# Patient Record
Sex: FEMALE | Race: BLACK OR AFRICAN AMERICAN | NOT HISPANIC OR LATINO | Employment: FULL TIME | URBAN - METROPOLITAN AREA
[De-identification: names, ages, dates, MRNs, and addresses within clinical notes are randomized per-mention and may not be internally consistent; named-entity substitution may affect disease eponyms.]

---

## 2018-01-31 ENCOUNTER — HISTORICAL (OUTPATIENT)
Dept: LAB | Facility: HOSPITAL | Age: 23
End: 2018-01-31

## 2018-01-31 LAB
ABS NEUT (OLG): 1.9 X10(3)/MCL (ref 1.5–6.9)
BASOPHILS NFR BLD MANUAL: 0 % (ref 0–1)
CHOLEST SERPL-MCNC: 208 MG/DL (ref 140–200)
CHOLEST/HDLC SERPL: 3 MG/DL (ref 0–8)
EOSINOPHIL NFR BLD MANUAL: 1 % (ref 0–5)
ERYTHROCYTE [DISTWIDTH] IN BLOOD BY AUTOMATED COUNT: 13.3 % (ref 11.5–17)
EST. AVERAGE GLUCOSE BLD GHB EST-MCNC: 194 MG/DL
GRANULOCYTES NFR BLD MANUAL: 48 % (ref 47–80)
HBA1C MFR BLD: 8.4 % (ref 4.8–6)
HCT VFR BLD AUTO: 34.3 % (ref 36–48)
HDLC SERPL-MCNC: 73 MG/DL (ref 35–59)
HGB BLD-MCNC: 11.1 GM/DL (ref 12–16)
LDLC SERPL CALC-MCNC: 116 MG/DL (ref 100–129)
LYMPHOCYTES NFR BLD MANUAL: 49 % (ref 15–40)
MCH RBC QN AUTO: 28 PG (ref 27–34)
MCHC RBC AUTO-ENTMCNC: 32 GM/DL (ref 31–36)
MCV RBC AUTO: 86 FL (ref 80–99)
MICROCYTES BLD QL SMEAR: ABNORMAL
MONOCYTES NFR BLD MANUAL: 2 % (ref 4–12)
PLATELET # BLD AUTO: 452 X10(3)/MCL (ref 140–400)
PLATELET # BLD EST: ABNORMAL 10*3/UL
PMV BLD AUTO: 9 FL (ref 6.8–10)
POIKILOCYTOSIS BLD QL SMEAR: ABNORMAL
RBC # BLD AUTO: 4.01 X10(6)/MCL (ref 4.2–5.4)
RBC MORPH BLD: ABNORMAL
TRIGL SERPL-MCNC: 101 MG/DL (ref 35–150)
TSH SERPL-ACNC: 1.5 MIU/ML (ref 0.36–3.74)
VLDLC SERPL CALC-MCNC: 20 MG/DL
WBC # SPEC AUTO: 4.8 X10(3)/MCL (ref 4.5–11.5)

## 2022-02-08 ENCOUNTER — HISTORICAL (OUTPATIENT)
Dept: ADMINISTRATIVE | Facility: HOSPITAL | Age: 27
End: 2022-02-08

## 2022-02-13 ENCOUNTER — HISTORICAL (OUTPATIENT)
Dept: ADMINISTRATIVE | Facility: HOSPITAL | Age: 27
End: 2022-02-13

## 2022-02-17 ENCOUNTER — HISTORICAL (OUTPATIENT)
Dept: ADMINISTRATIVE | Facility: HOSPITAL | Age: 27
End: 2022-02-17

## 2022-02-17 ENCOUNTER — HOSPITAL ENCOUNTER (OUTPATIENT)
Dept: MEDSURG UNIT | Facility: HOSPITAL | Age: 27
End: 2022-02-18
Attending: INTERNAL MEDICINE | Admitting: INTERNAL MEDICINE

## 2022-02-17 LAB
ABS NEUT (OLG): 12.33 (ref 2.1–9.2)
ALBUMIN SERPL-MCNC: 3.4 G/DL (ref 3.5–5)
ALBUMIN/GLOB SERPL: 0.9 {RATIO} (ref 1.1–2)
ALP SERPL-CCNC: 81 U/L (ref 40–150)
ALT SERPL-CCNC: 10 U/L (ref 0–55)
APPEARANCE, UA: CLEAR
AST SERPL-CCNC: 15 U/L (ref 5–34)
B-OH-BUTYR SERPL-MCNC: 0.38 MG/DL
BACTERIA SPEC CULT: NORMAL
BASOPHILS # BLD AUTO: 0 10*3/UL (ref 0–0.2)
BASOPHILS NFR BLD AUTO: 0 %
BILIRUB SERPL-MCNC: 0.6 MG/DL
BILIRUB UR QL STRIP: NEGATIVE
BILIRUBIN DIRECT+TOT PNL SERPL-MCNC: 0.3 (ref 0–0.5)
BILIRUBIN DIRECT+TOT PNL SERPL-MCNC: 0.3 (ref 0–0.8)
BUN SERPL-MCNC: 3.5 MG/DL (ref 7–18.7)
BUN SERPL-MCNC: 5.3 MG/DL (ref 7–18.7)
CALCIUM SERPL-MCNC: 8.5 MG/DL (ref 8.7–10.5)
CALCIUM SERPL-MCNC: 9.9 MG/DL (ref 8.7–10.5)
CBG: 102 (ref 70–115)
CBG: 160 (ref 70–115)
CBG: 337 (ref 70–115)
CBG: 437 (ref 70–115)
CHLORIDE SERPL-SCNC: 83 MMOL/L (ref 98–107)
CHLORIDE SERPL-SCNC: 93 MMOL/L (ref 98–107)
CK MB SERPL-MCNC: 0.3 NG/ML
CK SERPL-CCNC: 41 U/L (ref 29–168)
CO2 SERPL-SCNC: 31 MMOL/L (ref 22–29)
CO2 SERPL-SCNC: 34 MMOL/L (ref 22–29)
COLOR UR: NORMAL
CREAT SERPL-MCNC: 0.82 MG/DL (ref 0.55–1.02)
CREAT SERPL-MCNC: 1.41 MG/DL (ref 0.55–1.02)
CREAT/UREA NIT SERPL: 4
EOSINOPHIL # BLD AUTO: 0 10*3/UL (ref 0–0.9)
EOSINOPHIL NFR BLD AUTO: 0 %
ERYTHROCYTE [DISTWIDTH] IN BLOOD BY AUTOMATED COUNT: 12.5 % (ref 11.5–14.5)
FLAG2 (OHS): 70
FLAG3 (OHS): 80
FLAGS (OHS): 80
GLOBULIN SER-MCNC: 3.8 G/DL (ref 2.4–3.5)
GLUCOSE (UA): NORMAL
GLUCOSE SERPL-MCNC: 453 MG/DL (ref 74–100)
GLUCOSE SERPL-MCNC: 98 MG/DL (ref 74–100)
HCT VFR BLD AUTO: 33 % (ref 35–46)
HEMOLYSIS INTERF INDEX SERPL-ACNC: 1
HEMOLYSIS INTERF INDEX SERPL-ACNC: 3
HEMOLYSIS INTERF INDEX SERPL-ACNC: 6
HGB BLD-MCNC: 11.1 G/DL (ref 12–16)
HGB UR QL STRIP: NEGATIVE
HYALINE CASTS #/AREA URNS LPF: NORMAL /[LPF]
ICTERIC INTERF INDEX SERPL-ACNC: 1
ICTERIC INTERF INDEX SERPL-ACNC: 1
IMM GRANULOCYTES # BLD AUTO: 0.15 10*3/UL
IMM GRANULOCYTES NFR BLD AUTO: 1 %
IMM. NE 1 SUSPECT FLAG (OHS): 20
IMM. NE 2 SUSPECT FLAG (OHS): 10
KETONES UR QL STRIP: NORMAL
LEUKOCYTE ESTERASE UR QL STRIP: NEGATIVE
LIPASE SERPL-CCNC: 22 U/L
LIPEMIC INTERF INDEX SERPL-ACNC: 10
LIPEMIC INTERF INDEX SERPL-ACNC: 8
LOW EVENT # SUSPECT FLAG (OHS): 80
LYMPHOCYTES # BLD AUTO: 1.9 10*3/UL (ref 0.6–4.6)
LYMPHOCYTES NFR BLD AUTO: 12 %
MAGNESIUM SERPL-MCNC: 1.3 MG/DL (ref 1.6–2.6)
MANUAL DIFF? (OHS): NO
MCH RBC QN AUTO: 28.3 PG (ref 26–34)
MCHC RBC AUTO-ENTMCNC: 33.6 G/DL (ref 31–37)
MCV RBC AUTO: 84.2 FL (ref 80–100)
MO BLASTS SUSPECT FLAG (OHS): 40
MONOCYTES # BLD AUTO: 1.1 10*3/UL (ref 0.1–1.3)
MONOCYTES NFR BLD AUTO: 7 %
MUCOUS THREADS URNS QL MICRO: NORMAL
NEUTROPHILS # BLD AUTO: 12.33 10*3/UL (ref 2.1–9.2)
NEUTROPHILS NFR BLD AUTO: 79 %
NITRITE UR QL STRIP: NEGATIVE
NRBC BLD AUTO-RTO: 0 % (ref 0–0.2)
PH UR STRIP: 6.5 [PH] (ref 4.5–8)
PHOSPHATE SERPL-MCNC: 3.1 MG/DL (ref 2.3–4.7)
PLATELET # BLD AUTO: 739 10*3/UL (ref 130–400)
PLATELET CLUMPS SUSPECT FLAG (OHS): 10
PMV BLD AUTO: 8.7 FL (ref 7.4–10.4)
POTASSIUM SERPL-SCNC: 2.2 MMOL/L (ref 3.5–5.1)
POTASSIUM SERPL-SCNC: 2.5 MMOL/L (ref 3.5–5.1)
PROT SERPL-MCNC: 7.2 G/DL (ref 6.4–8.3)
PROT UR QL STRIP: NORMAL
RBC # BLD AUTO: 3.92 10*6/UL (ref 4–5.2)
RBC #/AREA URNS HPF: NORMAL /[HPF] (ref 0–5)
SARS-COV-2 AG RESP QL IA.RAPID: NEGATIVE
SODIUM SERPL-SCNC: 128 MMOL/L (ref 136–145)
SODIUM SERPL-SCNC: 137 MMOL/L (ref 136–145)
SP GR UR STRIP: 1.01 (ref 1–1.03)
SQUAMOUS EPITHELIAL, UA: NORMAL
TROPONIN I SERPL-MCNC: 0.01 NG/ML (ref 0–0.04)
UROBILINOGEN UR STRIP-ACNC: NORMAL
WBC # SPEC AUTO: 15.6 10*3/UL (ref 4.5–11)
WBC #/AREA URNS HPF: NORMAL /[HPF] (ref 0–5)

## 2022-02-18 LAB
ABS NEUT (OLG): 5.91 (ref 2.1–9.2)
ALBUMIN SERPL-MCNC: 2.5 G/DL (ref 3.5–5)
ALBUMIN/GLOB SERPL: 0.9 {RATIO} (ref 1.1–2)
ALP SERPL-CCNC: 61 U/L (ref 40–150)
ALT SERPL-CCNC: 8 U/L (ref 0–55)
AST SERPL-CCNC: 14 U/L (ref 5–34)
BASOPHILS # BLD AUTO: 0 10*3/UL (ref 0–0.2)
BASOPHILS NFR BLD AUTO: 0 %
BILIRUB SERPL-MCNC: 0.3 MG/DL
BILIRUBIN DIRECT+TOT PNL SERPL-MCNC: 0.1 (ref 0–0.5)
BILIRUBIN DIRECT+TOT PNL SERPL-MCNC: 0.2 (ref 0–0.8)
BUN SERPL-MCNC: 3.5 MG/DL (ref 7–18.7)
CALCIUM SERPL-MCNC: 8.3 MG/DL (ref 8.7–10.5)
CBG: 143 (ref 70–115)
CBG: 94 (ref 70–115)
CHLORIDE SERPL-SCNC: 99 MMOL/L (ref 98–107)
CO2 SERPL-SCNC: 32 MMOL/L (ref 22–29)
CREAT SERPL-MCNC: 0.76 MG/DL (ref 0.55–1.02)
EOSINOPHIL # BLD AUTO: 0.2 10*3/UL (ref 0–0.9)
EOSINOPHIL NFR BLD AUTO: 2 %
ERYTHROCYTE [DISTWIDTH] IN BLOOD BY AUTOMATED COUNT: 12.9 % (ref 11.5–14.5)
FLAG2 (OHS): 70
FLAG3 (OHS): 80
FLAGS (OHS): 80
GLOBULIN SER-MCNC: 2.8 G/DL (ref 2.4–3.5)
GLUCOSE SERPL-MCNC: 110 MG/DL (ref 74–100)
HCT VFR BLD AUTO: 30 % (ref 35–46)
HEMOLYSIS INTERF INDEX SERPL-ACNC: 11
HGB BLD-MCNC: 9.9 G/DL (ref 12–16)
ICTERIC INTERF INDEX SERPL-ACNC: 0
IMM GRANULOCYTES # BLD AUTO: 0.08 10*3/UL
IMM GRANULOCYTES NFR BLD AUTO: 1 %
IMM. NE 1 SUSPECT FLAG (OHS): 10
IMM. NE 2 SUSPECT FLAG (OHS): 10
LIPEMIC INTERF INDEX SERPL-ACNC: 7
LOW EVENT # SUSPECT FLAG (OHS): 80
LYMPHOCYTES # BLD AUTO: 2.5 10*3/UL (ref 0.6–4.6)
LYMPHOCYTES NFR BLD AUTO: 26 %
MANUAL DIFF? (OHS): NO
MCH RBC QN AUTO: 28.2 PG (ref 26–34)
MCHC RBC AUTO-ENTMCNC: 33 G/DL (ref 31–37)
MCV RBC AUTO: 85.5 FL (ref 80–100)
MO BLASTS SUSPECT FLAG (OHS): 40
MONOCYTES # BLD AUTO: 1 10*3/UL (ref 0.1–1.3)
MONOCYTES NFR BLD AUTO: 10 %
NEUTROPHILS # BLD AUTO: 5.91 10*3/UL (ref 2.1–9.2)
NEUTROPHILS NFR BLD AUTO: 61 %
NRBC BLD AUTO-RTO: 0 % (ref 0–0.2)
PLATELET # BLD AUTO: 610 10*3/UL (ref 130–400)
PMV BLD AUTO: 8.3 FL (ref 7.4–10.4)
POTASSIUM SERPL-SCNC: 3.8 MMOL/L (ref 3.5–5.1)
PROT SERPL-MCNC: 5.3 G/DL (ref 6.4–8.3)
RBC # BLD AUTO: 3.51 10*6/UL (ref 4–5.2)
SODIUM SERPL-SCNC: 139 MMOL/L (ref 136–145)
WBC # SPEC AUTO: 9.6 10*3/UL (ref 4.5–11)

## 2022-03-22 ENCOUNTER — HOSPITAL ENCOUNTER (OUTPATIENT)
Dept: MEDSURG UNIT | Facility: HOSPITAL | Age: 27
End: 2022-03-24
Attending: INTERNAL MEDICINE | Admitting: INTERNAL MEDICINE

## 2022-03-22 LAB
ABS NEUT (OLG): 7.5 (ref 1.5–6.9)
ALBUMIN SERPL-MCNC: 3 G/DL (ref 3.5–5)
ALBUMIN SERPL-MCNC: 3.4 G/DL (ref 3.5–5)
ALBUMIN/GLOB SERPL: 0.9 {RATIO} (ref 1.1–2)
ALBUMIN/GLOB SERPL: 1 {RATIO} (ref 1.1–2)
ALP SERPL-CCNC: 65 U/L (ref 40–150)
ALP SERPL-CCNC: 73 U/L (ref 40–150)
ALT SERPL-CCNC: 9 U/L (ref 0–55)
ALT SERPL-CCNC: 9 U/L (ref 0–55)
APPEARANCE, UA: CLEAR
AST SERPL-CCNC: 13 U/L (ref 5–34)
AST SERPL-CCNC: 19 U/L (ref 5–34)
B-HCG SERPL QL: NEGATIVE
BASOPHILS # BLD AUTO: 0 10*3/UL (ref 0–0.1)
BASOPHILS NFR BLD AUTO: 0 % (ref 0–1)
BILIRUB SERPL-MCNC: 0.7 MG/DL
BILIRUB SERPL-MCNC: 0.9 MG/DL
BILIRUB UR QL STRIP: NEGATIVE
BILIRUBIN DIRECT+TOT PNL SERPL-MCNC: 0.2 (ref 0–0.5)
BILIRUBIN DIRECT+TOT PNL SERPL-MCNC: 0.3 (ref 0–0.5)
BILIRUBIN DIRECT+TOT PNL SERPL-MCNC: 0.5 (ref 0–0.8)
BILIRUBIN DIRECT+TOT PNL SERPL-MCNC: 0.6 (ref 0–0.8)
BUN SERPL-MCNC: 12 MG/DL (ref 7–18.7)
BUN SERPL-MCNC: 13 MG/DL (ref 7–18.7)
CALCIUM SERPL-MCNC: 8.3 MG/DL (ref 8.7–10.5)
CALCIUM SERPL-MCNC: 9.1 MG/DL (ref 8.7–10.5)
CBG: 144 (ref 70–115)
CBG: 255 (ref 70–115)
CBG: 302 (ref 70–115)
CBG: 334 (ref 70–115)
CBG: 51 (ref 70–115)
CBG: 61 (ref 70–115)
CHLORIDE SERPL-SCNC: 90 MMOL/L (ref 98–107)
CHLORIDE SERPL-SCNC: 93 MMOL/L (ref 98–107)
CO2 SERPL-SCNC: 29 MMOL/L (ref 22–29)
CO2 SERPL-SCNC: 33 MMOL/L (ref 22–29)
COLOR UR: YELLOW
CREAT SERPL-MCNC: 0.8 MG/DL (ref 0.55–1.02)
CREAT SERPL-MCNC: 1.04 MG/DL (ref 0.55–1.02)
CRP SERPL HS-MCNC: 0.05 MG/L
DO MICRO?: YES
EOSINOPHIL # BLD AUTO: 0 10*3/UL (ref 0–0.6)
EOSINOPHIL NFR BLD AUTO: 0 % (ref 0–5)
ERYTHROCYTE [DISTWIDTH] IN BLOOD BY AUTOMATED COUNT: 13.4 % (ref 11.5–17)
ERYTHROCYTE [SEDIMENTATION RATE] IN BLOOD: 63 MM/H (ref 0–20)
GLOBULIN SER-MCNC: 3 G/DL (ref 2.4–3.5)
GLOBULIN SER-MCNC: 3.7 G/DL (ref 2.4–3.5)
GLUCOSE (UA): >=1000
GLUCOSE SERPL-MCNC: 262 MG/DL (ref 74–100)
GLUCOSE SERPL-MCNC: 402 MG/DL (ref 74–100)
HCG UR QL IA.RAPID: NEGATIVE
HCG UR QL IA.RAPID: POSITIVE
HCT VFR BLD AUTO: 34.7 % (ref 36–48)
HEMOLYSIS INTERF INDEX SERPL-ACNC: 0
HGB BLD-MCNC: 11.5 G/DL (ref 12–16)
HGB UR QL STRIP: NORMAL
ICTERIC INTERF INDEX SERPL-ACNC: 1
ICTERIC INTERF INDEX SERPL-ACNC: 1
IMM GRANULOCYTES # BLD AUTO: 0.05 10*3/UL (ref 0–0.02)
IMM GRANULOCYTES NFR BLD AUTO: 0.5 % (ref 0–0.43)
KETONES UR QL STRIP: 40
LACTATE SERPL-SCNC: 1.8 MMOL/L (ref 0.5–2.2)
LEUKOCYTE ESTERASE UR QL STRIP: NEGATIVE
LIPASE SERPL-CCNC: 17 U/L
LIPEMIC INTERF INDEX SERPL-ACNC: 0
LIPEMIC INTERF INDEX SERPL-ACNC: 2
LYMPHOCYTES # BLD AUTO: 1.7 10*3/UL (ref 0.5–4.1)
LYMPHOCYTES NFR BLD AUTO: 17 % (ref 15–40)
MAGNESIUM SERPL-MCNC: 1.5 MG/DL (ref 1.6–2.6)
MANUAL DIFF? (OHS): NO
MCH RBC QN AUTO: 28 PG (ref 27–34)
MCHC RBC AUTO-ENTMCNC: 33 G/DL (ref 31–36)
MCV RBC AUTO: 86 FL (ref 80–99)
MONOCYTES # BLD AUTO: 0.6 10*3/UL (ref 0–1.1)
MONOCYTES NFR BLD AUTO: 6 % (ref 4–12)
NEUTROPHILS # BLD AUTO: 7.5 10*3/UL (ref 1.5–6.9)
NEUTROPHILS NFR BLD AUTO: 76 % (ref 43–75)
NITRITE UR QL STRIP: NEGATIVE
PH UR STRIP: 7 [PH] (ref 4.6–8)
PHOSPHATE SERPL-MCNC: 2.5 MG/DL (ref 2.3–4.7)
PLATELET # BLD AUTO: 516 10*3/UL (ref 140–400)
PMV BLD AUTO: 9.3 FL (ref 6.8–10)
POTASSIUM SERPL-SCNC: 2.5 MMOL/L (ref 3.5–5.1)
POTASSIUM SERPL-SCNC: 2.9 MMOL/L (ref 3.5–5.1)
PROT SERPL-MCNC: 6 G/DL (ref 6.4–8.3)
PROT SERPL-MCNC: 7.1 G/DL (ref 6.4–8.3)
PROT UR QL STRIP: >=300
RBC # BLD AUTO: 4.06 10*6/UL (ref 4.2–5.4)
SARS-COV-2 AG RESP QL IA.RAPID: NOT DETECTED
SODIUM SERPL-SCNC: 134 MMOL/L (ref 136–145)
SODIUM SERPL-SCNC: 136 MMOL/L (ref 136–145)
SP GR UR STRIP: 1.02 (ref 1–1.03)
UROBILINOGEN UR STRIP-ACNC: 0.2
WBC # SPEC AUTO: 9.9 10*3/UL (ref 4.5–11.5)

## 2022-03-23 LAB
ABS NEUT (OLG): 5.2 (ref 1.5–6.9)
ALBUMIN SERPL-MCNC: 2.7 G/DL (ref 3.5–5)
ALBUMIN/GLOB SERPL: 0.9 {RATIO} (ref 1.1–2)
ALP SERPL-CCNC: 67 U/L (ref 40–150)
ALT SERPL-CCNC: 7 U/L (ref 0–55)
AST SERPL-CCNC: 15 U/L (ref 5–34)
BASOPHILS # BLD AUTO: 0 10*3/UL (ref 0–0.1)
BASOPHILS NFR BLD AUTO: 0 % (ref 0–1)
BILIRUB SERPL-MCNC: 0.5 MG/DL
BILIRUBIN DIRECT+TOT PNL SERPL-MCNC: 0.2 (ref 0–0.5)
BILIRUBIN DIRECT+TOT PNL SERPL-MCNC: 0.3 (ref 0–0.8)
BUN SERPL-MCNC: 7 MG/DL (ref 7–18.7)
BUN SERPL-MCNC: 8 MG/DL (ref 7–18.7)
CALCIUM SERPL-MCNC: 7.9 MG/DL (ref 8.7–10.5)
CALCIUM SERPL-MCNC: 8.2 MG/DL (ref 8.7–10.5)
CBG: 115 (ref 70–115)
CBG: 118 (ref 70–115)
CBG: 124 (ref 70–115)
CBG: 130 (ref 70–115)
CBG: 171 (ref 70–115)
CBG: 42 (ref 70–115)
CBG: 59 (ref 70–115)
CBG: 63 (ref 70–115)
CBG: 90 (ref 70–115)
CHLORIDE SERPL-SCNC: 100 MMOL/L (ref 98–107)
CHLORIDE SERPL-SCNC: 101 MMOL/L (ref 98–107)
CO2 SERPL-SCNC: 30 MMOL/L (ref 22–29)
CO2 SERPL-SCNC: 33 MMOL/L (ref 22–29)
CREAT SERPL-MCNC: 0.69 MG/DL (ref 0.55–1.02)
CREAT SERPL-MCNC: 0.71 MG/DL (ref 0.55–1.02)
CREAT/UREA NIT SERPL: 10
EOSINOPHIL # BLD AUTO: 0.1 10*3/UL (ref 0–0.6)
EOSINOPHIL NFR BLD AUTO: 1 % (ref 0–5)
ERYTHROCYTE [DISTWIDTH] IN BLOOD BY AUTOMATED COUNT: 13.4 % (ref 11.5–17)
GLOBULIN SER-MCNC: 2.9 G/DL (ref 2.4–3.5)
GLUCOSE SERPL-MCNC: 155 MG/DL (ref 74–100)
GLUCOSE SERPL-MCNC: 184 MG/DL (ref 74–100)
HCT VFR BLD AUTO: 28.7 % (ref 36–48)
HEMOLYSIS INTERF INDEX SERPL-ACNC: 2
HEMOLYSIS INTERF INDEX SERPL-ACNC: 2
HEMOLYSIS INTERF INDEX SERPL-ACNC: 9
HGB BLD-MCNC: 9.5 G/DL (ref 12–16)
ICTERIC INTERF INDEX SERPL-ACNC: 0
ICTERIC INTERF INDEX SERPL-ACNC: 1
IMM GRANULOCYTES # BLD AUTO: 0.03 10*3/UL (ref 0–0.02)
IMM GRANULOCYTES NFR BLD AUTO: 0.4 % (ref 0–0.43)
LIPEMIC INTERF INDEX SERPL-ACNC: 3
LIPEMIC INTERF INDEX SERPL-ACNC: 5
LYMPHOCYTES # BLD AUTO: 1.8 10*3/UL (ref 0.5–4.1)
LYMPHOCYTES NFR BLD AUTO: 23 % (ref 15–40)
MAGNESIUM SERPL-MCNC: 2.5 MG/DL (ref 1.6–2.6)
MANUAL DIFF? (OHS): NO
MCH RBC QN AUTO: 28 PG (ref 27–34)
MCHC RBC AUTO-ENTMCNC: 33 G/DL (ref 31–36)
MCV RBC AUTO: 85 FL (ref 80–99)
MONOCYTES # BLD AUTO: 0.7 10*3/UL (ref 0–1.1)
MONOCYTES NFR BLD AUTO: 9 % (ref 4–12)
NEUTROPHILS # BLD AUTO: 5.2 10*3/UL (ref 1.5–6.9)
NEUTROPHILS NFR BLD AUTO: 66 % (ref 43–75)
PLATELET # BLD AUTO: 435 10*3/UL (ref 140–400)
PMV BLD AUTO: 8.7 FL (ref 6.8–10)
POTASSIUM SERPL-SCNC: 2.4 MMOL/L (ref 3.5–5.1)
POTASSIUM SERPL-SCNC: 3.4 MMOL/L (ref 3.5–5.1)
PROT SERPL-MCNC: 5.6 G/DL (ref 6.4–8.3)
RBC # BLD AUTO: 3.39 10*6/UL (ref 4.2–5.4)
SODIUM SERPL-SCNC: 138 MMOL/L (ref 136–145)
SODIUM SERPL-SCNC: 139 MMOL/L (ref 136–145)
WBC # SPEC AUTO: 7.9 10*3/UL (ref 4.5–11.5)

## 2022-03-24 LAB
CBG: 147 (ref 70–115)
CBG: 170 (ref 70–115)
CBG: 47 (ref 70–115)
CBG: 63 (ref 70–115)

## 2022-04-22 ENCOUNTER — HISTORICAL (OUTPATIENT)
Dept: ADMINISTRATIVE | Facility: HOSPITAL | Age: 27
End: 2022-04-22

## 2022-05-14 NOTE — DISCHARGE SUMMARY
Admit and Discharge Dates  Admit Date: 03/24/2022  Discharge Date: 03/24/2022  Physicians  Attending Physician - Tiny ALLEN, Ozzy  Admitting Physician - Tiny ALLEN, Ozzy  Primary Care Physician - Physician MD, Non Staff  Primary Care Physician - Jerson ALLEN, Landen  Discharge Diagnosis  Back pain?[id number]  ?  Chronic bilateral thoracic back pain?M54.6  ?  Diabetic gastroparesis?E11.43  ?  Hypokalemia?E87.6  ?  Hypomagnesemia?E83.42  ?  Surgical Procedures  No procedures recorded for this visit.  Immunizations  01/14/2022 - COVID-19 mRNA, LNP-S, PF - Moderna?  Hospital Course  26-year-old?female with a past medical history of?type 1 diabetes,?acute kidney injury, dehydration?diabetic gastroparesis?presented to emergency room?with significant nausea vomiting?and throwing up. ?Did not get any better.  Upon arrival to emergency room noticed to be dehydrated, potassium was?low as well. ?Patient was admitted to the hospital for further evaluation management  IV fluid,She was placed IV medication for nausea and vomiting continued home medications.  She was treated for couple of days and slowly was started on p.o. medication.? She was started on p.o. diet as well which she tolerated  Patient was seen evaluated again on March 24, 2022.? That time she was at her baseline.  Patient states she has a long history of gastroparesis.? She has been taking Reglan in the past has been discontinued due to side effects.  We will discharge patient home with some p.o. treatment for nausea and vomiting, gastritis.  Patient is to follow-up with primary care physicians and be referred to a gastroenterologist for further evaluation management.  If patient is not a candidate for any Reglan and continues to be symptomatic recommend follow-up with GI and possible gastric pacing.?  ?  Significant Findings  hypokalemia  Time Spent on discharge  34 minutes  Objective  Vitals & Measurements  T:?36.5? ?C (Oral)?  TMIN:?36.5? ?C (Oral)? TMAX:?37? ?C (Oral)? HR:?84(Peripheral)? RR:?18? BP:?123/84? SpO2:?100%?  Physical Exam  VITAL SIGNS: ?Reviewed. ? ?  GENERAL: ?In no apparent distress. ?  HEAD: ?No signs of head trauma.  EYES: ?Pupils are equal. ?Extraocular motions intact. ?  EARS: ?Hearing grossly intact.  MOUTH: ?Oropharynx is normal.?  NECK: ?No adenopathy, no JVD. ??  CHEST: ?Chest with clear breath sounds bilaterally. ?No wheezes, rales, or rhonchi. ?  CARDIAC: ?Regular rate and rhythm. ?S1 and S2, without murmurs, gallops, or rubs.  VASCULAR: ?No Edema. ?Peripheral pulses normal and equal in all extremities.  ABDOMEN:?Soft, mild distention, positive bowel sounds and epigastric discomfort.  MUSCULOSKELETAL: ?Good range of motion of all major joints. Extremities without clubbing, cyanosis or edema. ?  NEUROLOGIC EXAM: ?Alert and oriented x 3. ?No focal sensory or strength deficits. ? Speech normal. ?Follows commands.  PSYCHIATRIC: ?Mood normal.  SKIN: ?No rash or lesions.  ?  Patient Discharge Condition  stable  Discharge Disposition  home   Discharge Medication Reconciliation  Prescribed  erythromycin (erythromycin 250 mg oral tablet)?250 mg, Oral, q8hr  insulin glargine (insulin glargine 100 units/mL subcutaneous solution)?40 units, Subcutaneous, At Bedtime  ondansetron (ondansetron 4 mg oral tablet)?4 mg, Oral, q6hr  Continue  dicyclomine (dicyclomine 10 mg oral capsule)?10 mg, Oral, QID  insulin lispro (HumaLOG Cartridge 100 units/mL injectable solution)?7 units, Subcutaneous, TIDAC  metoprolol (metoprolol tartrate 25 mg oral tab)?25 mg, Oral, BID  potassium chloride (potassium chloride 20 mEq oral powder)?40 mEq, Oral, Once  sucralfate (Carafate 1 g/10 mL oral suspension)?1 gm, Oral, AC & HS  sucralfate (Carafate 1 g/10 mL oral suspension)?1 gm, Oral, AC & HS  Discontinue  insulin glargine (Lantus 100 units/mL subcutaneous solution)?30 units, Subcutaneous, BID  metoclopramide (metoclopramide 5 mg oral tablet)?5 mg,  Oral, TID  pantoprazole (pantoprazole 20 mg ORAL EC-Tablet)?20 mg, Oral, Daily  potassium chloride (potassium chloride 20 mEq oral TABLET extended release)?20 mEq, Oral, BID  promethazine (Phenergan 12.5 mg Tab)?12.5 mg, Oral, q6hr, PRN for nausea/vomiting  promethazine (Promethazine 25 mg ORAL Tab)?25 mg, Oral, q4hr, PRN as needed for motion sickness  promethazine (promethazine 25 mg oral tablet)?25 mg, Oral, q6hr  Follow up  Physician MD, Non Staff, Attending Physician  Follow-up with PCP in 3 to 5 days  Car Seat Challenge  No Qualifying Data

## 2022-05-14 NOTE — ED PROVIDER NOTES
Patient:   Dorene Husain             MRN: [id number]            FIN: [id number]               Age:   26 years     Sex:  Female     :  1995   Associated Diagnoses:   Chronic bilateral thoracic back pain; Diabetic gastroparesis; Hypokalemia; Hypomagnesemia   Author:   Ozzy Franks MD      Basic Information   Time seen: Immediately upon arrival.   History source: Patient.   Arrival mode: Private vehicle.   History limitation: None.   Additional information: Chief Complaint from Nursing Triage Note : Chief Complaint   3/22/2022 11:10 CDT      Chief Complaint           Pain from the waiste up to her shoulder blades causing nausea and vomitting   started 2 weeks ago has not improved    3/21/2022 0:46 CDT       Chief Complaint           c/o generalized back pain with N/V that began at 1600 today. last tylenol at 2100.  .   Provider/Visit info:   Time Seen:  Ozzy Franks MD / 2022 11:21  .   History of Present Illness   The patient presents with 26-year-old female with history of type 1 diabetes, gastroparesis, and chronic thoracic back pain presents with nontraumatic thoracic back pain worse over the past 2 weeks.  Patient also admits to intermittent nausea with vomiting.  Denies diarrhea or constipation.  Denies fever or sick contacts.  Denies chest pain or shortness of breath.  This is her third ED visit in the past 9 days.  No other complaints..  The onset was chronic.  The course/duration of symptoms is constant and fluctuating in intensity.  Type of injury: none.  The location where the incident occurred was unknown.  Location: Bilateral thoracic. Radiating pain: none. The character of symptoms is achy.  The degree at onset was minimal.  The degree at present is minimal.  The exacerbating factor is none.  The relieving factor is analgesics.  Risk factors consist of none.  Prior episodes: none.  Therapy today: see nurses notes.  Associated symptoms: denies bowel dysfunction and denies bladder  dysfunction.        Review of Systems   Constitutional symptoms:  Negative except as documented in HPI.   Skin symptoms:  Negative except as documented in HPI.   Eye symptoms:  Negative except as documented in HPI.   ENMT symptoms:  Negative except as documented in HPI.   Respiratory symptoms:  Negative except as documented in HPI.   Cardiovascular symptoms:  Negative except as documented in HPI.   Gastrointestinal symptoms:  Nausea, vomiting.    Genitourinary symptoms:  Negative except as documented in HPI.   Musculoskeletal symptoms:  Back pain.   Neurologic symptoms:  Negative except as documented in HPI.   Psychiatric symptoms:  Negative except as documented in HPI.   Endocrine symptoms:  Negative except as documented in HPI.   Hematologic/Lymphatic symptoms:  Negative except as documented in HPI.   Allergy/immunologic symptoms:  Negative except as documented in HPI.      Health Status   Allergies:    Allergic Reactions (Selected)  No Known Allergies,    Allergies (1) Active Reaction  No Known Allergies None Documented  .   Medications:  (Selected)   Inpatient Medications  Ordered  IVF Normal Saline NS Bolus 1000ml: 1,000 mL, 1,000 mL, IV, Once, 1000 mL/hr, start date 02/06/22 5:33:00 CST, stop date 02/06/22 5:33:00 CST, STAT, 1.83, m2  IVF Normal Saline NS Infusion: 1,000 mL, 1,000 mL, IV, Once, 1000 mL/hr, start date 02/06/22 5:33:00 CST, stop date 02/06/22 5:33:00 CST, STAT, 1.83, m2  insulin glargine 100 U/mL (per 1 unit): 20 units, form: Soln, Subcutaneous, Once-NOW, first dose 02/06/22 7:50:00 CST, stop date 02/06/22 7:50:00 CST, STAT  morphine 1 mg/mL injectable solution: 2 mg, form: Soln, IV Push, Once-NOW, first dose 02/18/22 0:20:00 CST, stop date 02/18/22 0:20:00 CST, NOW  Prescriptions  Prescribed  Carafate 1 g/10 mL oral suspension: 1 gm = 10 mL, Oral, AC & HS, # 1,120 mL, 2 Refill(s), Pharmacy: [facility], 160, cm, Height/Length Dosing, 02/07/22 17:58:00 CST, 73.7, kg, Weight Dosing,  02/07/22 17:58:00 CST  Lantus 100 units/mL subcutaneous solution: 30 units, Subcutaneous, BID, # 12 mL, 3 Refill(s), Pharmacy: [facility], 160, cm, Height/Length Dosing, 02/07/22 17:58:00 CST, 73.7, kg, Weight Dosing, 02/07/22 17:58:00 CST  Phenergan 12.5 mg Tab: 12.5 mg = 1 tab(s), Oral, q6hr, PRN PRN for nausea/vomiting, # 12 tab(s), 0 Refill(s)  Promethazine 25 mg ORAL Tab: 25 mg = 1 tab(s), Oral, q4hr, PRN PRN as needed for motion sickness, # 18 tab(s), 0 Refill(s)  potassium chloride 20 mEq oral TABLET extended release: 20 mEq = 1 tab(s), Oral, BID, # 14 tab(s), 0 Refill(s)  promethazine 25 mg oral tablet: 25 mg = 1 tab(s), Oral, q6hr, # 28 tab(s), 0 Refill(s), 160, cm, Height/Length Dosing, 02/06/22 5:26:00 CST, 80, kg, Weight Dosing, 02/06/22 5:26:00 CST  Documented Medications  Documented  HumaLOG Cartridge 100 units/mL injectable solution: 7 units, Subcutaneous, TIDAC, # 3 mL, 0 Refill(s)  dicyclomine 10 mg oral capsule: 10 mg = 1 cap(s), Oral, QID  metoclopramide 5 mg oral tablet: 5 mg = 1 tab(s), Oral, TID  metoprolol tartrate 25 mg oral tab: 25 mg = 1 tab(s), Oral, BID  ondansetron 4 mg oral tablet: 4 mg = 1 tab(s), Oral, q6hr  pantoprazole 20 mg ORAL EC-Tablet: 20 mg = 1 tab(s), Oral, Daily  potassium chloride 20 mEq oral powder: 40 mEq = 2 packet(s), Oral, Once, 0 Refill(s).      Past Medical/ Family/ Social History   Medical history:    Active  DM - Diabetes mellitus (452376612)  Diabetic gastroparesis (8275091012)  Resolved  DKA - diabetic ketoacidosis (1769662377): Onset on 11/27/2021 at 26 years.  Resolved on 11/29/2021 at 26 years., Reviewed as documented in chart.   Surgical history:    Esophagogastroduodenoscopy on 9/11/2021 at 26 Years.  Comments:  9/11/2021 13:34 Philip Healy RN  auto-populated from documented surgical case  Biopsy Gastrointestinal on 9/11/2021 at 26 Years.  Comments:  9/11/2021 13:34 Philip Healy RN  auto-populated from  documented surgical case  Biopsy Gastrointestinal on 7/30/2018 at 23 Years.  Comments:  7/30/2018 8:06 Kunal Briseno  auto-populated from documented surgical case  Cytology  Brushing on 7/30/2018 at 23 Years.  Comments:  7/30/2018 8:06 Kunal Briseno  auto-populated from documented surgical case  Esophagogastroduodenoscopy on 7/30/2018 at 23 Years.  Comments:  7/30/2018 8:06 Kunal Briseno  auto-populated from documented surgical case  Esophagogastroduodenoscopy on 8/12/2016 at 21 Years.  Comments:  8/12/2016 11:58 NEVILLE Orta RN, Cierra CHISHOLM  auto-populated from documented surgical case  Central Line Insertion (Right) on 8/11/2016 at 21 Years.  Comments:  8/11/2016 14:27 Renea Pena RN  auto-populated from documented surgical case  Cholecystectomy Laparoscopic on 8/11/2016 at 21 Years.  Comments:  8/11/2016 14:27 Renea Pena RN  auto-populated from documented surgical case, Reviewed as documented in chart.   Family history:    Stroke  Father  Metastatic cancer  Father  Acute myocardial infarction.  Mother  Congestive heart disease.  Mother  Heart failure.  Mother  Hypertension.  Mother  Father  Diabetes mellitus type 1.  Mother  Hyperlipidemia.  Father  Mother  , Reviewed as documented in chart.   Social history: Reviewed as documented in chart.   Problem list:    Active Problems (11)  Acute disease or injury-related malnutrition   CHATA (acute kidney injury)   Dehydration   Diabetic gastroparesis   DM - Diabetes mellitus   Drug-seeking behavior   Lactic acidosis   Leukocytosis   Malnutrition   Nausea & vomiting   PNA (pneumonia)   .      Physical Examination               Vital Signs      Vital Signs (last 24 hrs)_____  Last Charted___________  Temp Oral     36.9 DegC  (MAR 22 11:10)  Heart Rate Peripheral   H 125bpm  (MAR 22 11:10)  Resp Rate         20 br/min  (MAR 22 11:10)  SBP      H 185mmHg  (MAR 22 11:10)  DBP      H 91mmHg  (MAR 22 11:10)  SpO2      100 %  (MAR 22  11:10)  .   General:  Alert, no acute distress.    Skin:  Warm, dry.    Head:  Normocephalic.   Neck:  Supple.   Eye:  Extraocular movements are intact, normal conjunctiva.    Ears, nose, mouth and throat:  Oral mucosa moist.   Cardiovascular:  Regular rate and rhythm.   Respiratory:  Lungs are clear to auscultation, respirations are non-labored.    Chest wall:  No tenderness.   Back:  Nontender, Normal range of motion.    Musculoskeletal:  Normal ROM, normal strength, no tenderness, no swelling, no deformity.    Gastrointestinal:  Soft, Nontender, Non distended, Normal bowel sounds.    Neurological:  No focal neurological deficit observed.   Lymphatics:  No lymphadenopathy.   Psychiatric:  Cooperative.      Medical Decision Making   Documents reviewed:  Emergency department nurses' notes, emergency department records, prior records.    Results review:  Lab results : Lab View   3/22/2022 14:30 CDT      COVID-19 Rapid            Not Detected    3/22/2022 12:36 CDT      POC pH                    7.600  HI                             POC pCO2                  32.7 mmHg  LOW                             POC pO2                   89.0 mmHg                             POC HCO3                  32.1 mmol/L  HI                             POC TCO2                  33.0 mmol/L  HI                             POC sO2                   98.0 %  HI                             POC BE                    11 mmol/L  HI    3/22/2022 12:32 CDT      Sample ABG                ARTERIAL                             Treatment                 ROOM AIR                             Site                      Brachial Rt                             pH Art                    7.600  HI                             pCO2 Art                  32.7 mmHg  LOW                             pO2 Art                   89.0 mmHg                             HCO3 Art                  32.1 mmol/L  HI                             CO2 Totl Art              33.0 mmol/L   HI                             O2 Sat Art                98.0 mmHg  HI                             D base                    11.0  HI                             Allens                    N/A    3/22/2022 12:07 CDT      Est Creat Clearance Ser   88.00 mL/min    3/22/2022 11:43 CDT      Sodium Lvl                134 mmol/L  LOW                             Potassium Lvl             2.5 mmol/L  LOW                             Chloride                  90 mmol/L  LOW                             CO2                       29 mmol/L                             Calcium Lvl               9.1 mg/dL                             Magnesium Lvl             1.50 mg/dL  LOW                             Glucose Lvl               402 mg/dL  HI                             BUN                       13.0 mg/dL                             Creatinine                1.04 mg/dL  HI                             eGFR-AA                   >60  NA                             eGFR-YENI                  >60 mls/min/1.73/m2  NA                             Bili Total                0.9 mg/dL                             Bili Direct               0.3 mg/dL                             Bili Indirect             0.60 mg/dL                             AST                       13 unit/L                             ALT                       9 unit/L                             Alk Phos                  73 unit/L                             Total Protein             7.1 gm/dL                             Albumin Lvl               3.4 gm/dL  LOW                             Globulin                  3.7 gm/dL  HI                             A/G Ratio                 0.9 ratio  LOW                             Phosphorus                2.5 mg/dL                             Lactic Acid Lvl           1.8 mmol/L                             Lipase Lvl                17 U/L                             WBC                       9.9 x10(3)/mcL                              RBC                       4.06 x10(6)/mcL  LOW                             Hgb                       11.5 gm/dL  LOW                             Hct                       34.7 %  LOW                             Platelet                  516 x10(3)/mcL  HI                             MCV                       86 fL                             MCH                       28 pg                             MCHC                      33 gm/dL                             RDW                       13.4 %                             MPV                       9.3 fL                             Abs Neut                  7.5 x10(3)/mcL  HI                             Neutro Auto               76 %  HI                             Lymph Auto                17 %                             Mono Auto                 6 %                             Eos Auto                  0 %                             Abs Eos                   0.0 x10(3)/mcL                             Basophil Auto             0 %                             Abs Neutro                7.5 x10(3)/mcL  HI                             Abs Lymph                 1.7 x10(3)/mcL                             Abs Mono                  0.6 x10(3)/mcL                             Abs Baso                  0.0 x10(3)/mcL                             IG%                       0.500 %  HI                             IG#                       0.0500  HI                             Sed Rate                  63 mm/hr  HI    3/22/2022 11:38 CDT      U Beta hCG Ql             Negative                             UA Appear                 Clear                             UA Color                  Yellow                             UA Spec Grav              1.025                             UA Bili                   Negative                             UA pH                     7.0                             UA Urobilinogen           0.2 EU/dL                             UA Blood                   Small                             UA Glucose                >=1000 mg/dL                             UA Ketones                40 mg/dL                             UA Protein                >=300 mg/dL                             UA Nitrite                Negative                             UA Leuk Est               Negative                             UA WBC                    2-5 /HPF                             UA RBC                    2-5 /HPF                             UA Mucous                 Trace                             UA Bacteria               Rare /HPF                             UA Trichomonas            Few /HPF                             UA Squam Epithelial       Few /LPF                             UA Hyal Cast              Rare /LPF    3/21/2022 5:21 CDT       U pH                      8  NA                             UA Appear                 Clear                             UA Color                  Yellow                             UA Spec Grav              1.025                             UA Bili                   Negative                             UA pH                     8.5  HI                             UA Urobilinogen           0.2 EU/dL                             UA Blood                  Moderate                             UA Glucose                250 mg/dL                             UA Ketones                Trace mg/dL                             UA Protein                >=300 mg/dL                             UA Nitrite                Negative                             UA Leuk Est               Negative                             UA WBC                    None Seen                             UA RBC                    5-10 /HPF                             UA Bacteria               Rare /HPF                             UA Trichomonas            Few /HPF                             UA Squam Epithelial       Few /LPF                             U Amph Scr                 Negative                             U Judie Scr                Negative                             U Benzodia Scr            Negative                             U Cannab Scr              Negative                             U Cocaine Scr             Negative                             U Opiate Scr              Negative                             U Phencyclidine Scr       Negative                             U Methadone               Negative                             U MDMA Scr                Negative                             U Fentanyl Scr            Negative    3/21/2022 1:48 CDT       Sodium Lvl                139 mmol/L                             Potassium Lvl             2.7 mmol/L  LOW                             Chloride                  99 mmol/L                             CO2                       29 mmol/L                             Calcium Lvl               9.4 mg/dL                             Magnesium Lvl             1.60 mg/dL                             Glucose Lvl               247 mg/dL  HI                             BUN                       11.0 mg/dL                             Creatinine                0.90 mg/dL                             eGFR-AA                   >60  NA                             eGFR-YENI                  >60 mls/min/1.73/m2  NA                             Bili Total                0.6 mg/dL                             Bili Direct               0.2 mg/dL                             Bili Indirect             0.40 mg/dL                             AST                       16 unit/L                             ALT                       10 unit/L                             Alk Phos                  75 unit/L                             Total Protein             7.3 gm/dL                             Albumin Lvl               3.5 gm/dL                             Globulin                  3.8 gm/dL  HI                             A/G Ratio                  0.9 ratio  LOW                             BNP                       68.3 pg/mL                             Lipase Lvl                37 U/L                             Total CK                  93 U/L                             WBC                       8.4 x10(3)/mcL                             RBC                       3.87 x10(6)/mcL  LOW                             Hgb                       10.6 gm/dL  LOW                             Hct                       32.0 %  LOW                             Platelet                  519 x10(3)/mcL  HI                             MCV                       83 fL                             MCH                       27 pg                             MCHC                      33 gm/dL                             RDW                       13.2 %                             MPV                       8.9 fL                             Abs Neut                  6.6 x10(3)/mcL                             Neutro Auto               78 %  HI                             Lymph Auto                15 %                             Mono Auto                 5 %                             Eos Auto                  0 %                             Abs Eos                   0.0 x10(3)/mcL                             Basophil Auto             0 %                             Abs Neutro                6.6 x10(3)/mcL                             Abs Lymph                 1.3 x10(3)/mcL                             Abs Mono                  0.4 x10(3)/mcL                             Abs Baso                  0.0 x10(3)/mcL                             IG%                       0.500 %  HI                             IG#                       0.0400  HI    3/21/2022 1:16 CDT       Beta hCG Ql               Negative  ,    No qualifying data available.       Reexamination/ Reevaluation   Vital signs   Anion gap 15   Course: progressing as expected.   Pain status: decreased.   Assessment: exam  improved.      Procedure   Critical care note   Total time: 30 minutes spent engaged in work directly related to patient care and/ or available for direct patient care.   Critical condition(s) addressed for impending deterioration include: cardiovascular, metabolic.   Associated risk factors: dysrhythmia, metabolic changes, dehydration.   Management: bedside assessment, supervision of care, Interpretation (blood gases, blood pressure, cardiac output measures), Interventions hemodynamic management.   Treatment response: Patient's been hydrated with IV fluids and given subcu insulin.  IV mag and p.o. potassium.  3rd ED visit in the past 9 days.  Will admit for hydration, ISS, and electrolyte replacement..   Performed by: self.      Impression and Plan   Diagnosis   Chronic bilateral thoracic back pain (MQO57-LA M54.6)   Diabetic gastroparesis (BGP46-MA E11.43)   Hypokalemia (STG32-MN E87.6)   Hypomagnesemia (ZGL99-BP E83.42)      Calls-Consults   -  3/22/2022 14:06:00 , Tra Franklin MD, Ok to admit.    Plan   Condition: Stable.    Disposition: Admit time  3/22/2022 14:06:00, Place in Observation Unit, Dispositioned by: Time: 3/22/2022 14:06:00, Ozzy Franks MD.    Counseled: Patient, Regarding diagnosis, Regarding diagnostic results, Regarding treatment plan, Patient indicated understanding of instructions.    Orders: Launch Orders   Admit/Transfer/Discharge:  Place in Outpatient Observation (Order): 3/22/2022 14:08 CDT, Medical Unit Tra Franklin MD, No.

## 2022-05-20 ENCOUNTER — HOSPITAL ENCOUNTER (INPATIENT)
Facility: HOSPITAL | Age: 27
LOS: 6 days | Discharge: HOME | DRG: 638 | End: 2022-05-26
Attending: INTERNAL MEDICINE | Admitting: INTERNAL MEDICINE
Payer: MEDICAID

## 2022-05-20 DIAGNOSIS — E10.10 TYPE 1 DIABETES MELLITUS WITH KETOACIDOSIS WITHOUT COMA: Primary | ICD-10-CM

## 2022-05-20 DIAGNOSIS — N17.9 AKI (ACUTE KIDNEY INJURY): ICD-10-CM

## 2022-05-20 DIAGNOSIS — A59.9 TRICHOMONIASIS: ICD-10-CM

## 2022-05-20 DIAGNOSIS — R11.2 NAUSEA AND VOMITING, INTRACTABILITY OF VOMITING NOT SPECIFIED, UNSPECIFIED VOMITING TYPE: ICD-10-CM

## 2022-05-20 DIAGNOSIS — R73.9 HYPERGLYCEMIA: ICD-10-CM

## 2022-05-20 DIAGNOSIS — Z91.199 PERSONAL HISTORY OF NONCOMPLIANCE WITH MEDICAL TREATMENT AND REGIMEN: ICD-10-CM

## 2022-05-20 DIAGNOSIS — E87.1 HYPONATREMIA: ICD-10-CM

## 2022-05-20 DIAGNOSIS — E86.0 DEHYDRATION: ICD-10-CM

## 2022-05-20 PROCEDURE — 82962 GLUCOSE BLOOD TEST: CPT

## 2022-05-20 PROCEDURE — 96365 THER/PROPH/DIAG IV INF INIT: CPT

## 2022-05-20 PROCEDURE — 20000000 HC ICU ROOM

## 2022-05-20 PROCEDURE — 96366 THER/PROPH/DIAG IV INF ADDON: CPT

## 2022-05-20 PROCEDURE — 96375 TX/PRO/DX INJ NEW DRUG ADDON: CPT

## 2022-05-20 PROCEDURE — 99291 CRITICAL CARE FIRST HOUR: CPT | Mod: 25

## 2022-05-20 PROCEDURE — 96361 HYDRATE IV INFUSION ADD-ON: CPT

## 2022-05-20 NOTE — Clinical Note
Patient is current on a insulin drip at 8 units an hour and will need admission to the intensive care unit

## 2022-05-21 PROBLEM — E10.10 TYPE 1 DIABETES MELLITUS WITH KETOACIDOSIS WITHOUT COMA: Status: ACTIVE | Noted: 2022-05-21

## 2022-05-21 PROBLEM — N17.9 AKI (ACUTE KIDNEY INJURY): Status: ACTIVE | Noted: 2022-05-21

## 2022-05-21 LAB
ALBUMIN SERPL-MCNC: 3.3 GM/DL (ref 3.5–5)
ALBUMIN/GLOB SERPL: 0.9 RATIO (ref 1.1–2)
ALP SERPL-CCNC: 98 UNIT/L (ref 40–150)
ALT SERPL-CCNC: 9 UNIT/L (ref 0–55)
AMPHET UR QL SCN: NEGATIVE
ANION GAP SERPL CALC-SCNC: 23 MEQ/L
ANION GAP SERPL CALC-SCNC: 8 MEQ/L
APPEARANCE UR: CLEAR
AST SERPL-CCNC: 14 UNIT/L (ref 5–34)
B-HCG SERPL QL: NEGATIVE
BACTERIA #/AREA URNS AUTO: ABNORMAL /HPF
BARBITURATE SCN PRESENT UR: NEGATIVE
BASOPHILS # BLD AUTO: 0.04 X10(3)/MCL (ref 0–0.2)
BASOPHILS NFR BLD AUTO: 0.3 %
BENZODIAZ UR QL SCN: NEGATIVE
BILIRUB UR QL STRIP.AUTO: NEGATIVE MG/DL
BILIRUBIN DIRECT+TOT PNL SERPL-MCNC: 1.2 MG/DL
BUN SERPL-MCNC: 31 MG/DL (ref 7–18.7)
BUN SERPL-MCNC: 39 MG/DL (ref 7–18.7)
BUN SERPL-MCNC: 41 MG/DL (ref 7–18.7)
CALCIUM SERPL-MCNC: 8.4 MG/DL (ref 8.4–10.2)
CALCIUM SERPL-MCNC: 8.5 MG/DL (ref 8.4–10.2)
CALCIUM SERPL-MCNC: 8.7 MG/DL (ref 8.4–10.2)
CANNABINOIDS UR QL SCN: NEGATIVE
CHLORIDE SERPL-SCNC: 79 MMOL/L (ref 98–107)
CHLORIDE SERPL-SCNC: 92 MMOL/L (ref 98–107)
CHLORIDE SERPL-SCNC: 99 MMOL/L (ref 98–107)
CO2 SERPL-SCNC: 10 MMOL/L (ref 22–29)
CO2 SERPL-SCNC: 25 MMOL/L (ref 22–29)
CO2 SERPL-SCNC: 8 MMOL/L (ref 22–29)
COCAINE UR QL SCN: NEGATIVE
COLOR UR AUTO: YELLOW
CREAT SERPL-MCNC: 1.45 MG/DL (ref 0.55–1.02)
CREAT SERPL-MCNC: 1.73 MG/DL (ref 0.55–1.02)
CREAT SERPL-MCNC: 1.91 MG/DL (ref 0.55–1.02)
CREAT/UREA NIT SERPL: 21
CREAT/UREA NIT SERPL: 23
EOSINOPHIL # BLD AUTO: 0 X10(3)/MCL (ref 0–0.9)
EOSINOPHIL NFR BLD AUTO: 0 %
ERYTHROCYTE [DISTWIDTH] IN BLOOD BY AUTOMATED COUNT: 12.3 % (ref 11.5–17)
EST. AVERAGE GLUCOSE BLD GHB EST-MCNC: 220.2 MG/DL
FENTANYL UR QL SCN: NEGATIVE
FIO2: 21
GLOBULIN SER-MCNC: 3.6 GM/DL (ref 2.4–3.5)
GLUCOSE SERPL-MCNC: 174 MG/DL (ref 70–110)
GLUCOSE SERPL-MCNC: 224 MG/DL (ref 70–110)
GLUCOSE SERPL-MCNC: 508 MG/DL (ref 74–100)
GLUCOSE SERPL-MCNC: 53 MG/DL (ref 74–100)
GLUCOSE SERPL-MCNC: 74 MG/DL (ref 70–110)
GLUCOSE SERPL-MCNC: 798 MG/DL (ref 74–100)
GLUCOSE UR QL STRIP.AUTO: >=1000 MG/DL
HBA1C MFR BLD: 9.3 %
HCO3 UR-SCNC: 11.4 MMOL/L (ref 24–28)
HCT VFR BLD AUTO: 34.4 % (ref 37–47)
HGB BLD-MCNC: 11.3 GM/DL (ref 12–16)
IMM GRANULOCYTES # BLD AUTO: 0.09 X10(3)/MCL (ref 0–0.02)
IMM GRANULOCYTES NFR BLD AUTO: 0.7 % (ref 0–0.43)
KETONES UR QL STRIP.AUTO: 80 MG/DL
LEUKOCYTE ESTERASE UR QL STRIP.AUTO: NEGATIVE UNIT/L
LYMPHOCYTES # BLD AUTO: 1.76 X10(3)/MCL (ref 0.6–4.6)
LYMPHOCYTES NFR BLD AUTO: 13 %
MCH RBC QN AUTO: 28.3 PG (ref 27–31)
MCHC RBC AUTO-ENTMCNC: 32.8 MG/DL (ref 33–36)
MCV RBC AUTO: 86.2 FL (ref 80–94)
MDMA UR QL SCN: NEGATIVE
MONOCYTES # BLD AUTO: 1.07 X10(3)/MCL (ref 0.1–1.3)
MONOCYTES NFR BLD AUTO: 7.9 %
MUCOUS THREADS URNS QL MICRO: ABNORMAL /LPF
NEUTROPHILS # BLD AUTO: 10.6 X10(3)/MCL (ref 2.1–9.2)
NEUTROPHILS NFR BLD AUTO: 78.1 %
NITRITE UR QL STRIP.AUTO: NEGATIVE
OPIATES UR QL SCN: NEGATIVE
PCO2 BLDA: 23.6 MMHG (ref 35–45)
PCP UR QL: NEGATIVE
PH SMN: 7.29 [PH] (ref 7.35–7.45)
PH UR STRIP.AUTO: 5 [PH]
PH UR: 5 [PH] (ref 3–11)
PLATELET # BLD AUTO: 530 X10(3)/MCL (ref 130–400)
PMV BLD AUTO: 9.3 FL (ref 9.4–12.4)
PO2 BLDA: 104 MMHG (ref 80–100)
POC BE: -15 MMOL/L
POC SATURATED O2: 97 % (ref 95–100)
POC TCO2: 12 MMOL/L (ref 23–27)
POCT GLUCOSE: 153 MG/DL (ref 70–110)
POCT GLUCOSE: 187 MG/DL (ref 70–110)
POCT GLUCOSE: 213 MG/DL (ref 70–110)
POCT GLUCOSE: 224 MG/DL (ref 70–110)
POCT GLUCOSE: 246 MG/DL (ref 70–110)
POCT GLUCOSE: 296 MG/DL (ref 70–110)
POCT GLUCOSE: 380 MG/DL (ref 70–110)
POCT GLUCOSE: 477 MG/DL (ref 70–110)
POCT GLUCOSE: 54 MG/DL (ref 70–110)
POCT GLUCOSE: 67 MG/DL (ref 70–110)
POCT GLUCOSE: 74 MG/DL (ref 70–110)
POCT GLUCOSE: >500 MG/DL (ref 70–110)
POTASSIUM SERPL-SCNC: 3.3 MMOL/L (ref 3.5–5.1)
POTASSIUM SERPL-SCNC: 3.5 MMOL/L (ref 3.5–5.1)
POTASSIUM SERPL-SCNC: 4.3 MMOL/L (ref 3.5–5.1)
PROT SERPL-MCNC: 6.9 GM/DL (ref 6.4–8.3)
PROT UR QL STRIP.AUTO: 100 MG/DL
RBC # BLD AUTO: 3.99 X10(6)/MCL (ref 4.2–5.4)
RBC #/AREA URNS AUTO: ABNORMAL /HPF
RBC UR QL AUTO: ABNORMAL UNIT/L
SAMPLE: ABNORMAL
SARS-COV-2 RDRP RESP QL NAA+PROBE: NEGATIVE
SODIUM SERPL-SCNC: 118 MMOL/L (ref 136–145)
SODIUM SERPL-SCNC: 125 MMOL/L (ref 136–145)
SODIUM SERPL-SCNC: 132 MMOL/L (ref 136–145)
SP GR UR STRIP.AUTO: 1.01
SPECIFIC GRAVITY, URINE AUTO (.000) (OHS): 1.01 (ref 1–1.03)
SQUAMOUS #/AREA URNS AUTO: ABNORMAL /LPF
T VAGINALIS URNS QL MICRO: ABNORMAL /HPF
UROBILINOGEN UR STRIP-ACNC: 0.2 MG/DL
WBC # SPEC AUTO: 13.6 X10(3)/MCL (ref 4.5–11.5)
WBC #/AREA URNS AUTO: ABNORMAL /HPF

## 2022-05-21 PROCEDURE — 94761 N-INVAS EAR/PLS OXIMETRY MLT: CPT

## 2022-05-21 PROCEDURE — C9399 UNCLASSIFIED DRUGS OR BIOLOG: HCPCS | Performed by: INTERNAL MEDICINE

## 2022-05-21 PROCEDURE — 25000003 PHARM REV CODE 250: Performed by: INTERNAL MEDICINE

## 2022-05-21 PROCEDURE — 63600175 PHARM REV CODE 636 W HCPCS: Performed by: INTERNAL MEDICINE

## 2022-05-21 PROCEDURE — 81001 URINALYSIS AUTO W/SCOPE: CPT | Performed by: INTERNAL MEDICINE

## 2022-05-21 PROCEDURE — 36415 COLL VENOUS BLD VENIPUNCTURE: CPT | Performed by: INTERNAL MEDICINE

## 2022-05-21 PROCEDURE — 80307 DRUG TEST PRSMV CHEM ANLYZR: CPT | Performed by: INTERNAL MEDICINE

## 2022-05-21 PROCEDURE — 82310 ASSAY OF CALCIUM: CPT | Performed by: INTERNAL MEDICINE

## 2022-05-21 PROCEDURE — 36600 WITHDRAWAL OF ARTERIAL BLOOD: CPT

## 2022-05-21 PROCEDURE — 99900035 HC TECH TIME PER 15 MIN (STAT)

## 2022-05-21 PROCEDURE — 82803 BLOOD GASES ANY COMBINATION: CPT

## 2022-05-21 PROCEDURE — 83036 HEMOGLOBIN GLYCOSYLATED A1C: CPT | Performed by: INTERNAL MEDICINE

## 2022-05-21 PROCEDURE — 80053 COMPREHEN METABOLIC PANEL: CPT | Performed by: INTERNAL MEDICINE

## 2022-05-21 PROCEDURE — 87040 BLOOD CULTURE FOR BACTERIA: CPT | Performed by: INTERNAL MEDICINE

## 2022-05-21 PROCEDURE — 87635 SARS-COV-2 COVID-19 AMP PRB: CPT | Performed by: INTERNAL MEDICINE

## 2022-05-21 PROCEDURE — 85025 COMPLETE CBC W/AUTO DIFF WBC: CPT | Performed by: INTERNAL MEDICINE

## 2022-05-21 PROCEDURE — 81025 URINE PREGNANCY TEST: CPT | Performed by: INTERNAL MEDICINE

## 2022-05-21 PROCEDURE — 20000000 HC ICU ROOM

## 2022-05-21 RX ORDER — HYDROXYZINE HYDROCHLORIDE 25 MG/1
50 TABLET, FILM COATED ORAL DAILY PRN
Status: DISCONTINUED | OUTPATIENT
Start: 2022-05-21 | End: 2022-05-26 | Stop reason: HOSPADM

## 2022-05-21 RX ORDER — HYDROXYZINE HYDROCHLORIDE 50 MG/1
50 TABLET, FILM COATED ORAL DAILY PRN
COMMUNITY
Start: 2022-05-02 | End: 2023-10-11 | Stop reason: SDUPTHER

## 2022-05-21 RX ORDER — SODIUM CHLORIDE 0.9 % (FLUSH) 0.9 %
10 SYRINGE (ML) INJECTION
Status: DISCONTINUED | OUTPATIENT
Start: 2022-05-21 | End: 2022-05-21 | Stop reason: SDUPTHER

## 2022-05-21 RX ORDER — SODIUM CHLORIDE 0.9 % (FLUSH) 0.9 %
10 SYRINGE (ML) INJECTION
Status: DISCONTINUED | OUTPATIENT
Start: 2022-05-21 | End: 2022-05-24

## 2022-05-21 RX ORDER — ENOXAPARIN SODIUM 100 MG/ML
30 INJECTION SUBCUTANEOUS EVERY 24 HOURS
Status: DISCONTINUED | OUTPATIENT
Start: 2022-05-21 | End: 2022-05-26 | Stop reason: HOSPADM

## 2022-05-21 RX ORDER — AMITRIPTYLINE HYDROCHLORIDE 25 MG/1
25 TABLET, FILM COATED ORAL NIGHTLY
COMMUNITY
Start: 2022-02-22 | End: 2024-03-18

## 2022-05-21 RX ORDER — FAMOTIDINE 10 MG/ML
20 INJECTION INTRAVENOUS EVERY 12 HOURS
Status: DISCONTINUED | OUTPATIENT
Start: 2022-05-21 | End: 2022-05-21

## 2022-05-21 RX ORDER — POTASSIUM CHLORIDE 7.46 G/1000ML
10 INJECTION, SOLUTION INTRAVENOUS
Status: DISCONTINUED | OUTPATIENT
Start: 2022-05-21 | End: 2022-05-21

## 2022-05-21 RX ORDER — ONDANSETRON 2 MG/ML
4 INJECTION INTRAMUSCULAR; INTRAVENOUS EVERY 6 HOURS PRN
Status: DISCONTINUED | OUTPATIENT
Start: 2022-05-21 | End: 2022-05-24

## 2022-05-21 RX ORDER — DEXTROSE MONOHYDRATE 100 MG/ML
INJECTION, SOLUTION INTRAVENOUS
Status: DISCONTINUED | OUTPATIENT
Start: 2022-05-21 | End: 2022-05-24

## 2022-05-21 RX ORDER — ONDANSETRON 2 MG/ML
4 INJECTION INTRAMUSCULAR; INTRAVENOUS
Status: COMPLETED | OUTPATIENT
Start: 2022-05-21 | End: 2022-05-21

## 2022-05-21 RX ORDER — INSULIN GLARGINE 100 [IU]/ML
INJECTION, SOLUTION SUBCUTANEOUS
COMMUNITY
Start: 2022-02-10 | End: 2022-06-10

## 2022-05-21 RX ORDER — PANTOPRAZOLE SODIUM 40 MG/1
40 TABLET, DELAYED RELEASE ORAL DAILY
Status: DISCONTINUED | OUTPATIENT
Start: 2022-05-21 | End: 2022-05-26 | Stop reason: HOSPADM

## 2022-05-21 RX ORDER — OLMESARTAN MEDOXOMIL 40 MG/1
40 TABLET ORAL DAILY
Status: ON HOLD | COMMUNITY
Start: 2022-05-02 | End: 2023-03-06 | Stop reason: HOSPADM

## 2022-05-21 RX ORDER — AMITRIPTYLINE HYDROCHLORIDE 25 MG/1
25 TABLET, FILM COATED ORAL NIGHTLY
Status: DISCONTINUED | OUTPATIENT
Start: 2022-05-21 | End: 2022-05-26 | Stop reason: HOSPADM

## 2022-05-21 RX ORDER — PANTOPRAZOLE SODIUM 40 MG/1
40 TABLET, DELAYED RELEASE ORAL DAILY
Status: ON HOLD | COMMUNITY
Start: 2022-05-02 | End: 2022-09-26 | Stop reason: SDUPTHER

## 2022-05-21 RX ORDER — SODIUM CHLORIDE 9 MG/ML
1000 INJECTION, SOLUTION INTRAVENOUS CONTINUOUS
Status: ACTIVE | OUTPATIENT
Start: 2022-05-21 | End: 2022-05-21

## 2022-05-21 RX ORDER — PROCHLORPERAZINE EDISYLATE 5 MG/ML
10 INJECTION INTRAMUSCULAR; INTRAVENOUS
Status: COMPLETED | OUTPATIENT
Start: 2022-05-21 | End: 2022-05-21

## 2022-05-21 RX ORDER — ACETAMINOPHEN 500 MG
1000 TABLET ORAL EVERY 6 HOURS PRN
Status: DISCONTINUED | OUTPATIENT
Start: 2022-05-21 | End: 2022-05-26 | Stop reason: HOSPADM

## 2022-05-21 RX ORDER — DICYCLOMINE HYDROCHLORIDE 10 MG/1
10 CAPSULE ORAL 4 TIMES DAILY
COMMUNITY
Start: 2022-02-22 | End: 2024-03-18

## 2022-05-21 RX ORDER — INSULIN GLARGINE 100 [IU]/ML
INJECTION, SOLUTION SUBCUTANEOUS
Status: ON HOLD | COMMUNITY
Start: 2022-04-29 | End: 2023-10-06 | Stop reason: SDUPTHER

## 2022-05-21 RX ORDER — DEXTROSE MONOHYDRATE 50 MG/ML
INJECTION, SOLUTION INTRAVENOUS CONTINUOUS
Status: DISCONTINUED | OUTPATIENT
Start: 2022-05-21 | End: 2022-05-21

## 2022-05-21 RX ORDER — ONDANSETRON 2 MG/ML
4 INJECTION INTRAMUSCULAR; INTRAVENOUS EVERY 8 HOURS PRN
Status: DISCONTINUED | OUTPATIENT
Start: 2022-05-21 | End: 2022-05-21

## 2022-05-21 RX ORDER — LORAZEPAM 2 MG/ML
0.5 INJECTION INTRAMUSCULAR ONCE
Status: COMPLETED | OUTPATIENT
Start: 2022-05-21 | End: 2022-05-21

## 2022-05-21 RX ORDER — METOPROLOL TARTRATE 50 MG/1
50 TABLET ORAL 2 TIMES DAILY
COMMUNITY
Start: 2022-05-02

## 2022-05-21 RX ORDER — ONDANSETRON 4 MG/1
4 TABLET, FILM COATED ORAL EVERY 6 HOURS PRN
Status: ON HOLD | COMMUNITY
Start: 2022-03-24 | End: 2023-06-19

## 2022-05-21 RX ORDER — LABETALOL HYDROCHLORIDE 5 MG/ML
10 INJECTION, SOLUTION INTRAVENOUS EVERY 6 HOURS PRN
Status: DISCONTINUED | OUTPATIENT
Start: 2022-05-21 | End: 2022-05-24

## 2022-05-21 RX ORDER — METOCLOPRAMIDE 5 MG/1
5 TABLET ORAL 4 TIMES DAILY
COMMUNITY
Start: 2022-05-18 | End: 2022-08-14

## 2022-05-21 RX ORDER — LISINOPRIL 20 MG/1
20 TABLET ORAL 2 TIMES DAILY
Status: ON HOLD | COMMUNITY
Start: 2022-02-22 | End: 2023-03-06 | Stop reason: HOSPADM

## 2022-05-21 RX ORDER — INSULIN LISPRO 100 [IU]/ML
INJECTION, SOLUTION INTRAVENOUS; SUBCUTANEOUS
COMMUNITY
Start: 2022-02-17 | End: 2022-08-21

## 2022-05-21 RX ORDER — METRONIDAZOLE 250 MG/1
500 TABLET ORAL EVERY 12 HOURS
Status: DISCONTINUED | OUTPATIENT
Start: 2022-05-21 | End: 2022-05-24

## 2022-05-21 RX ORDER — MUPIROCIN 20 MG/G
OINTMENT TOPICAL 2 TIMES DAILY
Status: DISPENSED | OUTPATIENT
Start: 2022-05-21 | End: 2022-05-26

## 2022-05-21 RX ORDER — METOPROLOL TARTRATE 50 MG/1
50 TABLET ORAL 2 TIMES DAILY
Status: DISCONTINUED | OUTPATIENT
Start: 2022-05-21 | End: 2022-05-26 | Stop reason: HOSPADM

## 2022-05-21 RX ORDER — PROCHLORPERAZINE EDISYLATE 5 MG/ML
5 INJECTION INTRAMUSCULAR; INTRAVENOUS EVERY 6 HOURS PRN
Status: DISCONTINUED | OUTPATIENT
Start: 2022-05-21 | End: 2022-05-24 | Stop reason: SDUPTHER

## 2022-05-21 RX ORDER — DICYCLOMINE HYDROCHLORIDE 10 MG/1
10 CAPSULE ORAL 4 TIMES DAILY
Status: DISCONTINUED | OUTPATIENT
Start: 2022-05-21 | End: 2022-05-26 | Stop reason: HOSPADM

## 2022-05-21 RX ORDER — INSULIN ASPART 100 [IU]/ML
7 INJECTION, SOLUTION INTRAVENOUS; SUBCUTANEOUS
Status: DISCONTINUED | OUTPATIENT
Start: 2022-05-21 | End: 2022-05-26 | Stop reason: HOSPADM

## 2022-05-21 RX ORDER — SODIUM CHLORIDE 9 MG/ML
1000 INJECTION, SOLUTION INTRAVENOUS CONTINUOUS
Status: DISCONTINUED | OUTPATIENT
Start: 2022-05-21 | End: 2022-05-21

## 2022-05-21 RX ORDER — ENOXAPARIN SODIUM 100 MG/ML
30 INJECTION SUBCUTANEOUS EVERY 24 HOURS
Status: DISCONTINUED | OUTPATIENT
Start: 2022-05-21 | End: 2022-05-21 | Stop reason: SDUPTHER

## 2022-05-21 RX ADMIN — PANTOPRAZOLE SODIUM 40 MG: 40 TABLET, DELAYED RELEASE ORAL at 02:05

## 2022-05-21 RX ADMIN — INSULIN HUMAN 6 UNITS/HR: 1 INJECTION, SOLUTION INTRAVENOUS at 02:05

## 2022-05-21 RX ADMIN — LABETALOL HYDROCHLORIDE 10 MG: 5 INJECTION INTRAVENOUS at 10:05

## 2022-05-21 RX ADMIN — DICYCLOMINE HYDROCHLORIDE 10 MG: 10 CAPSULE ORAL at 04:05

## 2022-05-21 RX ADMIN — HYDROXYZINE HYDROCHLORIDE 50 MG: 25 TABLET ORAL at 07:05

## 2022-05-21 RX ADMIN — INSULIN DETEMIR 15 UNITS: 100 INJECTION, SOLUTION SUBCUTANEOUS at 02:05

## 2022-05-21 RX ADMIN — POTASSIUM CHLORIDE 10 MEQ: 149 INJECTION, SOLUTION, CONCENTRATE INTRAVENOUS at 10:05

## 2022-05-21 RX ADMIN — POTASSIUM CHLORIDE 10 MEQ: 149 INJECTION, SOLUTION, CONCENTRATE INTRAVENOUS at 01:05

## 2022-05-21 RX ADMIN — FAMOTIDINE 20 MG: 10 INJECTION INTRAVENOUS at 08:05

## 2022-05-21 RX ADMIN — INSULIN ASPART 7 UNITS: 100 INJECTION, SOLUTION INTRAVENOUS; SUBCUTANEOUS at 04:05

## 2022-05-21 RX ADMIN — AMITRIPTYLINE HYDROCHLORIDE 25 MG: 25 TABLET, FILM COATED ORAL at 08:05

## 2022-05-21 RX ADMIN — DICYCLOMINE HYDROCHLORIDE 10 MG: 10 CAPSULE ORAL at 08:05

## 2022-05-21 RX ADMIN — METRONIDAZOLE 500 MG: 250 TABLET ORAL at 08:05

## 2022-05-21 RX ADMIN — POTASSIUM CHLORIDE 10 MEQ: 149 INJECTION, SOLUTION, CONCENTRATE INTRAVENOUS at 09:05

## 2022-05-21 RX ADMIN — DEXTROSE MONOHYDRATE: 50 INJECTION, SOLUTION INTRAVENOUS at 10:05

## 2022-05-21 RX ADMIN — SODIUM CHLORIDE 1000 ML: 9 INJECTION, SOLUTION INTRAVENOUS at 08:05

## 2022-05-21 RX ADMIN — METOPROLOL TARTRATE 50 MG: 50 TABLET, FILM COATED ORAL at 08:05

## 2022-05-21 RX ADMIN — PROCHLORPERAZINE EDISYLATE 10 MG: 5 INJECTION INTRAMUSCULAR; INTRAVENOUS at 04:05

## 2022-05-21 RX ADMIN — LORAZEPAM 0.5 MG: 2 INJECTION INTRAMUSCULAR; INTRAVENOUS at 10:05

## 2022-05-21 RX ADMIN — SODIUM CHLORIDE 1000 ML: 9 INJECTION, SOLUTION INTRAVENOUS at 01:05

## 2022-05-21 RX ADMIN — SODIUM CHLORIDE 1000 ML: 9 INJECTION, SOLUTION INTRAVENOUS at 12:05

## 2022-05-21 RX ADMIN — PROCHLORPERAZINE EDISYLATE 5 MG: 5 INJECTION INTRAMUSCULAR; INTRAVENOUS at 10:05

## 2022-05-21 RX ADMIN — MUPIROCIN: 20 OINTMENT TOPICAL at 10:05

## 2022-05-21 RX ADMIN — ACETAMINOPHEN 1000 MG: 500 TABLET, FILM COATED ORAL at 08:05

## 2022-05-21 RX ADMIN — ENOXAPARIN SODIUM 30 MG: 100 INJECTION SUBCUTANEOUS at 04:05

## 2022-05-21 RX ADMIN — POTASSIUM CHLORIDE 10 MEQ: 149 INJECTION, SOLUTION, CONCENTRATE INTRAVENOUS at 12:05

## 2022-05-21 RX ADMIN — ONDANSETRON 4 MG: 2 INJECTION INTRAMUSCULAR; INTRAVENOUS at 06:05

## 2022-05-21 RX ADMIN — ONDANSETRON 4 MG: 2 INJECTION INTRAMUSCULAR; INTRAVENOUS at 03:05

## 2022-05-21 RX ADMIN — INSULIN DETEMIR 30 UNITS: 100 INJECTION, SOLUTION SUBCUTANEOUS at 08:05

## 2022-05-21 RX ADMIN — ACETAMINOPHEN 1000 MG: 500 TABLET, FILM COATED ORAL at 01:05

## 2022-05-21 NOTE — H&P
"[facility] Medicine History & Physical Examination       Patient: Dorene Husain  MRN: [id number]  STATUS: IP- Inpatient   DOS: 5/21/2022   PCP: Kumar Ortiz NP      CC: muscle aches       HISTORY OF PRESENT ILLNESS   26 y.o. female with a history that includes IDDM I and medication non-compliance, presented to ED overnight with muscle aches and back pain.  Associated with nausea and vomiting. Evaluation in ED noted glucose >500, labs noted glucose of 798, bicarb 8, sodium 118, with AG of 31.  Patient admitted to ICU under [facility] and started on insulin infusion.      REVIEW OF SYSTEMS   Except as documented, all other systems reviewed and negative       PAST MEDICAL HISTORY   IDDM  HTN  Gastroparesis       PAST SURGICAL HISTORY   None      FAMILY HISTORY   Reviewed, negative in relation to patient's current condition.      SOCIAL HISTORY   Denies any alcohol, tobacco or drug use      ALLERGIES   NKDA      HOME MEDICATIONS     Current Outpatient Medications   Medication Instructions    amitriptyline (ELAVIL) 25 mg, Oral, Nightly    BASAGLAR KWIKPEN U-100 INSULIN glargine 100 units/mL (3mL) SubQ pen Subcutaneous    dicyclomine (BENTYL) 10 mg, Oral, 4 times daily    hydrOXYzine (ATARAX) 50 mg, Oral, Daily PRN    insulin glargine (LANTUS) 100 unit/mL injection Subcutaneous    insulin lispro (HUMALOG U-100 INSULIN) 100 unit/mL Crtg Subcutaneous    lisinopriL (PRINIVIL,ZESTRIL) 20 mg, Oral, 2 times daily    metoclopramide HCl (REGLAN) 5 mg, Oral, 4 times daily    metoprolol tartrate (LOPRESSOR) 50 mg, Oral, 2 times daily    olmesartan (BENICAR) 40 mg, Oral, Daily    ondansetron (ZOFRAN) 4 mg, Oral, Every 6 hours PRN    pantoprazole (PROTONIX) 40 mg, Oral, Daily          PHYSICAL EXAM   VITALS:  /72   Pulse 96   Temp 97 °F (36.1 °C)   Resp 16   Ht 5' 2" (1.575 m)   Wt 63.4 kg (139 lb 12.8 oz)   LMP  (LMP Unknown)   SpO2 98%   Breastfeeding No   BMI 25.57 kg/m²  "     GENERAL: Awake and in NAD  HEENT: NC/AT, EOMI, PERRL.  NECK: Supple,  No JVD  LUNGS: CTA B/L  CVS: RRR, S1S2 positive  GI/: Soft, ND, bowel sounds positive, minimally TTP, no focality  EXTREMITIES: Peripheral pulses 2+, no peripheral edema  DERM: Warm, dry.  No rashes or lesions noted.  NEURO: AAOx3, no focal neurologic deficit  PSYCH: Cooperative, appropriate mood and affect       LABS     Recent Labs     05/21/22  0100   WBC 13.6*   HGB 11.3*   HCT 34.4*   *      Recent Labs     05/21/22  0100 05/21/22  0430 05/21/22  1208   *   < > 132*   K 4.3   < > 3.5   CO2 8*   < > 25   BUN 41.0*   < > 31.0*   CREATININE 1.91*   < > 1.45*   CALCIUM 8.7   < > 8.5   BILITOT 1.2  --   --    AST 14  --   --    ALT 9  --   --    ALKPHOS 98  --   --    ALBUMIN 3.3*  --   --     < > = values in this interval not displayed.        Recent Labs     05/21/22  0030 05/21/22  0303 05/21/22  0421 05/21/22  0526 05/21/22  0629 05/21/22  0736 05/21/22  0827 05/21/22  1037 05/21/22  1118 05/21/22  1241   POCTGLUCOSE >500* >500* 477* 380* 296* 246* 224* 74 54* 67*         ASSESSMENT   IDDM I with DKA  CHATA s/t IVVD  SIRS, likley secondary to above  h/o gastroparesis    PLAN:   Most recent gap is now closed, can d/c insulin and D5  Start diabetic diet and SC insulin  Will continue to hold antihypertensives for now  Otherwise home meds reviewed and resumed as deemed appropriate      Prophylaxis: SC  Code Status: Full      Bao Vuong MD  [facility] Medicine  05/21/2022       Critical Care Time: 55 minutes spent in direct hands on care, review of labs, imaging and medical record, and discussion of diagnosis, treatment, prognosis with patient/family.  Patient remains at high-risk for clinical decompensation  Critical Care diagnosis: DKA    If the patient has been admitted under observation status, it is at my discretion and under our care with hospital medicine services. [TWA]
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
## 2022-05-21 NOTE — HISTORICAL [facility]
This is a historical note converted from Lori. Formatting and pictures may have been removed.  Please reference Lori for original formatting and attached multimedia. Chief Complaint  Pain from the waiste up to her shoulder blades causing nausea and vomitting ? started 2 weeks ago has not improved  History of Present Illness  6-year-old -American female with a past medical history significant for type 1 diabetes mostly noncompliant?and resulting  Physical Exam  Vitals & Measurements  T:?36.9? ?C (Oral)? HR:?90(Peripheral)? RR:?16? BP:?136/95? SpO2:?98%? WT:?68?kg?  Assessment/Plan  Back pain?[id number]  Chronic bilateral thoracic back pain?M54.6  Diabetic gastroparesis?E11.43  Hypokalemia?E87.6  Hypomagnesemia?E83.42  ?  ?  ?  #Type 1 diabetes?hyperglycemia with a mild?low-grade anion gap?with positive ketones consistent with?very mild?DKA?signed-patient has been hospitalized?in the past she has been in the ED?3 times in the course of the last 2 weeks  -Patient seen sitting up she is able to tolerate liquids currently?she feels?markedly better with IV fluids and insulin  -We will get?CMP?this evening check for?anion gap associated acidosis consider transfer to ICU with insulin if patient has worsening symptoms?or worsening anion gap  -We will continue to cover with twice daily basal insulin  -Advance diet as tolerated  -Phenergan 12.5 mg?IM every 8 hours as needed in addition to alternated Zofran  ?  #Lumbar back pain-nothing remarkable on CT scan she states that she is chronic back pain.  ?  #Gastroparesis-patient has not tolerated?Reglan in the past  -trial of erythromycin  ?  #Depression?patient was on?SSRI she is subsequently stopped the medicine she felt it was ineffective  ?   Problem List/Past Medical History  Ongoing  Acute disease or injury-related malnutrition  CHATA (acute kidney injury)  Dehydration  Diabetic gastroparesis  DM - Diabetes mellitus  Drug-seeking  behavior  Lactic acidosis  Leukocytosis  Nausea & vomiting  PNA (pneumonia)  Historical  DKA - diabetic ketoacidosis  Procedure/Surgical History  Fluoroscopy of Superior Vena Cava using Low Osmolar Contrast, Guidance (02/08/2022)  Insertion of Infusion Device into Superior Vena Cava, Percutaneous Approach (02/08/2022)  Biopsy Gastrointestinal (09/11/2021)  Esophagogastroduodenoscopy (09/11/2021)  Excision of Esophagogastric Junction, Via Natural or Artificial Opening Endoscopic, Diagnostic (09/11/2021)  Excision of Stomach, Via Natural or Artificial Opening Endoscopic, Diagnostic (09/11/2021)  Insertion of Infusion Device into Upper Vein, Percutaneous Approach (09/06/2021)  Biopsy Gastrointestinal (07/30/2018)  Cytology Brushing (07/30/2018)  Esophagogastroduodenoscopy (07/30/2018)  Excision of Duodenum, Via Natural or Artificial Opening Endoscopic, Diagnostic (07/30/2018)  Excision of Esophagus, Via Natural or Artificial Opening Endoscopic, Diagnostic (07/30/2018)  Excision of Stomach, Pylorus, Via Natural or Artificial Opening Endoscopic, Diagnostic (07/30/2018)  Esophagogastroduodenoscopy (08/12/2016)  Excision of Stomach, Via Natural or Artificial Opening Endoscopic, Diagnostic (08/12/2016)  Central Line Insertion (Right) (08/11/2016)  Cholecystectomy Laparoscopic (08/11/2016)  Resection of Gallbladder, Percutaneous Endoscopic Approach (08/11/2016)   Medications  Inpatient  acetaminophen, 650 mg= 20.3 mL, Oral, q6hr, PRN  acetaminophen, 1000 mg= 2 tab(s), Oral, q6hr, PRN  cloNIDine, 0.2 mg= 1 tab(s), Oral, q8hr, PRN  Dextrose 50% and Water (50 mL vial/syringe), 12.5 gm= 25 mL, IV Push, Once, PRN  Dextrose 50% and Water (50 mL vial/syringe), 12.5 gm= 25 mL, IV Push, As Directed, PRN  Dextrose 50% and Water (50 mL vial/syringe), 25 gm= 50 mL, IV Push, As Directed  Dextrose 50% in Water intravenous solution, 12.5 gm= 25 mL, IV Push, As Directed, PRN  glucagon, 1 mg= 1 EA, IM, q10min, PRN  glucagon, 1 mg= 1 EA, IM,  q10min, PRN  ibuprofen, 400 mg= 1 tab(s), Oral, q6hr, PRN  insulin glargine 100 U/mL (per 1 unit), 20 units= 0.2 mL, Subcutaneous, Once-NOW  insulin lispro, 3-21 units, Subcutaneous, As Directed, PRN  IVF Normal Saline NS Bolus 1000ml, 1000 mL, IV, Once  IVF Normal Saline NS Infusion, 1000 mL, IV, Once  Lovenox, 40 mg= 0.4 mL, Subcutaneous, Daily  morphine 1 mg/mL injectable solution, 2 mg= 1 mL, IV Push, Once-NOW  morphine 4 mg/mL preservative-free intravenous solution, 4 mg= 1 mL, IV Push, q4hr, PRN  Protonix IV 40 mg intravenous injection +  100 mL  Reglan, 10 mg= 2 mL, IV Push, [id number]  Sodium Chloride 0.9% intravenous solution 1,000 mL, 1000 mL, IV  Toradol, 30 mg= 1 mL, IV Push, q6hr, PRN  Zofran, 8 mg= 4 mL, IV Push, q4hr, PRN  Home  Carafate 1 g/10 mL oral suspension, 1 gm= 10 mL, Oral, AC & HS, 2 refills  dicyclomine 10 mg oral capsule, 10 mg= 1 cap(s), Oral, QID  HumaLOG Cartridge 100 units/mL injectable solution, 7 units, Subcutaneous, TIDAC  Lantus 100 units/mL subcutaneous solution, 30 units, Subcutaneous, BID, 3 refills,? ?Not taking  metoclopramide 5 mg oral tablet, 5 mg= 1 tab(s), Oral, TID,? ?Not taking  metoprolol tartrate 25 mg oral tab, 25 mg= 1 tab(s), Oral, BID  ondansetron 4 mg oral tablet, 4 mg= 1 tab(s), Oral, q6hr  pantoprazole 20 mg ORAL EC-Tablet, 20 mg= 1 tab(s), Oral, Daily  Phenergan 12.5 mg Tab, 12.5 mg= 1 tab(s), Oral, q6hr, PRN  potassium chloride 20 mEq oral powder, 40 mEq= 2 packet(s), Oral, Once,? ?Not taking  potassium chloride 20 mEq oral TABLET extended release, 20 mEq= 1 tab(s), Oral, BID  Promethazine 25 mg ORAL Tab, 25 mg= 1 tab(s), Oral, q4hr, PRN  promethazine 25 mg oral tablet, 25 mg= 1 tab(s), Oral, q6hr  Allergies  No Known Allergies  Social History  Abuse/Neglect  No, 03/22/2022  No, 03/21/2022  No, 03/15/2022  No, 03/13/2022  No, No, Yes, 02/17/2022  No, 02/13/2022  No, 02/07/2022  No, 02/06/2022  No, 12/07/2021  No, 11/30/2021  No, 11/27/2021  No, No, Yes,  09/05/2021  No, 09/05/2021  No, 09/04/2021  No, 09/02/2021  Alcohol  Never, 08/08/2016  Home/Environment  Living situation: Home/Independent., 12/05/2017  Substance Use  Never, 08/08/2016  Tobacco  Never (less than 100 in lifetime), No, 03/22/2022  Never (less than 100 in lifetime), No, 03/21/2022  Never (less than 100 in lifetime), No, 03/15/2022  Never (less than 100 in lifetime), No, 03/13/2022  Never (less than 100 in lifetime), N/A, 02/17/2022  Never (less than 100 in lifetime), No, 02/13/2022  Never (less than 100 in lifetime), N/A, 02/07/2022  Never (less than 100 in lifetime), No, 02/06/2022  Never (less than 100 in lifetime), N/A, 12/07/2021  Never (less than 100 in lifetime), N/A, Household tobacco concerns: No., 11/30/2021  Never (less than 100 in lifetime), N/A, 11/27/2021  Never (less than 100 in lifetime), No, 09/04/2021  Never (less than 100 in lifetime), No, 09/02/2021  Family History  Acute myocardial infarction.: Mother.  Congestive heart disease.: Mother.  Diabetes mellitus type 1.: Mother.  Heart failure.: Mother.  Hyperlipidemia.: Mother and Father.  Hypertension.: Mother and Father.  Metastatic cancer: Father.  Stroke: Father.  Immunizations  Vaccine Date Status   measles/mumps/rubella virus vaccine 05/31/2016 Recorded   tetanus/diphtheria/pertussis, acel(Tdap) 08/16/2013 Recorded   measles/mumps/rubella virus vaccine 08/25/1999 Recorded   hepatitis B pediatric vaccine 03/05/1996 Recorded   hepatitis B pediatric vaccine 1995 Recorded   hepatitis B pediatric vaccine 1995 Recorded

## 2022-05-21 NOTE — HPI
26 y.o. female with a history that includes IDDM I and medication non-compliance, presented to ED overnight with muscle aches and back pain.  Associated with nausea and vomiting. Evaluation in ED noted glucose >500, labs noted glucose of 798, bicarb 8, sodium 118, with AG of 31.  Patient admitted to ICU under [facility] and started on insulin infusion.

## 2022-05-21 NOTE — ED PROVIDER NOTES
Encounter Date: 5/20/2022       History     Chief Complaint   Patient presents with    Back Pain    Hyperglycemia     26-year-old black female came into the emergency department complaining of muscle aches back pain and states that she thinks she is in the DKA.  She states that she is supposed to take Basaglar 30 twice a day and sliding scale and 5 units with meals however she admits that she is not compliant        Review of patient's allergies indicates:  No Known Allergies  No past medical history on file.  No past surgical history on file.  No family history on file.     Review of Systems   Constitutional: Negative.  Negative for activity change, appetite change, chills, diaphoresis, fatigue, fever and unexpected weight change.   HENT: Negative.  Negative for congestion, dental problem, drooling, ear discharge, ear pain, facial swelling, hearing loss, mouth sores, nosebleeds, postnasal drip, rhinorrhea, sinus pressure, sinus pain, sneezing, sore throat, tinnitus, trouble swallowing and voice change.    Eyes: Negative.  Negative for photophobia, pain, discharge, redness, itching and visual disturbance.   Respiratory: Negative.  Negative for apnea, cough, choking, chest tightness, shortness of breath, wheezing and stridor.    Cardiovascular: Negative.  Negative for chest pain, palpitations and leg swelling.   Gastrointestinal: Positive for nausea and vomiting. Negative for abdominal distention, abdominal pain, anal bleeding, blood in stool, constipation, diarrhea and rectal pain.   Endocrine: Negative.  Negative for cold intolerance, heat intolerance, polydipsia, polyphagia and polyuria.   Genitourinary: Negative.  Negative for decreased urine volume, difficulty urinating, dyspareunia, dysuria, enuresis, flank pain, frequency, genital sores, hematuria, menstrual problem, pelvic pain, urgency, vaginal bleeding, vaginal discharge and vaginal pain.   Musculoskeletal: Positive for arthralgias, back pain and  myalgias. Negative for gait problem, joint swelling, neck pain and neck stiffness.   Skin: Negative.  Negative for color change, pallor, rash and wound.   Allergic/Immunologic: Negative.  Negative for environmental allergies, food allergies and immunocompromised state.   Neurological: Negative.  Negative for dizziness, tremors, seizures, syncope, facial asymmetry, speech difficulty, weakness, light-headedness, numbness and headaches.   Hematological: Negative.  Negative for adenopathy. Does not bruise/bleed easily.   Psychiatric/Behavioral: Negative.  Negative for agitation, behavioral problems, confusion, decreased concentration, dysphoric mood, hallucinations, self-injury, sleep disturbance and suicidal ideas. The patient is not nervous/anxious and is not hyperactive.    All other systems reviewed and are negative.      Physical Exam     Initial Vitals [05/20/22 2334]   BP Pulse Resp Temp SpO2   (!) 157/89 (!) 113 16 98.8 °F (37.1 °C) 98 %      MAP       --         Physical Exam    Nursing note and vitals reviewed.  Constitutional: She appears well-developed and well-nourished. She is not diaphoretic. No distress.   HENT:   Head: Normocephalic and atraumatic.   Mouth/Throat: Oropharynx is clear and moist. No oropharyngeal exudate.   Eyes: Conjunctivae and EOM are normal. Pupils are equal, round, and reactive to light. No scleral icterus.   Neck: Neck supple. No JVD present.   Normal range of motion.  Cardiovascular: Normal rate, regular rhythm, normal heart sounds and intact distal pulses. Exam reveals no gallop and no friction rub.    No murmur heard.  Pulmonary/Chest: Breath sounds normal. No respiratory distress. She has no wheezes. She exhibits no tenderness.   Abdominal: Abdomen is soft. Bowel sounds are normal. She exhibits no distension. There is no abdominal tenderness. There is no rebound.   Musculoskeletal:         General: Normal range of motion.      Cervical back: Normal range of motion and neck  supple.     Neurological: She is alert and oriented to person, place, and time. She has normal strength and normal reflexes.   Skin: Skin is warm and dry. Capillary refill takes less than 2 seconds.   Psychiatric:   Very dramatic         ED Course   Critical Care    Date/Time: 5/21/2022 5:44 AM  Performed by: Chuy Mathews MD  Authorized by: Chuy Mathews MD   Direct patient critical care time: 35 minutes  Additional history critical care time: 10 minutes  Ordering / reviewing critical care time: 5 minutes  Documentation critical care time: 10 minutes  Total critical care time (exclusive of procedural time) : 60 minutes  Critical care time was exclusive of separately billable procedures and treating other patients.  Critical care was necessary to treat or prevent imminent or life-threatening deterioration of the following conditions: dehydration.  Critical care was time spent personally by me on the following activities: ordering and performing treatments and interventions, ordering and review of laboratory studies, pulse oximetry, re-evaluation of patient's condition, review of old charts, evaluation of patient's response to treatment and interpretation of cardiac output measurements.        Admission on 05/20/2022   Component Date Value Ref Range Status    Sodium Level 05/21/2022 118 (A) 136 - 145 mmol/L Final    Potassium Level 05/21/2022 4.3  3.5 - 5.1 mmol/L Final    Chloride 05/21/2022 79 (A) 98 - 107 mmol/L Final    Carbon Dioxide 05/21/2022 8 (A) 22 - 29 mmol/L Final    Glucose Level 05/21/2022 798 (A) 74 - 100 mg/dL Final    Blood Urea Nitrogen 05/21/2022 41.0 (A) 7.0 - 18.7 mg/dL Final    Creatinine 05/21/2022 1.91 (A) 0.55 - 1.02 mg/dL Final    Calcium Level Total 05/21/2022 8.7  8.4 - 10.2 mg/dL Final    Protein Total 05/21/2022 6.9  6.4 - 8.3 gm/dL Final    Albumin Level 05/21/2022 3.3 (A) 3.5 - 5.0 gm/dL Final    Globulin 05/21/2022 3.6 (A) 2.4 - 3.5 gm/dL Final     Albumin/Globulin Ratio 05/21/2022 0.9 (A) 1.1 - 2.0 ratio Final    Bilirubin Total 05/21/2022 1.2  <=1.5 mg/dL Final    Alkaline Phosphatase 05/21/2022 98  40 - 150 unit/L Final    Alanine Aminotransferase 05/21/2022 9  0 - 55 unit/L Final    Aspartate Aminotransferase 05/21/2022 14  5 - 34 unit/L Final    Estimated GFR- 05/21/2022 41  mls/min/1.73/m2 Final    Color, UA 05/21/2022 Yellow  Yellow, Colorless, Other, Clear Final    Appearance, UA 05/21/2022 Clear  Clear Final    Specific Gravity, UA 05/21/2022 1.015   Final    pH, UA 05/21/2022 5.0  5.0, 5.5, 6.0, 6.5, 7.0, 7.5, 8.0, 8.5 Final    Protein, UA 05/21/2022 100  (A) Negative, 300  mg/dL Final    Glucose, UA 05/21/2022 >=1000 (A) Negative, Normal mg/dL Final    Ketones, UA 05/21/2022 80  (A) Negative, +1, +2, +3, +4, +5, >=160 mg/dL Final    Blood, UA 05/21/2022 Small (A) Negative unit/L Final    Bilirubin, UA 05/21/2022 Negative  Negative mg/dL Final    Urobilinogen, UA 05/21/2022 0.2  0.2, 1.0, Normal mg/dL Final    Nitrites, UA 05/21/2022 Negative  Negative Final    Leukocyte Esterase, UA 05/21/2022 Negative  Negative, 75  unit/L Final    Amphetamines, Urine 05/21/2022 Negative  Negative Final    Barbituates, Urine 05/21/2022 Negative  Negative Final    Benzodiazepine, Urine 05/21/2022 Negative  Negative Final    Cannabinoids, Urine 05/21/2022 Negative  Negative Final    Cocaine, Urine 05/21/2022 Negative  Negative Final    Fentanyl, Urine 05/21/2022 Negative  Negative Final    Mdma, Urine 05/21/2022 Negative  Negative Final    Opiates, Urine 05/21/2022 Negative  Negative Final    Phencyclidine, Urine 05/21/2022 Negative  Negative Final    pH, Urine 05/21/2022 5.0  3.0 - 11.0 Final    Specific Gravity, Urine Auto 05/21/2022 1.015  1.001 - 1.035 Final    Beta hCG Qualitative, Urine 05/21/2022 Negative  Negative Final    WBC 05/21/2022 13.6 (A) 4.5 - 11.5 x10(3)/mcL Final    RBC 05/21/2022 3.99 (A) 4.20 - 5.40  x10(6)/mcL Final    Hgb 05/21/2022 11.3 (A) 12.0 - 16.0 gm/dL Final    Hct 05/21/2022 34.4 (A) 37.0 - 47.0 % Final    MCV 05/21/2022 86.2  80.0 - 94.0 fL Final    MCH 05/21/2022 28.3  27.0 - 31.0 pg Final    MCHC 05/21/2022 32.8 (A) 33.0 - 36.0 mg/dL Final    RDW 05/21/2022 12.3  11.5 - 17.0 % Final    Platelet 05/21/2022 530 (A) 130 - 400 x10(3)/mcL Final    MPV 05/21/2022 9.3 (A) 9.4 - 12.4 fL Final    Neut % 05/21/2022 78.1  % Final    Lymph % 05/21/2022 13.0  % Final    Mono % 05/21/2022 7.9  % Final    Eos % 05/21/2022 0.0  % Final    Basophil % 05/21/2022 0.3  % Final    Lymph # 05/21/2022 1.76  0.6 - 4.6 x10(3)/mcL Final    Neut # 05/21/2022 10.6 (A) 2.1 - 9.2 x10(3)/mcL Final    Mono # 05/21/2022 1.07  0.1 - 1.3 x10(3)/mcL Final    Eos # 05/21/2022 0.00  0 - 0.9 x10(3)/mcL Final    Baso # 05/21/2022 0.04  0 - 0.2 x10(3)/mcL Final    IG# 05/21/2022 0.09 (A) 0 - 0.0155 x10(3)/mcL Final    IG% 05/21/2022 0.7 (A) 0 - 0.43 % Final    POC PH 05/21/2022 7.291 (A) 7.35 - 7.45 Final    POC PCO2 05/21/2022 23.6 (A) 35 - 45 mmHg Final    POC PO2 05/21/2022 104 (A) 80 - 100 mmHg Final    POC HCO3 05/21/2022 11.4 (A) 24 - 28 mmol/L Final    POC BE 05/21/2022 -15  -2 to 2 mmol/L Final    POC SATURATED O2 05/21/2022 97  95 - 100 % Final    POC TCO2 05/21/2022 12 (A) 23 - 27 mmol/L Final    Sample 05/21/2022 ARTERIAL   Final    FiO2 05/21/2022 21   Final    Bacteria, UA 05/21/2022 Rare  None Seen, Rare, Occasional /HPF Final    Mucous, UA 05/21/2022 Trace (A) None Seen /LPF Final    Trichomonas, UA 05/21/2022 Rare (A) None Seen /HPF Final    RBC, UA 05/21/2022 0-2  None Seen, 0-2, 3-5, 0-5 /HPF Final    WBC, UA 05/21/2022 0-2  None Seen, 0-2, 3-5, 0-5 /HPF Final    Squamous Epithelial Cells, UA 05/21/2022 Rare  None Seen, Rare, Occasional, Occ /LPF Final    Sodium Level 05/21/2022 125 (A) 136 - 145 mmol/L Final    Potassium Level 05/21/2022 3.3 (A) 3.5 - 5.1 mmol/L Final    Chloride  05/21/2022 92 (A) 98 - 107 mmol/L Final    Carbon Dioxide 05/21/2022 10 (A) 22 - 29 mmol/L Final    Glucose Level 05/21/2022 508 (A) 74 - 100 mg/dL Final    Blood Urea Nitrogen 05/21/2022 39.0 (A) 7.0 - 18.7 mg/dL Final    Creatinine 05/21/2022 1.73 (A) 0.55 - 1.02 mg/dL Final    BUN/Creatinine Ratio 05/21/2022 23   Final    Calcium Level Total 05/21/2022 8.4  8.4 - 10.2 mg/dL Final    Estimated GFR- 05/21/2022 46  mls/min/1.73/m2 Final    Anion Gap 05/21/2022 23.0  mEq/L Final    SARS COV-2 MOLECULAR 05/21/2022 Negative  Negative Final    Hemoglobin A1c 05/21/2022 9.3 (A) <=7.0 % Final    Estimated Average Glucose 05/21/2022 220.2  mg/dL Final    POCT Glucose 05/21/2022 >500 (A) 70 - 110 mg/dL Final    POCT Glucose 05/21/2022 >500 (A) 70 - 110 mg/dL Final    POCT Glucose 05/21/2022 477 (A) 70 - 110 mg/dL Final    POCT Glucose 05/21/2022 380 (A) 70 - 110 mg/dL Final       Labs Reviewed   COMPREHENSIVE METABOLIC PANEL - Abnormal; Notable for the following components:       Result Value    Sodium Level 118 (*)     Chloride 79 (*)     Carbon Dioxide 8 (*)     Glucose Level 798 (*)     Blood Urea Nitrogen 41.0 (*)     Creatinine 1.91 (*)     Albumin Level 3.3 (*)     Globulin 3.6 (*)     Albumin/Globulin Ratio 0.9 (*)     All other components within normal limits   URINALYSIS, REFLEX TO URINE CULTURE - Abnormal; Notable for the following components:    Protein,   (*)     Glucose, UA >=1000 (*)     Ketones, UA 80  (*)     Blood, UA Small (*)     All other components within normal limits   CBC WITH DIFFERENTIAL - Abnormal; Notable for the following components:    WBC 13.6 (*)     RBC 3.99 (*)     Hgb 11.3 (*)     Hct 34.4 (*)     MCHC 32.8 (*)     Platelet 530 (*)     MPV 9.3 (*)     Neut # 10.6 (*)     IG# 0.09 (*)     IG% 0.7 (*)     All other components within normal limits   URINALYSIS, MICROSCOPIC - Abnormal; Notable for the following components:    Mucous, UA Trace (*)      Trichomonas, UA Rare (*)     All other components within normal limits   BASIC METABOLIC PANEL - Abnormal; Notable for the following components:    Sodium Level 125 (*)     Potassium Level 3.3 (*)     Chloride 92 (*)     Carbon Dioxide 10 (*)     Glucose Level 508 (*)     Blood Urea Nitrogen 39.0 (*)     Creatinine 1.73 (*)     All other components within normal limits   HEMOGLOBIN A1C - Abnormal; Notable for the following components:    Hemoglobin A1c 9.3 (*)     All other components within normal limits   ISTAT PROCEDURE - Abnormal; Notable for the following components:    POC PH 7.291 (*)     POC PCO2 23.6 (*)     POC PO2 104 (*)     POC HCO3 11.4 (*)     POC TCO2 12 (*)     All other components within normal limits   POCT GLUCOSE - Abnormal; Notable for the following components:    POCT Glucose >500 (*)     All other components within normal limits   POCT GLUCOSE - Abnormal; Notable for the following components:    POCT Glucose >500 (*)     All other components within normal limits   POCT GLUCOSE - Abnormal; Notable for the following components:    POCT Glucose 477 (*)     All other components within normal limits   POCT GLUCOSE - Abnormal; Notable for the following components:    POCT Glucose 380 (*)     All other components within normal limits   DRUG SCREEN PANEL, URINE EMERGENCY - Normal   HCG QUALITATIVE URINE - Normal   SARS-COV-2 RNA AMPLIFICATION, QUAL - Normal   BLOOD CULTURE [facility]   BLOOD CULTURE [facility]   CBC W/ AUTO DIFFERENTIAL    Narrative:     The following orders were created for panel order CBC auto differential.  Procedure                               Abnormality         Status                     ---------                               -----------         ------                     CBC with Differential[[id number]]        Abnormal            Final result                 Please view results for these tests on the individual orders.   POCT GLUCOSE, HAND-HELD DEVICE          Imaging Results    None           Medications   0.9%  NaCl infusion (1,000 mLs Intravenous New Bag 5/21/22 0128)   insulin regular in 0.9 % NaCl 100 unit/100 mL (1 unit/mL) infusion (8 Units/hr Intravenous Rate/Dose Change 5/21/22 0500)   sodium chloride 0.9% bolus 1,000 mL (0 mLs Intravenous Stopped 5/21/22 0120)   ondansetron injection 4 mg (4 mg Intravenous Given 5/21/22 0308)   prochlorperazine injection Soln 10 mg (10 mg Intravenous Given 5/21/22 0434)                 ED Course as of 05/21/22 0550   Sat May 21, 2022   0230 Patient has an anion gap acidosis consistent with her diabetic ketoacidosis from noncompliance with [facility] insulin regimen I have ordered IV fluids IV insulin and will repeat her BMP at 4:00 a.m. at that time will decide if she needs admission or transfer. [PL]   0550 Spoke with Dr. Biggs the hospitalist who agrees with admission to the intensive care unit [PL]      ED Course User Index  [PL] Chuy Mathews MD             Clinical Impression:   Final diagnoses:  [E10.11] Diabetic ketoacidosis with coma associated with type 1 diabetes mellitus (Primary)  [R73.9] Hyperglycemia  [E86.0] Dehydration  [N17.9] CHATA (acute kidney injury)  [E87.1] Hyponatremia  [R11.2] Nausea and vomiting, intractability of vomiting not specified, unspecified vomiting type  [Z91.19] Personal history of noncompliance with medical treatment and regimen  [A59.9] Trichomoniasis          ED Disposition Condition    Admit           Anne Blackwell is a certified LPN and was present during the entire interaction with this patient     Chuy Mathews MD  05/21/22 0536

## 2022-05-22 LAB
ALBUMIN SERPL-MCNC: 2.9 GM/DL (ref 3.5–5)
ALBUMIN/GLOB SERPL: 1 RATIO (ref 1.1–2)
ALP SERPL-CCNC: 72 UNIT/L (ref 40–150)
ALT SERPL-CCNC: 6 UNIT/L (ref 0–55)
AST SERPL-CCNC: 14 UNIT/L (ref 5–34)
BILIRUBIN DIRECT+TOT PNL SERPL-MCNC: 0.6 MG/DL
BUN SERPL-MCNC: 25 MG/DL (ref 7–18.7)
CALCIUM SERPL-MCNC: 8.5 MG/DL (ref 8.4–10.2)
CHLORIDE SERPL-SCNC: 100 MMOL/L (ref 98–107)
CO2 SERPL-SCNC: 24 MMOL/L (ref 22–29)
CREAT SERPL-MCNC: 1.2 MG/DL (ref 0.55–1.02)
ERYTHROCYTE [DISTWIDTH] IN BLOOD BY AUTOMATED COUNT: 12.2 % (ref 11.5–17)
EST. AVERAGE GLUCOSE BLD GHB EST-MCNC: 220.2 MG/DL
GLOBULIN SER-MCNC: 2.9 GM/DL (ref 2.4–3.5)
GLUCOSE SERPL-MCNC: 121 MG/DL (ref 74–100)
HBA1C MFR BLD: 9.3 %
HCT VFR BLD AUTO: 29.7 % (ref 37–47)
HGB BLD-MCNC: 9.9 GM/DL (ref 12–16)
MCH RBC QN AUTO: 28.2 PG (ref 27–31)
MCHC RBC AUTO-ENTMCNC: 33.3 MG/DL (ref 33–36)
MCV RBC AUTO: 84.6 FL (ref 80–94)
PLATELET # BLD AUTO: 444 X10(3)/MCL (ref 130–400)
PMV BLD AUTO: 8.8 FL (ref 9.4–12.4)
POCT GLUCOSE: 127 MG/DL (ref 70–110)
POCT GLUCOSE: 160 MG/DL (ref 70–110)
POCT GLUCOSE: 169 MG/DL (ref 70–110)
POCT GLUCOSE: 306 MG/DL (ref 70–110)
POCT GLUCOSE: 87 MG/DL (ref 70–110)
POTASSIUM SERPL-SCNC: 3.3 MMOL/L (ref 3.5–5.1)
PROT SERPL-MCNC: 5.8 GM/DL (ref 6.4–8.3)
RBC # BLD AUTO: 3.51 X10(6)/MCL (ref 4.2–5.4)
SODIUM SERPL-SCNC: 136 MMOL/L (ref 136–145)
WBC # SPEC AUTO: 8.5 X10(3)/MCL (ref 4.5–11.5)

## 2022-05-22 PROCEDURE — 80053 COMPREHEN METABOLIC PANEL: CPT | Performed by: INTERNAL MEDICINE

## 2022-05-22 PROCEDURE — 25000003 PHARM REV CODE 250: Performed by: INTERNAL MEDICINE

## 2022-05-22 PROCEDURE — 36415 COLL VENOUS BLD VENIPUNCTURE: CPT | Performed by: INTERNAL MEDICINE

## 2022-05-22 PROCEDURE — 63600175 PHARM REV CODE 636 W HCPCS: Performed by: INTERNAL MEDICINE

## 2022-05-22 PROCEDURE — 11000001 HC ACUTE MED/SURG PRIVATE ROOM

## 2022-05-22 PROCEDURE — 83036 HEMOGLOBIN GLYCOSYLATED A1C: CPT | Performed by: INTERNAL MEDICINE

## 2022-05-22 PROCEDURE — 94761 N-INVAS EAR/PLS OXIMETRY MLT: CPT

## 2022-05-22 PROCEDURE — C9399 UNCLASSIFIED DRUGS OR BIOLOG: HCPCS | Performed by: INTERNAL MEDICINE

## 2022-05-22 PROCEDURE — 99900035 HC TECH TIME PER 15 MIN (STAT)

## 2022-05-22 PROCEDURE — 85027 COMPLETE CBC AUTOMATED: CPT | Performed by: INTERNAL MEDICINE

## 2022-05-22 RX ORDER — POTASSIUM CHLORIDE 20 MEQ/1
40 TABLET, EXTENDED RELEASE ORAL ONCE
Status: COMPLETED | OUTPATIENT
Start: 2022-05-22 | End: 2022-05-22

## 2022-05-22 RX ORDER — HYDROCODONE BITARTRATE AND ACETAMINOPHEN 5; 325 MG/1; MG/1
1 TABLET ORAL EVERY 6 HOURS PRN
Status: DISCONTINUED | OUTPATIENT
Start: 2022-05-22 | End: 2022-05-23

## 2022-05-22 RX ORDER — KETOROLAC TROMETHAMINE 30 MG/ML
15 INJECTION, SOLUTION INTRAMUSCULAR; INTRAVENOUS EVERY 6 HOURS PRN
Status: DISCONTINUED | OUTPATIENT
Start: 2022-05-22 | End: 2022-05-24

## 2022-05-22 RX ADMIN — HYDROXYZINE HYDROCHLORIDE 50 MG: 25 TABLET ORAL at 12:05

## 2022-05-22 RX ADMIN — KETOROLAC TROMETHAMINE 15 MG: 30 INJECTION, SOLUTION INTRAMUSCULAR; INTRAVENOUS at 01:05

## 2022-05-22 RX ADMIN — POTASSIUM CHLORIDE 40 MEQ: 1500 TABLET, EXTENDED RELEASE ORAL at 11:05

## 2022-05-22 RX ADMIN — METOPROLOL TARTRATE 50 MG: 50 TABLET, FILM COATED ORAL at 09:05

## 2022-05-22 RX ADMIN — HYDROCODONE BITARTRATE AND ACETAMINOPHEN 1 TABLET: 5; 325 TABLET ORAL at 12:05

## 2022-05-22 RX ADMIN — INSULIN DETEMIR 30 UNITS: 100 INJECTION, SOLUTION SUBCUTANEOUS at 09:05

## 2022-05-22 RX ADMIN — METRONIDAZOLE 500 MG: 250 TABLET ORAL at 09:05

## 2022-05-22 RX ADMIN — PROCHLORPERAZINE EDISYLATE 5 MG: 5 INJECTION INTRAMUSCULAR; INTRAVENOUS at 09:05

## 2022-05-22 RX ADMIN — AMITRIPTYLINE HYDROCHLORIDE 25 MG: 25 TABLET, FILM COATED ORAL at 09:05

## 2022-05-22 RX ADMIN — INSULIN ASPART 7 UNITS: 100 INJECTION, SOLUTION INTRAVENOUS; SUBCUTANEOUS at 11:05

## 2022-05-22 RX ADMIN — DICYCLOMINE HYDROCHLORIDE 10 MG: 10 CAPSULE ORAL at 09:05

## 2022-05-22 RX ADMIN — HYDROCODONE BITARTRATE AND ACETAMINOPHEN 1 TABLET: 5; 325 TABLET ORAL at 09:05

## 2022-05-22 RX ADMIN — ONDANSETRON 4 MG: 2 INJECTION INTRAMUSCULAR; INTRAVENOUS at 01:05

## 2022-05-22 RX ADMIN — PANTOPRAZOLE SODIUM 40 MG: 40 TABLET, DELAYED RELEASE ORAL at 09:05

## 2022-05-22 RX ADMIN — DICYCLOMINE HYDROCHLORIDE 10 MG: 10 CAPSULE ORAL at 12:05

## 2022-05-22 RX ADMIN — ACETAMINOPHEN 1000 MG: 500 TABLET, FILM COATED ORAL at 09:05

## 2022-05-22 RX ADMIN — LABETALOL HYDROCHLORIDE 10 MG: 5 INJECTION INTRAVENOUS at 04:05

## 2022-05-22 RX ADMIN — PROCHLORPERAZINE EDISYLATE 5 MG: 5 INJECTION INTRAMUSCULAR; INTRAVENOUS at 03:05

## 2022-05-22 RX ADMIN — INSULIN ASPART 7 UNITS: 100 INJECTION, SOLUTION INTRAVENOUS; SUBCUTANEOUS at 07:05

## 2022-05-22 RX ADMIN — DICYCLOMINE HYDROCHLORIDE 10 MG: 10 CAPSULE ORAL at 04:05

## 2022-05-22 RX ADMIN — ENOXAPARIN SODIUM 30 MG: 100 INJECTION SUBCUTANEOUS at 04:05

## 2022-05-22 RX ADMIN — MUPIROCIN: 20 OINTMENT TOPICAL at 09:05

## 2022-05-22 NOTE — PROGRESS NOTES
[facility]  Progress Note - Hospital Medicine     Patient Name: Dorene Husain  MRN: [id number]  Status: IP- Inpatient   Admission Date: 5/20/2022  Length of Stay: 1      CC: hospital follow-up for hyperglycemia       SUBJECTIVE   26 y.o. female with a history that includes IDDM I and medication non-compliance, presented to ED overnight with muscle aches and back pain.  Associated with nausea and vomiting. Evaluation in ED noted glucose >500, labs noted glucose of 798, bicarb 8, sodium 118, with AG of 31.  [place] [facility] insulin infusion.    Today: Patient seen and examined at bedside, and chart reviewed, including current medications.  Patient complains of abd/back pain today.  No vomiting.  Sugars remain stable.      MEDICATIONS   Scheduled   amitriptyline  25 mg Oral Nightly    dicyclomine  10 mg Oral QID    enoxaparin  30 mg Subcutaneous Daily    insulin aspart U-100  7 Units Subcutaneous TIDWM    insulin detemir U-100  30 Units Subcutaneous QHS    metoprolol tartrate  50 mg Oral BID    metroNIDAZOLE  500 mg Oral Q12H    mupirocin   Nasal BID    pantoprazole  40 mg Oral Daily     Continuous Infusions  None    PRN  acetaminophen, dextrose 10 % in water (D10W), dextrose 10 % in water (D10W), dextrose 10%, dextrose 10%, HYDROcodone-acetaminophen, hydrOXYzine, labetalol, ondansetron, prochlorperazine, sodium chloride 0.9%       PHYSICAL EXAM   VITALS  Temp: 98.4 °F (36.9 °C) (05/22/22 0822)  Pulse: 79 (05/22/22 1202)  Resp: 20 (05/22/22 1232)  BP: 138/81 (05/22/22 0822)  SpO2: 99 % (05/22/22 1202)    GENERAL: Awake and in NAD  LUNGS: CTA B/L  CVS: Normal rate  GI/: Soft, NT, bowel sounds positive.  EXTREMITIES: No peripheral edema  NEURO: AAOx3  PSYCH: Cooperative      LABS   CBC  Recent Labs     05/22/22  0239   WBC 8.5   HGB 9.9*   HCT 29.7*   *      CHEM  Recent Labs     05/22/22 0239      K 3.3*   CO2 24   BUN 25.0*   CREATININE  1.20*   CALCIUM 8.5   BILITOT 0.6   AST 14   ALT 6   ALKPHOS 72   ALBUMIN 2.9*          MICROBIOLOGY     Microbiology Results (last 7 days)     Procedure Component Value Units Date/Time    Blood Culture #2 [[id number]] Collected: 05/21/22 0430    Order Status: Resulted Specimen: Blood Updated: 05/21/22 0433    Blood Culture #1 [[id number]] Collected: 05/21/22 0100    Order Status: Resulted Specimen: Blood Updated: 05/21/22 0112          ASSESSMENT   IDDM I with DKA  CHATA s/t IVVD  SIRS, likley secondary to above  h/o gastroparesis      PLAN   Scale diet back to clears, continue analgesics as needed  Sugars relatively controlled, continue current insulin regimen  Will continue to monitor inpatient for now      Prophylaxis: JACINTO Vuong MD  [facility] Medicine  05/22/2022

## 2022-05-22 NOTE — NURSING TRANSFER
Nursing Transfer Note      5/22/2022     Reason patient is being transferred: MD order.    Transfer from ICU 3 to Rm 339.    Transfer via WC.    Transfer with personal belongings.    Transported by AMNA Medel RN    Medicines sent: NA    Any special needs or follow-up needed: NA    Chart send with patient: Yes    Notified: Receiving nurse Jp.    Patient reassessed at: Time of transfer.    Upon arrival to floor: Pt stable.

## 2022-05-23 LAB
POCT GLUCOSE: 126 MG/DL (ref 70–110)
POCT GLUCOSE: 174 MG/DL (ref 70–110)
POCT GLUCOSE: 180 MG/DL (ref 70–110)
POCT GLUCOSE: 396 MG/DL (ref 70–110)

## 2022-05-23 PROCEDURE — 94761 N-INVAS EAR/PLS OXIMETRY MLT: CPT

## 2022-05-23 PROCEDURE — 99900035 HC TECH TIME PER 15 MIN (STAT)

## 2022-05-23 PROCEDURE — 25000003 PHARM REV CODE 250: Performed by: INTERNAL MEDICINE

## 2022-05-23 PROCEDURE — C9399 UNCLASSIFIED DRUGS OR BIOLOG: HCPCS | Performed by: INTERNAL MEDICINE

## 2022-05-23 PROCEDURE — 11000001 HC ACUTE MED/SURG PRIVATE ROOM

## 2022-05-23 PROCEDURE — 63600175 PHARM REV CODE 636 W HCPCS: Performed by: INTERNAL MEDICINE

## 2022-05-23 RX ORDER — SODIUM CHLORIDE 9 MG/ML
INJECTION, SOLUTION INTRAVENOUS CONTINUOUS
Status: DISCONTINUED | OUTPATIENT
Start: 2022-05-23 | End: 2022-05-24

## 2022-05-23 RX ORDER — POTASSIUM CHLORIDE 14.9 MG/ML
20 INJECTION INTRAVENOUS
Status: COMPLETED | OUTPATIENT
Start: 2022-05-23 | End: 2022-05-23

## 2022-05-23 RX ADMIN — INSULIN ASPART 7 UNITS: 100 INJECTION, SOLUTION INTRAVENOUS; SUBCUTANEOUS at 11:05

## 2022-05-23 RX ADMIN — POTASSIUM CHLORIDE 20 MEQ: 14.9 INJECTION, SOLUTION INTRAVENOUS at 11:05

## 2022-05-23 RX ADMIN — INSULIN DETEMIR 30 UNITS: 100 INJECTION, SOLUTION SUBCUTANEOUS at 09:05

## 2022-05-23 RX ADMIN — PANTOPRAZOLE SODIUM 40 MG: 40 TABLET, DELAYED RELEASE ORAL at 08:05

## 2022-05-23 RX ADMIN — SODIUM CHLORIDE: 9 INJECTION, SOLUTION INTRAVENOUS at 11:05

## 2022-05-23 RX ADMIN — POTASSIUM CHLORIDE 20 MEQ: 14.9 INJECTION, SOLUTION INTRAVENOUS at 01:05

## 2022-05-23 RX ADMIN — KETOROLAC TROMETHAMINE 15 MG: 30 INJECTION, SOLUTION INTRAMUSCULAR; INTRAVENOUS at 10:05

## 2022-05-23 RX ADMIN — AMITRIPTYLINE HYDROCHLORIDE 25 MG: 25 TABLET, FILM COATED ORAL at 09:05

## 2022-05-23 RX ADMIN — PROCHLORPERAZINE EDISYLATE 5 MG: 5 INJECTION INTRAMUSCULAR; INTRAVENOUS at 03:05

## 2022-05-23 RX ADMIN — METRONIDAZOLE 500 MG: 250 TABLET ORAL at 08:05

## 2022-05-23 RX ADMIN — SODIUM CHLORIDE: 9 INJECTION, SOLUTION INTRAVENOUS at 04:05

## 2022-05-23 RX ADMIN — METOPROLOL TARTRATE 50 MG: 50 TABLET, FILM COATED ORAL at 09:05

## 2022-05-23 RX ADMIN — KETOROLAC TROMETHAMINE 15 MG: 30 INJECTION, SOLUTION INTRAMUSCULAR; INTRAVENOUS at 06:05

## 2022-05-23 RX ADMIN — PROCHLORPERAZINE EDISYLATE 5 MG: 5 INJECTION INTRAMUSCULAR; INTRAVENOUS at 12:05

## 2022-05-23 RX ADMIN — MUPIROCIN: 20 OINTMENT TOPICAL at 09:05

## 2022-05-23 RX ADMIN — METRONIDAZOLE 500 MG: 250 TABLET ORAL at 09:05

## 2022-05-23 RX ADMIN — PROCHLORPERAZINE EDISYLATE 5 MG: 5 INJECTION INTRAMUSCULAR; INTRAVENOUS at 09:05

## 2022-05-23 RX ADMIN — METOPROLOL TARTRATE 50 MG: 50 TABLET, FILM COATED ORAL at 08:05

## 2022-05-23 NOTE — HOSPITAL COURSE
Blood sugars better controlled. Symptoms improved. Tolerating PO initially then started having nausea/vomiting. Restarted on Reglan and improving again. K+ required replacement.

## 2022-05-23 NOTE — SUBJECTIVE & OBJECTIVE
Interval History: Feeling better. No acute complaints. Wants to advance diet.    Review of Systems: 10 systems reviewed all pertinent positives and negatives stated in HPI, otherwise negative.    Objective:     Vital Signs (Most Recent):  Temp: 98.1 °F (36.7 °C) (05/23/22 0800)  Pulse: 74 (05/23/22 0800)  Resp: 20 (05/23/22 0800)  BP: 115/78 (05/23/22 0800)  SpO2: 99 % (05/23/22 0812) Vital Signs (24h Range):  Temp:  [97.8 °F (36.6 °C)-98.1 °F (36.7 °C)] 98.1 °F (36.7 °C)  Pulse:  [66-74] 74  Resp:  [18-20] 20  SpO2:  [98 %-100 %] 99 %  BP: (115-149)/() 115/78     Weight: 63.4 kg (139 lb 12.8 oz)  Body mass index is 25.57 kg/m².  No intake or output data in the 24 hours ending 05/23/22 1728   Physical Exam  Constitutional:       Appearance: Normal appearance.   HENT:      Head: Normocephalic and atraumatic.      Nose: Nose normal.      Mouth/Throat:      Mouth: Mucous membranes are moist.      Pharynx: Oropharynx is clear.   Eyes:      Extraocular Movements: Extraocular movements intact.      Pupils: Pupils are equal, round, and reactive to light.   Cardiovascular:      Rate and Rhythm: Normal rate and regular rhythm.      Heart sounds: Normal heart sounds.   Pulmonary:      Effort: Pulmonary effort is normal.      Breath sounds: Normal breath sounds.   Abdominal:      General: Abdomen is flat. There is no distension.      Palpations: Abdomen is soft.      Tenderness: There is no abdominal tenderness.   Musculoskeletal:         General: Normal range of motion.      Cervical back: Normal range of motion. No rigidity.   Skin:     General: Skin is warm and dry.   Neurological:      General: No focal deficit present.      Mental Status: She is alert and oriented to person, place, and time. Mental status is at baseline.   Psychiatric:         Attention and Perception: Attention normal.         Mood and Affect: Mood is depressed. Affect is flat.         Speech: Speech normal.         Behavior: Behavior normal.  Behavior is cooperative.         Thought Content: Thought content normal.         Cognition and Memory: Cognition and memory normal.         Judgment: Judgment normal.       Significant Labs: All pertinent labs within the past 24 hours have been reviewed.  CBC:   Recent Labs   Lab 05/22/22 0239   WBC 8.5   HGB 9.9*   HCT 29.7*   *     CMP:   Recent Labs   Lab 05/22/22 0239      K 3.3*   CO2 24   BUN 25.0*   CREATININE 1.20*   CALCIUM 8.5   ALBUMIN 2.9*   BILITOT 0.6   ALKPHOS 72   AST 14   ALT 6       Significant Imaging: I have reviewed all pertinent imaging results/findings within the past 24 hours.

## 2022-05-23 NOTE — PROGRESS NOTES
[facility] Medicine  Progress Note    Patient Name: Dorene Husain  MRN: [id number]  Patient Class: IP- Inpatient   Admission Date: 5/20/2022  Length of Stay: 2 days  Attending Physician: Sergio Hidalgo MD  Primary Care Provider: Kumar Ortiz NP        Subjective:     Principal Problem:Type 1 diabetes mellitus with ketoacidosis without coma        HPI:  26 y.o. female with a history that includes IDDM I and medication non-compliance, presented to ED overnight with muscle aches and back pain.  Associated with nausea and vomiting. Evaluation in ED noted glucose >500, labs noted glucose of 798, bicarb 8, sodium 118, with AG of 31.  [place] [facility] [place].      Overview/Hospital Course:  Blood sugars better controlled. Symptoms improved. Tolerating PO.      Interval History: Feeling better. No acute complaints. Wants to advance diet.    Review of Systems: 10 systems reviewed all pertinent positives and negatives stated in HPI, otherwise negative.    Objective:     Vital Signs (Most Recent):  Temp: 98.1 °F (36.7 °C) (05/23/22 0800)  Pulse: 74 (05/23/22 0800)  Resp: 20 (05/23/22 0800)  BP: 115/78 (05/23/22 0800)  SpO2: 99 % (05/23/22 0812) Vital Signs (24h Range):  Temp:  [97.8 °F (36.6 °C)-98.1 °F (36.7 °C)] 98.1 °F (36.7 °C)  Pulse:  [66-74] 74  Resp:  [18-20] 20  SpO2:  [98 %-100 %] 99 %  BP: (115-149)/() 115/78     Weight: 63.4 kg (139 lb 12.8 oz)  Body mass index is 25.57 kg/m².  No intake or output data in the 24 hours ending 05/23/22 1728   Physical Exam  Constitutional:       Appearance: Normal appearance.   HENT:      Head: Normocephalic and atraumatic.      Nose: Nose normal.      Mouth/Throat:      Mouth: Mucous membranes are moist.      Pharynx: Oropharynx is clear.   Eyes:      Extraocular Movements: Extraocular movements intact.      Pupils: Pupils are equal, round, and reactive to light.   Cardiovascular:       Rate and Rhythm: Normal rate and regular rhythm.      Heart sounds: Normal heart sounds.   Pulmonary:      Effort: Pulmonary effort is normal.      Breath sounds: Normal breath sounds.   Abdominal:      General: Abdomen is flat. There is no distension.      Palpations: Abdomen is soft.      Tenderness: There is no abdominal tenderness.   Musculoskeletal:         General: Normal range of motion.      Cervical back: Normal range of motion. No rigidity.   Skin:     General: Skin is warm and dry.   Neurological:      General: No focal deficit present.      Mental Status: She is alert and oriented to person, place, and time. Mental status is at baseline.   Psychiatric:         Attention and Perception: Attention normal.         Mood and Affect: Mood is depressed. Affect is flat.         Speech: Speech normal.         Behavior: Behavior normal. Behavior is cooperative.         Thought Content: Thought content normal.         Cognition and Memory: Cognition and memory normal.         Judgment: Judgment normal.       Significant Labs: All pertinent labs within the past 24 hours have been reviewed.  CBC:   Recent Labs   Lab 05/22/22 0239   WBC 8.5   HGB 9.9*   HCT 29.7*   *     CMP:   Recent Labs   Lab 05/22/22 0239      K 3.3*   CO2 24   BUN 25.0*   CREATININE 1.20*   CALCIUM 8.5   ALBUMIN 2.9*   BILITOT 0.6   ALKPHOS 72   AST 14   ALT 6       Significant Imaging: I have reviewed all pertinent imaging results/findings within the past 24 hours.      Assessment/Plan:      * Type 1 diabetes mellitus with ketoacidosis without coma  ssi  Home lantus  Advance diet  Monitor cbg's  hme tomorrow      CHATA (acute kidney injury)  Improving  IVF  Monitor labs        VTE Risk Mitigation (From admission, onward)         Ordered     enoxaparin injection 30 mg  Daily         05/21/22 0824     IP VTE HIGH RISK PATIENT  Once         05/21/22 0824                Discharge Planning   EBENEZER:      Code Status: Full Code   Is the  patient medically ready for discharge?:     Reason for patient still in hospital (select all that apply): Treatment                     Sergio Hidalgo MD  Department of Hospital Medicine   [facility]

## 2022-05-24 LAB
ANION GAP SERPL CALC-SCNC: 6 MEQ/L
BASOPHILS # BLD AUTO: 0.02 X10(3)/MCL (ref 0–0.2)
BASOPHILS NFR BLD AUTO: 0.3 %
BUN SERPL-MCNC: 14 MG/DL (ref 7–18.7)
CALCIUM SERPL-MCNC: 8.1 MG/DL (ref 8.4–10.2)
CHLORIDE SERPL-SCNC: 104 MMOL/L (ref 98–107)
CO2 SERPL-SCNC: 25 MMOL/L (ref 22–29)
CREAT SERPL-MCNC: 1.01 MG/DL (ref 0.55–1.02)
CREAT/UREA NIT SERPL: 14
EOSINOPHIL # BLD AUTO: 0.11 X10(3)/MCL (ref 0–0.9)
EOSINOPHIL NFR BLD AUTO: 1.6 %
ERYTHROCYTE [DISTWIDTH] IN BLOOD BY AUTOMATED COUNT: 12.3 % (ref 11.5–17)
GLUCOSE SERPL-MCNC: 369 MG/DL (ref 74–100)
HCT VFR BLD AUTO: 26.6 % (ref 37–47)
HGB BLD-MCNC: 8.7 GM/DL (ref 12–16)
IMM GRANULOCYTES # BLD AUTO: 0.03 X10(3)/MCL (ref 0–0.02)
IMM GRANULOCYTES NFR BLD AUTO: 0.4 % (ref 0–0.43)
LYMPHOCYTES # BLD AUTO: 2.31 X10(3)/MCL (ref 0.6–4.6)
LYMPHOCYTES NFR BLD AUTO: 34.3 %
MCH RBC QN AUTO: 28.2 PG (ref 27–31)
MCHC RBC AUTO-ENTMCNC: 32.7 MG/DL (ref 33–36)
MCV RBC AUTO: 86.4 FL (ref 80–94)
MONOCYTES # BLD AUTO: 0.57 X10(3)/MCL (ref 0.1–1.3)
MONOCYTES NFR BLD AUTO: 8.5 %
NEUTROPHILS # BLD AUTO: 3.7 X10(3)/MCL (ref 2.1–9.2)
NEUTROPHILS NFR BLD AUTO: 54.9 %
PLATELET # BLD AUTO: 345 X10(3)/MCL (ref 130–400)
PMV BLD AUTO: 9.1 FL (ref 9.4–12.4)
POCT GLUCOSE: 208 MG/DL (ref 70–110)
POCT GLUCOSE: 262 MG/DL (ref 70–110)
POCT GLUCOSE: 417 MG/DL (ref 70–110)
POCT GLUCOSE: 69 MG/DL (ref 70–110)
POTASSIUM SERPL-SCNC: 3.4 MMOL/L (ref 3.5–5.1)
RBC # BLD AUTO: 3.08 X10(6)/MCL (ref 4.2–5.4)
SODIUM SERPL-SCNC: 135 MMOL/L (ref 136–145)
WBC # SPEC AUTO: 6.7 X10(3)/MCL (ref 4.5–11.5)

## 2022-05-24 PROCEDURE — 94761 N-INVAS EAR/PLS OXIMETRY MLT: CPT

## 2022-05-24 PROCEDURE — 25000003 PHARM REV CODE 250: Performed by: INTERNAL MEDICINE

## 2022-05-24 PROCEDURE — C9399 UNCLASSIFIED DRUGS OR BIOLOG: HCPCS | Performed by: INTERNAL MEDICINE

## 2022-05-24 PROCEDURE — 11000001 HC ACUTE MED/SURG PRIVATE ROOM

## 2022-05-24 PROCEDURE — 63600175 PHARM REV CODE 636 W HCPCS: Performed by: INTERNAL MEDICINE

## 2022-05-24 PROCEDURE — 36415 COLL VENOUS BLD VENIPUNCTURE: CPT | Performed by: INTERNAL MEDICINE

## 2022-05-24 PROCEDURE — 85025 COMPLETE CBC W/AUTO DIFF WBC: CPT | Performed by: INTERNAL MEDICINE

## 2022-05-24 PROCEDURE — 99900035 HC TECH TIME PER 15 MIN (STAT)

## 2022-05-24 PROCEDURE — 80048 BASIC METABOLIC PNL TOTAL CA: CPT | Performed by: INTERNAL MEDICINE

## 2022-05-24 RX ORDER — METOCLOPRAMIDE HYDROCHLORIDE 5 MG/ML
10 INJECTION INTRAMUSCULAR; INTRAVENOUS EVERY 6 HOURS
Status: DISCONTINUED | OUTPATIENT
Start: 2022-05-24 | End: 2022-05-24

## 2022-05-24 RX ORDER — PROCHLORPERAZINE MALEATE 10 MG
10 TABLET ORAL 3 TIMES DAILY PRN
Status: DISCONTINUED | OUTPATIENT
Start: 2022-05-24 | End: 2022-05-26 | Stop reason: HOSPADM

## 2022-05-24 RX ORDER — PROCHLORPERAZINE EDISYLATE 5 MG/ML
10 INJECTION INTRAMUSCULAR; INTRAVENOUS EVERY 6 HOURS PRN
Status: DISCONTINUED | OUTPATIENT
Start: 2022-05-24 | End: 2022-05-24

## 2022-05-24 RX ORDER — ONDANSETRON 4 MG/1
4 TABLET, ORALLY DISINTEGRATING ORAL ONCE
Status: COMPLETED | OUTPATIENT
Start: 2022-05-24 | End: 2022-05-24

## 2022-05-24 RX ORDER — KETOROLAC TROMETHAMINE 30 MG/ML
15 INJECTION, SOLUTION INTRAMUSCULAR; INTRAVENOUS EVERY 6 HOURS PRN
Status: DISCONTINUED | OUTPATIENT
Start: 2022-05-24 | End: 2022-05-26 | Stop reason: HOSPADM

## 2022-05-24 RX ORDER — PROMETHAZINE HYDROCHLORIDE 25 MG/1
25 SUPPOSITORY RECTAL EVERY 6 HOURS PRN
Status: DISCONTINUED | OUTPATIENT
Start: 2022-05-24 | End: 2022-05-26 | Stop reason: HOSPADM

## 2022-05-24 RX ORDER — METOCLOPRAMIDE 10 MG/1
10 TABLET ORAL
Status: DISCONTINUED | OUTPATIENT
Start: 2022-05-24 | End: 2022-05-26 | Stop reason: HOSPADM

## 2022-05-24 RX ADMIN — INSULIN DETEMIR 30 UNITS: 100 INJECTION, SOLUTION SUBCUTANEOUS at 08:05

## 2022-05-24 RX ADMIN — KETOROLAC TROMETHAMINE 15 MG: 30 INJECTION, SOLUTION INTRAMUSCULAR; INTRAVENOUS at 10:05

## 2022-05-24 RX ADMIN — METRONIDAZOLE 500 MG: 250 TABLET ORAL at 09:05

## 2022-05-24 RX ADMIN — HYDROXYZINE HYDROCHLORIDE 50 MG: 25 TABLET ORAL at 11:05

## 2022-05-24 RX ADMIN — INSULIN ASPART 7 UNITS: 100 INJECTION, SOLUTION INTRAVENOUS; SUBCUTANEOUS at 09:05

## 2022-05-24 RX ADMIN — PROCHLORPERAZINE MALEATE 10 MG: 10 TABLET ORAL at 03:05

## 2022-05-24 RX ADMIN — KETOROLAC TROMETHAMINE 15 MG: 30 INJECTION, SOLUTION INTRAMUSCULAR; INTRAVENOUS at 04:05

## 2022-05-24 RX ADMIN — PROMETHAZINE HYDROCHLORIDE 25 MG: 25 SUPPOSITORY RECTAL at 01:05

## 2022-05-24 RX ADMIN — METOPROLOL TARTRATE 50 MG: 50 TABLET, FILM COATED ORAL at 09:05

## 2022-05-24 RX ADMIN — ONDANSETRON 4 MG: 4 TABLET, ORALLY DISINTEGRATING ORAL at 10:05

## 2022-05-24 RX ADMIN — PROCHLORPERAZINE MALEATE 10 MG: 10 TABLET ORAL at 08:05

## 2022-05-24 RX ADMIN — HYDROXYZINE HYDROCHLORIDE 50 MG: 25 TABLET ORAL at 08:05

## 2022-05-24 RX ADMIN — PANTOPRAZOLE SODIUM 40 MG: 40 TABLET, DELAYED RELEASE ORAL at 09:05

## 2022-05-24 RX ADMIN — PROCHLORPERAZINE MALEATE 10 MG: 10 TABLET ORAL at 06:05

## 2022-05-24 RX ADMIN — METOCLOPRAMIDE 10 MG: 10 TABLET ORAL at 04:05

## 2022-05-25 PROBLEM — E11.43 DM GASTROPARESIS: Chronic | Status: ACTIVE | Noted: 2022-05-25

## 2022-05-25 PROBLEM — E87.6 HYPOKALEMIA: Status: ACTIVE | Noted: 2022-05-25

## 2022-05-25 PROBLEM — K31.84 DM GASTROPARESIS: Chronic | Status: ACTIVE | Noted: 2022-05-25

## 2022-05-25 LAB
ANION GAP SERPL CALC-SCNC: 12 MEQ/L
BASOPHILS # BLD AUTO: 0.04 X10(3)/MCL (ref 0–0.2)
BASOPHILS NFR BLD AUTO: 0.3 %
BUN SERPL-MCNC: 15 MG/DL (ref 7–18.7)
CALCIUM SERPL-MCNC: 9.3 MG/DL (ref 8.4–10.2)
CHLORIDE SERPL-SCNC: 93 MMOL/L (ref 98–107)
CO2 SERPL-SCNC: 31 MMOL/L (ref 22–29)
CREAT SERPL-MCNC: 0.92 MG/DL (ref 0.55–1.02)
CREAT/UREA NIT SERPL: 16
EOSINOPHIL # BLD AUTO: 0.11 X10(3)/MCL (ref 0–0.9)
EOSINOPHIL NFR BLD AUTO: 0.9 %
ERYTHROCYTE [DISTWIDTH] IN BLOOD BY AUTOMATED COUNT: 12.1 % (ref 11.5–17)
GLUCOSE SERPL-MCNC: 190 MG/DL (ref 74–100)
HCT VFR BLD AUTO: 32 % (ref 37–47)
HGB BLD-MCNC: 10.7 GM/DL (ref 12–16)
IMM GRANULOCYTES # BLD AUTO: 0.07 X10(3)/MCL (ref 0–0.02)
IMM GRANULOCYTES NFR BLD AUTO: 0.6 % (ref 0–0.43)
LYMPHOCYTES # BLD AUTO: 3.05 X10(3)/MCL (ref 0.6–4.6)
LYMPHOCYTES NFR BLD AUTO: 25.8 %
MCH RBC QN AUTO: 28.1 PG (ref 27–31)
MCHC RBC AUTO-ENTMCNC: 33.4 MG/DL (ref 33–36)
MCV RBC AUTO: 84 FL (ref 80–94)
MONOCYTES # BLD AUTO: 1.03 X10(3)/MCL (ref 0.1–1.3)
MONOCYTES NFR BLD AUTO: 8.7 %
NEUTROPHILS # BLD AUTO: 7.5 X10(3)/MCL (ref 2.1–9.2)
NEUTROPHILS NFR BLD AUTO: 63.7 %
PLATELET # BLD AUTO: 471 X10(3)/MCL (ref 130–400)
PMV BLD AUTO: 9.1 FL (ref 9.4–12.4)
POCT GLUCOSE: 236 MG/DL (ref 70–110)
POCT GLUCOSE: 400 MG/DL (ref 70–110)
POCT GLUCOSE: 402 MG/DL (ref 70–110)
POCT GLUCOSE: >500 MG/DL (ref 70–110)
POTASSIUM SERPL-SCNC: 2.9 MMOL/L (ref 3.5–5.1)
RBC # BLD AUTO: 3.81 X10(6)/MCL (ref 4.2–5.4)
SODIUM SERPL-SCNC: 136 MMOL/L (ref 136–145)
WBC # SPEC AUTO: 11.8 X10(3)/MCL (ref 4.5–11.5)

## 2022-05-25 PROCEDURE — 11000001 HC ACUTE MED/SURG PRIVATE ROOM

## 2022-05-25 PROCEDURE — 80048 BASIC METABOLIC PNL TOTAL CA: CPT | Performed by: INTERNAL MEDICINE

## 2022-05-25 PROCEDURE — 99900035 HC TECH TIME PER 15 MIN (STAT)

## 2022-05-25 PROCEDURE — 36415 COLL VENOUS BLD VENIPUNCTURE: CPT | Performed by: INTERNAL MEDICINE

## 2022-05-25 PROCEDURE — 63600175 PHARM REV CODE 636 W HCPCS: Performed by: INTERNAL MEDICINE

## 2022-05-25 PROCEDURE — 25000003 PHARM REV CODE 250: Performed by: INTERNAL MEDICINE

## 2022-05-25 PROCEDURE — C9399 UNCLASSIFIED DRUGS OR BIOLOG: HCPCS | Performed by: INTERNAL MEDICINE

## 2022-05-25 PROCEDURE — 85025 COMPLETE CBC W/AUTO DIFF WBC: CPT | Performed by: INTERNAL MEDICINE

## 2022-05-25 PROCEDURE — 94761 N-INVAS EAR/PLS OXIMETRY MLT: CPT

## 2022-05-25 RX ORDER — POTASSIUM CHLORIDE 20 MEQ/1
60 TABLET, EXTENDED RELEASE ORAL ONCE
Status: CANCELLED | OUTPATIENT
Start: 2022-05-25

## 2022-05-25 RX ORDER — POTASSIUM CHLORIDE 14.9 MG/ML
20 INJECTION INTRAVENOUS
Status: DISCONTINUED | OUTPATIENT
Start: 2022-05-25 | End: 2022-05-25

## 2022-05-25 RX ORDER — POTASSIUM CHLORIDE 20 MEQ/1
20 TABLET, EXTENDED RELEASE ORAL ONCE
Status: COMPLETED | OUTPATIENT
Start: 2022-05-25 | End: 2022-05-25

## 2022-05-25 RX ORDER — POTASSIUM CHLORIDE 20 MEQ/1
60 TABLET, EXTENDED RELEASE ORAL ONCE
Status: COMPLETED | OUTPATIENT
Start: 2022-05-25 | End: 2022-05-25

## 2022-05-25 RX ORDER — POTASSIUM CHLORIDE 20 MEQ/1
60 TABLET, EXTENDED RELEASE ORAL ONCE
Status: DISCONTINUED | OUTPATIENT
Start: 2022-05-25 | End: 2022-05-25

## 2022-05-25 RX ORDER — POTASSIUM CHLORIDE 20 MEQ/1
60 TABLET, EXTENDED RELEASE ORAL ONCE
Status: COMPLETED | OUTPATIENT
Start: 2022-05-26 | End: 2022-05-26

## 2022-05-25 RX ORDER — POTASSIUM CHLORIDE 20 MEQ/1
40 TABLET, EXTENDED RELEASE ORAL ONCE
Status: COMPLETED | OUTPATIENT
Start: 2022-05-25 | End: 2022-05-25

## 2022-05-25 RX ADMIN — PROCHLORPERAZINE MALEATE 10 MG: 10 TABLET ORAL at 11:05

## 2022-05-25 RX ADMIN — AMITRIPTYLINE HYDROCHLORIDE 25 MG: 25 TABLET, FILM COATED ORAL at 09:05

## 2022-05-25 RX ADMIN — KETOROLAC TROMETHAMINE 15 MG: 30 INJECTION, SOLUTION INTRAMUSCULAR; INTRAVENOUS at 01:05

## 2022-05-25 RX ADMIN — DICYCLOMINE HYDROCHLORIDE 10 MG: 10 CAPSULE ORAL at 09:05

## 2022-05-25 RX ADMIN — POTASSIUM CHLORIDE 40 MEQ: 1500 TABLET, EXTENDED RELEASE ORAL at 01:05

## 2022-05-25 RX ADMIN — KETOROLAC TROMETHAMINE 15 MG: 30 INJECTION, SOLUTION INTRAMUSCULAR; INTRAVENOUS at 09:05

## 2022-05-25 RX ADMIN — POTASSIUM CHLORIDE 60 MEQ: 1500 TABLET, EXTENDED RELEASE ORAL at 09:05

## 2022-05-25 RX ADMIN — DICYCLOMINE HYDROCHLORIDE 10 MG: 10 CAPSULE ORAL at 05:05

## 2022-05-25 RX ADMIN — DICYCLOMINE HYDROCHLORIDE 10 MG: 10 CAPSULE ORAL at 01:05

## 2022-05-25 RX ADMIN — METOPROLOL TARTRATE 50 MG: 50 TABLET, FILM COATED ORAL at 09:05

## 2022-05-25 RX ADMIN — PROCHLORPERAZINE MALEATE 10 MG: 10 TABLET ORAL at 09:05

## 2022-05-25 RX ADMIN — INSULIN DETEMIR 30 UNITS: 100 INJECTION, SOLUTION SUBCUTANEOUS at 09:05

## 2022-05-25 RX ADMIN — POTASSIUM CHLORIDE 20 MEQ: 1500 TABLET, EXTENDED RELEASE ORAL at 03:05

## 2022-05-25 RX ADMIN — INSULIN ASPART 7 UNITS: 100 INJECTION, SOLUTION INTRAVENOUS; SUBCUTANEOUS at 05:05

## 2022-05-25 RX ADMIN — METOCLOPRAMIDE 10 MG: 10 TABLET ORAL at 05:05

## 2022-05-25 RX ADMIN — KETOROLAC TROMETHAMINE 15 MG: 30 INJECTION, SOLUTION INTRAMUSCULAR; INTRAVENOUS at 05:05

## 2022-05-25 RX ADMIN — ENOXAPARIN SODIUM 30 MG: 100 INJECTION SUBCUTANEOUS at 05:05

## 2022-05-25 RX ADMIN — PROCHLORPERAZINE MALEATE 10 MG: 10 TABLET ORAL at 05:05

## 2022-05-25 RX ADMIN — PANTOPRAZOLE SODIUM 40 MG: 40 TABLET, DELAYED RELEASE ORAL at 09:05

## 2022-05-25 RX ADMIN — METOCLOPRAMIDE 10 MG: 10 TABLET ORAL at 12:05

## 2022-05-25 RX ADMIN — INSULIN ASPART 7 UNITS: 100 INJECTION, SOLUTION INTRAVENOUS; SUBCUTANEOUS at 01:05

## 2022-05-25 NOTE — PROGRESS NOTES
[facility] Medicine  Progress Note    Patient Name: Dorene Husain  MRN: [id number]  Patient Class: IP- Inpatient   Admission Date: 5/20/2022  Length of Stay: 4 days  Attending Physician: Sergio Hidalgo MD  Primary Care Provider: Kumar Ortiz NP        Subjective:     Principal Problem:Type 1 diabetes mellitus with ketoacidosis without coma        HPI:  26 y.o. female with a history that includes IDDM I and medication non-compliance, presented to ED overnight with muscle aches and back pain.  Associated with nausea and vomiting. Evaluation in ED noted glucose >500, labs noted glucose of 798, bicarb 8, sodium 118, with AG of 31.  [place] [facility] [place] insulin infusion.      Overview/Hospital Course:  Blood sugars better controlled. Symptoms improved. Tolerating PO initially then started having nausea/vomiting.      Interval History: Complains of intractable nausea/vomiting. Vomited upon entering room.    Review of Systems: 10 systems reviewed all pertinent positives and negatives stated in HPI, otherwise negative.    Objective:     Vital Signs (Most Recent):  Temp: 98.9 °F (37.2 °C) (05/25/22 0529)  Pulse: 101 (05/25/22 0529)  Resp: 18 (05/24/22 1949)  BP: (!) 141/92 (05/25/22 0529)  SpO2: 100 % (05/25/22 0816)   Vital Signs (24h Range):  Temp:  [98.4 °F (36.9 °C)-98.9 °F (37.2 °C)] 98.9 °F (37.2 °C)  Pulse:  [] 101  Resp:  [18] 18  SpO2:  [97 %-100 %] 100 %  BP: (141-187)/() 141/92     Weight: 63.4 kg (139 lb 12.8 oz)  Body mass index is 25.57 kg/m².    Intake/Output Summary (Last 24 hours) at 5/25/2022 0828  Last data filed at 5/24/2022 1800  Gross per 24 hour   Intake 240 ml   Output --   Net 240 ml      Physical Exam  Constitutional:       Appearance: Normal appearance.   HENT:      Head: Normocephalic and atraumatic.      Nose: Nose normal.      Mouth/Throat:      Mouth: Mucous membranes are moist.      Pharynx: Oropharynx is  clear.   Eyes:      Extraocular Movements: Extraocular movements intact.      Pupils: Pupils are equal, round, and reactive to light.   Cardiovascular:      Rate and Rhythm: Normal rate and regular rhythm.      Heart sounds: Normal heart sounds.   Pulmonary:      Effort: Pulmonary effort is normal.      Breath sounds: Normal breath sounds.   Abdominal:      General: Abdomen is flat. There is no distension.      Palpations: Abdomen is soft.      Tenderness: There is no abdominal tenderness.   Musculoskeletal:         General: Normal range of motion.      Cervical back: Normal range of motion. No rigidity.   Skin:     General: Skin is warm and dry.   Neurological:      General: No focal deficit present.      Mental Status: She is alert and oriented to person, place, and time. Mental status is at baseline.   Psychiatric:         Attention and Perception: Attention normal.         Mood and Affect: Mood is depressed. Affect is flat.         Speech: Speech normal.         Behavior: Behavior normal. Behavior is cooperative.         Thought Content: Thought content normal.         Cognition and Memory: Cognition and memory normal.         Judgment: Judgment normal.        Significant Labs: All pertinent labs within the past 24 hours have been reviewed.    Significant Imaging: I have reviewed all pertinent imaging results/findings within the past 24 hours.      Assessment/Plan:      * Type 1 diabetes mellitus with ketoacidosis without coma  ssi  Home lantus  Advance diet as felecia  Monitor cbg's  home soon    CHATA (acute kidney injury)  Improving  IVF  Monitor labs        VTE Risk Mitigation (From admission, onward)         Ordered     enoxaparin injection 30 mg  Daily         05/21/22 0824     IP VTE HIGH RISK PATIENT  Once         05/21/22 0824                Discharge Planning   EBENEZER:      Code Status: Full Code   Is the patient medically ready for discharge?:     Reason for patient still in hospital (select all that apply):  Treatment                     Sergio Hidalgo MD  Department of Hospital Medicine   [facility]

## 2022-05-26 VITALS
OXYGEN SATURATION: 100 % | BODY MASS INDEX: 25.73 KG/M2 | SYSTOLIC BLOOD PRESSURE: 136 MMHG | HEART RATE: 86 BPM | DIASTOLIC BLOOD PRESSURE: 103 MMHG | WEIGHT: 139.81 LBS | TEMPERATURE: 98 F | HEIGHT: 62 IN | RESPIRATION RATE: 18 BRPM

## 2022-05-26 PROBLEM — N17.9 AKI (ACUTE KIDNEY INJURY): Status: RESOLVED | Noted: 2022-05-21 | Resolved: 2022-05-26

## 2022-05-26 PROBLEM — E87.6 HYPOKALEMIA: Status: RESOLVED | Noted: 2022-05-25 | Resolved: 2022-05-26

## 2022-05-26 LAB
ANION GAP SERPL CALC-SCNC: 12 MEQ/L
BACTERIA BLD CULT: NORMAL
BACTERIA BLD CULT: NORMAL
BASOPHILS # BLD AUTO: 0.04 X10(3)/MCL (ref 0–0.2)
BASOPHILS NFR BLD AUTO: 0.4 %
BUN SERPL-MCNC: 14 MG/DL (ref 7–18.7)
CALCIUM SERPL-MCNC: 9.5 MG/DL (ref 8.4–10.2)
CHLORIDE SERPL-SCNC: 94 MMOL/L (ref 98–107)
CO2 SERPL-SCNC: 26 MMOL/L (ref 22–29)
CREAT SERPL-MCNC: 0.98 MG/DL (ref 0.55–1.02)
CREAT/UREA NIT SERPL: 14
EOSINOPHIL # BLD AUTO: 0.08 X10(3)/MCL (ref 0–0.9)
EOSINOPHIL NFR BLD AUTO: 0.8 %
ERYTHROCYTE [DISTWIDTH] IN BLOOD BY AUTOMATED COUNT: 11.9 % (ref 11.5–17)
GLUCOSE SERPL-MCNC: 255 MG/DL (ref 74–100)
HCT VFR BLD AUTO: 33.5 % (ref 37–47)
HGB BLD-MCNC: 10.8 GM/DL (ref 12–16)
IMM GRANULOCYTES # BLD AUTO: 0.07 X10(3)/MCL (ref 0–0.02)
IMM GRANULOCYTES NFR BLD AUTO: 0.7 % (ref 0–0.43)
LYMPHOCYTES # BLD AUTO: 2.29 X10(3)/MCL (ref 0.6–4.6)
LYMPHOCYTES NFR BLD AUTO: 22.4 %
MCH RBC QN AUTO: 27.8 PG (ref 27–31)
MCHC RBC AUTO-ENTMCNC: 32.2 MG/DL (ref 33–36)
MCV RBC AUTO: 86.3 FL (ref 80–94)
MONOCYTES # BLD AUTO: 0.86 X10(3)/MCL (ref 0.1–1.3)
MONOCYTES NFR BLD AUTO: 8.4 %
NEUTROPHILS # BLD AUTO: 6.9 X10(3)/MCL (ref 2.1–9.2)
NEUTROPHILS NFR BLD AUTO: 67.3 %
PLATELET # BLD AUTO: 475 X10(3)/MCL (ref 130–400)
PMV BLD AUTO: 9 FL (ref 9.4–12.4)
POCT GLUCOSE: 223 MG/DL (ref 70–110)
POCT GLUCOSE: 267 MG/DL (ref 70–110)
POTASSIUM SERPL-SCNC: 3.7 MMOL/L (ref 3.5–5.1)
RBC # BLD AUTO: 3.88 X10(6)/MCL (ref 4.2–5.4)
SODIUM SERPL-SCNC: 132 MMOL/L (ref 136–145)
WBC # SPEC AUTO: 10.2 X10(3)/MCL (ref 4.5–11.5)

## 2022-05-26 PROCEDURE — 85025 COMPLETE CBC W/AUTO DIFF WBC: CPT | Performed by: INTERNAL MEDICINE

## 2022-05-26 PROCEDURE — 25000003 PHARM REV CODE 250: Performed by: INTERNAL MEDICINE

## 2022-05-26 PROCEDURE — 63600175 PHARM REV CODE 636 W HCPCS: Performed by: INTERNAL MEDICINE

## 2022-05-26 PROCEDURE — 36415 COLL VENOUS BLD VENIPUNCTURE: CPT | Performed by: INTERNAL MEDICINE

## 2022-05-26 PROCEDURE — 94761 N-INVAS EAR/PLS OXIMETRY MLT: CPT

## 2022-05-26 PROCEDURE — 80048 BASIC METABOLIC PNL TOTAL CA: CPT | Performed by: INTERNAL MEDICINE

## 2022-05-26 RX ADMIN — PROMETHAZINE HYDROCHLORIDE 25 MG: 25 SUPPOSITORY RECTAL at 08:05

## 2022-05-26 RX ADMIN — METOCLOPRAMIDE 10 MG: 10 TABLET ORAL at 04:05

## 2022-05-26 RX ADMIN — KETOROLAC TROMETHAMINE 15 MG: 30 INJECTION, SOLUTION INTRAMUSCULAR; INTRAVENOUS at 01:05

## 2022-05-26 RX ADMIN — INSULIN ASPART 7 UNITS: 100 INJECTION, SOLUTION INTRAVENOUS; SUBCUTANEOUS at 12:05

## 2022-05-26 RX ADMIN — KETOROLAC TROMETHAMINE 15 MG: 30 INJECTION, SOLUTION INTRAMUSCULAR; INTRAVENOUS at 04:05

## 2022-05-26 RX ADMIN — PROCHLORPERAZINE MALEATE 10 MG: 10 TABLET ORAL at 12:05

## 2022-05-26 RX ADMIN — ACETAMINOPHEN 1000 MG: 500 TABLET, FILM COATED ORAL at 06:05

## 2022-05-26 RX ADMIN — PANTOPRAZOLE SODIUM 40 MG: 40 TABLET, DELAYED RELEASE ORAL at 08:05

## 2022-05-26 RX ADMIN — METOPROLOL TARTRATE 50 MG: 50 TABLET, FILM COATED ORAL at 08:05

## 2022-05-26 RX ADMIN — DICYCLOMINE HYDROCHLORIDE 10 MG: 10 CAPSULE ORAL at 04:05

## 2022-05-26 RX ADMIN — DICYCLOMINE HYDROCHLORIDE 10 MG: 10 CAPSULE ORAL at 08:05

## 2022-05-26 RX ADMIN — METOCLOPRAMIDE 10 MG: 10 TABLET ORAL at 06:05

## 2022-05-26 RX ADMIN — POTASSIUM CHLORIDE 60 MEQ: 1500 TABLET, EXTENDED RELEASE ORAL at 06:05

## 2022-05-26 RX ADMIN — METOCLOPRAMIDE 10 MG: 10 TABLET ORAL at 12:05

## 2022-05-26 RX ADMIN — DICYCLOMINE HYDROCHLORIDE 10 MG: 10 CAPSULE ORAL at 12:05

## 2022-05-26 NOTE — SUBJECTIVE & OBJECTIVE
Interval History: Feeling better today. Didn't eat breakfast but did eat a little bit of lunch. Tolerating liquids well today. Replacing K+ PO and home tomorrow.    Review of Systems: 10 systems reviewed all pertinent positives and negatives stated in HPI, otherwise negative.    Objective:     Vital Signs (Most Recent):  Temp: 98.4 °F (36.9 °C) (05/25/22 1700)  Pulse: (!) 10 (05/25/22 1700)  Resp: 18 (05/25/22 1700)  BP: (!) 167/94 (05/25/22 1700)  SpO2: 97 % (05/25/22 1700)   Vital Signs (24h Range):  Temp:  [98.2 °F (36.8 °C)-98.9 °F (37.2 °C)] 98.4 °F (36.9 °C)  Pulse:  [] 10  Resp:  [18] 18  SpO2:  [97 %-100 %] 97 %  BP: (141-187)/() 167/94     Weight: 63.4 kg (139 lb 12.8 oz)  Body mass index is 25.57 kg/m².  No intake or output data in the 24 hours ending 05/25/22 1911     Physical Exam  Constitutional:       Appearance: Normal appearance.   HENT:      Head: Normocephalic and atraumatic.      Nose: Nose normal.      Mouth/Throat:      Mouth: Mucous membranes are moist.      Pharynx: Oropharynx is clear.   Eyes:      Extraocular Movements: Extraocular movements intact.      Pupils: Pupils are equal, round, and reactive to light.   Cardiovascular:      Rate and Rhythm: Normal rate and regular rhythm.      Heart sounds: Normal heart sounds.   Pulmonary:      Effort: Pulmonary effort is normal.      Breath sounds: Normal breath sounds.   Abdominal:      General: Abdomen is flat. There is no distension.      Palpations: Abdomen is soft.      Tenderness: There is no abdominal tenderness.   Musculoskeletal:         General: Normal range of motion.      Cervical back: Normal range of motion. No rigidity.   Skin:     General: Skin is warm and dry.   Neurological:      General: No focal deficit present.      Mental Status: She is alert and oriented to person, place, and time. Mental status is at baseline.   Psychiatric:         Attention and Perception: Attention normal.         Mood and Affect: Mood is  depressed. Affect is flat.         Speech: Speech normal.         Behavior: Behavior normal. Behavior is cooperative.         Thought Content: Thought content normal.         Cognition and Memory: Cognition and memory normal.         Judgment: Judgment normal.     Significant Labs: All pertinent labs within the past 24 hours have been reviewed.  CBC:   Recent Labs   Lab 05/24/22 0332 05/25/22  0846   WBC 6.7 11.8*   HGB 8.7* 10.7*   HCT 26.6* 32.0*    471*     CMP:   Recent Labs   Lab 05/24/22 0332 05/25/22  0846   * 136   K 3.4* 2.9*   CO2 25 31*   BUN 14.0 15.0   CREATININE 1.01 0.92   CALCIUM 8.1* 9.3       Significant Imaging: I have reviewed all pertinent imaging results/findings within the past 24 hours.

## 2022-05-26 NOTE — PROGRESS NOTES
[facility] Medicine  Progress Note    Patient Name: Dorene Husain  MRN: [id number]  Patient Class: IP- Inpatient   Admission Date: 5/20/2022  Length of Stay: 4 days  Attending Physician: Sergio Hidalgo MD  Primary Care Provider: Kumar Ortiz NP        Subjective:     Principal Problem:Type 1 diabetes mellitus with ketoacidosis without coma        HPI:  26 y.o. female with a history that includes IDDM I and medication non-compliance, presented to ED overnight with muscle aches and back pain.  Associated with nausea and vomiting. Evaluation in ED noted glucose >500, labs noted glucose of 798, bicarb 8, sodium 118, with AG of 31.  [place] [facility] [place].      Overview/Hospital Course:  Blood sugars better controlled. Symptoms improved. Tolerating PO initially then started having nausea/vomiting. Restarted on Reglan and improving again. K+ required replacement.       Interval History: Feeling better today. Didn't eat breakfast but did eat a little bit of lunch. Tolerating liquids well today. Replacing K+ PO and home tomorrow.    Review of Systems: 10 systems reviewed all pertinent positives and negatives stated in HPI, otherwise negative.    Objective:     Vital Signs (Most Recent):  Temp: 98.4 °F (36.9 °C) (05/25/22 1700)  Pulse: (!) 10 (05/25/22 1700)  Resp: 18 (05/25/22 1700)  BP: (!) 167/94 (05/25/22 1700)  SpO2: 97 % (05/25/22 1700)   Vital Signs (24h Range):  Temp:  [98.2 °F (36.8 °C)-98.9 °F (37.2 °C)] 98.4 °F (36.9 °C)  Pulse:  [] 10  Resp:  [18] 18  SpO2:  [97 %-100 %] 97 %  BP: (141-187)/() 167/94     Weight: 63.4 kg (139 lb 12.8 oz)  Body mass index is 25.57 kg/m².  No intake or output data in the 24 hours ending 05/25/22 1911     Physical Exam  Constitutional:       Appearance: Normal appearance.   HENT:      Head: Normocephalic and atraumatic.      Nose: Nose normal.      Mouth/Throat:      Mouth: Mucous  membranes are moist.      Pharynx: Oropharynx is clear.   Eyes:      Extraocular Movements: Extraocular movements intact.      Pupils: Pupils are equal, round, and reactive to light.   Cardiovascular:      Rate and Rhythm: Normal rate and regular rhythm.      Heart sounds: Normal heart sounds.   Pulmonary:      Effort: Pulmonary effort is normal.      Breath sounds: Normal breath sounds.   Abdominal:      General: Abdomen is flat. There is no distension.      Palpations: Abdomen is soft.      Tenderness: There is no abdominal tenderness.   Musculoskeletal:         General: Normal range of motion.      Cervical back: Normal range of motion. No rigidity.   Skin:     General: Skin is warm and dry.   Neurological:      General: No focal deficit present.      Mental Status: She is alert and oriented to person, place, and time. Mental status is at baseline.   Psychiatric:         Attention and Perception: Attention normal.         Mood and Affect: Mood is depressed. Affect is flat.         Speech: Speech normal.         Behavior: Behavior normal. Behavior is cooperative.         Thought Content: Thought content normal.         Cognition and Memory: Cognition and memory normal.         Judgment: Judgment normal.     Significant Labs: All pertinent labs within the past 24 hours have been reviewed.  CBC:   Recent Labs   Lab 05/24/22  0332 05/25/22  0846   WBC 6.7 11.8*   HGB 8.7* 10.7*   HCT 26.6* 32.0*    471*     CMP:   Recent Labs   Lab 05/24/22  0332 05/25/22  0846   * 136   K 3.4* 2.9*   CO2 25 31*   BUN 14.0 15.0   CREATININE 1.01 0.92   CALCIUM 8.1* 9.3       Significant Imaging: I have reviewed all pertinent imaging results/findings within the past 24 hours.      Assessment/Plan:      * Type 1 diabetes mellitus with ketoacidosis without coma  ssi  Home lantus  Advanced diet  Monitor cbg's  home tomorrow      CHATA (acute kidney injury)  Improving  IVF  Monitor labs      Hypokalemia  Replace PO      DM  gastroparesis  Reglan  Needs outpt GI f/u  Pt now has [place] Medicaid finally      VTE Risk Mitigation (From admission, onward)         Ordered     enoxaparin injection 30 mg  Daily         05/21/22 0824     IP VTE HIGH RISK PATIENT  Once         05/21/22 0824                Discharge Planning   EBENEZER:      Code Status: Full Code   Is the patient medically ready for discharge?:     Reason for patient still in hospital (select all that apply): Treatment                     Sergio Hidalgo MD  Department of Hospital Medicine   [facility]

## 2022-05-27 ENCOUNTER — PATIENT OUTREACH (OUTPATIENT)
Dept: ADMINISTRATIVE | Facility: CLINIC | Age: 27
End: 2022-05-27
Payer: MEDICAID

## 2022-05-27 NOTE — PROGRESS NOTES
C3 nurse attempted to contact patient for a TCC post hospital discharge follow-up call. The patient hung up the phone in the middle of the call.

## 2022-05-30 ENCOUNTER — HOSPITAL ENCOUNTER (INPATIENT)
Facility: HOSPITAL | Age: 27
LOS: 3 days | Discharge: HOME | DRG: 683 | End: 2022-06-02
Attending: FAMILY MEDICINE | Admitting: INTERNAL MEDICINE
Payer: MEDICAID

## 2022-05-30 DIAGNOSIS — R55 SYNCOPE, UNSPECIFIED SYNCOPE TYPE: Primary | ICD-10-CM

## 2022-05-30 DIAGNOSIS — R52 PAIN: ICD-10-CM

## 2022-05-30 DIAGNOSIS — R07.9 CHEST PAIN: ICD-10-CM

## 2022-05-30 DIAGNOSIS — S30.0XXA COCCYGEAL CONTUSION, INITIAL ENCOUNTER: ICD-10-CM

## 2022-05-30 DIAGNOSIS — E87.1 HYPONATREMIA: ICD-10-CM

## 2022-05-30 DIAGNOSIS — R73.9 HYPERGLYCEMIA: ICD-10-CM

## 2022-05-30 DIAGNOSIS — R55 SYNCOPE AND COLLAPSE: ICD-10-CM

## 2022-05-30 DIAGNOSIS — R55 SYNCOPE: ICD-10-CM

## 2022-05-30 DIAGNOSIS — E86.0 DEHYDRATION: ICD-10-CM

## 2022-05-30 LAB
ALBUMIN SERPL-MCNC: 3.3 GM/DL (ref 3.5–5)
ALBUMIN/GLOB SERPL: 0.8 RATIO (ref 1.1–2)
ALP SERPL-CCNC: 91 UNIT/L (ref 40–150)
ALT SERPL-CCNC: 12 UNIT/L (ref 0–55)
AMPHET UR QL SCN: NEGATIVE
ANION GAP SERPL CALC-SCNC: 17 MEQ/L
APPEARANCE UR: CLEAR
AST SERPL-CCNC: 29 UNIT/L (ref 5–34)
B-HCG SERPL QL: NEGATIVE
BACTERIA #/AREA URNS AUTO: ABNORMAL /HPF
BARBITURATE SCN PRESENT UR: NEGATIVE
BASOPHILS # BLD AUTO: 0.05 X10(3)/MCL (ref 0–0.2)
BASOPHILS NFR BLD AUTO: 0.4 %
BENZODIAZ UR QL SCN: POSITIVE
BILIRUB UR QL STRIP.AUTO: ABNORMAL MG/DL
BILIRUBIN DIRECT+TOT PNL SERPL-MCNC: 0.6 MG/DL
BUN SERPL-MCNC: 24 MG/DL (ref 7–18.7)
BUN SERPL-MCNC: 25 MG/DL (ref 7–18.7)
CALCIUM SERPL-MCNC: 8.8 MG/DL (ref 8.4–10.2)
CALCIUM SERPL-MCNC: 9.2 MG/DL (ref 8.4–10.2)
CANNABINOIDS UR QL SCN: NEGATIVE
CHLORIDE SERPL-SCNC: 82 MMOL/L (ref 98–107)
CHLORIDE SERPL-SCNC: 84 MMOL/L (ref 98–107)
CO2 SERPL-SCNC: 23 MMOL/L (ref 22–29)
CO2 SERPL-SCNC: 26 MMOL/L (ref 22–29)
COCAINE UR QL SCN: NEGATIVE
COLOR UR AUTO: YELLOW
CREAT SERPL-MCNC: 1.68 MG/DL (ref 0.55–1.02)
CREAT SERPL-MCNC: 2.14 MG/DL (ref 0.55–1.02)
CREAT/UREA NIT SERPL: 15
EOSINOPHIL # BLD AUTO: 0.02 X10(3)/MCL (ref 0–0.9)
EOSINOPHIL NFR BLD AUTO: 0.2 %
ERYTHROCYTE [DISTWIDTH] IN BLOOD BY AUTOMATED COUNT: 12 % (ref 11.5–17)
FENTANYL UR QL SCN: NEGATIVE
FIO2: 21
GLOBULIN SER-MCNC: 4.1 GM/DL (ref 2.4–3.5)
GLUCOSE SERPL-MCNC: 348 MG/DL (ref 74–100)
GLUCOSE SERPL-MCNC: 355 MG/DL (ref 74–100)
GLUCOSE SERPL-MCNC: 384 MG/DL (ref 70–110)
GLUCOSE UR QL STRIP.AUTO: 250 MG/DL
HCO3 UR-SCNC: 32.2 MMOL/L (ref 24–28)
HCT VFR BLD AUTO: 33.2 % (ref 37–47)
HGB BLD-MCNC: 11.3 GM/DL (ref 12–16)
HYALINE CASTS URNS QL MICRO: ABNORMAL /HPF
IMM GRANULOCYTES # BLD AUTO: 0.13 X10(3)/MCL (ref 0–0.02)
IMM GRANULOCYTES NFR BLD AUTO: 1.1 % (ref 0–0.43)
KETONES UR QL STRIP.AUTO: ABNORMAL MG/DL
LEUKOCYTE ESTERASE UR QL STRIP.AUTO: NEGATIVE UNIT/L
LYMPHOCYTES # BLD AUTO: 2.16 X10(3)/MCL (ref 0.6–4.6)
LYMPHOCYTES NFR BLD AUTO: 18.6 %
MCH RBC QN AUTO: 28.1 PG (ref 27–31)
MCHC RBC AUTO-ENTMCNC: 34 MG/DL (ref 33–36)
MCV RBC AUTO: 82.6 FL (ref 80–94)
MDMA UR QL SCN: NEGATIVE
MONOCYTES # BLD AUTO: 1.36 X10(3)/MCL (ref 0.1–1.3)
MONOCYTES NFR BLD AUTO: 11.7 %
NEUTROPHILS # BLD AUTO: 7.9 X10(3)/MCL (ref 2.1–9.2)
NEUTROPHILS NFR BLD AUTO: 68 %
NITRITE UR QL STRIP.AUTO: NEGATIVE
OPIATES UR QL SCN: POSITIVE
PCO2 BLDA: 41.5 MMHG (ref 35–45)
PCP UR QL: NEGATIVE
PH SMN: 7.5 [PH] (ref 7.35–7.45)
PH UR STRIP.AUTO: 5.5 [PH]
PH UR: 5 [PH] (ref 3–11)
PLATELET # BLD AUTO: 607 X10(3)/MCL (ref 130–400)
PMV BLD AUTO: 8.7 FL (ref 9.4–12.4)
PO2 BLDA: 100 MMHG (ref 80–100)
POC BE: 9 MMOL/L
POC SATURATED O2: 98 % (ref 95–100)
POC TCO2: 33 MMOL/L (ref 23–27)
POCT GLUCOSE: 300 MG/DL (ref 70–110)
POCT GLUCOSE: 393 MG/DL (ref 70–110)
POTASSIUM SERPL-SCNC: 3.3 MMOL/L (ref 3.5–5.1)
POTASSIUM SERPL-SCNC: 4.3 MMOL/L (ref 3.5–5.1)
PROT SERPL-MCNC: 7.4 GM/DL (ref 6.4–8.3)
PROT UR QL STRIP.AUTO: >=300 MG/DL
RBC # BLD AUTO: 4.02 X10(6)/MCL (ref 4.2–5.4)
RBC #/AREA URNS AUTO: ABNORMAL /HPF
RBC UR QL AUTO: ABNORMAL UNIT/L
SAMPLE: ABNORMAL
SODIUM SERPL-SCNC: 124 MMOL/L (ref 136–145)
SODIUM SERPL-SCNC: 127 MMOL/L (ref 136–145)
SP GR UR STRIP.AUTO: >=1.03
SPECIFIC GRAVITY, URINE AUTO (.000) (OHS): 1.01 (ref 1–1.03)
SQUAMOUS #/AREA URNS AUTO: ABNORMAL /LPF
UROBILINOGEN UR STRIP-ACNC: 0.2 MG/DL
WBC # SPEC AUTO: 11.6 X10(3)/MCL (ref 4.5–11.5)
WBC #/AREA URNS AUTO: ABNORMAL /HPF

## 2022-05-30 PROCEDURE — 96375 TX/PRO/DX INJ NEW DRUG ADDON: CPT

## 2022-05-30 PROCEDURE — 85025 COMPLETE CBC W/AUTO DIFF WBC: CPT | Performed by: NURSE PRACTITIONER

## 2022-05-30 PROCEDURE — 96374 THER/PROPH/DIAG INJ IV PUSH: CPT

## 2022-05-30 PROCEDURE — 81025 URINE PREGNANCY TEST: CPT | Performed by: FAMILY MEDICINE

## 2022-05-30 PROCEDURE — 63600175 PHARM REV CODE 636 W HCPCS: Performed by: FAMILY MEDICINE

## 2022-05-30 PROCEDURE — 93005 ELECTROCARDIOGRAM TRACING: CPT

## 2022-05-30 PROCEDURE — 36415 COLL VENOUS BLD VENIPUNCTURE: CPT | Performed by: NURSE PRACTITIONER

## 2022-05-30 PROCEDURE — 11000001 HC ACUTE MED/SURG PRIVATE ROOM

## 2022-05-30 PROCEDURE — 96361 HYDRATE IV INFUSION ADD-ON: CPT

## 2022-05-30 PROCEDURE — 25000003 PHARM REV CODE 250: Performed by: HOSPITALIST

## 2022-05-30 PROCEDURE — 21400001 HC TELEMETRY ROOM

## 2022-05-30 PROCEDURE — 82962 GLUCOSE BLOOD TEST: CPT

## 2022-05-30 PROCEDURE — 82803 BLOOD GASES ANY COMBINATION: CPT

## 2022-05-30 PROCEDURE — 81001 URINALYSIS AUTO W/SCOPE: CPT | Performed by: NURSE PRACTITIONER

## 2022-05-30 PROCEDURE — 63600175 PHARM REV CODE 636 W HCPCS: Performed by: NURSE PRACTITIONER

## 2022-05-30 PROCEDURE — 80307 DRUG TEST PRSMV CHEM ANLYZR: CPT | Performed by: NURSE PRACTITIONER

## 2022-05-30 PROCEDURE — 36600 WITHDRAWAL OF ARTERIAL BLOOD: CPT

## 2022-05-30 PROCEDURE — 94761 N-INVAS EAR/PLS OXIMETRY MLT: CPT

## 2022-05-30 PROCEDURE — 99285 EMERGENCY DEPT VISIT HI MDM: CPT | Mod: 25

## 2022-05-30 PROCEDURE — 80053 COMPREHEN METABOLIC PANEL: CPT | Performed by: NURSE PRACTITIONER

## 2022-05-30 RX ORDER — GLUCAGON 1 MG
1 KIT INJECTION
Status: DISCONTINUED | OUTPATIENT
Start: 2022-05-30 | End: 2022-05-31

## 2022-05-30 RX ORDER — AMITRIPTYLINE HYDROCHLORIDE 25 MG/1
25 TABLET, FILM COATED ORAL NIGHTLY
Status: DISCONTINUED | OUTPATIENT
Start: 2022-05-30 | End: 2022-06-02 | Stop reason: HOSPADM

## 2022-05-30 RX ORDER — SODIUM CHLORIDE 0.9 % (FLUSH) 0.9 %
10 SYRINGE (ML) INJECTION EVERY 12 HOURS PRN
Status: DISCONTINUED | OUTPATIENT
Start: 2022-05-30 | End: 2022-06-02 | Stop reason: HOSPADM

## 2022-05-30 RX ORDER — SODIUM CHLORIDE 0.9 % (FLUSH) 0.9 %
10 SYRINGE (ML) INJECTION
Status: DISCONTINUED | OUTPATIENT
Start: 2022-05-30 | End: 2022-06-02 | Stop reason: HOSPADM

## 2022-05-30 RX ORDER — PANTOPRAZOLE SODIUM 40 MG/1
40 TABLET, DELAYED RELEASE ORAL DAILY
Status: DISCONTINUED | OUTPATIENT
Start: 2022-05-31 | End: 2022-06-02 | Stop reason: HOSPADM

## 2022-05-30 RX ORDER — MAG HYDROX/ALUMINUM HYD/SIMETH 200-200-20
30 SUSPENSION, ORAL (FINAL DOSE FORM) ORAL 4 TIMES DAILY PRN
Status: DISCONTINUED | OUTPATIENT
Start: 2022-05-30 | End: 2022-06-02 | Stop reason: HOSPADM

## 2022-05-30 RX ORDER — SIMETHICONE 80 MG
1 TABLET,CHEWABLE ORAL 4 TIMES DAILY PRN
Status: DISCONTINUED | OUTPATIENT
Start: 2022-05-30 | End: 2022-06-02 | Stop reason: HOSPADM

## 2022-05-30 RX ORDER — METOPROLOL TARTRATE 50 MG/1
50 TABLET ORAL 2 TIMES DAILY
Status: DISCONTINUED | OUTPATIENT
Start: 2022-05-30 | End: 2022-05-30

## 2022-05-30 RX ORDER — DICYCLOMINE HYDROCHLORIDE 10 MG/1
10 CAPSULE ORAL 4 TIMES DAILY
Status: DISCONTINUED | OUTPATIENT
Start: 2022-05-31 | End: 2022-06-02 | Stop reason: HOSPADM

## 2022-05-30 RX ORDER — LOSARTAN POTASSIUM 50 MG/1
50 TABLET ORAL DAILY
Status: DISCONTINUED | OUTPATIENT
Start: 2022-05-31 | End: 2022-05-30

## 2022-05-30 RX ORDER — INSULIN ASPART 100 [IU]/ML
0-5 INJECTION, SOLUTION INTRAVENOUS; SUBCUTANEOUS
Status: DISCONTINUED | OUTPATIENT
Start: 2022-05-30 | End: 2022-05-31

## 2022-05-30 RX ORDER — KETOROLAC TROMETHAMINE 30 MG/ML
30 INJECTION, SOLUTION INTRAMUSCULAR; INTRAVENOUS
Status: COMPLETED | OUTPATIENT
Start: 2022-05-30 | End: 2022-05-30

## 2022-05-30 RX ORDER — ONDANSETRON 2 MG/ML
4 INJECTION INTRAMUSCULAR; INTRAVENOUS EVERY 8 HOURS PRN
Status: DISCONTINUED | OUTPATIENT
Start: 2022-05-30 | End: 2022-06-02 | Stop reason: HOSPADM

## 2022-05-30 RX ORDER — TRAMADOL HYDROCHLORIDE 50 MG/1
50 TABLET ORAL EVERY 6 HOURS PRN
Status: DISCONTINUED | OUTPATIENT
Start: 2022-05-30 | End: 2022-05-31

## 2022-05-30 RX ORDER — MORPHINE SULFATE 4 MG/ML
4 INJECTION, SOLUTION INTRAMUSCULAR; INTRAVENOUS
Status: COMPLETED | OUTPATIENT
Start: 2022-05-30 | End: 2022-05-30

## 2022-05-30 RX ORDER — ACETAMINOPHEN 325 MG/1
650 TABLET ORAL EVERY 4 HOURS PRN
Status: DISCONTINUED | OUTPATIENT
Start: 2022-05-30 | End: 2022-06-02 | Stop reason: HOSPADM

## 2022-05-30 RX ORDER — LISINOPRIL 20 MG/1
20 TABLET ORAL 2 TIMES DAILY
Status: DISCONTINUED | OUTPATIENT
Start: 2022-05-30 | End: 2022-05-30

## 2022-05-30 RX ORDER — METOCLOPRAMIDE 10 MG/1
10 TABLET ORAL 4 TIMES DAILY
Status: DISCONTINUED | OUTPATIENT
Start: 2022-05-31 | End: 2022-05-31

## 2022-05-30 RX ORDER — IBUPROFEN 200 MG
24 TABLET ORAL
Status: DISCONTINUED | OUTPATIENT
Start: 2022-05-30 | End: 2022-06-02 | Stop reason: HOSPADM

## 2022-05-30 RX ORDER — IBUPROFEN 200 MG
16 TABLET ORAL
Status: DISCONTINUED | OUTPATIENT
Start: 2022-05-30 | End: 2022-06-02 | Stop reason: HOSPADM

## 2022-05-30 RX ORDER — MORPHINE SULFATE 4 MG/ML
2 INJECTION, SOLUTION INTRAMUSCULAR; INTRAVENOUS
Status: COMPLETED | OUTPATIENT
Start: 2022-05-30 | End: 2022-05-30

## 2022-05-30 RX ORDER — ACETAMINOPHEN 325 MG/1
650 TABLET ORAL EVERY 8 HOURS PRN
Status: DISCONTINUED | OUTPATIENT
Start: 2022-05-30 | End: 2022-06-02 | Stop reason: HOSPADM

## 2022-05-30 RX ORDER — NALOXONE HCL 0.4 MG/ML
0.02 VIAL (ML) INJECTION
Status: DISCONTINUED | OUTPATIENT
Start: 2022-05-30 | End: 2022-06-02 | Stop reason: HOSPADM

## 2022-05-30 RX ORDER — TALC
6 POWDER (GRAM) TOPICAL NIGHTLY PRN
Status: DISCONTINUED | OUTPATIENT
Start: 2022-05-30 | End: 2022-06-02 | Stop reason: HOSPADM

## 2022-05-30 RX ADMIN — MORPHINE SULFATE 2 MG: 4 INJECTION INTRAVENOUS at 06:05

## 2022-05-30 RX ADMIN — HUMAN INSULIN 4 UNITS: 100 INJECTION, SOLUTION SUBCUTANEOUS at 05:05

## 2022-05-30 RX ADMIN — SODIUM CHLORIDE, POTASSIUM CHLORIDE, SODIUM LACTATE AND CALCIUM CHLORIDE 1000 ML: 600; 310; 30; 20 INJECTION, SOLUTION INTRAVENOUS at 12:05

## 2022-05-30 RX ADMIN — TRAMADOL HYDROCHLORIDE 50 MG: 50 TABLET, COATED ORAL at 11:05

## 2022-05-30 RX ADMIN — KETOROLAC TROMETHAMINE 30 MG: 30 INJECTION, SOLUTION INTRAMUSCULAR; INTRAVENOUS at 12:05

## 2022-05-30 RX ADMIN — AMITRIPTYLINE HYDROCHLORIDE 25 MG: 25 TABLET, FILM COATED ORAL at 09:05

## 2022-05-30 RX ADMIN — MORPHINE SULFATE 4 MG: 4 INJECTION INTRAVENOUS at 01:05

## 2022-05-30 RX ADMIN — SODIUM CHLORIDE, POTASSIUM CHLORIDE, SODIUM LACTATE AND CALCIUM CHLORIDE 1000 ML: 600; 310; 30; 20 INJECTION, SOLUTION INTRAVENOUS at 02:05

## 2022-05-30 NOTE — ED PROVIDER NOTES
Encounter Date: 5/30/2022       History     Chief Complaint   Patient presents with    Fall     Reports got dizzy 2 days ago and fell in bathroom hitting head. Having head pain and tailbone pain     26-year-old female presents emergency department after history of fall.  States she fell yesterday after getting dizzy.  She also states while taking a bath 2 days ago she had a syncopal episode while getting out of the tub.  She is complaining of pain over her tailbone and to the top of her head.  She does not know when she hit her head if it was yesterday or the day before.  She has a history of diabetes with frequent DKA and noncompliance.  She describes the pain in her tailbone as moderate and sharp.  Pain increases with lying on her back, movement and palpation.  She denies any change in bowel or bladder habits.  She denies any numbness or weakness in her legs.  She does not know what her blood sugar was at the time of either of the incidents that she did not check it after the incident occurred.        Review of patient's allergies indicates:  No Known Allergies  Past Medical History:   Diagnosis Date    Diabetes mellitus     DKA (diabetic ketoacidosis)     DKA (diabetic ketoacidosis)     DKA (diabetic ketoacidosis)     Gastroparesis     Gastroparesis due to DM     Hypertension     Ketoacidosis due to diabetes     Pneumonia     Secondary DM with DKA      Past Surgical History:   Procedure Laterality Date    None       History reviewed. No pertinent family history.  Social History     Tobacco Use    Smoking status: Never Smoker    Smokeless tobacco: Never Used   Substance Use Topics    Alcohol use: Never    Drug use: Never     Review of Systems   Constitutional: Negative.    HENT: Negative.    Eyes: Negative.    Respiratory: Negative.    Cardiovascular: Negative.    Gastrointestinal: Negative.  Negative for nausea and vomiting.   Endocrine: Negative.    Genitourinary: Negative.    Musculoskeletal:  Negative.  Negative for neck pain.   Skin: Negative.    Allergic/Immunologic: Negative.    Neurological: Positive for syncope.   Hematological: Negative.    Psychiatric/Behavioral: Negative.        Physical Exam     Initial Vitals [05/30/22 1158]   BP Pulse Resp Temp SpO2   106/75 (!) 115 19 98.8 °F (37.1 °C) 99 %      MAP       --         Physical Exam    Nursing note and vitals reviewed.  Constitutional: Vital signs are normal. She appears well-developed and well-nourished. She is cooperative.   In obvious discomfort.   HENT:   Head: Normocephalic and atraumatic.       Right Ear: Tympanic membrane, external ear and ear canal normal. No hemotympanum.   Left Ear: Tympanic membrane, external ear and ear canal normal. No hemotympanum.   Nose: Nose normal.   Mouth/Throat: Uvula is midline, oropharynx is clear and moist and mucous membranes are normal. No oropharyngeal exudate.   No dental injury   Eyes: Conjunctivae, EOM and lids are normal. Pupils are equal, round, and reactive to light.   Neck: Neck supple.   Normal range of motion.  Cardiovascular: Regular rhythm. Tachycardia present.    Pulmonary/Chest: Effort normal and breath sounds normal.   Abdominal: Abdomen is soft and flat. Bowel sounds are normal. There is no abdominal tenderness.   Musculoskeletal:         General: Normal range of motion.      Cervical back: Normal range of motion and neck supple. No spinous process tenderness or muscular tenderness.        Back:      Neurological: She is alert and oriented to person, place, and time. She has normal strength.   Skin: Skin is warm, dry and intact. No rash noted.   Psychiatric: Her speech is normal and behavior is normal. Thought content normal. Her mood appears anxious. Cognition and memory are normal.         ED Course   Procedures  Labs Reviewed   COMPREHENSIVE METABOLIC PANEL - Abnormal; Notable for the following components:       Result Value    Sodium Level 124 (*)     Chloride 82 (*)     Glucose Level  348 (*)     Blood Urea Nitrogen 24.0 (*)     Creatinine 2.14 (*)     Albumin Level 3.3 (*)     Globulin 4.1 (*)     Albumin/Globulin Ratio 0.8 (*)     All other components within normal limits   URINALYSIS, REFLEX TO URINE CULTURE - Abnormal; Notable for the following components:    Protein, UA >=300 (*)     Glucose,   (*)     Ketones, UA Trace (*)     Blood, UA Small (*)     Bilirubin, UA Small (*)     All other components within normal limits   DRUG SCREEN, URINE (BEAKER) - Abnormal; Notable for the following components:    Benzodiazepine, Urine Positive (*)     Opiates, Urine Positive (*)     All other components within normal limits   CBC WITH DIFFERENTIAL - Abnormal; Notable for the following components:    WBC 11.6 (*)     RBC 4.02 (*)     Hgb 11.3 (*)     Hct 33.2 (*)     Platelet 607 (*)     MPV 8.7 (*)     Mono # 1.36 (*)     IG# 0.13 (*)     IG% 1.1 (*)     All other components within normal limits   URINALYSIS, MICROSCOPIC - Abnormal; Notable for the following components:    Hyaline Casts, UA Few (*)     Squamous Epithelial Cells, UA Few (*)     All other components within normal limits   POCT GLUCOSE - Abnormal; Notable for the following components:    POCT Glucose 393 (*)     All other components within normal limits   PREGNANCY TEST, URINE RAPID - Normal   CBC W/ AUTO DIFFERENTIAL    Narrative:     The following orders were created for panel order CBC Auto Differential.  Procedure                               Abnormality         Status                     ---------                               -----------         ------                     CBC with Differential[[id number]]        Abnormal            Final result                 Please view results for these tests on the individual orders.     EKG Readings: (Independently Interpreted)   Rhythm: Sinus Tachycardia. Heart Rate: 123. Ectopy: No Ectopy. ST Segments: Normal ST Segments. T Waves: Normal.     ECG Results          EKG 12-lead (In process)   Result time 05/30/22 13:36:35    In process by Interface, Lab In [facility] (05/30/22 13:36:35)                 Narrative:    Test Reason : R55,    Vent. Rate : 123 BPM     Atrial Rate : 123 BPM     P-R Int : 120 ms          QRS Dur : 080 ms      QT Int : 356 ms       P-R-T Axes : 065 076 016 degrees     QTc Int : 509 ms    Sinus tachycardia  Biatrial enlargement  Abnormal ECG  No previous ECGs available    Referred By: [email or web address]   SELF           Confirmed By:                             Imaging Results          X-Ray Sacrum And Coccyx (Final result)  Result time 05/30/22 16:03:50    Final result by Danilo Navarro MD (05/30/22 16:03:50)                 Impression:      1. No definite acute osseous defect identified  2. Osseous injury to the sacrum and coccyx is notoriously occult in appearance and a bone scan or MR exam would allow further evaluation if clinically indicated      Electronically signed by: Danilo Navarro  Date:    05/30/2022  Time:    16:03             Narrative:    EXAMINATION:  XR SACRUM AND COCCYX    CLINICAL HISTORY:  Syncope and collapse; .    COMPARISON:  Lumbosacral spine 217 22    FINDINGS:  AP and lateral views reveal no definite fracture dislocation.  There is slight sclerosis at sacroiliac joints bilaterally.  No aggressive osteolytic or osteoblastic lesion is appreciated.                                 Medications   lactated ringers bolus 1,000 mL (1,000 mLs Intravenous New Bag 5/30/22 1239)   ketorolac injection 30 mg (30 mg Intravenous Given 5/30/22 1253)   morphine injection 4 mg (4 mg Intravenous Given 5/30/22 1332)   lactated ringers bolus 1,000 mL (1,000 mLs Intravenous New Bag 5/30/22 1421)     Medical Decision Making:   Clinical Tests:   Lab Tests: Ordered and Reviewed  1300 having difficulty getting blood for lab tests was able to get IV access we will give her fluids and attempt to redraw    Able to obtain blood after 1 liter of fluids.     Positive orthostatics.              ED  Course as of 05/30/22 2026   Mon May 30, 2022   1523 Sodium(!): 124 [DL]   1752 POCT ARTERIAL BLOOD GAS Blood Gas [BRIDGET]      ED Course User Index  [DL] ORLANDO Berger  [BRIDGET] Lincoln Mckeon MD           4:28 PM.  Transfer of Care:  Pt care transferred to Dr. Mckeon.      Clinical Impression:   Final diagnoses:  [R55] Syncope  [R52] Pain  [R55] Syncope, unspecified syncope type (Primary)  [E87.1] Hyponatremia  [E86.0] Dehydration  [S30.0XXA] Coccygeal contusion, initial encounter  [R73.9] Hyperglycemia          ED Disposition Condition    Admit               ORLANDO Berger  05/30/22 1629       ORLANDO Berger  05/30/22 1644       ORLANDO Berger  05/30/22 2026

## 2022-05-30 NOTE — ED PROVIDER NOTES
Encounter Date: 5/30/2022       History     Chief Complaint   Patient presents with    Fall     Reports got dizzy 2 days ago and fell in bathroom hitting head. Having head pain and tailbone pain     HPI  Review of patient's allergies indicates:  No Known Allergies  Past Medical History:   Diagnosis Date    Diabetes mellitus     DKA (diabetic ketoacidosis)     DKA (diabetic ketoacidosis)     DKA (diabetic ketoacidosis)     Gastroparesis     Gastroparesis due to DM     Hypertension     Ketoacidosis due to diabetes     Pneumonia     Secondary DM with DKA      Past Surgical History:   Procedure Laterality Date    None       History reviewed. No pertinent family history.  Social History     Tobacco Use    Smoking status: Never Smoker    Smokeless tobacco: Never Used   Substance Use Topics    Alcohol use: Never    Drug use: Never     Review of Systems    Physical Exam     Initial Vitals [05/30/22 1158]   BP Pulse Resp Temp SpO2   106/75 (!) 115 19 98.8 °F (37.1 °C) 99 %      MAP       --         Physical Exam    ED Course   Procedures  Labs Reviewed   COMPREHENSIVE METABOLIC PANEL - Abnormal; Notable for the following components:       Result Value    Sodium Level 124 (*)     Chloride 82 (*)     Glucose Level 348 (*)     Blood Urea Nitrogen 24.0 (*)     Creatinine 2.14 (*)     Albumin Level 3.3 (*)     Globulin 4.1 (*)     Albumin/Globulin Ratio 0.8 (*)     All other components within normal limits   URINALYSIS, REFLEX TO URINE CULTURE - Abnormal; Notable for the following components:    Protein, UA >=300 (*)     Glucose,   (*)     Ketones, UA Trace (*)     Blood, UA Small (*)     Bilirubin, UA Small (*)     All other components within normal limits   DRUG SCREEN, URINE (BEAKER) - Abnormal; Notable for the following components:    Benzodiazepine, Urine Positive (*)     Opiates, Urine Positive (*)     All other components within normal limits   CBC WITH DIFFERENTIAL - Abnormal; Notable for the  following components:    WBC 11.6 (*)     RBC 4.02 (*)     Hgb 11.3 (*)     Hct 33.2 (*)     Platelet 607 (*)     MPV 8.7 (*)     Mono # 1.36 (*)     IG# 0.13 (*)     IG% 1.1 (*)     All other components within normal limits   URINALYSIS, MICROSCOPIC - Abnormal; Notable for the following components:    Hyaline Casts, UA Few (*)     Squamous Epithelial Cells, UA Few (*)     All other components within normal limits   BASIC METABOLIC PANEL - Abnormal; Notable for the following components:    Sodium Level 127 (*)     Potassium Level 3.3 (*)     Chloride 84 (*)     Glucose Level 355 (*)     Blood Urea Nitrogen 25.0 (*)     Creatinine 1.68 (*)     All other components within normal limits   POCT GLUCOSE, HAND-HELD DEVICE - Abnormal; Notable for the following components:    POC Glucose 384 (*)     All other components within normal limits   POCT GLUCOSE - Abnormal; Notable for the following components:    POCT Glucose 393 (*)     All other components within normal limits   ISTAT PROCEDURE - Abnormal; Notable for the following components:    POC PH 7.498 (*)     POC HCO3 32.2 (*)     POC TCO2 33 (*)     All other components within normal limits   PREGNANCY TEST, URINE RAPID - Normal   CBC W/ AUTO DIFFERENTIAL    Narrative:     The following orders were created for panel order CBC Auto Differential.  Procedure                               Abnormality         Status                     ---------                               -----------         ------                     CBC with Differential[[id number]]        Abnormal            Final result                 Please view results for these tests on the individual orders.   SARS-COV-2 RNA AMPLIFICATION, QUAL        ECG Results          EKG 12-lead (In process)  Result time 05/30/22 13:36:35    In process by Interface, Lab In [facility] (05/30/22 13:36:35)                 Narrative:    Test Reason : R55,    Vent. Rate : 123 BPM     Atrial Rate : 123 BPM     P-R Int : 120 ms           QRS Dur : 080 ms      QT Int : 356 ms       P-R-T Axes : 065 076 016 degrees     QTc Int : 509 ms    Sinus tachycardia  Biatrial enlargement  Abnormal ECG  No previous ECGs available    Referred By: [email or web address]   SELF           Confirmed By:                             Imaging Results          X-Ray Chest 1 View (Final result)  Result time 05/30/22 16:39:42    Final result by Danilo Navarro MD (05/30/22 16:39:42)                 Impression:      1. No active cardiopulmonary disease identified      Electronically signed by: Danilo Navarro  Date:    05/30/2022  Time:    16:39             Narrative:    EXAMINATION:  XR CHEST 1 VIEW    CLINICAL HISTORY:  , Pain, unspecified.    COMPARISON:  04/22/2022    FINDINGS:  An AP view or more reveals the heart to be normal in size the trachea is midline.  No infiltrate or effusion is seen.  Bony structures appear grossly intact.                               X-Ray Sacrum And Coccyx (Final result)  Result time 05/30/22 16:03:50    Final result by Danilo Navarro MD (05/30/22 16:03:50)                 Impression:      1. No definite acute osseous defect identified  2. Osseous injury to the sacrum and coccyx is notoriously occult in appearance and a bone scan or MR exam would allow further evaluation if clinically indicated      Electronically signed by: Danilo Navarro  Date:    05/30/2022  Time:    16:03             Narrative:    EXAMINATION:  XR SACRUM AND COCCYX    CLINICAL HISTORY:  Syncope and collapse; .    COMPARISON:  Lumbosacral spine 217 22    FINDINGS:  AP and lateral views reveal no definite fracture dislocation.  There is slight sclerosis at sacroiliac joints bilaterally.  No aggressive osteolytic or osteoblastic lesion is appreciated.                                 Medications   sodium chloride 0.9% flush 10 mL (has no administration in time range)   lactated ringers bolus 1,000 mL (1,000 mLs Intravenous New Bag 5/30/22 1239)   ketorolac injection 30 mg (30 mg  Intravenous Given 5/30/22 1253)   morphine injection 4 mg (4 mg Intravenous Given 5/30/22 1332)   lactated ringers bolus 1,000 mL (1,000 mLs Intravenous New Bag 5/30/22 1421)   insulin regular injection 4 Units 0.04 mL (4 Units Intravenous Given 5/30/22 1753)     Medical Decision Making:   Initial Assessment:   Syncope, hyperglycemia  Differential Diagnosis:   Syncope, hyperglycemia,  Clinical Tests:   Lab Tests: Ordered and Reviewed  Radiological Study: Ordered and Reviewed  ED Management:  Spoke with Dr. Hidalgo states get ABG and BMP and call him back             ED Course as of 05/30/22 1805   Mon May 30, 2022   1523 Sodium(!): 124 [DL]   1752 POCT ARTERIAL BLOOD GAS Blood Gas [BRIDGET]      ED Course User Index  [DL] ORLANDO Berger  [BRIDGET] Lincoln Mckeon MD             Clinical Impression:   Final diagnoses:  [R55] Syncope  [R52] Pain  [R55] Syncope, unspecified syncope type (Primary)  [E87.1] Hyponatremia  [E86.0] Dehydration  [S30.0XXA] Coccygeal contusion, initial encounter  [R73.9] Hyperglycemia          ED Disposition Condition    Admit               Lincoln Mckeon MD  05/30/22 9567

## 2022-05-31 PROBLEM — M54.9 BACK PAIN: Status: ACTIVE | Noted: 2022-05-31

## 2022-05-31 PROBLEM — G89.29 CHRONIC PAIN: Chronic | Status: ACTIVE | Noted: 2022-05-31

## 2022-05-31 PROBLEM — R55 SYNCOPAL EPISODES: Status: ACTIVE | Noted: 2022-05-31

## 2022-05-31 PROBLEM — E10.65 HYPERGLYCEMIA DUE TO TYPE 1 DIABETES MELLITUS: Status: ACTIVE | Noted: 2022-05-31

## 2022-05-31 LAB
ALBUMIN SERPL-MCNC: 2.7 GM/DL (ref 3.5–5)
ALBUMIN/GLOB SERPL: 1 RATIO (ref 1.1–2)
ALP SERPL-CCNC: 81 UNIT/L (ref 40–150)
ALT SERPL-CCNC: 9 UNIT/L (ref 0–55)
AST SERPL-CCNC: 15 UNIT/L (ref 5–34)
BASOPHILS # BLD AUTO: 0.05 X10(3)/MCL (ref 0–0.2)
BASOPHILS NFR BLD AUTO: 0.5 %
BILIRUBIN DIRECT+TOT PNL SERPL-MCNC: 0.6 MG/DL
BUN SERPL-MCNC: 24 MG/DL (ref 7–18.7)
CALCIUM SERPL-MCNC: 8.6 MG/DL (ref 8.4–10.2)
CHLORIDE SERPL-SCNC: 83 MMOL/L (ref 98–107)
CO2 SERPL-SCNC: 27 MMOL/L (ref 22–29)
CREAT SERPL-MCNC: 1.4 MG/DL (ref 0.55–1.02)
EOSINOPHIL # BLD AUTO: 0.06 X10(3)/MCL (ref 0–0.9)
EOSINOPHIL NFR BLD AUTO: 0.6 %
ERYTHROCYTE [DISTWIDTH] IN BLOOD BY AUTOMATED COUNT: 11.9 % (ref 11.5–17)
GLOBULIN SER-MCNC: 2.8 GM/DL (ref 2.4–3.5)
GLUCOSE SERPL-MCNC: 434 MG/DL (ref 74–100)
HCT VFR BLD AUTO: 29.1 % (ref 37–47)
HGB BLD-MCNC: 9.6 GM/DL (ref 12–16)
IMM GRANULOCYTES # BLD AUTO: 0.11 X10(3)/MCL (ref 0–0.02)
IMM GRANULOCYTES NFR BLD AUTO: 1.1 % (ref 0–0.43)
LYMPHOCYTES # BLD AUTO: 2.3 X10(3)/MCL (ref 0.6–4.6)
LYMPHOCYTES NFR BLD AUTO: 22.1 %
MCH RBC QN AUTO: 27.7 PG (ref 27–31)
MCHC RBC AUTO-ENTMCNC: 33 MG/DL (ref 33–36)
MCV RBC AUTO: 84.1 FL (ref 80–94)
MONOCYTES # BLD AUTO: 0.99 X10(3)/MCL (ref 0.1–1.3)
MONOCYTES NFR BLD AUTO: 9.5 %
NEUTROPHILS # BLD AUTO: 6.9 X10(3)/MCL (ref 2.1–9.2)
NEUTROPHILS NFR BLD AUTO: 66.2 %
PLATELET # BLD AUTO: 522 X10(3)/MCL (ref 130–400)
PMV BLD AUTO: 9.2 FL (ref 9.4–12.4)
POCT GLUCOSE: 394 MG/DL (ref 70–110)
POCT GLUCOSE: 489 MG/DL (ref 70–110)
POCT GLUCOSE: >500 MG/DL (ref 70–110)
POCT GLUCOSE: >500 MG/DL (ref 70–110)
POTASSIUM SERPL-SCNC: 3.6 MMOL/L (ref 3.5–5.1)
PROT SERPL-MCNC: 5.5 GM/DL (ref 6.4–8.3)
RBC # BLD AUTO: 3.46 X10(6)/MCL (ref 4.2–5.4)
SODIUM SERPL-SCNC: 127 MMOL/L (ref 136–145)
WBC # SPEC AUTO: 10.4 X10(3)/MCL (ref 4.5–11.5)

## 2022-05-31 PROCEDURE — 63600175 PHARM REV CODE 636 W HCPCS: Performed by: EMERGENCY MEDICINE

## 2022-05-31 PROCEDURE — C9399 UNCLASSIFIED DRUGS OR BIOLOG: HCPCS | Performed by: EMERGENCY MEDICINE

## 2022-05-31 PROCEDURE — 94761 N-INVAS EAR/PLS OXIMETRY MLT: CPT

## 2022-05-31 PROCEDURE — 63600175 PHARM REV CODE 636 W HCPCS: Performed by: INTERNAL MEDICINE

## 2022-05-31 PROCEDURE — 25000003 PHARM REV CODE 250: Performed by: INTERNAL MEDICINE

## 2022-05-31 PROCEDURE — 25000003 PHARM REV CODE 250: Performed by: HOSPITALIST

## 2022-05-31 PROCEDURE — 63600175 PHARM REV CODE 636 W HCPCS: Performed by: HOSPITALIST

## 2022-05-31 PROCEDURE — 80053 COMPREHEN METABOLIC PANEL: CPT | Performed by: FAMILY MEDICINE

## 2022-05-31 PROCEDURE — 36415 COLL VENOUS BLD VENIPUNCTURE: CPT | Performed by: FAMILY MEDICINE

## 2022-05-31 PROCEDURE — 25000003 PHARM REV CODE 250: Performed by: EMERGENCY MEDICINE

## 2022-05-31 PROCEDURE — 11000001 HC ACUTE MED/SURG PRIVATE ROOM

## 2022-05-31 PROCEDURE — 85025 COMPLETE CBC W/AUTO DIFF WBC: CPT | Performed by: FAMILY MEDICINE

## 2022-05-31 PROCEDURE — 21400001 HC TELEMETRY ROOM

## 2022-05-31 PROCEDURE — 36415 COLL VENOUS BLD VENIPUNCTURE: CPT | Performed by: EMERGENCY MEDICINE

## 2022-05-31 RX ORDER — GLUCAGON 1 MG
1 KIT INJECTION
Status: DISCONTINUED | OUTPATIENT
Start: 2022-05-31 | End: 2022-06-02 | Stop reason: HOSPADM

## 2022-05-31 RX ORDER — GLUCAGON 1 MG
1 KIT INJECTION
Status: DISCONTINUED | OUTPATIENT
Start: 2022-05-31 | End: 2022-05-31

## 2022-05-31 RX ORDER — INSULIN ASPART 100 [IU]/ML
5 INJECTION, SOLUTION INTRAVENOUS; SUBCUTANEOUS
Status: DISCONTINUED | OUTPATIENT
Start: 2022-05-31 | End: 2022-05-31

## 2022-05-31 RX ORDER — METOCLOPRAMIDE HYDROCHLORIDE 5 MG/5ML
5 SOLUTION ORAL
Status: DISCONTINUED | OUTPATIENT
Start: 2022-05-31 | End: 2022-06-02 | Stop reason: HOSPADM

## 2022-05-31 RX ORDER — OXYCODONE AND ACETAMINOPHEN 5; 325 MG/1; MG/1
1 TABLET ORAL EVERY 4 HOURS PRN
Status: DISCONTINUED | OUTPATIENT
Start: 2022-05-31 | End: 2022-06-02 | Stop reason: HOSPADM

## 2022-05-31 RX ORDER — SODIUM CHLORIDE AND POTASSIUM CHLORIDE 150; 900 MG/100ML; MG/100ML
INJECTION, SOLUTION INTRAVENOUS CONTINUOUS
Status: DISCONTINUED | OUTPATIENT
Start: 2022-05-31 | End: 2022-06-02 | Stop reason: HOSPADM

## 2022-05-31 RX ORDER — INSULIN ASPART 100 [IU]/ML
0-14 INJECTION, SOLUTION INTRAVENOUS; SUBCUTANEOUS
Status: COMPLETED | OUTPATIENT
Start: 2022-05-31 | End: 2022-05-31

## 2022-05-31 RX ORDER — HYDRALAZINE HYDROCHLORIDE 20 MG/ML
10 INJECTION INTRAMUSCULAR; INTRAVENOUS EVERY 4 HOURS PRN
Status: DISCONTINUED | OUTPATIENT
Start: 2022-05-31 | End: 2022-06-02 | Stop reason: HOSPADM

## 2022-05-31 RX ADMIN — DICYCLOMINE HYDROCHLORIDE 10 MG: 10 CAPSULE ORAL at 09:05

## 2022-05-31 RX ADMIN — METOCLOPRAMIDE 10 MG: 10 TABLET ORAL at 04:05

## 2022-05-31 RX ADMIN — METOCLOPRAMIDE 10 MG: 10 TABLET ORAL at 09:05

## 2022-05-31 RX ADMIN — TRAMADOL HYDROCHLORIDE 50 MG: 50 TABLET, COATED ORAL at 09:05

## 2022-05-31 RX ADMIN — OXYCODONE HYDROCHLORIDE AND ACETAMINOPHEN 1 TABLET: 5; 325 TABLET ORAL at 04:05

## 2022-05-31 RX ADMIN — METOCLOPRAMIDE 10 MG: 10 TABLET ORAL at 01:05

## 2022-05-31 RX ADMIN — INSULIN ASPART 14 UNITS: 100 INJECTION, SOLUTION INTRAVENOUS; SUBCUTANEOUS at 09:05

## 2022-05-31 RX ADMIN — AMITRIPTYLINE HYDROCHLORIDE 25 MG: 25 TABLET, FILM COATED ORAL at 08:05

## 2022-05-31 RX ADMIN — PANTOPRAZOLE SODIUM 40 MG: 40 TABLET, DELAYED RELEASE ORAL at 09:05

## 2022-05-31 RX ADMIN — METOCLOPRAMIDE HYDROCHLORIDE 5 MG: 5 SOLUTION ORAL at 08:05

## 2022-05-31 RX ADMIN — DICYCLOMINE HYDROCHLORIDE 10 MG: 10 CAPSULE ORAL at 04:05

## 2022-05-31 RX ADMIN — INSULIN ASPART 5 UNITS: 100 INJECTION, SOLUTION INTRAVENOUS; SUBCUTANEOUS at 01:05

## 2022-05-31 RX ADMIN — OXYCODONE HYDROCHLORIDE AND ACETAMINOPHEN 1 TABLET: 5; 325 TABLET ORAL at 08:05

## 2022-05-31 RX ADMIN — SODIUM CHLORIDE AND POTASSIUM CHLORIDE: 9; 1.49 INJECTION, SOLUTION INTRAVENOUS at 04:05

## 2022-05-31 RX ADMIN — DICYCLOMINE HYDROCHLORIDE 10 MG: 10 CAPSULE ORAL at 01:05

## 2022-05-31 RX ADMIN — INSULIN DETEMIR 10 UNITS: 100 INJECTION, SOLUTION SUBCUTANEOUS at 09:05

## 2022-05-31 RX ADMIN — DICYCLOMINE HYDROCHLORIDE 10 MG: 10 CAPSULE ORAL at 08:05

## 2022-05-31 NOTE — H&P
Date of Service: 5/30/2022    Chief complaint:  Dizziness    HPI  This is a 25 y/o female with PMH HTN and DM2 who comes in for fall.  Patient reports that she got dizzy after taking a shower two days ago and she reports that when she was putting her clothes on she passed out.  Patient reports that the next day patient went to the kitchen to get something to drink and she passed out.  Patient reports that she hit her head.  Patient reports that this has not happened to her before.  No recent changes to medications.  Patient had no chest pain, palpitations shortness of breath prior to passing out.  Patient complaining of pain in her tailbone and back.  PMH:  DM, gastroparesis, HTN  Surgeries: Cholecystectomy  and box injection in stomach  Family History: Father had HTN  Social History: No smoking, no drinking, no illicit drug use    ROS: All other 14 point review of systems were reviewed and are negative      Physical Exam  Initial Vitals [05/30/22 1158]   BP Pulse Resp Temp SpO2   106/75 (!) 115 19 98.8 °F (37.1 °C) 99 %       MAP           --              Physical Exam     Nursing note and vitals reviewed.  Constitutional: Vital signs are normal. She appears well-developed and well-nourished. She is cooperative.   In obvious discomfort.   HENT:   Head: Normocephalic and atraumatic.    Right Ear: Tympanic membrane, external ear and ear canal normal. No hemotympanum.   Left Ear: Tympanic membrane, external ear and ear canal normal. No hemotympanum.   Nose: Nose normal.   Mouth/Throat: Uvula is midline, oropharynx is clear and moist and mucous membranes are normal. No oropharyngeal exudate.   No dental injury   Eyes: Conjunctivae, EOM and lids are normal. Pupils are equal, round, and reactive to light.   Neck: Neck supple.   Normal range of motion.  Cardiovascular: Regular rhythm. Tachycardia present.    Pulmonary/Chest: Effort normal and breath sounds normal.   Abdominal: Abdomen is soft and flat. Bowel sounds are  normal. There is no abdominal tenderness.   Musculoskeletal:         General: Normal range of motion.      Cervical back: Normal range of motion and neck supple. No spinous process tenderness or muscular tendernes  Neurological: She is alert and oriented to person, place, and time. She has normal strength.   Skin: Skin is warm, dry and intact. No rash noted.   Psychiatric: Her speech is normal and behavior is normal. Thought content normal. Her mood appears anxious. Cognition and memory are normal.     Labs Reviewed   COMPREHENSIVE METABOLIC PANEL - Abnormal; Notable for the following components:       Result Value      Sodium Level 124 (*)       Chloride 82 (*)       Glucose Level 348 (*)       Blood Urea Nitrogen 24.0 (*)       Creatinine 2.14 (*)       Albumin Level 3.3 (*)       Globulin 4.1 (*)       Albumin/Globulin Ratio 0.8 (*)       All other components within normal limits   URINALYSIS, REFLEX TO URINE CULTURE - Abnormal; Notable for the following components:     Protein, UA >=300 (*)       Glucose,   (*)       Ketones, UA Trace (*)       Blood, UA Small (*)       Bilirubin, UA Small (*)       All other components within normal limits   DRUG SCREEN, URINE (BEAKER) - Abnormal; Notable for the following components:     Benzodiazepine, Urine Positive (*)       Opiates, Urine Positive (*)       All other components within normal limits   CBC WITH DIFFERENTIAL - Abnormal; Notable for the following components:     WBC 11.6 (*)       RBC 4.02 (*)       Hgb 11.3 (*)       Hct 33.2 (*)       Platelet 607 (*)       MPV 8.7 (*)       Mono # 1.36 (*)       IG# 0.13 (*)       IG% 1.1 (*)       All other components within normal limits   URINALYSIS, MICROSCOPIC - Abnormal; Notable for the following components:     Hyaline Casts, UA Few (*)       Squamous Epithelial Cells, UA Few (*)       All other components within normal limits   POCT GLUCOSE - Abnormal; Notable for the following components:     POCT Glucose 393       Assessment and Plan    Syncope  Most likely due to dehydration, and possibly from drug use  UTox positive for Benzos and Opiates  IVF  Check orthos  Check ECHO and US Carotid    Acute renal failure  Dehydration  IVF   Monitor BMP daily    DM2  Uncontrolled  Check HbA1c  ISS  Keep FS<200    Hyponatremia  Most likely from dehydration  IVF  Monitor BMP daily    Patient seen and examined via telemedicine  Provider at home,patient at [facility]

## 2022-05-31 NOTE — ASSESSMENT & PLAN NOTE
Patient with decreased BUN and creatinine on arrival.  Secondary to dehydration  Continue IV fluid check BUN and creatinine.  Patient eventually has this medical issue on and off.  Suspect noncompliance with medical treatment/.

## 2022-05-31 NOTE — ASSESSMENT & PLAN NOTE
Patient claims she has a severe coccyx area pain.  Very resistant to examination.  X-ray shows negative finding.  Since fracture can be insidious will order CT of the coccyx area to rule out fracture.  Titrate pain medication as needed

## 2022-05-31 NOTE — ASSESSMENT & PLAN NOTE
Patient has been using pain medication as an outpatient.  Not sure exactly where she is taking them from but they are prescribed pain medication  I would not be surprised this patient is shopping for her home pain medication

## 2022-05-31 NOTE — SUBJECTIVE & OBJECTIVE
Interval History:  Patient continues to have tail bone pain.  Pain is very intense, continues 5/10 in the tailbone area.  Does not radiate gets better with pain medication.  No passing out no near syncopal episodes.      Review of Systems   Constitutional:  Positive for activity change. Negative for appetite change, chills and diaphoresis.   HENT: Negative.     Musculoskeletal:  Positive for back pain.        See history of present illness.   All other systems reviewed and are negative.  Objective:     Vital Signs (Most Recent):  Temp: 98.1 °F (36.7 °C) (05/31/22 1216)  Pulse: 108 (05/31/22 1216)  Resp: 20 (05/31/22 0940)  BP: 120/76 (05/31/22 1216)  SpO2: 100 % (05/31/22 1216) Vital Signs (24h Range):  Temp:  [97.6 °F (36.4 °C)-98.9 °F (37.2 °C)] 98.1 °F (36.7 °C)  Pulse:  [] 108  Resp:  [13-20] 20  SpO2:  [96 %-100 %] 100 %  BP: (107-162)/(67-98) 120/76     Weight: 66.7 kg (147 lb)  Body mass index is 26.89 kg/m².  No intake or output data in the 24 hours ending 05/31/22 1533   Physical Exam  Vitals reviewed.   Constitutional:       General: She is in acute distress.   HENT:      Head: Normocephalic.      Nose: Nose normal.      Mouth/Throat:      Mouth: Mucous membranes are moist.   Eyes:      Extraocular Movements: Extraocular movements intact.      Pupils: Pupils are equal, round, and reactive to light.   Cardiovascular:      Rate and Rhythm: Normal rate and regular rhythm.   Pulmonary:      Effort: Pulmonary effort is normal.      Breath sounds: Normal breath sounds.   Abdominal:      General: Abdomen is flat. Bowel sounds are normal.      Palpations: Abdomen is soft.   Musculoskeletal:         General: Tenderness and signs of injury present.      Cervical back: Normal range of motion.      Comments: Significant pain in the lumbar sacral area.   Skin:     Capillary Refill: Capillary refill takes less than 2 seconds.   Neurological:      Mental Status: She is alert.   Psychiatric:         Attention and  Perception: Attention and perception normal.         Mood and Affect: Mood is anxious.       Significant Labs: All pertinent labs within the past 24 hours have been reviewed.  Recent Lab Results  (Last 5 results in the past 24 hours)        05/31/22  1153   05/31/22  0421   05/30/22 2009 05/30/22  1737   05/30/22  1730        Albumin/Globulin Ratio   1.0             Albumin   2.7             Alkaline Phosphatase   81             ALT   9             Anion Gap         17.0       AST   15             Baso #   0.05             Basophil %   0.5             BILIRUBIN TOTAL   0.6             BUN   24.0       25.0       BUN/CREAT RATIO         15       Calcium   8.6       8.8       Chloride   83       84       CO2   27       26       Creatinine   1.40       1.68       eGFR if    58       47       Eos #   0.06             Eosinophil %   0.6             FiO2       21         Globulin, Total   2.8             Glucose   434       355       Hematocrit   29.1             Hemoglobin   9.6             Immature Grans (Abs)   0.11             Immature Granulocytes   1.1             Lymph #   2.30             LYMPH %   22.1             MCH   27.7             MCHC   33.0             MCV   84.1             Mono #   0.99             Mono %   9.5             MPV   9.2             Neut #   6.9             Neut %   66.2             Platelets   522             POC BE       9         POC HCO3       32.2         POC PCO2       41.5         POC PH       7.498         POC PO2       100         POC SATURATED O2       98         POC TCO2       33         POCT Glucose >500     300           Potassium   3.6       3.3       PROTEIN TOTAL   5.5             RBC   3.46             RDW   11.9             Sample       ARTERIAL         Sodium   127       127       WBC   10.4                                    Significant Imaging: I have reviewed all pertinent imaging results/findings within the past 24 hours.

## 2022-05-31 NOTE — HPI
HPI  This is a 27 y/o female with PMH HTN and DM2 who comes in for fall.  Patient reports that she got dizzy after taking a shower two days ago and she reports that when she was putting her clothes on she passed out.  Patient reports that the next day patient went to the kitchen to get something to drink and she passed out.  Patient reports that she hit her head.  Patient reports that this has not happened to her before.  No recent changes to medications.  Patient had no chest pain, palpitations shortness of breath prior to passing out.  Patient complaining of pain in her tailbone and back.  PMH:  DM, gastroparesis, HTN  Surgeries: Cholecystectomy  and box injection in stomach  Family History: Father had HTN  Social History: No smoking, no drinking, no illicit drug use.     05/31/2022  Patient continued to have tailbone pain.  Very intense, gets better with pain medication does not radiate and associated with nausea.  Pain has been the same has a day of arrival to emergency room and Ultram is not helping significantly.  Overall heart wise patient doing okay.  Will continue telemetry monitoring.  Titrate pain medication to better control prior to being discharged home.

## 2022-05-31 NOTE — ASSESSMENT & PLAN NOTE
Patient is giving as multiple excuses.  Sometime blood sugar at home is high sometimes very low.  Still elevated.  Will titrate medication to keep blood pressure elevated.  Continue IV fluid normal saline to assist us with dehydration and pseodohypernatremia

## 2022-05-31 NOTE — HOSPITAL COURSE
05/31/2022  Patient continued to have tailbone pain.  Very intense, gets better with pain medication does not radiate and associated with nausea.  Pain has been the same has a day of arrival to emergency room and Ultram is not helping significantly.  Overall heart wise patient doing okay.  Will continue telemetry monitoring.  Titrate pain medication to better control prior to being discharged home.  06/01/2022.  During the night blood sugar has been in the range of 500 and nondetectable.  Eventually patient is taking 15 units of detemir but her home medication is Lantus 40 twice a day in addition to 7 units of fast acting insulin before each meal.  She continues to have tail bone pain/coccyx pain.  She is not able to ambulate as much as she wants to.  Will change patient medication to detemir 30 units twice a day today, increase her sliding scale insulin.  Will start patient on 5 units of fast acting insulin with meal and add sliding scale on top of that.  Continue IV fluid, diabetic diet.  Patient at high risk for developing ketoacidosis although not stable to be discharged.  CT of the coccyx area to rule out any fracture.  06/02/2022.  Patient doing much better.  Pain has been controlled with p.o. medication.  Blood sugar much better controlled overnight.  Her hemoglobin A1c has been over 10.  Discussed with patient diabetes management.  Eventually patient is taking more medications than we are giving here in hospital but her p.o. intake calories there is a more at home  Recommended patient to continue with current regimen and monitor closely blood sugar in order to become compliant with treatment.  She will be prescribed few days of P 0 pain medication for pain relief.  Patient will follow up with primary care physician.  If pain persists recommend to have a CT of the spine/coccyx care as an outpatient since our CT here in hospital is not working at present time.

## 2022-05-31 NOTE — PROGRESS NOTES
Pharmacist Renal Dose Adjustment Note    Dorene Husain is a 26 y.o. female being treated with the medication metoclopramide    Patient Data:    Vital Signs (Most Recent):  Temp: 98 °F (36.7 °C) (05/31/22 1646)  Pulse: 103 (05/31/22 1646)  Resp: 20 (05/31/22 1638)  BP: (!) 139/91 (05/31/22 1646)  SpO2: 97 % (05/31/22 1646)   Vital Signs (72h Range):  Temp:  [97.6 °F (36.4 °C)-98.9 °F (37.2 °C)]   Pulse:  []   Resp:  [13-21]   BP: ()/(67-98)   SpO2:  [96 %-100 %]      Recent Labs   Lab 05/30/22  1343 05/30/22  1730 05/31/22  0421   CREATININE 2.14* 1.68* 1.40*     Serum creatinine: 1.4 mg/dL (H) 05/31/22 0421  Estimated creatinine clearance: 54.5 mL/min (A)    Medication:metoclopramide dose: 10mg frequency qid will be changed to medication:metoclopramide dose:5mg frequency:achs    Pharmacist's Name: Leny Dhillon  Pharmacist's Extension: [phone]

## 2022-05-31 NOTE — PROGRESS NOTES
[facility] - Medical Surgical Unit  [facility]  Progress Note    Patient Name: Dorene Husain  MRN: [id number]  Patient Class: IP- Inpatient   Admission Date: 5/30/2022  Length of Stay: 1 days  Attending Physician: Sergoi Hidalgo MD  Primary Care Provider: Kumar Ortiz NP        Subjective:     Principal Problem:Syncopal episodes        HPI:    HPI  This is a 25 y/o female with PMH HTN and DM2 who comes in for fall.  Patient reports that she got dizzy after taking a shower two days ago and she reports that when she was putting her clothes on she passed out.  Patient reports that the next day patient went to the kitchen to get something to drink and she passed out.  Patient reports that she hit her head.  Patient reports that this has not happened to her before.  No recent changes to medications.  Patient had no chest pain, palpitations shortness of breath prior to passing out.  Patient complaining of pain in her tailbone and back.  PMH:  DM, gastroparesis, HTN  Surgeries: Cholecystectomy  and box injection in stomach  Family History: Father had HTN  Social History: No smoking, no drinking, no illicit drug use.     05/31/2022  Patient continued to have tailbone pain.  Very intense, gets better with pain medication does not radiate and associated with nausea.  Pain has been the same has a day of arrival to emergency room and Ultram is not helping significantly.  Overall heart wise patient doing okay.  Will continue telemetry monitoring.  Titrate pain medication to better control prior to being discharged home.      Overview/Hospital Course:        05/31/2022  Patient continued to have tailbone pain.  Very intense, gets better with pain medication does not radiate and associated with nausea.  Pain has been the same has a day of arrival to emergency room and Ultram is not helping significantly.  Overall heart wise patient doing okay.  Will continue telemetry monitoring.  Titrate pain medication to  better control prior to being discharged home.      Interval History:  Patient continues to have tail bone pain.  Pain is very intense, continues 5/10 in the tailbone area.  Does not radiate gets better with pain medication.  No passing out no near syncopal episodes.      Review of Systems   Constitutional:  Positive for activity change. Negative for appetite change, chills and diaphoresis.   HENT: Negative.     Musculoskeletal:  Positive for back pain.        See history of present illness.   All other systems reviewed and are negative.  Objective:     Vital Signs (Most Recent):  Temp: 98.1 °F (36.7 °C) (05/31/22 1216)  Pulse: 108 (05/31/22 1216)  Resp: 20 (05/31/22 0940)  BP: 120/76 (05/31/22 1216)  SpO2: 100 % (05/31/22 1216) Vital Signs (24h Range):  Temp:  [97.6 °F (36.4 °C)-98.9 °F (37.2 °C)] 98.1 °F (36.7 °C)  Pulse:  [] 108  Resp:  [13-20] 20  SpO2:  [96 %-100 %] 100 %  BP: (107-162)/(67-98) 120/76     Weight: 66.7 kg (147 lb)  Body mass index is 26.89 kg/m².  No intake or output data in the 24 hours ending 05/31/22 1533   Physical Exam  Vitals reviewed.   Constitutional:       General: She is in acute distress.   HENT:      Head: Normocephalic.      Nose: Nose normal.      Mouth/Throat:      Mouth: Mucous membranes are moist.   Eyes:      Extraocular Movements: Extraocular movements intact.      Pupils: Pupils are equal, round, and reactive to light.   Cardiovascular:      Rate and Rhythm: Normal rate and regular rhythm.   Pulmonary:      Effort: Pulmonary effort is normal.      Breath sounds: Normal breath sounds.   Abdominal:      General: Abdomen is flat. Bowel sounds are normal.      Palpations: Abdomen is soft.   Musculoskeletal:         General: Tenderness and signs of injury present.      Cervical back: Normal range of motion.      Comments: Significant pain in the lumbar sacral area.   Skin:     Capillary Refill: Capillary refill takes less than 2 seconds.   Neurological:      Mental Status:  She is alert.   Psychiatric:         Attention and Perception: Attention and perception normal.         Mood and Affect: Mood is anxious.       Significant Labs: All pertinent labs within the past 24 hours have been reviewed.  Recent Lab Results  (Last 5 results in the past 24 hours)        05/31/22  1153   05/31/22  0421   05/30/22 2009 05/30/22  1737   05/30/22  1730        Albumin/Globulin Ratio   1.0             Albumin   2.7             Alkaline Phosphatase   81             ALT   9             Anion Gap         17.0       AST   15             Baso #   0.05             Basophil %   0.5             BILIRUBIN TOTAL   0.6             BUN   24.0       25.0       BUN/CREAT RATIO         15       Calcium   8.6       8.8       Chloride   83       84       CO2   27       26       Creatinine   1.40       1.68       eGFR if    58       47       Eos #   0.06             Eosinophil %   0.6             FiO2       21         Globulin, Total   2.8             Glucose   434       355       Hematocrit   29.1             Hemoglobin   9.6             Immature Grans (Abs)   0.11             Immature Granulocytes   1.1             Lymph #   2.30             LYMPH %   22.1             MCH   27.7             MCHC   33.0             MCV   84.1             Mono #   0.99             Mono %   9.5             MPV   9.2             Neut #   6.9             Neut %   66.2             Platelets   522             POC BE       9         POC HCO3       32.2         POC PCO2       41.5         POC PH       7.498         POC PO2       100         POC SATURATED O2       98         POC TCO2       33         POCT Glucose >500     300           Potassium   3.6       3.3       PROTEIN TOTAL   5.5             RBC   3.46             RDW   11.9             Sample       ARTERIAL         Sodium   127       127       WBC   10.4                                    Significant Imaging: I have reviewed all pertinent imaging results/findings  within the past 24 hours.      Assessment/Plan:      * Syncopal episodes  Continue current management.  Titrate home medications for sugar diabetes.      CHATA (acute kidney injury)  Patient with decreased BUN and creatinine on arrival.  Secondary to dehydration  Continue IV fluid check BUN and creatinine.  Patient eventually has this medical issue on and off.  Suspect noncompliance with medical treatment/.      Hyperglycemia due to type 1 diabetes mellitus  Patient is giving as multiple excuses.  Sometime blood sugar at home is high sometimes very low.  Still elevated.  Will titrate medication to keep blood pressure elevated.  Continue IV fluid normal saline to assist us with dehydration and pseodohypernatremia      Chronic pain  Patient has been using pain medication as an outpatient.  Not sure exactly where she is taking them from but they are prescribed pain medication  I would not be surprised this patient is shopping for her home pain medication      Back pain  Patient claims she has a severe coccyx area pain.  Very resistant to examination.  X-ray shows negative finding.  Since fracture can be insidious will order CT of the coccyx area to rule out fracture.  Titrate pain medication as needed      DM gastroparesis  Chronic medical issue.  Small continues p.o. intake      VTE Risk Mitigation (From admission, onward)         Ordered     Place CORNELIA hose  Until discontinued         05/30/22 1805     Place sequential compression device  Until discontinued         05/30/22 1805     IP VTE LOW RISK PATIENT  Once         05/30/22 1805                Discharge Planning   EBENEZER:      Code Status: Full Code   Is the patient medically ready for discharge?:     Reason for patient still in hospital (select all that apply): Patient trending condition                  Segun Viramontes MD  Department of Hospital Medicine   [facility] - [facility] Surgical [facility]

## 2022-06-01 LAB
ALBUMIN SERPL-MCNC: 2.8 GM/DL (ref 3.5–5)
ALBUMIN/GLOB SERPL: 0.9 RATIO (ref 1.1–2)
ALP SERPL-CCNC: 92 UNIT/L (ref 40–150)
ALT SERPL-CCNC: 11 UNIT/L (ref 0–55)
AST SERPL-CCNC: 17 UNIT/L (ref 5–34)
BASOPHILS # BLD AUTO: 0.03 X10(3)/MCL (ref 0–0.2)
BASOPHILS NFR BLD AUTO: 0.4 %
BILIRUBIN DIRECT+TOT PNL SERPL-MCNC: 0.3 MG/DL
BUN SERPL-MCNC: 17 MG/DL (ref 7–18.7)
CALCIUM SERPL-MCNC: 8.5 MG/DL (ref 8.4–10.2)
CHLORIDE SERPL-SCNC: 90 MMOL/L (ref 98–107)
CO2 SERPL-SCNC: 28 MMOL/L (ref 22–29)
CREAT SERPL-MCNC: 1.5 MG/DL (ref 0.55–1.02)
EOSINOPHIL # BLD AUTO: 0.1 X10(3)/MCL (ref 0–0.9)
EOSINOPHIL NFR BLD AUTO: 1.3 %
ERYTHROCYTE [DISTWIDTH] IN BLOOD BY AUTOMATED COUNT: 11.9 % (ref 11.5–17)
EST. AVERAGE GLUCOSE BLD GHB EST-MCNC: 254.7 MG/DL
GLOBULIN SER-MCNC: 3.1 GM/DL (ref 2.4–3.5)
GLUCOSE SERPL-MCNC: 416 MG/DL (ref 74–100)
HBA1C MFR BLD: 10.5 %
HCT VFR BLD AUTO: 26.3 % (ref 37–47)
HGB BLD-MCNC: 8.9 GM/DL (ref 12–16)
IMM GRANULOCYTES # BLD AUTO: 0.09 X10(3)/MCL (ref 0–0.02)
IMM GRANULOCYTES NFR BLD AUTO: 1.2 % (ref 0–0.43)
LYMPHOCYTES # BLD AUTO: 1.43 X10(3)/MCL (ref 0.6–4.6)
LYMPHOCYTES NFR BLD AUTO: 18.8 %
MAGNESIUM SERPL-MCNC: 1.9 MG/DL (ref 1.6–2.6)
MCH RBC QN AUTO: 27.9 PG (ref 27–31)
MCHC RBC AUTO-ENTMCNC: 33.8 MG/DL (ref 33–36)
MCV RBC AUTO: 82.4 FL (ref 80–94)
MONOCYTES # BLD AUTO: 0.9 X10(3)/MCL (ref 0.1–1.3)
MONOCYTES NFR BLD AUTO: 11.8 %
NEUTROPHILS # BLD AUTO: 5.1 X10(3)/MCL (ref 2.1–9.2)
NEUTROPHILS NFR BLD AUTO: 66.5 %
PLATELET # BLD AUTO: 516 X10(3)/MCL (ref 130–400)
PMV BLD AUTO: 8.5 FL (ref 9.4–12.4)
POCT GLUCOSE: 183 MG/DL (ref 70–110)
POCT GLUCOSE: 274 MG/DL (ref 70–110)
POCT GLUCOSE: 448 MG/DL (ref 70–110)
POCT GLUCOSE: 482 MG/DL (ref 70–110)
POCT GLUCOSE: >500 MG/DL (ref 70–110)
POTASSIUM SERPL-SCNC: 3.8 MMOL/L (ref 3.5–5.1)
PROT SERPL-MCNC: 5.9 GM/DL (ref 6.4–8.3)
RBC # BLD AUTO: 3.19 X10(6)/MCL (ref 4.2–5.4)
SODIUM SERPL-SCNC: 128 MMOL/L (ref 136–145)
WBC # SPEC AUTO: 7.6 X10(3)/MCL (ref 4.5–11.5)

## 2022-06-01 PROCEDURE — 25000003 PHARM REV CODE 250: Performed by: INTERNAL MEDICINE

## 2022-06-01 PROCEDURE — 85025 COMPLETE CBC W/AUTO DIFF WBC: CPT | Performed by: EMERGENCY MEDICINE

## 2022-06-01 PROCEDURE — 25000003 PHARM REV CODE 250: Performed by: HOSPITALIST

## 2022-06-01 PROCEDURE — 36415 COLL VENOUS BLD VENIPUNCTURE: CPT | Performed by: EMERGENCY MEDICINE

## 2022-06-01 PROCEDURE — 83735 ASSAY OF MAGNESIUM: CPT | Performed by: EMERGENCY MEDICINE

## 2022-06-01 PROCEDURE — 83036 HEMOGLOBIN GLYCOSYLATED A1C: CPT | Performed by: INTERNAL MEDICINE

## 2022-06-01 PROCEDURE — 63600175 PHARM REV CODE 636 W HCPCS: Performed by: EMERGENCY MEDICINE

## 2022-06-01 PROCEDURE — C9399 UNCLASSIFIED DRUGS OR BIOLOG: HCPCS | Performed by: EMERGENCY MEDICINE

## 2022-06-01 PROCEDURE — 25000003 PHARM REV CODE 250: Performed by: EMERGENCY MEDICINE

## 2022-06-01 PROCEDURE — 80053 COMPREHEN METABOLIC PANEL: CPT | Performed by: HOSPITALIST

## 2022-06-01 PROCEDURE — 94761 N-INVAS EAR/PLS OXIMETRY MLT: CPT

## 2022-06-01 PROCEDURE — 11000001 HC ACUTE MED/SURG PRIVATE ROOM

## 2022-06-01 RX ORDER — IBUPROFEN 200 MG
24 TABLET ORAL
Status: DISCONTINUED | OUTPATIENT
Start: 2022-06-01 | End: 2022-06-02 | Stop reason: HOSPADM

## 2022-06-01 RX ORDER — INSULIN ASPART 100 [IU]/ML
5 INJECTION, SOLUTION INTRAVENOUS; SUBCUTANEOUS
Status: DISCONTINUED | OUTPATIENT
Start: 2022-06-01 | End: 2022-06-02

## 2022-06-01 RX ORDER — INSULIN ASPART 100 [IU]/ML
25 INJECTION, SOLUTION INTRAVENOUS; SUBCUTANEOUS ONCE
Status: COMPLETED | OUTPATIENT
Start: 2022-06-01 | End: 2022-06-01

## 2022-06-01 RX ORDER — INSULIN ASPART 100 [IU]/ML
1-10 INJECTION, SOLUTION INTRAVENOUS; SUBCUTANEOUS
Status: DISCONTINUED | OUTPATIENT
Start: 2022-06-01 | End: 2022-06-02 | Stop reason: HOSPADM

## 2022-06-01 RX ORDER — INSULIN ASPART 100 [IU]/ML
25 INJECTION, SOLUTION INTRAVENOUS; SUBCUTANEOUS ONCE
Status: DISCONTINUED | OUTPATIENT
Start: 2022-06-01 | End: 2022-06-01

## 2022-06-01 RX ORDER — GLUCAGON 1 MG
1 KIT INJECTION
Status: DISCONTINUED | OUTPATIENT
Start: 2022-06-01 | End: 2022-06-02 | Stop reason: HOSPADM

## 2022-06-01 RX ORDER — IBUPROFEN 200 MG
16 TABLET ORAL
Status: DISCONTINUED | OUTPATIENT
Start: 2022-06-01 | End: 2022-06-02 | Stop reason: HOSPADM

## 2022-06-01 RX ADMIN — AMITRIPTYLINE HYDROCHLORIDE 25 MG: 25 TABLET, FILM COATED ORAL at 08:06

## 2022-06-01 RX ADMIN — OXYCODONE HYDROCHLORIDE AND ACETAMINOPHEN 1 TABLET: 5; 325 TABLET ORAL at 03:06

## 2022-06-01 RX ADMIN — OXYCODONE HYDROCHLORIDE AND ACETAMINOPHEN 1 TABLET: 5; 325 TABLET ORAL at 02:06

## 2022-06-01 RX ADMIN — INSULIN DETEMIR 30 UNITS: 100 INJECTION, SOLUTION SUBCUTANEOUS at 09:06

## 2022-06-01 RX ADMIN — DICYCLOMINE HYDROCHLORIDE 10 MG: 10 CAPSULE ORAL at 08:06

## 2022-06-01 RX ADMIN — DICYCLOMINE HYDROCHLORIDE 10 MG: 10 CAPSULE ORAL at 05:06

## 2022-06-01 RX ADMIN — OXYCODONE HYDROCHLORIDE AND ACETAMINOPHEN 1 TABLET: 5; 325 TABLET ORAL at 09:06

## 2022-06-01 RX ADMIN — OXYCODONE HYDROCHLORIDE AND ACETAMINOPHEN 1 TABLET: 5; 325 TABLET ORAL at 08:06

## 2022-06-01 RX ADMIN — INSULIN ASPART 6 UNITS: 100 INJECTION, SOLUTION INTRAVENOUS; SUBCUTANEOUS at 05:06

## 2022-06-01 RX ADMIN — METOCLOPRAMIDE HYDROCHLORIDE 5 MG: 5 SOLUTION ORAL at 08:06

## 2022-06-01 RX ADMIN — INSULIN ASPART 25 UNITS: 100 INJECTION, SOLUTION INTRAVENOUS; SUBCUTANEOUS at 11:06

## 2022-06-01 RX ADMIN — METOCLOPRAMIDE HYDROCHLORIDE 5 MG: 5 SOLUTION ORAL at 05:06

## 2022-06-01 RX ADMIN — INSULIN ASPART 5 UNITS: 100 INJECTION, SOLUTION INTRAVENOUS; SUBCUTANEOUS at 05:06

## 2022-06-01 RX ADMIN — SODIUM CHLORIDE AND POTASSIUM CHLORIDE: 9; 1.49 INJECTION, SOLUTION INTRAVENOUS at 12:06

## 2022-06-01 RX ADMIN — SODIUM CHLORIDE AND POTASSIUM CHLORIDE: 9; 1.49 INJECTION, SOLUTION INTRAVENOUS at 11:06

## 2022-06-01 RX ADMIN — PANTOPRAZOLE SODIUM 40 MG: 40 TABLET, DELAYED RELEASE ORAL at 09:06

## 2022-06-01 RX ADMIN — INSULIN ASPART 25 UNITS: 100 INJECTION, SOLUTION INTRAVENOUS; SUBCUTANEOUS at 03:06

## 2022-06-01 RX ADMIN — SODIUM CHLORIDE AND POTASSIUM CHLORIDE: 9; 1.49 INJECTION, SOLUTION INTRAVENOUS at 03:06

## 2022-06-01 RX ADMIN — DICYCLOMINE HYDROCHLORIDE 10 MG: 10 CAPSULE ORAL at 09:06

## 2022-06-01 RX ADMIN — METOCLOPRAMIDE HYDROCHLORIDE 5 MG: 5 SOLUTION ORAL at 04:06

## 2022-06-01 RX ADMIN — METOCLOPRAMIDE HYDROCHLORIDE 5 MG: 5 SOLUTION ORAL at 11:06

## 2022-06-01 RX ADMIN — DICYCLOMINE HYDROCHLORIDE 10 MG: 10 CAPSULE ORAL at 02:06

## 2022-06-01 NOTE — SUBJECTIVE & OBJECTIVE
Interval History:  As mentioned patient has significant pain in the tailbone area and not able to ambulate much.  In addition    Review of system.  Patient denies any nausea vomiting and diarrhea.  She continues to have ongoing tailbone pain.  It is 7/10 and gets better with pain medication.  Minimal ambulation increases the pain, palpations increase the pain.  Nurse reports significant elevation of blood sugar overnight and not able to control with current regimen.  All other system reviewed and no other positive findings other than all above-mentioned.    Objective:     Vital Signs (Most Recent):  Temp: 97.9 °F (36.6 °C) (06/01/22 1209)  Pulse: (!) 112 (06/01/22 1209)  Resp: 18 (06/01/22 0947)  BP: (!) 178/114 (06/01/22 1209)  SpO2: 98 % (06/01/22 1209)   Vital Signs (24h Range):  Temp:  [96.8 °F (36 °C)-98.9 °F (37.2 °C)] 97.9 °F (36.6 °C)  Pulse:  [] 112  Resp:  [18-20] 18  SpO2:  [93 %-100 %] 98 %  BP: (120-178)/() 178/114     Weight: 66.7 kg (147 lb)  Body mass index is 26.89 kg/m².    Intake/Output Summary (Last 24 hours) at 6/1/2022 1211  Last data filed at 5/31/2022 1800  Gross per 24 hour   Intake 240 ml   Output --   Net 240 ml      Physical Exam  Vitals reviewed.   Constitutional:       General: She is in acute distress.   HENT:      Head: Normocephalic and atraumatic.      Nose: Nose normal.      Mouth/Throat:      Mouth: Mucous membranes are dry.   Eyes:      Extraocular Movements: Extraocular movements intact.      Conjunctiva/sclera: Conjunctivae normal.      Pupils: Pupils are equal, round, and reactive to light.   Cardiovascular:      Rate and Rhythm: Regular rhythm. Tachycardia present.      Pulses: Normal pulses.      Heart sounds: Normal heart sounds.   Pulmonary:      Effort: Pulmonary effort is normal.      Breath sounds: Normal breath sounds.   Abdominal:      General: Abdomen is flat. Bowel sounds are normal.      Palpations: Abdomen is soft.   Musculoskeletal:         General:  Tenderness and signs of injury present.      Cervical back: Normal range of motion and neck supple.      Comments: Significant pain and discomfort in the lumbar and sacral area.  Decreased range of motion secondary to pain with bilateral paravertebral lower lumbar muscle pain.   Skin:     Capillary Refill: Capillary refill takes less than 2 seconds.   Neurological:      General: No focal deficit present.      Mental Status: She is alert and oriented to person, place, and time.   Psychiatric:      Comments: Patient anxious.       Significant Labs: All pertinent labs within the past 24 hours have been reviewed.    Significant Imaging: I have reviewed all pertinent imaging results/findings within the past 24 hours.

## 2022-06-01 NOTE — PROGRESS NOTES
[facility]  [facility] Medicine  Progress Note    Patient Name: Dorene Husain  MRN: [id number]  Patient Class: IP- Inpatient   Admission Date: 5/30/2022  Length of Stay: 2 days  Attending Physician: Sergio Hidalgo MD  Primary Care Provider: Kumar Ortiz NP        Subjective:     Principal Problem:CHATA (acute kidney injury)        HPI:    HPI  This is a 27 y/o female with PMH HTN and DM2 who comes in for fall.  Patient reports that she got dizzy after taking a shower two days ago and she reports that when she was putting her clothes on she passed out.  Patient reports that the next day patient went to the kitchen to get something to drink and she passed out.  Patient reports that she hit her head.  Patient reports that this has not happened to her before.  No recent changes to medications.  Patient had no chest pain, palpitations shortness of breath prior to passing out.  Patient complaining of pain in her tailbone and back.  PMH:  DM, gastroparesis, HTN  Surgeries: Cholecystectomy  and box injection in stomach  Family History: Father had HTN  Social History: No smoking, no drinking, no illicit drug use.     05/31/2022  Patient continued to have tailbone pain.  Very intense, gets better with pain medication does not radiate and associated with nausea.  Pain has been the same has a day of arrival to emergency room and Ultram is not helping significantly.  Overall heart wise patient doing okay.  Will continue telemetry monitoring.  Titrate pain medication to better control prior to being discharged home.      Overview/Hospital Course:        05/31/2022  Patient continued to have tailbone pain.  Very intense, gets better with pain medication does not radiate and associated with nausea.  Pain has been the same has a day of arrival to emergency room and Ultram is not helping significantly.  Overall heart wise patient doing okay.  Will continue telemetry monitoring.  Titrate pain  medication to better control prior to being discharged home.  06/01/2022.  During the night blood sugar has been in the range of 500 and nondetectable.  Eventually patient is taking 15 units of detemir but her home medication is Lantus 40 twice a day in addition to 7 units of fast acting insulin before each meal.  She continues to have tail bone pain/coccyx pain.  She is not able to ambulate as much as she wants to.  Will change patient medication to detemir 30 units twice a day today, increase her sliding scale insulin.  Will start patient on 5 units of fast acting insulin with meal and add sliding scale on top of that.  Continue IV fluid, diabetic diet.  Patient at high risk for developing ketoacidosis although not stable to be discharged.  CT of the coccyx area to rule out any fracture.      Interval History:  As mentioned patient has significant pain in the tailbone area and not able to ambulate much.  In addition    Review of system.  Patient denies any nausea vomiting and diarrhea.  She continues to have ongoing tailbone pain.  It is 7/10 and gets better with pain medication.  Minimal ambulation increases the pain, palpations increase the pain.  Nurse reports significant elevation of blood sugar overnight and not able to control with current regimen.  All other system reviewed and no other positive findings other than all above-mentioned.    Objective:     Vital Signs (Most Recent):  Temp: 97.9 °F (36.6 °C) (06/01/22 1209)  Pulse: (!) 112 (06/01/22 1209)  Resp: 18 (06/01/22 0947)  BP: (!) 178/114 (06/01/22 1209)  SpO2: 98 % (06/01/22 1209)   Vital Signs (24h Range):  Temp:  [96.8 °F (36 °C)-98.9 °F (37.2 °C)] 97.9 °F (36.6 °C)  Pulse:  [] 112  Resp:  [18-20] 18  SpO2:  [93 %-100 %] 98 %  BP: (120-178)/() 178/114     Weight: 66.7 kg (147 lb)  Body mass index is 26.89 kg/m².    Intake/Output Summary (Last 24 hours) at 6/1/2022 1211  Last data filed at 5/31/2022 1800  Gross per 24 hour   Intake 240 ml    Output --   Net 240 ml      Physical Exam  Vitals reviewed.   Constitutional:       General: She is in acute distress.   HENT:      Head: Normocephalic and atraumatic.      Nose: Nose normal.      Mouth/Throat:      Mouth: Mucous membranes are dry.   Eyes:      Extraocular Movements: Extraocular movements intact.      Conjunctiva/sclera: Conjunctivae normal.      Pupils: Pupils are equal, round, and reactive to light.   Cardiovascular:      Rate and Rhythm: Regular rhythm. Tachycardia present.      Pulses: Normal pulses.      Heart sounds: Normal heart sounds.   Pulmonary:      Effort: Pulmonary effort is normal.      Breath sounds: Normal breath sounds.   Abdominal:      General: Abdomen is flat. Bowel sounds are normal.      Palpations: Abdomen is soft.   Musculoskeletal:         General: Tenderness and signs of injury present.      Cervical back: Normal range of motion and neck supple.      Comments: Significant pain and discomfort in the lumbar and sacral area.  Decreased range of motion secondary to pain with bilateral paravertebral lower lumbar muscle pain.   Skin:     Capillary Refill: Capillary refill takes less than 2 seconds.   Neurological:      General: No focal deficit present.      Mental Status: She is alert and oriented to person, place, and time.   Psychiatric:      Comments: Patient anxious.       Significant Labs: All pertinent labs within the past 24 hours have been reviewed.    Significant Imaging: I have reviewed all pertinent imaging results/findings within the past 24 hours.      Assessment/Plan:      * CHATA (acute kidney injury)  Improved.  Continue IV fluid check BUN and creatinine in the morning      Syncopal episodes  Improved at this time.  Continue to monitor    Hyperglycemia due to type 1 diabetes mellitus  Blood sugar elevated over 500.  Will give 25 units of short-acting insulin.  Address patient home regimen of insulin and use around 60-65% of what patient is using at home      Chronic pain  Patient has been using pain medication as an outpatient.  Not sure exactly where she is taking them from but they are prescribed pain medication  I would not be surprised this patient is shopping for her home pain medication      Back pain  CT of the lumbar and coccyx area to rule out fracture.  Continue pain medication      DM gastroparesis  Chronic medical issue.  Small continues p.o. intake      VTE Risk Mitigation (From admission, onward)         Ordered     Place CORNELIA hose  Until discontinued         05/30/22 1805     Place sequential compression device  Until discontinued         05/30/22 1805     IP VTE LOW RISK PATIENT  Once         05/30/22 1805                Discharge Planning   EBENEZER:      Code Status: Full Code   Is the patient medically ready for discharge?:     Reason for patient still in hospital (select all that apply): Treatment                     Segun Viramontes MD  Department of Hospital Medicine   [facility]

## 2022-06-01 NOTE — ASSESSMENT & PLAN NOTE
Blood sugar elevated over 500.  Will give 25 units of short-acting insulin.  Address patient home regimen of insulin and use around 60-65% of what patient is using at home

## 2022-06-02 VITALS
HEIGHT: 62 IN | OXYGEN SATURATION: 100 % | RESPIRATION RATE: 18 BRPM | WEIGHT: 147 LBS | SYSTOLIC BLOOD PRESSURE: 143 MMHG | TEMPERATURE: 98 F | BODY MASS INDEX: 27.05 KG/M2 | HEART RATE: 97 BPM | DIASTOLIC BLOOD PRESSURE: 95 MMHG

## 2022-06-02 PROBLEM — E10.65 HYPERGLYCEMIA DUE TO TYPE 1 DIABETES MELLITUS: Chronic | Status: ACTIVE | Noted: 2022-05-31

## 2022-06-02 PROBLEM — R55 SYNCOPAL EPISODES: Chronic | Status: ACTIVE | Noted: 2022-05-31

## 2022-06-02 PROBLEM — N17.9 AKI (ACUTE KIDNEY INJURY): Chronic | Status: ACTIVE | Noted: 2022-05-21

## 2022-06-02 LAB
ALBUMIN SERPL-MCNC: 2.5 GM/DL (ref 3.5–5)
ALBUMIN/GLOB SERPL: 1 RATIO (ref 1.1–2)
ALP SERPL-CCNC: 67 UNIT/L (ref 40–150)
ALT SERPL-CCNC: 7 UNIT/L (ref 0–55)
AORTIC VALVE CUSP SEPERATION: 0 CM
AST SERPL-CCNC: 16 UNIT/L (ref 5–34)
AV MEAN GRADIENT: 4 MMHG
AV PEAK GRADIENT: 8 MMHG
AV VELOCITY RATIO: 0.9
BASOPHILS # BLD AUTO: 0.04 X10(3)/MCL (ref 0–0.2)
BASOPHILS NFR BLD AUTO: 0.5 %
BILIRUBIN DIRECT+TOT PNL SERPL-MCNC: 0.2 MG/DL
BSA FOR ECHO PROCEDURE: 1.71 M2
BUN SERPL-MCNC: 6 MG/DL (ref 7–18.7)
CALCIUM SERPL-MCNC: 8.6 MG/DL (ref 8.4–10.2)
CHLORIDE SERPL-SCNC: 100 MMOL/L (ref 98–107)
CO2 SERPL-SCNC: 30 MMOL/L (ref 22–29)
CREAT SERPL-MCNC: 0.8 MG/DL (ref 0.55–1.02)
CV ECHO LV RWT: 0.83 CM
DOP CALC AO PEAK VEL: 1.44 M/S
DOP CALC AO VTI: 25.9 CM
DOP CALC LVOT AREA: 3 CM2
DOP CALC LVOT DIAMETER: 1.95 CM
DOP CALC LVOT PEAK VEL: 1.29 M/S
E WAVE DECELERATION TIME: 173.73 MSEC
E/A RATIO: 1.13
ECHO LV POSTERIOR WALL: 1.54 CM (ref 0.6–1.1)
EOSINOPHIL # BLD AUTO: 0.12 X10(3)/MCL (ref 0–0.9)
EOSINOPHIL NFR BLD AUTO: 1.4 %
ERYTHROCYTE [DISTWIDTH] IN BLOOD BY AUTOMATED COUNT: 11.9 % (ref 11.5–17)
FRACTIONAL SHORTENING: 29 % (ref 28–44)
GLOBULIN SER-MCNC: 2.5 GM/DL (ref 2.4–3.5)
GLUCOSE SERPL-MCNC: 53 MG/DL (ref 74–100)
HCT VFR BLD AUTO: 26.2 % (ref 37–47)
HGB BLD-MCNC: 8.7 GM/DL (ref 12–16)
IMM GRANULOCYTES # BLD AUTO: 0.08 X10(3)/MCL (ref 0–0.02)
IMM GRANULOCYTES NFR BLD AUTO: 0.9 % (ref 0–0.43)
INTERVENTRICULAR SEPTUM: 1.34 CM (ref 0.6–1.1)
LEFT ATRIUM SIZE: 3.85 CM
LEFT INTERNAL DIMENSION IN SYSTOLE: 2.63 CM (ref 2.1–4)
LEFT VENTRICLE DIASTOLIC VOLUME INDEX: 34.99 ML/M2
LEFT VENTRICLE DIASTOLIC VOLUME: 58.78 ML
LEFT VENTRICLE MASS INDEX: 117 G/M2
LEFT VENTRICLE SYSTOLIC VOLUME INDEX: 15.1 ML/M2
LEFT VENTRICLE SYSTOLIC VOLUME: 25.3 ML
LEFT VENTRICULAR INTERNAL DIMENSION IN DIASTOLE: 3.72 CM (ref 3.5–6)
LEFT VENTRICULAR MASS: 196.97 G
LVOT MG: 3.11 MMHG
LVOT MV: 0.77 CM/S
LYMPHOCYTES # BLD AUTO: 3.16 X10(3)/MCL (ref 0.6–4.6)
LYMPHOCYTES NFR BLD AUTO: 36.6 %
MCH RBC QN AUTO: 27.8 PG (ref 27–31)
MCHC RBC AUTO-ENTMCNC: 33.2 MG/DL (ref 33–36)
MCV RBC AUTO: 83.7 FL (ref 80–94)
MONOCYTES # BLD AUTO: 0.99 X10(3)/MCL (ref 0.1–1.3)
MONOCYTES NFR BLD AUTO: 11.5 %
MV PEAK A VEL: 0.68 M/S
MV PEAK E VEL: 0.77 M/S
MV STENOSIS PRESSURE HALF TIME: 50.38 MS
MV VALVE AREA P 1/2 METHOD: 4.37 CM2
NEUTROPHILS # BLD AUTO: 4.3 X10(3)/MCL (ref 2.1–9.2)
NEUTROPHILS NFR BLD AUTO: 49.1 %
PISA TR MAX VEL: 2.35 M/S
PLATELET # BLD AUTO: 530 X10(3)/MCL (ref 130–400)
PMV BLD AUTO: 8.5 FL (ref 9.4–12.4)
POCT GLUCOSE: 120 MG/DL (ref 70–110)
POCT GLUCOSE: 178 MG/DL (ref 70–110)
POCT GLUCOSE: 179 MG/DL (ref 70–110)
POCT GLUCOSE: 53 MG/DL (ref 70–110)
POTASSIUM SERPL-SCNC: 4 MMOL/L (ref 3.5–5.1)
PROT SERPL-MCNC: 5 GM/DL (ref 6.4–8.3)
RBC # BLD AUTO: 3.13 X10(6)/MCL (ref 4.2–5.4)
RIGHT VENTRICULAR END-DIASTOLIC DIMENSION: 2.22 CM
SODIUM SERPL-SCNC: 137 MMOL/L (ref 136–145)
TR MAX PG: 22 MMHG
WBC # SPEC AUTO: 8.6 X10(3)/MCL (ref 4.5–11.5)

## 2022-06-02 PROCEDURE — 25000003 PHARM REV CODE 250: Performed by: HOSPITALIST

## 2022-06-02 PROCEDURE — C9399 UNCLASSIFIED DRUGS OR BIOLOG: HCPCS | Performed by: EMERGENCY MEDICINE

## 2022-06-02 PROCEDURE — 36415 COLL VENOUS BLD VENIPUNCTURE: CPT | Performed by: HOSPITALIST

## 2022-06-02 PROCEDURE — 25000003 PHARM REV CODE 250: Performed by: INTERNAL MEDICINE

## 2022-06-02 PROCEDURE — 85025 COMPLETE CBC W/AUTO DIFF WBC: CPT | Performed by: HOSPITALIST

## 2022-06-02 PROCEDURE — 63600175 PHARM REV CODE 636 W HCPCS: Performed by: EMERGENCY MEDICINE

## 2022-06-02 PROCEDURE — 94761 N-INVAS EAR/PLS OXIMETRY MLT: CPT

## 2022-06-02 PROCEDURE — 25000003 PHARM REV CODE 250: Performed by: EMERGENCY MEDICINE

## 2022-06-02 PROCEDURE — 80053 COMPREHEN METABOLIC PANEL: CPT | Performed by: HOSPITALIST

## 2022-06-02 RX ORDER — INSULIN ASPART 100 [IU]/ML
7 INJECTION, SOLUTION INTRAVENOUS; SUBCUTANEOUS
Status: DISCONTINUED | OUTPATIENT
Start: 2022-06-02 | End: 2022-06-02 | Stop reason: HOSPADM

## 2022-06-02 RX ORDER — OXYCODONE AND ACETAMINOPHEN 5; 325 MG/1; MG/1
1 TABLET ORAL EVERY 8 HOURS PRN
Qty: 10 TABLET | Refills: 0
Start: 2022-06-02 | End: 2022-06-06

## 2022-06-02 RX ADMIN — METOCLOPRAMIDE HYDROCHLORIDE 5 MG: 5 SOLUTION ORAL at 11:06

## 2022-06-02 RX ADMIN — METOCLOPRAMIDE HYDROCHLORIDE 5 MG: 5 SOLUTION ORAL at 05:06

## 2022-06-02 RX ADMIN — INSULIN DETEMIR 30 UNITS: 100 INJECTION, SOLUTION SUBCUTANEOUS at 09:06

## 2022-06-02 RX ADMIN — OXYCODONE HYDROCHLORIDE AND ACETAMINOPHEN 1 TABLET: 5; 325 TABLET ORAL at 02:06

## 2022-06-02 RX ADMIN — PANTOPRAZOLE SODIUM 40 MG: 40 TABLET, DELAYED RELEASE ORAL at 09:06

## 2022-06-02 RX ADMIN — INSULIN ASPART 7 UNITS: 100 INJECTION, SOLUTION INTRAVENOUS; SUBCUTANEOUS at 11:06

## 2022-06-02 RX ADMIN — OXYCODONE HYDROCHLORIDE AND ACETAMINOPHEN 1 TABLET: 5; 325 TABLET ORAL at 09:06

## 2022-06-02 RX ADMIN — SODIUM CHLORIDE AND POTASSIUM CHLORIDE: 9; 1.49 INJECTION, SOLUTION INTRAVENOUS at 09:06

## 2022-06-02 RX ADMIN — DICYCLOMINE HYDROCHLORIDE 10 MG: 10 CAPSULE ORAL at 09:06

## 2022-06-02 NOTE — DISCHARGE SUMMARY
[facility]  Hospital Medicine  Discharge Summary      Patient Name: Dorene Husain  MRN: [id number]  Patient Class: IP- Inpatient  Admission Date: 5/30/2022  Hospital Length of Stay: 3 days  Discharge Date and Time:  06/02/2022 12:40 PM  Attending Physician: Sergio Hidalgo MD   Discharging Provider: Segun Viramontes MD  Primary Care Provider: Kumar Ortiz NP      HPI:     HPI  This is a 27 y/o female with PMH HTN and DM2 who comes in for fall.  Patient reports that she got dizzy after taking a shower two days ago and she reports that when she was putting her clothes on she passed out.  Patient reports that the next day patient went to the kitchen to get something to drink and she passed out.  Patient reports that she hit her head.  Patient reports that this has not happened to her before.  No recent changes to medications.  Patient had no chest pain, palpitations shortness of breath prior to passing out.  Patient complaining of pain in her tailbone and back.  PMH:  DM, gastroparesis, HTN  Surgeries: Cholecystectomy  and box injection in stomach  Family History: Father had HTN  Social History: No smoking, no drinking, no illicit drug use.     05/31/2022  Patient continued to have tailbone pain.  Very intense, gets better with pain medication does not radiate and associated with nausea.  Pain has been the same has a day of arrival to emergency room and Ultram is not helping significantly.  Overall heart wise patient doing okay.  Will continue telemetry monitoring.  Titrate pain medication to better control prior to being discharged home.      * No surgery found *      Hospital Course:         05/31/2022  Patient continued to have tailbone pain.  Very intense, gets better with pain medication does not radiate and associated with nausea.  Pain has been the same has a day of arrival to emergency room and Ultram is not helping significantly.  Overall heart wise patient doing  anthony.  Will continue telemetry monitoring.  Titrate pain medication to better control prior to being discharged home.  06/01/2022.  During the night blood sugar has been in the range of 500 and nondetectable.  Eventually patient is taking 15 units of detemir but her home medication is Lantus 40 twice a day in addition to 7 units of fast acting insulin before each meal.  She continues to have tail bone pain/coccyx pain.  She is not able to ambulate as much as she wants to.  Will change patient medication to detemir 30 units twice a day today, increase her sliding scale insulin.  Will start patient on 5 units of fast acting insulin with meal and add sliding scale on top of that.  Continue IV fluid, diabetic diet.  Patient at high risk for developing ketoacidosis although not stable to be discharged.  CT of the coccyx area to rule out any fracture.  06/02/2022.  Patient doing much better.  Pain has been controlled with p.o. medication.  Blood sugar much better controlled overnight.  Her hemoglobin A1c has been over 10.  Discussed with patient diabetes management.  Eventually patient is taking more medications than we are giving here in hospital but her p.o. intake calories there is a more at home  Recommended patient to continue with current regimen and monitor closely blood sugar in order to become compliant with treatment.  She will be prescribed few days of P 0 pain medication for pain relief.  Patient will follow up with primary care physician.  If pain persists recommend to have a CT of the spine/coccyx care as an outpatient since our CT here in hospital is not working at present time.       Goals of Care Treatment Preferences:  Code Status: Full Code      Consults:     * CHATA (acute kidney injury)  Improved and close to her baseline.      Syncopal episodes  Resolved.    Hyperglycemia due to type 1 diabetes mellitus  Continue current medications but closely monitor calorie intake otherwise blood sugar goals going  to be difficult to  managed    Chronic pain  Recommended patient to follow up with primary care physician regarding chronic pain management.      Final Active Diagnoses:    Diagnosis Date Noted POA    PRINCIPAL PROBLEM:  CHATA (acute kidney injury) [N17.9] 05/21/2022 Unknown     Chronic    Syncopal episodes [R55] 05/31/2022 Yes     Chronic    Hyperglycemia due to type 1 diabetes mellitus [E10.65] 05/31/2022 Yes     Chronic    Back pain [M54.9] 05/31/2022 Unknown    Chronic pain [G89.29] 05/31/2022 Yes     Chronic    DM gastroparesis [E11.43, K31.84] 05/25/2022 Yes     Chronic      Problems Resolved During this Admission:       Discharged Condition: good    Disposition: Home or Self Care    Follow Up:   Follow-up Information     Kumar Ortiz NP Follow up in 1 week(s).    Specialty: Family Medicine  Contact information:  [place] [id number]  [phone]                       Patient Instructions:      Diet diabetic     Notify your health care provider if you experience any of the following:  temperature >100.4     Notify your health care provider if you experience any of the following:  increased confusion or weakness     Activity as tolerated       Significant Diagnostic Studies: Labs:   CMP   Recent Labs   Lab 06/01/22  0455 06/02/22  0518   * 137   K 3.8 4.0   CO2 28 30*   BUN 17.0 6.0*   CREATININE 1.50* 0.80   CALCIUM 8.5 8.6   ALBUMIN 2.8* 2.5*   BILITOT 0.3 0.2   ALKPHOS 92 67   AST 17 16   ALT 11 7       Pending Diagnostic Studies:     Procedure Component Value Units Date/Time    CT Lumbar Spine Without Contrast [[id number]]     Order Status: Sent Lab Status: No result          Medications:  Reconciled Home Medications:      Medication List      START taking these medications    oxyCODONE-acetaminophen 5-325 mg per tablet  Commonly known as: PERCOCET  Take 1 tablet by mouth every 8 (eight) hours as needed for Pain (back pain.).        CONTINUE taking these medications     amitriptyline 25 MG tablet  Commonly known as: ELAVIL  Take 25 mg by mouth nightly.     * insulin glargine 100 unit/mL injection  Commonly known as: Lantus  Inject into the skin.     * BASAGLAR KWIKPEN U-100 INSULIN glargine 100 units/mL (3mL) SubQ pen  Generic drug: insulin  Inject into the skin.     dicyclomine 10 MG capsule  Commonly known as: BENTYL  Take 10 mg by mouth 4 (four) times daily.     HumaLOG U-100 Insulin 100 unit/mL Crtg  Generic drug: insulin lispro  Inject into the skin.     hydrOXYzine 50 MG tablet  Commonly known as: ATARAX  Take 50 mg by mouth daily as needed.     lisinopriL 20 MG tablet  Commonly known as: PRINIVIL,ZESTRIL  Take 20 mg by mouth 2 (two) times daily.     metoclopramide HCl 5 MG tablet  Commonly known as: REGLAN  Take 5 mg by mouth 4 (four) times daily.     metoprolol tartrate 50 MG tablet  Commonly known as: LOPRESSOR  Take 50 mg by mouth 2 (two) times daily.     olmesartan 40 MG tablet  Commonly known as: BENICAR  Take 40 mg by mouth once daily.     ondansetron 4 MG tablet  Commonly known as: ZOFRAN  Take 4 mg by mouth every 6 (six) hours as needed.     pantoprazole 40 MG tablet  Commonly known as: PROTONIX  Take 40 mg by mouth once daily.         * This list has 2 medication(s) that are the same as other medications prescribed for you. Read the directions carefully, and ask your doctor or other care provider to review them with you.                Indwelling Lines/Drains at time of discharge:   Lines/Drains/Airways     None                 Time spent on the discharge of patient: 39 minutes         Segun Viramontes MD  Department of Hospital Medicine  [facility]

## 2022-06-02 NOTE — ASSESSMENT & PLAN NOTE
Continue current medications but closely monitor calorie intake otherwise blood sugar goals going to be difficult to  managed

## 2022-06-03 ENCOUNTER — PATIENT OUTREACH (OUTPATIENT)
Dept: ADMINISTRATIVE | Facility: CLINIC | Age: 27
End: 2022-06-03
Payer: MEDICAID

## 2022-06-03 NOTE — PROGRESS NOTES
C3 nurse attempted to contact Dorene Husain for a TCC post hospital discharge follow up call. No answer. No voicemail available.The patient does not have a scheduled HOSFU appointment that I can locate.

## 2022-06-06 NOTE — PROGRESS NOTES
C3 nurse attempted to contact Dorene Husain for a TCC post hospital discharge follow up call. No answer. VM left with CB information. The patient does not have a scheduled HOSFU appointment.

## 2022-06-23 NOTE — DISCHARGE SUMMARY
[facility]  [facility] Surgical [facility]  Hospital Medicine  Discharge Summary      Patient Name: Dorene Husain  MRN: [id number]  Patient Class: IP- Inpatient  Admission Date: 5/20/2022  Hospital Length of Stay: 6 days  Discharge Date and Time: 5/26/2022  6:15 PM  Attending Physician: No att. providers found   Discharging Provider: Sergio Hidalgo MD  Primary Care Provider: Kumar Ortiz NP      HPI:   26 y.o. female with a history that includes IDDM I and medication non-compliance, presented to ED overnight with muscle aches and back pain.  Associated with nausea and vomiting. Evaluation in ED noted glucose >500, labs noted glucose of 798, bicarb 8, sodium 118, with AG of 31.  Patient admitted to ICU under [facility] and started on insulin infusion.      * No surgery found *      Hospital Course:   Blood sugars better controlled. Symptoms improved. Tolerating PO initially then started having nausea/vomiting. Restarted on Reglan and improving again. K+ required replacement. Discharged home       Goals of Care Treatment Preferences:  Code Status: Full Code      Consults:     No new Assessment & Plan notes have been filed under this hospital service since the last note was generated.  Service: Hospital Medicine    Final Active Diagnoses:    Diagnosis Date Noted POA    PRINCIPAL PROBLEM:  Type 1 diabetes mellitus with ketoacidosis without coma [E10.10] 05/21/2022 Yes    CHATA (acute kidney injury) [N17.9] 05/21/2022 Yes     Chronic    DM gastroparesis [E11.43, K31.84] 05/25/2022 Yes     Chronic      Problems Resolved During this Admission:    Diagnosis Date Noted Date Resolved POA    Hypokalemia [E87.6] 05/25/2022 05/26/2022 Yes       Discharged Condition: stable    Disposition: Home or Self Care    Follow Up:   Follow-up Information     Kumar Ortiz NP Follow up.    Specialty: Family Medicine  Why: As needed, If symptoms worsen  Contact information:  [place] [address]  [phone]                        Patient Instructions:      Diet diabetic     Activity as tolerated         Pending Diagnostic Studies:     None         Medications:  Reconciled Home Medications:      Medication List      CONTINUE taking these medications    amitriptyline 25 MG tablet  Commonly known as: ELAVIL  Take 25 mg by mouth nightly.     * insulin glargine 100 unit/mL injection  Commonly known as: Lantus  Inject into the skin.     * BASAGLAR KWIKPEN U-100 INSULIN glargine 100 units/mL (3mL) SubQ pen  Generic drug: insulin  Inject into the skin.     dicyclomine 10 MG capsule  Commonly known as: BENTYL  Take 10 mg by mouth 4 (four) times daily.     HumaLOG U-100 Insulin 100 unit/mL Crtg  Generic drug: insulin lispro  Inject into the skin.     hydrOXYzine 50 MG tablet  Commonly known as: ATARAX  Take 50 mg by mouth daily as needed.     lisinopriL 20 MG tablet  Commonly known as: PRINIVIL,ZESTRIL  Take 20 mg by mouth 2 (two) times daily.     metoclopramide HCl 5 MG tablet  Commonly known as: REGLAN  Take 5 mg by mouth 4 (four) times daily.     metoprolol tartrate 50 MG tablet  Commonly known as: LOPRESSOR  Take 50 mg by mouth 2 (two) times daily.     olmesartan 40 MG tablet  Commonly known as: BENICAR  Take 40 mg by mouth once daily.     ondansetron 4 MG tablet  Commonly known as: ZOFRAN  Take 4 mg by mouth every 6 (six) hours as needed.     pantoprazole 40 MG tablet  Commonly known as: PROTONIX  Take 40 mg by mouth once daily.         * This list has 2 medication(s) that are the same as other medications prescribed for you. Read the directions carefully, and ask your doctor or other care provider to review them with you.                Indwelling Lines/Drains at time of discharge:   Lines/Drains/Airways     None                 Time spent on the discharge of patient: 32 minutes         Sergio Hidalgo MD  Department of Hospital Medicine  [facility]

## 2022-06-29 ENCOUNTER — HOSPITAL ENCOUNTER (EMERGENCY)
Facility: HOSPITAL | Age: 27
Discharge: HOME | End: 2022-06-30
Attending: INTERNAL MEDICINE
Payer: MEDICAID

## 2022-06-29 DIAGNOSIS — E86.0 DEHYDRATION: Primary | ICD-10-CM

## 2022-06-29 DIAGNOSIS — E10.65 UNCONTROLLED TYPE 1 DIABETES MELLITUS WITH HYPERGLYCEMIA: ICD-10-CM

## 2022-06-29 DIAGNOSIS — M79.10 MYALGIA: ICD-10-CM

## 2022-06-29 DIAGNOSIS — R11.2 NAUSEA AND VOMITING, INTRACTABILITY OF VOMITING NOT SPECIFIED, UNSPECIFIED VOMITING TYPE: ICD-10-CM

## 2022-06-29 LAB
ALBUMIN SERPL-MCNC: 3.4 GM/DL (ref 3.5–5)
ALBUMIN/GLOB SERPL: 0.9 RATIO (ref 1.1–2)
ALLENS TEST: ABNORMAL
ALP SERPL-CCNC: 105 UNIT/L (ref 40–150)
ALT SERPL-CCNC: 10 UNIT/L (ref 0–55)
AMPHET UR QL SCN: NEGATIVE
AMYLASE SERPL-CCNC: 34 UNIT/L (ref 25–125)
APPEARANCE UR: CLEAR
AST SERPL-CCNC: 14 UNIT/L (ref 5–34)
B-HCG SERPL QL: NEGATIVE
BACTERIA #/AREA URNS AUTO: ABNORMAL /HPF
BARBITURATE SCN PRESENT UR: NEGATIVE
BASOPHILS # BLD AUTO: 0.05 X10(3)/MCL (ref 0–0.2)
BASOPHILS NFR BLD AUTO: 0.3 %
BENZODIAZ UR QL SCN: NEGATIVE
BILIRUB UR QL STRIP.AUTO: NEGATIVE MG/DL
BILIRUBIN DIRECT+TOT PNL SERPL-MCNC: 1.2 MG/DL
BUN SERPL-MCNC: 30 MG/DL (ref 7–18.7)
CALCIUM SERPL-MCNC: 9 MG/DL (ref 8.4–10.2)
CANNABINOIDS UR QL SCN: NEGATIVE
CHLORIDE SERPL-SCNC: 90 MMOL/L (ref 98–107)
CO2 SERPL-SCNC: 18 MMOL/L (ref 22–29)
COCAINE UR QL SCN: NEGATIVE
COLOR UR AUTO: YELLOW
CREAT SERPL-MCNC: 1.24 MG/DL (ref 0.55–1.02)
DELSYS: ABNORMAL
EOSINOPHIL # BLD AUTO: 0 X10(3)/MCL (ref 0–0.9)
EOSINOPHIL NFR BLD AUTO: 0 %
ERYTHROCYTE [DISTWIDTH] IN BLOOD BY AUTOMATED COUNT: 13.2 % (ref 11.5–17)
FENTANYL UR QL SCN: NEGATIVE
GLOBULIN SER-MCNC: 3.7 GM/DL (ref 2.4–3.5)
GLUCOSE SERPL-MCNC: 531 MG/DL (ref 74–100)
GLUCOSE UR QL STRIP.AUTO: 500 MG/DL
HCO3 UR-SCNC: 21.7 MMOL/L (ref 24–28)
HCT VFR BLD AUTO: 31.1 % (ref 37–47)
HGB BLD-MCNC: 10.4 GM/DL (ref 12–16)
IMM GRANULOCYTES # BLD AUTO: 0.13 X10(3)/MCL (ref 0–0.02)
IMM GRANULOCYTES NFR BLD AUTO: 0.8 % (ref 0–0.43)
KETONES UR QL STRIP.AUTO: 15 MG/DL
LACTATE SERPL-SCNC: 1.9 MMOL/L (ref 0.5–2.2)
LEUKOCYTE ESTERASE UR QL STRIP.AUTO: NEGATIVE UNIT/L
LIPASE SERPL-CCNC: 14 U/L
LYMPHOCYTES # BLD AUTO: 1.81 X10(3)/MCL (ref 0.6–4.6)
LYMPHOCYTES NFR BLD AUTO: 11.5 %
MAGNESIUM SERPL-MCNC: 1.8 MG/DL (ref 1.6–2.6)
MCH RBC QN AUTO: 27.7 PG (ref 27–31)
MCHC RBC AUTO-ENTMCNC: 33.4 MG/DL (ref 33–36)
MCV RBC AUTO: 82.9 FL (ref 80–94)
MDMA UR QL SCN: NEGATIVE
MONOCYTES # BLD AUTO: 1.09 X10(3)/MCL (ref 0.1–1.3)
MONOCYTES NFR BLD AUTO: 6.9 %
NEUTROPHILS # BLD AUTO: 12.7 X10(3)/MCL (ref 2.1–9.2)
NEUTROPHILS NFR BLD AUTO: 80.5 %
NITRITE UR QL STRIP.AUTO: NEGATIVE
OPIATES UR QL SCN: NEGATIVE
PCO2 BLDA: 31.3 MMHG (ref 35–45)
PCP UR QL: NEGATIVE
PH SMN: 7.45 [PH] (ref 7.35–7.45)
PH UR STRIP.AUTO: 6 [PH]
PH UR: 6 [PH] (ref 3–11)
PLATELET # BLD AUTO: 618 X10(3)/MCL (ref 130–400)
PMV BLD AUTO: 8.7 FL (ref 7.4–10.4)
PO2 BLDA: 104 MMHG (ref 80–100)
POC BE: -2 MMOL/L
POC SATURATED O2: 98 % (ref 95–100)
POC TCO2: 23 MMOL/L (ref 23–27)
POTASSIUM SERPL-SCNC: 3.9 MMOL/L (ref 3.5–5.1)
PROT SERPL-MCNC: 7.1 GM/DL (ref 6.4–8.3)
PROT UR QL STRIP.AUTO: >=300 MG/DL
RBC # BLD AUTO: 3.75 X10(6)/MCL (ref 4.2–5.4)
RBC #/AREA URNS AUTO: ABNORMAL /HPF
RBC UR QL AUTO: ABNORMAL UNIT/L
SAMPLE: ABNORMAL
SITE: ABNORMAL
SODIUM SERPL-SCNC: 127 MMOL/L (ref 136–145)
SP GR UR STRIP.AUTO: 1.01
SPECIFIC GRAVITY, URINE AUTO (.000) (OHS): 1.01 (ref 1–1.03)
SQUAMOUS #/AREA URNS AUTO: ABNORMAL /HPF
UROBILINOGEN UR STRIP-ACNC: 0.2 MG/DL
WBC # SPEC AUTO: 15.8 X10(3)/MCL (ref 4.5–11.5)
WBC #/AREA URNS AUTO: ABNORMAL /HPF

## 2022-06-29 PROCEDURE — 83690 ASSAY OF LIPASE: CPT | Performed by: INTERNAL MEDICINE

## 2022-06-29 PROCEDURE — 36415 COLL VENOUS BLD VENIPUNCTURE: CPT | Performed by: INTERNAL MEDICINE

## 2022-06-29 PROCEDURE — 81001 URINALYSIS AUTO W/SCOPE: CPT | Mod: 59 | Performed by: INTERNAL MEDICINE

## 2022-06-29 PROCEDURE — 83605 ASSAY OF LACTIC ACID: CPT | Performed by: INTERNAL MEDICINE

## 2022-06-29 PROCEDURE — 96374 THER/PROPH/DIAG INJ IV PUSH: CPT

## 2022-06-29 PROCEDURE — 80307 DRUG TEST PRSMV CHEM ANLYZR: CPT | Performed by: INTERNAL MEDICINE

## 2022-06-29 PROCEDURE — 80053 COMPREHEN METABOLIC PANEL: CPT | Performed by: INTERNAL MEDICINE

## 2022-06-29 PROCEDURE — 63600175 PHARM REV CODE 636 W HCPCS: Performed by: INTERNAL MEDICINE

## 2022-06-29 PROCEDURE — 85025 COMPLETE CBC W/AUTO DIFF WBC: CPT | Performed by: INTERNAL MEDICINE

## 2022-06-29 PROCEDURE — 82150 ASSAY OF AMYLASE: CPT | Performed by: INTERNAL MEDICINE

## 2022-06-29 PROCEDURE — 96375 TX/PRO/DX INJ NEW DRUG ADDON: CPT

## 2022-06-29 PROCEDURE — 96361 HYDRATE IV INFUSION ADD-ON: CPT

## 2022-06-29 PROCEDURE — 82962 GLUCOSE BLOOD TEST: CPT

## 2022-06-29 PROCEDURE — 81025 URINE PREGNANCY TEST: CPT | Performed by: INTERNAL MEDICINE

## 2022-06-29 PROCEDURE — 99284 EMERGENCY DEPT VISIT MOD MDM: CPT | Mod: 25

## 2022-06-29 PROCEDURE — 83735 ASSAY OF MAGNESIUM: CPT | Performed by: INTERNAL MEDICINE

## 2022-06-29 PROCEDURE — 25000003 PHARM REV CODE 250: Performed by: INTERNAL MEDICINE

## 2022-06-29 RX ORDER — ONDANSETRON 2 MG/ML
4 INJECTION INTRAMUSCULAR; INTRAVENOUS
Status: COMPLETED | OUTPATIENT
Start: 2022-06-29 | End: 2022-06-29

## 2022-06-29 RX ORDER — KETOROLAC TROMETHAMINE 30 MG/ML
15 INJECTION, SOLUTION INTRAMUSCULAR; INTRAVENOUS
Status: COMPLETED | OUTPATIENT
Start: 2022-06-30 | End: 2022-06-30

## 2022-06-29 RX ORDER — SODIUM CHLORIDE AND POTASSIUM CHLORIDE 150; 900 MG/100ML; MG/100ML
INJECTION, SOLUTION INTRAVENOUS CONTINUOUS
Status: DISCONTINUED | OUTPATIENT
Start: 2022-06-29 | End: 2022-06-30 | Stop reason: HOSPADM

## 2022-06-29 RX ORDER — MAGNESIUM SULFATE HEPTAHYDRATE 40 MG/ML
2 INJECTION, SOLUTION INTRAVENOUS
Status: COMPLETED | OUTPATIENT
Start: 2022-06-30 | End: 2022-06-30

## 2022-06-29 RX ADMIN — HUMAN INSULIN 10 UNITS: 100 INJECTION, SOLUTION SUBCUTANEOUS at 11:06

## 2022-06-29 RX ADMIN — ONDANSETRON 4 MG: 2 INJECTION INTRAMUSCULAR; INTRAVENOUS at 11:06

## 2022-06-29 RX ADMIN — HUMAN INSULIN 4 UNITS: 100 INJECTION, SOLUTION SUBCUTANEOUS at 11:06

## 2022-06-29 RX ADMIN — POTASSIUM CHLORIDE AND SODIUM CHLORIDE: 900; 150 INJECTION, SOLUTION INTRAVENOUS at 11:06

## 2022-06-29 RX ADMIN — SODIUM CHLORIDE 1000 ML: 9 INJECTION, SOLUTION INTRAVENOUS at 11:06

## 2022-06-30 VITALS
BODY MASS INDEX: 26.57 KG/M2 | WEIGHT: 144.38 LBS | RESPIRATION RATE: 18 BRPM | DIASTOLIC BLOOD PRESSURE: 74 MMHG | OXYGEN SATURATION: 99 % | SYSTOLIC BLOOD PRESSURE: 127 MMHG | TEMPERATURE: 98 F | HEART RATE: 87 BPM | HEIGHT: 62 IN

## 2022-06-30 VITALS
OXYGEN SATURATION: 99 % | WEIGHT: 144 LBS | DIASTOLIC BLOOD PRESSURE: 114 MMHG | SYSTOLIC BLOOD PRESSURE: 157 MMHG | HEIGHT: 62 IN | BODY MASS INDEX: 26.5 KG/M2 | RESPIRATION RATE: 18 BRPM | TEMPERATURE: 99 F | HEART RATE: 106 BPM

## 2022-06-30 DIAGNOSIS — M54.50 CHRONIC LOW BACK PAIN WITHOUT SCIATICA, UNSPECIFIED BACK PAIN LATERALITY: Primary | ICD-10-CM

## 2022-06-30 DIAGNOSIS — G89.29 CHRONIC LOW BACK PAIN WITHOUT SCIATICA, UNSPECIFIED BACK PAIN LATERALITY: Primary | ICD-10-CM

## 2022-06-30 PROBLEM — K31.84 DIABETIC GASTROPARESIS: Status: ACTIVE | Noted: 2022-06-30

## 2022-06-30 PROBLEM — Z76.5 DRUG-SEEKING BEHAVIOR: Status: ACTIVE | Noted: 2022-06-30

## 2022-06-30 PROBLEM — E11.43 DIABETIC GASTROPARESIS: Status: ACTIVE | Noted: 2022-06-30

## 2022-06-30 PROBLEM — E11.9 DIABETES MELLITUS: Status: ACTIVE | Noted: 2022-06-30

## 2022-06-30 LAB
POCT GLUCOSE: 230 MG/DL (ref 70–110)
POCT GLUCOSE: 322 MG/DL (ref 70–110)
POCT GLUCOSE: 371 MG/DL (ref 70–110)
POCT GLUCOSE: >500 MG/DL (ref 70–110)

## 2022-06-30 PROCEDURE — 96372 THER/PROPH/DIAG INJ SC/IM: CPT | Performed by: INTERNAL MEDICINE

## 2022-06-30 PROCEDURE — 99285 EMERGENCY DEPT VISIT HI MDM: CPT | Mod: 25

## 2022-06-30 PROCEDURE — 63600175 PHARM REV CODE 636 W HCPCS: Performed by: INTERNAL MEDICINE

## 2022-06-30 PROCEDURE — 82962 GLUCOSE BLOOD TEST: CPT

## 2022-06-30 RX ORDER — KETOROLAC TROMETHAMINE 30 MG/ML
30 INJECTION, SOLUTION INTRAMUSCULAR; INTRAVENOUS
Status: COMPLETED | OUTPATIENT
Start: 2022-06-30 | End: 2022-06-30

## 2022-06-30 RX ORDER — DROPERIDOL 2.5 MG/ML
2.5 INJECTION, SOLUTION INTRAMUSCULAR; INTRAVENOUS ONCE
Status: COMPLETED | OUTPATIENT
Start: 2022-06-30 | End: 2022-06-30

## 2022-06-30 RX ORDER — PROCHLORPERAZINE EDISYLATE 5 MG/ML
10 INJECTION INTRAMUSCULAR; INTRAVENOUS
Status: COMPLETED | OUTPATIENT
Start: 2022-06-30 | End: 2022-06-30

## 2022-06-30 RX ORDER — NAPROXEN 500 MG/1
500 TABLET ORAL 2 TIMES DAILY WITH MEALS
Qty: 30 TABLET | Refills: 0 | Status: SHIPPED | OUTPATIENT
Start: 2022-06-30 | End: 2022-07-15

## 2022-06-30 RX ADMIN — KETOROLAC TROMETHAMINE 30 MG: 30 INJECTION, SOLUTION INTRAMUSCULAR; INTRAVENOUS at 06:06

## 2022-06-30 RX ADMIN — MAGNESIUM SULFATE 2 G: 2 INJECTION INTRAVENOUS at 12:06

## 2022-06-30 RX ADMIN — KETOROLAC TROMETHAMINE 15 MG: 30 INJECTION, SOLUTION INTRAMUSCULAR; INTRAVENOUS at 12:06

## 2022-06-30 RX ADMIN — PROCHLORPERAZINE EDISYLATE 10 MG: 5 INJECTION INTRAMUSCULAR; INTRAVENOUS at 12:06

## 2022-06-30 RX ADMIN — HUMAN INSULIN 5 UNITS: 100 INJECTION, SOLUTION SUBCUTANEOUS at 01:06

## 2022-06-30 RX ADMIN — DROPERIDOL 2.5 MG: 2.5 INJECTION, SOLUTION INTRAMUSCULAR; INTRAVENOUS at 06:06

## 2022-06-30 NOTE — ED PROVIDER NOTES
Encounter Date: 6/30/2022       History     Chief Complaint   Patient presents with    Nausea     States bad nausea and back pain. Cbg 230 in triage     HPI   Patient with history of diabetes mellitus, diabetic gastroparesis, recurrent nausea, vomiting, noncompliance with medication recurrent episodes of DKA comes to the emergency room with complaint of back pain, and usual nausea and vomiting.  Patient says last month she fell in the bathroom and she has been hurting in the back, she had come at that time but did not do anything about it because the CT scan was broken and she comes back with complaint of low back pain today.  She says she has been to her family doctor but he does not do anything about it, so she today she decided to come to the emergency room get the CT scan done.    Review of patient's allergies indicates:  No Known Allergies  Past Medical History:   Diagnosis Date    Diabetes mellitus     Diabetic gastroparesis associated with type 1 diabetes mellitus     DKA (diabetic ketoacidosis)     DKA (diabetic ketoacidosis)     DKA (diabetic ketoacidosis)     Gastroparesis     Gastroparesis due to DM     Hypertension     Ketoacidosis due to diabetes     Non compliance with medical treatment     Pneumonia     Secondary DM with DKA      Past Surgical History:   Procedure Laterality Date    CHOLECYSTECTOMY      ESOPHAGOGASTRODUODENOSCOPY      Multiple    None       Family History   Problem Relation Age of Onset    Heart disease Mother     Hypertension Mother     Diabetes Mother     Hyperlipidemia Mother     Cancer Father     Stroke Father     Hypertension Father     Hyperlipidemia Father      Social History     Tobacco Use    Smoking status: Never Smoker    Smokeless tobacco: Never Used   Substance Use Topics    Alcohol use: Never    Drug use: Yes     Types: Marijuana     Review of Systems      Review of Systems   Constitutional symptoms:  No fever, no chills.    Skin symptoms:  No  rash.    Eye symptoms:  Negative except as documented in HPI.   ENMT symptoms:  No sore throat,    Respiratory symptoms:  No shortness of breath, no cough, no wheezing.    Cardiovascular symptoms:  No chest pain, no palpitations, no tachycardia.    Gastrointestinal symptoms:  No abdominal pain, Nausea, Vomiting, no diarrhea, no constipation.    Genitourinary symptoms:  No dysuria,    Musculoskeletal symptoms:  Back pain,    Neurologic symptoms:  No headache, no dizziness.    Psychiatric symptoms:  No anxiety, no depression, no substance abuse.    Allergy/immunologic symptoms:  No seasonal allergies,              Additional review of systems information: All other systems reviewed and otherwise negative.    Physical Exam     Initial Vitals [06/30/22 1821]   BP Pulse Resp Temp SpO2   (!) 157/114 106 18 99.4 °F (37.4 °C) 99 %      MAP       --         Physical Exam       Vital Signs: Reviewed as in chart.  General:  Alert, no acute distress. Anxious  Skin: Normal for Ethnic Origin  Eye:  Extraocular movements are intact.   Cardiovascular:  Regular rate and rhythm, No murmur.    Respiratory:  Lungs are clear to auscultation, respirations are non-labored.    Musculoskeletal:  No deformity.  No point tenderness on the spine, no swelling on the spine, no bruising, mild diffuse tenderness in the lower lumbar spine area bilaterally,  Gastrointestinal:  Soft, Nontender, Non distended, Normal bowel sounds.    Neurological:  Alert and oriented to person, place, time, and situation, normal motor observed, normal speech observed.    Psychiatric:  Cooperative, appropriate mood & affect.    ED Course   Procedures  Labs Reviewed   POCT GLUCOSE - Abnormal; Notable for the following components:       Result Value    POCT Glucose 230 (*)     All other components within normal limits          Imaging Results          CT Lumbar Spine Without Contrast (Final result)  Result time 06/30/22 19:03:25    Final result by Trenton Hankins MD  (06/30/22 19:03:25)                 Impression:      No fracture of the lumbar spine.  Minimal spondylotic changes are identified without evidence for central canal stenosis or neural foraminal narrowing.      Electronically signed by: Trenton Hankins MD  Date:    06/30/2022  Time:    19:03             Narrative:    EXAMINATION:  CT LUMBAR SPINE WITHOUT CONTRAST    CLINICAL HISTORY:  Low back pain, symptoms persist with > 6wks conservative treatment;    TECHNIQUE:  Multiple cross-sectional images of the lumbar spine were obtained without the use of contrast.  Coronal and sagittal reformatted images were obtained.  An automated dose exposure technique was utilized.  This limits radiation does the patient.    COMPARISON:  None    FINDINGS:  Five lumbar type vertebral bodies.  The alignment curvature lumbar spine is normal.  The vertebral heights and intervertebral disc spaces maintained.  No evidence for compression deformity, fracture, or subluxation.  No evidence for central canal stenosis or neural foraminal narrowing.    Degenerative changes of bilateral SI joints.    The visualized hollow and solid viscera are grossly normal.  Postsurgical changes of cholecystectomy.                                 Medications   ketorolac injection 30 mg (30 mg Intramuscular Given 6/30/22 1837)   droperidoL injection 2.5 mg (2.5 mg Intramuscular Given 6/30/22 1838)                        The patient essentially has chronic back pain, her CT scan does not show any acute abnormality, I am going to let her go home with instruction to see her family doctor for continuation of her usual medication, adjustment of her medication, and she needs to start following a diet, is stop drinking soda and high energy drinks, and take medicines as prescribed by her family doctor.  She will continue taking her usual medication.  I will prescribe her naproxen for her back pain which has been chronic.  Clinical Impression:   Final diagnoses:  [M54.50,  G89.29] Chronic low back pain without sciatica, unspecified back pain laterality (Primary)          ED Disposition Condition    Discharge Stable        ED Prescriptions     Medication Sig Dispense Start Date End Date Auth. Provider    naproxen (NAPROSYN) 500 MG tablet Take 1 tablet (500 mg total) by mouth 2 (two) times daily with meals. for 15 days 30 tablet 6/30/2022 7/15/2022 Millie Burr MD        Follow-up Information     Follow up With Specialties Details Why Contact Info    Kumar Ortiz NP Family Medicine In 2 days  [address] [place] [address]  [phone]             Millie Burr MD  06/30/22 6249

## 2022-06-30 NOTE — ED PROVIDER NOTES
Encounter Date: 6/29/2022       History     Chief Complaint   Patient presents with    Vomiting     Nausea, vomiting and back pain starting yesterday, increasing tonight. Pt reports CBG at home 346, took 30 units of insulin at 1730. CBG currently reading HI    Hyperglycemia     26-year-old 26-year-old black female came to the emergency department with nausea vomiting and back cramps.  Patient has a history of uncontrolled diabetes and is in the emergency department numerous times for hyperglycemia and diabetic ketoacidosis.  She states that she has been feeling well for few days and her sugars been very high and she has been a with a heat and she she feels dehydrated which made her come to the emergency department        Review of patient's allergies indicates:  No Known Allergies  Past Medical History:   Diagnosis Date    Diabetes mellitus     DKA (diabetic ketoacidosis)     DKA (diabetic ketoacidosis)     DKA (diabetic ketoacidosis)     Gastroparesis     Gastroparesis due to DM     Hypertension     Ketoacidosis due to diabetes     Pneumonia     Secondary DM with DKA      Past Surgical History:   Procedure Laterality Date    None       No family history on file.  Social History     Tobacco Use    Smoking status: Never Smoker    Smokeless tobacco: Never Used   Substance Use Topics    Alcohol use: Never    Drug use: Never     Review of Systems   Constitutional: Negative.  Negative for activity change, appetite change, chills, diaphoresis, fatigue, fever and unexpected weight change.   HENT: Negative.  Negative for congestion, dental problem, drooling, ear discharge, ear pain, facial swelling, hearing loss, mouth sores, nosebleeds, postnasal drip, rhinorrhea, sinus pressure, sinus pain, sneezing, sore throat, tinnitus, trouble swallowing and voice change.    Eyes: Negative.  Negative for photophobia, pain, discharge, redness, itching and visual disturbance.   Respiratory: Negative.  Negative for  apnea, cough, choking, chest tightness, shortness of breath, wheezing and stridor.    Cardiovascular: Negative.  Negative for chest pain, palpitations and leg swelling.   Gastrointestinal: Positive for nausea and vomiting. Negative for abdominal distention, abdominal pain, anal bleeding, blood in stool, constipation, diarrhea and rectal pain.   Endocrine: Positive for polyuria. Negative for cold intolerance, heat intolerance, polydipsia and polyphagia.   Genitourinary: Negative.  Negative for decreased urine volume, difficulty urinating, dyspareunia, dysuria, enuresis, flank pain, frequency, genital sores, hematuria, menstrual problem, pelvic pain, urgency, vaginal bleeding, vaginal discharge and vaginal pain.   Musculoskeletal: Positive for myalgias. Negative for arthralgias, back pain, gait problem, joint swelling, neck pain and neck stiffness.   Skin: Negative.  Negative for color change, pallor, rash and wound.   Allergic/Immunologic: Negative.  Negative for environmental allergies, food allergies and immunocompromised state.   Neurological: Negative.  Negative for dizziness, tremors, seizures, syncope, facial asymmetry, speech difficulty, weakness, light-headedness, numbness and headaches.   Hematological: Negative.  Negative for adenopathy. Does not bruise/bleed easily.   Psychiatric/Behavioral: Negative.  Negative for agitation, behavioral problems, confusion, decreased concentration, dysphoric mood, hallucinations, self-injury, sleep disturbance and suicidal ideas. The patient is not nervous/anxious and is not hyperactive.    All other systems reviewed and are negative.      Physical Exam     Initial Vitals [06/29/22 2239]   BP Pulse Resp Temp SpO2   (!) 188/127 (!) 116 18 97.9 °F (36.6 °C) 98 %      MAP       --         Physical Exam    Nursing note and vitals reviewed.  Constitutional: She appears well-developed and well-nourished. She is not diaphoretic. No distress.   Very dramatic   HENT:   Head:  Normocephalic and atraumatic.   Mouth/Throat: No oropharyngeal exudate.   Dry mucous membranes   Eyes: Conjunctivae and EOM are normal. Pupils are equal, round, and reactive to light. No scleral icterus.   Neck: Neck supple. No JVD present.   Normal range of motion.  Cardiovascular: Regular rhythm, normal heart sounds and intact distal pulses. Tachycardia present.  Exam reveals no gallop and no friction rub.    No murmur heard.  Pulmonary/Chest: Breath sounds normal. No respiratory distress. She has no wheezes. She exhibits no tenderness.   Abdominal: Abdomen is soft. Bowel sounds are normal. She exhibits no distension. There is no abdominal tenderness. There is no rebound.   Musculoskeletal:         General: Normal range of motion.      Cervical back: Normal range of motion and neck supple.     Neurological: She is alert and oriented to person, place, and time. She has normal strength and normal reflexes.   Skin: Skin is warm and dry. Capillary refill takes less than 2 seconds.   Poor skin turgor   Psychiatric: She has a normal mood and affect. Her behavior is normal. Judgment and thought content normal.         ED Course   Procedures  Labs Reviewed   COMPREHENSIVE METABOLIC PANEL - Abnormal; Notable for the following components:       Result Value    Sodium Level 127 (*)     Chloride 90 (*)     Carbon Dioxide 18 (*)     Glucose Level 531 (*)     Blood Urea Nitrogen 30.0 (*)     Creatinine 1.24 (*)     Albumin Level 3.4 (*)     Globulin 3.7 (*)     Albumin/Globulin Ratio 0.9 (*)     All other components within normal limits   URINALYSIS, REFLEX TO URINE CULTURE - Abnormal; Notable for the following components:    Protein, UA >=300 (*)     Glucose,   (*)     Ketones, UA 15  (*)     Blood, UA Moderate (*)     All other components within normal limits   CBC WITH DIFFERENTIAL - Abnormal; Notable for the following components:    WBC 15.8 (*)     RBC 3.75 (*)     Hgb 10.4 (*)     Hct 31.1 (*)     Platelet 618 (*)      Neut # 12.7 (*)     IG# 0.13 (*)     IG% 0.8 (*)     All other components within normal limits   URINALYSIS, MICROSCOPIC - Abnormal; Notable for the following components:    Squamous Epithelial Cells, UA Few (*)     All other components within normal limits   ISTAT PROCEDURE - Abnormal; Notable for the following components:    POC PCO2 31.3 (*)     POC PO2 104 (*)     POC HCO3 21.7 (*)     All other components within normal limits   LIPASE - Normal   AMYLASE - Normal   LACTIC ACID, PLASMA - Normal   MAGNESIUM - Normal   PREGNANCY TEST, URINE RAPID - Normal   DRUG SCREEN, URINE (BEAKER) - Normal    Narrative:     Cut off concentrations:    Amphetamines - 1000 ng/ml  Barbiturates - 200 ng/ml  Benzodiazepine - 200 ng/ml  Cannabinoids (THC) - 50 ng/ml  Cocaine - 300 ng/ml  Fentanyl - 1.0 ng/ml  MDMA - 500 ng/ml  Opiates - 300 ng/ml   Phencyclidine (PCP) - 25 ng/ml    Specimen submitted for drug analysis and tested for pH and specific gravity in order to evaluate sample integrity. Suspect tampering if specific gravity is <1.003 and/or pH is not within the range of 4.5 - 8.0  False negatives may result form substances such as bleach added to urine.  False positives may result for the presence of a substance with similar chemical structure to the drug or its metabolite.    This test provides only a PRELIMINARY analytical test result. A more specific alternate chemical method must be used in order to obtain a confirmed analytical result. Gas chromatography/mass spectrometry (GC/MS) is the preferred confirmatory method. Other chemical confirmation methods are available. Clinical consideration and professional judgement should be applied to any drug of abuse test result, particularly when preliminary positive results are used.    Positive results will be confirmed only at the physicians request. Unconfirmed screening results are to be used only for medical purposes (treatment).        CBC W/ AUTO DIFFERENTIAL    Narrative:      The following orders were created for panel order CBC auto differential.  Procedure                               Abnormality         Status                     ---------                               -----------         ------                     CBC with Differential[[id number]]        Abnormal            Final result                 Please view results for these tests on the individual orders.   POCT GLUCOSE, HAND-HELD DEVICE      Admission on 06/29/2022   Component Date Value Ref Range Status    Sodium Level 06/29/2022 127 (A) 136 - 145 mmol/L Final    Potassium Level 06/29/2022 3.9  3.5 - 5.1 mmol/L Final    Chloride 06/29/2022 90 (A) 98 - 107 mmol/L Final    Carbon Dioxide 06/29/2022 18 (A) 22 - 29 mmol/L Final    Glucose Level 06/29/2022 531 (A) 74 - 100 mg/dL Final    Blood Urea Nitrogen 06/29/2022 30.0 (A) 7.0 - 18.7 mg/dL Final    Creatinine 06/29/2022 1.24 (A) 0.55 - 1.02 mg/dL Final    Calcium Level Total 06/29/2022 9.0  8.4 - 10.2 mg/dL Final    Protein Total 06/29/2022 7.1  6.4 - 8.3 gm/dL Final    Albumin Level 06/29/2022 3.4 (A) 3.5 - 5.0 gm/dL Final    Globulin 06/29/2022 3.7 (A) 2.4 - 3.5 gm/dL Final    Albumin/Globulin Ratio 06/29/2022 0.9 (A) 1.1 - 2.0 ratio Final    Bilirubin Total 06/29/2022 1.2  <=1.5 mg/dL Final    Alkaline Phosphatase 06/29/2022 105  40 - 150 unit/L Final    Alanine Aminotransferase 06/29/2022 10  0 - 55 unit/L Final    Aspartate Aminotransferase 06/29/2022 14  5 - 34 unit/L Final    Estimated GFR- 06/29/2022 >60  mls/min/1.73/m2 Final    Lipase Level 06/29/2022 14  <=60 U/L Final    Amylase Level 06/29/2022 34  25 - 125 unit/L Final    Lactic Acid Level 06/29/2022 1.9  0.5 - 2.2 mmol/L Final    Magnesium Level 06/29/2022 1.80  1.60 - 2.60 mg/dL Final    Color, UA 06/29/2022 Yellow  Yellow, Colorless, Other, Clear Final    Appearance, UA 06/29/2022 Clear  Clear Final    Specific [place], UA 06/29/2022 1.015   Final    pH, UA  06/29/2022 6.0  5.0, 5.5, 6.0, 6.5, 7.0, 7.5, 8.0, 8.5 Final    Protein, UA 06/29/2022 >=300 (A) Negative, 300  mg/dL Final    Glucose, UA 06/29/2022 500  (A) Negative, Normal mg/dL Final    Ketones, UA 06/29/2022 15  (A) Negative, +1, +2, +3, +4, +5, >=160, >=80 mg/dL Final    Blood, UA 06/29/2022 Moderate (A) Negative unit/L Final    Bilirubin, UA 06/29/2022 Negative  Negative mg/dL Final    Urobilinogen, UA 06/29/2022 0.2  0.2, 1.0, Normal mg/dL Final    Nitrites, UA 06/29/2022 Negative  Negative Final    Leukocyte Esterase, UA 06/29/2022 Negative  Negative, 75  unit/L Final    Beta hCG Qualitative, Urine 06/29/2022 Negative  Negative Final    Amphetamines, Urine 06/29/2022 Negative  Negative Final    Barbituates, Urine 06/29/2022 Negative  Negative Final    Benzodiazepine, Urine 06/29/2022 Negative  Negative Final    Cannabinoids, Urine 06/29/2022 Negative  Negative Final    Cocaine, Urine 06/29/2022 Negative  Negative Final    Fentanyl, Urine 06/29/2022 Negative  Negative Final    MDMA, Urine 06/29/2022 Negative  Negative Final    Opiates, Urine 06/29/2022 Negative  Negative Final    Phencyclidine, Urine 06/29/2022 Negative  Negative Final    pH, Urine 06/29/2022 6.0  3.0 - 11.0 Final    Specific Gravity, Urine Auto 06/29/2022 1.015  1.001 - 1.035 Final    WBC 06/29/2022 15.8 (A) 4.5 - 11.5 x10(3)/mcL Final    RBC 06/29/2022 3.75 (A) 4.20 - 5.40 x10(6)/mcL Final    Hgb 06/29/2022 10.4 (A) 12.0 - 16.0 gm/dL Final    Hct 06/29/2022 31.1 (A) 37.0 - 47.0 % Final    MCV 06/29/2022 82.9  80.0 - 94.0 fL Final    MCH 06/29/2022 27.7  27.0 - 31.0 pg Final    MCHC 06/29/2022 33.4  33.0 - 36.0 mg/dL Final    RDW 06/29/2022 13.2  11.5 - 17.0 % Final    Platelet 06/29/2022 618 (A) 130 - 400 x10(3)/mcL Final    MPV 06/29/2022 8.7  7.4 - 10.4 fL Final    Neut % 06/29/2022 80.5  % Final    Lymph % 06/29/2022 11.5  % Final    Mono % 06/29/2022 6.9  % Final    Eos % 06/29/2022 0.0  % Final     Basophil % 06/29/2022 0.3  % Final    Lymph # 06/29/2022 1.81  0.6 - 4.6 x10(3)/mcL Final    Neut # 06/29/2022 12.7 (A) 2.1 - 9.2 x10(3)/mcL Final    Mono # 06/29/2022 1.09  0.1 - 1.3 x10(3)/mcL Final    Eos # 06/29/2022 0.00  0 - 0.9 x10(3)/mcL Final    Baso # 06/29/2022 0.05  0 - 0.2 x10(3)/mcL Final    IG# 06/29/2022 0.13 (A) 0 - 0.0155 x10(3)/mcL Final    IG% 06/29/2022 0.8 (A) 0 - 0.43 % Final    Bacteria, UA 06/29/2022 None Seen  None Seen, Rare, Occasional /HPF Final    RBC, UA 06/29/2022 3-5  None Seen, 0-2, 3-5, 0-5 /HPF Final    WBC, UA 06/29/2022 0-2  None Seen, 0-2, 3-5, 0-5 /HPF Final    Squamous Epithelial Cells, UA 06/29/2022 Few (A) None Seen, Rare, Occasional, Occ /HPF Final    POC PH 06/29/2022 7.449  7.35 - 7.45 Final    POC PCO2 06/29/2022 31.3 (A) 35 - 45 mmHg Final    POC PO2 06/29/2022 104 (A) 80 - 100 mmHg Final    POC HCO3 06/29/2022 21.7 (A) 24 - 28 mmol/L Final    POC BE 06/29/2022 -2  -2 to 2 mmol/L Final    POC SATURATED O2 06/29/2022 98  95 - 100 % Final    POC TCO2 06/29/2022 23  23 - 27 mmol/L Final    Sample 06/29/2022 ARTERIAL   Final    Site 06/29/2022 LR   Final    Allens Test 06/29/2022 Pass   Final    DelSys 06/29/2022 Room Air   Final            Imaging Results    None          Medications   0.9 % NaCl with KCl 20 mEq infusion ( Intravenous Stopped 6/30/22 0135)   magnesium sulfate 2g in water 50mL IVPB (premix) (2 g Intravenous New Bag 6/30/22 0038)   sodium chloride 0.9% bolus 1,000 mL (0 mLs Intravenous Stopped 6/30/22 0001)   ondansetron injection 4 mg (4 mg Intravenous Given 6/29/22 2334)   insulin regular injection 4 Units 0.04 mL (4 Units Intravenous Given 6/29/22 2353)   insulin regular injection 10 Units 0.1 mL (10 Units Subcutaneous Given 6/29/22 2354)   ketorolac injection 15 mg (15 mg Intravenous Given 6/30/22 0015)   prochlorperazine injection Soln 10 mg (10 mg Intravenous Given 6/30/22 0047)   insulin regular injection 5 Units 0.05 mL (5  Units Subcutaneous Given 6/30/22 0104)     Medical Decision Making:   History:   Old Medical Records: I decided to obtain old medical records.  Old Records Summarized: records from previous admission(s).  Differential Diagnosis:   Diabetic ketoacidosis versus hyperglycemia and dehydration  Clinical Tests:   Lab Tests: Ordered  Medical Tests: Ordered  ED Management:  For IV fluids and will give Zofran and Toradol for nausea vomiting and myalgia pain             ED Course as of 06/30/22 0146   Thu Jun 30, 2022   0144 After a couple L of IV fluids and some subcutaneous and intravenous insulin patient's blood sugar is under 350 I have given her Toradol for pain and Zofran and Compazine for anxiety and nausea and she is markedly improved she is stable for discharge so of sent home and I have educated her on a proper diabetic diet and how to take insulin to keep her out of the emergency department.  It is apparent that patient has 0 understanding of diabetes [PL]      ED Course User Index  [PL] Chuy Mathews MD             Clinical Impression:   Final diagnoses:  [E86.0] Dehydration (Primary)  [E10.65] Uncontrolled type 1 diabetes mellitus with hyperglycemia  [R11.2] Nausea and vomiting, intractability of vomiting not specified, unspecified vomiting type  [M79.10] Myalgia          ED Disposition Condition    Discharge Stable        ED Prescriptions     None        Follow-up Information     Follow up With Specialties Details Why Contact Info    Kumar Ortiz NP Family Medicine In 2 days  [address] [place] [address]  [phone]          Umu Guzman is a certified MA and was present during the entire interaction with this patient     Chuy Mathews MD  06/30/22 0146

## 2022-06-30 NOTE — ED NOTES
Pt entered ED ambulatory with c/o generalized back pain, nausea, and vomiting. States glucose at home tonight was in 300s and took 30 units at 1730. Crying at this time.

## 2022-06-30 NOTE — ED NOTES
Pt to ED via POV for nausea and back pain. Pt ambulatory to room. Pt in no distress, was seen in ED last night. All safety and personal needs addressed.

## 2022-07-08 ENCOUNTER — HOSPITAL ENCOUNTER (OUTPATIENT)
Facility: HOSPITAL | Age: 27
Discharge: HOME | DRG: 641 | End: 2022-07-10
Attending: EMERGENCY MEDICINE | Admitting: INTERNAL MEDICINE
Payer: MEDICAID

## 2022-07-08 DIAGNOSIS — M54.9 CHRONIC BACK PAIN GREATER THAN 3 MONTHS DURATION: ICD-10-CM

## 2022-07-08 DIAGNOSIS — R11.2 NAUSEA & VOMITING: ICD-10-CM

## 2022-07-08 DIAGNOSIS — G89.29 CHRONIC BACK PAIN GREATER THAN 3 MONTHS DURATION: ICD-10-CM

## 2022-07-08 DIAGNOSIS — E10.69 TYPE 1 DIABETES MELLITUS WITH OTHER SPECIFIED COMPLICATION: ICD-10-CM

## 2022-07-08 DIAGNOSIS — Z45.2 ENCOUNTER FOR CENTRAL LINE PLACEMENT: ICD-10-CM

## 2022-07-08 DIAGNOSIS — K31.84 DIABETIC GASTROPARESIS: ICD-10-CM

## 2022-07-08 DIAGNOSIS — E11.43 DIABETIC GASTROPARESIS: ICD-10-CM

## 2022-07-08 DIAGNOSIS — E87.6 HYPOKALEMIA: Primary | ICD-10-CM

## 2022-07-08 DIAGNOSIS — E86.0 DEHYDRATION, MODERATE: ICD-10-CM

## 2022-07-08 LAB
ALBUMIN SERPL-MCNC: 3.6 GM/DL (ref 3.5–5)
ALBUMIN/GLOB SERPL: 1 RATIO (ref 1.1–2)
ALLENS TEST: ABNORMAL
ALP SERPL-CCNC: 85 UNIT/L (ref 40–150)
ALT SERPL-CCNC: 9 UNIT/L (ref 0–55)
ANION GAP SERPL CALC-SCNC: 14 MEQ/L
APPEARANCE UR: ABNORMAL
APTT PPP: 29.5 SECONDS (ref 23.2–33.7)
AST SERPL-CCNC: 14 UNIT/L (ref 5–34)
B-HCG SERPL QL: NEGATIVE
B-OH-BUTYR SERPL-MCNC: 2.6 MMOL/L
BACTERIA #/AREA URNS AUTO: ABNORMAL /HPF
BASOPHILS # BLD AUTO: 0.04 X10(3)/MCL (ref 0–0.2)
BASOPHILS NFR BLD AUTO: 0.4 %
BILIRUB UR QL STRIP.AUTO: NEGATIVE MG/DL
BILIRUBIN DIRECT+TOT PNL SERPL-MCNC: 0.9 MG/DL
BUN SERPL-MCNC: 24 MG/DL (ref 7–18.7)
BUN SERPL-MCNC: 25 MG/DL (ref 7–18.7)
CALCIUM SERPL-MCNC: 8 MG/DL (ref 8.4–10.2)
CALCIUM SERPL-MCNC: 9.6 MG/DL (ref 8.4–10.2)
CHLORIDE SERPL-SCNC: 86 MMOL/L (ref 98–107)
CHLORIDE SERPL-SCNC: 94 MMOL/L (ref 98–107)
CO2 SERPL-SCNC: 27 MMOL/L (ref 22–29)
CO2 SERPL-SCNC: 30 MMOL/L (ref 22–29)
COLOR UR AUTO: YELLOW
CREAT SERPL-MCNC: 1.39 MG/DL (ref 0.55–1.02)
CREAT SERPL-MCNC: 1.71 MG/DL (ref 0.55–1.02)
CREAT/UREA NIT SERPL: 17
DELSYS: ABNORMAL
EOSINOPHIL # BLD AUTO: 0.04 X10(3)/MCL (ref 0–0.9)
EOSINOPHIL NFR BLD AUTO: 0.4 %
ERYTHROCYTE [DISTWIDTH] IN BLOOD BY AUTOMATED COUNT: 12.8 % (ref 11.5–17)
GLOBULIN SER-MCNC: 3.6 GM/DL (ref 2.4–3.5)
GLUCOSE SERPL-MCNC: 339 MG/DL (ref 74–100)
GLUCOSE SERPL-MCNC: 428 MG/DL (ref 74–100)
GLUCOSE UR QL STRIP.AUTO: 100 MG/DL
HCO3 UR-SCNC: 29.1 MMOL/L (ref 24–28)
HCT VFR BLD AUTO: 32.6 % (ref 37–47)
HGB BLD-MCNC: 10.8 GM/DL (ref 12–16)
IMM GRANULOCYTES # BLD AUTO: 0.05 X10(3)/MCL (ref 0–0.04)
IMM GRANULOCYTES NFR BLD AUTO: 0.5 %
INR BLD: 1.06 (ref 0–1.3)
KETONES UR QL STRIP.AUTO: 15 MG/DL
LACTATE SERPL-SCNC: 1.5 MMOL/L (ref 0.5–2.2)
LEUKOCYTE ESTERASE UR QL STRIP.AUTO: NEGATIVE UNIT/L
LYMPHOCYTES # BLD AUTO: 1.96 X10(3)/MCL (ref 0.6–4.6)
LYMPHOCYTES NFR BLD AUTO: 20.9 %
MAGNESIUM SERPL-MCNC: 1.7 MG/DL (ref 1.6–2.6)
MCH RBC QN AUTO: 27.6 PG (ref 27–31)
MCHC RBC AUTO-ENTMCNC: 33.1 MG/DL (ref 33–36)
MCV RBC AUTO: 83.2 FL (ref 80–94)
MONOCYTES # BLD AUTO: 0.69 X10(3)/MCL (ref 0.1–1.3)
MONOCYTES NFR BLD AUTO: 7.4 %
NEUTROPHILS # BLD AUTO: 6.6 X10(3)/MCL (ref 2.1–9.2)
NEUTROPHILS NFR BLD AUTO: 70.4 %
NITRITE UR QL STRIP.AUTO: NEGATIVE
PCO2 BLDA: 43.5 MMHG (ref 35–45)
PH SMN: 7.43 [PH] (ref 7.35–7.45)
PH UR STRIP.AUTO: 5.5 [PH]
PLATELET # BLD AUTO: 568 X10(3)/MCL (ref 130–400)
PMV BLD AUTO: 9 FL (ref 7.4–10.4)
PO2 BLDA: 32 MMHG (ref 40–60)
POC BE: 5 MMOL/L
POC SATURATED O2: 62 % (ref 95–100)
POC TCO2: 30 MMOL/L (ref 24–29)
POCT GLUCOSE: 273 MG/DL (ref 70–110)
POCT GLUCOSE: 277 MG/DL (ref 70–110)
POTASSIUM SERPL-SCNC: 2.7 MMOL/L (ref 3.5–5.1)
POTASSIUM SERPL-SCNC: 2.7 MMOL/L (ref 3.5–5.1)
PROT SERPL-MCNC: 7.2 GM/DL (ref 6.4–8.3)
PROT UR QL STRIP.AUTO: >=300 MG/DL
PROTHROMBIN TIME: 13.8 SECONDS (ref 12.5–14.5)
RBC # BLD AUTO: 3.92 X10(6)/MCL (ref 4.2–5.4)
RBC #/AREA URNS AUTO: ABNORMAL /HPF
RBC UR QL AUTO: NEGATIVE UNIT/L
SAMPLE: ABNORMAL
SITE: ABNORMAL
SODIUM SERPL-SCNC: 131 MMOL/L (ref 136–145)
SODIUM SERPL-SCNC: 135 MMOL/L (ref 136–145)
SP GR UR STRIP.AUTO: 1.02
SQUAMOUS #/AREA URNS AUTO: ABNORMAL /HPF
TROPONIN I SERPL-MCNC: 0.02 NG/ML (ref 0–0.04)
TSH SERPL-ACNC: 1.15 UIU/ML (ref 0.35–4.94)
UROBILINOGEN UR STRIP-ACNC: 0.2 MG/DL
WBC # SPEC AUTO: 9.4 X10(3)/MCL (ref 4.5–11.5)
WBC #/AREA URNS AUTO: ABNORMAL /HPF

## 2022-07-08 PROCEDURE — 82010 KETONE BODYS QUAN: CPT | Performed by: EMERGENCY MEDICINE

## 2022-07-08 PROCEDURE — 96361 HYDRATE IV INFUSION ADD-ON: CPT

## 2022-07-08 PROCEDURE — G0378 HOSPITAL OBSERVATION PER HR: HCPCS

## 2022-07-08 PROCEDURE — 25000003 PHARM REV CODE 250: Performed by: INTERNAL MEDICINE

## 2022-07-08 PROCEDURE — 81025 URINE PREGNANCY TEST: CPT | Performed by: EMERGENCY MEDICINE

## 2022-07-08 PROCEDURE — 99900035 HC TECH TIME PER 15 MIN (STAT)

## 2022-07-08 PROCEDURE — 25000003 PHARM REV CODE 250: Performed by: EMERGENCY MEDICINE

## 2022-07-08 PROCEDURE — 85025 COMPLETE CBC W/AUTO DIFF WBC: CPT | Performed by: EMERGENCY MEDICINE

## 2022-07-08 PROCEDURE — 99291 CRITICAL CARE FIRST HOUR: CPT | Mod: 25

## 2022-07-08 PROCEDURE — 11000001 HC ACUTE MED/SURG PRIVATE ROOM

## 2022-07-08 PROCEDURE — 96372 THER/PROPH/DIAG INJ SC/IM: CPT | Mod: 59 | Performed by: INTERNAL MEDICINE

## 2022-07-08 PROCEDURE — 93005 ELECTROCARDIOGRAM TRACING: CPT

## 2022-07-08 PROCEDURE — 85610 PROTHROMBIN TIME: CPT | Performed by: EMERGENCY MEDICINE

## 2022-07-08 PROCEDURE — 96366 THER/PROPH/DIAG IV INF ADDON: CPT | Mod: 59

## 2022-07-08 PROCEDURE — 94761 N-INVAS EAR/PLS OXIMETRY MLT: CPT

## 2022-07-08 PROCEDURE — 84443 ASSAY THYROID STIM HORMONE: CPT | Performed by: EMERGENCY MEDICINE

## 2022-07-08 PROCEDURE — 96372 THER/PROPH/DIAG INJ SC/IM: CPT | Performed by: EMERGENCY MEDICINE

## 2022-07-08 PROCEDURE — 36556 INSERT NON-TUNNEL CV CATH: CPT | Mod: LT

## 2022-07-08 PROCEDURE — 96375 TX/PRO/DX INJ NEW DRUG ADDON: CPT

## 2022-07-08 PROCEDURE — 63600175 PHARM REV CODE 636 W HCPCS: Performed by: EMERGENCY MEDICINE

## 2022-07-08 PROCEDURE — 83735 ASSAY OF MAGNESIUM: CPT | Performed by: EMERGENCY MEDICINE

## 2022-07-08 PROCEDURE — 36415 COLL VENOUS BLD VENIPUNCTURE: CPT | Performed by: EMERGENCY MEDICINE

## 2022-07-08 PROCEDURE — 87040 BLOOD CULTURE FOR BACTERIA: CPT | Performed by: EMERGENCY MEDICINE

## 2022-07-08 PROCEDURE — 84484 ASSAY OF TROPONIN QUANT: CPT | Performed by: EMERGENCY MEDICINE

## 2022-07-08 PROCEDURE — 82962 GLUCOSE BLOOD TEST: CPT

## 2022-07-08 PROCEDURE — 63600175 PHARM REV CODE 636 W HCPCS: Performed by: INTERNAL MEDICINE

## 2022-07-08 PROCEDURE — 21400001 HC TELEMETRY ROOM

## 2022-07-08 PROCEDURE — 80053 COMPREHEN METABOLIC PANEL: CPT | Performed by: EMERGENCY MEDICINE

## 2022-07-08 PROCEDURE — C9399 UNCLASSIFIED DRUGS OR BIOLOG: HCPCS | Performed by: INTERNAL MEDICINE

## 2022-07-08 PROCEDURE — 82803 BLOOD GASES ANY COMBINATION: CPT

## 2022-07-08 PROCEDURE — 83605 ASSAY OF LACTIC ACID: CPT | Performed by: EMERGENCY MEDICINE

## 2022-07-08 PROCEDURE — 85730 THROMBOPLASTIN TIME PARTIAL: CPT | Performed by: EMERGENCY MEDICINE

## 2022-07-08 PROCEDURE — 96365 THER/PROPH/DIAG IV INF INIT: CPT

## 2022-07-08 PROCEDURE — 81001 URINALYSIS AUTO W/SCOPE: CPT | Performed by: EMERGENCY MEDICINE

## 2022-07-08 RX ORDER — POTASSIUM CHLORIDE 14.9 MG/ML
20 INJECTION INTRAVENOUS
Status: COMPLETED | OUTPATIENT
Start: 2022-07-08 | End: 2022-07-08

## 2022-07-08 RX ORDER — TRAMADOL HYDROCHLORIDE 50 MG/1
50 TABLET ORAL EVERY 6 HOURS PRN
Status: DISCONTINUED | OUTPATIENT
Start: 2022-07-08 | End: 2022-07-10 | Stop reason: HOSPADM

## 2022-07-08 RX ORDER — METOCLOPRAMIDE HYDROCHLORIDE 5 MG/ML
10 INJECTION INTRAMUSCULAR; INTRAVENOUS EVERY 6 HOURS
Status: DISCONTINUED | OUTPATIENT
Start: 2022-07-08 | End: 2022-07-08

## 2022-07-08 RX ORDER — IBUPROFEN 200 MG
16 TABLET ORAL
Status: DISCONTINUED | OUTPATIENT
Start: 2022-07-08 | End: 2022-07-10 | Stop reason: HOSPADM

## 2022-07-08 RX ORDER — SODIUM CHLORIDE 9 MG/ML
1000 INJECTION, SOLUTION INTRAVENOUS
Status: ACTIVE | OUTPATIENT
Start: 2022-07-08 | End: 2022-07-09

## 2022-07-08 RX ORDER — IBUPROFEN 200 MG
24 TABLET ORAL
Status: DISCONTINUED | OUTPATIENT
Start: 2022-07-08 | End: 2022-07-10 | Stop reason: HOSPADM

## 2022-07-08 RX ORDER — LIDOCAINE HYDROCHLORIDE 10 MG/ML
INJECTION INFILTRATION; PERINEURAL
Status: DISPENSED
Start: 2022-07-08 | End: 2022-07-09

## 2022-07-08 RX ORDER — ONDANSETRON 4 MG/1
8 TABLET, ORALLY DISINTEGRATING ORAL
Status: COMPLETED | OUTPATIENT
Start: 2022-07-08 | End: 2022-07-08

## 2022-07-08 RX ORDER — SODIUM CHLORIDE 9 MG/ML
INJECTION, SOLUTION INTRAVENOUS CONTINUOUS
Status: DISCONTINUED | OUTPATIENT
Start: 2022-07-08 | End: 2022-07-10 | Stop reason: HOSPADM

## 2022-07-08 RX ORDER — SODIUM CHLORIDE 0.9 % (FLUSH) 0.9 %
10 SYRINGE (ML) INJECTION
Status: DISCONTINUED | OUTPATIENT
Start: 2022-07-08 | End: 2022-07-10 | Stop reason: HOSPADM

## 2022-07-08 RX ORDER — ONDANSETRON 2 MG/ML
8 INJECTION INTRAMUSCULAR; INTRAVENOUS EVERY 6 HOURS PRN
Status: DISCONTINUED | OUTPATIENT
Start: 2022-07-08 | End: 2022-07-08

## 2022-07-08 RX ORDER — HYDROXYZINE HYDROCHLORIDE 25 MG/1
50 TABLET, FILM COATED ORAL DAILY PRN
Status: DISCONTINUED | OUTPATIENT
Start: 2022-07-08 | End: 2022-07-10 | Stop reason: HOSPADM

## 2022-07-08 RX ORDER — FAMOTIDINE 20 MG/1
20 TABLET, FILM COATED ORAL 2 TIMES DAILY
Status: DISCONTINUED | OUTPATIENT
Start: 2022-07-08 | End: 2022-07-10 | Stop reason: HOSPADM

## 2022-07-08 RX ORDER — DICYCLOMINE HYDROCHLORIDE 10 MG/1
10 CAPSULE ORAL 4 TIMES DAILY
Status: DISCONTINUED | OUTPATIENT
Start: 2022-07-08 | End: 2022-07-10 | Stop reason: HOSPADM

## 2022-07-08 RX ORDER — HYDROMORPHONE HYDROCHLORIDE 2 MG/ML
0.25 INJECTION, SOLUTION INTRAMUSCULAR; INTRAVENOUS; SUBCUTANEOUS
Status: COMPLETED | OUTPATIENT
Start: 2022-07-08 | End: 2022-07-08

## 2022-07-08 RX ORDER — POTASSIUM CHLORIDE 14.9 MG/ML
20 INJECTION INTRAVENOUS
Status: DISCONTINUED | OUTPATIENT
Start: 2022-07-08 | End: 2022-07-08

## 2022-07-08 RX ORDER — METOPROLOL TARTRATE 50 MG/1
50 TABLET ORAL 2 TIMES DAILY
Status: DISCONTINUED | OUTPATIENT
Start: 2022-07-08 | End: 2022-07-10 | Stop reason: HOSPADM

## 2022-07-08 RX ORDER — ONDANSETRON 2 MG/ML
8 INJECTION INTRAMUSCULAR; INTRAVENOUS EVERY 6 HOURS PRN
Status: DISCONTINUED | OUTPATIENT
Start: 2022-07-08 | End: 2022-07-10 | Stop reason: HOSPADM

## 2022-07-08 RX ORDER — ACETAMINOPHEN 10 MG/ML
1000 INJECTION, SOLUTION INTRAVENOUS ONCE
Status: DISCONTINUED | OUTPATIENT
Start: 2022-07-08 | End: 2022-07-08

## 2022-07-08 RX ORDER — POTASSIUM CHLORIDE 7.45 MG/ML
10 INJECTION INTRAVENOUS
Status: DISCONTINUED | OUTPATIENT
Start: 2022-07-08 | End: 2022-07-08

## 2022-07-08 RX ORDER — INSULIN ASPART 100 [IU]/ML
0-5 INJECTION, SOLUTION INTRAVENOUS; SUBCUTANEOUS
Status: DISCONTINUED | OUTPATIENT
Start: 2022-07-08 | End: 2022-07-10 | Stop reason: HOSPADM

## 2022-07-08 RX ORDER — KETOROLAC TROMETHAMINE 30 MG/ML
30 INJECTION, SOLUTION INTRAMUSCULAR; INTRAVENOUS
Status: DISCONTINUED | OUTPATIENT
Start: 2022-07-08 | End: 2022-07-08

## 2022-07-08 RX ORDER — AMITRIPTYLINE HYDROCHLORIDE 25 MG/1
25 TABLET, FILM COATED ORAL NIGHTLY
Status: DISCONTINUED | OUTPATIENT
Start: 2022-07-08 | End: 2022-07-08

## 2022-07-08 RX ORDER — ACETAMINOPHEN 325 MG/1
650 TABLET ORAL EVERY 8 HOURS PRN
Status: DISCONTINUED | OUTPATIENT
Start: 2022-07-08 | End: 2022-07-10 | Stop reason: HOSPADM

## 2022-07-08 RX ORDER — PANTOPRAZOLE SODIUM 40 MG/1
40 TABLET, DELAYED RELEASE ORAL DAILY
Status: DISCONTINUED | OUTPATIENT
Start: 2022-07-09 | End: 2022-07-10 | Stop reason: HOSPADM

## 2022-07-08 RX ORDER — HYDROMORPHONE HYDROCHLORIDE 2 MG/ML
1 INJECTION, SOLUTION INTRAMUSCULAR; INTRAVENOUS; SUBCUTANEOUS
Status: COMPLETED | OUTPATIENT
Start: 2022-07-08 | End: 2022-07-08

## 2022-07-08 RX ORDER — LISINOPRIL 20 MG/1
20 TABLET ORAL 2 TIMES DAILY
Status: DISCONTINUED | OUTPATIENT
Start: 2022-07-08 | End: 2022-07-08

## 2022-07-08 RX ORDER — SODIUM CHLORIDE 9 MG/ML
1000 INJECTION, SOLUTION INTRAVENOUS
Status: COMPLETED | OUTPATIENT
Start: 2022-07-08 | End: 2022-07-08

## 2022-07-08 RX ORDER — GLUCAGON 1 MG
1 KIT INJECTION
Status: DISCONTINUED | OUTPATIENT
Start: 2022-07-08 | End: 2022-07-10 | Stop reason: HOSPADM

## 2022-07-08 RX ORDER — METOCLOPRAMIDE 10 MG/1
10 TABLET ORAL
Status: DISCONTINUED | OUTPATIENT
Start: 2022-07-08 | End: 2022-07-10 | Stop reason: HOSPADM

## 2022-07-08 RX ADMIN — POTASSIUM CHLORIDE 20 MEQ: 14.9 INJECTION, SOLUTION INTRAVENOUS at 06:07

## 2022-07-08 RX ADMIN — SODIUM CHLORIDE: 9 INJECTION, SOLUTION INTRAVENOUS at 05:07

## 2022-07-08 RX ADMIN — SODIUM CHLORIDE: 9 INJECTION, SOLUTION INTRAVENOUS at 09:07

## 2022-07-08 RX ADMIN — POTASSIUM BICARBONATE 20 MEQ: 782 TABLET, EFFERVESCENT ORAL at 01:07

## 2022-07-08 RX ADMIN — HYDROMORPHONE HYDROCHLORIDE 1 MG: 2 INJECTION, SOLUTION INTRAMUSCULAR; INTRAVENOUS; SUBCUTANEOUS at 12:07

## 2022-07-08 RX ADMIN — ONDANSETRON 8 MG: 4 TABLET, ORALLY DISINTEGRATING ORAL at 12:07

## 2022-07-08 RX ADMIN — POTASSIUM CHLORIDE 10 MEQ: 7.46 INJECTION, SOLUTION INTRAVENOUS at 01:07

## 2022-07-08 RX ADMIN — DICYCLOMINE HYDROCHLORIDE 10 MG: 10 CAPSULE ORAL at 09:07

## 2022-07-08 RX ADMIN — METOPROLOL TARTRATE 50 MG: 50 TABLET, FILM COATED ORAL at 09:07

## 2022-07-08 RX ADMIN — SODIUM CHLORIDE 2000 ML: 9 INJECTION, SOLUTION INTRAVENOUS at 10:07

## 2022-07-08 RX ADMIN — TRAMADOL HYDROCHLORIDE 50 MG: 50 TABLET, COATED ORAL at 10:07

## 2022-07-08 RX ADMIN — HYDROXYZINE HYDROCHLORIDE 50 MG: 25 TABLET ORAL at 05:07

## 2022-07-08 RX ADMIN — HYDROMORPHONE HYDROCHLORIDE 0.25 MG: 2 INJECTION, SOLUTION INTRAMUSCULAR; INTRAVENOUS; SUBCUTANEOUS at 03:07

## 2022-07-08 RX ADMIN — DICYCLOMINE HYDROCHLORIDE 10 MG: 10 CAPSULE ORAL at 05:07

## 2022-07-08 RX ADMIN — SODIUM CHLORIDE 1000 ML: 9 INJECTION, SOLUTION INTRAVENOUS at 02:07

## 2022-07-08 RX ADMIN — METOCLOPRAMIDE 10 MG: 10 TABLET ORAL at 05:07

## 2022-07-08 RX ADMIN — INSULIN DETEMIR 20 UNITS: 100 INJECTION, SOLUTION SUBCUTANEOUS at 09:07

## 2022-07-08 RX ADMIN — INSULIN ASPART 1 UNITS: 100 INJECTION, SOLUTION INTRAVENOUS; SUBCUTANEOUS at 09:07

## 2022-07-08 RX ADMIN — POTASSIUM CHLORIDE 20 MEQ: 14.9 INJECTION, SOLUTION INTRAVENOUS at 04:07

## 2022-07-08 RX ADMIN — FAMOTIDINE 20 MG: 20 TABLET ORAL at 09:07

## 2022-07-08 RX ADMIN — METOCLOPRAMIDE 10 MG: 10 TABLET ORAL at 09:07

## 2022-07-08 NOTE — H&P
[facility] - Medical Surgical Unit  [facility] Medicine  History & Physical    Patient Name: Dorene Husain  MRN: [id number]  Patient Class: IP- Inpatient  Admission Date: 7/8/2022  Attending Physician: Sergio Hidalgo MD   Primary Care Provider: Kumar Ortiz NP         Patient information was obtained from patient, past medical records and ER records.     Subjective:     Principal Problem:Hypokalemia    Chief Complaint:   Chief Complaint   Patient presents with    Emesis     Pt c/o vomiting and pain to entire back from fall on month agp. Tx here 3 days ago for same complaint.        HPI: 25yo BF with h/o IDDM, Gastroparesis, Chronic Pain presented to ED with several day h/o N/V  and severe back pain that is chronic. She was seen in ED 8 days ago for same complaints. CT Spine didn't not show any acute abnormalities. She was found to be dehydrated and hypokalemic in ED. Admitted for IVF, electrolyte replacement and symptom control. She will not be given any narcotic analgesics.      Past Medical History:   Diagnosis Date    Diabetes mellitus     Diabetic gastroparesis associated with type 1 diabetes mellitus     DKA (diabetic ketoacidosis)     DKA (diabetic ketoacidosis)     DKA (diabetic ketoacidosis)     Gastroparesis     Gastroparesis due to DM     Hypertension     Ketoacidosis due to diabetes     Non compliance with medical treatment     Pneumonia     Secondary DM with DKA        Past Surgical History:   Procedure Laterality Date    CHOLECYSTECTOMY      ESOPHAGOGASTRODUODENOSCOPY      Multiple    None         Review of patient's allergies indicates:  No Known Allergies    No current facility-administered medications on file prior to encounter.     Current Outpatient Medications on File Prior to Encounter   Medication Sig    amitriptyline (ELAVIL) 25 MG tablet Take 25 mg by mouth nightly.    BASAGLAR KWIKPEN U-100 INSULIN glargine 100 units/mL (3mL) SubQ pen Inject into the skin.     dicyclomine (BENTYL) 10 MG capsule Take 10 mg by mouth 4 (four) times daily.    hydrOXYzine (ATARAX) 50 MG tablet Take 50 mg by mouth daily as needed.    insulin lispro (HUMALOG U-100 INSULIN) 100 unit/mL Crtg Inject into the skin.    lisinopriL (PRINIVIL,ZESTRIL) 20 MG tablet Take 20 mg by mouth 2 (two) times daily.    metoclopramide HCl (REGLAN) 5 MG tablet Take 5 mg by mouth 4 (four) times daily.    metoprolol tartrate (LOPRESSOR) 50 MG tablet Take 50 mg by mouth 2 (two) times daily.    naproxen (NAPROSYN) 500 MG tablet Take 1 tablet (500 mg total) by mouth 2 (two) times daily with meals. for 15 days    olmesartan (BENICAR) 40 MG tablet Take 40 mg by mouth once daily.    ondansetron (ZOFRAN) 4 MG tablet Take 4 mg by mouth every 6 (six) hours as needed.    pantoprazole (PROTONIX) 40 MG tablet Take 40 mg by mouth once daily.     Family History       Problem Relation (Age of Onset)    Cancer Father    Diabetes Mother    Heart disease Mother    Hyperlipidemia Mother, Father    Hypertension Mother, Father    Stroke Father          Tobacco Use    Smoking status: Never Smoker    Smokeless tobacco: Never Used   Substance and Sexual Activity    Alcohol use: Never    Drug use: Yes     Types: Marijuana    Sexual activity: Yes       Review of Systems: 10 systems reviewed all pertinent positives and negatives stated in HPI, otherwise negative.        Objective:     Vital Signs (Most Recent):  Temp: 98.1 °F (36.7 °C) (07/08/22 1616)  Pulse: 82 (07/08/22 1616)  Resp: 16 (07/08/22 1616)  BP: (!) 158/111 (07/08/22 1616)  SpO2: 100 % (07/08/22 1616)   Vital Signs (24h Range):  Temp:  [98.1 °F (36.7 °C)-98.8 °F (37.1 °C)] 98.1 °F (36.7 °C)  Pulse:  [] 82  Resp:  [14-20] 16  SpO2:  [99 %-100 %] 100 %  BP: (130-165)/() 158/111     Weight: 65.3 kg (143 lb 15.4 oz)  Body mass index is 26.33 kg/m².    Physical Exam  Constitutional:       Appearance: Normal appearance.   HENT:      Head: Normocephalic and  atraumatic.      Nose: Nose normal.      Mouth/Throat:      Mouth: Mucous membranes are dry.      Pharynx: Oropharynx is clear.   Eyes:      Extraocular Movements: Extraocular movements intact.      Pupils: Pupils are equal, round, and reactive to light.   Cardiovascular:      Rate and Rhythm: Normal rate and regular rhythm.   Pulmonary:      Effort: Pulmonary effort is normal.      Breath sounds: Normal breath sounds.   Abdominal:      General: Abdomen is flat. Bowel sounds are normal.      Palpations: Abdomen is soft.   Musculoskeletal:         General: No swelling.      Cervical back: Normal range of motion.      Right lower leg: No edema.      Left lower leg: No edema.   Skin:     General: Skin is warm and dry.   Neurological:      General: No focal deficit present.      Mental Status: She is alert. Mental status is at baseline.   Psychiatric:         Attention and Perception: Attention normal.         Mood and Affect: Mood is depressed. Affect is labile and tearful.         Speech: Speech normal.         Behavior: Behavior normal.          Significant Labs: All pertinent labs within the past 24 hours have been reviewed.  CBC:   Recent Labs   Lab 07/08/22  1054   WBC 9.4   HGB 10.8*   HCT 32.6*   *     CMP:   Recent Labs   Lab 07/08/22  1054 07/08/22  1445   * 135*   K 2.7* 2.7*   CO2 30* 27   BUN 25.0* 24.0*   CREATININE 1.71* 1.39*   CALCIUM 9.6 8.0*   ALBUMIN 3.6  --    BILITOT 0.9  --    ALKPHOS 85  --    AST 14  --    ALT 9  --          Significant Imaging: I have reviewed all pertinent imaging results/findings within the past 24 hours.    Assessment/Plan:     Problem Noted   Hypokalemia 5/25/2022   Alvarez (Acute Kidney Injury) 5/21/2022   Hypertension    Drug-Seeking Behavior 6/30/2022   Hyperglycemia Due to Type 1 Diabetes Mellitus 5/31/2022   Chronic Pain 5/31/2022   DM Gastroparesis 5/25/2022       - IVF  - replace lytes  - Reglan  - home meds  - am labs  - no narcotics          VTE Risk  Mitigation (From admission, onward)         Ordered     Place sequential compression device  Until discontinued         07/08/22 1534     IP VTE LOW RISK PATIENT  Once         07/08/22 1534                   Sergio Hidalgo MD  Department of Hospital Medicine   [facility] - [facility] Surgical [facility]

## 2022-07-08 NOTE — SUBJECTIVE & OBJECTIVE
Past Medical History:   Diagnosis Date    Diabetes mellitus     Diabetic gastroparesis associated with type 1 diabetes mellitus     DKA (diabetic ketoacidosis)     DKA (diabetic ketoacidosis)     DKA (diabetic ketoacidosis)     Gastroparesis     Gastroparesis due to DM     Hypertension     Ketoacidosis due to diabetes     Non compliance with medical treatment     Pneumonia     Secondary DM with DKA        Past Surgical History:   Procedure Laterality Date    CHOLECYSTECTOMY      ESOPHAGOGASTRODUODENOSCOPY      Multiple    None         Review of patient's allergies indicates:  No Known Allergies    No current facility-administered medications on file prior to encounter.     Current Outpatient Medications on File Prior to Encounter   Medication Sig    amitriptyline (ELAVIL) 25 MG tablet Take 25 mg by mouth nightly.    BASAGLAR KWIKPEN U-100 INSULIN glargine 100 units/mL (3mL) SubQ pen Inject into the skin.    dicyclomine (BENTYL) 10 MG capsule Take 10 mg by mouth 4 (four) times daily.    hydrOXYzine (ATARAX) 50 MG tablet Take 50 mg by mouth daily as needed.    insulin lispro (HUMALOG U-100 INSULIN) 100 unit/mL Crtg Inject into the skin.    lisinopriL (PRINIVIL,ZESTRIL) 20 MG tablet Take 20 mg by mouth 2 (two) times daily.    metoclopramide HCl (REGLAN) 5 MG tablet Take 5 mg by mouth 4 (four) times daily.    metoprolol tartrate (LOPRESSOR) 50 MG tablet Take 50 mg by mouth 2 (two) times daily.    naproxen (NAPROSYN) 500 MG tablet Take 1 tablet (500 mg total) by mouth 2 (two) times daily with meals. for 15 days    olmesartan (BENICAR) 40 MG tablet Take 40 mg by mouth once daily.    ondansetron (ZOFRAN) 4 MG tablet Take 4 mg by mouth every 6 (six) hours as needed.    pantoprazole (PROTONIX) 40 MG tablet Take 40 mg by mouth once daily.     Family History       Problem Relation (Age of Onset)    Cancer Father    Diabetes Mother    Heart disease Mother    Hyperlipidemia Mother, Father    Hypertension Mother, Father    Stroke  Father          Tobacco Use    Smoking status: Never Smoker    Smokeless tobacco: Never Used   Substance and Sexual Activity    Alcohol use: Never    Drug use: Yes     Types: Marijuana    Sexual activity: Yes       Review of Systems: 10 systems reviewed all pertinent positives and negatives stated in HPI, otherwise negative.        Objective:     Vital Signs (Most Recent):  Temp: 98.1 °F (36.7 °C) (07/08/22 1616)  Pulse: 82 (07/08/22 1616)  Resp: 16 (07/08/22 1616)  BP: (!) 158/111 (07/08/22 1616)  SpO2: 100 % (07/08/22 1616)   Vital Signs (24h Range):  Temp:  [98.1 °F (36.7 °C)-98.8 °F (37.1 °C)] 98.1 °F (36.7 °C)  Pulse:  [] 82  Resp:  [14-20] 16  SpO2:  [99 %-100 %] 100 %  BP: (130-165)/() 158/111     Weight: 65.3 kg (143 lb 15.4 oz)  Body mass index is 26.33 kg/m².    Physical Exam  Constitutional:       Appearance: Normal appearance.   HENT:      Head: Normocephalic and atraumatic.      Nose: Nose normal.      Mouth/Throat:      Mouth: Mucous membranes are dry.      Pharynx: Oropharynx is clear.   Eyes:      Extraocular Movements: Extraocular movements intact.      Pupils: Pupils are equal, round, and reactive to light.   Cardiovascular:      Rate and Rhythm: Normal rate and regular rhythm.   Pulmonary:      Effort: Pulmonary effort is normal.      Breath sounds: Normal breath sounds.   Abdominal:      General: Abdomen is flat. Bowel sounds are normal.      Palpations: Abdomen is soft.   Musculoskeletal:         General: No swelling.      Cervical back: Normal range of motion.      Right lower leg: No edema.      Left lower leg: No edema.   Skin:     General: Skin is warm and dry.   Neurological:      General: No focal deficit present.      Mental Status: She is alert. Mental status is at baseline.   Psychiatric:         Attention and Perception: Attention normal.         Mood and Affect: Mood is depressed. Affect is labile and tearful.         Speech: Speech normal.         Behavior: Behavior  normal.          Significant Labs: All pertinent labs within the past 24 hours have been reviewed.  CBC:   Recent Labs   Lab 07/08/22  1054   WBC 9.4   HGB 10.8*   HCT 32.6*   *     CMP:   Recent Labs   Lab 07/08/22  1054 07/08/22  1445   * 135*   K 2.7* 2.7*   CO2 30* 27   BUN 25.0* 24.0*   CREATININE 1.71* 1.39*   CALCIUM 9.6 8.0*   ALBUMIN 3.6  --    BILITOT 0.9  --    ALKPHOS 85  --    AST 14  --    ALT 9  --          Significant Imaging: I have reviewed all pertinent imaging results/findings within the past 24 hours.

## 2022-07-08 NOTE — ED PROVIDER NOTES
Encounter Date: 2022       History     Chief Complaint   Patient presents with    Emesis     Pt c/o vomiting and pain to entire back from fall on month agp. Tx here 3 days ago for same complaint.      26-year-old female who presents to emergency department complaining of nausea and vomiting and severe low back pain severe low back pain is chronic.  She has had back pain off and on sciatica for apparently a very long time.  She had a fall sometime in the past she was here on the 30th a days ago had a CT scan of her back which did not show any obvious fractures or dislocations.  She had nausea and vomiting at that time also.  She has had diabetic ketoacidosis in the past.  She is a type 1 diabetic.  She has diabetic gastroparesis.    The patient says her pain is terrible in her back right now she can not hold anything down.  Apparently this has gotten worse but both are chronic.    Family history Mom is alive healthy dad alive healthy ( this is the history the patient gives me it is markedly different than what is listed in the chart from previous evaluations)     Social history said she is single lives with her father does not do tobacco alcohol or drugs is employed.      Vaccinations said she has had 2 COVID vaccine but no boost yet she did have a flu shot  tetanus is less than 5 years no pneumonia vaccines.    Family physician Dr. Kumar Ortiz.  At the [facility].     0 para 0 menstrual cycle 1 week ago.    Surgical history cholecystectomy.    Medical history diabetes type 1 diabetic gastroparesis, episodes of DKA, chronic back pain with and without sciatica @ times        Review of patient's allergies indicates:  No Known Allergies  Past Medical History:   Diagnosis Date    Diabetes mellitus     Diabetic gastroparesis associated with type 1 diabetes mellitus     DKA (diabetic ketoacidosis)     DKA (diabetic ketoacidosis)     DKA (diabetic ketoacidosis)     Gastroparesis     Gastroparesis  due to DM     Hypertension     Ketoacidosis due to diabetes     Non compliance with medical treatment     Pneumonia     Secondary DM with DKA      Past Surgical History:   Procedure Laterality Date    CHOLECYSTECTOMY      ESOPHAGOGASTRODUODENOSCOPY      Multiple    None       Family History   Problem Relation Age of Onset    Heart disease Mother     Hypertension Mother     Diabetes Mother     Hyperlipidemia Mother     Cancer Father     Stroke Father     Hypertension Father     Hyperlipidemia Father      Social History     Tobacco Use    Smoking status: Never Smoker    Smokeless tobacco: Never Used   Substance Use Topics    Alcohol use: Never    Drug use: Yes     Types: Marijuana     Review of Systems   Constitutional: Negative for activity change, appetite change and unexpected weight change.   HENT: Negative.    Eyes: Negative.    Respiratory: Negative.    Cardiovascular: Negative.    Gastrointestinal: Positive for nausea and vomiting.   Endocrine: Negative.    Genitourinary: Negative.    Musculoskeletal: Positive for back pain.   Skin: Negative.    Allergic/Immunologic: Negative.    Neurological: Negative.    Hematological: Negative.    Psychiatric/Behavioral: Negative.    All other systems reviewed and are negative.      Physical Exam     Initial Vitals [07/08/22 1025]   BP Pulse Resp Temp SpO2   (!) 130/91 102 18 98.8 °F (37.1 °C) 100 %      MAP       --         Physical Exam    Nursing note and vitals reviewed.  Constitutional: She appears well-developed and well-nourished. She appears distressed.   Patient has a rather round moon type face.  She is awake she is oriented.  She is obviously uncomfortable  She is not toxic-appearing Her story varies  originally  did go see her doctor then she went 2 weeks ago.  But she has not gone seen him since she was evaluated here on the 30th not in the past 8 days.   HENT:   Head: Normocephalic and atraumatic.   Right Ear: Tympanic membrane and external  ear normal.   Left Ear: Tympanic membrane and external ear normal.   Nose: Nose normal.   Mouth/Throat: Oropharynx is clear and moist and mucous membranes are normal.   Eyes: Conjunctivae and EOM are normal. Pupils are equal, round, and reactive to light.   Neck: Neck supple. No thyromegaly present. No tracheal deviation present. No JVD present.   Normal range of motion.  Cardiovascular: Normal rate, regular rhythm and normal heart sounds. Exam reveals no gallop.    No murmur heard.  Pulmonary/Chest: Breath sounds normal. No stridor. No respiratory distress. She has no wheezes. She has no rhonchi. She has no rales. She exhibits no tenderness.   Abdominal: Abdomen is soft. Bowel sounds are normal. She exhibits no distension and no mass. There is no abdominal tenderness.   Genitourinary:    Genitourinary Comments: Patient is without CVA tenderness on either side     Musculoskeletal:         General: No tenderness. Normal range of motion.      Cervical back: Normal range of motion and neck supple.      Comments: She has tenderness to even the slightest touch in her lower back there is no obvious gross step-off there is no CVA tenderness     Lymphadenopathy:     She has no cervical adenopathy.   Neurological: She is alert and oriented to person, place, and time. She has normal strength and normal reflexes. No cranial nerve deficit. GCS score is 15. GCS eye subscore is 4. GCS verbal subscore is 5. GCS motor subscore is 6.   Skin: Skin is warm and dry. Capillary refill takes 2 to 3 seconds. No rash noted.   Psychiatric: She has a normal mood and affect. Her behavior is normal. Judgment and thought content normal.         ED Course   Central Line    Date/Time: 7/8/2022 1:03 PM  Performed by: Danilo العلي MD  Authorized by: Danilo العلي MD     Location procedure was performed:  [facility] EMERGENCY DEPARTMENT  Pre-operative diagnosis:  Dehyration  Post-operative diagnosis:  Same  Consent Done ?:  Yes  Time out complete?:  Verified correct patient, procedure, equipment, staff, and site/side    Indications:  Vascular access and med administration  Anesthesia:  Local infiltration  Local anesthetic:  Lidocaine 1% without epinephrine  Anesthetic total (ml):  10  Description of findings:  Chest x-ray is pending  Preparation:  Skin prepped with ChloraPrep  Skin prep agent dried: Skin prep agent completely dried prior to procedure    Sterile barriers: All five maximal sterile barriers used - gloves, gown, cap, mask and large sterile sheet    Hand hygiene: Hand hygiene performed immediately prior to central venous catheter insertion    Location:  Left subclavian  Catheter type:  Triple lumen  Catheter size:  7 Fr  Inserted Catheter Length (cm):  15  Ultrasound guidance: No    Manometry: No    Number of attempts:  3  Securement:  Line sutured and sterile dressing applied  Technical Procedures Used:  Standard Seldinger technique was used.  The 1st 2 attempts vessel was cannulated with a needle in the water each time.  But due to thick fibrous tissue.  The dilator would not pass within unable to insert central line on 3rd attempt.  The dilator passed and we were able to insert the central line  Estimated blood loss (mL):  7  XRay:  Placement verified by x-ray, no pneumothorax on x-ray and successful placement  Adverse Events:  None  Other Complications:  The 1st 2 attempts were complicated by thick fibrous tissue.  On the third attempt the dilator to be inserted.   The 1st 2 attempts were complicated by thick fibrous tissue.  On the third attempt the dilator to be inserted.Termination Site: superior vena cava    Critical Care    Date/Time: 7/8/2022 3:41 PM  Performed by: Danilo العلي MD  Authorized by: Danilo العلي MD   Direct patient critical care time: 30 minutes  Documentation critical care time: 6 minutes  Consulting other physicians critical care time: 6 minutes  Total critical care time (exclusive of procedural time) : 42  minutes  Critical care time was exclusive of separately billable procedures and treating other patients.  Critical care was necessary to treat or prevent imminent or life-threatening deterioration of the following conditions: dehydration.        Recent Results (from the past 6 hour(s))   Urinalysis, Reflex to Urine Culture Urine, Clean Catch    Collection Time: 07/08/22 10:38 AM    Specimen: Urine   Result Value Ref Range    Color, UA Yellow Yellow, Colorless, Other, Clear    Appearance, UA Slightly Cloudy (A) Clear    Specific Gravity, UA 1.025     pH, UA 5.5 5.0, 5.5, 6.0, 6.5, 7.0, 7.5, 8.0, 8.5    Protein, UA >=300 (A) Negative, 300  mg/dL    Glucose,   (A) Negative, Normal mg/dL    Ketones, UA 15  (A) Negative, +1, +2, +3, +4, +5, >=160, >=80 mg/dL    Blood, UA Negative Negative unit/L    Bilirubin, UA Negative Negative mg/dL    Urobilinogen, UA 0.2 0.2, 1.0, Normal mg/dL    Nitrites, UA Negative Negative    Leukocyte Esterase, UA Negative Negative, 75  unit/L   Pregnancy, urine rapid    Collection Time: 07/08/22 10:38 AM   Result Value Ref Range    Beta hCG Qualitative, Urine Negative Negative   Urinalysis, Microscopic    Collection Time: 07/08/22 10:38 AM   Result Value Ref Range    Bacteria, UA Few (A) None Seen, Rare, Occasional /HPF    RBC, UA None Seen None Seen, 0-2, 3-5, 0-5 /HPF    WBC, UA None Seen None Seen, 0-2, 3-5, 0-5 /HPF    Squamous Epithelial Cells, UA Many (A) None Seen, Rare, Occasional, Occ /HPF   Comprehensive metabolic panel    Collection Time: 07/08/22 10:54 AM   Result Value Ref Range    Sodium Level 131 (L) 136 - 145 mmol/L    Potassium Level 2.7 (LL) 3.5 - 5.1 mmol/L    Chloride 86 (L) 98 - 107 mmol/L    Carbon Dioxide 30 (H) 22 - 29 mmol/L    Glucose Level 428 (H) 74 - 100 mg/dL    Blood Urea Nitrogen 25.0 (H) 7.0 - 18.7 mg/dL    Creatinine 1.71 (H) 0.55 - 1.02 mg/dL    Calcium Level Total 9.6 8.4 - 10.2 mg/dL    Protein Total 7.2 6.4 - 8.3 gm/dL    Albumin Level 3.6 3.5 - 5.0  gm/dL    Globulin 3.6 (H) 2.4 - 3.5 gm/dL    Albumin/Globulin Ratio 1.0 (L) 1.1 - 2.0 ratio    Bilirubin Total 0.9 <=1.5 mg/dL    Alkaline Phosphatase 85 40 - 150 unit/L    Alanine Aminotransferase 9 0 - 55 unit/L    Aspartate Aminotransferase 14 5 - 34 unit/L    Estimated GFR- 46 mls/min/1.73/m2   Lactic acid, plasma    Collection Time: 07/08/22 10:54 AM   Result Value Ref Range    Lactic Acid Level 1.5 0.5 - 2.2 mmol/L   Protime-INR    Collection Time: 07/08/22 10:54 AM   Result Value Ref Range    PT 13.8 12.5 - 14.5 seconds    INR 1.06 0.00 - 1.30   APTT    Collection Time: 07/08/22 10:54 AM   Result Value Ref Range    PTT 29.5 23.2 - 33.7 seconds   TSH    Collection Time: 07/08/22 10:54 AM   Result Value Ref Range    Thyroid Stimulating Hormone 1.1516 0.3500 - 4.9400 uIU/mL   Troponin I    Collection Time: 07/08/22 10:54 AM   Result Value Ref Range    Troponin-I 0.021 0.000 - 0.045 ng/mL   Magnesium    Collection Time: 07/08/22 10:54 AM   Result Value Ref Range    Magnesium Level 1.70 1.60 - 2.60 mg/dL   CBC with Differential    Collection Time: 07/08/22 10:54 AM   Result Value Ref Range    WBC 9.4 4.5 - 11.5 x10(3)/mcL    RBC 3.92 (L) 4.20 - 5.40 x10(6)/mcL    Hgb 10.8 (L) 12.0 - 16.0 gm/dL    Hct 32.6 (L) 37.0 - 47.0 %    MCV 83.2 80.0 - 94.0 fL    MCH 27.6 27.0 - 31.0 pg    MCHC 33.1 33.0 - 36.0 mg/dL    RDW 12.8 11.5 - 17.0 %    Platelet 568 (H) 130 - 400 x10(3)/mcL    MPV 9.0 7.4 - 10.4 fL    Neut % 70.4 %    Lymph % 20.9 %    Mono % 7.4 %    Eos % 0.4 %    Basophil % 0.4 %    Lymph # 1.96 0.6 - 4.6 x10(3)/mcL    Neut # 6.6 2.1 - 9.2 x10(3)/mcL    Mono # 0.69 0.1 - 1.3 x10(3)/mcL    Eos # 0.04 0 - 0.9 x10(3)/mcL    Baso # 0.04 0 - 0.2 x10(3)/mcL    IG# 0.05 (H) 0 - 0.04 x10(3)/mcL    IG% 0.5 %   ISTAT PROCEDURE    Collection Time: 07/08/22  2:36 PM   Result Value Ref Range    POC PH 7.433 7.35 - 7.45    POC PCO2 43.5 35 - 45 mmHg    POC PO2 32 (L) 40 - 60 mmHg    POC HCO3 29.1 (H) 24 - 28  mmol/L    POC BE 5 -2 to 2 mmol/L    POC SATURATED O2 62 (L) 95 - 100 %    POC TCO2 30 (H) 24 - 29 mmol/L    Sample VENOUS     Site Other     Allens Test N/A     DelSys Room Air    Basic metabolic panel    Collection Time: 07/08/22  2:45 PM   Result Value Ref Range    Sodium Level 135 (L) 136 - 145 mmol/L    Potassium Level 2.7 (LL) 3.5 - 5.1 mmol/L    Chloride 94 (L) 98 - 107 mmol/L    Carbon Dioxide 27 22 - 29 mmol/L    Glucose Level 339 (H) 74 - 100 mg/dL    Blood Urea Nitrogen 24.0 (H) 7.0 - 18.7 mg/dL    Creatinine 1.39 (H) 0.55 - 1.02 mg/dL    BUN/Creatinine Ratio 17     Calcium Level Total 8.0 (L) 8.4 - 10.2 mg/dL    Estimated GFR- 59 mls/min/1.73/m2    Anion Gap 14.0 mEq/L       Labs Reviewed   COMPREHENSIVE METABOLIC PANEL - Abnormal; Notable for the following components:       Result Value    Sodium Level 131 (*)     Potassium Level 2.7 (*)     Chloride 86 (*)     Carbon Dioxide 30 (*)     Glucose Level 428 (*)     Blood Urea Nitrogen 25.0 (*)     Creatinine 1.71 (*)     Globulin 3.6 (*)     Albumin/Globulin Ratio 1.0 (*)     All other components within normal limits   URINALYSIS, REFLEX TO URINE CULTURE - Abnormal; Notable for the following components:    Appearance, UA Slightly Cloudy (*)     Protein, UA >=300 (*)     Glucose,   (*)     Ketones, UA 15  (*)     All other components within normal limits   CBC WITH DIFFERENTIAL - Abnormal; Notable for the following components:    RBC 3.92 (*)     Hgb 10.8 (*)     Hct 32.6 (*)     Platelet 568 (*)     IG# 0.05 (*)     All other components within normal limits   URINALYSIS, MICROSCOPIC - Abnormal; Notable for the following components:    Bacteria, UA Few (*)     Squamous Epithelial Cells, UA Many (*)     All other components within normal limits   BASIC METABOLIC PANEL - Abnormal; Notable for the following components:    Sodium Level 135 (*)     Potassium Level 2.7 (*)     Chloride 94 (*)     Glucose Level 339 (*)     Blood Urea Nitrogen  24.0 (*)     Creatinine 1.39 (*)     Calcium Level Total 8.0 (*)     All other components within normal limits   ISTAT PROCEDURE - Abnormal; Notable for the following components:    POC PO2 32 (*)     POC HCO3 29.1 (*)     POC SATURATED O2 62 (*)     POC TCO2 30 (*)     All other components within normal limits   PREGNANCY TEST, URINE RAPID - Normal   LACTIC ACID, PLASMA - Normal   PROTIME-INR - Normal   APTT - Normal   TSH - Normal   TROPONIN I - Normal   MAGNESIUM - Normal   BLOOD CULTURE OLG   CBC W/ AUTO DIFFERENTIAL    Narrative:     The following orders were created for panel order CBC auto differential.  Procedure                               Abnormality         Status                     ---------                               -----------         ------                     CBC with Differential[[id number]]        Abnormal            Final result                 Please view results for these tests on the individual orders.   BETA - HYDROXYBUTYRATE, SERUM   POCT GLUCOSE, HAND-HELD DEVICE     EKG Readings: (Independently Interpreted)   EKG is done at 10:48 a.m. 103 beats per minute sinus tachycardia with left atrial enlargement QT is prolonged at 547 milliseconds.  There is no ST segment elevation or changes there is a poor baseline the patient is having a very hard time holding still.     ECG Results          EKG 12-lead (In process)  Result time 07/08/22 14:07:27    In process by Interface, Lab In [facility] (07/08/22 14:07:27)                 Narrative:    Test Reason : R11.2,    Vent. Rate : 103 BPM     Atrial Rate : 103 BPM     P-R Int : 126 ms          QRS Dur : 072 ms      QT Int : 418 ms       P-R-T Axes : 061 055 037 degrees     QTc Int : 547 ms    Sinus tachycardia  Possible Left atrial enlargement  Prolonged QT  Abnormal ECG  When compared with ECG of 30-MAY-2022 12:23,  No significant change was found    Referred By: [email or web address]   SELF           Confirmed By:                             Vitals:     07/08/22 1214 07/08/22 1445 07/08/22 1458 07/08/22 1527   BP:  (!) 149/118     BP Location:       Patient Position:       Pulse: 90 91 86    Resp:  16 20 18   Temp:       SpO2: 100% 99% 99%    Weight:       Height:         Imaging Results          X-Ray Chest AP Portable (Final result)  Result time 07/08/22 13:42:49    Final result by Lauro Bravo MD (07/08/22 13:42:49)                 Impression:      No acute pulmonary process identified.      Electronically signed by: Lauro Bravo  Date:    07/08/2022  Time:    13:42             Narrative:    EXAMINATION:  XR CHEST AP PORTABLE    CLINICAL HISTORY:  Encounter for adjustment and management of vascular access device    TECHNIQUE:  Frontal view(s) of the chest.    COMPARISON:  Radiography 05/30/2022    FINDINGS:  Left-sided central venous catheter tip overlies the proximal SVC.  Normal cardiac silhouette.  The lungs are well-inflated.  No consolidation identified.  No significant pleural effusion or discernible pneumothorax.                    ED Interpretation by Danilo العلي MD (07/08/22 13:27:59, [facility]  Emergency Dept, Emergency Medicine)    No ptx in SVC                               Medications   LIDOcaine HCL 10 mg/ml (1%) 10 mg/mL (1 %) injection (has no administration in time range)   potassium chloride 10 mEq in 100 mL IVPB (10 mEq Intravenous New Bag 7/8/22 1330)   sodium chloride 0.9% flush 10 mL (has no administration in time range)   acetaminophen tablet 650 mg (has no administration in time range)   traMADoL tablet 50 mg (has no administration in time range)   famotidine tablet 20 mg (has no administration in time range)   ondansetron injection 8 mg (has no administration in time range)   potassium chloride 20 mEq in 100 mL IVPB (FOR CENTRAL LINE ADMINISTRATION ONLY) (has no administration in time range)   0.9%  NaCl infusion (has no administration in time range)     Followed by   0.9%  NaCl infusion (has no administration in  time range)   sodium chloride 0.9% bolus 2,000 mL (0 mLs Intravenous Stopped 7/8/22 1245)   ondansetron disintegrating tablet 8 mg (8 mg Oral Given 7/8/22 1200)   HYDROmorphone (PF) injection 1 mg (1 mg Intramuscular Given 7/8/22 1200)   potassium bicarbonate disintegrating tablet 20 mEq (20 mEq Oral Given 7/8/22 1315)   0.9%  NaCl infusion (1,000 mLs Intravenous New Bag 7/8/22 1445)   HYDROmorphone (PF) injection 0.25 mg (0.25 mg Intravenous Given 7/8/22 1530)     Medical Decision Making:   Initial Assessment:   DKA dehydration malingering diabetic gastroparesis  Differential Diagnosis:   DKA, malingering, diabetic gastroparesis, nausea vomiting, electrolyte disturbances.             ED Course as of 07/08/22 1543   Fri Jul 08, 2022   1529 Hypokalemia still present I talked to Dr. Hidalgo T  on-call for hospitalist service patient is admitted we will continue hydration through the left subclavian line will continue potassium replacement [DM]      ED Course User Index  [DM] Danilo العلي MD             Clinical Impression:   Final diagnoses:  [R11.2] Nausea & vomiting  [Z45.2] Encounter for central line placement  [E87.6] Hypokalemia (Primary)  [E10.69] Type 1 diabetes mellitus with other specified complication  [E86.0] Dehydration, moderate  [E11.43, K31.84] Diabetic gastroparesis  [M54.9, G89.29] Chronic back pain greater than 3 months duration          ED Disposition Condition    Admit               Danilo العلي MD  07/08/22 1330       Danilo العلي MD  07/08/22 1341       Danilo العلي MD  07/08/22 1543

## 2022-07-08 NOTE — HPI
27yo BF with h/o IDDM, Gastroparesis, Chronic Pain presented to ED with several day h/o N/V  and severe back pain that is chronic. She was seen in ED 8 days ago for same complaints. CT Spine didn't not show any acute abnormalities. She was found to be dehydrated and hypokalemic in ED. Admitted for IVF, electrolyte replacement and symptom control. She will not be given any narcotic analgesics.

## 2022-07-09 LAB
ANION GAP SERPL CALC-SCNC: 13 MEQ/L
BASOPHILS # BLD AUTO: 0.06 X10(3)/MCL (ref 0–0.2)
BASOPHILS NFR BLD AUTO: 0.6 %
BUN SERPL-MCNC: 14 MG/DL (ref 7–18.7)
CALCIUM SERPL-MCNC: 8.8 MG/DL (ref 8.4–10.2)
CHLORIDE SERPL-SCNC: 101 MMOL/L (ref 98–107)
CO2 SERPL-SCNC: 24 MMOL/L (ref 22–29)
CREAT SERPL-MCNC: 0.79 MG/DL (ref 0.55–1.02)
CREAT/UREA NIT SERPL: 18
EOSINOPHIL # BLD AUTO: 0.09 X10(3)/MCL (ref 0–0.9)
EOSINOPHIL NFR BLD AUTO: 0.9 %
ERYTHROCYTE [DISTWIDTH] IN BLOOD BY AUTOMATED COUNT: 12.9 % (ref 11.5–17)
GLUCOSE SERPL-MCNC: 130 MG/DL (ref 74–100)
HCT VFR BLD AUTO: 28.6 % (ref 37–47)
HGB BLD-MCNC: 9.4 GM/DL (ref 12–16)
IMM GRANULOCYTES # BLD AUTO: 0.06 X10(3)/MCL (ref 0–0.04)
IMM GRANULOCYTES NFR BLD AUTO: 0.6 %
LYMPHOCYTES # BLD AUTO: 2.84 X10(3)/MCL (ref 0.6–4.6)
LYMPHOCYTES NFR BLD AUTO: 27.6 %
MCH RBC QN AUTO: 27.4 PG (ref 27–31)
MCHC RBC AUTO-ENTMCNC: 32.9 MG/DL (ref 33–36)
MCV RBC AUTO: 83.4 FL (ref 80–94)
MONOCYTES # BLD AUTO: 1.06 X10(3)/MCL (ref 0.1–1.3)
MONOCYTES NFR BLD AUTO: 10.3 %
NEUTROPHILS # BLD AUTO: 6.2 X10(3)/MCL (ref 2.1–9.2)
NEUTROPHILS NFR BLD AUTO: 60 %
PLATELET # BLD AUTO: 514 X10(3)/MCL (ref 130–400)
PMV BLD AUTO: 9.4 FL (ref 7.4–10.4)
POCT GLUCOSE: 128 MG/DL (ref 70–110)
POCT GLUCOSE: 150 MG/DL (ref 70–110)
POCT GLUCOSE: 334 MG/DL (ref 70–110)
POCT GLUCOSE: 44 MG/DL (ref 70–110)
POCT GLUCOSE: 459 MG/DL (ref 70–110)
POCT GLUCOSE: 80 MG/DL (ref 70–110)
POTASSIUM SERPL-SCNC: 3.5 MMOL/L (ref 3.5–5.1)
RBC # BLD AUTO: 3.43 X10(6)/MCL (ref 4.2–5.4)
SODIUM SERPL-SCNC: 138 MMOL/L (ref 136–145)
WBC # SPEC AUTO: 10.3 X10(3)/MCL (ref 4.5–11.5)

## 2022-07-09 PROCEDURE — 96372 THER/PROPH/DIAG INJ SC/IM: CPT | Performed by: INTERNAL MEDICINE

## 2022-07-09 PROCEDURE — 96361 HYDRATE IV INFUSION ADD-ON: CPT

## 2022-07-09 PROCEDURE — 21400001 HC TELEMETRY ROOM

## 2022-07-09 PROCEDURE — 94761 N-INVAS EAR/PLS OXIMETRY MLT: CPT

## 2022-07-09 PROCEDURE — 85025 COMPLETE CBC W/AUTO DIFF WBC: CPT | Performed by: EMERGENCY MEDICINE

## 2022-07-09 PROCEDURE — 63600175 PHARM REV CODE 636 W HCPCS: Performed by: INTERNAL MEDICINE

## 2022-07-09 PROCEDURE — 80048 BASIC METABOLIC PNL TOTAL CA: CPT | Performed by: EMERGENCY MEDICINE

## 2022-07-09 PROCEDURE — 25000003 PHARM REV CODE 250: Performed by: EMERGENCY MEDICINE

## 2022-07-09 PROCEDURE — 25000003 PHARM REV CODE 250: Performed by: INTERNAL MEDICINE

## 2022-07-09 PROCEDURE — 36415 COLL VENOUS BLD VENIPUNCTURE: CPT | Performed by: EMERGENCY MEDICINE

## 2022-07-09 PROCEDURE — C9399 UNCLASSIFIED DRUGS OR BIOLOG: HCPCS | Performed by: INTERNAL MEDICINE

## 2022-07-09 PROCEDURE — G0378 HOSPITAL OBSERVATION PER HR: HCPCS

## 2022-07-09 PROCEDURE — 11000001 HC ACUTE MED/SURG PRIVATE ROOM

## 2022-07-09 RX ADMIN — TRAMADOL HYDROCHLORIDE 50 MG: 50 TABLET, COATED ORAL at 10:07

## 2022-07-09 RX ADMIN — INSULIN DETEMIR 35 UNITS: 100 INJECTION, SOLUTION SUBCUTANEOUS at 08:07

## 2022-07-09 RX ADMIN — METOCLOPRAMIDE 10 MG: 10 TABLET ORAL at 06:07

## 2022-07-09 RX ADMIN — PANTOPRAZOLE SODIUM 40 MG: 40 TABLET, DELAYED RELEASE ORAL at 08:07

## 2022-07-09 RX ADMIN — METOCLOPRAMIDE 10 MG: 10 TABLET ORAL at 10:07

## 2022-07-09 RX ADMIN — FAMOTIDINE 20 MG: 20 TABLET ORAL at 08:07

## 2022-07-09 RX ADMIN — FAMOTIDINE 20 MG: 20 TABLET ORAL at 10:07

## 2022-07-09 RX ADMIN — METOPROLOL TARTRATE 50 MG: 50 TABLET, FILM COATED ORAL at 08:07

## 2022-07-09 RX ADMIN — TRAMADOL HYDROCHLORIDE 50 MG: 50 TABLET, COATED ORAL at 11:07

## 2022-07-09 RX ADMIN — DICYCLOMINE HYDROCHLORIDE 10 MG: 10 CAPSULE ORAL at 08:07

## 2022-07-09 RX ADMIN — SODIUM CHLORIDE: 9 INJECTION, SOLUTION INTRAVENOUS at 12:07

## 2022-07-09 RX ADMIN — INSULIN DETEMIR 15 UNITS: 100 INJECTION, SOLUTION SUBCUTANEOUS at 10:07

## 2022-07-09 RX ADMIN — DICYCLOMINE HYDROCHLORIDE 10 MG: 10 CAPSULE ORAL at 10:07

## 2022-07-09 RX ADMIN — DICYCLOMINE HYDROCHLORIDE 10 MG: 10 CAPSULE ORAL at 05:07

## 2022-07-09 RX ADMIN — DICYCLOMINE HYDROCHLORIDE 10 MG: 10 CAPSULE ORAL at 12:07

## 2022-07-09 RX ADMIN — METOPROLOL TARTRATE 50 MG: 50 TABLET, FILM COATED ORAL at 10:07

## 2022-07-09 RX ADMIN — INSULIN ASPART 2 UNITS: 100 INJECTION, SOLUTION INTRAVENOUS; SUBCUTANEOUS at 10:07

## 2022-07-09 NOTE — PROGRESS NOTES
[facility] Medicine Progress Note    Patient Name: Dorene Husain  Age: 27 y.o.   Code Status: Full Code  MRN: [id number]  Admission Date: 7/8/2022 10:29 AM   Patient Class: IP- Inpatient  Hospital Length of Stay: 1 days  Attending Provider: Sergio Hidalgo MD  Primary Care Provider: Kumar Ortiz NP   Chief Complaint:   Chief Complaint   Patient presents with    Emesis     Pt c/o vomiting and pain to entire back from fall on month agp. Tx here 3 days ago for same complaint.           SUBJECTIVE:     Follow-up:  Hypokalemia    HPI:  27yo BF with h/o IDDM, Gastroparesis, Chronic Pain presented to ED with several day h/o N/V  and severe back pain that is chronic. She was seen in ED 8 days ago for same complaints. CT Spine didn't not show any acute abnormalities. She was found to be dehydrated and hypokalemic in ED. Admitted for IVF, electrolyte replacement and symptom control. She will not be given any narcotic analgesics.     Last 24h: Feeling better. Tolerating PO CL. Wants to advance diet. Labs improved.    Scheduled Meds:   dicyclomine  10 mg Oral QID    famotidine  20 mg Oral BID    insulin detemir U-100  20 Units Subcutaneous QHS    insulin detemir U-100  35 Units Subcutaneous Daily    metoclopramide HCl  10 mg Oral QID (AC & HS)    metoprolol tartrate  50 mg Oral BID    pantoprazole  40 mg Oral Daily     Continuous Infusions:   sodium chloride 0.9% 150 mL/hr at 07/09/22 1230     PRN Meds:   acetaminophen    dextrose 10%    dextrose 10%    dextrose 50%    dextrose 50%    glucagon (human recombinant)    glucose    glucose    hydrOXYzine    insulin aspart U-100    ondansetron    sodium chloride 0.9%    traMADoL      Lines/Drains:   Lines/Drains/Airways     Central Venous Catheter Line  Duration           (Retired)     CVC Triple Lumen - PreSep Cath 07/08/22 1045 left subclavian 1 day                  Allergies as of 07/08/2022    (No Known Allergies)         Review of  Systems: 10 systems reviewed all pertinent positives and negatives stated in HPI, otherwise negative.       OBJECTIVE:     Vital Signs (Most Recent)  Temp: 98.5 °F (36.9 °C) (07/09/22 0800)  Pulse: 93 (07/09/22 0800)  Resp: 18 (07/09/22 1105)  BP: (!) 147/102 (07/09/22 0800)  SpO2: 100 % (07/09/22 0800)    Vital Signs Range (Last 24H):  Temp:  [96.8 °F (36 °C)-98.5 °F (36.9 °C)]   Pulse:  [73-93]   Resp:  [14-20]   BP: (122-165)/()   SpO2:  [99 %-100 %]     I & O (Last 24H):    Intake/Output Summary (Last 24 hours) at 7/9/2022 1422  Last data filed at 7/9/2022 0430  Gross per 24 hour   Intake 100 ml   Output 3050 ml   Net -2950 ml       Physical Exam  Constitutional:       General: She is not in acute distress.     Appearance: Normal appearance. She is normal weight. She is not ill-appearing or toxic-appearing.   HENT:      Head: Normocephalic and atraumatic.      Nose: Nose normal.      Mouth/Throat:      Mouth: Mucous membranes are moist.      Pharynx: Oropharynx is clear.   Eyes:      Extraocular Movements: Extraocular movements intact.      Pupils: Pupils are equal, round, and reactive to light.   Cardiovascular:      Rate and Rhythm: Normal rate and regular rhythm.      Pulses: Normal pulses.      Heart sounds: Normal heart sounds.   Pulmonary:      Effort: Pulmonary effort is normal.      Breath sounds: Normal breath sounds.   Abdominal:      General: Abdomen is flat. Bowel sounds are normal.      Palpations: Abdomen is soft.   Musculoskeletal:         General: Normal range of motion.   Skin:     General: Skin is warm and dry.   Neurological:      General: No focal deficit present.      Mental Status: She is alert and oriented to person, place, and time. Mental status is at baseline.   Psychiatric:         Mood and Affect: Mood normal.         Behavior: Behavior normal.         Thought Content: Thought content normal.         Judgment: Judgment normal.          Recent Labs   Lab 07/08/22  1059  07/09/22  0559   WBC 9.4 10.3   HGB 10.8* 9.4*   HCT 32.6* 28.6*   * 514*        Recent Labs   Lab 07/08/22  1054 07/08/22  1445 07/09/22  0559   * 135* 138   K 2.7* 2.7* 3.5   CHLORIDE 86* 94* 101   CO2 30* 27 24   BUN 25.0* 24.0* 14.0   CREATININE 1.71* 1.39* 0.79   GLUCOSE 428* 339* 130*   MG 1.70  --   --    CALCIUM 9.6 8.0* 8.8   ALBUMIN 3.6  --   --    BILITOT 0.9  --   --    ALKPHOS 85  --   --    ALT 9  --   --    AST 14  --   --                ASSESSMENT/PLAN:     Patient Active Problem List    Diagnosis Date Noted    Hypokalemia 05/25/2022    CHATA (acute kidney injury) 05/21/2022    Hypertension     Drug-seeking behavior 06/30/2022    Diabetic gastroparesis 06/30/2022    Diabetes mellitus 06/30/2022    Syncopal episodes 05/31/2022    Hyperglycemia due to type 1 diabetes mellitus 05/31/2022    Back pain 05/31/2022    Chronic pain 05/31/2022    DM gastroparesis 05/25/2022           - IVF  - replace lytes prn  - Reglan  - home meds  - am labs  - no narcotics  - advance diet           VTE Risk Mitigation (From admission, onward)         Ordered     Place sequential compression device  Until discontinued         07/08/22 1534     IP VTE LOW RISK PATIENT  Once         07/08/22 1534                       Sergio Hidalgo MD  [facility] Medicine   [facility]

## 2022-07-10 VITALS
TEMPERATURE: 98 F | HEIGHT: 62 IN | DIASTOLIC BLOOD PRESSURE: 71 MMHG | OXYGEN SATURATION: 99 % | HEART RATE: 88 BPM | WEIGHT: 143.94 LBS | BODY MASS INDEX: 26.49 KG/M2 | SYSTOLIC BLOOD PRESSURE: 108 MMHG | RESPIRATION RATE: 20 BRPM

## 2022-07-10 PROBLEM — Z76.5 DRUG-SEEKING BEHAVIOR: Chronic | Status: ACTIVE | Noted: 2022-06-30

## 2022-07-10 LAB
ALBUMIN SERPL-MCNC: 2.5 GM/DL (ref 3.5–5)
BASOPHILS # BLD AUTO: 0.05 X10(3)/MCL (ref 0–0.2)
BASOPHILS NFR BLD AUTO: 0.5 %
BUN SERPL-MCNC: 10 MG/DL (ref 7–18.7)
CALCIUM SERPL-MCNC: 8.1 MG/DL (ref 8.4–10.2)
CHLORIDE SERPL-SCNC: 105 MMOL/L (ref 98–107)
CO2 SERPL-SCNC: 24 MMOL/L (ref 22–29)
CREAT SERPL-MCNC: 0.8 MG/DL (ref 0.55–1.02)
EOSINOPHIL # BLD AUTO: 0.16 X10(3)/MCL (ref 0–0.9)
EOSINOPHIL NFR BLD AUTO: 1.7 %
ERYTHROCYTE [DISTWIDTH] IN BLOOD BY AUTOMATED COUNT: 12.8 % (ref 11.5–17)
GLUCOSE SERPL-MCNC: 79 MG/DL (ref 74–100)
HCT VFR BLD AUTO: 25.8 % (ref 37–47)
HGB BLD-MCNC: 8.3 GM/DL (ref 12–16)
IMM GRANULOCYTES # BLD AUTO: 0.05 X10(3)/MCL (ref 0–0.04)
IMM GRANULOCYTES NFR BLD AUTO: 0.5 %
LYMPHOCYTES # BLD AUTO: 3.35 X10(3)/MCL (ref 0.6–4.6)
LYMPHOCYTES NFR BLD AUTO: 35.8 %
MAGNESIUM SERPL-MCNC: 1.1 MG/DL (ref 1.6–2.6)
MCH RBC QN AUTO: 28.2 PG (ref 27–31)
MCHC RBC AUTO-ENTMCNC: 32.2 MG/DL (ref 33–36)
MCV RBC AUTO: 87.8 FL (ref 80–94)
MONOCYTES # BLD AUTO: 0.92 X10(3)/MCL (ref 0.1–1.3)
MONOCYTES NFR BLD AUTO: 9.8 %
NEUTROPHILS # BLD AUTO: 4.8 X10(3)/MCL (ref 2.1–9.2)
NEUTROPHILS NFR BLD AUTO: 51.7 %
PHOSPHATE SERPL-MCNC: 3.4 MG/DL (ref 2.3–4.7)
PLATELET # BLD AUTO: 384 X10(3)/MCL (ref 130–400)
PMV BLD AUTO: 8.8 FL (ref 7.4–10.4)
POCT GLUCOSE: 150 MG/DL (ref 70–110)
POCT GLUCOSE: 361 MG/DL (ref 70–110)
POCT GLUCOSE: 49 MG/DL (ref 70–110)
POCT GLUCOSE: 69 MG/DL (ref 70–110)
POTASSIUM SERPL-SCNC: 3 MMOL/L (ref 3.5–5.1)
RBC # BLD AUTO: 2.94 X10(6)/MCL (ref 4.2–5.4)
SODIUM SERPL-SCNC: 139 MMOL/L (ref 136–145)
WBC # SPEC AUTO: 9.4 X10(3)/MCL (ref 4.5–11.5)

## 2022-07-10 PROCEDURE — G0378 HOSPITAL OBSERVATION PER HR: HCPCS

## 2022-07-10 PROCEDURE — 94761 N-INVAS EAR/PLS OXIMETRY MLT: CPT

## 2022-07-10 PROCEDURE — 96376 TX/PRO/DX INJ SAME DRUG ADON: CPT

## 2022-07-10 PROCEDURE — 96372 THER/PROPH/DIAG INJ SC/IM: CPT | Performed by: INTERNAL MEDICINE

## 2022-07-10 PROCEDURE — 80069 RENAL FUNCTION PANEL: CPT | Performed by: INTERNAL MEDICINE

## 2022-07-10 PROCEDURE — 25000003 PHARM REV CODE 250: Performed by: EMERGENCY MEDICINE

## 2022-07-10 PROCEDURE — 85025 COMPLETE CBC W/AUTO DIFF WBC: CPT | Performed by: INTERNAL MEDICINE

## 2022-07-10 PROCEDURE — 36415 COLL VENOUS BLD VENIPUNCTURE: CPT | Performed by: INTERNAL MEDICINE

## 2022-07-10 PROCEDURE — 25000003 PHARM REV CODE 250: Performed by: INTERNAL MEDICINE

## 2022-07-10 PROCEDURE — 83735 ASSAY OF MAGNESIUM: CPT | Performed by: INTERNAL MEDICINE

## 2022-07-10 PROCEDURE — 63600175 PHARM REV CODE 636 W HCPCS: Performed by: INTERNAL MEDICINE

## 2022-07-10 PROCEDURE — 96361 HYDRATE IV INFUSION ADD-ON: CPT

## 2022-07-10 PROCEDURE — C9399 UNCLASSIFIED DRUGS OR BIOLOG: HCPCS | Performed by: INTERNAL MEDICINE

## 2022-07-10 RX ORDER — POTASSIUM CHLORIDE 750 MG/1
10 TABLET, EXTENDED RELEASE ORAL 2 TIMES DAILY
Qty: 40 TABLET | Refills: 0 | Status: SHIPPED | OUTPATIENT
Start: 2022-07-10 | End: 2022-07-30

## 2022-07-10 RX ORDER — POTASSIUM CHLORIDE 14.9 MG/ML
20 INJECTION INTRAVENOUS
Status: COMPLETED | OUTPATIENT
Start: 2022-07-10 | End: 2022-07-10

## 2022-07-10 RX ORDER — POTASSIUM CHLORIDE 20 MEQ/1
40 TABLET, EXTENDED RELEASE ORAL EVERY 6 HOURS SCHEDULED
Status: DISCONTINUED | OUTPATIENT
Start: 2022-07-10 | End: 2022-07-10 | Stop reason: HOSPADM

## 2022-07-10 RX ADMIN — SODIUM CHLORIDE: 9 INJECTION, SOLUTION INTRAVENOUS at 08:07

## 2022-07-10 RX ADMIN — POTASSIUM CHLORIDE 20 MEQ: 200 INJECTION, SOLUTION INTRAVENOUS at 02:07

## 2022-07-10 RX ADMIN — FAMOTIDINE 20 MG: 20 TABLET ORAL at 08:07

## 2022-07-10 RX ADMIN — POTASSIUM CHLORIDE 40 MEQ: 1500 TABLET, EXTENDED RELEASE ORAL at 12:07

## 2022-07-10 RX ADMIN — METOPROLOL TARTRATE 50 MG: 50 TABLET, FILM COATED ORAL at 08:07

## 2022-07-10 RX ADMIN — METOCLOPRAMIDE 10 MG: 10 TABLET ORAL at 10:07

## 2022-07-10 RX ADMIN — INSULIN ASPART 5 UNITS: 100 INJECTION, SOLUTION INTRAVENOUS; SUBCUTANEOUS at 10:07

## 2022-07-10 RX ADMIN — TRAMADOL HYDROCHLORIDE 50 MG: 50 TABLET, COATED ORAL at 12:07

## 2022-07-10 RX ADMIN — DICYCLOMINE HYDROCHLORIDE 10 MG: 10 CAPSULE ORAL at 08:07

## 2022-07-10 RX ADMIN — ACETAMINOPHEN 325MG 650 MG: 325 TABLET ORAL at 01:07

## 2022-07-10 RX ADMIN — POTASSIUM CHLORIDE 20 MEQ: 200 INJECTION, SOLUTION INTRAVENOUS at 12:07

## 2022-07-10 RX ADMIN — DICYCLOMINE HYDROCHLORIDE 10 MG: 10 CAPSULE ORAL at 12:07

## 2022-07-10 RX ADMIN — INSULIN DETEMIR 35 UNITS: 100 INJECTION, SOLUTION SUBCUTANEOUS at 08:07

## 2022-07-10 RX ADMIN — PANTOPRAZOLE SODIUM 40 MG: 40 TABLET, DELAYED RELEASE ORAL at 08:07

## 2022-07-10 RX ADMIN — SODIUM CHLORIDE: 9 INJECTION, SOLUTION INTRAVENOUS at 01:07

## 2022-07-10 RX ADMIN — METOCLOPRAMIDE 10 MG: 10 TABLET ORAL at 05:07

## 2022-07-10 NOTE — DISCHARGE SUMMARY
[facility]  Medical Surgical Unit  Hospital Medicine  Discharge Summary      Patient Name: Dorene Husain  MRN: [id number]  Patient Class: IP- Inpatient  Admission Date: 7/8/2022 10:29 AM  Hospital Length of Stay: 2 days  Discharge Date and Time:  07/10/2022 2:01 PM  Attending Physician: Sergio Hidalgo MD   Discharging Provider: Sergio Hidalgo MD  Primary Care Provider: Kumar Ortiz NP      HPI:   25yo BF with h/o IDDM, Gastroparesis, Chronic Pain presented to ED with several day h/o N/V  and severe back pain that is chronic. She was seen in ED 8 days ago for same complaints. CT Spine didn't not show any acute abnormalities. She was found to be dehydrated and hypokalemic in ED. Admitted for IVF, electrolyte replacement and symptom control. She will not be given any narcotic analgesics.      Hospital Course:   Pt admitted to Hospital Medicine and aggressively rehydrated with IVF. Given CL diet and advanced as felecia. K+ replaced as needed. Pt improved back to baseline and discharged home in stable condition.        Final Active Diagnoses:    Diagnosis Date Noted POA    PRINCIPAL PROBLEM:  Hypokalemia [E87.6] 05/25/2022 Yes    CHATA (acute kidney injury) [N17.9] 05/21/2022 Yes    Hypertension [I10]  Yes     Chronic    Drug-seeking behavior [Z76.5] 06/30/2022 Yes     Chronic    Hyperglycemia due to type 1 diabetes mellitus [E10.65] 05/31/2022 Yes     Chronic    Chronic pain [G89.29] 05/31/2022 Yes     Chronic    DM gastroparesis [E11.43, K31.84] 05/25/2022 Yes     Chronic      Problems Resolved During this Admission:       Discharged Condition: good    Disposition: Home or Self Care    Follow Up:   Follow-up Information     Kumar Ortiz NP Follow up.    Specialty: Family Medicine  Why: As needed, If symptoms worsen  Contact information:  [place] [id number] [phone]                       Patient Instructions:      Diet diabetic     Activity as tolerated       Physical  Exam  GEN:  WNWD, non-toxic, appears comfortable  HEENT:  NCAT, MMM, EOMI, anicteric sclerae, neck supple  CV:  RRR, no MRG  CHEST: no CVA tenderness, nml excursions, non-labored, equal air entry bilat, CTAB, no WRR  ABD:  BS+, NTND, no rebound/guarding, no ascites  EXTR:  no deformities, MAEW, strength equal bilat  SKIN:  no rashes, no lesions, no CCE, warm, dry, turgor nml  NEURO:  A&Ox4, no focal deficits, CN II-XII intact grossly  PSYCH:  calm, cooperative, responds appropriately      Significant Diagnostic Studies: Labs:   CMP   Recent Labs   Lab 07/08/22  1445 07/09/22  0559 07/10/22  0454   * 138 139   K 2.7* 3.5 3.0*   CO2 27 24 24   BUN 24.0* 14.0 10.0   CREATININE 1.39* 0.79 0.80   CALCIUM 8.0* 8.8 8.1*   ALBUMIN  --   --  2.5*    and CBC   Recent Labs   Lab 07/09/22  0559 07/10/22  0621   WBC 10.3 9.4   HGB 9.4* 8.3*   HCT 28.6* 25.8*   * 384       Pending Diagnostic Studies:     None         Medications:  Reconciled Home Medications:      Medication List      START taking these medications    potassium chloride SA 10 MEQ tablet  Commonly known as: K-DUR,KLOR-CON  Take 1 tablet (10 mEq total) by mouth 2 (two) times daily. for 20 days        CONTINUE taking these medications    amitriptyline 25 MG tablet  Commonly known as: ELAVIL  Take 25 mg by mouth nightly.     BASAGLAR KWIKPEN U-100 INSULIN glargine 100 units/mL SubQ pen  Generic drug: insulin  Inject into the skin.     dicyclomine 10 MG capsule  Commonly known as: BENTYL  Take 10 mg by mouth 4 (four) times daily.     HumaLOG U-100 Insulin 100 unit/mL Crtg  Generic drug: insulin lispro  Inject into the skin.     hydrOXYzine 50 MG tablet  Commonly known as: ATARAX  Take 50 mg by mouth daily as needed.     lisinopriL 20 MG tablet  Commonly known as: PRINIVIL,ZESTRIL  Take 20 mg by mouth 2 (two) times daily.     metoclopramide HCl 5 MG tablet  Commonly known as: REGLAN  Take 5 mg by mouth 4 (four) times daily.     metoprolol tartrate 50 MG  tablet  Commonly known as: LOPRESSOR  Take 50 mg by mouth 2 (two) times daily.     naproxen 500 MG tablet  Commonly known as: NAPROSYN  Take 1 tablet (500 mg total) by mouth 2 (two) times daily with meals. for 15 days     olmesartan 40 MG tablet  Commonly known as: BENICAR  Take 40 mg by mouth once daily.     ondansetron 4 MG tablet  Commonly known as: ZOFRAN  Take 4 mg by mouth every 6 (six) hours as needed.     pantoprazole 40 MG tablet  Commonly known as: PROTONIX  Take 40 mg by mouth once daily.            Indwelling Lines/Drains at time of discharge:   Lines/Drains/Airways     Central Venous Catheter Line  Duration           (Retired)     CVC Triple Lumen - PreSep Cath 07/08/22 1045 left subclavian 2 days                Time spent on the discharge of patient: 31 minutes         Sergio Hidalgo MD  Department of Hospital Medicine  [facility] - Medical Surgical Unit

## 2022-07-10 NOTE — HOSPITAL COURSE
Pt admitted to Hospital Medicine and aggressively rehydrated with IVF. Given CL diet and advanced as felecia. K+ replaced as needed. Pt improved back to baseline and discharged home in stable condition.

## 2022-07-11 ENCOUNTER — PATIENT OUTREACH (OUTPATIENT)
Dept: ADMINISTRATIVE | Facility: CLINIC | Age: 27
End: 2022-07-11
Payer: MEDICAID

## 2022-07-11 NOTE — PROGRESS NOTES
C3 nurse attempted to contact Dorene Husain for a TCC post hospital discharge follow up call. No answer. LVM with CB information. The patient does not have a scheduled HOSFU appointment, and the pt does not have an [facility] PCP.

## 2022-07-12 NOTE — PROGRESS NOTES
C3 nurse attempted to contact Dorene Husain for a TCC post hospital discharge follow up call. No answer. Phone rang and hung up. The patient does not have a scheduled HOSFU appointment, and the pt does not have an [facility] PCP.

## 2022-07-13 LAB — BACTERIA BLD CULT: NORMAL

## 2022-07-18 NOTE — CLINICAL REVIEW
27-year-old female admitted on 07/08/2022 and discharged on 07/10/2022 she presented with acute on chronic gastroparesis.  She was admitted for IV fluids, electrolyte management.  In there was no hemodynamic instability, food/medication intolerance persisting beyond the observation level of care .  She remains appropriate for an observation setting    Kenna Toure MD  Utilization Management  Physician Advisor

## 2022-08-08 ENCOUNTER — HOSPITAL ENCOUNTER (EMERGENCY)
Facility: HOSPITAL | Age: 27
Discharge: HOME | End: 2022-08-08
Attending: INTERNAL MEDICINE
Payer: MEDICAID

## 2022-08-08 VITALS
RESPIRATION RATE: 18 BRPM | SYSTOLIC BLOOD PRESSURE: 151 MMHG | WEIGHT: 155 LBS | TEMPERATURE: 99 F | OXYGEN SATURATION: 98 % | BODY MASS INDEX: 28.35 KG/M2 | HEART RATE: 89 BPM | DIASTOLIC BLOOD PRESSURE: 85 MMHG

## 2022-08-08 DIAGNOSIS — R41.82 ALTERED MENTAL STATUS: ICD-10-CM

## 2022-08-08 DIAGNOSIS — F23 ACUTE PSYCHOSIS: Primary | ICD-10-CM

## 2022-08-08 DIAGNOSIS — F19.90 DRUG USE DISORDER: ICD-10-CM

## 2022-08-08 LAB
ALBUMIN SERPL-MCNC: 3.5 GM/DL (ref 3.5–5)
ALBUMIN/GLOB SERPL: 0.9 RATIO (ref 1.1–2)
ALP SERPL-CCNC: 95 UNIT/L (ref 40–150)
ALT SERPL-CCNC: 9 UNIT/L (ref 0–55)
AMPHET UR QL SCN: NEGATIVE
APAP SERPL-MCNC: <17.4 UG/ML (ref 17.4–30)
APPEARANCE UR: CLEAR
AST SERPL-CCNC: 16 UNIT/L (ref 5–34)
B-HCG SERPL QL: NEGATIVE
BACTERIA #/AREA URNS AUTO: NORMAL /HPF
BARBITURATE SCN PRESENT UR: NEGATIVE
BASOPHILS # BLD AUTO: 0.07 X10(3)/MCL (ref 0–0.2)
BASOPHILS NFR BLD AUTO: 0.5 %
BENZODIAZ UR QL SCN: NEGATIVE
BILIRUB UR QL STRIP.AUTO: NEGATIVE MG/DL
BILIRUBIN DIRECT+TOT PNL SERPL-MCNC: 0.2 MG/DL
BUN SERPL-MCNC: 20 MG/DL (ref 7–18.7)
CALCIUM SERPL-MCNC: 9.7 MG/DL (ref 8.4–10.2)
CANNABINOIDS UR QL SCN: NEGATIVE
CHLORIDE SERPL-SCNC: 105 MMOL/L (ref 98–107)
CO2 SERPL-SCNC: 20 MMOL/L (ref 22–29)
COCAINE UR QL SCN: NEGATIVE
COLOR UR AUTO: YELLOW
CREAT SERPL-MCNC: 1.23 MG/DL (ref 0.55–1.02)
EOSINOPHIL # BLD AUTO: 0.12 X10(3)/MCL (ref 0–0.9)
EOSINOPHIL NFR BLD AUTO: 0.9 %
ERYTHROCYTE [DISTWIDTH] IN BLOOD BY AUTOMATED COUNT: 12.4 % (ref 11.5–17)
ETHANOL SERPL-MCNC: <10 MG/DL
FENTANYL UR QL SCN: NEGATIVE
GFR SERPLBLD CREATININE-BSD FMLA CKD-EPI: >60 MLS/MIN/1.73/M2
GLOBULIN SER-MCNC: 4 GM/DL (ref 2.4–3.5)
GLUCOSE SERPL-MCNC: 124 MG/DL (ref 74–100)
GLUCOSE SERPL-MCNC: 132 MG/DL (ref 70–110)
GLUCOSE UR QL STRIP.AUTO: NEGATIVE MG/DL
HCT VFR BLD AUTO: 29.4 % (ref 37–47)
HGB BLD-MCNC: 9.9 GM/DL (ref 12–16)
IMM GRANULOCYTES # BLD AUTO: 0.12 X10(3)/MCL (ref 0–0.04)
IMM GRANULOCYTES NFR BLD AUTO: 0.9 %
KETONES UR QL STRIP.AUTO: NEGATIVE MG/DL
LEUKOCYTE ESTERASE UR QL STRIP.AUTO: NEGATIVE UNIT/L
LIPASE SERPL-CCNC: 22 U/L
LYMPHOCYTES # BLD AUTO: 2.54 X10(3)/MCL (ref 0.6–4.6)
LYMPHOCYTES NFR BLD AUTO: 18.3 %
MCH RBC QN AUTO: 27.9 PG (ref 27–31)
MCHC RBC AUTO-ENTMCNC: 33.7 MG/DL (ref 33–36)
MCV RBC AUTO: 82.8 FL (ref 80–94)
MDMA UR QL SCN: NEGATIVE
MONOCYTES # BLD AUTO: 0.58 X10(3)/MCL (ref 0.1–1.3)
MONOCYTES NFR BLD AUTO: 4.2 %
NEUTROPHILS # BLD AUTO: 10.4 X10(3)/MCL (ref 2.1–9.2)
NEUTROPHILS NFR BLD AUTO: 75.2 %
NITRITE UR QL STRIP.AUTO: NEGATIVE
OPIATES UR QL SCN: NEGATIVE
PCP UR QL: NEGATIVE
PH UR STRIP.AUTO: 6 [PH]
PH UR: 6 [PH] (ref 3–11)
PLATELET # BLD AUTO: 564 X10(3)/MCL (ref 130–400)
PMV BLD AUTO: 8.5 FL (ref 7.4–10.4)
POCT GLUCOSE: 132 MG/DL (ref 70–110)
POTASSIUM SERPL-SCNC: 4.3 MMOL/L (ref 3.5–5.1)
PROT SERPL-MCNC: 7.5 GM/DL (ref 6.4–8.3)
PROT UR QL STRIP.AUTO: 100 MG/DL
RBC # BLD AUTO: 3.55 X10(6)/MCL (ref 4.2–5.4)
RBC #/AREA URNS AUTO: NORMAL /HPF
RBC UR QL AUTO: NEGATIVE UNIT/L
SALICYLATES SERPL-MCNC: <5 MG/DL
SODIUM SERPL-SCNC: 137 MMOL/L (ref 136–145)
SP GR UR STRIP.AUTO: 1.01
SPECIFIC GRAVITY, URINE AUTO (.000) (OHS): 1.01 (ref 1–1.03)
SQUAMOUS #/AREA URNS AUTO: NORMAL /HPF
UROBILINOGEN UR STRIP-ACNC: 0.2 MG/DL
WBC # SPEC AUTO: 13.9 X10(3)/MCL (ref 4.5–11.5)
WBC #/AREA URNS AUTO: NORMAL /HPF

## 2022-08-08 PROCEDURE — 80307 DRUG TEST PRSMV CHEM ANLYZR: CPT | Performed by: INTERNAL MEDICINE

## 2022-08-08 PROCEDURE — 81001 URINALYSIS AUTO W/SCOPE: CPT | Performed by: INTERNAL MEDICINE

## 2022-08-08 PROCEDURE — 63600175 PHARM REV CODE 636 W HCPCS: Performed by: INTERNAL MEDICINE

## 2022-08-08 PROCEDURE — 80143 DRUG ASSAY ACETAMINOPHEN: CPT | Performed by: INTERNAL MEDICINE

## 2022-08-08 PROCEDURE — 36415 COLL VENOUS BLD VENIPUNCTURE: CPT | Performed by: INTERNAL MEDICINE

## 2022-08-08 PROCEDURE — 83690 ASSAY OF LIPASE: CPT | Performed by: INTERNAL MEDICINE

## 2022-08-08 PROCEDURE — 85025 COMPLETE CBC W/AUTO DIFF WBC: CPT | Performed by: INTERNAL MEDICINE

## 2022-08-08 PROCEDURE — 81025 URINE PREGNANCY TEST: CPT | Performed by: INTERNAL MEDICINE

## 2022-08-08 PROCEDURE — 82962 GLUCOSE BLOOD TEST: CPT

## 2022-08-08 PROCEDURE — 96372 THER/PROPH/DIAG INJ SC/IM: CPT | Performed by: INTERNAL MEDICINE

## 2022-08-08 PROCEDURE — 93005 ELECTROCARDIOGRAM TRACING: CPT

## 2022-08-08 PROCEDURE — 80053 COMPREHEN METABOLIC PANEL: CPT | Performed by: INTERNAL MEDICINE

## 2022-08-08 PROCEDURE — 80179 DRUG ASSAY SALICYLATE: CPT | Performed by: INTERNAL MEDICINE

## 2022-08-08 PROCEDURE — 99285 EMERGENCY DEPT VISIT HI MDM: CPT | Mod: 25

## 2022-08-08 PROCEDURE — 82077 ASSAY SPEC XCP UR&BREATH IA: CPT | Performed by: INTERNAL MEDICINE

## 2022-08-08 RX ORDER — HALOPERIDOL 5 MG/ML
5 INJECTION INTRAMUSCULAR
Status: COMPLETED | OUTPATIENT
Start: 2022-08-08 | End: 2022-08-08

## 2022-08-08 RX ADMIN — HALOPERIDOL LACTATE 5 MG: 5 INJECTION, SOLUTION INTRAMUSCULAR at 09:08

## 2022-08-08 NOTE — ED PROVIDER NOTES
08/08/2022         10:49 AM    Source of History:  History obtained from the patient.       Chief complaint:  From Nurse Triage:  Altered Mental Status (Brought in by [facility] for altered mental status.  in route)    HPI:  Dorene Husain is a 27 y.o. female presenting with Altered Mental Status (Brought in by [facility] for altered mental status.  in route)       Patient with history of the diabetes, recurrent ER visits with the diabetic gastroparesis, nausea and vomiting is brought to the emergency room by ambulance saying that patient is just crying and cannot say what is going on.  When I talked to patient patient says that she did some drug and after that she got scared and then she starts talking about different things, patient's father says that she was found next to the refrigerator standing there and talking out of her head, so he gave her some peanut butter and Gatorade and call the ambulance.  He says they were not with her during the night so they do not know what she did.    Review of Systems   Constitutional symptoms:  No Fever. No Chills    Skin symptoms:  No Rash.    Eye symptoms:  No Visual disturbance reported.   ENMT symptoms:  No Sore throat,    Respiratory symptoms:  No Shortness of Breath, no Cough, no Wheezing.    Cardiovascular symptoms:  No Chest Pain, No Palpitations, No Tachycardia.    Gastrointestinal symptoms:  No Abdominal Pain, No Nausea, No Vomiting, No Diarrhea, No Constipation.    Genitourinary symptoms:  No Dysuria,    Musculoskeletal symptoms:  No Back pain,    Neurologic symptoms:  No Headache, No Dizziness.    Psychiatric symptoms:  Anxiety, Depression, Substance Abuse, feeling confused.              Additional review of systems information: Patient Denies Any Other Complaints.  All Other Systems Reviewed With Patient And Negative.    ALLEGIES:  Review of patient's allergies indicates:  No Known Allergies    HOME MEDICINES:    Current Facility-Administered  Medications:     lactated ringers bolus 1,000 mL, 1,000 mL, Intravenous, ED 1 Time, Millie Burr MD    Current Outpatient Medications:     amitriptyline (ELAVIL) 25 MG tablet, Take 25 mg by mouth nightly., Disp: , Rfl:     BASAGLAR KWIKPEN U-100 INSULIN glargine 100 units/mL (3mL) SubQ pen, Inject into the skin., Disp: , Rfl:     dicyclomine (BENTYL) 10 MG capsule, Take 10 mg by mouth 4 (four) times daily., Disp: , Rfl:     hydrOXYzine (ATARAX) 50 MG tablet, Take 50 mg by mouth daily as needed., Disp: , Rfl:     insulin lispro (HUMALOG U-100 INSULIN) 100 unit/mL Crtg, Inject into the skin., Disp: , Rfl:     lisinopriL (PRINIVIL,ZESTRIL) 20 MG tablet, Take 20 mg by mouth 2 (two) times daily., Disp: , Rfl:     metoclopramide HCl (REGLAN) 5 MG tablet, Take 5 mg by mouth 4 (four) times daily., Disp: , Rfl:     metoprolol tartrate (LOPRESSOR) 50 MG tablet, Take 50 mg by mouth 2 (two) times daily., Disp: , Rfl:     olmesartan (BENICAR) 40 MG tablet, Take 40 mg by mouth once daily., Disp: , Rfl:     ondansetron (ZOFRAN) 4 MG tablet, Take 4 mg by mouth every 6 (six) hours as needed., Disp: , Rfl:     pantoprazole (PROTONIX) 40 MG tablet, Take 40 mg by mouth once daily., Disp: , Rfl:     PMH:  As per HPI and below:    Reviewed and updated in chart.    PAST MEDICAL HISTORY:  Past Medical History:   Diagnosis Date    Diabetes mellitus     Diabetic gastroparesis associated with type 1 diabetes mellitus     DKA (diabetic ketoacidosis)     DKA (diabetic ketoacidosis)     DKA (diabetic ketoacidosis)     Gastroparesis     Gastroparesis due to DM     Hypertension     Ketoacidosis due to diabetes     Non compliance with medical treatment     Pneumonia     Secondary DM with DKA         PAST SURGICAL HISTORY:  Past Surgical History:   Procedure Laterality Date    CHOLECYSTECTOMY      ESOPHAGOGASTRODUODENOSCOPY      Multiple    None         SOCIAL HISTORY:  Social History     Tobacco Use    Smoking  status: Never Smoker    Smokeless tobacco: Never Used   Substance Use Topics    Alcohol use: Never    Drug use: Yes     Types: Marijuana       FAMILY HISTORY:  Family History   Problem Relation Age of Onset    Heart disease Mother     Hypertension Mother     Diabetes Mother     Hyperlipidemia Mother     Cancer Father     Stroke Father     Hypertension Father     Hyperlipidemia Father         PROBLEM LIST:  Patient Active Problem List   Diagnosis    CHATA (acute kidney injury)    Hypokalemia    DM gastroparesis    Syncopal episodes    Hyperglycemia due to type 1 diabetes mellitus    Back pain    Chronic pain    Drug-seeking behavior    Diabetic gastroparesis    Diabetes mellitus    Hypertension        PHYSICAL EXAM:    Vitals:    08/08/22 1000   BP: (!) 157/119   Pulse:    Resp:    Temp:        BP (!) 157/119   Pulse (!) 112   Temp 97.7 °F (36.5 °C) (Axillary)   Resp (!) 24   Wt 70.3 kg (155 lb)   SpO2 98%   BMI 28.35 kg/m²      Vital Signs: Reviewed As In Chart.  General:  Alert, No Cardiorespiratory Distress Noted, constantly crying, tearful, and talking sometimes she makes sense sometime she just have word salad.   Skin: Normal For Ethnic Origin  Eye:  Extraocular Movements Are Intact.   Cardiovascular:  Regular Rate And Rhythm, No Murmur, No Pedal Edema.    Respiratory:  Respirations Nonlabored, No Respiratory Distress, Good Bilateral Air Entry, No Rales, No Rhonchi.    Musculoskeletal:  No Gross Deformity Noted.   Gastrointestinal:  Soft, Non Distended, Non Tender, Normal Bowel Sounds.    Neurological:  Alert And Oriented To Person, Place, Time, And Situation, Normal Motor Observed, Normal Speech Observed.    Psychiatric:  Anxious, tearful , depressed , paranoid.    INITIAL IMPRESSION/ DIFFERENTIAL DX:      MEDICAL DECISION MAKING:      Reviewed Nurses Note. Reviewed Vital Signs.     Reviewed Pertinent old records, History and updated as necessary.    27 y.o. female with Altered Mental  Status (Brought in by [facility] for altered mental status.  in route)    Patient with history of recurrent admission to emergency room with the nausea, vomiting, DKA, diabetic gastroparesis, today comes to the emergency room but she says that she did some drugs last night and this morning her family found her talking out of her head, and confused.  Father gave her peanut butter and Gatorade and then brought her to the emergency room by ambulance.  Patient herself says that she did some drugs last night.  And then she is scared and she does not know what she did.    ED WORKUP AND COURSE:  ED ORDERS:  Orders Placed This Encounter   Procedures    X-Ray Chest AP Portable    CBC auto differential    Comprehensive metabolic panel    Urinalysis, Reflex to Urine Culture Urine, Clean Catch    Drug Screen panel, emergency    Ethanol    Acetaminophen level    Salicylate level    Pregnancy, urine rapid    Lipase    CBC with Differential    Urinalysis, Microscopic    Diet NPO    Vital signs    Cardiac Monitoring - Adult    Pulse Oximetry Continuous    POCT Glucose    EKG 12-lead    Saline lock IV       Medications   lactated ringers bolus 1,000 mL (has no administration in time range)   haloperidol lactate injection 5 mg (5 mg Intramuscular Given 8/8/22 0954)            ECG Results          EKG 12-lead (Preliminary result)  Result time 08/08/22 10:35:50    ED Interpretation by Millie Burr MD (08/08/22 10:35:50, [facility] - Emergency Dept, Emergency Medicine)    EKG: Interpreted by Millie Burr MD. independently as Normal Sinus Rhythm, Rate 92, Normal Axis, Normal Intervals., normal EKG                              ED LABS ORDERED AND REVIEWED:  Admission on 08/08/2022   Component Date Value Ref Range Status    Sodium Level 08/08/2022 137  136 - 145 mmol/L Final    Potassium Level 08/08/2022 4.3  3.5 - 5.1 mmol/L Final    Chloride 08/08/2022 105  98 - 107 mmol/L Final    Carbon  Dioxide 08/08/2022 20 (A) 22 - 29 mmol/L Final    Glucose Level 08/08/2022 124 (A) 74 - 100 mg/dL Final    Blood Urea Nitrogen 08/08/2022 20.0 (A) 7.0 - 18.7 mg/dL Final    Creatinine 08/08/2022 1.23 (A) 0.55 - 1.02 mg/dL Final    Calcium Level Total 08/08/2022 9.7  8.4 - 10.2 mg/dL Final    Protein Total 08/08/2022 7.5  6.4 - 8.3 gm/dL Final    Albumin Level 08/08/2022 3.5  3.5 - 5.0 gm/dL Final    Globulin 08/08/2022 4.0 (A) 2.4 - 3.5 gm/dL Final    Albumin/Globulin Ratio 08/08/2022 0.9 (A) 1.1 - 2.0 ratio Final    Bilirubin Total 08/08/2022 0.2  <=1.5 mg/dL Final    Alkaline Phosphatase 08/08/2022 95  40 - 150 unit/L Final    Alanine Aminotransferase 08/08/2022 9  0 - 55 unit/L Final    Aspartate Aminotransferase 08/08/2022 16  5 - 34 unit/L Final    eGFR 08/08/2022 >60  mls/min/1.73/m2 Final    POC Glucose 08/08/2022 132 (A) 70 - 110 MG/DL Final    Color, UA 08/08/2022 Yellow  Yellow, Colorless, Other, Clear Final    Appearance, UA 08/08/2022 Clear  Clear Final    Specific Gravity, UA 08/08/2022 1.010   Final    pH, UA 08/08/2022 6.0  5.0, 5.5, 6.0, 6.5, 7.0, 7.5, 8.0, 8.5 Final    Protein, UA 08/08/2022 100  (A) Negative, 300  mg/dL Final    Glucose, UA 08/08/2022 Negative  Negative, Normal mg/dL Final    Ketones, UA 08/08/2022 Negative  Negative, +1, +2, +3, +4, +5, >=160, >=80 mg/dL Final    Blood, UA 08/08/2022 Negative  Negative unit/L Final    Bilirubin, UA 08/08/2022 Negative  Negative mg/dL Final    Urobilinogen, UA 08/08/2022 0.2  0.2, 1.0, Normal mg/dL Final    Nitrites, UA 08/08/2022 Negative  Negative Final    Leukocyte Esterase, UA 08/08/2022 Negative  Negative, 75  unit/L Final    Amphetamines, Urine 08/08/2022 Negative  Negative Final    Barbituates, Urine 08/08/2022 Negative  Negative Final    Benzodiazepine, Urine 08/08/2022 Negative  Negative Final    Cannabinoids, Urine 08/08/2022 Negative  Negative Final    Cocaine, Urine 08/08/2022 Negative  Negative Final     Fentanyl, Urine 08/08/2022 Negative  Negative Final    MDMA, Urine 08/08/2022 Negative  Negative Final    Opiates, Urine 08/08/2022 Negative  Negative Final    Phencyclidine, Urine 08/08/2022 Negative  Negative Final    pH, Urine 08/08/2022 6.0  3.0 - 11.0 Final    Specific Gravity, Urine Auto 08/08/2022 1.010  1.001 - 1.035 Final    Ethanol Level 08/08/2022 <10.0  <=10.0 mg/dL Final    Acetaminophen Level 08/08/2022 <17.4 (A) 17.4 - 30.0 ug/ml Final    Salicylate Level 08/08/2022 <5.0  mg/dL Final    Beta hCG Qualitative, Urine 08/08/2022 Negative  Negative Final    Lipase Level 08/08/2022 22  <=60 U/L Final    WBC 08/08/2022 13.9 (A) 4.5 - 11.5 x10(3)/mcL Final    RBC 08/08/2022 3.55 (A) 4.20 - 5.40 x10(6)/mcL Final    Hgb 08/08/2022 9.9 (A) 12.0 - 16.0 gm/dL Final    Hct 08/08/2022 29.4 (A) 37.0 - 47.0 % Final    MCV 08/08/2022 82.8  80.0 - 94.0 fL Final    MCH 08/08/2022 27.9  27.0 - 31.0 pg Final    MCHC 08/08/2022 33.7  33.0 - 36.0 mg/dL Final    RDW 08/08/2022 12.4  11.5 - 17.0 % Final    Platelet 08/08/2022 564 (A) 130 - 400 x10(3)/mcL Final    MPV 08/08/2022 8.5  7.4 - 10.4 fL Final    Neut % 08/08/2022 75.2  % Final    Lymph % 08/08/2022 18.3  % Final    Mono % 08/08/2022 4.2  % Final    Eos % 08/08/2022 0.9  % Final    Basophil % 08/08/2022 0.5  % Final    Lymph # 08/08/2022 2.54  0.6 - 4.6 x10(3)/mcL Final    Neut # 08/08/2022 10.4 (A) 2.1 - 9.2 x10(3)/mcL Final    Mono # 08/08/2022 0.58  0.1 - 1.3 x10(3)/mcL Final    Eos # 08/08/2022 0.12  0 - 0.9 x10(3)/mcL Final    Baso # 08/08/2022 0.07  0 - 0.2 x10(3)/mcL Final    IG# 08/08/2022 0.12 (A) 0 - 0.04 x10(3)/mcL Final    IG% 08/08/2022 0.9  % Final    Bacteria, UA 08/08/2022 None Seen  None Seen, Rare, Occasional /HPF Final    RBC, UA 08/08/2022 None Seen  None Seen, 0-2, 3-5, 0-5 /HPF Final    WBC, UA 08/08/2022 None Seen  None Seen, 0-2, 3-5, 0-5 /HPF Final    Squamous Epithelial Cells, UA 08/08/2022 None Seen  None  Seen, Rare, Occasional, Occ /HPF Final    POCT Glucose 08/08/2022 132 (A) 70 - 110 mg/dL Final       RADIOLOGY STUDIES ORDERED AND REVIEWED:  Imaging Results          X-Ray Chest AP Portable (Preliminary result)  Result time 08/08/22 11:22:14    ED Interpretation by Millie Burr MD (08/08/22 11:22:14, [facility] - Emergency Dept, Emergency Medicine)    Chest One View: No Focal Consolidation, No Acute Cardiopulmonary abnormality identified grossly.                              ED COURSE AND REEVALUATIONS:    PROCEDURES PERFORMED IN ED:  Procedures    ED Course as of 08/08/22 1159   Mon Aug 08, 2022   1122 Patient's workup in the emergency room does not reveal any major abnormality, she said that she did some drug but there is no drugs in her urine screen, possibly she did some legal weed which is known to cause this type of syndrome with no drugs on the drug screen.  She is doing better after the Haldol, I am going to let her go back home with her father with instruction to monitor her closely, they need to monitor her blood sugar and see from there. [GQ]      ED Course User Index  [GQ] Millie Burr MD              DIAGNOSTIC IMPRESSION:      1. Acute psychosis    2. Altered mental status    3. Drug use disorder         ED Disposition Condition    Discharge Stable             Medication List      ASK your doctor about these medications    amitriptyline 25 MG tablet  Commonly known as: ELAVIL     BASAGLAR KWIKPEN U-100 INSULIN glargine 100 units/mL SubQ pen  Generic drug: insulin     dicyclomine 10 MG capsule  Commonly known as: BENTYL     HumaLOG U-100 Insulin 100 unit/mL Crtg  Generic drug: insulin lispro     hydrOXYzine 50 MG tablet  Commonly known as: ATARAX     lisinopriL 20 MG tablet  Commonly known as: PRINIVIL,ZESTRIL     metoclopramide HCl 5 MG tablet  Commonly known as: REGLAN     metoprolol tartrate 50 MG tablet  Commonly known as: LOPRESSOR     olmesartan 40 MG tablet  Commonly known  as: BENICAR     ondansetron 4 MG tablet  Commonly known as: ZOFRAN     pantoprazole 40 MG tablet  Commonly known as: PROTONIX              Follow-up Information     Kumar Ortiz NP In 2 days.    Specialty: Family Medicine  Contact information:  [address] [place] [address]  [phone]                          ED Prescriptions     None        Follow-up Information     Follow up With Specialties Details Why Contact Info    Kumar Ortiz NP Family Medicine In 2 days  [address] [place] [address]  [phone]             Millie Burr MD  08/08/22 1126       Millie Burr MD  08/08/22 1155

## 2022-08-08 NOTE — DISCHARGE INSTRUCTIONS
Take medicines as prescribed    See your family doctor in one to 2 days for further evaluation, workup, and treatment as necessary    Avoid driving or operating machinery while taking medicines as some medicines might cause drowsiness and may cause problems. Also pain medicines have potential of being addictive  so use Pain meds specially Narcotics Sparingly.    The exam and treatment you received in Emergency Room was for an urgent problem and NOT INTENDED AS COMPLETE CARE. It is important that you FOLLOW UP with a doctor for ongoing care. If your symptoms become WORSE or you DO NOT IMPROVE and you are unable to reach your health care provider, you should RETURN to the emergency department. The Emergency Room doctor has provided a PRELIMINARY INTERPRETATION of all your STUDIES. A final interpretation may be done after you are discharged. IF A CHANGE in your diagnosis or treatment is needed WE WILL CONTACT YOU. It is critical that we have a CURRENT PHONE NUMBER FOR YOU.          See a psychiatrist for follow-up.    If you already have one.  Please see him or her.  Otherwise, talk to your family doctor to refer you to one.

## 2022-08-14 ENCOUNTER — HOSPITAL ENCOUNTER (INPATIENT)
Facility: HOSPITAL | Age: 27
LOS: 3 days | Discharge: HOME | DRG: 690 | End: 2022-08-17
Attending: INTERNAL MEDICINE | Admitting: INTERNAL MEDICINE
Payer: MEDICAID

## 2022-08-14 DIAGNOSIS — R11.2 NON-INTRACTABLE VOMITING WITH NAUSEA, UNSPECIFIED VOMITING TYPE: ICD-10-CM

## 2022-08-14 DIAGNOSIS — R11.10 EMESIS: ICD-10-CM

## 2022-08-14 DIAGNOSIS — N30.01 ACUTE CYSTITIS WITH HEMATURIA: ICD-10-CM

## 2022-08-14 DIAGNOSIS — R10.13 EPIGASTRIC PAIN: ICD-10-CM

## 2022-08-14 DIAGNOSIS — Z76.5 DRUG-SEEKING BEHAVIOR: Chronic | ICD-10-CM

## 2022-08-14 DIAGNOSIS — M54.50 LUMBAR PAIN: ICD-10-CM

## 2022-08-14 DIAGNOSIS — R73.9 HYPERGLYCEMIA: ICD-10-CM

## 2022-08-14 DIAGNOSIS — R11.2 NON-INTRACTABLE VOMITING WITH NAUSEA: Primary | ICD-10-CM

## 2022-08-14 DIAGNOSIS — R11.2 INTRACTABLE VOMITING WITH NAUSEA, UNSPECIFIED VOMITING TYPE: ICD-10-CM

## 2022-08-14 LAB
ALBUMIN SERPL-MCNC: 3.7 GM/DL (ref 3.5–5)
ALBUMIN/GLOB SERPL: 0.9 RATIO (ref 1.1–2)
ALP SERPL-CCNC: 102 UNIT/L (ref 40–150)
ALT SERPL-CCNC: 5 UNIT/L (ref 0–55)
AMPHET UR QL SCN: NEGATIVE
APPEARANCE UR: CLEAR
AST SERPL-CCNC: 13 UNIT/L (ref 5–34)
B-HCG SERPL QL: NEGATIVE
BACTERIA #/AREA URNS AUTO: ABNORMAL /HPF
BARBITURATE SCN PRESENT UR: NEGATIVE
BASOPHILS # BLD AUTO: 0.05 X10(3)/MCL (ref 0–0.2)
BASOPHILS NFR BLD AUTO: 0.7 %
BENZODIAZ UR QL SCN: NEGATIVE
BILIRUB UR QL STRIP.AUTO: NEGATIVE MG/DL
BILIRUBIN DIRECT+TOT PNL SERPL-MCNC: 0.4 MG/DL
BUN SERPL-MCNC: 16 MG/DL (ref 7–18.7)
CALCIUM SERPL-MCNC: 9.6 MG/DL (ref 8.4–10.2)
CANNABINOIDS UR QL SCN: NEGATIVE
CHLORIDE SERPL-SCNC: 104 MMOL/L (ref 98–107)
CO2 SERPL-SCNC: 17 MMOL/L (ref 22–29)
COCAINE UR QL SCN: NEGATIVE
COLOR UR AUTO: YELLOW
CREAT SERPL-MCNC: 1.2 MG/DL (ref 0.55–1.02)
EOSINOPHIL # BLD AUTO: 0.07 X10(3)/MCL (ref 0–0.9)
EOSINOPHIL NFR BLD AUTO: 1 %
ERYTHROCYTE [DISTWIDTH] IN BLOOD BY AUTOMATED COUNT: 12.6 % (ref 11.5–17)
FENTANYL UR QL SCN: NEGATIVE
GFR SERPLBLD CREATININE-BSD FMLA CKD-EPI: >60 MLS/MIN/1.73/M2
GLOBULIN SER-MCNC: 4 GM/DL (ref 2.4–3.5)
GLUCOSE SERPL-MCNC: 293 MG/DL (ref 74–100)
GLUCOSE SERPL-MCNC: 308 MG/DL (ref 70–110)
GLUCOSE UR QL STRIP.AUTO: 250 MG/DL
HCT VFR BLD AUTO: 32.9 % (ref 37–47)
HGB BLD-MCNC: 10.6 GM/DL (ref 12–16)
IMM GRANULOCYTES # BLD AUTO: 0.03 X10(3)/MCL (ref 0–0.04)
IMM GRANULOCYTES NFR BLD AUTO: 0.4 %
KETONES UR QL STRIP.AUTO: NEGATIVE MG/DL
LEUKOCYTE ESTERASE UR QL STRIP.AUTO: NEGATIVE UNIT/L
LYMPHOCYTES # BLD AUTO: 1.9 X10(3)/MCL (ref 0.6–4.6)
LYMPHOCYTES NFR BLD AUTO: 25.9 %
MCH RBC QN AUTO: 27.3 PG (ref 27–31)
MCHC RBC AUTO-ENTMCNC: 32.2 MG/DL (ref 33–36)
MCV RBC AUTO: 84.8 FL (ref 80–94)
MDMA UR QL SCN: NEGATIVE
MONOCYTES # BLD AUTO: 0.36 X10(3)/MCL (ref 0.1–1.3)
MONOCYTES NFR BLD AUTO: 4.9 %
NEUTROPHILS # BLD AUTO: 4.9 X10(3)/MCL (ref 2.1–9.2)
NEUTROPHILS NFR BLD AUTO: 67.1 %
NITRITE UR QL STRIP.AUTO: NEGATIVE
NRBC BLD AUTO-RTO: 0 %
OPIATES UR QL SCN: NEGATIVE
PCP UR QL: NEGATIVE
PH UR STRIP.AUTO: 5.5 [PH]
PH UR: 5.5 [PH] (ref 3–11)
PLATELET # BLD AUTO: 351 X10(3)/MCL (ref 130–400)
PMV BLD AUTO: 10.4 FL (ref 7.4–10.4)
POCT GLUCOSE: 185 MG/DL (ref 70–110)
POTASSIUM SERPL-SCNC: 4 MMOL/L (ref 3.5–5.1)
PROT SERPL-MCNC: 7.7 GM/DL (ref 6.4–8.3)
PROT UR QL STRIP.AUTO: >=300 MG/DL
RBC # BLD AUTO: 3.88 X10(6)/MCL (ref 4.2–5.4)
RBC #/AREA URNS AUTO: ABNORMAL /HPF
RBC UR QL AUTO: ABNORMAL UNIT/L
SARS-COV-2 RDRP RESP QL NAA+PROBE: NEGATIVE
SODIUM SERPL-SCNC: 136 MMOL/L (ref 136–145)
SP GR UR STRIP.AUTO: 1.02
SPECIFIC GRAVITY, URINE AUTO (.000) (OHS): 1.02 (ref 1–1.03)
SQUAMOUS #/AREA URNS AUTO: ABNORMAL /HPF
UROBILINOGEN UR STRIP-ACNC: 0.2 MG/DL
WBC # SPEC AUTO: 7.3 X10(3)/MCL (ref 4.5–11.5)
WBC #/AREA URNS AUTO: ABNORMAL /HPF

## 2022-08-14 PROCEDURE — 63700000 PHARM REV CODE 250 ALT 637 W/O HCPCS: Performed by: NURSE PRACTITIONER

## 2022-08-14 PROCEDURE — 81001 URINALYSIS AUTO W/SCOPE: CPT | Performed by: NURSE PRACTITIONER

## 2022-08-14 PROCEDURE — 80053 COMPREHEN METABOLIC PANEL: CPT | Performed by: NURSE PRACTITIONER

## 2022-08-14 PROCEDURE — 63600175 PHARM REV CODE 636 W HCPCS: Performed by: INTERNAL MEDICINE

## 2022-08-14 PROCEDURE — 96374 THER/PROPH/DIAG INJ IV PUSH: CPT

## 2022-08-14 PROCEDURE — 96372 THER/PROPH/DIAG INJ SC/IM: CPT | Performed by: INTERNAL MEDICINE

## 2022-08-14 PROCEDURE — G0378 HOSPITAL OBSERVATION PER HR: HCPCS

## 2022-08-14 PROCEDURE — 25000003 PHARM REV CODE 250: Performed by: INTERNAL MEDICINE

## 2022-08-14 PROCEDURE — 82962 GLUCOSE BLOOD TEST: CPT

## 2022-08-14 PROCEDURE — 85025 COMPLETE CBC W/AUTO DIFF WBC: CPT | Performed by: NURSE PRACTITIONER

## 2022-08-14 PROCEDURE — 36415 COLL VENOUS BLD VENIPUNCTURE: CPT | Performed by: NURSE PRACTITIONER

## 2022-08-14 PROCEDURE — 96361 HYDRATE IV INFUSION ADD-ON: CPT

## 2022-08-14 PROCEDURE — C9399 UNCLASSIFIED DRUGS OR BIOLOG: HCPCS | Performed by: INTERNAL MEDICINE

## 2022-08-14 PROCEDURE — 11000001 HC ACUTE MED/SURG PRIVATE ROOM

## 2022-08-14 PROCEDURE — 63600175 PHARM REV CODE 636 W HCPCS: Performed by: GENERAL ACUTE CARE HOSPITAL

## 2022-08-14 PROCEDURE — 96375 TX/PRO/DX INJ NEW DRUG ADDON: CPT

## 2022-08-14 PROCEDURE — 99285 EMERGENCY DEPT VISIT HI MDM: CPT | Mod: 25

## 2022-08-14 PROCEDURE — 63600175 PHARM REV CODE 636 W HCPCS: Performed by: NURSE PRACTITIONER

## 2022-08-14 PROCEDURE — 81025 URINE PREGNANCY TEST: CPT | Performed by: NURSE PRACTITIONER

## 2022-08-14 PROCEDURE — 25000003 PHARM REV CODE 250: Performed by: NURSE PRACTITIONER

## 2022-08-14 PROCEDURE — 80307 DRUG TEST PRSMV CHEM ANLYZR: CPT | Performed by: NURSE PRACTITIONER

## 2022-08-14 PROCEDURE — 87635 SARS-COV-2 COVID-19 AMP PRB: CPT | Performed by: NURSE PRACTITIONER

## 2022-08-14 PROCEDURE — 94761 N-INVAS EAR/PLS OXIMETRY MLT: CPT

## 2022-08-14 RX ORDER — DROPERIDOL 2.5 MG/ML
2.5 INJECTION, SOLUTION INTRAMUSCULAR; INTRAVENOUS ONCE
Status: DISCONTINUED | OUTPATIENT
Start: 2022-08-14 | End: 2022-08-14

## 2022-08-14 RX ORDER — FLUCONAZOLE 100 MG/1
100 TABLET ORAL
Status: COMPLETED | OUTPATIENT
Start: 2022-08-14 | End: 2022-08-14

## 2022-08-14 RX ORDER — SODIUM CHLORIDE 9 MG/ML
INJECTION, SOLUTION INTRAVENOUS CONTINUOUS
Status: DISCONTINUED | OUTPATIENT
Start: 2022-08-14 | End: 2022-08-17 | Stop reason: HOSPADM

## 2022-08-14 RX ORDER — MAG HYDROX/ALUMINUM HYD/SIMETH 200-200-20
30 SUSPENSION, ORAL (FINAL DOSE FORM) ORAL ONCE
Status: COMPLETED | OUTPATIENT
Start: 2022-08-14 | End: 2022-08-14

## 2022-08-14 RX ORDER — LIDOCAINE HYDROCHLORIDE 20 MG/ML
15 SOLUTION OROPHARYNGEAL ONCE
Status: COMPLETED | OUTPATIENT
Start: 2022-08-14 | End: 2022-08-14

## 2022-08-14 RX ORDER — GLUCAGON 1 MG
1 KIT INJECTION
Status: DISCONTINUED | OUTPATIENT
Start: 2022-08-14 | End: 2022-08-17 | Stop reason: HOSPADM

## 2022-08-14 RX ORDER — ACETAMINOPHEN 325 MG/1
650 TABLET ORAL EVERY 6 HOURS PRN
Status: DISCONTINUED | OUTPATIENT
Start: 2022-08-14 | End: 2022-08-17 | Stop reason: HOSPADM

## 2022-08-14 RX ORDER — ONDANSETRON 2 MG/ML
8 INJECTION INTRAMUSCULAR; INTRAVENOUS
Status: COMPLETED | OUTPATIENT
Start: 2022-08-14 | End: 2022-08-14

## 2022-08-14 RX ORDER — PROMETHAZINE HYDROCHLORIDE 25 MG/1
25 TABLET ORAL EVERY 6 HOURS PRN
Status: DISCONTINUED | OUTPATIENT
Start: 2022-08-14 | End: 2022-08-17 | Stop reason: HOSPADM

## 2022-08-14 RX ORDER — METOCLOPRAMIDE 10 MG/1
10 TABLET ORAL EVERY 6 HOURS
Qty: 30 TABLET | Refills: 0 | Status: ON HOLD | OUTPATIENT
Start: 2022-08-14 | End: 2022-09-26 | Stop reason: SDUPTHER

## 2022-08-14 RX ORDER — INSULIN ASPART 100 [IU]/ML
1-10 INJECTION, SOLUTION INTRAVENOUS; SUBCUTANEOUS EVERY 6 HOURS PRN
Status: DISCONTINUED | OUTPATIENT
Start: 2022-08-14 | End: 2022-08-17 | Stop reason: HOSPADM

## 2022-08-14 RX ORDER — PROCHLORPERAZINE EDISYLATE 5 MG/ML
10 INJECTION INTRAMUSCULAR; INTRAVENOUS
Status: COMPLETED | OUTPATIENT
Start: 2022-08-14 | End: 2022-08-14

## 2022-08-14 RX ORDER — METOPROLOL TARTRATE 50 MG/1
50 TABLET ORAL 2 TIMES DAILY
Status: DISCONTINUED | OUTPATIENT
Start: 2022-08-14 | End: 2022-08-17 | Stop reason: HOSPADM

## 2022-08-14 RX ORDER — CEFTRIAXONE 1 G/1
1 INJECTION, POWDER, FOR SOLUTION INTRAMUSCULAR; INTRAVENOUS
Status: DISCONTINUED | OUTPATIENT
Start: 2022-08-14 | End: 2022-08-17 | Stop reason: HOSPADM

## 2022-08-14 RX ORDER — DROPERIDOL 2.5 MG/ML
2.5 INJECTION, SOLUTION INTRAMUSCULAR; INTRAVENOUS ONCE
Status: COMPLETED | OUTPATIENT
Start: 2022-08-14 | End: 2022-08-14

## 2022-08-14 RX ORDER — FLUCONAZOLE 100 MG/1
100 TABLET ORAL DAILY
Qty: 7 TABLET | Refills: 0 | Status: ON HOLD | OUTPATIENT
Start: 2022-08-14 | End: 2022-08-21 | Stop reason: HOSPADM

## 2022-08-14 RX ORDER — SODIUM CHLORIDE 9 MG/ML
1000 INJECTION, SOLUTION INTRAVENOUS
Status: COMPLETED | OUTPATIENT
Start: 2022-08-14 | End: 2022-08-14

## 2022-08-14 RX ORDER — SODIUM CHLORIDE 9 MG/ML
1000 INJECTION, SOLUTION INTRAVENOUS
Status: ACTIVE | OUTPATIENT
Start: 2022-08-14 | End: 2022-08-15

## 2022-08-14 RX ORDER — NITROFURANTOIN 25; 75 MG/1; MG/1
100 CAPSULE ORAL 2 TIMES DAILY
Qty: 10 CAPSULE | Refills: 0 | Status: ON HOLD | OUTPATIENT
Start: 2022-08-14 | End: 2022-08-21 | Stop reason: HOSPADM

## 2022-08-14 RX ORDER — NITROFURANTOIN 25; 75 MG/1; MG/1
100 CAPSULE ORAL
Status: COMPLETED | OUTPATIENT
Start: 2022-08-14 | End: 2022-08-14

## 2022-08-14 RX ORDER — ENOXAPARIN SODIUM 100 MG/ML
40 INJECTION SUBCUTANEOUS EVERY 24 HOURS
Status: DISCONTINUED | OUTPATIENT
Start: 2022-08-14 | End: 2022-08-17 | Stop reason: HOSPADM

## 2022-08-14 RX ORDER — SODIUM CHLORIDE 0.9 % (FLUSH) 0.9 %
10 SYRINGE (ML) INJECTION
Status: DISCONTINUED | OUTPATIENT
Start: 2022-08-14 | End: 2022-08-17 | Stop reason: HOSPADM

## 2022-08-14 RX ORDER — PANTOPRAZOLE SODIUM 40 MG/1
40 TABLET, DELAYED RELEASE ORAL DAILY
Status: DISCONTINUED | OUTPATIENT
Start: 2022-08-15 | End: 2022-08-15

## 2022-08-14 RX ORDER — ONDANSETRON 2 MG/ML
4 INJECTION INTRAMUSCULAR; INTRAVENOUS EVERY 8 HOURS PRN
Status: DISCONTINUED | OUTPATIENT
Start: 2022-08-14 | End: 2022-08-17 | Stop reason: HOSPADM

## 2022-08-14 RX ORDER — TALC
6 POWDER (GRAM) TOPICAL NIGHTLY PRN
Status: DISCONTINUED | OUTPATIENT
Start: 2022-08-14 | End: 2022-08-17 | Stop reason: HOSPADM

## 2022-08-14 RX ADMIN — SODIUM CHLORIDE 1000 ML: 9 INJECTION, SOLUTION INTRAVENOUS at 05:08

## 2022-08-14 RX ADMIN — INSULIN ASPART 2 UNITS: 100 INJECTION, SOLUTION INTRAVENOUS; SUBCUTANEOUS at 10:08

## 2022-08-14 RX ADMIN — SODIUM CHLORIDE: 9 INJECTION, SOLUTION INTRAVENOUS at 10:08

## 2022-08-14 RX ADMIN — ONDANSETRON 8 MG: 2 INJECTION INTRAMUSCULAR; INTRAVENOUS at 06:08

## 2022-08-14 RX ADMIN — SODIUM CHLORIDE 1000 ML: 9 INJECTION, SOLUTION INTRAVENOUS at 06:08

## 2022-08-14 RX ADMIN — CEFTRIAXONE 1 G: 1 INJECTION, POWDER, FOR SOLUTION INTRAMUSCULAR; INTRAVENOUS at 09:08

## 2022-08-14 RX ADMIN — FLUCONAZOLE 100 MG: 100 TABLET ORAL at 06:08

## 2022-08-14 RX ADMIN — HUMAN INSULIN 7 UNITS: 100 INJECTION, SOLUTION SUBCUTANEOUS at 05:08

## 2022-08-14 RX ADMIN — PROCHLORPERAZINE EDISYLATE 10 MG: 5 INJECTION INTRAMUSCULAR; INTRAVENOUS at 09:08

## 2022-08-14 RX ADMIN — DROPERIDOL 2.5 MG: 2.5 INJECTION, SOLUTION INTRAMUSCULAR; INTRAVENOUS at 05:08

## 2022-08-14 RX ADMIN — LIDOCAINE HYDROCHLORIDE 15 ML: 20 SOLUTION ORAL at 08:08

## 2022-08-14 RX ADMIN — ENOXAPARIN SODIUM 40 MG: 100 INJECTION SUBCUTANEOUS at 10:08

## 2022-08-14 RX ADMIN — ALUMINUM HYDROXIDE, MAGNESIUM HYDROXIDE, AND SIMETHICONE 30 ML: 200; 200; 20 SUSPENSION ORAL at 08:08

## 2022-08-14 RX ADMIN — PROMETHAZINE HYDROCHLORIDE 25 MG: 25 TABLET ORAL at 11:08

## 2022-08-14 RX ADMIN — INSULIN DETEMIR 10 UNITS: 100 INJECTION, SOLUTION SUBCUTANEOUS at 10:08

## 2022-08-14 RX ADMIN — NITROFURANTOIN (MONOHYDRATE/MACROCRYSTALS) 100 MG: 75; 25 CAPSULE ORAL at 06:08

## 2022-08-14 NOTE — ED PROVIDER NOTES
Encounter Date: 8/14/2022       History     Chief Complaint   Patient presents with    Back Pain     Back pain   started again over the last 2 days     Patient is a 27-year-old female who is known to this ED who presents to the emergency department for evaluation and treatment of severe back pain, generalized body aches, she is crying in the room and it is difficult to elicit an accurate HPI, she is a known noncompliant type one diabetic, she is alert and awake, nods her head no to questions of any SOB or CP.        Review of patient's allergies indicates:  No Known Allergies  Past Medical History:   Diagnosis Date    Diabetes mellitus     Diabetic gastroparesis associated with type 1 diabetes mellitus     DKA (diabetic ketoacidosis)     DKA (diabetic ketoacidosis)     DKA (diabetic ketoacidosis)     Gastroparesis     Gastroparesis due to DM     Hypertension     Ketoacidosis due to diabetes     Non compliance with medical treatment     Pneumonia     Secondary DM with DKA      Past Surgical History:   Procedure Laterality Date    CHOLECYSTECTOMY      ESOPHAGOGASTRODUODENOSCOPY      Multiple    None       Family History   Problem Relation Age of Onset    Heart disease Mother     Hypertension Mother     Diabetes Mother     Hyperlipidemia Mother     Cancer Father     Stroke Father     Hypertension Father     Hyperlipidemia Father      Social History     Tobacco Use    Smoking status: Never Smoker    Smokeless tobacco: Never Used   Substance Use Topics    Alcohol use: Never    Drug use: Yes     Types: Marijuana     Review of Systems   All other systems reviewed and are negative.      Physical Exam     Initial Vitals [08/14/22 1621]   BP Pulse Resp Temp SpO2   (!) 95/56 100 (!) 22 98.6 °F (37 °C) 100 %      MAP       --         Physical Exam    Nursing note and vitals reviewed.  Constitutional: She appears well-developed and well-nourished.   HENT:   Head: Atraumatic.   Eyes: Conjunctivae are  normal.   Neck: Neck supple.   Normal range of motion.  Cardiovascular: Normal rate, regular rhythm and normal heart sounds.   Pulmonary/Chest: Breath sounds normal.   Abdominal: Abdomen is soft. There is no abdominal tenderness.   There is right CVA tenderness.  There is left CVA tenderness.   Musculoskeletal:      Cervical back: Normal, normal range of motion and neck supple.      Thoracic back: Normal.      Lumbar back: Normal.           ED Course   Procedures  Labs Reviewed   COMPREHENSIVE METABOLIC PANEL - Abnormal; Notable for the following components:       Result Value    Carbon Dioxide 17 (*)     Glucose Level 293 (*)     Creatinine 1.20 (*)     Globulin 4.0 (*)     Albumin/Globulin Ratio 0.9 (*)     All other components within normal limits   URINALYSIS, REFLEX TO URINE CULTURE - Abnormal; Notable for the following components:    Protein, UA >=300 (*)     Glucose,   (*)     Blood, UA Large (*)     All other components within normal limits   CBC WITH DIFFERENTIAL - Abnormal; Notable for the following components:    RBC 3.88 (*)     Hgb 10.6 (*)     Hct 32.9 (*)     MCHC 32.2 (*)     All other components within normal limits   URINALYSIS, MICROSCOPIC - Abnormal; Notable for the following components:    RBC, UA 11-20 (*)     All other components within normal limits   POCT GLUCOSE, HAND-HELD DEVICE - Abnormal; Notable for the following components:    POC Glucose 308 (*)     All other components within normal limits   DRUG SCREEN, URINE (BEAKER) - Normal    Narrative:     Cut off concentrations:    Amphetamines - 1000 ng/ml  Barbiturates - 200 ng/ml  Benzodiazepine - 200 ng/ml  Cannabinoids (THC) - 50 ng/ml  Cocaine - 300 ng/ml  Fentanyl - 1.0 ng/ml  MDMA - 500 ng/ml  Opiates - 300 ng/ml   Phencyclidine (PCP) - 25 ng/ml    Specimen submitted for drug analysis and tested for pH and specific gravity in order to evaluate sample integrity. Suspect tampering if specific gravity is <1.003 and/or pH is not  within the range of 4.5 - 8.0  False negatives may result form substances such as bleach added to urine.  False positives may result for the presence of a substance with similar chemical structure to the drug or its metabolite.    This test provides only a PRELIMINARY analytical test result. A more specific alternate chemical method must be used in order to obtain a confirmed analytical result. Gas chromatography/mass spectrometry (GC/MS) is the preferred confirmatory method. Other chemical confirmation methods are available. Clinical consideration and professional judgement should be applied to any drug of abuse test result, particularly when preliminary positive results are used.    Positive results will be confirmed only at the physicians request. Unconfirmed screening results are to be used only for medical purposes (treatment).        PREGNANCY TEST, URINE RAPID - Normal   SARS-COV-2 RNA AMPLIFICATION, QUAL - Normal   CBC W/ AUTO DIFFERENTIAL    Narrative:     The following orders were created for panel order CBC auto differential.  Procedure                               Abnormality         Status                     ---------                               -----------         ------                     CBC with Differential[[id number]]        Abnormal            Final result                 Please view results for these tests on the individual orders.          Imaging Results          CT Abdomen Pelvis  Without Contrast (Final result)  Result time 08/15/22 08:38:53    Final result by Danilo Navarro MD (08/15/22 08:38:53)                 Impression:    Impression:    1. The bladder is nondistended however the bladder wall appears thickened after considering state of nondistension which could reflect an element of cystitis.    2. No acute intraabdominal or pelvic solid organ or bowel pathology identified. Details and other findings as discussed above.    1. Concur with preliminary report      Electronically  signed by: Danilo Navarro  Date:    08/15/2022  Time:    08:38             Narrative:    EXAMINATION:  CT ABDOMEN PELVIS WITHOUT CONTRAST    CLINICAL HISTORY:  Epigastric pain;, .    TECHNIQUE:  PATIENT RADIATION DOSE: DLP(mGycm) 253.30    As per PQRS measures, all CT scans at this facility used dose modulation, iterative reconstruction, and/or weight based dose adjustment when appropriate to reduce radiation dose to as low as reasonably achievable.    COMPARISON:  None available.    FINDINGS:  Serial axial images were obtained of the abdomen pelvis without the administration of IV contrast.  Additional sagittal and coronal reconstructions were performed.    Lines and Tubes: None.    Thorax:    Lungs: The visualized lung bases appear unremarkable.    Heart: Mild cardiomegaly is seen.    Abdomen:    Abdominal Wall: No abdominal wall pathology is seen.    Liver: The liver appears unremarkable.    Biliary System: No intrahepatic or extrahepatic biliary duct dilatation is seen.    Gallbladder: Surgical clips are seen in the gallbladder fossa which may reflect prior cholecystectomy correlate with surgical history and visual inspection.    Pancreas: The pancreas appears unremarkable.    Spleen: The spleen appears unremarkable.    Adrenals: The adrenal glands appear unremarkable.    Kidneys: The kidneys appear unremarkable with no stones cysts masses or hydronephrosis.    Aorta: The abdominal aorta appears unremarkable.    IVC: Unremarkable.    Bowel:    Esophagus: There is a moderate sized hiatal hernia with mucosal prominence.    Stomach: The stomach appears unremarkable.    Duodenum: Unremarkable appearing duodenum.    Small Bowel: The small bowel appears unremarkable.    Colon: There is moderate stool in the colon which could reflect an element of constipation.    Appendix: The appendix appears unremarkable and is seen on â?OImage 104, Series 3â?O through â?OImage 87, Series 3â?O.    Peritoneum: No intraperitoneal free  air or ascites is seen.    Pelvis:    Bladder: The bladder is nondistended however the bladder wall appears thickened after considering state of nondistension.    Female:    Uterus: The uterus appears unremarkable.    Ovaries: The ovaries are not identified. No adnexal masses are seen.    Bony structures:    Dorsal Spine: The visualized dorsal spine appears unremarkable.    Bony Pelvis: The visualized bony structures of the pelvis appear unremarkable.                    Preliminary result by Interface, Rad Results In (08/14/22 19:38:28)                 Narrative:    START OF REPORT:  Technique: CT of the abdomen and pelvis was performed with axial images as well as sagittal and coronal reconstruction images without intravenous contrast.    Comparison: None available.    Clinical History: Abd pain.    Dosage Information: Automated Exposure Control was utilized.    Findings:  Lines and Tubes: None.  Thorax:  Lungs: The visualized lung bases appear unremarkable.  Heart: Mild cardiomegaly is seen.  Abdomen:  Abdominal Wall: No abdominal wall pathology is seen.  Liver: The liver appears unremarkable.  Biliary System: No intrahepatic or extrahepatic biliary duct dilatation is seen.  Gallbladder: Surgical clips are seen in the gallbladder fossa which may reflect prior cholecystectomy correlate with surgical history and visual inspection.  Pancreas: The pancreas appears unremarkable.  Spleen: The spleen appears unremarkable.  Adrenals: The adrenal glands appear unremarkable.  Kidneys: The kidneys appear unremarkable with no stones cysts masses or hydronephrosis.  Aorta: The abdominal aorta appears unremarkable.  IVC: Unremarkable.  Bowel:  Esophagus: There is a moderate sized hiatal hernia.  Stomach: The stomach appears unremarkable.  Duodenum: Unremarkable appearing duodenum.  Small Bowel: The small bowel appears unremarkable.  Colon: There is moderate stool in the colon which could reflect an element of  constipation.  Appendix: The appendix appears unremarkable and is seen on âImage 104, Series 3â through âImage 87, Series 3â.  Peritoneum: No intraperitoneal free air or ascites is seen.    Pelvis:  Bladder: The bladder is nondistended however the bladder wall appears thickened after considering state of nondistension.  Female:  Uterus: The uterus appears unremarkable.  Ovaries: The ovaries are not identified. No adnexal masses are seen.    Bony structures:  Dorsal Spine: The visualized dorsal spine appears unremarkable.  Bony Pelvis: The visualized bony structures of the pelvis appear unremarkable.      Impression:  1. The bladder is nondistended however the bladder wall appears thickened after considering state of nondistension which could reflect an element of cystitis.  2. No acute intraabdominal or pelvic solid organ or bowel pathology identified. Details and other findings as discussed above.                      Preliminary result by Danilo Navarro MD (08/14/22 19:38:28)                 Narrative:    START OF REPORT:  Technique: CT of the abdomen and pelvis was performed with axial images as well as sagittal and coronal reconstruction images without intravenous contrast.    Comparison: None available.    Clinical History: Abd pain.    Dosage Information: Automated Exposure Control was utilized.    Findings:  Lines and Tubes: None.  Thorax:  Lungs: The visualized lung bases appear unremarkable.  Heart: Mild cardiomegaly is seen.  Abdomen:  Abdominal Wall: No abdominal wall pathology is seen.  Liver: The liver appears unremarkable.  Biliary System: No intrahepatic or extrahepatic biliary duct dilatation is seen.  Gallbladder: Surgical clips are seen in the gallbladder fossa which may reflect prior cholecystectomy correlate with surgical history and visual inspection.  Pancreas: The pancreas appears unremarkable.  Spleen: The spleen appears unremarkable.  Adrenals: The adrenal glands appear  unremarkable.  Kidneys: The kidneys appear unremarkable with no stones cysts masses or hydronephrosis.  Aorta: The abdominal aorta appears unremarkable.  IVC: Unremarkable.  Bowel:  Esophagus: There is a moderate sized hiatal hernia.  Stomach: The stomach appears unremarkable.  Duodenum: Unremarkable appearing duodenum.  Small Bowel: The small bowel appears unremarkable.  Colon: There is moderate stool in the colon which could reflect an element of constipation.  Appendix: The appendix appears unremarkable and is seen on âImage 104, Series 3â through âImage 87, Series 3â.  Peritoneum: No intraperitoneal free air or ascites is seen.    Pelvis:  Bladder: The bladder is nondistended however the bladder wall appears thickened after considering state of nondistension.  Female:  Uterus: The uterus appears unremarkable.  Ovaries: The ovaries are not identified. No adnexal masses are seen.    Bony structures:  Dorsal Spine: The visualized dorsal spine appears unremarkable.  Bony Pelvis: The visualized bony structures of the pelvis appear unremarkable.      Impression:  1. The bladder is nondistended however the bladder wall appears thickened after considering state of nondistension which could reflect an element of cystitis.  2. No acute intraabdominal or pelvic solid organ or bowel pathology identified. Details and other findings as discussed above.                                 RADIOLOGY REPORT (Final result)  Result time 08/17/22 15:12:56    Final result by Unknown User (08/17/22 15:12:56)                                   Medications   0.9%  NaCl infusion (1,000 mLs Intravenous Not Given 8/14/22 2200)   0.9%  NaCl infusion ( Intravenous Canceled Entry 8/15/22 1045)   droperidoL injection 2.5 mg (2.5 mg Intravenous Given 8/14/22 1700)   0.9%  NaCl infusion (0 mLs Intravenous Stopped 8/14/22 1805)   insulin regular injection 7 Units 0.07 mL (7 Units Intravenous Given 8/14/22 1753)   nitrofurantoin  (macrocrystal-monohydrate) 100 MG capsule 100 mg (100 mg Oral Given 8/14/22 1818)   fluconazole tablet 100 mg (100 mg Oral Given 8/14/22 1818)   ondansetron injection 8 mg (8 mg Intravenous Given 8/14/22 1837)   0.9%  NaCl infusion (0 mLs Intravenous Stopped 8/14/22 2135)   aluminum-magnesium hydroxide-simethicone 200-200-20 mg/5 mL suspension 30 mL (30 mLs Oral Given 8/14/22 2025)     And   LIDOcaine HCl 2% oral solution 15 mL (15 mLs Oral Given 8/14/22 2025)   prochlorperazine injection Soln 10 mg (10 mg Intravenous Given 8/14/22 2148)   traMADoL tablet 50 mg (50 mg Oral Given 8/15/22 0533)   magnesium sulfate 2g in water 50mL IVPB (premix) (0 g Intravenous Stopped 8/16/22 1709)                 ED Course as of 08/20/22 1749   Sun Aug 14, 2022   1712 Anion gap is 15, not in DKA at this time, will administer 7 units of regular insulin, cancelled ABG per Dr. Burr, will hydrate while in the ED, UA pending [EB]   1800 States feels much better after hydration, has no evidence of systemic infection, I will treat her for gastroparesis and a UTI at this time, instructed to take her insulin as prescribed and diabetic diet, she verbalized understanding and she will be discharged at this time. [EB]   1923 The patient was administered her 1st dose of Diflucan and Macrobid while in the ED, she vomited immediately thereafter, she was given some Zofran and another bag of normal saline, she was then re-examined by myself and found to have severe epigastric pain that was not present upon initial examination, her flank pain has since resolved, her blood pressure was noted to increase during her time in the ED, I will order a CT at this time.  She did have a CT abdomen and pelvis done last February, no acute findings. [EB]   2003 CT results reviewed in detail, no acute findings in her epigastrium, confirm diagnosis consistent with cystitis for which she will be treated, I will administer GI cocktail at this time and discharge her  to home to follow-up with her PCP tomorrow. [EB]   2100 Upon attempt to discharge, the patient continues to retch and vomit when we mention discharged to her, I re-evaluated her and ask  to re-evaluate her, due to her degree of vomiting and known gastric paresis we would like to admit her observation overnight for control of intractable vomiting, cystitis. [EB]   2100 The patient came for nausea and intractable emesis ( has h/o gastroparesis), CT of abd/pelvis is negative, she has cystitis, she threw up PO Macrobid which was given to her, on PE the patient is actively throwing up, because oef DM 1 type and intractable emesis, she requires IV hydration with IV ABX x 24 hrs admission [IP]   2103 I passed care to Dr. Faust at 9:03 p.m. [EB]   2148 The patient was accepted by a hospitalist ( DR Franco) [IP]      ED Course User Index  [EB] Samantha Reynolds, LORENZA  [IP] Shana Brown MD             Clinical Impression:   Final diagnoses:  [R73.9] Hyperglycemia  [M54.50] Lumbar pain  [N30.01] Acute cystitis with hematuria  [R11.2] Non-intractable vomiting with nausea, unspecified vomiting type  [R10.13] Epigastric pain  [R11.2] Intractable vomiting with nausea, unspecified vomiting type  [R11.10] Emesis          ED Disposition Condition    Observation               Samantha Reynolds, ORLANDO  08/14/22 1806       ORLANDO Christine  08/14/22 2005       ORLANDO Christine  08/14/22 2104       Samantha Reynolds, SALVADOR  08/20/22 [id number]

## 2022-08-15 LAB
POCT GLUCOSE: 200 MG/DL (ref 70–110)
POCT GLUCOSE: 223 MG/DL (ref 70–110)
POCT GLUCOSE: 280 MG/DL (ref 70–110)
POCT GLUCOSE: 308 MG/DL (ref 70–110)

## 2022-08-15 PROCEDURE — 11000001 HC ACUTE MED/SURG PRIVATE ROOM

## 2022-08-15 PROCEDURE — 96372 THER/PROPH/DIAG INJ SC/IM: CPT | Performed by: INTERNAL MEDICINE

## 2022-08-15 PROCEDURE — 25000003 PHARM REV CODE 250: Performed by: INTERNAL MEDICINE

## 2022-08-15 PROCEDURE — 63600175 PHARM REV CODE 636 W HCPCS: Performed by: FAMILY MEDICINE

## 2022-08-15 PROCEDURE — 63600175 PHARM REV CODE 636 W HCPCS: Performed by: INTERNAL MEDICINE

## 2022-08-15 PROCEDURE — G0378 HOSPITAL OBSERVATION PER HR: HCPCS

## 2022-08-15 PROCEDURE — C9399 UNCLASSIFIED DRUGS OR BIOLOG: HCPCS | Performed by: INTERNAL MEDICINE

## 2022-08-15 PROCEDURE — C9113 INJ PANTOPRAZOLE SODIUM, VIA: HCPCS | Performed by: FAMILY MEDICINE

## 2022-08-15 PROCEDURE — 25000003 PHARM REV CODE 250: Performed by: HOSPITALIST

## 2022-08-15 PROCEDURE — 25000003 PHARM REV CODE 250: Performed by: FAMILY MEDICINE

## 2022-08-15 PROCEDURE — 94761 N-INVAS EAR/PLS OXIMETRY MLT: CPT

## 2022-08-15 RX ORDER — MORPHINE SULFATE 4 MG/ML
1 INJECTION, SOLUTION INTRAMUSCULAR; INTRAVENOUS EVERY 4 HOURS PRN
Status: DISCONTINUED | OUTPATIENT
Start: 2022-08-15 | End: 2022-08-16

## 2022-08-15 RX ORDER — ONDANSETRON 2 MG/ML
4 INJECTION INTRAMUSCULAR; INTRAVENOUS EVERY 6 HOURS PRN
Status: DISCONTINUED | OUTPATIENT
Start: 2022-08-15 | End: 2022-08-15

## 2022-08-15 RX ORDER — TRAMADOL HYDROCHLORIDE 50 MG/1
50 TABLET ORAL ONCE
Status: COMPLETED | OUTPATIENT
Start: 2022-08-15 | End: 2022-08-15

## 2022-08-15 RX ORDER — PROMETHAZINE HYDROCHLORIDE 25 MG/ML
12.5 INJECTION, SOLUTION INTRAMUSCULAR; INTRAVENOUS EVERY 6 HOURS PRN
Status: DISCONTINUED | OUTPATIENT
Start: 2022-08-15 | End: 2022-08-17 | Stop reason: HOSPADM

## 2022-08-15 RX ORDER — HYDRALAZINE HYDROCHLORIDE 20 MG/ML
10 INJECTION INTRAMUSCULAR; INTRAVENOUS EVERY 6 HOURS PRN
Status: DISCONTINUED | OUTPATIENT
Start: 2022-08-15 | End: 2022-08-17 | Stop reason: HOSPADM

## 2022-08-15 RX ORDER — SODIUM CHLORIDE 9 MG/ML
1000 INJECTION, SOLUTION INTRAVENOUS
Status: ACTIVE | OUTPATIENT
Start: 2022-08-15 | End: 2022-08-15

## 2022-08-15 RX ADMIN — SODIUM CHLORIDE: 9 INJECTION, SOLUTION INTRAVENOUS at 03:08

## 2022-08-15 RX ADMIN — MORPHINE SULFATE 1 MG: 4 INJECTION INTRAVENOUS at 08:08

## 2022-08-15 RX ADMIN — ACETAMINOPHEN 325MG 650 MG: 325 TABLET ORAL at 04:08

## 2022-08-15 RX ADMIN — PANTOPRAZOLE SODIUM 40 MG: 40 INJECTION, POWDER, FOR SOLUTION INTRAVENOUS at 11:08

## 2022-08-15 RX ADMIN — METOPROLOL TARTRATE 50 MG: 50 TABLET, FILM COATED ORAL at 08:08

## 2022-08-15 RX ADMIN — ONDANSETRON 4 MG: 2 INJECTION INTRAMUSCULAR; INTRAVENOUS at 03:08

## 2022-08-15 RX ADMIN — INSULIN ASPART 6 UNITS: 100 INJECTION, SOLUTION INTRAVENOUS; SUBCUTANEOUS at 04:08

## 2022-08-15 RX ADMIN — ENOXAPARIN SODIUM 40 MG: 100 INJECTION SUBCUTANEOUS at 04:08

## 2022-08-15 RX ADMIN — PANTOPRAZOLE SODIUM 40 MG: 40 TABLET, DELAYED RELEASE ORAL at 08:08

## 2022-08-15 RX ADMIN — INSULIN DETEMIR 10 UNITS: 100 INJECTION, SOLUTION SUBCUTANEOUS at 08:08

## 2022-08-15 RX ADMIN — MORPHINE SULFATE 1 MG: 4 INJECTION INTRAVENOUS at 11:08

## 2022-08-15 RX ADMIN — CEFTRIAXONE 1 G: 1 INJECTION, POWDER, FOR SOLUTION INTRAMUSCULAR; INTRAVENOUS at 09:08

## 2022-08-15 RX ADMIN — PROMETHAZINE HYDROCHLORIDE 25 MG: 25 TABLET ORAL at 06:08

## 2022-08-15 RX ADMIN — HYDRALAZINE HYDROCHLORIDE 10 MG: 20 INJECTION INTRAMUSCULAR; INTRAVENOUS at 03:08

## 2022-08-15 RX ADMIN — TRAMADOL HYDROCHLORIDE 50 MG: 50 TABLET, COATED ORAL at 05:08

## 2022-08-15 RX ADMIN — MORPHINE SULFATE 1 MG: 4 INJECTION INTRAVENOUS at 03:08

## 2022-08-15 NOTE — SUBJECTIVE & OBJECTIVE
Interval History:  epigastric pain, nausea and vomting    Review of Systems   Constitutional:  Positive for activity change, appetite change and fatigue. Negative for fever.   HENT:  Negative for ear pain, sinus pressure, sinus pain and sore throat.    Respiratory:  Negative for cough, chest tightness, shortness of breath and wheezing.    Cardiovascular:  Negative for chest pain and leg swelling.   Gastrointestinal:  Positive for abdominal pain, nausea and vomiting. Negative for constipation and diarrhea.   Genitourinary:  Negative for dysuria, flank pain and frequency.   Musculoskeletal:  Negative for back pain, gait problem and joint swelling.   Skin:  Negative for rash and wound.   Neurological:  Negative for seizures, light-headedness and numbness.   Psychiatric/Behavioral:  Negative for agitation, behavioral problems and confusion.    Objective:     Vital Signs (Most Recent):  Temp: 99.1 °F (37.3 °C) (08/15/22 0726)  Pulse: 105 (08/15/22 0726)  Resp: 20 (08/15/22 1119)  BP: (!) 187/109 (08/15/22 0726)  SpO2: 99 % (08/15/22 0726)   Vital Signs (24h Range):  Temp:  [98.6 °F (37 °C)-99.2 °F (37.3 °C)] 99.1 °F (37.3 °C)  Pulse:  [] 105  Resp:  [18-22] 20  SpO2:  [76 %-100 %] 99 %  BP: ()/() 187/109     Weight: 67.6 kg (149 lb)  Body mass index is 27.25 kg/m².    Intake/Output Summary (Last 24 hours) at 8/15/2022 1516  Last data filed at 8/15/2022 1200  Gross per 24 hour   Intake 3373.33 ml   Output --   Net 3373.33 ml      Physical Exam  Vitals and nursing note reviewed.   Constitutional:       General: She is in acute distress.      Appearance: She is normal weight. She is ill-appearing. She is not toxic-appearing.   HENT:      Head: Normocephalic and atraumatic.      Right Ear: External ear normal.      Left Ear: External ear normal.      Nose: Nose normal.      Mouth/Throat:      Mouth: Mucous membranes are moist.      Pharynx: Oropharynx is clear.   Eyes:      General: No scleral icterus.      Conjunctiva/sclera: Conjunctivae normal.   Cardiovascular:      Rate and Rhythm: Normal rate and regular rhythm.      Heart sounds: Normal heart sounds. No murmur heard.  Pulmonary:      Effort: Pulmonary effort is normal. No respiratory distress.      Breath sounds: Normal breath sounds. No wheezing or rales.   Abdominal:      Palpations: Abdomen is soft.      Tenderness: There is abdominal tenderness (epigastric).   Musculoskeletal:      Cervical back: Normal range of motion and neck supple.   Skin:     General: Skin is warm.      Coloration: Skin is not jaundiced.      Findings: No erythema or rash.   Neurological:      Mental Status: She is alert.       Significant Labs: All pertinent labs within the past 24 hours have been reviewed.       Significant Imaging: I have reviewed all pertinent imaging results/findings within the past 24 hours.

## 2022-08-15 NOTE — SUBJECTIVE & OBJECTIVE
Past Medical History:   Diagnosis Date    Diabetes mellitus     Diabetic gastroparesis associated with type 1 diabetes mellitus     DKA (diabetic ketoacidosis)     DKA (diabetic ketoacidosis)     DKA (diabetic ketoacidosis)     Gastroparesis     Gastroparesis due to DM     Hypertension     Ketoacidosis due to diabetes     Non compliance with medical treatment     Pneumonia     Secondary DM with DKA        Past Surgical History:   Procedure Laterality Date    CHOLECYSTECTOMY      ESOPHAGOGASTRODUODENOSCOPY      Multiple    None         Review of patient's allergies indicates:  No Known Allergies    No current facility-administered medications on file prior to encounter.     Current Outpatient Medications on File Prior to Encounter   Medication Sig    amitriptyline (ELAVIL) 25 MG tablet Take 25 mg by mouth nightly.    BASAGLAR KWIKPEN U-100 INSULIN glargine 100 units/mL (3mL) SubQ pen Inject into the skin.    dicyclomine (BENTYL) 10 MG capsule Take 10 mg by mouth 4 (four) times daily.    hydrOXYzine (ATARAX) 50 MG tablet Take 50 mg by mouth daily as needed.    insulin lispro (HUMALOG U-100 INSULIN) 100 unit/mL Crtg Inject into the skin.    lisinopriL (PRINIVIL,ZESTRIL) 20 MG tablet Take 20 mg by mouth 2 (two) times daily.    metoprolol tartrate (LOPRESSOR) 50 MG tablet Take 50 mg by mouth 2 (two) times daily.    olmesartan (BENICAR) 40 MG tablet Take 40 mg by mouth once daily.    ondansetron (ZOFRAN) 4 MG tablet Take 4 mg by mouth every 6 (six) hours as needed.    pantoprazole (PROTONIX) 40 MG tablet Take 40 mg by mouth once daily.    [DISCONTINUED] metoclopramide HCl (REGLAN) 5 MG tablet Take 5 mg by mouth 4 (four) times daily.     Family History       Problem Relation (Age of Onset)    Cancer Father    Diabetes Mother    Heart disease Mother    Hyperlipidemia Mother, Father    Hypertension Mother, Father    Stroke Father          Tobacco Use    Smoking status: Never Smoker    Smokeless tobacco: Never Used    Substance and Sexual Activity    Alcohol use: Never    Drug use: Yes     Types: Marijuana    Sexual activity: Yes     Review of Systems   Constitutional: Negative.    HENT: Negative.     Eyes: Negative.    Respiratory: Negative.     Cardiovascular: Negative.    Gastrointestinal:  Positive for nausea and vomiting.   Endocrine: Negative.    Genitourinary: Negative.    Musculoskeletal:  Positive for back pain.   Skin: Negative.    Allergic/Immunologic: Negative.    Neurological: Negative.    Hematological: Negative.    Psychiatric/Behavioral: Negative.     Objective:     Vital Signs (Most Recent):  Temp: 98.8 °F (37.1 °C) (08/14/22 2236)  Pulse: 81 (08/14/22 2236)  Resp: 20 (08/14/22 2236)  BP: (!) 186/112 (08/14/22 2236)  SpO2: 98 % (08/14/22 2236)   Vital Signs (24h Range):  Temp:  [98.6 °F (37 °C)-98.8 °F (37.1 °C)] 98.8 °F (37.1 °C)  Pulse:  [] 81  Resp:  [20-22] 20  SpO2:  [76 %-100 %] 98 %  BP: ()/() 186/112     Weight: 67.6 kg (149 lb)  Body mass index is 27.25 kg/m².    Physical Exam  Constitutional:       Appearance: Normal appearance. She is normal weight.   HENT:      Head: Normocephalic and atraumatic.      Nose: Nose normal.      Mouth/Throat:      Mouth: Mucous membranes are moist.      Pharynx: Oropharynx is clear.   Eyes:      Extraocular Movements: Extraocular movements intact.      Conjunctiva/sclera: Conjunctivae normal.      Pupils: Pupils are equal, round, and reactive to light.   Cardiovascular:      Rate and Rhythm: Normal rate and regular rhythm.      Pulses: Normal pulses.      Heart sounds: Normal heart sounds.   Pulmonary:      Effort: Pulmonary effort is normal.      Breath sounds: Normal breath sounds.   Abdominal:      General: Abdomen is flat. Bowel sounds are normal.      Palpations: Abdomen is soft.   Musculoskeletal:         General: Normal range of motion.      Cervical back: Normal range of motion and neck supple.   Skin:     General: Skin is warm.   Neurological:       General: No focal deficit present.      Mental Status: She is alert and oriented to person, place, and time. Mental status is at baseline.   Psychiatric:         Mood and Affect: Mood normal.         CRANIAL NERVES     CN III, IV, VI   Pupils are equal, round, and reactive to light.     Significant Labs: All pertinent labs within the past 24 hours have been reviewed.  A1C:   Recent Labs   Lab 05/21/22  0514 05/22/22  0239 06/01/22  0559   HGBA1C 9.3* 9.3* 10.5*     ABGs: No results for input(s): PH, PCO2, HCO3, POCSATURATED, BE, TOTALHB, COHB, METHB, O2HB, POCFIO2, PO2 in the last 48 hours.  BMP:   Recent Labs   Lab 08/14/22  1643      K 4.0   CO2 17*   BUN 16.0   CREATININE 1.20*   CALCIUM 9.6     CBC:   Recent Labs   Lab 08/14/22  1643   WBC 7.3   HGB 10.6*   HCT 32.9*        CMP:   Recent Labs   Lab 08/14/22  1643      K 4.0   CO2 17*   BUN 16.0   CREATININE 1.20*   CALCIUM 9.6   ALBUMIN 3.7   BILITOT 0.4   ALKPHOS 102   AST 13   ALT 5     Cardiac Markers: No results for input(s): CKMB, MYOGLOBIN, BNP, TROPISTAT in the last 48 hours.  Coagulation: No results for input(s): PT, INR, APTT in the last 48 hours.  Lactic Acid: No results for input(s): LACTATE in the last 48 hours.  Lipase: No results for input(s): LIPASE in the last 48 hours.  Magnesium: No results for input(s): MG in the last 48 hours.  Prealbumin: No results for input(s): PREALBUMIN in the last 48 hours.  Troponin: No results for input(s): TROPONINI in the last 48 hours.  TSH:   Recent Labs   Lab 07/08/22  1054   TSH 1.1516     Urine Studies:   Recent Labs   Lab 08/14/22  1744   APPEARANCEUA Clear   PROTEINUA >=300*   BILIRUBINUA Negative   UROBILINOGEN 0.2   LEUKOCYTESUR Negative   RBCUA 11-20*   WBCUA None Seen   BACTERIA Rare       Significant Imaging:   Abd CT  1. The bladder is nondistended however the bladder wall appears thickened after considering state of nondistension which could reflect an element of cystitis.  2.  No acute intraabdominal or pelvic solid organ or bowel pathology identified. Details and other findings as discussed above.

## 2022-08-15 NOTE — PROGRESS NOTES
[facility] Medicine  Progress Note    Patient Name: Dorene Husain  MRN: [id number]  Patient Class: OP- Observation   Admission Date: 8/14/2022  Length of Stay: 0 days  Attending Physician: Sergio Hidalgo MD  Primary Care Provider: Kumar Ortiz NP        Subjective:     Principal Problem:Acute cystitis with hematuria        HPI:  27-year-old female with hx IDDM,. HTN, GERD, ch back pain who is known to this ED who presents to the emergency department for evaluation and treatment of severe back pain, generalized body aches, she is crying. Denies F/C , Chest pain, SOB Denies dysuria, no abd pain, C/O back pain, nausea, vomiting. She takes Lantus 30 units BID and Novolog      Overview/Hospital Course:  Pt admitted overnight with acute abdominal pain, h/o DM uncontrolled with hyperglycemia, c/o vomiting and severe abdominal epigastric pain.  PT had more vomiting this am, took a few sips of clear liquids, c/o epigastric pain.        Interval History:  epigastric pain, nausea and vomting    Review of Systems   Constitutional:  Positive for activity change, appetite change and fatigue. Negative for fever.   HENT:  Negative for ear pain, sinus pressure, sinus pain and sore throat.    Respiratory:  Negative for cough, chest tightness, shortness of breath and wheezing.    Cardiovascular:  Negative for chest pain and leg swelling.   Gastrointestinal:  Positive for abdominal pain, nausea and vomiting. Negative for constipation and diarrhea.   Genitourinary:  Negative for dysuria, flank pain and frequency.   Musculoskeletal:  Negative for back pain, gait problem and joint swelling.   Skin:  Negative for rash and wound.   Neurological:  Negative for seizures, light-headedness and numbness.   Psychiatric/Behavioral:  Negative for agitation, behavioral problems and confusion.    Objective:     Vital Signs (Most Recent):  Temp: 99.1 °F (37.3 °C) (08/15/22 0726)  Pulse: 105 (08/15/22  0726)  Resp: 20 (08/15/22 1119)  BP: (!) 187/109 (08/15/22 0726)  SpO2: 99 % (08/15/22 0726)   Vital Signs (24h Range):  Temp:  [98.6 °F (37 °C)-99.2 °F (37.3 °C)] 99.1 °F (37.3 °C)  Pulse:  [] 105  Resp:  [18-22] 20  SpO2:  [76 %-100 %] 99 %  BP: ()/() 187/109     Weight: 67.6 kg (149 lb)  Body mass index is 27.25 kg/m².    Intake/Output Summary (Last 24 hours) at 8/15/2022 1516  Last data filed at 8/15/2022 1200  Gross per 24 hour   Intake 3373.33 ml   Output --   Net 3373.33 ml      Physical Exam  Vitals and nursing note reviewed.   Constitutional:       General: She is in acute distress.      Appearance: She is normal weight. She is ill-appearing. She is not toxic-appearing.   HENT:      Head: Normocephalic and atraumatic.      Right Ear: External ear normal.      Left Ear: External ear normal.      Nose: Nose normal.      Mouth/Throat:      Mouth: Mucous membranes are moist.      Pharynx: Oropharynx is clear.   Eyes:      General: No scleral icterus.     Conjunctiva/sclera: Conjunctivae normal.   Cardiovascular:      Rate and Rhythm: Normal rate and regular rhythm.      Heart sounds: Normal heart sounds. No murmur heard.  Pulmonary:      Effort: Pulmonary effort is normal. No respiratory distress.      Breath sounds: Normal breath sounds. No wheezing or rales.   Abdominal:      Palpations: Abdomen is soft.      Tenderness: There is abdominal tenderness (epigastric).   Musculoskeletal:      Cervical back: Normal range of motion and neck supple.   Skin:     General: Skin is warm.      Coloration: Skin is not jaundiced.      Findings: No erythema or rash.   Neurological:      Mental Status: She is alert.       Significant Labs: All pertinent labs within the past 24 hours have been reviewed.       Significant Imaging: I have reviewed all pertinent imaging results/findings within the past 24 hours.         Assessment/Plan:      * Acute cystitis with hematuria  Check Urine culture, continue  rocephin.      Non-intractable vomiting with nausea  Continue with antiemetics,   Add zofran and phenergan if needed      Epigastric pain  Continue PPI protonix 40mg iv daily      Hyperglycemia  Type 1 DM with hyperglycemia decreased Levemir 12 units BID, IVF and sliding scale      Lumbar pain  Continue with tylenol, warm compress        VTE Risk Mitigation (From admission, onward)         Ordered     enoxaparin injection 40 mg  Daily         08/14/22 2214     Place sequential compression device  Until discontinued         08/14/22 2152     IP VTE LOW RISK PATIENT  Once         08/14/22 2152                Discharge Planning   EBENEZER:      Code Status: Full Code   Is the patient medically ready for discharge?:     Reason for patient still in hospital (select all that apply): Patient trending condition, Laboratory test and Treatment  Discharge Plan A: Home with family                  Darlene Mitchell MD  Department of Hospital Medicine   [facility]

## 2022-08-15 NOTE — HPI
27-year-old female with hx IDDM,. HTN, GERD, ch back pain who is known to this ED who presents to the emergency department for evaluation and treatment of severe back pain, generalized body aches, she is crying. Denies F/C , Chest pain, SOB Denies dysuria, no abd pain, C/O back pain, nausea, vomiting. She takes Lantus 30 units BID and Novolog

## 2022-08-15 NOTE — HOSPITAL COURSE
08/15  Pt admitted overnight with acute abdominal pain, h/o DM uncontrolled with hyperglycemia, c/o vomiting and severe abdominal epigastric pain.  PT had more vomiting this am, took a few sips of clear liquids, c/o epigastric pain.      08/16/2022  Pt states pain is better and pain meds help her but do not last long enough  Her sugars are a bit better, no vomiting since yesterday am, drinking more of her clear liquids about 50% this am  Magneium level is 1.5

## 2022-08-15 NOTE — ED PROVIDER NOTES
Encounter Date: 8/14/2022       History     Chief Complaint   Patient presents with    Back Pain     Back pain   started again over the last 2 days     HPI  Review of patient's allergies indicates:  No Known Allergies  Past Medical History:   Diagnosis Date    Diabetes mellitus     Diabetic gastroparesis associated with type 1 diabetes mellitus     DKA (diabetic ketoacidosis)     DKA (diabetic ketoacidosis)     DKA (diabetic ketoacidosis)     Gastroparesis     Gastroparesis due to DM     Hypertension     Ketoacidosis due to diabetes     Non compliance with medical treatment     Pneumonia     Secondary DM with DKA      Past Surgical History:   Procedure Laterality Date    CHOLECYSTECTOMY      ESOPHAGOGASTRODUODENOSCOPY      Multiple    None       Family History   Problem Relation Age of Onset    Heart disease Mother     Hypertension Mother     Diabetes Mother     Hyperlipidemia Mother     Cancer Father     Stroke Father     Hypertension Father     Hyperlipidemia Father      Social History     Tobacco Use    Smoking status: Never Smoker    Smokeless tobacco: Never Used   Substance Use Topics    Alcohol use: Never    Drug use: Yes     Types: Marijuana     Review of Systems    Physical Exam     Initial Vitals [08/14/22 1621]   BP Pulse Resp Temp SpO2   (!) 95/56 100 (!) 22 98.6 °F (37 °C) 100 %      MAP       --         Physical Exam    ED Course   Procedures  Labs Reviewed   COMPREHENSIVE METABOLIC PANEL - Abnormal; Notable for the following components:       Result Value    Carbon Dioxide 17 (*)     Glucose Level 293 (*)     Creatinine 1.20 (*)     Globulin 4.0 (*)     Albumin/Globulin Ratio 0.9 (*)     All other components within normal limits   URINALYSIS, REFLEX TO URINE CULTURE - Abnormal; Notable for the following components:    Protein, UA >=300 (*)     Glucose,   (*)     Blood, UA Large (*)     All other components within normal limits   CBC WITH DIFFERENTIAL - Abnormal;  Notable for the following components:    RBC 3.88 (*)     Hgb 10.6 (*)     Hct 32.9 (*)     MCHC 32.2 (*)     All other components within normal limits   URINALYSIS, MICROSCOPIC - Abnormal; Notable for the following components:    RBC, UA 11-20 (*)     All other components within normal limits   POCT GLUCOSE, HAND-HELD DEVICE - Abnormal; Notable for the following components:    POC Glucose 308 (*)     All other components within normal limits   DRUG SCREEN, URINE (BEAKER) - Normal    Narrative:     Cut off concentrations:    Amphetamines - 1000 ng/ml  Barbiturates - 200 ng/ml  Benzodiazepine - 200 ng/ml  Cannabinoids (THC) - 50 ng/ml  Cocaine - 300 ng/ml  Fentanyl - 1.0 ng/ml  MDMA - 500 ng/ml  Opiates - 300 ng/ml   Phencyclidine (PCP) - 25 ng/ml    Specimen submitted for drug analysis and tested for pH and specific gravity in order to evaluate sample integrity. Suspect tampering if specific gravity is <1.003 and/or pH is not within the range of 4.5 - 8.0  False negatives may result form substances such as bleach added to urine.  False positives may result for the presence of a substance with similar chemical structure to the drug or its metabolite.    This test provides only a PRELIMINARY analytical test result. A more specific alternate chemical method must be used in order to obtain a confirmed analytical result. Gas chromatography/mass spectrometry (GC/MS) is the preferred confirmatory method. Other chemical confirmation methods are available. Clinical consideration and professional judgement should be applied to any drug of abuse test result, particularly when preliminary positive results are used.    Positive results will be confirmed only at the physicians request. Unconfirmed screening results are to be used only for medical purposes (treatment).        PREGNANCY TEST, URINE RAPID - Normal   CBC W/ AUTO DIFFERENTIAL    Narrative:     The following orders were created for panel order CBC auto  differential.  Procedure                               Abnormality         Status                     ---------                               -----------         ------                     CBC with Differential[[id number]]        Abnormal            Final result                 Please view results for these tests on the individual orders.   SARS-COV-2 RNA AMPLIFICATION, QUAL   POCT GLUCOSE, HAND-HELD DEVICE          Imaging Results          CT Abdomen Pelvis  Without Contrast (Preliminary result)  Result time 08/14/22 19:38:28    Preliminary result by Interface, Rad Results In (08/14/22 19:38:28)                 Narrative:    START OF REPORT:  Technique: CT of the abdomen and pelvis was performed with axial images as well as sagittal and coronal reconstruction images without intravenous contrast.    Comparison: None available.    Clinical History: Abd pain.    Dosage Information: Automated Exposure Control was utilized.    Findings:  Lines and Tubes: None.  Thorax:  Lungs: The visualized lung bases appear unremarkable.  Heart: Mild cardiomegaly is seen.  Abdomen:  Abdominal Wall: No abdominal wall pathology is seen.  Liver: The liver appears unremarkable.  Biliary System: No intrahepatic or extrahepatic biliary duct dilatation is seen.  Gallbladder: Surgical clips are seen in the gallbladder fossa which may reflect prior cholecystectomy correlate with surgical history and visual inspection.  Pancreas: The pancreas appears unremarkable.  Spleen: The spleen appears unremarkable.  Adrenals: The adrenal glands appear unremarkable.  Kidneys: The kidneys appear unremarkable with no stones cysts masses or hydronephrosis.  Aorta: The abdominal aorta appears unremarkable.  IVC: Unremarkable.  Bowel:  Esophagus: There is a moderate sized hiatal hernia.  Stomach: The stomach appears unremarkable.  Duodenum: Unremarkable appearing duodenum.  Small Bowel: The small bowel appears unremarkable.  Colon: There is moderate stool in  the colon which could reflect an element of constipation.  Appendix: The appendix appears unremarkable and is seen on âImage 104, Series 3â through âImage 87, Series 3â.  Peritoneum: No intraperitoneal free air or ascites is seen.    Pelvis:  Bladder: The bladder is nondistended however the bladder wall appears thickened after considering state of nondistension.  Female:  Uterus: The uterus appears unremarkable.  Ovaries: The ovaries are not identified. No adnexal masses are seen.    Bony structures:  Dorsal Spine: The visualized dorsal spine appears unremarkable.  Bony Pelvis: The visualized bony structures of the pelvis appear unremarkable.      Impression:  1. The bladder is nondistended however the bladder wall appears thickened after considering state of nondistension which could reflect an element of cystitis.  2. No acute intraabdominal or pelvic solid organ or bowel pathology identified. Details and other findings as discussed above.                      Preliminary result by Chai Negro MD (08/14/22 19:38:28)                 Narrative:    START OF REPORT:  Technique: CT of the abdomen and pelvis was performed with axial images as well as sagittal and coronal reconstruction images without intravenous contrast.    Comparison: None available.    Clinical History: Abd pain.    Dosage Information: Automated Exposure Control was utilized.    Findings:  Lines and Tubes: None.  Thorax:  Lungs: The visualized lung bases appear unremarkable.  Heart: Mild cardiomegaly is seen.  Abdomen:  Abdominal Wall: No abdominal wall pathology is seen.  Liver: The liver appears unremarkable.  Biliary System: No intrahepatic or extrahepatic biliary duct dilatation is seen.  Gallbladder: Surgical clips are seen in the gallbladder fossa which may reflect prior cholecystectomy correlate with surgical history and visual inspection.  Pancreas: The pancreas appears unremarkable.  Spleen: The spleen appears  unremarkable.  Adrenals: The adrenal glands appear unremarkable.  Kidneys: The kidneys appear unremarkable with no stones cysts masses or hydronephrosis.  Aorta: The abdominal aorta appears unremarkable.  IVC: Unremarkable.  Bowel:  Esophagus: There is a moderate sized hiatal hernia.  Stomach: The stomach appears unremarkable.  Duodenum: Unremarkable appearing duodenum.  Small Bowel: The small bowel appears unremarkable.  Colon: There is moderate stool in the colon which could reflect an element of constipation.  Appendix: The appendix appears unremarkable and is seen on âImage 104, Series 3â through âImage 87, Series 3â.  Peritoneum: No intraperitoneal free air or ascites is seen.    Pelvis:  Bladder: The bladder is nondistended however the bladder wall appears thickened after considering state of nondistension.  Female:  Uterus: The uterus appears unremarkable.  Ovaries: The ovaries are not identified. No adnexal masses are seen.    Bony structures:  Dorsal Spine: The visualized dorsal spine appears unremarkable.  Bony Pelvis: The visualized bony structures of the pelvis appear unremarkable.      Impression:  1. The bladder is nondistended however the bladder wall appears thickened after considering state of nondistension which could reflect an element of cystitis.  2. No acute intraabdominal or pelvic solid organ or bowel pathology identified. Details and other findings as discussed above.                                   Medications   cefTRIAXone injection 1 g (has no administration in time range)   droperidoL injection 2.5 mg (2.5 mg Intravenous Given 8/14/22 1700)   0.9%  NaCl infusion (0 mLs Intravenous Stopped 8/14/22 1805)   insulin regular injection 7 Units 0.07 mL (7 Units Intravenous Given 8/14/22 1753)   nitrofurantoin (macrocrystal-monohydrate) 100 MG capsule 100 mg (100 mg Oral Given 8/14/22 1818)   fluconazole tablet 100 mg (100 mg Oral Given 8/14/22 1818)   ondansetron injection 8 mg (8 mg  Intravenous Given 8/14/22 1837)   0.9%  NaCl infusion (1,000 mLs Intravenous New Bag 8/14/22 1845)   aluminum-magnesium hydroxide-simethicone 200-200-20 mg/5 mL suspension 30 mL (30 mLs Oral Given 8/14/22 2025)     And   LIDOcaine HCl 2% oral solution 15 mL (15 mLs Oral Given 8/14/22 2025)                 ED Course as of 08/14/22 2149   Sun Aug 14, 2022   1712 Anion gap is 15, not in DKA at this time, will administer 7 units of regular insulin, cancelled ABG per Dr. Burr, will hydrate while in the ED, UA pending [EB]   1800 States feels much better after hydration, has no evidence of systemic infection, I will treat her for gastroparesis and a UTI at this time, instructed to take her insulin as prescribed and diabetic diet, she verbalized understanding and she will be discharged at this time. [EB]   1923 The patient was administered her 1st dose of Diflucan and Macrobid while in the ED, she vomited immediately thereafter, she was given some Zofran and another bag of normal saline, she was then re-examined by myself and found to have severe epigastric pain that was not present upon initial examination, her flank pain has since resolved, her blood pressure was noted to increase during her time in the ED, I will order a CT at this time.  She did have a CT abdomen and pelvis done last February, no acute findings. [EB]   2003 CT results reviewed in detail, no acute findings in her epigastrium, confirm diagnosis consistent with cystitis for which she will be treated, I will administer GI cocktail at this time and discharge her to home to follow-up with her PCP tomorrow. [EB]   2100 Upon attempt to discharge, the patient continues to retch and vomit when we mention discharged to her, I re-evaluated her and ask  to re-evaluate her, due to her degree of vomiting and known gastric paresis we would like to admit her observation overnight for control of intractable vomiting, cystitis. [EB]   2100 The patient came  for nausea and intractable emesis ( has h/o gastroparesis), CT of abd/pelvis is negative, she has cystitis, she threw up PO Macrobid which was given to her, on PE the patient is actively throwing up, because oef DM 1 type and intractable emesis, she requires IV hydration with IV ABX x 24 hrs admission [IP]   2103 I passed care to Dr. Faust at 9:03 p.m. [EB]   2148 The patient was accepted by a hospitalist ( DR Franco) [IP]      ED Course User Index  [EB] Samantha Reynolds, LORENZAP  [IP] Shana Brown MD             Clinical Impression:   Final diagnoses:  [R73.9] Hyperglycemia (Primary)  [M54.50] Lumbar pain  [N30.01] Acute cystitis with hematuria  [R11.2] Non-intractable vomiting with nausea, unspecified vomiting type  [R10.13] Epigastric pain                 Shana Brown MD  08/14/22 8198

## 2022-08-15 NOTE — H&P
[facility] Medicine  History & Physical    Patient Name: Dorene Husain  MRN: [id number]  Patient Class: OP- Observation  Admission Date: 8/14/2022  Attending Physician:Niranjan Love MD  Primary Care Provider: Kumar Ortiz NP         Patient information was obtained from via telemed    Subjective:     Principal Problem:Acute cystitis with hematuria    Chief Complaint:   Chief Complaint   Patient presents with    Back Pain     Back pain   started again over the last 2 days        HPI: 27-year-old female with hx IDDM,. HTN, GERD, ch back pain who is known to this ED who presents to the emergency department for evaluation and treatment of severe back pain, generalized body aches, she is crying. Denies F/C , Chest pain, SOB Denies dysuria, no abd pain, C/O back pain, nausea, vomiting. She takes Lantus 30 units BID and Novolog      Past Medical History:   Diagnosis Date    Diabetes mellitus     Diabetic gastroparesis associated with type 1 diabetes mellitus     DKA (diabetic ketoacidosis)     DKA (diabetic ketoacidosis)     DKA (diabetic ketoacidosis)     Gastroparesis     Gastroparesis due to DM     Hypertension     Ketoacidosis due to diabetes     Non compliance with medical treatment     Pneumonia     Secondary DM with DKA        Past Surgical History:   Procedure Laterality Date    CHOLECYSTECTOMY      ESOPHAGOGASTRODUODENOSCOPY      Multiple    None         Review of patient's allergies indicates:  No Known Allergies    No current facility-administered medications on file prior to encounter.     Current Outpatient Medications on File Prior to Encounter   Medication Sig    amitriptyline (ELAVIL) 25 MG tablet Take 25 mg by mouth nightly.    BASAGLAR KWIKPEN U-100 INSULIN glargine 100 units/mL (3mL) SubQ pen Inject into the skin.    dicyclomine (BENTYL) 10 MG capsule Take 10 mg by mouth 4 (four) times daily.    hydrOXYzine (ATARAX) 50 MG tablet Take 50 mg by  mouth daily as needed.    insulin lispro (HUMALOG U-100 INSULIN) 100 unit/mL Crtg Inject into the skin.    lisinopriL (PRINIVIL,ZESTRIL) 20 MG tablet Take 20 mg by mouth 2 (two) times daily.    metoprolol tartrate (LOPRESSOR) 50 MG tablet Take 50 mg by mouth 2 (two) times daily.    olmesartan (BENICAR) 40 MG tablet Take 40 mg by mouth once daily.    ondansetron (ZOFRAN) 4 MG tablet Take 4 mg by mouth every 6 (six) hours as needed.    pantoprazole (PROTONIX) 40 MG tablet Take 40 mg by mouth once daily.    [DISCONTINUED] metoclopramide HCl (REGLAN) 5 MG tablet Take 5 mg by mouth 4 (four) times daily.     Family History       Problem Relation (Age of Onset)    Cancer Father    Diabetes Mother    Heart disease Mother    Hyperlipidemia Mother, Father    Hypertension Mother, Father    Stroke Father          Tobacco Use    Smoking status: Never Smoker    Smokeless tobacco: Never Used   Substance and Sexual Activity    Alcohol use: Never    Drug use: Yes     Types: Marijuana    Sexual activity: Yes     Review of Systems   Constitutional: Negative.    HENT: Negative.     Eyes: Negative.    Respiratory: Negative.     Cardiovascular: Negative.    Gastrointestinal:  Positive for nausea and vomiting.   Endocrine: Negative.    Genitourinary: Negative.    Musculoskeletal:  Positive for back pain.   Skin: Negative.    Allergic/Immunologic: Negative.    Neurological: Negative.    Hematological: Negative.    Psychiatric/Behavioral: Negative.     Objective:     Vital Signs (Most Recent):  Temp: 98.8 °F (37.1 °C) (08/14/22 2236)  Pulse: 81 (08/14/22 2236)  Resp: 20 (08/14/22 2236)  BP: (!) 186/112 (08/14/22 2236)  SpO2: 98 % (08/14/22 2236)   Vital Signs (24h Range):  Temp:  [98.6 °F (37 °C)-98.8 °F (37.1 °C)] 98.8 °F (37.1 °C)  Pulse:  [] 81  Resp:  [20-22] 20  SpO2:  [76 %-100 %] 98 %  BP: ()/() 186/112     Weight: 67.6 kg (149 lb)  Body mass index is 27.25 kg/m².    Physical Exam  Constitutional:        Appearance: Normal appearance. She is normal weight.   HENT:      Head: Normocephalic and atraumatic.      Nose: Nose normal.      Mouth/Throat:      Mouth: Mucous membranes are moist.      Pharynx: Oropharynx is clear.   Eyes:      Extraocular Movements: Extraocular movements intact.      Conjunctiva/sclera: Conjunctivae normal.      Pupils: Pupils are equal, round, and reactive to light.   Cardiovascular:      Rate and Rhythm: Normal rate and regular rhythm.      Pulses: Normal pulses.      Heart sounds: Normal heart sounds.   Pulmonary:      Effort: Pulmonary effort is normal.      Breath sounds: Normal breath sounds.   Abdominal:      General: Abdomen is flat. Bowel sounds are normal.      Palpations: Abdomen is soft.   Musculoskeletal:         General: Normal range of motion.      Cervical back: Normal range of motion and neck supple.   Skin:     General: Skin is warm.   Neurological:      General: No focal deficit present.      Mental Status: She is alert and oriented to person, place, and time. Mental status is at baseline.   Psychiatric:         Mood and Affect: Mood normal.         CRANIAL NERVES     CN III, IV, VI   Pupils are equal, round, and reactive to light.     Significant Labs: All pertinent labs within the past 24 hours have been reviewed.  A1C:   Recent Labs   Lab 05/21/22  0514 05/22/22  0239 06/01/22  0559   HGBA1C 9.3* 9.3* 10.5*     ABGs: No results for input(s): PH, PCO2, HCO3, POCSATURATED, BE, TOTALHB, COHB, METHB, O2HB, POCFIO2, PO2 in the last 48 hours.  BMP:   Recent Labs   Lab 08/14/22  1643      K 4.0   CO2 17*   BUN 16.0   CREATININE 1.20*   CALCIUM 9.6     CBC:   Recent Labs   Lab 08/14/22  1643   WBC 7.3   HGB 10.6*   HCT 32.9*        CMP:   Recent Labs   Lab 08/14/22  1643      K 4.0   CO2 17*   BUN 16.0   CREATININE 1.20*   CALCIUM 9.6   ALBUMIN 3.7   BILITOT 0.4   ALKPHOS 102   AST 13   ALT 5     Cardiac Markers: No results for input(s): CKMB, MYOGLOBIN, BNP,  TROPISTAT in the last 48 hours.  Coagulation: No results for input(s): PT, INR, APTT in the last 48 hours.  Lactic Acid: No results for input(s): LACTATE in the last 48 hours.  Lipase: No results for input(s): LIPASE in the last 48 hours.  Magnesium: No results for input(s): MG in the last 48 hours.  Prealbumin: No results for input(s): PREALBUMIN in the last 48 hours.  Troponin: No results for input(s): TROPONINI in the last 48 hours.  TSH:   Recent Labs   Lab 07/08/22  1054   TSH 1.1516     Urine Studies:   Recent Labs   Lab 08/14/22  1744   APPEARANCEUA Clear   PROTEINUA >=300*   BILIRUBINUA Negative   UROBILINOGEN 0.2   LEUKOCYTESUR Negative   RBCUA 11-20*   WBCUA None Seen   BACTERIA Rare       Significant Imaging:   Abd CT  1. The bladder is nondistended however the bladder wall appears thickened after considering state of nondistension which could reflect an element of cystitis.  2. No acute intraabdominal or pelvic solid organ or bowel pathology identified. Details and other findings as discussed above.    Assessment/Plan:     * Acute cystitis with hematuria  Check Urine culture, continue rocephin.      Lumbar pain  Continue with tylenol, warm compress      Epigastric pain  Continue PPI      Non-intractable vomiting with nausea  Continue with antiemetics,       Hyperglycemia  Type 1 DM with hyperglycemia decrease Levemir 12 units BID, IVF        VTE Risk Mitigation (From admission, onward)         Ordered     enoxaparin injection 40 mg  Daily         08/14/22 2214     Place sequential compression device  Until discontinued         08/14/22 2152     IP VTE LOW RISK PATIENT  Once         08/14/22 2152               Pt is full code, SDM is her father Bao Husain  Pt is seen and examined via telemed, Pt is in [facility] I am in [place], [place]. Nursing staff, Leana assisted with evaluation of the patient. Software is Audio/ video  Pt is being admitted as an observation for further evaluate and  treat her underlying condition    Niranjan Love MD  Department of Hospital Medicine   [facility]

## 2022-08-16 LAB
ANION GAP SERPL CALC-SCNC: 10 MEQ/L
BASOPHILS # BLD AUTO: 0.04 X10(3)/MCL (ref 0–0.2)
BASOPHILS NFR BLD AUTO: 0.4 %
BUN SERPL-MCNC: 11 MG/DL (ref 7–18.7)
CALCIUM SERPL-MCNC: 8.8 MG/DL (ref 8.4–10.2)
CHLORIDE SERPL-SCNC: 108 MMOL/L (ref 98–107)
CO2 SERPL-SCNC: 20 MMOL/L (ref 22–29)
CREAT SERPL-MCNC: 0.79 MG/DL (ref 0.55–1.02)
CREAT/UREA NIT SERPL: 14
EOSINOPHIL # BLD AUTO: 0.09 X10(3)/MCL (ref 0–0.9)
EOSINOPHIL NFR BLD AUTO: 0.9 %
ERYTHROCYTE [DISTWIDTH] IN BLOOD BY AUTOMATED COUNT: 12.6 % (ref 11.5–17)
GFR SERPLBLD CREATININE-BSD FMLA CKD-EPI: >60 MLS/MIN/1.73/M2
GLUCOSE SERPL-MCNC: 110 MG/DL (ref 74–100)
HCT VFR BLD AUTO: 26.7 % (ref 37–47)
HGB BLD-MCNC: 8.8 GM/DL (ref 12–16)
IMM GRANULOCYTES # BLD AUTO: 0.05 X10(3)/MCL (ref 0–0.04)
IMM GRANULOCYTES NFR BLD AUTO: 0.5 %
LYMPHOCYTES # BLD AUTO: 2.88 X10(3)/MCL (ref 0.6–4.6)
LYMPHOCYTES NFR BLD AUTO: 30 %
MAGNESIUM SERPL-MCNC: 1.5 MG/DL (ref 1.6–2.6)
MCH RBC QN AUTO: 27.3 PG (ref 27–31)
MCHC RBC AUTO-ENTMCNC: 33 MG/DL (ref 33–36)
MCV RBC AUTO: 82.9 FL (ref 80–94)
MONOCYTES # BLD AUTO: 0.75 X10(3)/MCL (ref 0.1–1.3)
MONOCYTES NFR BLD AUTO: 7.8 %
NEUTROPHILS # BLD AUTO: 5.8 X10(3)/MCL (ref 2.1–9.2)
NEUTROPHILS NFR BLD AUTO: 60.4 %
PLATELET # BLD AUTO: 430 X10(3)/MCL (ref 130–400)
PMV BLD AUTO: 8.6 FL (ref 7.4–10.4)
POCT GLUCOSE: 169 MG/DL (ref 70–110)
POCT GLUCOSE: 217 MG/DL (ref 70–110)
POCT GLUCOSE: 228 MG/DL (ref 70–110)
POCT GLUCOSE: 248 MG/DL (ref 70–110)
POCT GLUCOSE: 312 MG/DL (ref 70–110)
POTASSIUM SERPL-SCNC: 3.6 MMOL/L (ref 3.5–5.1)
RBC # BLD AUTO: 3.22 X10(6)/MCL (ref 4.2–5.4)
SODIUM SERPL-SCNC: 138 MMOL/L (ref 136–145)
WBC # SPEC AUTO: 9.6 X10(3)/MCL (ref 4.5–11.5)

## 2022-08-16 PROCEDURE — 83735 ASSAY OF MAGNESIUM: CPT | Performed by: FAMILY MEDICINE

## 2022-08-16 PROCEDURE — 25000003 PHARM REV CODE 250: Performed by: INTERNAL MEDICINE

## 2022-08-16 PROCEDURE — 94761 N-INVAS EAR/PLS OXIMETRY MLT: CPT

## 2022-08-16 PROCEDURE — 11000001 HC ACUTE MED/SURG PRIVATE ROOM

## 2022-08-16 PROCEDURE — 63600175 PHARM REV CODE 636 W HCPCS: Performed by: INTERNAL MEDICINE

## 2022-08-16 PROCEDURE — 63600175 PHARM REV CODE 636 W HCPCS: Performed by: FAMILY MEDICINE

## 2022-08-16 PROCEDURE — 85025 COMPLETE CBC W/AUTO DIFF WBC: CPT | Performed by: FAMILY MEDICINE

## 2022-08-16 PROCEDURE — 25000003 PHARM REV CODE 250: Performed by: FAMILY MEDICINE

## 2022-08-16 PROCEDURE — 96372 THER/PROPH/DIAG INJ SC/IM: CPT | Performed by: INTERNAL MEDICINE

## 2022-08-16 PROCEDURE — 36415 COLL VENOUS BLD VENIPUNCTURE: CPT | Performed by: FAMILY MEDICINE

## 2022-08-16 PROCEDURE — 80048 BASIC METABOLIC PNL TOTAL CA: CPT | Performed by: FAMILY MEDICINE

## 2022-08-16 PROCEDURE — C9113 INJ PANTOPRAZOLE SODIUM, VIA: HCPCS | Performed by: FAMILY MEDICINE

## 2022-08-16 PROCEDURE — C9399 UNCLASSIFIED DRUGS OR BIOLOG: HCPCS | Performed by: INTERNAL MEDICINE

## 2022-08-16 RX ORDER — MAGNESIUM SULFATE HEPTAHYDRATE 40 MG/ML
2 INJECTION, SOLUTION INTRAVENOUS ONCE
Status: COMPLETED | OUTPATIENT
Start: 2022-08-16 | End: 2022-08-16

## 2022-08-16 RX ADMIN — MORPHINE SULFATE 1 MG: 4 INJECTION INTRAVENOUS at 05:08

## 2022-08-16 RX ADMIN — SODIUM CHLORIDE: 9 INJECTION, SOLUTION INTRAVENOUS at 10:08

## 2022-08-16 RX ADMIN — PROMETHAZINE HYDROCHLORIDE 25 MG: 25 TABLET ORAL at 12:08

## 2022-08-16 RX ADMIN — METOPROLOL TARTRATE 50 MG: 50 TABLET, FILM COATED ORAL at 09:08

## 2022-08-16 RX ADMIN — INSULIN ASPART 4 UNITS: 100 INJECTION, SOLUTION INTRAVENOUS; SUBCUTANEOUS at 05:08

## 2022-08-16 RX ADMIN — INSULIN DETEMIR 10 UNITS: 100 INJECTION, SOLUTION SUBCUTANEOUS at 09:08

## 2022-08-16 RX ADMIN — MAGNESIUM SULFATE HEPTAHYDRATE 2 G: 2 INJECTION, SOLUTION INTRAVENOUS at 03:08

## 2022-08-16 RX ADMIN — INSULIN ASPART 2 UNITS: 100 INJECTION, SOLUTION INTRAVENOUS; SUBCUTANEOUS at 06:08

## 2022-08-16 RX ADMIN — MORPHINE SULFATE 1 MG: 4 INJECTION INTRAVENOUS at 12:08

## 2022-08-16 RX ADMIN — PANTOPRAZOLE SODIUM 40 MG: 40 INJECTION, POWDER, FOR SOLUTION INTRAVENOUS at 09:08

## 2022-08-16 RX ADMIN — INSULIN ASPART 4 UNITS: 100 INJECTION, SOLUTION INTRAVENOUS; SUBCUTANEOUS at 11:08

## 2022-08-16 RX ADMIN — HYDRALAZINE HYDROCHLORIDE 10 MG: 20 INJECTION INTRAMUSCULAR; INTRAVENOUS at 12:08

## 2022-08-16 RX ADMIN — INSULIN ASPART 2 UNITS: 100 INJECTION, SOLUTION INTRAVENOUS; SUBCUTANEOUS at 12:08

## 2022-08-16 RX ADMIN — HYDRALAZINE HYDROCHLORIDE 10 MG: 20 INJECTION INTRAMUSCULAR; INTRAVENOUS at 05:08

## 2022-08-16 RX ADMIN — MORPHINE SULFATE 1 MG: 4 INJECTION INTRAVENOUS at 01:08

## 2022-08-16 RX ADMIN — MORPHINE SULFATE 1 MG: 4 INJECTION INTRAVENOUS at 09:08

## 2022-08-16 RX ADMIN — CEFTRIAXONE 1 G: 1 INJECTION, POWDER, FOR SOLUTION INTRAMUSCULAR; INTRAVENOUS at 09:08

## 2022-08-16 NOTE — SUBJECTIVE & OBJECTIVE
Interval History:  pain is better    Review of Systems  Review of Systems   Constitutional:  Positive for activity change, appetite change and fatigue. Negative for fever.    Cardiovascular:  Negative for chest pain and leg swelling.   Gastrointestinal:  Positive for abdominal pain, nausea and vomiting. Negative for constipation and diarrhea.   Genitourinary:  Negative for dysuria, flank pain and frequency.   Musculoskeletal:  Negative for back pain, gait problem and joint swelling.   Skin:  Negative for rash and wound.   Objective:     Vital Signs (Most Recent):  Temp: 98.3 °F (36.8 °C) (08/16/22 1154)  Pulse: 88 (08/16/22 1154)  Resp: 20 (08/16/22 1321)  BP: (!) 170/102 (08/16/22 1154)  SpO2: 100 % (08/16/22 1154)   Vital Signs (24h Range):  Temp:  [98.3 °F (36.8 °C)-98.8 °F (37.1 °C)] 98.3 °F (36.8 °C)  Pulse:  [] 88  Resp:  [18-20] 20  SpO2:  [99 %-100 %] 100 %  BP: (146-196)/() 170/102     Weight: 67.6 kg (149 lb)  Body mass index is 27.25 kg/m².    Intake/Output Summary (Last 24 hours) at 8/16/2022 1429  Last data filed at 8/16/2022 0800  Gross per 24 hour   Intake 240 ml   Output 500 ml   Net -260 ml      Physical Exam  Physical Exam  Vitals and nursing note reviewed.   Constitutional:       General: She is in acute distress.      Appearance: She is normal weight. She is ill-appearing. She is not toxic-appearing.   Cardiovascular:      Rate and Rhythm: Normal rate and regular rhythm.      Heart sounds: Normal heart sounds. No murmur heard.  Pulmonary:      Effort: Pulmonary effort is normal. No respiratory distress.      Breath sounds: Normal breath sounds. No wheezing or rales.   Abdominal:      Palpations: Abdomen is soft.      Tenderness: There is abdominal tenderness (epigastric).    Skin:     General: Skin is warm.      Coloration: Skin is not jaundiced.      Findings: No erythema or rash.   Neurological:      Mental Status: She is alert.        Significant Labs: All pertinent labs within the  past 24 hours have been reviewed.       Significant Imaging: I have reviewed all pertinent imaging results/findings within the past 24 hours.

## 2022-08-16 NOTE — CLINICAL REVIEW
27-year-old female admitted on 08/14/2022 with acute cystitis, nausea, vomiting, hyperglycemia.  The patient has failed observation level of care as it was noted that she had poor food tolerance and intake.  She had only completed 240 mL of oral intake for 08/16/2022.  Yesterday, oral intake was at 420 ml.  Diet remains at a clear liquid, continues with aggressive IV hydration, IV anti-infective therapy.  In the setting of inability to advance diet beyond clear liquids, the need for IV anti-infective therapy be on observation level of care, the patient is appropriate for IP LOC    Kenna Toure MD  Utilization Management  Physician Advisor

## 2022-08-16 NOTE — PROGRESS NOTES
[facility] Medicine  Progress Note    Patient Name: Dorene Husain  MRN: [id number]  Patient Class: OP- Observation   Admission Date: 8/14/2022  Length of Stay: 0 days  Attending Physician: Sergio Hidalgo MD  Primary Care Provider: Kumar Ortiz NP        Subjective:     Principal Problem:Acute cystitis with hematuria        HPI:  27-year-old female with hx IDDM,. HTN, GERD, ch back pain who is known to this ED who presents to the emergency department for evaluation and treatment of severe back pain, generalized body aches, she is crying. Denies F/C , Chest pain, SOB Denies dysuria, no abd pain, C/O back pain, nausea, vomiting. She takes Lantus 30 units BID and Novolog      Overview/Hospital Course:  08/15  Pt admitted overnight with acute abdominal pain, h/o DM uncontrolled with hyperglycemia, c/o vomiting and severe abdominal epigastric pain.  PT had more vomiting this am, took a few sips of clear liquids, c/o epigastric pain.      08/16/2022  Pt states pain is better and pain meds help her but do not last long enough  Her sugars are a bit better, no vomiting since yesterday am, drinking more of her clear liquids about 50% this am  Magneium level is 1.5      Interval History:  pain is better    Review of Systems  Review of Systems   Constitutional:  Positive for activity change, appetite change and fatigue. Negative for fever.    Cardiovascular:  Negative for chest pain and leg swelling.   Gastrointestinal:  Positive for abdominal pain, nausea and vomiting. Negative for constipation and diarrhea.   Genitourinary:  Negative for dysuria, flank pain and frequency.   Musculoskeletal:  Negative for back pain, gait problem and joint swelling.   Skin:  Negative for rash and wound.   Objective:     Vital Signs (Most Recent):  Temp: 98.3 °F (36.8 °C) (08/16/22 1154)  Pulse: 88 (08/16/22 1154)  Resp: 20 (08/16/22 1321)  BP: (!) 170/102 (08/16/22 1154)  SpO2: 100 % (08/16/22  1154)   Vital Signs (24h Range):  Temp:  [98.3 °F (36.8 °C)-98.8 °F (37.1 °C)] 98.3 °F (36.8 °C)  Pulse:  [] 88  Resp:  [18-20] 20  SpO2:  [99 %-100 %] 100 %  BP: (146-196)/() 170/102     Weight: 67.6 kg (149 lb)  Body mass index is 27.25 kg/m².    Intake/Output Summary (Last 24 hours) at 8/16/2022 1429  Last data filed at 8/16/2022 0800  Gross per 24 hour   Intake 240 ml   Output 500 ml   Net -260 ml      Physical Exam  Physical Exam  Vitals and nursing note reviewed.   Constitutional:       General: She is in acute distress.      Appearance: She is normal weight. She is ill-appearing. She is not toxic-appearing.   Cardiovascular:      Rate and Rhythm: Normal rate and regular rhythm.      Heart sounds: Normal heart sounds. No murmur heard.  Pulmonary:      Effort: Pulmonary effort is normal. No respiratory distress.      Breath sounds: Normal breath sounds. No wheezing or rales.   Abdominal:      Palpations: Abdomen is soft.      Tenderness: There is abdominal tenderness (epigastric).    Skin:     General: Skin is warm.      Coloration: Skin is not jaundiced.      Findings: No erythema or rash.   Neurological:      Mental Status: She is alert.        Significant Labs: All pertinent labs within the past 24 hours have been reviewed.       Significant Imaging: I have reviewed all pertinent imaging results/findings within the past 24 hours.         Assessment/Plan:      * Acute cystitis with hematuria  Check Urine culture, continue rocephin.      Non-intractable vomiting with nausea  Continue with antiemetics,     zofran and phenergan if needed      Epigastric pain  Continue PPI protonix 40mg iv daily      Hyperglycemia  Type 1 DM with hyperglycemia decreased Levemir 12 units BID, IVF and sliding scale      Lumbar pain  Continue with tylenol, warm compress        VTE Risk Mitigation (From admission, onward)         Ordered     enoxaparin injection 40 mg  Daily         08/14/22 2214     Place sequential  compression device  Until discontinued         08/14/22 2152     IP VTE LOW RISK PATIENT  Once         08/14/22 2152                Discharge Planning   EBENEZER:      Code Status: Full Code   Is the patient medically ready for discharge?:     Reason for patient still in hospital (select all that apply): Treatment  Discharge Plan A: Home with family                  Darlene Mitchell MD  Department of Hospital Medicine   [facility]

## 2022-08-17 VITALS
DIASTOLIC BLOOD PRESSURE: 82 MMHG | WEIGHT: 149 LBS | BODY MASS INDEX: 27.42 KG/M2 | OXYGEN SATURATION: 98 % | HEART RATE: 80 BPM | RESPIRATION RATE: 20 BRPM | HEIGHT: 62 IN | SYSTOLIC BLOOD PRESSURE: 129 MMHG | TEMPERATURE: 98 F

## 2022-08-17 LAB
ANION GAP SERPL CALC-SCNC: 11 MEQ/L
BASOPHILS # BLD AUTO: 0.04 X10(3)/MCL (ref 0–0.2)
BASOPHILS NFR BLD AUTO: 0.4 %
BUN SERPL-MCNC: 15 MG/DL (ref 7–18.7)
CALCIUM SERPL-MCNC: 8.6 MG/DL (ref 8.4–10.2)
CHLORIDE SERPL-SCNC: 104 MMOL/L (ref 98–107)
CO2 SERPL-SCNC: 19 MMOL/L (ref 22–29)
CREAT SERPL-MCNC: 0.89 MG/DL (ref 0.55–1.02)
CREAT/UREA NIT SERPL: 17
EOSINOPHIL # BLD AUTO: 0.04 X10(3)/MCL (ref 0–0.9)
EOSINOPHIL NFR BLD AUTO: 0.4 %
ERYTHROCYTE [DISTWIDTH] IN BLOOD BY AUTOMATED COUNT: 12.5 % (ref 11.5–17)
GFR SERPLBLD CREATININE-BSD FMLA CKD-EPI: >60 MLS/MIN/1.73/M2
GLUCOSE SERPL-MCNC: 242 MG/DL (ref 74–100)
HCT VFR BLD AUTO: 26.4 % (ref 37–47)
HGB BLD-MCNC: 8.6 GM/DL (ref 12–16)
IMM GRANULOCYTES # BLD AUTO: 0.04 X10(3)/MCL (ref 0–0.04)
IMM GRANULOCYTES NFR BLD AUTO: 0.4 %
LYMPHOCYTES # BLD AUTO: 2.68 X10(3)/MCL (ref 0.6–4.6)
LYMPHOCYTES NFR BLD AUTO: 27 %
MAGNESIUM SERPL-MCNC: 2.1 MG/DL (ref 1.6–2.6)
MCH RBC QN AUTO: 27 PG (ref 27–31)
MCHC RBC AUTO-ENTMCNC: 32.6 MG/DL (ref 33–36)
MCV RBC AUTO: 83 FL (ref 80–94)
MONOCYTES # BLD AUTO: 0.87 X10(3)/MCL (ref 0.1–1.3)
MONOCYTES NFR BLD AUTO: 8.8 %
NEUTROPHILS # BLD AUTO: 6.3 X10(3)/MCL (ref 2.1–9.2)
NEUTROPHILS NFR BLD AUTO: 63 %
PLATELET # BLD AUTO: 424 X10(3)/MCL (ref 130–400)
PMV BLD AUTO: 9 FL (ref 7.4–10.4)
POCT GLUCOSE: 195 MG/DL (ref 70–110)
POCT GLUCOSE: 245 MG/DL (ref 70–110)
POCT GLUCOSE: 335 MG/DL (ref 70–110)
POTASSIUM SERPL-SCNC: 3.7 MMOL/L (ref 3.5–5.1)
RBC # BLD AUTO: 3.18 X10(6)/MCL (ref 4.2–5.4)
SODIUM SERPL-SCNC: 134 MMOL/L (ref 136–145)
WBC # SPEC AUTO: 9.9 X10(3)/MCL (ref 4.5–11.5)

## 2022-08-17 PROCEDURE — 80048 BASIC METABOLIC PNL TOTAL CA: CPT | Performed by: FAMILY MEDICINE

## 2022-08-17 PROCEDURE — 25000003 PHARM REV CODE 250: Performed by: INTERNAL MEDICINE

## 2022-08-17 PROCEDURE — 94761 N-INVAS EAR/PLS OXIMETRY MLT: CPT

## 2022-08-17 PROCEDURE — 63600175 PHARM REV CODE 636 W HCPCS: Performed by: INTERNAL MEDICINE

## 2022-08-17 PROCEDURE — 85025 COMPLETE CBC W/AUTO DIFF WBC: CPT | Performed by: FAMILY MEDICINE

## 2022-08-17 PROCEDURE — C9399 UNCLASSIFIED DRUGS OR BIOLOG: HCPCS | Performed by: INTERNAL MEDICINE

## 2022-08-17 PROCEDURE — 83735 ASSAY OF MAGNESIUM: CPT | Performed by: FAMILY MEDICINE

## 2022-08-17 PROCEDURE — 25000003 PHARM REV CODE 250: Performed by: FAMILY MEDICINE

## 2022-08-17 PROCEDURE — C9113 INJ PANTOPRAZOLE SODIUM, VIA: HCPCS | Performed by: FAMILY MEDICINE

## 2022-08-17 PROCEDURE — 36415 COLL VENOUS BLD VENIPUNCTURE: CPT | Performed by: FAMILY MEDICINE

## 2022-08-17 PROCEDURE — 63600175 PHARM REV CODE 636 W HCPCS: Performed by: FAMILY MEDICINE

## 2022-08-17 RX ORDER — METOCLOPRAMIDE 10 MG/1
10 TABLET ORAL
Status: DISCONTINUED | OUTPATIENT
Start: 2022-08-17 | End: 2022-08-17 | Stop reason: HOSPADM

## 2022-08-17 RX ORDER — AMITRIPTYLINE HYDROCHLORIDE 25 MG/1
25 TABLET, FILM COATED ORAL NIGHTLY
Status: DISCONTINUED | OUTPATIENT
Start: 2022-08-17 | End: 2022-08-17 | Stop reason: HOSPADM

## 2022-08-17 RX ORDER — LISINOPRIL 20 MG/1
20 TABLET ORAL 2 TIMES DAILY
Status: DISCONTINUED | OUTPATIENT
Start: 2022-08-17 | End: 2022-08-17 | Stop reason: HOSPADM

## 2022-08-17 RX ADMIN — INSULIN DETEMIR 15 UNITS: 100 INJECTION, SOLUTION SUBCUTANEOUS at 10:08

## 2022-08-17 RX ADMIN — METOPROLOL TARTRATE 50 MG: 50 TABLET, FILM COATED ORAL at 09:08

## 2022-08-17 RX ADMIN — ACETAMINOPHEN 325MG 650 MG: 325 TABLET ORAL at 02:08

## 2022-08-17 RX ADMIN — SODIUM CHLORIDE: 9 INJECTION, SOLUTION INTRAVENOUS at 09:08

## 2022-08-17 RX ADMIN — INSULIN ASPART 4 UNITS: 100 INJECTION, SOLUTION INTRAVENOUS; SUBCUTANEOUS at 12:08

## 2022-08-17 RX ADMIN — METOCLOPRAMIDE 10 MG: 10 TABLET ORAL at 10:08

## 2022-08-17 RX ADMIN — PANTOPRAZOLE SODIUM 40 MG: 40 INJECTION, POWDER, FOR SOLUTION INTRAVENOUS at 09:08

## 2022-08-17 RX ADMIN — LISINOPRIL 20 MG: 20 TABLET ORAL at 10:08

## 2022-08-17 RX ADMIN — INSULIN ASPART 4 UNITS: 100 INJECTION, SOLUTION INTRAVENOUS; SUBCUTANEOUS at 10:08

## 2022-08-18 ENCOUNTER — PATIENT OUTREACH (OUTPATIENT)
Dept: ADMINISTRATIVE | Facility: CLINIC | Age: 27
End: 2022-08-18
Payer: MEDICAID

## 2022-08-18 ENCOUNTER — HOSPITAL ENCOUNTER (INPATIENT)
Facility: HOSPITAL | Age: 27
LOS: 3 days | Discharge: HOME | DRG: 638 | End: 2022-08-21
Attending: EMERGENCY MEDICINE | Admitting: INTERNAL MEDICINE
Payer: MEDICAID

## 2022-08-18 DIAGNOSIS — E11.43 DIABETIC GASTROPARESIS: Primary | ICD-10-CM

## 2022-08-18 DIAGNOSIS — E11.10 DKA (DIABETIC KETOACIDOSIS): ICD-10-CM

## 2022-08-18 DIAGNOSIS — E10.10 DKA, TYPE 1: ICD-10-CM

## 2022-08-18 DIAGNOSIS — R11.14 BILIOUS VOMITING WITH NAUSEA: ICD-10-CM

## 2022-08-18 DIAGNOSIS — R73.9 HYPERGLYCEMIA: ICD-10-CM

## 2022-08-18 DIAGNOSIS — K31.84 DIABETIC GASTROPARESIS: Primary | ICD-10-CM

## 2022-08-18 DIAGNOSIS — M54.9 BACK PAIN, UNSPECIFIED BACK LOCATION, UNSPECIFIED BACK PAIN LATERALITY, UNSPECIFIED CHRONICITY: ICD-10-CM

## 2022-08-18 LAB
ALBUMIN SERPL-MCNC: 3.8 GM/DL (ref 3.5–5)
ALBUMIN/GLOB SERPL: 1 RATIO (ref 1.1–2)
ALLENS TEST: ABNORMAL
ALP SERPL-CCNC: 115 UNIT/L (ref 40–150)
ALT SERPL-CCNC: 6 UNIT/L (ref 0–55)
ANION GAP SERPL CALC-SCNC: 15 MEQ/L
ANION GAP SERPL CALC-SCNC: 19 MEQ/L
APPEARANCE UR: CLEAR
AST SERPL-CCNC: 12 UNIT/L (ref 5–34)
BACTERIA #/AREA URNS AUTO: NORMAL /HPF
BASOPHILS # BLD AUTO: 0.03 X10(3)/MCL (ref 0–0.2)
BASOPHILS NFR BLD AUTO: 0.4 %
BILIRUB UR QL STRIP.AUTO: NEGATIVE MG/DL
BILIRUBIN DIRECT+TOT PNL SERPL-MCNC: 0.8 MG/DL
BUN SERPL-MCNC: 12 MG/DL (ref 7–18.7)
BUN SERPL-MCNC: 12 MG/DL (ref 7–18.7)
BUN SERPL-MCNC: 13 MG/DL (ref 7–18.7)
CALCIUM SERPL-MCNC: 9.2 MG/DL (ref 8.4–10.2)
CALCIUM SERPL-MCNC: 9.7 MG/DL (ref 8.4–10.2)
CALCIUM SERPL-MCNC: 9.9 MG/DL (ref 8.4–10.2)
CHLORIDE SERPL-SCNC: 104 MMOL/L (ref 98–107)
CHLORIDE SERPL-SCNC: 94 MMOL/L (ref 98–107)
CHLORIDE SERPL-SCNC: 97 MMOL/L (ref 98–107)
CO2 SERPL-SCNC: 19 MMOL/L (ref 22–29)
CO2 SERPL-SCNC: 22 MMOL/L (ref 22–29)
CO2 SERPL-SCNC: 23 MMOL/L (ref 22–29)
COLOR UR AUTO: YELLOW
CREAT SERPL-MCNC: 0.87 MG/DL (ref 0.55–1.02)
CREAT SERPL-MCNC: 1.09 MG/DL (ref 0.55–1.02)
CREAT SERPL-MCNC: 1.17 MG/DL (ref 0.55–1.02)
CREAT/UREA NIT SERPL: 11
CREAT/UREA NIT SERPL: 15
DELSYS: ABNORMAL
EOSINOPHIL # BLD AUTO: 0 X10(3)/MCL (ref 0–0.9)
EOSINOPHIL NFR BLD AUTO: 0 %
ERYTHROCYTE [DISTWIDTH] IN BLOOD BY AUTOMATED COUNT: 11.9 % (ref 11.5–17)
FLUAV AG UPPER RESP QL IA.RAPID: NOT DETECTED
FLUBV AG UPPER RESP QL IA.RAPID: NOT DETECTED
GFR SERPLBLD CREATININE-BSD FMLA CKD-EPI: >60 MLS/MIN/1.73/M2
GLOBULIN SER-MCNC: 3.8 GM/DL (ref 2.4–3.5)
GLUCOSE SERPL-MCNC: 146 MG/DL (ref 74–100)
GLUCOSE SERPL-MCNC: 540 MG/DL (ref 74–100)
GLUCOSE SERPL-MCNC: 627 MG/DL (ref 74–100)
GLUCOSE SERPL-MCNC: >500 MG/DL (ref 70–110)
GLUCOSE UR QL STRIP.AUTO: >=1000 MG/DL
HCO3 UR-SCNC: 26 MMOL/L (ref 24–28)
HCT VFR BLD AUTO: 27.9 % (ref 37–47)
HGB BLD-MCNC: 9.6 GM/DL (ref 12–16)
IMM GRANULOCYTES # BLD AUTO: 0.03 X10(3)/MCL (ref 0–0.04)
IMM GRANULOCYTES NFR BLD AUTO: 0.4 %
KETONES UR QL STRIP.AUTO: 40 MG/DL
LEUKOCYTE ESTERASE UR QL STRIP.AUTO: NEGATIVE UNIT/L
LYMPHOCYTES # BLD AUTO: 0.68 X10(3)/MCL (ref 0.6–4.6)
LYMPHOCYTES NFR BLD AUTO: 9.7 %
MAGNESIUM SERPL-MCNC: 1.4 MG/DL (ref 1.6–2.6)
MCH RBC QN AUTO: 27.1 PG (ref 27–31)
MCHC RBC AUTO-ENTMCNC: 34.4 MG/DL (ref 33–36)
MCV RBC AUTO: 78.8 FL (ref 80–94)
MONOCYTES # BLD AUTO: 0.18 X10(3)/MCL (ref 0.1–1.3)
MONOCYTES NFR BLD AUTO: 2.6 %
NEUTROPHILS # BLD AUTO: 6.1 X10(3)/MCL (ref 2.1–9.2)
NEUTROPHILS NFR BLD AUTO: 86.9 %
NITRITE UR QL STRIP.AUTO: NEGATIVE
PCO2 BLDA: 31.5 MMHG (ref 35–45)
PH SMN: 7.53 [PH] (ref 7.35–7.45)
PH UR STRIP.AUTO: 7 [PH]
PLATELET # BLD AUTO: 484 X10(3)/MCL (ref 130–400)
PMV BLD AUTO: 9.1 FL (ref 7.4–10.4)
PO2 BLDA: 78 MMHG (ref 80–100)
POC BE: 3 MMOL/L
POC SATURATED O2: 97 % (ref 95–100)
POC TCO2: 27 MMOL/L (ref 23–27)
POCT GLUCOSE: 136 MG/DL (ref 70–110)
POCT GLUCOSE: 162 MG/DL (ref 70–110)
POCT GLUCOSE: 167 MG/DL (ref 70–110)
POCT GLUCOSE: 229 MG/DL (ref 70–110)
POCT GLUCOSE: >500 MG/DL (ref 70–110)
POCT GLUCOSE: >500 MG/DL (ref 70–110)
POTASSIUM SERPL-SCNC: 2.9 MMOL/L (ref 3.5–5.1)
POTASSIUM SERPL-SCNC: 3.5 MMOL/L (ref 3.5–5.1)
POTASSIUM SERPL-SCNC: 4.1 MMOL/L (ref 3.5–5.1)
PROT SERPL-MCNC: 7.6 GM/DL (ref 6.4–8.3)
PROT UR QL STRIP.AUTO: >=300 MG/DL
RBC # BLD AUTO: 3.54 X10(6)/MCL (ref 4.2–5.4)
RBC #/AREA URNS AUTO: NORMAL /HPF
RBC UR QL AUTO: ABNORMAL UNIT/L
SAMPLE: ABNORMAL
SARS-COV-2 RNA RESP QL NAA+PROBE: NOT DETECTED
SITE: ABNORMAL
SODIUM SERPL-SCNC: 135 MMOL/L (ref 136–145)
SODIUM SERPL-SCNC: 135 MMOL/L (ref 136–145)
SODIUM SERPL-SCNC: 141 MMOL/L (ref 136–145)
SP GR UR STRIP.AUTO: 1.02
SQUAMOUS #/AREA URNS AUTO: NORMAL /HPF
UROBILINOGEN UR STRIP-ACNC: 0.2 MG/DL
WBC # SPEC AUTO: 7 X10(3)/MCL (ref 4.5–11.5)
WBC #/AREA URNS AUTO: NORMAL /HPF

## 2022-08-18 PROCEDURE — 25000003 PHARM REV CODE 250: Performed by: EMERGENCY MEDICINE

## 2022-08-18 PROCEDURE — 82962 GLUCOSE BLOOD TEST: CPT | Mod: 91

## 2022-08-18 PROCEDURE — 96374 THER/PROPH/DIAG INJ IV PUSH: CPT | Mod: 59

## 2022-08-18 PROCEDURE — 81001 URINALYSIS AUTO W/SCOPE: CPT | Performed by: EMERGENCY MEDICINE

## 2022-08-18 PROCEDURE — 83735 ASSAY OF MAGNESIUM: CPT | Performed by: INTERNAL MEDICINE

## 2022-08-18 PROCEDURE — 20000000 HC ICU ROOM

## 2022-08-18 PROCEDURE — 87040 BLOOD CULTURE FOR BACTERIA: CPT | Performed by: EMERGENCY MEDICINE

## 2022-08-18 PROCEDURE — 63600175 PHARM REV CODE 636 W HCPCS: Performed by: EMERGENCY MEDICINE

## 2022-08-18 PROCEDURE — 25000003 PHARM REV CODE 250: Performed by: INTERNAL MEDICINE

## 2022-08-18 PROCEDURE — 87636 SARSCOV2 & INF A&B AMP PRB: CPT | Performed by: EMERGENCY MEDICINE

## 2022-08-18 PROCEDURE — 63600175 PHARM REV CODE 636 W HCPCS: Performed by: INTERNAL MEDICINE

## 2022-08-18 PROCEDURE — 99291 CRITICAL CARE FIRST HOUR: CPT | Mod: 25

## 2022-08-18 PROCEDURE — 96375 TX/PRO/DX INJ NEW DRUG ADDON: CPT

## 2022-08-18 PROCEDURE — 36415 COLL VENOUS BLD VENIPUNCTURE: CPT | Performed by: EMERGENCY MEDICINE

## 2022-08-18 PROCEDURE — 80053 COMPREHEN METABOLIC PANEL: CPT | Performed by: EMERGENCY MEDICINE

## 2022-08-18 PROCEDURE — 93005 ELECTROCARDIOGRAM TRACING: CPT

## 2022-08-18 PROCEDURE — 82010 KETONE BODYS QUAN: CPT | Performed by: EMERGENCY MEDICINE

## 2022-08-18 PROCEDURE — 36600 WITHDRAWAL OF ARTERIAL BLOOD: CPT

## 2022-08-18 PROCEDURE — 99900035 HC TECH TIME PER 15 MIN (STAT)

## 2022-08-18 PROCEDURE — 82803 BLOOD GASES ANY COMBINATION: CPT

## 2022-08-18 PROCEDURE — 85025 COMPLETE CBC W/AUTO DIFF WBC: CPT | Performed by: EMERGENCY MEDICINE

## 2022-08-18 RX ORDER — KETOROLAC TROMETHAMINE 30 MG/ML
15 INJECTION, SOLUTION INTRAMUSCULAR; INTRAVENOUS
Status: COMPLETED | OUTPATIENT
Start: 2022-08-18 | End: 2022-08-18

## 2022-08-18 RX ORDER — DROPERIDOL 2.5 MG/ML
2.5 INJECTION, SOLUTION INTRAMUSCULAR; INTRAVENOUS ONCE
Status: DISCONTINUED | OUTPATIENT
Start: 2022-08-18 | End: 2022-08-18

## 2022-08-18 RX ORDER — SODIUM CHLORIDE AND POTASSIUM CHLORIDE 150; 900 MG/100ML; MG/100ML
INJECTION, SOLUTION INTRAVENOUS CONTINUOUS PRN
Status: DISCONTINUED | OUTPATIENT
Start: 2022-08-18 | End: 2022-08-19

## 2022-08-18 RX ORDER — ACETAMINOPHEN 10 MG/ML
1000 INJECTION, SOLUTION INTRAVENOUS ONCE
Status: DISCONTINUED | OUTPATIENT
Start: 2022-08-18 | End: 2022-08-18

## 2022-08-18 RX ORDER — ACETAMINOPHEN 325 MG/1
650 TABLET ORAL EVERY 6 HOURS PRN
Status: DISCONTINUED | OUTPATIENT
Start: 2022-08-18 | End: 2022-08-21 | Stop reason: HOSPADM

## 2022-08-18 RX ORDER — METOPROLOL TARTRATE 50 MG/1
50 TABLET ORAL 2 TIMES DAILY
Status: DISCONTINUED | OUTPATIENT
Start: 2022-08-18 | End: 2022-08-21 | Stop reason: HOSPADM

## 2022-08-18 RX ORDER — ONDANSETRON 2 MG/ML
4 INJECTION INTRAMUSCULAR; INTRAVENOUS
Status: COMPLETED | OUTPATIENT
Start: 2022-08-18 | End: 2022-08-18

## 2022-08-18 RX ORDER — SODIUM CHLORIDE 0.9 % (FLUSH) 0.9 %
10 SYRINGE (ML) INJECTION
Status: DISCONTINUED | OUTPATIENT
Start: 2022-08-18 | End: 2022-08-21 | Stop reason: HOSPADM

## 2022-08-18 RX ORDER — METOCLOPRAMIDE HYDROCHLORIDE 5 MG/ML
10 INJECTION INTRAMUSCULAR; INTRAVENOUS EVERY 6 HOURS PRN
Status: DISCONTINUED | OUTPATIENT
Start: 2022-08-18 | End: 2022-08-19

## 2022-08-18 RX ORDER — DICYCLOMINE HYDROCHLORIDE 10 MG/1
10 CAPSULE ORAL 4 TIMES DAILY
Status: DISCONTINUED | OUTPATIENT
Start: 2022-08-18 | End: 2022-08-21 | Stop reason: HOSPADM

## 2022-08-18 RX ORDER — HYDRALAZINE HYDROCHLORIDE 20 MG/ML
10 INJECTION INTRAMUSCULAR; INTRAVENOUS EVERY 6 HOURS PRN
Status: DISCONTINUED | OUTPATIENT
Start: 2022-08-18 | End: 2022-08-21 | Stop reason: HOSPADM

## 2022-08-18 RX ORDER — DROPERIDOL 2.5 MG/ML
1.25 INJECTION, SOLUTION INTRAMUSCULAR; INTRAVENOUS ONCE
Status: COMPLETED | OUTPATIENT
Start: 2022-08-18 | End: 2022-08-18

## 2022-08-18 RX ORDER — LABETALOL HYDROCHLORIDE 5 MG/ML
10 INJECTION, SOLUTION INTRAVENOUS EVERY 4 HOURS PRN
Status: DISCONTINUED | OUTPATIENT
Start: 2022-08-18 | End: 2022-08-21 | Stop reason: HOSPADM

## 2022-08-18 RX ORDER — TRAMADOL HYDROCHLORIDE 50 MG/1
50 TABLET ORAL EVERY 6 HOURS PRN
Status: DISCONTINUED | OUTPATIENT
Start: 2022-08-18 | End: 2022-08-21 | Stop reason: HOSPADM

## 2022-08-18 RX ORDER — DIPHENHYDRAMINE HYDROCHLORIDE 50 MG/ML
25 INJECTION INTRAMUSCULAR; INTRAVENOUS
Status: COMPLETED | OUTPATIENT
Start: 2022-08-18 | End: 2022-08-18

## 2022-08-18 RX ORDER — ENOXAPARIN SODIUM 100 MG/ML
40 INJECTION SUBCUTANEOUS EVERY 24 HOURS
Status: DISCONTINUED | OUTPATIENT
Start: 2022-08-19 | End: 2022-08-21 | Stop reason: HOSPADM

## 2022-08-18 RX ORDER — NITROFURANTOIN 25; 75 MG/1; MG/1
100 CAPSULE ORAL 2 TIMES DAILY
Status: DISPENSED | OUTPATIENT
Start: 2022-08-18 | End: 2022-08-19

## 2022-08-18 RX ORDER — ACETAMINOPHEN 650 MG/1
650 SUPPOSITORY RECTAL
Status: COMPLETED | OUTPATIENT
Start: 2022-08-18 | End: 2022-08-18

## 2022-08-18 RX ORDER — AMITRIPTYLINE HYDROCHLORIDE 25 MG/1
25 TABLET, FILM COATED ORAL NIGHTLY
Status: DISCONTINUED | OUTPATIENT
Start: 2022-08-18 | End: 2022-08-21 | Stop reason: HOSPADM

## 2022-08-18 RX ORDER — MUPIROCIN 20 MG/G
OINTMENT TOPICAL 2 TIMES DAILY
Status: DISCONTINUED | OUTPATIENT
Start: 2022-08-18 | End: 2022-08-21 | Stop reason: HOSPADM

## 2022-08-18 RX ORDER — ONDANSETRON 2 MG/ML
4 INJECTION INTRAMUSCULAR; INTRAVENOUS EVERY 8 HOURS PRN
Status: DISCONTINUED | OUTPATIENT
Start: 2022-08-18 | End: 2022-08-18

## 2022-08-18 RX ORDER — FAMOTIDINE 10 MG/ML
20 INJECTION INTRAVENOUS 2 TIMES DAILY
Status: DISCONTINUED | OUTPATIENT
Start: 2022-08-18 | End: 2022-08-21 | Stop reason: HOSPADM

## 2022-08-18 RX ORDER — DROPERIDOL 2.5 MG/ML
2.5 INJECTION, SOLUTION INTRAMUSCULAR; INTRAVENOUS ONCE
Status: DISCONTINUED | OUTPATIENT
Start: 2022-08-18 | End: 2022-08-20

## 2022-08-18 RX ORDER — HYDROCODONE BITARTRATE AND ACETAMINOPHEN 5; 325 MG/1; MG/1
1 TABLET ORAL EVERY 6 HOURS PRN
Status: DISCONTINUED | OUTPATIENT
Start: 2022-08-18 | End: 2022-08-18

## 2022-08-18 RX ORDER — LISINOPRIL 20 MG/1
20 TABLET ORAL 2 TIMES DAILY
Status: DISCONTINUED | OUTPATIENT
Start: 2022-08-18 | End: 2022-08-18

## 2022-08-18 RX ORDER — NIFEDIPINE 30 MG/1
30 TABLET, EXTENDED RELEASE ORAL DAILY
Status: DISCONTINUED | OUTPATIENT
Start: 2022-08-19 | End: 2022-08-21 | Stop reason: HOSPADM

## 2022-08-18 RX ORDER — HYDRALAZINE HYDROCHLORIDE 20 MG/ML
20 INJECTION INTRAMUSCULAR; INTRAVENOUS
Status: COMPLETED | OUTPATIENT
Start: 2022-08-18 | End: 2022-08-18

## 2022-08-18 RX ORDER — DEXTROSE MONOHYDRATE, SODIUM CHLORIDE, AND POTASSIUM CHLORIDE 50; 1.49; 4.5 G/1000ML; G/1000ML; G/1000ML
INJECTION, SOLUTION INTRAVENOUS CONTINUOUS PRN
Status: DISCONTINUED | OUTPATIENT
Start: 2022-08-18 | End: 2022-08-19

## 2022-08-18 RX ORDER — MORPHINE SULFATE 4 MG/ML
2 INJECTION, SOLUTION INTRAMUSCULAR; INTRAVENOUS EVERY 6 HOURS PRN
Status: DISCONTINUED | OUTPATIENT
Start: 2022-08-18 | End: 2022-08-18

## 2022-08-18 RX ORDER — ONDANSETRON HYDROCHLORIDE 4 MG/5ML
4 SOLUTION ORAL EVERY 6 HOURS PRN
Status: DISCONTINUED | OUTPATIENT
Start: 2022-08-18 | End: 2022-08-20

## 2022-08-18 RX ORDER — MORPHINE SULFATE 4 MG/ML
1 INJECTION, SOLUTION INTRAMUSCULAR; INTRAVENOUS EVERY 8 HOURS PRN
Status: DISCONTINUED | OUTPATIENT
Start: 2022-08-18 | End: 2022-08-21 | Stop reason: HOSPADM

## 2022-08-18 RX ORDER — LISINOPRIL 20 MG/1
20 TABLET ORAL 2 TIMES DAILY
Status: DISCONTINUED | OUTPATIENT
Start: 2022-08-18 | End: 2022-08-21 | Stop reason: HOSPADM

## 2022-08-18 RX ADMIN — KETOROLAC TROMETHAMINE 15 MG: 30 INJECTION, SOLUTION INTRAMUSCULAR; INTRAVENOUS at 04:08

## 2022-08-18 RX ADMIN — POTASSIUM CHLORIDE, DEXTROSE MONOHYDRATE AND SODIUM CHLORIDE: 150; 5; 450 INJECTION, SOLUTION INTRAVENOUS at 09:08

## 2022-08-18 RX ADMIN — HYDRALAZINE HYDROCHLORIDE 20 MG: 20 INJECTION, SOLUTION INTRAMUSCULAR; INTRAVENOUS at 05:08

## 2022-08-18 RX ADMIN — SODIUM CHLORIDE 1000 ML: 9 INJECTION, SOLUTION INTRAVENOUS at 03:08

## 2022-08-18 RX ADMIN — ACETAMINOPHEN 650 MG: 650 SUPPOSITORY RECTAL at 03:08

## 2022-08-18 RX ADMIN — FAMOTIDINE 20 MG: 10 INJECTION, SOLUTION INTRAVENOUS at 09:08

## 2022-08-18 RX ADMIN — INSULIN HUMAN 0.1 UNITS/KG/HR: 1 INJECTION, SOLUTION INTRAVENOUS at 06:08

## 2022-08-18 RX ADMIN — ONDANSETRON 4 MG: 2 INJECTION INTRAMUSCULAR; INTRAVENOUS at 04:08

## 2022-08-18 RX ADMIN — DROPERIDOL 1.25 MG: 2.5 INJECTION, SOLUTION INTRAMUSCULAR; INTRAVENOUS at 05:08

## 2022-08-18 RX ADMIN — DIPHENHYDRAMINE HYDROCHLORIDE 25 MG: 50 INJECTION, SOLUTION INTRAMUSCULAR; INTRAVENOUS at 04:08

## 2022-08-18 RX ADMIN — SODIUM CHLORIDE 1000 ML: 9 INJECTION, SOLUTION INTRAVENOUS at 04:08

## 2022-08-18 RX ADMIN — MORPHINE SULFATE 1 MG: 4 INJECTION INTRAVENOUS at 09:08

## 2022-08-18 NOTE — ED PROVIDER NOTES
Encounter Date: 8/18/2022       History     Chief Complaint   Patient presents with    Vomiting    Back Pain     Vomitting and back pain again   started this morning    febrile 99.7 in triage   cbg >500     Patient's 3rd ED visit this month most recently she was admitted on Sunday night.  With nausea and vomiting.  She has type 1 diabetes mellitus she has been in DKA before.  She has diabetic gastroparesis she has frequent nausea and vomiting she has been having episodes of severe back pain over the past month to 2 months.  She had a fall 2 weeks ago with the back pain proceeded that she has a history of hypertension and diabetes insulin dependent.  She presents emerged department today crying with severe back pain and nausea and vomiting nausea vomiting started this morning back pain started this morning CIS same back pain she has had before she denies any blood coming up in the vomitus she denies coffee-ground emesis says just to food.  She does not have any history of kidney stones or gallbladder has been removed.    She has had COVID shots x2 she denies having flu pneumonia shot she believes her tetanus is less than 5 years.  She is single lives with her father.  She has had no tobacco alcohol or drug use she states.  She is employed.    Family history mother and father both alive and well.  Her regular healthcare provider is Brittney Kumar [facility].  Last cycle was 4 days ago she has never had any pregnancy she states.  Her only surgery is cholecystectomy.        Review of patient's allergies indicates:  No Known Allergies  Past Medical History:   Diagnosis Date    Diabetes mellitus     Diabetic gastroparesis associated with type 1 diabetes mellitus     DKA (diabetic ketoacidosis)     DKA (diabetic ketoacidosis)     DKA (diabetic ketoacidosis)     Gastroparesis     Gastroparesis due to DM     Hypertension     Ketoacidosis due to diabetes     Non compliance with medical treatment     Pneumonia      Secondary DM with DKA      Past Surgical History:   Procedure Laterality Date    CHOLECYSTECTOMY      ESOPHAGOGASTRODUODENOSCOPY      Multiple    None       Family History   Problem Relation Age of Onset    Heart disease Mother     Hypertension Mother     Diabetes Mother     Hyperlipidemia Mother     Cancer Father     Stroke Father     Hypertension Father     Hyperlipidemia Father      Social History     Tobacco Use    Smoking status: Never Smoker    Smokeless tobacco: Never Used   Substance Use Topics    Alcohol use: Never    Drug use: Yes     Types: Marijuana     Review of Systems   Constitutional: Negative for chills and fever.   HENT: Negative.  Negative for sore throat.    Eyes: Negative.    Respiratory: Negative.  Negative for shortness of breath.    Cardiovascular: Negative for chest pain.   Gastrointestinal: Positive for nausea and vomiting.   Endocrine: Negative.    Genitourinary: Negative.  Negative for dysuria.   Musculoskeletal: Positive for back pain.   Skin: Negative for rash.   Allergic/Immunologic: Negative.    Neurological: Negative.  Negative for weakness.   Hematological: Negative.  Does not bruise/bleed easily.   Psychiatric/Behavioral: Positive for dysphoric mood.   All other systems reviewed and are negative.      Physical Exam     Initial Vitals [08/18/22 1519]   BP Pulse Resp Temp SpO2   (!) 141/55 (!) 120 20 99.7 °F (37.6 °C) 100 %      MAP       --         Physical Exam    Nursing note and vitals reviewed.  Constitutional: She appears well-developed and well-nourished.   Heavyset black female crying awake oriented repeatedly telling everybody in the room home much pain she is in.  She is awake she is oriented   HENT:   Head: Normocephalic and atraumatic.   Right Ear: Tympanic membrane and external ear normal.   Left Ear: Tympanic membrane and external ear normal.   Nose: Nose normal.   Mouth/Throat: Oropharynx is clear and moist and mucous membranes are normal.   Eyes: EOM  are normal. Pupils are equal, round, and reactive to light.   Neck: Neck supple. No thyromegaly present. No tracheal deviation present. No JVD present.   Normal range of motion.  Cardiovascular: Normal rate, regular rhythm and normal heart sounds. Exam reveals no gallop and no friction rub.    No murmur heard.  Pulmonary/Chest: Breath sounds normal. No stridor. No respiratory distress. She has no wheezes. She has no rhonchi. She has no rales. She exhibits no tenderness.   Abdominal: Abdomen is soft. Bowel sounds are normal. She exhibits no distension and no mass. There is no abdominal tenderness.   Genitourinary:    Genitourinary Comments: Patient has no CVA tenderness she is tender from the upper thoracic spine tell proximally the upper lumbar spine there is no CVA significant tenderness on either side there is no point tenderness     Musculoskeletal:         General: No tenderness or edema. Normal range of motion.      Cervical back: Normal range of motion and neck supple.     Lymphadenopathy:     She has no cervical adenopathy.   Neurological: She is alert and oriented to person, place, and time. She has normal strength and normal reflexes. No cranial nerve deficit. GCS score is 15. GCS eye subscore is 4. GCS verbal subscore is 5. GCS motor subscore is 6.   Skin: Skin is warm and dry. Capillary refill takes 2 to 3 seconds. No rash noted.   Psychiatric: She has a normal mood and affect. Her behavior is normal. Judgment and thought content normal.         ED Course   Critical Care    Date/Time: 8/18/2022 5:49 PM  Performed by: Danilo العلي MD  Authorized by: Danilo العلي MD   Direct patient critical care time: 20 minutes  Additional history critical care time: 12 minutes  Ordering / reviewing critical care time: 15 minutes  Documentation critical care time: 12 minutes  Consulting other physicians critical care time: 10 minutes  Total critical care time (exclusive of procedural time) : 69 minutes  Critical  care time was exclusive of separately billable procedures and treating other patients.  Critical care was necessary to treat or prevent imminent or life-threatening deterioration of the following conditions: metabolic crisis.        Labs Reviewed   COMPREHENSIVE METABOLIC PANEL - Abnormal; Notable for the following components:       Result Value    Sodium Level 135 (*)     Chloride 94 (*)     Glucose Level 627 (*)     Creatinine 1.17 (*)     Globulin 3.8 (*)     Albumin/Globulin Ratio 1.0 (*)     All other components within normal limits   URINALYSIS, REFLEX TO URINE CULTURE - Abnormal; Notable for the following components:    Protein, UA >=300 (*)     Glucose, UA >=1000 (*)     Ketones, UA 40  (*)     Blood, UA Small (*)     All other components within normal limits   CBC WITH DIFFERENTIAL - Abnormal; Notable for the following components:    RBC 3.54 (*)     Hgb 9.6 (*)     Hct 27.9 (*)     MCV 78.8 (*)     Platelet 484 (*)     All other components within normal limits   ISTAT PROCEDURE - Abnormal; Notable for the following components:    POC PH 7.526 (*)     POC PCO2 31.5 (*)     POC PO2 78 (*)     All other components within normal limits   COVID/FLU A&B PCR - Normal   URINALYSIS, MICROSCOPIC - Normal   BLOOD CULTURE [facility]   BLOOD CULTURE [facility]   CBC W/ AUTO DIFFERENTIAL    Narrative:     The following orders were created for panel order CBC auto differential.  Procedure                               Abnormality         Status                     ---------                               -----------         ------                     CBC with Differential[[id number]]        Abnormal            Final result                 Please view results for these tests on the individual orders.   BETA - HYDROXYBUTYRATE, SERUM   BASIC METABOLIC PANEL   POCT GLUCOSE, HAND-HELD DEVICE   POCT GLUCOSE, HAND-HELD DEVICE   POCT GLUCOSE, HAND-HELD DEVICE   POCT GLUCOSE MONITORING CONTINUOUS   POCT GLUCOSE MONITORING CONTINUOUS     Recent  Results (from the past 4 hour(s))   COVID/FLU A&B PCR    Collection Time: 08/18/22  3:32 PM   Result Value Ref Range    Influenza A PCR Not Detected Not Detected    Influenza B PCR Not Detected Not Detected    SARS-CoV-2 PCR Not Detected Not Detected   ISTAT PROCEDURE    Collection Time: 08/18/22  3:47 PM   Result Value Ref Range    POC PH 7.526 (H) 7.35 - 7.45    POC PCO2 31.5 (L) 35 - 45 mmHg    POC PO2 78 (L) 80 - 100 mmHg    POC HCO3 26.0 24 - 28 mmol/L    POC BE 3 -2 to 2 mmol/L    POC SATURATED O2 97 95 - 100 %    POC TCO2 27 23 - 27 mmol/L    Sample ARTERIAL     Site LB     Allens Test N/A     DelSys Room Air    Comprehensive metabolic panel    Collection Time: 08/18/22  3:56 PM   Result Value Ref Range    Sodium Level 135 (L) 136 - 145 mmol/L    Potassium Level 3.5 3.5 - 5.1 mmol/L    Chloride 94 (L) 98 - 107 mmol/L    Carbon Dioxide 23 22 - 29 mmol/L    Glucose Level 627 (HH) 74 - 100 mg/dL    Blood Urea Nitrogen 12.0 7.0 - 18.7 mg/dL    Creatinine 1.17 (H) 0.55 - 1.02 mg/dL    Calcium Level Total 9.9 8.4 - 10.2 mg/dL    Protein Total 7.6 6.4 - 8.3 gm/dL    Albumin Level 3.8 3.5 - 5.0 gm/dL    Globulin 3.8 (H) 2.4 - 3.5 gm/dL    Albumin/Globulin Ratio 1.0 (L) 1.1 - 2.0 ratio    Bilirubin Total 0.8 <=1.5 mg/dL    Alkaline Phosphatase 115 40 - 150 unit/L    Alanine Aminotransferase 6 0 - 55 unit/L    Aspartate Aminotransferase 12 5 - 34 unit/L    eGFR >60 mls/min/1.73/m2   CBC with Differential    Collection Time: 08/18/22  3:56 PM   Result Value Ref Range    WBC 7.0 4.5 - 11.5 x10(3)/mcL    RBC 3.54 (L) 4.20 - 5.40 x10(6)/mcL    Hgb 9.6 (L) 12.0 - 16.0 gm/dL    Hct 27.9 (L) 37.0 - 47.0 %    MCV 78.8 (L) 80.0 - 94.0 fL    MCH 27.1 27.0 - 31.0 pg    MCHC 34.4 33.0 - 36.0 mg/dL    RDW 11.9 11.5 - 17.0 %    Platelet 484 (H) 130 - 400 x10(3)/mcL    MPV 9.1 7.4 - 10.4 fL    Neut % 86.9 %    Lymph % 9.7 %    Mono % 2.6 %    Eos % 0.0 %    Basophil % 0.4 %    Lymph # 0.68 0.6 - 4.6 x10(3)/mcL    Neut # 6.1 2.1 - 9.2  x10(3)/mcL    Mono # 0.18 0.1 - 1.3 x10(3)/mcL    Eos # 0.00 0 - 0.9 x10(3)/mcL    Baso # 0.03 0 - 0.2 x10(3)/mcL    IG# 0.03 0 - 0.04 x10(3)/mcL    IG% 0.4 %   Urinalysis, Reflex to Urine Culture Urine, Clean Catch    Collection Time: 08/18/22  5:21 PM    Specimen: Urine   Result Value Ref Range    Color, UA Yellow Yellow, Colorless, Other, Clear    Appearance, UA Clear Clear    Specific Gravity, UA 1.020     pH, UA 7.0 5.0, 5.5, 6.0, 6.5, 7.0, 7.5, 8.0, 8.5    Protein, UA >=300 (A) Negative, 300  mg/dL    Glucose, UA >=1000 (A) Negative, Normal mg/dL    Ketones, UA 40  (A) Negative, +1, +2, +3, +4, +5, >=160, >=80 mg/dL    Blood, UA Small (A) Negative unit/L    Bilirubin, UA Negative Negative mg/dL    Urobilinogen, UA 0.2 0.2, 1.0, Normal mg/dL    Nitrites, UA Negative Negative    Leukocyte Esterase, UA Negative Negative, 75  unit/L   Urinalysis, Microscopic    Collection Time: 08/18/22  5:21 PM   Result Value Ref Range    Bacteria, UA None Seen None Seen, Rare, Occasional /HPF    RBC, UA 0-2 None Seen, 0-2, 3-5, 0-5 /HPF    WBC, UA None Seen None Seen, 0-2, 3-5, 0-5 /HPF    Squamous Epithelial Cells, UA Rare None Seen, Rare, Occasional, Occ /HPF       EKG Readings: (Independently Interpreted)   Rhythm: Sinus Tachycardia. Heart Rate: 115. Ectopy: No Ectopy.   EKG was performed at 3:35 p.m. sinus tachycardia possible left atrial enlargement no acute ST       Imaging Results          X-Ray Chest AP Portable (Final result)  Result time 08/18/22 15:57:43    Final result by Danilo Navarro MD (08/18/22 15:57:43)                 Impression:      1. No active cardiopulmonary disease identified      Electronically signed by: Danilo Navarro  Date:    08/18/2022  Time:    15:57             Narrative:    EXAMINATION:  XR CHEST AP PORTABLE    CLINICAL HISTORY:  hyperglycemia;, .    COMPARISON:  08/08/2022    FINDINGS:  An AP view or more reveals the heart to be normal in size.  The trachea is to the left of midline.  No  definite infiltrate or effusion is seen.  Bony structures appear grossly intact.  There is slight haziness at the lateral right upper lung which is believed to represent superimposition of shadows including anterior right 1st rib and scapula.                                 Medications   hydrALAZINE injection 20 mg (has no administration in time range)   sodium chloride 0.9% bolus 1,000 mL (has no administration in time range)   insulin regular bolus from bag/infusion 6.76 Units (has no administration in time range)   insulin regular in 0.9 % NaCl 100 unit/100 mL (1 unit/mL) infusion (has no administration in time range)   dextrose 50% injection 12.5 g (has no administration in time range)   sodium chloride 0.9% bolus 2,000 mL (has no administration in time range)   sodium chloride 0.9% bolus 1,000 mL (has no administration in time range)   0.45% NaCl with KCl 20 mEq infusion (has no administration in time range)   0.9 % NaCl with KCl 20 mEq infusion (has no administration in time range)   dextrose 5 % and 0.45 % NaCl with KCl 20 mEq infusion (has no administration in time range)   sodium chloride 0.9% flush 10 mL (has no administration in time range)   ondansetron injection 4 mg (has no administration in time range)   sodium chloride 0.9% bolus 1,000 mL (1,000 mLs Intravenous New Bag 8/18/22 1606)   ondansetron injection 4 mg (4 mg Intravenous Given 8/18/22 1605)   sodium chloride 0.9% bolus 1,000 mL (1,000 mLs Intravenous New Bag 8/18/22 1530)   acetaminophen suppository 650 mg (650 mg Rectal Given 8/18/22 1546)   droperidoL injection 1.25 mg (1.25 mg Intravenous Given 8/18/22 1730)   diphenhydrAMINE injection 25 mg (25 mg Intravenous Given 8/18/22 1630)   ketorolac injection 15 mg (15 mg Intravenous Given 8/18/22 1630)                 ED Course as of 08/18/22 1751   u Aug 18, 2022   1712 Patient has an alkalotic pH her CO2 was blown down her sodium is 135 chloride is 94 with a CO2 of 23 a calculated anion gap  of 18 which is elevated. [DM]   1729 Consultation being obtained with hospital service Dr. Hidalgo.  The alkalotic pH would go against any diabetic ketoacidosis but have an anion gap of 18 I will discuss this with the hospitalist.  Her elevated glucose certainly points toward diabetic ketoacidosis [DM]   1748 I had a good discussion with Dr. Monahan we discussed the anion gap.  We discussed the alkalotic pH.  Decision was made to treat this as a DKA in the unit orders are written patient is admitted. [DM]      ED Course User Index  [DM] Danilo العلي MD             Clinical Impression:   Final diagnoses:  [R73.9] Hyperglycemia  [E10.10] DKA, type 1  [E11.43, K31.84] Diabetic gastroparesis (Primary)  [R11.14] Bilious vomiting with nausea  [M54.9] Back pain, unspecified back location, unspecified back pain laterality, unspecified chronicity          ED Disposition Condition    Admit               Danilo العلي MD  08/18/22 2907

## 2022-08-18 NOTE — Clinical Note
Diagnosis: DKA, type 1 [031727]   Admitting Provider:: ALICIA BAIRD [[id number]]   Future Attending Provider: ALICIA BAIRD [[id number]]   Reason for IP Medical Treatment  (Clinical interventions that can only be accomplished in the IP setting? ) :: further tx   Estimated Length of Stay:: 2 midnights   I certify that Inpatient services for greater than or equal to 2 midnights are medically necessary:: Yes   Plans for Post-Acute care--if anticipated (pick the single best option):: A. No post acute care anticipated at this time

## 2022-08-18 NOTE — PROGRESS NOTES
C3 nurse attempted to contact Dorene Husain for a TCC post hospital discharge follow up call. No answer. No voicemail available.The patient does not have a scheduled HOSFU appointment. Message sent to PCP staff for assistance with scheduling visit with patient.

## 2022-08-19 LAB
ANION GAP SERPL CALC-SCNC: 10 MEQ/L
ANION GAP SERPL CALC-SCNC: 11 MEQ/L
ANION GAP SERPL CALC-SCNC: 11 MEQ/L
ANION GAP SERPL CALC-SCNC: 12 MEQ/L
ANION GAP SERPL CALC-SCNC: 9 MEQ/L
B-OH-BUTYR SERPL-MCNC: 1.7 MMOL/L
BASOPHILS # BLD AUTO: 0.03 X10(3)/MCL (ref 0–0.2)
BASOPHILS NFR BLD AUTO: 0.3 %
BUN SERPL-MCNC: 11 MG/DL (ref 7–18.7)
BUN SERPL-MCNC: 13 MG/DL (ref 7–18.7)
BUN SERPL-MCNC: 8 MG/DL (ref 7–18.7)
CALCIUM SERPL-MCNC: 8.4 MG/DL (ref 8.4–10.2)
CALCIUM SERPL-MCNC: 8.5 MG/DL (ref 8.4–10.2)
CALCIUM SERPL-MCNC: 8.5 MG/DL (ref 8.4–10.2)
CALCIUM SERPL-MCNC: 8.8 MG/DL (ref 8.4–10.2)
CALCIUM SERPL-MCNC: 8.9 MG/DL (ref 8.4–10.2)
CHLORIDE SERPL-SCNC: 103 MMOL/L (ref 98–107)
CHLORIDE SERPL-SCNC: 103 MMOL/L (ref 98–107)
CHLORIDE SERPL-SCNC: 104 MMOL/L (ref 98–107)
CHLORIDE SERPL-SCNC: 104 MMOL/L (ref 98–107)
CHLORIDE SERPL-SCNC: 105 MMOL/L (ref 98–107)
CO2 SERPL-SCNC: 22 MMOL/L (ref 22–29)
CO2 SERPL-SCNC: 23 MMOL/L (ref 22–29)
CO2 SERPL-SCNC: 24 MMOL/L (ref 22–29)
CREAT SERPL-MCNC: 0.8 MG/DL (ref 0.55–1.02)
CREAT SERPL-MCNC: 0.83 MG/DL (ref 0.55–1.02)
CREAT SERPL-MCNC: 0.84 MG/DL (ref 0.55–1.02)
CREAT SERPL-MCNC: 0.84 MG/DL (ref 0.55–1.02)
CREAT SERPL-MCNC: 1 MG/DL (ref 0.55–1.02)
CREAT/UREA NIT SERPL: 10
CREAT/UREA NIT SERPL: 11
CREAT/UREA NIT SERPL: 15
CREAT/UREA NIT SERPL: 15
CREAT/UREA NIT SERPL: 16
EOSINOPHIL # BLD AUTO: 0.01 X10(3)/MCL (ref 0–0.9)
EOSINOPHIL NFR BLD AUTO: 0.1 %
ERYTHROCYTE [DISTWIDTH] IN BLOOD BY AUTOMATED COUNT: 12.3 % (ref 11.5–17)
GFR SERPLBLD CREATININE-BSD FMLA CKD-EPI: >60 MLS/MIN/1.73/M2
GLUCOSE SERPL-MCNC: 158 MG/DL (ref 74–100)
GLUCOSE SERPL-MCNC: 162 MG/DL (ref 74–100)
GLUCOSE SERPL-MCNC: 257 MG/DL (ref 74–100)
GLUCOSE SERPL-MCNC: 337 MG/DL (ref 74–100)
GLUCOSE SERPL-MCNC: 354 MG/DL (ref 74–100)
HCT VFR BLD AUTO: 24.1 % (ref 37–47)
HGB BLD-MCNC: 8.2 GM/DL (ref 12–16)
IMM GRANULOCYTES # BLD AUTO: 0.03 X10(3)/MCL (ref 0–0.04)
IMM GRANULOCYTES NFR BLD AUTO: 0.3 %
LYMPHOCYTES # BLD AUTO: 2.52 X10(3)/MCL (ref 0.6–4.6)
LYMPHOCYTES NFR BLD AUTO: 24.2 %
MAGNESIUM SERPL-MCNC: 1.8 MG/DL (ref 1.6–2.6)
MCH RBC QN AUTO: 27.5 PG (ref 27–31)
MCHC RBC AUTO-ENTMCNC: 34 MG/DL (ref 33–36)
MCV RBC AUTO: 80.9 FL (ref 80–94)
MONOCYTES # BLD AUTO: 0.94 X10(3)/MCL (ref 0.1–1.3)
MONOCYTES NFR BLD AUTO: 9 %
NEUTROPHILS # BLD AUTO: 6.9 X10(3)/MCL (ref 2.1–9.2)
NEUTROPHILS NFR BLD AUTO: 66.1 %
PLATELET # BLD AUTO: 383 X10(3)/MCL (ref 130–400)
PMV BLD AUTO: 8.7 FL (ref 7.4–10.4)
POCT GLUCOSE: 132 MG/DL (ref 70–110)
POCT GLUCOSE: 132 MG/DL (ref 70–110)
POCT GLUCOSE: 152 MG/DL (ref 70–110)
POCT GLUCOSE: 164 MG/DL (ref 70–110)
POCT GLUCOSE: 181 MG/DL (ref 70–110)
POCT GLUCOSE: 236 MG/DL (ref 70–110)
POCT GLUCOSE: 239 MG/DL (ref 70–110)
POCT GLUCOSE: 239 MG/DL (ref 70–110)
POCT GLUCOSE: 243 MG/DL (ref 70–110)
POCT GLUCOSE: 350 MG/DL (ref 70–110)
POCT GLUCOSE: 368 MG/DL (ref 70–110)
POCT GLUCOSE: 385 MG/DL (ref 70–110)
POCT GLUCOSE: 412 MG/DL (ref 70–110)
POTASSIUM SERPL-SCNC: 3.1 MMOL/L (ref 3.5–5.1)
POTASSIUM SERPL-SCNC: 3.2 MMOL/L (ref 3.5–5.1)
POTASSIUM SERPL-SCNC: 3.3 MMOL/L (ref 3.5–5.1)
POTASSIUM SERPL-SCNC: 3.5 MMOL/L (ref 3.5–5.1)
POTASSIUM SERPL-SCNC: 3.6 MMOL/L (ref 3.5–5.1)
RBC # BLD AUTO: 2.98 X10(6)/MCL (ref 4.2–5.4)
SODIUM SERPL-SCNC: 136 MMOL/L (ref 136–145)
SODIUM SERPL-SCNC: 136 MMOL/L (ref 136–145)
SODIUM SERPL-SCNC: 137 MMOL/L (ref 136–145)
SODIUM SERPL-SCNC: 138 MMOL/L (ref 136–145)
SODIUM SERPL-SCNC: 140 MMOL/L (ref 136–145)
WBC # SPEC AUTO: 10.4 X10(3)/MCL (ref 4.5–11.5)

## 2022-08-19 PROCEDURE — 36415 COLL VENOUS BLD VENIPUNCTURE: CPT | Performed by: INTERNAL MEDICINE

## 2022-08-19 PROCEDURE — 83735 ASSAY OF MAGNESIUM: CPT | Performed by: INTERNAL MEDICINE

## 2022-08-19 PROCEDURE — 25000003 PHARM REV CODE 250: Performed by: INTERNAL MEDICINE

## 2022-08-19 PROCEDURE — 63600175 PHARM REV CODE 636 W HCPCS: Performed by: INTERNAL MEDICINE

## 2022-08-19 PROCEDURE — C9399 UNCLASSIFIED DRUGS OR BIOLOG: HCPCS | Performed by: INTERNAL MEDICINE

## 2022-08-19 PROCEDURE — 85025 COMPLETE CBC W/AUTO DIFF WBC: CPT | Performed by: INTERNAL MEDICINE

## 2022-08-19 PROCEDURE — 11000001 HC ACUTE MED/SURG PRIVATE ROOM

## 2022-08-19 PROCEDURE — 94761 N-INVAS EAR/PLS OXIMETRY MLT: CPT

## 2022-08-19 PROCEDURE — 80048 BASIC METABOLIC PNL TOTAL CA: CPT | Performed by: INTERNAL MEDICINE

## 2022-08-19 RX ORDER — METOCLOPRAMIDE 10 MG/1
10 TABLET ORAL
Status: DISCONTINUED | OUTPATIENT
Start: 2022-08-19 | End: 2022-08-21 | Stop reason: HOSPADM

## 2022-08-19 RX ORDER — INSULIN ASPART 100 [IU]/ML
2-12 INJECTION, SOLUTION INTRAVENOUS; SUBCUTANEOUS
Status: DISCONTINUED | OUTPATIENT
Start: 2022-08-19 | End: 2022-08-21 | Stop reason: HOSPADM

## 2022-08-19 RX ORDER — INSULIN ASPART 100 [IU]/ML
7 INJECTION, SOLUTION INTRAVENOUS; SUBCUTANEOUS
Status: DISCONTINUED | OUTPATIENT
Start: 2022-08-19 | End: 2022-08-21 | Stop reason: HOSPADM

## 2022-08-19 RX ORDER — SODIUM CHLORIDE 9 MG/ML
INJECTION, SOLUTION INTRAVENOUS CONTINUOUS
Status: DISCONTINUED | OUTPATIENT
Start: 2022-08-19 | End: 2022-08-21 | Stop reason: HOSPADM

## 2022-08-19 RX ORDER — POTASSIUM CHLORIDE 7.45 MG/ML
10 INJECTION INTRAVENOUS
Status: COMPLETED | OUTPATIENT
Start: 2022-08-19 | End: 2022-08-19

## 2022-08-19 RX ORDER — POTASSIUM CHLORIDE 20 MEQ/1
40 TABLET, EXTENDED RELEASE ORAL ONCE
Status: COMPLETED | OUTPATIENT
Start: 2022-08-19 | End: 2022-08-19

## 2022-08-19 RX ORDER — POTASSIUM CHLORIDE 20 MEQ/1
40 TABLET, EXTENDED RELEASE ORAL ONCE
Status: DISCONTINUED | OUTPATIENT
Start: 2022-08-19 | End: 2022-08-19

## 2022-08-19 RX ORDER — GLUCAGON 1 MG
1 KIT INJECTION
Status: DISCONTINUED | OUTPATIENT
Start: 2022-08-19 | End: 2022-08-21 | Stop reason: HOSPADM

## 2022-08-19 RX ORDER — MAGNESIUM SULFATE 1 G/100ML
1 INJECTION INTRAVENOUS ONCE
Status: COMPLETED | OUTPATIENT
Start: 2022-08-19 | End: 2022-08-19

## 2022-08-19 RX ORDER — METOCLOPRAMIDE 10 MG/1
10 TABLET ORAL
Status: DISCONTINUED | OUTPATIENT
Start: 2022-08-19 | End: 2022-08-19

## 2022-08-19 RX ADMIN — LISINOPRIL 20 MG: 20 TABLET ORAL at 09:08

## 2022-08-19 RX ADMIN — INSULIN DETEMIR 30 UNITS: 100 INJECTION, SOLUTION SUBCUTANEOUS at 11:08

## 2022-08-19 RX ADMIN — FAMOTIDINE 20 MG: 10 INJECTION, SOLUTION INTRAVENOUS at 09:08

## 2022-08-19 RX ADMIN — METOCLOPRAMIDE 10 MG: 10 TABLET ORAL at 05:08

## 2022-08-19 RX ADMIN — ENOXAPARIN SODIUM 40 MG: 100 INJECTION SUBCUTANEOUS at 05:08

## 2022-08-19 RX ADMIN — POTASSIUM CHLORIDE 10 MEQ: 7.46 INJECTION, SOLUTION INTRAVENOUS at 09:08

## 2022-08-19 RX ADMIN — POTASSIUM CHLORIDE 40 MEQ: 1500 TABLET, EXTENDED RELEASE ORAL at 05:08

## 2022-08-19 RX ADMIN — FAMOTIDINE 20 MG: 10 INJECTION, SOLUTION INTRAVENOUS at 10:08

## 2022-08-19 RX ADMIN — MUPIROCIN: 20 OINTMENT TOPICAL at 09:08

## 2022-08-19 RX ADMIN — METOPROLOL TARTRATE 50 MG: 50 TABLET, FILM COATED ORAL at 11:08

## 2022-08-19 RX ADMIN — METOCLOPRAMIDE 10 MG: 10 TABLET ORAL at 09:08

## 2022-08-19 RX ADMIN — POTASSIUM CHLORIDE, DEXTROSE MONOHYDRATE AND SODIUM CHLORIDE: 150; 5; 450 INJECTION, SOLUTION INTRAVENOUS at 11:08

## 2022-08-19 RX ADMIN — DICYCLOMINE HYDROCHLORIDE 10 MG: 10 CAPSULE ORAL at 05:08

## 2022-08-19 RX ADMIN — DICYCLOMINE HYDROCHLORIDE 10 MG: 10 CAPSULE ORAL at 09:08

## 2022-08-19 RX ADMIN — NIFEDIPINE 30 MG: 30 TABLET, EXTENDED RELEASE ORAL at 09:08

## 2022-08-19 RX ADMIN — SODIUM CHLORIDE: 9 INJECTION, SOLUTION INTRAVENOUS at 11:08

## 2022-08-19 RX ADMIN — MORPHINE SULFATE 1 MG: 4 INJECTION INTRAVENOUS at 11:08

## 2022-08-19 RX ADMIN — NITROFURANTOIN (MONOHYDRATE/MACROCRYSTALS) 100 MG: 75; 25 CAPSULE ORAL at 11:08

## 2022-08-19 RX ADMIN — POTASSIUM CHLORIDE 10 MEQ: 7.46 INJECTION, SOLUTION INTRAVENOUS at 07:08

## 2022-08-19 RX ADMIN — INSULIN DETEMIR 30 UNITS: 100 INJECTION, SOLUTION SUBCUTANEOUS at 09:08

## 2022-08-19 RX ADMIN — AMITRIPTYLINE HYDROCHLORIDE 25 MG: 25 TABLET, FILM COATED ORAL at 09:08

## 2022-08-19 RX ADMIN — METOCLOPRAMIDE 10 MG: 10 TABLET ORAL at 11:08

## 2022-08-19 RX ADMIN — LISINOPRIL 20 MG: 20 TABLET ORAL at 11:08

## 2022-08-19 RX ADMIN — MAGNESIUM SULFATE 1 G: 1 INJECTION INTRAVENOUS at 04:08

## 2022-08-19 RX ADMIN — MORPHINE SULFATE 1 MG: 4 INJECTION INTRAVENOUS at 07:08

## 2022-08-19 RX ADMIN — POTASSIUM CHLORIDE 10 MEQ: 7.46 INJECTION, SOLUTION INTRAVENOUS at 05:08

## 2022-08-19 RX ADMIN — POTASSIUM CHLORIDE, DEXTROSE MONOHYDRATE AND SODIUM CHLORIDE: 150; 5; 450 INJECTION, SOLUTION INTRAVENOUS at 04:08

## 2022-08-19 RX ADMIN — METOPROLOL TARTRATE 50 MG: 50 TABLET, FILM COATED ORAL at 09:08

## 2022-08-19 RX ADMIN — MORPHINE SULFATE 1 MG: 4 INJECTION INTRAVENOUS at 03:08

## 2022-08-19 RX ADMIN — DICYCLOMINE HYDROCHLORIDE 10 MG: 10 CAPSULE ORAL at 11:08

## 2022-08-19 RX ADMIN — POTASSIUM CHLORIDE 10 MEQ: 7.46 INJECTION, SOLUTION INTRAVENOUS at 10:08

## 2022-08-19 NOTE — H&P
Chief Complaint; nausea,vomiting and abdominal pain earlier this morning    HPI:   Patient is a 27 y.o. female with a medical history of type 1 Diabetes Mellitus,Hypertension, gastroparesis, Chronic back pain and recent UTI presents to the ER on account of nausea,vomiting and abdominal pain.  Patient was recently discharged from our facility yesterday after she was managed for UTI,gastroparesis and uncontrolled DM. She was stable upon discharge until this morning, when she developed recurrent nausea,non bloody vomiting and  Upper abdominal pain which has been persistent , radiating to the upper area, about 8/10 intensity with tasneem aggravating or relieving factor. Also, complaints of back pain.No fever, no dizziness or dysuria.  She had a recent CT abdomen and lumbar spine with no remarkable findings.  At the ER, she was found to have a mild DKA and currently on DKA protocol.     PCP Kumar Ortiz NP MD        Past Medical History:   Diagnosis Date    Diabetes mellitus     Diabetic gastroparesis associated with type 1 diabetes mellitus     DKA (diabetic ketoacidosis)     DKA (diabetic ketoacidosis)     DKA (diabetic ketoacidosis)     Gastroparesis     Gastroparesis due to DM     Hypertension     Ketoacidosis due to diabetes     Non compliance with medical treatment     Pneumonia     Secondary DM with DKA         Past Surgical History:   Procedure Laterality Date    CHOLECYSTECTOMY      ESOPHAGOGASTRODUODENOSCOPY      Multiple    None          Medications Prior to Admission   Medication Sig Dispense Refill Last Dose    amitriptyline (ELAVIL) 25 MG tablet Take 25 mg by mouth nightly.       BASAGLAR KWIKPEN U-100 INSULIN glargine 100 units/mL (3mL) SubQ pen Inject into the skin.       dicyclomine (BENTYL) 10 MG capsule Take 10 mg by mouth 4 (four) times daily.       fluconazole (DIFLUCAN) 100 MG tablet Take 1 tablet (100 mg total) by mouth once daily. for 7 days 7 tablet 0     hydrOXYzine (ATARAX)  50 MG tablet Take 50 mg by mouth daily as needed.       insulin lispro (HUMALOG U-100 INSULIN) 100 unit/mL Crtg Inject into the skin.       lisinopriL (PRINIVIL,ZESTRIL) 20 MG tablet Take 20 mg by mouth 2 (two) times daily.       metoclopramide HCl (REGLAN) 10 MG tablet Take 1 tablet (10 mg total) by mouth every 6 (six) hours. 30 tablet 0     metoprolol tartrate (LOPRESSOR) 50 MG tablet Take 50 mg by mouth 2 (two) times daily.       nitrofurantoin, macrocrystal-monohydrate, (MACROBID) 100 MG capsule Take 1 capsule (100 mg total) by mouth 2 (two) times daily. for 5 days 10 capsule 0     olmesartan (BENICAR) 40 MG tablet Take 40 mg by mouth once daily.       ondansetron (ZOFRAN) 4 MG tablet Take 4 mg by mouth every 6 (six) hours as needed.       pantoprazole (PROTONIX) 40 MG tablet Take 40 mg by mouth once daily.          Review of patient's allergies indicates:  No Known Allergies     Social History     Tobacco Use    Smoking status: Never Smoker    Smokeless tobacco: Never Used   Substance Use Topics    Alcohol use: Never        Family History   Problem Relation Age of Onset    Heart disease Mother     Hypertension Mother     Diabetes Mother     Hyperlipidemia Mother     Cancer Father     Stroke Father     Hypertension Father     Hyperlipidemia Father        Review of Systems  Review of Systems   Constitutional: Negative for fever.   HEENT: Negative for drooling, ear pain, facial swelling and nosebleeds.    Eyes: Negative for discharge and visual disturbance.   Respiratory: Negative for cough, negative shortness of breath.    Cardiovascular: negative for chest pain or SOB  Gastrointestinal: positive for nausea, Vomiting.and abdominal pain  Genitourinary: Negative for decreased urine volume and dysuria  Musculoskeletal: Negative for neck pain.   Skin: Negative for rash.   Neurological: negative for numbness, negative for weakness,negative for seizures and facial asymmetry.              Objective:  "    No intake/output data recorded.    BP (!) 193/124   Pulse (!) 113   Temp 99.7 °F (37.6 °C)   Resp 12   Ht 5' 2" (1.575 m)   Wt 67.6 kg (149 lb)   SpO2 100%   BMI 27.25 kg/m²     General Appearance:    Awake, alert, not in acute distress   HEENT:   atraumatic, PERRL, EOM intact, conjuctiva pink, sclera anicteric, oropharynx moist, no lesions noted   Neck:    Supple, no JVD, no carotid bruits, no lymphadenopathy or thyromegaly noted       Pulmonary:   Clear to auscultation bilaterally, reduced breath sounds bileterally.   Cardiovascular:    Regular rate and rhythm, S1 S2 normal   Abdomen:     Soft, mild diffuse tenderness,nondistended, bowel sounds active all four quadrants, no masses, no organomegaly   Extremities:  no edema, no cyanosis or clubbing noted       Skin:   No bruises noted.       Neurologic: Alert, awake, oriented x 3, moves all extremities.                 Data Review :   Labs:    CBC:   Lab Results   Component Value Date    WBC 7.0 08/18/2022    RBC 3.54 (L) 08/18/2022    HGB 9.6 (L) 08/18/2022    HCT 27.9 (L) 08/18/2022     (H) 08/18/2022     CMP:   Lab Results   Component Value Date     (L) 08/18/2022    K 4.1 08/18/2022    CO2 19 (L) 08/18/2022    BUN 12.0 08/18/2022    CREATININE 1.09 (H) 08/18/2022    CALCIUM 9.7 08/18/2022    ALKPHOS 115 08/18/2022    AST 12 08/18/2022    ALT 6 08/18/2022    ALBUMIN 3.8 08/18/2022    BILITOT 0.8 08/18/2022     Cardiac markers:   Lab Results   Component Value Date    BNP 68.3 03/21/2022       Radiology:  Micro:  No components found for: BLOODCX, SPUTUMCX, URINECX    Radiology:  @[email or web address]@        Assessment & Plan:   1.Mild Diabetes Ketoacidosis; probably precipitated by the the flare of gastroparesis.on DKA protocol.continue IV fluid,insulin drip and correct possible electrolytes derangement.Keep NPO.     2. Flare of gastroparesis; continue Reglan and Zofran prn. Recent CT abdomen was not remarkable. Also, on famotidine..     3.Hypertensive " Urgency; continue lisinopril,metoprolol,nifedipine and prn labetalol    4. Type1 DM; continue above mgt. Repeat A1c.    5. Recent UTI; continue macrobid    6. Chronic back pain; recent CT lumbar spine was non remarkable. Continue analgesics.    7.. Maintain the patient on DVT prophylaxis; Lovenox.    Code status; full code  Disposition;continue above management.  This note was completed using voice recognition software and transcription errors may occur.    This was a telemedicine evaluation and management.  Location; [place], [place]        Celina Abrams  8/18/2022  8:11 PM

## 2022-08-19 NOTE — PROGRESS NOTES
"[facility] - ICU  Adult Nutrition  Progress Note    SUMMARY       Recommendations    Recommendation/Intervention:     1. Rec'd continue FL diet and advance as tolerated to Diabetic when medically appropriate.     2. Monitor diet advancement, intake, tolerance, weight, and labs.    Communication of RD Recs: reviewed with RN      Service: Nutrition       Malnutrition Assessment                                       Reason for Assessment    Reason For Assessment: other (see comments) (DKA.)  Diagnosis: diabetes diagnosis/complications  Relevant Medical History: Hx of gastroparesis.  General Information Comments: 8/19: Pt asleep during rounds.  Discussed with nsg.  NPO status lifted and diet was just advanced to FL.  Will continue to monitor during stay.    Nutrition Risk Screen    Nutrition Risk Screen: no indicators present    Nutrition/Diet History    Food Allergies: NKFA    Anthropometrics    Temp: 98.4 °F (36.9 °C)  Height Method: Stated  Height: 5' 2.01" (157.5 cm)  Height (inches): 62.01 in  Weight Method: Bed Scale  Weight: 67.1 kg (147 lb 14.9 oz)  Weight (lb): 147.93 lb  Ideal Body Weight (IBW), Female: 110.05 lb  % Ideal Body Weight, Female (lb): 134.42 %  BMI (Calculated): 27  BMI Grade: 25 - 29.9 - overweight       Lab/Procedures/Meds    Pertinent Labs Reviewed: reviewed  Pertinent Labs Comments: 8/19: K+ 3.1(L); (H); H/H 8.2/24.1(L).  Pertinent Medications Reviewed: reviewed  Pertinent Medications Comments: Insulin; Morphine (PRN).    Physical Findings/Assessment         Estimated/Assessed Needs                                   Nutrition Prescription Ordered    Current Diet Order: FL.  Nutrition Order Comments: Diet advanced today to FL.    Evaluation of Received Nutrient/Fluid Intake       % Intake of Estimated Energy Needs: Other: Diet just advanced to FL.  % Meal Intake: Other: Diet just advanced to FL.    Nutrition Risk    Level of Risk/Frequency of Follow-up: low     Monitor and " Evaluation    Food and Nutrient Intake: energy intake, food and beverage intake  Food and Nutrient Adminstration: diet order  Anthropometric Measurements: weight change, weight  Biochemical Data, Medical Tests and Procedures: electrolyte and renal panel, lipid profile, gastrointestinal profile, glucose/endocrine profile, inflammatory profile     Nutrition Follow-Up    RD Follow-up?: Yes

## 2022-08-19 NOTE — PROGRESS NOTES
Patient has been admitted to the ICU as of 8/18/2022 per notes in chart, call not made, encounter signed.

## 2022-08-19 NOTE — PROGRESS NOTES
[facility] Medicine Progress Note    Patient Name: Dorene Husain  Age: 27 y.o.   Code Status: Full Code  MRN: [id number]  Admission Date: 8/18/2022  3:22 PM   Patient Class: IP- Inpatient  Hospital Length of Stay: 1 days  Attending Provider: Sergio Hidalgo MD  Primary Care Provider: Kumar Ortiz NP   Chief Complaint:   Chief Complaint   Patient presents with    Vomiting    Back Pain     Vomitting and back pain again   started this morning    febrile 99.7 in triage   cbg >500           SUBJECTIVE:     Follow-up:  DKA (diabetic ketoacidosis)    HPI:  Patient is a 27 y.o. female with a medical history of type 1 Diabetes Mellitus,Hypertension, gastroparesis, Chronic back pain and recent UTI presents to the ER on account of nausea,vomiting and abdominal pain.  Patient was recently discharged from our facility yesterday after she was managed for UTI,gastroparesis and uncontrolled DM. She was stable upon discharge until this morning, when she developed recurrent nausea,non bloody vomiting and  Upper abdominal pain which has been persistent , radiating to the upper area, about 8/10 intensity with tasneem aggravating or relieving factor. Also, complaints of back pain.No fever, no dizziness or dysuria.  She had a recent CT abdomen and lumbar spine with no remarkable findings.  At the ER, she was found to have a mild DKA and currently on DKA protocol.       Last 24h: DKA quickly broken. Insulin gtt stopped early this morning, however no basal or sliding scale insulin ordered. Hyperglycemic this morning. Started Levemir 30u bid, Aspart 7u tidwc and SSI. Changed fluids to NS. Advanced diet to Full Liquid but she hasnt taken anything PO other than water all day. K+ replacement ordered. Hopefully home tomorrow. No N/V today.      Scheduled Meds:   amitriptyline  25 mg Oral Nightly    dicyclomine  10 mg Oral QID    droperidoL  2.5 mg Intravenous Once    enoxaparin  40 mg Subcutaneous Daily    famotidine  (PF)  20 mg Intravenous BID    insulin aspart U-100  7 Units Subcutaneous TIDWM    insulin detemir U-100  30 Units Subcutaneous BID    lisinopriL  20 mg Oral BID    metoclopramide HCl  10 mg Oral QID (AC & HS)    metoprolol tartrate  50 mg Oral BID    mupirocin   Nasal BID    NIFEdipine  30 mg Oral Daily    nitrofurantoin (macrocrystal-monohydrate)  100 mg Oral BID    potassium chloride  10 mEq Intravenous Q1H     Continuous Infusions:   sodium chloride 0.9% 150 mL/hr at 08/19/22 1142     PRN Meds:   acetaminophen    dextrose 10%    dextrose 10%    dextrose 50%    glucagon (human recombinant)    hydrALAZINE    insulin aspart U-100    labetalol    morphine    ondansetron    sodium chloride 0.9%    sodium chloride 0.9%    traMADoL      Lines/Drains:   Lines/Drains/Airways     None                   Allergies as of 08/18/2022    (No Known Allergies)         Review of Systems: 10 systems reviewed all pertinent positives and negatives stated in HPI, otherwise negative.       OBJECTIVE:     Vital Signs (Most Recent)  Temp: 98.4 °F (36.9 °C) (08/19/22 1515)  Pulse: 85 (08/19/22 1700)  Resp: 15 (08/19/22 1700)  BP: 125/75 (08/19/22 1700)  SpO2: 96 % (08/19/22 1700)    Vital Signs Range (Last 24H):  Temp:  [97.2 °F (36.2 °C)-100.1 °F (37.8 °C)]   Pulse:  []   Resp:  [6-22]   BP: ()/()   SpO2:  [96 %-100 %]     I & O (Last 24H):    Intake/Output Summary (Last 24 hours) at 8/19/2022 1830  Last data filed at 8/19/2022 0600  Gross per 24 hour   Intake 1314.9 ml   Output 100 ml   Net 1214.9 ml       Physical Exam  Constitutional:       Appearance: She is not toxic-appearing.   HENT:      Head: Normocephalic and atraumatic.      Nose: Nose normal.      Mouth/Throat:      Mouth: Mucous membranes are moist.   Eyes:      Extraocular Movements: Extraocular movements intact.      Conjunctiva/sclera: Conjunctivae normal.      Pupils: Pupils are equal, round, and reactive to light.    Cardiovascular:      Rate and Rhythm: Normal rate and regular rhythm.      Heart sounds: Normal heart sounds.   Pulmonary:      Effort: Pulmonary effort is normal.      Breath sounds: Normal breath sounds. No wheezing, rhonchi or rales.   Abdominal:      General: Bowel sounds are normal. There is no distension.      Palpations: Abdomen is soft.   Musculoskeletal:         General: Normal range of motion.      Right lower leg: No edema.      Left lower leg: No edema.   Skin:     General: Skin is warm and dry.   Neurological:      General: No focal deficit present.      Mental Status: She is alert and oriented to person, place, and time. Mental status is at baseline.   Psychiatric:         Attention and Perception: Attention normal.         Mood and Affect: Mood is depressed. Affect is blunt and flat.         Speech: Speech normal.         Behavior: Behavior is cooperative.         Thought Content: Thought content normal.         Cognition and Memory: Cognition and memory normal.          Recent Labs   Lab 08/17/22  0429 08/18/22  1556 08/19/22  0230   WBC 9.9 7.0 10.4   HGB 8.6* 9.6* 8.2*   HCT 26.4* 27.9* 24.1*   * 484* 383   MCV 83.0 78.8* 80.9        Recent Labs   Lab 08/18/22  1556 08/18/22  1759 08/18/22  2139 08/18/22  2140 08/19/22  0628 08/19/22  0757 08/19/22  1219 08/19/22  1545   *   < >  --    < > 137 136 136 138   K 3.5   < >  --    < > 3.5 3.6 3.3* 3.1*   CHLORIDE 94*   < >  --    < > 104 103 103 104   CO2 23   < >  --    < > 24 23 22 23   BUN 12.0   < >  --    < > 13.0 13.0 11.0 8.0   CREATININE 1.17*   < >  --    < > 0.84 0.84 1.00 0.80   GLUCOSE 627*   < >  --    < > 257* 354* 337* 162*   MG  --   --  1.40*  --  1.80  --   --   --    CALCIUM 9.9   < >  --    < > 8.5 8.4 8.8 8.9   ALBUMIN 3.8  --   --   --   --   --   --   --    BILITOT 0.8  --   --   --   --   --   --   --    ALKPHOS 115  --   --   --   --   --   --   --    ALT 6  --   --   --   --   --   --   --    AST 12  --   --   --    --   --   --   --     < > = values in this interval not displayed.           ASSESSMENT/PLAN:     Patient Active Problem List    Diagnosis Date Noted    DKA (diabetic ketoacidosis)     Hyperglycemia 08/14/2022    Acute cystitis with hematuria 08/14/2022    Non-intractable vomiting with nausea 08/14/2022    Epigastric pain 08/14/2022    Hypertension     Drug-seeking behavior 06/30/2022    Diabetic gastroparesis 06/30/2022    Diabetes mellitus 06/30/2022    Syncopal episodes 05/31/2022    Hyperglycemia due to type 1 diabetes mellitus 05/31/2022    Lumbar pain 05/31/2022    Chronic pain 05/31/2022    Hypokalemia 05/25/2022    DM gastroparesis 05/25/2022    CHATA (acute kidney injury) 05/21/2022        - Levemir 30u bid (home dose of Lantus)  - Aspart 7u tidwc if eating (home dose)  - SSI  - NS  - replace K+  - Reglan 10mg PO qac + hs  - home meds  - transfer to floor  - am labs      VTE Risk Mitigation (From admission, onward)         Ordered     enoxaparin injection 40 mg  Daily         08/18/22 2108     Place sequential compression device  Until discontinued         08/18/22 1748     IP VTE LOW RISK PATIENT  Once         08/18/22 1748                       Sergio Hidalgo MD  [facility] Medicine   [facility]

## 2022-08-19 NOTE — HPI
Patient is a 27 y.o. female with a medical history of type 1 Diabetes Mellitus,Hypertension, gastroparesis, Chronic back pain and recent UTI presents to the ER on account of nausea,vomiting and abdominal pain.  Patient was recently discharged from our facility yesterday after she was managed for UTI,gastroparesis and uncontrolled DM. She was stable upon discharge until this morning, when she developed recurrent nausea,non bloody vomiting and  Upper abdominal pain which has been persistent , radiating to the upper area, about 8/10 intensity with tasneem aggravating or relieving factor. Also, complaints of back pain.No fever, no dizziness or dysuria.  She had a recent CT abdomen and lumbar spine with no remarkable findings.  At the ER, she was found to have a mild DKA and currently on DKA protocol.

## 2022-08-20 LAB
ALBUMIN SERPL-MCNC: 2.8 GM/DL (ref 3.5–5)
BASOPHILS # BLD AUTO: 0.02 X10(3)/MCL (ref 0–0.2)
BASOPHILS NFR BLD AUTO: 0.2 %
BUN SERPL-MCNC: 8 MG/DL (ref 7–18.7)
CALCIUM SERPL-MCNC: 8.7 MG/DL (ref 8.4–10.2)
CHLORIDE SERPL-SCNC: 110 MMOL/L (ref 98–107)
CO2 SERPL-SCNC: 20 MMOL/L (ref 22–29)
CREAT SERPL-MCNC: 0.72 MG/DL (ref 0.55–1.02)
EOSINOPHIL # BLD AUTO: 0.15 X10(3)/MCL (ref 0–0.9)
EOSINOPHIL NFR BLD AUTO: 1.8 %
ERYTHROCYTE [DISTWIDTH] IN BLOOD BY AUTOMATED COUNT: 12.7 % (ref 11.5–17)
GFR SERPLBLD CREATININE-BSD FMLA CKD-EPI: >60 MLS/MIN/1.73/M2
GLUCOSE SERPL-MCNC: 45 MG/DL (ref 74–100)
HCT VFR BLD AUTO: 24.8 % (ref 37–47)
HGB BLD-MCNC: 8.2 GM/DL (ref 12–16)
IMM GRANULOCYTES # BLD AUTO: 0.03 X10(3)/MCL (ref 0–0.04)
IMM GRANULOCYTES NFR BLD AUTO: 0.4 %
LYMPHOCYTES # BLD AUTO: 3.81 X10(3)/MCL (ref 0.6–4.6)
LYMPHOCYTES NFR BLD AUTO: 44.5 %
MAGNESIUM SERPL-MCNC: 1.6 MG/DL (ref 1.6–2.6)
MCH RBC QN AUTO: 27.5 PG (ref 27–31)
MCHC RBC AUTO-ENTMCNC: 33.1 MG/DL (ref 33–36)
MCV RBC AUTO: 83.2 FL (ref 80–94)
MONOCYTES # BLD AUTO: 0.6 X10(3)/MCL (ref 0.1–1.3)
MONOCYTES NFR BLD AUTO: 7 %
NEUTROPHILS # BLD AUTO: 4 X10(3)/MCL (ref 2.1–9.2)
NEUTROPHILS NFR BLD AUTO: 46.1 %
PHOSPHATE SERPL-MCNC: 2.9 MG/DL (ref 2.3–4.7)
PLATELET # BLD AUTO: 404 X10(3)/MCL (ref 130–400)
PMV BLD AUTO: 8.8 FL (ref 7.4–10.4)
POCT GLUCOSE: 289 MG/DL (ref 70–110)
POCT GLUCOSE: 36 MG/DL (ref 70–110)
POCT GLUCOSE: 389 MG/DL (ref 70–110)
POCT GLUCOSE: 45 MG/DL (ref 70–110)
POCT GLUCOSE: 53 MG/DL (ref 70–110)
POCT GLUCOSE: 81 MG/DL (ref 70–110)
POTASSIUM SERPL-SCNC: 3.8 MMOL/L (ref 3.5–5.1)
RBC # BLD AUTO: 2.98 X10(6)/MCL (ref 4.2–5.4)
SODIUM SERPL-SCNC: 140 MMOL/L (ref 136–145)
WBC # SPEC AUTO: 8.6 X10(3)/MCL (ref 4.5–11.5)

## 2022-08-20 PROCEDURE — 85025 COMPLETE CBC W/AUTO DIFF WBC: CPT | Performed by: INTERNAL MEDICINE

## 2022-08-20 PROCEDURE — 25000003 PHARM REV CODE 250: Performed by: INTERNAL MEDICINE

## 2022-08-20 PROCEDURE — 63600175 PHARM REV CODE 636 W HCPCS: Performed by: INTERNAL MEDICINE

## 2022-08-20 PROCEDURE — 36415 COLL VENOUS BLD VENIPUNCTURE: CPT | Performed by: INTERNAL MEDICINE

## 2022-08-20 PROCEDURE — C9399 UNCLASSIFIED DRUGS OR BIOLOG: HCPCS | Performed by: INTERNAL MEDICINE

## 2022-08-20 PROCEDURE — 94761 N-INVAS EAR/PLS OXIMETRY MLT: CPT

## 2022-08-20 PROCEDURE — 83735 ASSAY OF MAGNESIUM: CPT | Performed by: INTERNAL MEDICINE

## 2022-08-20 PROCEDURE — 11000001 HC ACUTE MED/SURG PRIVATE ROOM

## 2022-08-20 PROCEDURE — 80069 RENAL FUNCTION PANEL: CPT | Performed by: INTERNAL MEDICINE

## 2022-08-20 RX ORDER — ONDANSETRON 4 MG/1
4 TABLET, ORALLY DISINTEGRATING ORAL EVERY 6 HOURS PRN
Status: DISCONTINUED | OUTPATIENT
Start: 2022-08-20 | End: 2022-08-21 | Stop reason: HOSPADM

## 2022-08-20 RX ADMIN — DICYCLOMINE HYDROCHLORIDE 10 MG: 10 CAPSULE ORAL at 09:08

## 2022-08-20 RX ADMIN — MORPHINE SULFATE 1 MG: 4 INJECTION INTRAVENOUS at 09:08

## 2022-08-20 RX ADMIN — INSULIN ASPART 8 UNITS: 100 INJECTION, SOLUTION INTRAVENOUS; SUBCUTANEOUS at 12:08

## 2022-08-20 RX ADMIN — LISINOPRIL 20 MG: 20 TABLET ORAL at 09:08

## 2022-08-20 RX ADMIN — METOCLOPRAMIDE 10 MG: 10 TABLET ORAL at 12:08

## 2022-08-20 RX ADMIN — NIFEDIPINE 30 MG: 30 TABLET, EXTENDED RELEASE ORAL at 09:08

## 2022-08-20 RX ADMIN — MUPIROCIN: 20 OINTMENT TOPICAL at 09:08

## 2022-08-20 RX ADMIN — METOPROLOL TARTRATE 50 MG: 50 TABLET, FILM COATED ORAL at 09:08

## 2022-08-20 RX ADMIN — DICYCLOMINE HYDROCHLORIDE 10 MG: 10 CAPSULE ORAL at 01:08

## 2022-08-20 RX ADMIN — AMITRIPTYLINE HYDROCHLORIDE 25 MG: 25 TABLET, FILM COATED ORAL at 09:08

## 2022-08-20 RX ADMIN — SODIUM CHLORIDE: 9 INJECTION, SOLUTION INTRAVENOUS at 06:08

## 2022-08-20 RX ADMIN — ENOXAPARIN SODIUM 40 MG: 100 INJECTION SUBCUTANEOUS at 05:08

## 2022-08-20 RX ADMIN — INSULIN DETEMIR 30 UNITS: 100 INJECTION, SOLUTION SUBCUTANEOUS at 09:08

## 2022-08-20 RX ADMIN — INSULIN ASPART 7 UNITS: 100 INJECTION, SOLUTION INTRAVENOUS; SUBCUTANEOUS at 12:08

## 2022-08-20 RX ADMIN — INSULIN ASPART 7 UNITS: 100 INJECTION, SOLUTION INTRAVENOUS; SUBCUTANEOUS at 09:08

## 2022-08-20 RX ADMIN — METOCLOPRAMIDE 10 MG: 10 TABLET ORAL at 05:08

## 2022-08-20 RX ADMIN — SODIUM CHLORIDE: 9 INJECTION, SOLUTION INTRAVENOUS at 11:08

## 2022-08-20 RX ADMIN — FAMOTIDINE 20 MG: 10 INJECTION, SOLUTION INTRAVENOUS at 09:08

## 2022-08-20 RX ADMIN — METOCLOPRAMIDE 10 MG: 10 TABLET ORAL at 09:08

## 2022-08-20 RX ADMIN — DICYCLOMINE HYDROCHLORIDE 10 MG: 10 CAPSULE ORAL at 05:08

## 2022-08-20 RX ADMIN — MORPHINE SULFATE 1 MG: 4 INJECTION INTRAVENOUS at 05:08

## 2022-08-20 NOTE — PROGRESS NOTES
Progress Note         Hospital follow up    Subjective:     CC   Vomiting and back pain     S patient is seen lying in bed sleeping.  She is not quite at her baseline still some nausea.  Sent to take several rounds Zofran today.    Current Facility-Administered Medications   Medication Dose Route Frequency Provider Last Rate Last Admin    0.9%  NaCl infusion   Intravenous Continuous Sergio Hidalgo  mL/hr at 08/20/22 1112 New Bag at 08/20/22 1112    acetaminophen tablet 650 mg  650 mg Oral Q6H PRN Celina Abrams MD        amitriptyline tablet 25 mg  25 mg Oral Nightly Celina Abrams MD   25 mg at 08/19/22 2123    dextrose 10% bolus 125 mL  12.5 g Intravenous PRN Sergio Hidalgo MD        dextrose 10% bolus 250 mL  25 g Intravenous PRN Sergio Hidalgo MD        dextrose 50% injection 12.5 g  12.5 g Intravenous Q15 Min PRN Celina Abrams MD        dicyclomine capsule 10 mg  10 mg Oral QID Celina Abrams MD   10 mg at 08/20/22 1351    enoxaparin injection 40 mg  40 mg Subcutaneous Daily Celina Abrams MD   40 mg at 08/19/22 1712    famotidine (PF) injection 20 mg  20 mg Intravenous BID Celina Abrams MD   20 mg at 08/20/22 0920    glucagon (human recombinant) injection 1 mg  1 mg Intramuscular PRN Sergio Hidalgo MD        hydrALAZINE injection 10 mg  10 mg Intravenous Q6H PRN Celina Abrams MD        insulin aspart U-100 injection 2-12 Units  2-12 Units Subcutaneous QID (AC + HS) PRN Sergio Hidalgo MD   8 Units at 08/20/22 1219    insulin aspart U-100 injection 7 Units  7 Units Subcutaneous TIDWM Sergio Hidalgo MD   7 Units at 08/20/22 1214    insulin detemir U-100 injection 30 Units  30 Units Subcutaneous BID Sergio Hidalgo MD   30 Units at 08/20/22 0924    labetaloL injection 10 mg  10 mg Intravenous Q4H PRN Celina Abrams MD        lisinopriL tablet 20 mg  20 mg Oral BID Celina Abrams MD   20 mg at 08/20/22 0923    metoclopramide HCl tablet 10 mg   10 mg Oral QID (AC & HS) Sergio Hidalgo MD   10 mg at 08/20/22 1214    metoprolol tartrate (LOPRESSOR) tablet 50 mg  50 mg Oral BID Celina Abrams MD   50 mg at 08/20/22 0923    morphine injection 1 mg  1 mg Intravenous Q8H PRN Celina Abrams MD   1 mg at 08/20/22 0920    mupirocin 2 % ointment   Nasal BID Sergio Hidalgo MD   Given at 08/20/22 0930    NIFEdipine 24 hr tablet 30 mg  30 mg Oral Daily Celina Abrams MD   30 mg at 08/20/22 0923    ondansetron disintegrating tablet 4 mg  4 mg Oral Q6H PRN Tra Franklin MD        sodium chloride 0.9% flush 10 mL  10 mL Intravenous PRN Celina Abrams MD        sodium chloride 0.9% flush 10 mL  10 mL Intravenous PRN Celina Abrams MD        traMADoL tablet 50 mg  50 mg Oral Q6H PRN Celina Abrams MD               Review of Systems:     ROS    Objective:       VITAL SIGNS: 24 HR MIN & MAX LAST    Temp  Min: 98.2 °F (36.8 °C)  Max: 98.7 °F (37.1 °C)  98.4 °F (36.9 °C)        BP  Min: 102/73  Max: 148/91  106/66     Pulse  Min: 72  Max: 97  79     Resp  Min: 12  Max: 30  18    SpO2  Min: 96 %  Max: 100 %  98 %      Physical Exam*  Constitutional:       Appearance: She is not toxic-appearing.   HENT:      Head: Normocephalic and atraumatic.      Nose: Nose normal.      Mouth/Throat:      Mouth: Mucous membranes are moist.   Eyes:      Extraocular Movements: Extraocular movements intact.      Conjunctiva/sclera: Conjunctivae normal.      Pupils: Pupils are equal, round, and reactive to light.   Cardiovascular:      Rate and Rhythm: Normal rate and regular rhythm.      Heart sounds: Normal heart sounds.   Pulmonary:      Effort: Pulmonary effort is normal.      Breath sounds: Normal breath sounds. No wheezing, rhonchi or rales.   Abdominal:      General: Bowel sounds are normal. There is no distension.      Palpations: Abdomen is soft.   Musculoskeletal:         General: Normal range of motion.      Right lower leg: No edema.      Left lower leg:  No edema.   Skin:     General: Skin is warm and dry.   Neurological:      General: No focal deficit present.      Mental Status: She is alert and oriented to person, place, and time. Mental status is at baseline.   Psychiatric:         Attention and Perception: Attention normal.         Mood and Affect: Mood is depressed. Affect is blunt and flat.               Assessment / Plan:     Active Hospital Problems    Diagnosis  POA    *DKA (diabetic ketoacidosis) [E11.10]  Yes      Resolved Hospital Problems   No resolved problems to display.     #DKA with DM - acidosis resolved, pt still has some nausea will continue current medical management  - continue current insulin regimen detemir 30   Bid    # HTN - stable on ace and CCB       # anemia- no signs of occult bleeding   - follow clinically continue lovenox             Barriers to DC: *      Components of this note were documented using voice recognition systems; and are subject to errors not corrected at proof reading.  Please contact the author for any clarifications.

## 2022-08-21 VITALS
OXYGEN SATURATION: 100 % | HEART RATE: 93 BPM | WEIGHT: 147.94 LBS | SYSTOLIC BLOOD PRESSURE: 139 MMHG | BODY MASS INDEX: 27.22 KG/M2 | HEIGHT: 62 IN | TEMPERATURE: 99 F | RESPIRATION RATE: 18 BRPM | DIASTOLIC BLOOD PRESSURE: 90 MMHG

## 2022-08-21 LAB
POCT GLUCOSE: 133 MG/DL (ref 70–110)
POCT GLUCOSE: 72 MG/DL (ref 70–110)
POCT GLUCOSE: 77 MG/DL (ref 70–110)

## 2022-08-21 PROCEDURE — 94761 N-INVAS EAR/PLS OXIMETRY MLT: CPT

## 2022-08-21 PROCEDURE — 25000003 PHARM REV CODE 250: Performed by: INTERNAL MEDICINE

## 2022-08-21 PROCEDURE — 63600175 PHARM REV CODE 636 W HCPCS: Performed by: INTERNAL MEDICINE

## 2022-08-21 RX ADMIN — METOCLOPRAMIDE 10 MG: 10 TABLET ORAL at 12:08

## 2022-08-21 RX ADMIN — NIFEDIPINE 30 MG: 30 TABLET, EXTENDED RELEASE ORAL at 09:08

## 2022-08-21 RX ADMIN — METOCLOPRAMIDE 10 MG: 10 TABLET ORAL at 05:08

## 2022-08-21 RX ADMIN — METOPROLOL TARTRATE 50 MG: 50 TABLET, FILM COATED ORAL at 09:08

## 2022-08-21 RX ADMIN — TRAMADOL HYDROCHLORIDE 50 MG: 50 TABLET, COATED ORAL at 01:08

## 2022-08-21 RX ADMIN — DICYCLOMINE HYDROCHLORIDE 10 MG: 10 CAPSULE ORAL at 01:08

## 2022-08-21 RX ADMIN — MORPHINE SULFATE 1 MG: 4 INJECTION INTRAVENOUS at 02:08

## 2022-08-21 RX ADMIN — DICYCLOMINE HYDROCHLORIDE 10 MG: 10 CAPSULE ORAL at 09:08

## 2022-08-21 RX ADMIN — MUPIROCIN: 20 OINTMENT TOPICAL at 09:08

## 2022-08-21 RX ADMIN — LISINOPRIL 20 MG: 20 TABLET ORAL at 09:08

## 2022-08-21 RX ADMIN — MORPHINE SULFATE 1 MG: 4 INJECTION INTRAVENOUS at 10:08

## 2022-08-21 RX ADMIN — SODIUM CHLORIDE: 9 INJECTION, SOLUTION INTRAVENOUS at 09:08

## 2022-08-21 RX ADMIN — FAMOTIDINE 20 MG: 10 INJECTION, SOLUTION INTRAVENOUS at 09:08

## 2022-08-21 NOTE — DISCHARGE SUMMARY
Discharge Summary    ADMISSION INFORMATION:     Admit Date: 8/18/2022    Admitting Physician: Sergio Hidalgo MD    Presenting Problem: DKA (diabetic ketoacidosis) [E11.10]  Hyperglycemia [R73.9]  Diabetic gastroparesis [E11.43, K31.84]  DKA, type 1 [E10.10]  Bilious vomiting with nausea [R11.14]  Back pain, unspecified back location, unspecified back pain laterality, unspecified chronicity [M54.9]      Primary Care Physician: Kumar Ortiz NP     Primary Care Phone: [phone]   Consulting Provider(s):             DISCHARGE INFORMATION:     Discharge Date:     Discharge Physician: Sergio Hidalgo MD     Primary Discharge Diagnosis: DKA (diabetic ketoacidosis)   Secondary Discharge Diagnosis:   Active Hospital Problems   No active problems to display.      Resolved Hospital Problems    Diagnosis Date Resolved POA    *DKA (diabetic ketoacidosis) [E11.10] 08/21/2022 Yes              Discharge Condition: good        Discharge Disposition: to home       DETAILS OF HOSPITAL STAY:         ADMIT   PRESENTATION:         27-year-old female with hx IDDM,. HTN, GERD, ch back pain who is known to this ED who presents to the emergency department for evaluation and treatment of severe back pain, generalized body aches, she is crying. Denies F/C , Chest pain, SOB Denies dysuria, no abd pain, C/O back pain, nausea, vomiting. She takes Lantus 30 units BID and Novolog         Hospital Course:     #DKA with DM -more in line with hyperglycemia and mild acidosis that improved with fluids and insulin  - acidosis resolved, pt still has some nausea will continue current medical management  - continue current insulin regimen detemir 30   Bid home regimen stable for DC     # HTN - stable on ace        # anemia- no signs of occult bleeding   - follow clinically continue lovenox     Physical exam on the date of discharge:  *Constitutional:       Appearance: She is not toxic-appearing.   HENT:      Head: Normocephalic and atraumatic.  "     Nose: Nose normal.      Mouth/Throat:      Mouth: Mucous membranes are moist.   Eyes:      Extraocular Movements: Extraocular movements intact.      Conjunctiva/sclera: Conjunctivae normal.      Pupils: Pupils are equal, round, and reactive to light.   Cardiovascular:      Rate and Rhythm: Normal rate and regular rhythm.      Heart sounds: Normal heart sounds.   Pulmonary:      Effort: Pulmonary effort is normal.      Breath sounds: Normal breath sounds. No wheezing, rhonchi or rales.   Abdominal:      General: Bowel sounds are normal. There is no distension.      Palpations: Abdomen is soft.   Musculoskeletal:         General: Normal range of motion.      Right lower leg: No edema.      Left lower leg: No edema.   Skin:     General: Skin is warm and dry.   Neurological:      General: No focal deficit present.      Mental Status: She is alert and oriented to person, place, and time. Mental status is at baseline.   Psychiatric:         Attention and Perception: Attention normal.         Mood and Affect: Mood is depressed. Affect is blunt and flat.                  DISCHARGE PLAN:       Future Appointments   Date Time Provider Department Center   10/13/2022  9:30 AM Keely Gil NP [facility]        Portions of this note may have been created with voice recognition software. Occasional "wrong-word" or "sound-a-like" substitutions may have occurred due to the inherent limitations of voice recognition software.  Please, read the note carefully and recognize, using context, where substitutions have occurred.       Greater than 35 minutes   "

## 2022-08-21 NOTE — PROGRESS NOTES
Pt ordered a full liquid diet requesting sandwich, pt educated on diet ordered, pt verbalized an understanding, pt given sandwich. Glucose ,monitored as ordered this shift.

## 2022-08-22 ENCOUNTER — PATIENT OUTREACH (OUTPATIENT)
Dept: ADMINISTRATIVE | Facility: CLINIC | Age: 27
End: 2022-08-22
Payer: MEDICAID

## 2022-08-23 LAB — POCT GLUCOSE: 145 MG/DL (ref 70–110)

## 2022-08-24 LAB
BACTERIA BLD CULT: NORMAL
BACTERIA BLD CULT: NORMAL

## 2022-09-08 NOTE — DISCHARGE SUMMARY
[facility] - [facility] Unit  Hospital Medicine  Discharge Summary      Patient Name: Dorene Husain  MRN: [id number]  Patient Class: IP- Inpatient  Admission Date: 8/14/2022  Hospital Length of Stay: 3 days  Discharge Date and Time: 8/17/2022  2:40 PM  Attending Physician: No att. providers found   Discharging Provider: Sergio Hidalgo MD  Primary Care Provider: Kumar Ortiz NP      HPI:   27-year-old female with hx IDDM,. HTN, GERD, ch back pain who is known to this ED who presents to the emergency department for evaluation and treatment of severe back pain, generalized body aches, she is crying. Denies F/C , Chest pain, SOB Denies dysuria, no abd pain, C/O back pain, nausea, vomiting. She takes Lantus 30 units BID and Novolog      * No surgery found *      Hospital Course:   08/15  Pt admitted overnight with acute abdominal pain, h/o DM uncontrolled with hyperglycemia, c/o vomiting and severe abdominal epigastric pain.  PT had more vomiting this am, took a few sips of clear liquids, c/o epigastric pain.      08/16/2022  Pt states pain is better and pain meds help her but do not last long enough  Her sugars are a bit better, no vomiting since yesterday am, drinking more of her clear liquids about 50% this am  Magneium level is 1.5     8/17/22 - Feels better and tolerating PO. Wants to go home. Discharged in stable condition with Rx for Reglan.      Goals of Care Treatment Preferences:  Code Status: Full Code      Consults:     No new Assessment & Plan notes have been filed under this hospital service since the last note was generated.  Service: Hospital Medicine    Final Active Diagnoses:    Diagnosis Date Noted POA    PRINCIPAL PROBLEM:  Non-intractable vomiting with nausea [R11.2] 08/14/2022 Yes    Hyperglycemia [R73.9] 08/14/2022 Yes    Epigastric pain [R10.13] 08/14/2022 Yes    Hypertension [I10]  Yes     Chronic    Drug-seeking behavior [Z76.5] 06/30/2022 Yes     Chronic    Chronic  pain [G89.29] 05/31/2022 Yes     Chronic    DM gastroparesis [E11.43, K31.84] 05/25/2022 Yes     Chronic      Problems Resolved During this Admission:       Discharged Condition: stable    Disposition: Home or Self Care    Follow Up:   Follow-up Information     Kumar Ortiz NP Follow up.    Specialty: Family Medicine  Why: As needed  Contact information:  [place] [id number] [phone]                       Patient Instructions:      Diet diabetic     Activity as tolerated       Pending Diagnostic Studies:     None         Medications:  Reconciled Home Medications:      Medication List      START taking these medications    metoclopramide HCl 10 MG tablet  Commonly known as: REGLAN  Take 1 tablet (10 mg total) by mouth every 6 (six) hours.        CONTINUE taking these medications    amitriptyline 25 MG tablet  Commonly known as: ELAVIL  Take 25 mg by mouth nightly.     BASAGLAR KWIKPEN U-100 INSULIN glargine 100 units/mL SubQ pen  Generic drug: insulin  Inject into the skin.     dicyclomine 10 MG capsule  Commonly known as: BENTYL  Take 10 mg by mouth 4 (four) times daily.     hydrOXYzine 50 MG tablet  Commonly known as: ATARAX  Take 50 mg by mouth daily as needed.     lisinopriL 20 MG tablet  Commonly known as: PRINIVIL,ZESTRIL  Take 20 mg by mouth 2 (two) times daily.     metoprolol tartrate 50 MG tablet  Commonly known as: LOPRESSOR  Take 50 mg by mouth 2 (two) times daily.     olmesartan 40 MG tablet  Commonly known as: BENICAR  Take 40 mg by mouth once daily.     ondansetron 4 MG tablet  Commonly known as: ZOFRAN  Take 4 mg by mouth every 6 (six) hours as needed.     pantoprazole 40 MG tablet  Commonly known as: PROTONIX  Take 40 mg by mouth once daily.        STOP taking these medications    HumaLOG U-100 Insulin 100 unit/mL Crtg  Generic drug: insulin lispro            Indwelling Lines/Drains at time of discharge:   Lines/Drains/Airways     None                 Time spent on the  discharge of patient: 35 minutes         Sergio Hidalgo MD  Department of Hospital Medicine  [facility]

## 2022-09-20 ENCOUNTER — HOSPITAL ENCOUNTER (EMERGENCY)
Facility: HOSPITAL | Age: 27
Discharge: ANOTHER HOSPITAL | End: 2022-09-21
Attending: GENERAL ACUTE CARE HOSPITAL
Payer: MEDICAID

## 2022-09-20 DIAGNOSIS — N17.9 AKI (ACUTE KIDNEY INJURY): ICD-10-CM

## 2022-09-20 DIAGNOSIS — R73.9 HYPERGLYCEMIA: ICD-10-CM

## 2022-09-20 DIAGNOSIS — E10.10 TYPE 1 DIABETES MELLITUS WITH KETOACIDOSIS WITHOUT COMA: Primary | ICD-10-CM

## 2022-09-20 LAB
ABS NEUT CALC (OHS): 25.98 X10(3)/MCL (ref 2.1–9.2)
ALBUMIN SERPL-MCNC: 2.8 GM/DL (ref 3.5–5)
ALBUMIN/GLOB SERPL: 0.9 RATIO (ref 1.1–2)
ALLENS TEST: ABNORMAL
ALP SERPL-CCNC: 129 UNIT/L (ref 40–150)
ALT SERPL-CCNC: 14 UNIT/L (ref 0–55)
AMPHET UR QL SCN: NEGATIVE
AMYLASE SERPL-CCNC: 52 UNIT/L (ref 25–125)
APPEARANCE UR: CLEAR
APTT PPP: 39.1 SECONDS (ref 23.2–33.7)
AST SERPL-CCNC: 16 UNIT/L (ref 5–34)
B-HCG SERPL QL: NEGATIVE
BACTERIA #/AREA URNS AUTO: ABNORMAL /HPF
BARBITURATE SCN PRESENT UR: NEGATIVE
BENZODIAZ UR QL SCN: NEGATIVE
BILIRUB UR QL STRIP.AUTO: NEGATIVE MG/DL
BILIRUBIN DIRECT+TOT PNL SERPL-MCNC: 0.6 MG/DL
BNP BLD-MCNC: 98.3 PG/ML
BUN SERPL-MCNC: 106 MG/DL (ref 7–18.7)
BURR CELLS (OLG): SLIGHT
CALCIUM SERPL-MCNC: 6.6 MG/DL (ref 8.4–10.2)
CANNABINOIDS UR QL SCN: NEGATIVE
CHLORIDE SERPL-SCNC: 62 MMOL/L (ref 98–107)
CO2 SERPL-SCNC: <5 MMOL/L (ref 22–29)
COCAINE UR QL SCN: NEGATIVE
COLOR UR AUTO: YELLOW
CREAT SERPL-MCNC: 4.65 MG/DL (ref 0.55–1.02)
DELSYS: ABNORMAL
EOSINOPHIL NFR BLD MANUAL: 1.56 X10(3)/MCL (ref 0–0.9)
EOSINOPHIL NFR BLD MANUAL: 5 % (ref 0–8)
ERYTHROCYTE [DISTWIDTH] IN BLOOD BY AUTOMATED COUNT: 12.4 % (ref 11.5–17)
ETHANOL SERPL-MCNC: <10 MG/DL
FENTANYL UR QL SCN: NEGATIVE
FLUAV AG UPPER RESP QL IA.RAPID: NOT DETECTED
FLUBV AG UPPER RESP QL IA.RAPID: NOT DETECTED
GFR SERPLBLD CREATININE-BSD FMLA CKD-EPI: 13 MLS/MIN/1.73/M2
GLOBULIN SER-MCNC: 3.1 GM/DL (ref 2.4–3.5)
GLUCOSE SERPL-MCNC: 1228 MG/DL (ref 74–100)
GLUCOSE SERPL-MCNC: >500 MG/DL (ref 70–110)
GLUCOSE UR QL STRIP.AUTO: >=1000 MG/DL
HCO3 UR-SCNC: 4.7 MMOL/L (ref 24–28)
HCT VFR BLD AUTO: 29.8 % (ref 37–47)
HGB BLD-MCNC: 9 GM/DL (ref 12–16)
IMM GRANULOCYTES # BLD AUTO: 0.73 X10(3)/MCL (ref 0–0.04)
IMM GRANULOCYTES NFR BLD AUTO: 2.3 %
KETONES UR QL STRIP.AUTO: 80 MG/DL
LEUKOCYTE ESTERASE UR QL STRIP.AUTO: NEGATIVE UNIT/L
LIPASE SERPL-CCNC: 11 U/L
LYMPHOCYTES NFR BLD MANUAL: 2.82 X10(3)/MCL
LYMPHOCYTES NFR BLD MANUAL: 9 % (ref 13–40)
MCH RBC QN AUTO: 28 PG (ref 27–31)
MCHC RBC AUTO-ENTMCNC: 30.2 MG/DL (ref 33–36)
MCV RBC AUTO: 92.5 FL (ref 80–94)
MDMA UR QL SCN: NEGATIVE
MONOCYTES NFR BLD MANUAL: 0.94 X10(3)/MCL (ref 0.1–1.3)
MONOCYTES NFR BLD MANUAL: 3 % (ref 2–11)
NEUTROPHILS NFR BLD MANUAL: 81 % (ref 47–80)
NEUTS BAND NFR BLD MANUAL: 2 % (ref 0–11)
NITRITE UR QL STRIP.AUTO: NEGATIVE
OPIATES UR QL SCN: NEGATIVE
PCO2 BLDA: 13.9 MMHG (ref 35–45)
PCP UR QL: NEGATIVE
PH SMN: 7.14 [PH] (ref 7.35–7.45)
PH UR STRIP.AUTO: 5 [PH]
PH UR: 5 [PH] (ref 3–11)
PLATELET # BLD AUTO: 525 X10(3)/MCL (ref 130–400)
PLATELET # BLD EST: ABNORMAL 10*3/UL
PMV BLD AUTO: 10 FL (ref 7.4–10.4)
PO2 BLDA: 129 MMHG (ref 80–100)
POC BE: -24 MMOL/L
POC SATURATED O2: 98 % (ref 95–100)
POC TCO2: 5 MMOL/L (ref 23–27)
POCT GLUCOSE: >500 MG/DL (ref 70–110)
POIKILOCYTOSIS BLD QL SMEAR: ABNORMAL
POTASSIUM SERPL-SCNC: 6.9 MMOL/L (ref 3.5–5.1)
PROT SERPL-MCNC: 5.9 GM/DL (ref 6.4–8.3)
PROT UR QL STRIP.AUTO: 100 MG/DL
RBC # BLD AUTO: 3.22 X10(6)/MCL (ref 4.2–5.4)
RBC #/AREA URNS AUTO: ABNORMAL /HPF
RBC MORPH BLD: ABNORMAL
RBC UR QL AUTO: ABNORMAL UNIT/L
SAMPLE: ABNORMAL
SARS-COV-2 RNA RESP QL NAA+PROBE: NOT DETECTED
SITE: ABNORMAL
SODIUM SERPL-SCNC: 107 MMOL/L (ref 136–145)
SP GR UR STRIP.AUTO: 1.02
SPECIFIC GRAVITY, URINE AUTO (.000) (OHS): 1.02 (ref 1–1.03)
SQUAMOUS #/AREA URNS AUTO: ABNORMAL /HPF
TROPONIN I SERPL-MCNC: 0.06 NG/ML (ref 0–0.04)
UROBILINOGEN UR STRIP-ACNC: 0.2 MG/DL
WBC # SPEC AUTO: 31.3 X10(3)/MCL (ref 4.5–11.5)
WBC #/AREA URNS AUTO: ABNORMAL /HPF

## 2022-09-20 PROCEDURE — 96361 HYDRATE IV INFUSION ADD-ON: CPT

## 2022-09-20 PROCEDURE — 82077 ASSAY SPEC XCP UR&BREATH IA: CPT | Performed by: GENERAL ACUTE CARE HOSPITAL

## 2022-09-20 PROCEDURE — 25000003 PHARM REV CODE 250: Performed by: GENERAL ACUTE CARE HOSPITAL

## 2022-09-20 PROCEDURE — 87040 BLOOD CULTURE FOR BACTERIA: CPT | Performed by: GENERAL ACUTE CARE HOSPITAL

## 2022-09-20 PROCEDURE — 81025 URINE PREGNANCY TEST: CPT | Performed by: GENERAL ACUTE CARE HOSPITAL

## 2022-09-20 PROCEDURE — 82010 KETONE BODYS QUAN: CPT | Performed by: GENERAL ACUTE CARE HOSPITAL

## 2022-09-20 PROCEDURE — 81001 URINALYSIS AUTO W/SCOPE: CPT | Performed by: GENERAL ACUTE CARE HOSPITAL

## 2022-09-20 PROCEDURE — 96375 TX/PRO/DX INJ NEW DRUG ADDON: CPT

## 2022-09-20 PROCEDURE — 85025 COMPLETE CBC W/AUTO DIFF WBC: CPT | Performed by: GENERAL ACUTE CARE HOSPITAL

## 2022-09-20 PROCEDURE — 96374 THER/PROPH/DIAG INJ IV PUSH: CPT

## 2022-09-20 PROCEDURE — 87636 SARSCOV2 & INF A&B AMP PRB: CPT | Performed by: GENERAL ACUTE CARE HOSPITAL

## 2022-09-20 PROCEDURE — 83880 ASSAY OF NATRIURETIC PEPTIDE: CPT | Performed by: GENERAL ACUTE CARE HOSPITAL

## 2022-09-20 PROCEDURE — 36415 COLL VENOUS BLD VENIPUNCTURE: CPT | Performed by: GENERAL ACUTE CARE HOSPITAL

## 2022-09-20 PROCEDURE — 36556 INSERT NON-TUNNEL CV CATH: CPT | Mod: RT

## 2022-09-20 PROCEDURE — 36600 WITHDRAWAL OF ARTERIAL BLOOD: CPT

## 2022-09-20 PROCEDURE — 80053 COMPREHEN METABOLIC PANEL: CPT | Performed by: GENERAL ACUTE CARE HOSPITAL

## 2022-09-20 PROCEDURE — 82150 ASSAY OF AMYLASE: CPT | Performed by: GENERAL ACUTE CARE HOSPITAL

## 2022-09-20 PROCEDURE — 84484 ASSAY OF TROPONIN QUANT: CPT | Performed by: GENERAL ACUTE CARE HOSPITAL

## 2022-09-20 PROCEDURE — 99900031 HC PATIENT EDUCATION (STAT)

## 2022-09-20 PROCEDURE — 82803 BLOOD GASES ANY COMBINATION: CPT

## 2022-09-20 PROCEDURE — 99900035 HC TECH TIME PER 15 MIN (STAT)

## 2022-09-20 PROCEDURE — 85730 THROMBOPLASTIN TIME PARTIAL: CPT | Performed by: GENERAL ACUTE CARE HOSPITAL

## 2022-09-20 PROCEDURE — 93005 ELECTROCARDIOGRAM TRACING: CPT

## 2022-09-20 PROCEDURE — 83690 ASSAY OF LIPASE: CPT | Performed by: GENERAL ACUTE CARE HOSPITAL

## 2022-09-20 PROCEDURE — 80307 DRUG TEST PRSMV CHEM ANLYZR: CPT | Performed by: GENERAL ACUTE CARE HOSPITAL

## 2022-09-20 PROCEDURE — 96376 TX/PRO/DX INJ SAME DRUG ADON: CPT

## 2022-09-20 PROCEDURE — 99291 CRITICAL CARE FIRST HOUR: CPT | Mod: 25

## 2022-09-20 RX ADMIN — SODIUM CHLORIDE 2151 ML: 9 INJECTION, SOLUTION INTRAVENOUS at 10:09

## 2022-09-20 RX ADMIN — SODIUM CHLORIDE 1000 ML: 9 INJECTION, SOLUTION INTRAVENOUS at 11:09

## 2022-09-21 VITALS
DIASTOLIC BLOOD PRESSURE: 53 MMHG | TEMPERATURE: 98 F | SYSTOLIC BLOOD PRESSURE: 114 MMHG | HEART RATE: 109 BPM | WEIGHT: 158 LBS | BODY MASS INDEX: 28.89 KG/M2 | RESPIRATION RATE: 22 BRPM | OXYGEN SATURATION: 100 %

## 2022-09-21 LAB
ALBUMIN SERPL-MCNC: 2.4 GM/DL (ref 3.5–5)
ALBUMIN/GLOB SERPL: 1 RATIO (ref 1.1–2)
ALLENS TEST: ABNORMAL
ALP SERPL-CCNC: 113 UNIT/L (ref 40–150)
ALT SERPL-CCNC: 12 UNIT/L (ref 0–55)
AST SERPL-CCNC: 18 UNIT/L (ref 5–34)
B-OH-BUTYR SERPL-MCNC: 14.48 MMOL/L
BASOPHILS # BLD AUTO: 0.04 X10(3)/MCL (ref 0–0.2)
BASOPHILS NFR BLD AUTO: 0.1 %
BILIRUBIN DIRECT+TOT PNL SERPL-MCNC: 0.4 MG/DL
BUN SERPL-MCNC: 102 MG/DL (ref 7–18.7)
CALCIUM SERPL-MCNC: 6.2 MG/DL (ref 8.4–10.2)
CHLORIDE SERPL-SCNC: 78 MMOL/L (ref 98–107)
CO2 SERPL-SCNC: 6 MMOL/L (ref 22–29)
CREAT SERPL-MCNC: 4.06 MG/DL (ref 0.55–1.02)
EOSINOPHIL # BLD AUTO: 0.03 X10(3)/MCL (ref 0–0.9)
EOSINOPHIL NFR BLD AUTO: 0.1 %
ERYTHROCYTE [DISTWIDTH] IN BLOOD BY AUTOMATED COUNT: 12.3 % (ref 11.5–17)
GFR SERPLBLD CREATININE-BSD FMLA CKD-EPI: 15 MLS/MIN/1.73/M2
GLOBULIN SER-MCNC: 2.4 GM/DL (ref 2.4–3.5)
GLUCOSE SERPL-MCNC: 879 MG/DL (ref 74–100)
GLUCOSE SERPL-MCNC: >500 MG/DL (ref 70–110)
HCO3 UR-SCNC: 5 MMOL/L (ref 24–28)
HCT VFR BLD AUTO: 24.8 % (ref 37–47)
HGB BLD-MCNC: 8 GM/DL (ref 12–16)
IMM GRANULOCYTES # BLD AUTO: 0.3 X10(3)/MCL (ref 0–0.04)
IMM GRANULOCYTES NFR BLD AUTO: 1.1 %
LYMPHOCYTES # BLD AUTO: 2.16 X10(3)/MCL (ref 0.6–4.6)
LYMPHOCYTES NFR BLD AUTO: 7.7 %
MCH RBC QN AUTO: 26.9 PG (ref 27–31)
MCHC RBC AUTO-ENTMCNC: 32.3 MG/DL (ref 33–36)
MCV RBC AUTO: 83.5 FL (ref 80–94)
MONOCYTES # BLD AUTO: 2.69 X10(3)/MCL (ref 0.1–1.3)
MONOCYTES NFR BLD AUTO: 9.6 %
NEUTROPHILS # BLD AUTO: 22.8 X10(3)/MCL (ref 2.1–9.2)
NEUTROPHILS NFR BLD AUTO: 81.4 %
PCO2 BLDA: 19.5 MMHG (ref 35–45)
PH SMN: 7.01 [PH] (ref 7.35–7.45)
PLATELET # BLD AUTO: 428 X10(3)/MCL (ref 130–400)
PMV BLD AUTO: 8.9 FL (ref 7.4–10.4)
PO2 BLDA: 131 MMHG (ref 80–100)
POC BE: -26 MMOL/L
POC SATURATED O2: 97 % (ref 95–100)
POC TCO2: 6 MMOL/L (ref 23–27)
POTASSIUM SERPL-SCNC: 4.7 MMOL/L (ref 3.5–5.1)
PROT SERPL-MCNC: 4.8 GM/DL (ref 6.4–8.3)
RBC # BLD AUTO: 2.97 X10(6)/MCL (ref 4.2–5.4)
SAMPLE: ABNORMAL
SITE: ABNORMAL
SODIUM SERPL-SCNC: 120 MMOL/L (ref 136–145)
WBC # SPEC AUTO: 28.1 X10(3)/MCL (ref 4.5–11.5)

## 2022-09-21 PROCEDURE — 25000003 PHARM REV CODE 250

## 2022-09-21 PROCEDURE — 25000003 PHARM REV CODE 250: Performed by: GENERAL ACUTE CARE HOSPITAL

## 2022-09-21 PROCEDURE — 63600175 PHARM REV CODE 636 W HCPCS: Performed by: GENERAL ACUTE CARE HOSPITAL

## 2022-09-21 PROCEDURE — 36600 WITHDRAWAL OF ARTERIAL BLOOD: CPT

## 2022-09-21 PROCEDURE — 80053 COMPREHEN METABOLIC PANEL: CPT | Performed by: GENERAL ACUTE CARE HOSPITAL

## 2022-09-21 PROCEDURE — 85025 COMPLETE CBC W/AUTO DIFF WBC: CPT | Performed by: GENERAL ACUTE CARE HOSPITAL

## 2022-09-21 PROCEDURE — 99900035 HC TECH TIME PER 15 MIN (STAT)

## 2022-09-21 PROCEDURE — 36415 COLL VENOUS BLD VENIPUNCTURE: CPT | Performed by: GENERAL ACUTE CARE HOSPITAL

## 2022-09-21 PROCEDURE — 82803 BLOOD GASES ANY COMBINATION: CPT

## 2022-09-21 RX ORDER — CALCIUM GLUCONATE 20 MG/ML
INJECTION, SOLUTION INTRAVENOUS
Status: COMPLETED
Start: 2022-09-21 | End: 2022-09-21

## 2022-09-21 RX ORDER — SODIUM CHLORIDE 9 MG/ML
1000 INJECTION, SOLUTION INTRAVENOUS
Status: COMPLETED | OUTPATIENT
Start: 2022-09-21 | End: 2022-09-21

## 2022-09-21 RX ORDER — SODIUM BICARBONATE 1 MEQ/ML
50 SYRINGE (ML) INTRAVENOUS
Status: COMPLETED | OUTPATIENT
Start: 2022-09-21 | End: 2022-09-21

## 2022-09-21 RX ADMIN — SODIUM CHLORIDE 1000 ML: 9 INJECTION, SOLUTION INTRAVENOUS at 02:09

## 2022-09-21 RX ADMIN — HUMAN INSULIN 10 UNITS: 100 INJECTION, SOLUTION SUBCUTANEOUS at 12:09

## 2022-09-21 RX ADMIN — SODIUM CHLORIDE, POTASSIUM CHLORIDE, SODIUM LACTATE AND CALCIUM CHLORIDE 1000 ML: 600; 310; 30; 20 INJECTION, SOLUTION INTRAVENOUS at 12:09

## 2022-09-21 RX ADMIN — SODIUM BICARBONATE 50 MEQ: 84 INJECTION, SOLUTION INTRAVENOUS at 02:09

## 2022-09-21 RX ADMIN — CALCIUM GLUCONATE 1 G: 20 INJECTION, SOLUTION INTRAVENOUS at 12:09

## 2022-09-21 RX ADMIN — MEROPENEM 1 G: 1 INJECTION, POWDER, FOR SOLUTION INTRAVENOUS at 12:09

## 2022-09-21 RX ADMIN — INSULIN HUMAN 7 UNITS/HR: 1 INJECTION, SOLUTION INTRAVENOUS at 12:09

## 2022-09-21 NOTE — ED PROVIDER NOTES
Encounter Date: 9/20/2022       History     Chief Complaint   Patient presents with    Hyperglycemia     Father reports increased confusion x3 days and patient not taking diabetes medication. Cbg >500.      Patient was brought in the emergency room with chief complaints on the generalized weakness, dizziness, confusion, elevated blood sugar, she also complains of a cramping abdominal pain with polyuria and polydipsia, has symptoms for 3 days.  Patient has insulin-dependent diabetes type 1, very well known to ER for similar symptoms with previous history of DKA, father is at bedside    Review of patient's allergies indicates:  No Known Allergies  Past Medical History:   Diagnosis Date    Diabetes mellitus     Diabetic gastroparesis associated with type 1 diabetes mellitus     DKA (diabetic ketoacidosis)     DKA (diabetic ketoacidosis)     DKA (diabetic ketoacidosis)     Gastroparesis     Gastroparesis due to DM     Hypertension     Ketoacidosis due to diabetes     Non compliance with medical treatment     Pneumonia     Secondary DM with DKA      Past Surgical History:   Procedure Laterality Date    CHOLECYSTECTOMY      ESOPHAGOGASTRODUODENOSCOPY      Multiple    None       Family History   Problem Relation Age of Onset    Heart disease Mother     Hypertension Mother     Diabetes Mother     Hyperlipidemia Mother     Cancer Father     Stroke Father     Hypertension Father     Hyperlipidemia Father      Social History     Tobacco Use    Smoking status: Never    Smokeless tobacco: Never   Substance Use Topics    Alcohol use: Never    Drug use: Yes     Types: Marijuana     Review of Systems   Constitutional:  Positive for activity change, diaphoresis and fatigue.   Gastrointestinal:  Positive for abdominal pain, nausea and vomiting.   Genitourinary:  Positive for urgency.     Physical Exam     Initial Vitals [09/20/22 2128]   BP Pulse Resp Temp SpO2   99/64 103 (!) 22 98.4 °F (36.9 °C) 100 %       MAP       --         Physical Exam    Nursing note and vitals reviewed.  Constitutional: Vital signs are normal. She appears well-developed and well-nourished. She appears lethargic. She is diaphoretic. She is easily aroused. She appears toxic. She has a sickly appearance. She appears ill.   Acetone odor is present   HENT:   Head: Normocephalic and atraumatic.   Right Ear: External ear normal.   Left Ear: External ear normal.   Mouth/Throat: Oropharynx is clear and moist.   Dry mucous membrane   Eyes: Conjunctivae and EOM are normal. Pupils are equal, round, and reactive to light.   Neck: Neck supple.   Normal range of motion.  Cardiovascular:  Normal rate and normal heart sounds.           Pulmonary/Chest: Breath sounds normal.   Abdominal: Abdomen is soft. Bowel sounds are normal. There is abdominal tenderness in the periumbilical area.   Musculoskeletal:         General: Normal range of motion.      Cervical back: Normal range of motion and neck supple.     Neurological: She is easily aroused. She has normal strength and normal reflexes. She appears lethargic. GCS eye subscore is 2. GCS verbal subscore is 3. GCS motor subscore is 4.   GCS 9   Psychiatric: Cognition and memory are impaired.       ED Course   Critical Care    Date/Time: 9/20/2022 11:13 PM  Performed by: Shana Brown MD  Authorized by: Shana Brown MD   Direct patient critical care time: 60 minutes  Additional history critical care time: 20 minutes  Ordering / reviewing critical care time: 15 minutes  Documentation critical care time: 30 minutes  Consulting other physicians critical care time: 15 minutes  Consult with family critical care time: 15 minutes  Total critical care time (exclusive of procedural time) : 155 minutes  Critical care was necessary to treat or prevent imminent or life-threatening deterioration of the following conditions: dehydration and metabolic crisis.  Critical care was time spent personally by me on the  following activities: discussions with consultants, evaluation of patient's response to treatment, interpretation of cardiac output measurements, examination of patient, obtaining history from patient or surrogate, ordering and performing treatments and interventions, ordering and review of laboratory studies, ordering and review of radiographic studies, re-evaluation of patient's condition and vascular access procedures.      Central Line    Date/Time: 9/20/2022 10:00 PM  Performed by: hSana Brown MD  Authorized by: Shana Brown MD     Location procedure was performed:  [facility] EMERGENCY DEPARTMENT  Post-operative diagnosis:  DKA  Consent Done ?:  Emergent Situation  Time out complete?: Verified correct patient, procedure, equipment, staff, and site/side    Indications:  Vascular access and hemodynamic monitoring  Preparation:  Skin prepped with ChloraPrep  Skin prep agent dried: Skin prep agent completely dried prior to procedure    Sterile barriers: All five maximal sterile barriers used - gloves, gown, cap, mask and large sterile sheet    Hand hygiene: Hand hygiene performed immediately prior to central venous catheter insertion    Location:  Right femoral  Site selection rationale:  Dehyrated  Catheter type:  Triple lumen  Catheter size:  7 Fr  Ultrasound guidance: Yes    Vessel Caliber:  Medium   patent  Comprressibility:  Normal  Doppler:  Flow caudad  Needle advanced into vessel with real time ultrasound guidance.    Guidewire confirmed in vessel.    Steril sheath on probe.    Sterile gel used.  Manometry: No    Number of attempts:  1  Securement:  Line sutured, chlorhexidine patch, sterile dressing applied and blood return through all ports  Complications: No    Specimens: No    Implants: No    Adverse Events:  None  Labs Reviewed   COMPREHENSIVE METABOLIC PANEL - Abnormal; Notable for the following components:       Result Value    Sodium Level 107 (*)     Potassium Level 6.9 (*)     Chloride 62  (*)     Carbon Dioxide <5 (*)     Glucose Level 1,228 (*)     Blood Urea Nitrogen 106.0 (*)     Creatinine 4.65 (*)     Calcium Level Total 6.6 (*)     Protein Total 5.9 (*)     Albumin Level 2.8 (*)     Albumin/Globulin Ratio 0.9 (*)     All other components within normal limits   BETA - HYDROXYBUTYRATE, SERUM - Abnormal; Notable for the following components:    Beta Hydroxybutyrate 14.48 (*)     All other components within normal limits   URINALYSIS, REFLEX TO URINE CULTURE - Abnormal; Notable for the following components:    Protein,  (*)     Glucose, UA >=1000 (*)     Ketones, UA 80 (*)     Blood, UA Trace-Lysed (*)     All other components within normal limits   TROPONIN I - Abnormal; Notable for the following components:    Troponin-I 0.055 (*)     All other components within normal limits   APTT - Abnormal; Notable for the following components:    PTT 39.1 (*)     All other components within normal limits   CBC WITH DIFFERENTIAL - Abnormal; Notable for the following components:    WBC 31.3 (*)     RBC 3.22 (*)     Hgb 9.0 (*)     Hct 29.8 (*)     MCHC 30.2 (*)     Platelet 525 (*)     IG# 0.73 (*)     All other components within normal limits   MANUAL DIFFERENTIAL - Abnormal; Notable for the following components:    Neut Man 81 (*)     Lymph Man 9 (*)     Abs Neut calc 25.979 (*)     Abs Eos  1.565 (*)     RBC Morph Abnormal (*)     Poik 1+ (*)     [place] Cells Slight (*)     Platelet Est Increased (*)     All other components within normal limits   URINALYSIS, MICROSCOPIC - Abnormal; Notable for the following components:    Squamous Epithelial Cells, UA Few (*)     All other components within normal limits   COMPREHENSIVE METABOLIC PANEL - Abnormal; Notable for the following components:    Sodium Level 120 (*)     Chloride 78 (*)     Carbon Dioxide 6 (*)     Glucose Level 879 (*)     Blood Urea Nitrogen 102.0 (*)     Creatinine 4.06 (*)     Calcium Level Total 6.2 (*)     Protein Total 4.8 (*)      Albumin Level 2.4 (*)     Albumin/Globulin Ratio 1.0 (*)     All other components within normal limits   CBC WITH DIFFERENTIAL - Abnormal; Notable for the following components:    WBC 28.1 (*)     RBC 2.97 (*)     Hgb 8.0 (*)     Hct 24.8 (*)     MCH 26.9 (*)     MCHC 32.3 (*)     Platelet 428 (*)     Neut # 22.8 (*)     Mono # 2.69 (*)     IG# 0.30 (*)     All other components within normal limits   POCT GLUCOSE - Abnormal; Notable for the following components:    POCT Glucose >500 (*)     All other components within normal limits   ISTAT PROCEDURE - Abnormal; Notable for the following components:    POC PH 7.138 (*)     POC PCO2 13.9 (*)     POC PO2 129 (*)     POC HCO3 4.7 (*)     POC TCO2 5 (*)     All other components within normal limits   ISTAT PROCEDURE - Abnormal; Notable for the following components:    POC PH 7.014 (*)     POC PCO2 19.5 (*)     POC PO2 131 (*)     POC HCO3 5.0 (*)     POC TCO2 6 (*)     All other components within normal limits   B-TYPE NATRIURETIC PEPTIDE - Normal   AMYLASE - Normal   COVID/FLU A&B PCR - Normal   ALCOHOL,MEDICAL (ETHANOL) - Normal   DRUG SCREEN, URINE (BEAKER) - Normal    Narrative:     Cut off concentrations:    Amphetamines - 1000 ng/ml  Barbiturates - 200 ng/ml  Benzodiazepine - 200 ng/ml  Cannabinoids (THC) - 50 ng/ml  Cocaine - 300 ng/ml  Fentanyl - 1.0 ng/ml  MDMA - 500 ng/ml  Opiates - 300 ng/ml   Phencyclidine (PCP) - 25 ng/ml    Specimen submitted for drug analysis and tested for pH and specific gravity in order to evaluate sample integrity. Suspect tampering if specific gravity is <1.003 and/or pH is not within the range of 4.5 - 8.0  False negatives may result form substances such as bleach added to urine.  False positives may result for the presence of a substance with similar chemical structure to the drug or its metabolite.    This test provides only a PRELIMINARY analytical test result. A more specific alternate chemical method must be used in order to  obtain a confirmed analytical result. Gas chromatography/mass spectrometry (GC/MS) is the preferred confirmatory method. Other chemical confirmation methods are available. Clinical consideration and professional judgement should be applied to any drug of abuse test result, particularly when preliminary positive results are used.    Positive results will be confirmed only at the physicians request. Unconfirmed screening results are to be used only for medical purposes (treatment).        LIPASE - Normal   PREGNANCY TEST, URINE RAPID - Normal   BLOOD CULTURE O[facility]   BLOOD CULTURE [facility]   CBC W/ AUTO DIFFERENTIAL    Narrative:     The following orders were created for panel order CBC auto differential.  Procedure                               Abnormality         Status                     ---------                               -----------         ------                     CBC with Differential[[id number]]        Abnormal            Final result               Manual Differential[[id number]]          Abnormal            Final result                 Please view results for these tests on the individual orders.   CBC W/ AUTO DIFFERENTIAL    Narrative:     The following orders were created for panel order CBC Auto Differential.  Procedure                               Abnormality         Status                     ---------                               -----------         ------                     CBC with Differential[[id number]]        Abnormal            Final result                 Please view results for these tests on the individual orders.   POCT GLUCOSE, HAND-HELD DEVICE   POCT GLUCOSE, HAND-HELD DEVICE     EKG Readings: (Independently Interpreted)   Rhythm: Sinus Tachycardia. Heart Rate: 107. Conduction: LAFB. Axis: Normal. Other Findings: Prolonged QT Interval. Other Impression: Tall T-waves     Imaging Results              X-Ray Chest AP Portable (In process)                      Medications   calcium gluconate 1g  in D5W 50mL (ready to mix system) (1 g Intravenous Not Given 9/20/22 2330)   lactated ringers bolus 1,000 mL (1,000 mLs Intravenous Not Given 9/20/22 2330)   insulin regular in 0.9 % NaCl 100 unit/100 mL (1 unit/mL) infusion (10 Units/hr Intravenous Rate/Dose Change 9/21/22 0200)   NORepinephrine 4 mg in dextrose 5 % 250 mL infusion (has no administration in time range)   sodium chloride 0.9% bolus 1,000 mL (1,000 mLs Intravenous New Bag 9/20/22 2323)   sodium chloride 0.9% bolus 2,151 mL (0 mLs Intravenous Stopped 9/20/22 2335)   lactated ringers bolus 1,000 mL (1,000 mLs Intravenous New Bag 9/21/22 0008)   meropenem (MERREM) 1 g in sodium chloride 0.9 % 100 mL IVPB (MB+) (1 g Intravenous New Bag 9/21/22 0008)   insulin regular injection 10 Units 0.1 mL (10 Units Intravenous Given 9/21/22 0009)   calcium gluconate 1 g 1 gram/50 mL in NS IVPB (premixed) (1 g  New Bag 9/21/22 0039)   sodium bicarbonate 8.4 % (1 mEq/mL) injection 50 mEq (50 mEq Intravenous Given 9/21/22 0200)   0.9%  NaCl infusion (1,000 mLs Intravenous New Bag 9/21/22 0200)     Medical Decision Making:   History:   I obtained history from: someone other than patient.       <> Summary of History: Patient's father was also questioned  Initial Assessment:   Patient was brought in the emergency room by father for elevated blood sugar, has previous history of DKA, came these confusion, polydipsia polyuria  Differential Diagnosis:   DKA, hyperosmolar state  Clinical Tests:   Lab Tests: Ordered and Reviewed  The following lab test(s) were unremarkable: CBC, UPT, Urinalysis, CMP, Lactate and Lipase       <> Summary of Lab: Patient has DKA with serum blood sugar 1228, sodium 107 (corrected sodium is 125) potassium is 6.9, , creatinine 4.65, ABG revealed metabolic acidosis with pH 7.13, pCO2 13.9 bicarb 4  Radiological Study: Ordered and Reviewed,                 ED Course as of 09/21/22 0320   Wed Sep 21, 2022   0140 The patient was accepted by   [facility] [IP]      ED Course User Index  [IP] Shana Brown MD                   Clinical Impression:   Final diagnoses:  [R73.9] Hyperglycemia  [E10.10] Type 1 diabetes mellitus with ketoacidosis without coma (Primary)  [N17.9] CHATA (acute kidney injury)        ED Disposition Condition    Transfer to Another Facility Fair                Shana Brown MD  09/21/22 0319       Shana Brown MD  09/21/22 0319       Shana Brown MD  09/21/22 0320

## 2022-09-22 LAB
POCT GLUCOSE: >500 MG/DL (ref 70–110)

## 2022-09-24 ENCOUNTER — HOSPITAL ENCOUNTER (INPATIENT)
Facility: HOSPITAL | Age: 27
LOS: 2 days | Discharge: HOME | DRG: 638 | End: 2022-09-26
Attending: STUDENT IN AN ORGANIZED HEALTH CARE EDUCATION/TRAINING PROGRAM | Admitting: EMERGENCY MEDICINE
Payer: MEDICAID

## 2022-09-24 DIAGNOSIS — R07.89 ATYPICAL CHEST PAIN: ICD-10-CM

## 2022-09-24 DIAGNOSIS — E10.10 DIABETIC KETOACIDOSIS WITHOUT COMA ASSOCIATED WITH TYPE 1 DIABETES MELLITUS: Primary | ICD-10-CM

## 2022-09-24 DIAGNOSIS — R07.9 CHEST PAIN: ICD-10-CM

## 2022-09-24 DIAGNOSIS — R79.89 ELEVATED TROPONIN LEVEL NOT DUE MYOCARDIAL INFARCTION: ICD-10-CM

## 2022-09-24 DIAGNOSIS — Z91.199 MEDICALLY NONCOMPLIANT: ICD-10-CM

## 2022-09-24 PROBLEM — K31.84 DIABETIC GASTROPARESIS: Chronic | Status: RESOLVED | Noted: 2022-06-30 | Resolved: 2022-09-24

## 2022-09-24 PROBLEM — D62 ACUTE BLOOD LOSS ANEMIA: Chronic | Status: ACTIVE | Noted: 2022-09-24

## 2022-09-24 PROBLEM — E11.43 DIABETIC GASTROPARESIS: Chronic | Status: ACTIVE | Noted: 2022-06-30

## 2022-09-24 PROBLEM — K31.84 DIABETIC GASTROPARESIS: Chronic | Status: ACTIVE | Noted: 2022-06-30

## 2022-09-24 PROBLEM — E11.43 DIABETIC GASTROPARESIS: Chronic | Status: RESOLVED | Noted: 2022-06-30 | Resolved: 2022-09-24

## 2022-09-24 LAB
ALBUMIN SERPL-MCNC: 2.5 GM/DL (ref 3.5–5)
ALBUMIN/GLOB SERPL: 0.7 RATIO (ref 1.1–2)
ALLENS TEST: ABNORMAL
ALP SERPL-CCNC: 84 UNIT/L (ref 40–150)
ALT SERPL-CCNC: 12 UNIT/L (ref 0–55)
AMPHET UR QL SCN: NEGATIVE
ANION GAP SERPL CALC-SCNC: 30 MEQ/L
ANION GAP SERPL CALC-SCNC: 32 MEQ/L
APPEARANCE UR: CLEAR
AST SERPL-CCNC: 15 UNIT/L (ref 5–34)
B-HCG SERPL QL: NEGATIVE
BACTERIA #/AREA URNS AUTO: NORMAL /HPF
BARBITURATE SCN PRESENT UR: NEGATIVE
BASOPHILS # BLD AUTO: 0.08 X10(3)/MCL (ref 0–0.2)
BASOPHILS NFR BLD AUTO: 0.4 %
BENZODIAZ UR QL SCN: NEGATIVE
BILIRUB UR QL STRIP.AUTO: NEGATIVE MG/DL
BILIRUBIN DIRECT+TOT PNL SERPL-MCNC: 0.6 MG/DL
BUN SERPL-MCNC: 17 MG/DL (ref 7–18.7)
BUN SERPL-MCNC: 19 MG/DL (ref 7–18.7)
BUN SERPL-MCNC: 20 MG/DL (ref 7–18.7)
CALCIUM SERPL-MCNC: 8.4 MG/DL (ref 8.4–10.2)
CALCIUM SERPL-MCNC: 8.5 MG/DL (ref 8.4–10.2)
CALCIUM SERPL-MCNC: 8.6 MG/DL (ref 8.4–10.2)
CANNABINOIDS UR QL SCN: NEGATIVE
CHLORIDE SERPL-SCNC: 88 MMOL/L (ref 98–107)
CHLORIDE SERPL-SCNC: 90 MMOL/L (ref 98–107)
CHLORIDE SERPL-SCNC: 95 MMOL/L (ref 98–107)
CK MB SERPL-MCNC: 0.9 NG/ML
CK MB SERPL-MCNC: 1 NG/ML
CK SERPL-CCNC: 85 U/L (ref 29–168)
CK SERPL-CCNC: 90 U/L (ref 29–168)
CO2 SERPL-SCNC: 11 MMOL/L (ref 22–29)
CO2 SERPL-SCNC: 6 MMOL/L (ref 22–29)
CO2 SERPL-SCNC: 9 MMOL/L (ref 22–29)
COCAINE UR QL SCN: NEGATIVE
COLOR UR AUTO: YELLOW
CREAT SERPL-MCNC: 1.4 MG/DL (ref 0.55–1.02)
CREAT SERPL-MCNC: 1.46 MG/DL (ref 0.55–1.02)
CREAT SERPL-MCNC: 1.46 MG/DL (ref 0.55–1.02)
CREAT/UREA NIT SERPL: 13
CREAT/UREA NIT SERPL: 14
EOSINOPHIL # BLD AUTO: 0 X10(3)/MCL (ref 0–0.9)
EOSINOPHIL NFR BLD AUTO: 0 %
ERYTHROCYTE [DISTWIDTH] IN BLOOD BY AUTOMATED COUNT: 12.6 % (ref 11.5–17)
FENTANYL UR QL SCN: NEGATIVE
GFR SERPLBLD CREATININE-BSD FMLA CKD-EPI: 50 MLS/MIN/1.73/M2
GFR SERPLBLD CREATININE-BSD FMLA CKD-EPI: 50 MLS/MIN/1.73/M2
GFR SERPLBLD CREATININE-BSD FMLA CKD-EPI: 53 MLS/MIN/1.73/M2
GLOBULIN SER-MCNC: 3.5 GM/DL (ref 2.4–3.5)
GLUCOSE SERPL-MCNC: 365 MG/DL (ref 74–100)
GLUCOSE SERPL-MCNC: 459 MG/DL (ref 70–110)
GLUCOSE SERPL-MCNC: 518 MG/DL (ref 74–100)
GLUCOSE SERPL-MCNC: 521 MG/DL (ref 74–100)
GLUCOSE UR QL STRIP.AUTO: 500 MG/DL
HCO3 UR-SCNC: 11.9 MMOL/L (ref 24–28)
HCT VFR BLD AUTO: 26.5 % (ref 37–47)
HGB BLD-MCNC: 8.7 GM/DL (ref 12–16)
IMM GRANULOCYTES # BLD AUTO: 0.81 X10(3)/MCL (ref 0–0.04)
IMM GRANULOCYTES NFR BLD AUTO: 4.5 %
KETONES UR QL STRIP.AUTO: >=160 MG/DL
LEUKOCYTE ESTERASE UR QL STRIP.AUTO: NEGATIVE UNIT/L
LIPASE SERPL-CCNC: 14 U/L
LYMPHOCYTES # BLD AUTO: 1.14 X10(3)/MCL (ref 0.6–4.6)
LYMPHOCYTES NFR BLD AUTO: 6.4 %
MAGNESIUM SERPL-MCNC: 1.8 MG/DL (ref 1.6–2.6)
MCH RBC QN AUTO: 27.4 PG (ref 27–31)
MCHC RBC AUTO-ENTMCNC: 32.8 MG/DL (ref 33–36)
MCV RBC AUTO: 83.6 FL (ref 80–94)
MDMA UR QL SCN: NEGATIVE
MONOCYTES # BLD AUTO: 0.93 X10(3)/MCL (ref 0.1–1.3)
MONOCYTES NFR BLD AUTO: 5.2 %
NEUTROPHILS # BLD AUTO: 15 X10(3)/MCL (ref 2.1–9.2)
NEUTROPHILS NFR BLD AUTO: 83.5 %
NITRITE UR QL STRIP.AUTO: NEGATIVE
OPIATES UR QL SCN: POSITIVE
PCO2 BLDA: 20.7 MMHG (ref 35–45)
PCP UR QL: NEGATIVE
PH SMN: 7.37 [PH] (ref 7.35–7.45)
PH UR STRIP.AUTO: 5 [PH]
PH UR: 6 [PH] (ref 3–11)
PHOSPHATE SERPL-MCNC: 3.5 MG/DL (ref 2.3–4.7)
PLATELET # BLD AUTO: 425 X10(3)/MCL (ref 130–400)
PMV BLD AUTO: 9.2 FL (ref 7.4–10.4)
PO2 BLDA: 118 MMHG (ref 80–100)
POC BE: -13 MMOL/L
POC SATURATED O2: 99 % (ref 95–100)
POC TCO2: 13 MMOL/L (ref 23–27)
POCT GLUCOSE: 156 MG/DL (ref 70–110)
POCT GLUCOSE: 172 MG/DL (ref 70–110)
POCT GLUCOSE: 183 MG/DL (ref 70–110)
POCT GLUCOSE: 251 MG/DL (ref 70–110)
POCT GLUCOSE: 300 MG/DL (ref 70–110)
POCT GLUCOSE: 365 MG/DL (ref 70–110)
POCT GLUCOSE: >500 MG/DL (ref 70–110)
POTASSIUM SERPL-SCNC: 3.5 MMOL/L (ref 3.5–5.1)
POTASSIUM SERPL-SCNC: 3.7 MMOL/L (ref 3.5–5.1)
POTASSIUM SERPL-SCNC: 3.9 MMOL/L (ref 3.5–5.1)
PROT SERPL-MCNC: 6 GM/DL (ref 6.4–8.3)
PROT UR QL STRIP.AUTO: 30 MG/DL
RBC # BLD AUTO: 3.17 X10(6)/MCL (ref 4.2–5.4)
RBC #/AREA URNS AUTO: NORMAL /HPF
RBC UR QL AUTO: ABNORMAL UNIT/L
SAMPLE: ABNORMAL
SARS-COV-2 RDRP RESP QL NAA+PROBE: NEGATIVE
SITE: ABNORMAL
SODIUM SERPL-SCNC: 128 MMOL/L (ref 136–145)
SODIUM SERPL-SCNC: 131 MMOL/L (ref 136–145)
SODIUM SERPL-SCNC: 131 MMOL/L (ref 136–145)
SP GR UR STRIP.AUTO: 1.02
SPECIFIC GRAVITY, URINE AUTO (.000) (OHS): 1.01 (ref 1–1.03)
SQUAMOUS #/AREA URNS AUTO: NORMAL /HPF
TROPONIN I SERPL-MCNC: 0.92 NG/ML (ref 0–0.04)
TROPONIN I SERPL-MCNC: 1 NG/ML (ref 0–0.04)
UROBILINOGEN UR STRIP-ACNC: 0.2 MG/DL
WBC # SPEC AUTO: 17.9 X10(3)/MCL (ref 4.5–11.5)
WBC #/AREA URNS AUTO: NORMAL /HPF

## 2022-09-24 PROCEDURE — 83735 ASSAY OF MAGNESIUM: CPT | Performed by: STUDENT IN AN ORGANIZED HEALTH CARE EDUCATION/TRAINING PROGRAM

## 2022-09-24 PROCEDURE — 82010 KETONE BODYS QUAN: CPT | Performed by: STUDENT IN AN ORGANIZED HEALTH CARE EDUCATION/TRAINING PROGRAM

## 2022-09-24 PROCEDURE — 84100 ASSAY OF PHOSPHORUS: CPT | Performed by: STUDENT IN AN ORGANIZED HEALTH CARE EDUCATION/TRAINING PROGRAM

## 2022-09-24 PROCEDURE — 63600175 PHARM REV CODE 636 W HCPCS: Performed by: EMERGENCY MEDICINE

## 2022-09-24 PROCEDURE — 93005 ELECTROCARDIOGRAM TRACING: CPT

## 2022-09-24 PROCEDURE — 80307 DRUG TEST PRSMV CHEM ANLYZR: CPT | Performed by: EMERGENCY MEDICINE

## 2022-09-24 PROCEDURE — 36415 COLL VENOUS BLD VENIPUNCTURE: CPT | Performed by: STUDENT IN AN ORGANIZED HEALTH CARE EDUCATION/TRAINING PROGRAM

## 2022-09-24 PROCEDURE — 94761 N-INVAS EAR/PLS OXIMETRY MLT: CPT

## 2022-09-24 PROCEDURE — 63600175 PHARM REV CODE 636 W HCPCS: Performed by: STUDENT IN AN ORGANIZED HEALTH CARE EDUCATION/TRAINING PROGRAM

## 2022-09-24 PROCEDURE — A4216 STERILE WATER/SALINE, 10 ML: HCPCS | Performed by: EMERGENCY MEDICINE

## 2022-09-24 PROCEDURE — 36600 WITHDRAWAL OF ARTERIAL BLOOD: CPT

## 2022-09-24 PROCEDURE — 83690 ASSAY OF LIPASE: CPT | Performed by: STUDENT IN AN ORGANIZED HEALTH CARE EDUCATION/TRAINING PROGRAM

## 2022-09-24 PROCEDURE — 85025 COMPLETE CBC W/AUTO DIFF WBC: CPT | Performed by: STUDENT IN AN ORGANIZED HEALTH CARE EDUCATION/TRAINING PROGRAM

## 2022-09-24 PROCEDURE — 81001 URINALYSIS AUTO W/SCOPE: CPT | Performed by: STUDENT IN AN ORGANIZED HEALTH CARE EDUCATION/TRAINING PROGRAM

## 2022-09-24 PROCEDURE — 96361 HYDRATE IV INFUSION ADD-ON: CPT

## 2022-09-24 PROCEDURE — 99900035 HC TECH TIME PER 15 MIN (STAT)

## 2022-09-24 PROCEDURE — 80053 COMPREHEN METABOLIC PANEL: CPT | Performed by: STUDENT IN AN ORGANIZED HEALTH CARE EDUCATION/TRAINING PROGRAM

## 2022-09-24 PROCEDURE — 82553 CREATINE MB FRACTION: CPT | Performed by: EMERGENCY MEDICINE

## 2022-09-24 PROCEDURE — 84484 ASSAY OF TROPONIN QUANT: CPT | Performed by: STUDENT IN AN ORGANIZED HEALTH CARE EDUCATION/TRAINING PROGRAM

## 2022-09-24 PROCEDURE — 96374 THER/PROPH/DIAG INJ IV PUSH: CPT

## 2022-09-24 PROCEDURE — 96375 TX/PRO/DX INJ NEW DRUG ADDON: CPT

## 2022-09-24 PROCEDURE — 99291 CRITICAL CARE FIRST HOUR: CPT | Mod: 25

## 2022-09-24 PROCEDURE — 20000000 HC ICU ROOM

## 2022-09-24 PROCEDURE — 82550 ASSAY OF CK (CPK): CPT | Performed by: EMERGENCY MEDICINE

## 2022-09-24 PROCEDURE — 25000003 PHARM REV CODE 250: Performed by: EMERGENCY MEDICINE

## 2022-09-24 PROCEDURE — 87635 SARS-COV-2 COVID-19 AMP PRB: CPT | Performed by: EMERGENCY MEDICINE

## 2022-09-24 PROCEDURE — 81025 URINE PREGNANCY TEST: CPT | Performed by: STUDENT IN AN ORGANIZED HEALTH CARE EDUCATION/TRAINING PROGRAM

## 2022-09-24 PROCEDURE — 82803 BLOOD GASES ANY COMBINATION: CPT

## 2022-09-24 RX ORDER — ONDANSETRON 2 MG/ML
4 INJECTION INTRAMUSCULAR; INTRAVENOUS EVERY 8 HOURS PRN
Status: DISCONTINUED | OUTPATIENT
Start: 2022-09-24 | End: 2022-09-26 | Stop reason: HOSPADM

## 2022-09-24 RX ORDER — SODIUM CHLORIDE 0.9 % (FLUSH) 0.9 %
10 SYRINGE (ML) INJECTION EVERY 6 HOURS
Status: DISCONTINUED | OUTPATIENT
Start: 2022-09-24 | End: 2022-09-26 | Stop reason: HOSPADM

## 2022-09-24 RX ORDER — METOPROLOL TARTRATE 50 MG/1
50 TABLET ORAL 2 TIMES DAILY
Status: DISCONTINUED | OUTPATIENT
Start: 2022-09-24 | End: 2022-09-26 | Stop reason: HOSPADM

## 2022-09-24 RX ORDER — CALCIUM GLUCONATE 20 MG/ML
3 INJECTION, SOLUTION INTRAVENOUS
Status: DISCONTINUED | OUTPATIENT
Start: 2022-09-24 | End: 2022-09-26 | Stop reason: HOSPADM

## 2022-09-24 RX ORDER — DEXTROSE MONOHYDRATE, SODIUM CHLORIDE, AND POTASSIUM CHLORIDE 50; 1.49; 4.5 G/1000ML; G/1000ML; G/1000ML
INJECTION, SOLUTION INTRAVENOUS CONTINUOUS PRN
Status: DISCONTINUED | OUTPATIENT
Start: 2022-09-24 | End: 2022-09-25

## 2022-09-24 RX ORDER — ENOXAPARIN SODIUM 100 MG/ML
40 INJECTION SUBCUTANEOUS EVERY 24 HOURS
Status: DISCONTINUED | OUTPATIENT
Start: 2022-09-24 | End: 2022-09-26 | Stop reason: HOSPADM

## 2022-09-24 RX ORDER — SODIUM CHLORIDE 0.9 % (FLUSH) 0.9 %
10 SYRINGE (ML) INJECTION
Status: DISCONTINUED | OUTPATIENT
Start: 2022-09-24 | End: 2022-09-26 | Stop reason: HOSPADM

## 2022-09-24 RX ORDER — CEFTRIAXONE 1 G/1
1 INJECTION, POWDER, FOR SOLUTION INTRAMUSCULAR; INTRAVENOUS
Status: DISCONTINUED | OUTPATIENT
Start: 2022-09-24 | End: 2022-09-24

## 2022-09-24 RX ORDER — CALCIUM GLUCONATE 20 MG/ML
1 INJECTION, SOLUTION INTRAVENOUS
Status: DISCONTINUED | OUTPATIENT
Start: 2022-09-24 | End: 2022-09-26 | Stop reason: HOSPADM

## 2022-09-24 RX ORDER — MAGNESIUM SULFATE HEPTAHYDRATE 40 MG/ML
4 INJECTION, SOLUTION INTRAVENOUS
Status: DISCONTINUED | OUTPATIENT
Start: 2022-09-24 | End: 2022-09-26 | Stop reason: HOSPADM

## 2022-09-24 RX ORDER — ACETAMINOPHEN 325 MG/1
650 TABLET ORAL EVERY 4 HOURS PRN
Status: DISCONTINUED | OUTPATIENT
Start: 2022-09-24 | End: 2022-09-26 | Stop reason: HOSPADM

## 2022-09-24 RX ORDER — FAMOTIDINE 10 MG/ML
20 INJECTION INTRAVENOUS EVERY 12 HOURS
Status: DISCONTINUED | OUTPATIENT
Start: 2022-09-24 | End: 2022-09-26 | Stop reason: HOSPADM

## 2022-09-24 RX ORDER — SODIUM CHLORIDE AND POTASSIUM CHLORIDE 150; 900 MG/100ML; MG/100ML
INJECTION, SOLUTION INTRAVENOUS CONTINUOUS PRN
Status: DISCONTINUED | OUTPATIENT
Start: 2022-09-24 | End: 2022-09-25

## 2022-09-24 RX ORDER — MORPHINE SULFATE 4 MG/ML
2 INJECTION, SOLUTION INTRAMUSCULAR; INTRAVENOUS
Status: COMPLETED | OUTPATIENT
Start: 2022-09-24 | End: 2022-09-24

## 2022-09-24 RX ORDER — ONDANSETRON 2 MG/ML
4 INJECTION INTRAMUSCULAR; INTRAVENOUS
Status: COMPLETED | OUTPATIENT
Start: 2022-09-24 | End: 2022-09-24

## 2022-09-24 RX ORDER — PROCHLORPERAZINE EDISYLATE 5 MG/ML
5 INJECTION INTRAMUSCULAR; INTRAVENOUS EVERY 6 HOURS PRN
Status: DISCONTINUED | OUTPATIENT
Start: 2022-09-24 | End: 2022-09-26 | Stop reason: HOSPADM

## 2022-09-24 RX ORDER — MAGNESIUM SULFATE HEPTAHYDRATE 40 MG/ML
2 INJECTION, SOLUTION INTRAVENOUS
Status: DISCONTINUED | OUTPATIENT
Start: 2022-09-24 | End: 2022-09-26 | Stop reason: HOSPADM

## 2022-09-24 RX ORDER — CALCIUM GLUCONATE 20 MG/ML
2 INJECTION, SOLUTION INTRAVENOUS
Status: DISCONTINUED | OUTPATIENT
Start: 2022-09-24 | End: 2022-09-26 | Stop reason: HOSPADM

## 2022-09-24 RX ORDER — METOCLOPRAMIDE HYDROCHLORIDE 5 MG/ML
10 INJECTION INTRAMUSCULAR; INTRAVENOUS EVERY 6 HOURS PRN
Status: ACTIVE | OUTPATIENT
Start: 2022-09-24 | End: 2022-09-26

## 2022-09-24 RX ORDER — MORPHINE SULFATE 4 MG/ML
2 INJECTION, SOLUTION INTRAMUSCULAR; INTRAVENOUS EVERY 4 HOURS PRN
Status: DISCONTINUED | OUTPATIENT
Start: 2022-09-24 | End: 2022-09-25

## 2022-09-24 RX ORDER — IPRATROPIUM BROMIDE AND ALBUTEROL SULFATE 2.5; .5 MG/3ML; MG/3ML
3 SOLUTION RESPIRATORY (INHALATION) EVERY 4 HOURS PRN
Status: DISCONTINUED | OUTPATIENT
Start: 2022-09-24 | End: 2022-09-26 | Stop reason: HOSPADM

## 2022-09-24 RX ADMIN — FAMOTIDINE 20 MG: 10 INJECTION, SOLUTION INTRAVENOUS at 08:09

## 2022-09-24 RX ADMIN — MORPHINE SULFATE 2 MG: 4 INJECTION INTRAVENOUS at 11:09

## 2022-09-24 RX ADMIN — MORPHINE SULFATE 2 MG: 4 INJECTION INTRAVENOUS at 07:09

## 2022-09-24 RX ADMIN — INSULIN HUMAN 10 UNITS/HR: 1 INJECTION, SOLUTION INTRAVENOUS at 06:09

## 2022-09-24 RX ADMIN — SODIUM CHLORIDE, PRESERVATIVE FREE 10 ML: 5 INJECTION INTRAVENOUS at 11:09

## 2022-09-24 RX ADMIN — ONDANSETRON 4 MG: 2 INJECTION INTRAMUSCULAR; INTRAVENOUS at 07:09

## 2022-09-24 RX ADMIN — MORPHINE SULFATE 2 MG: 4 INJECTION, SOLUTION INTRAMUSCULAR; INTRAVENOUS at 02:09

## 2022-09-24 RX ADMIN — POTASSIUM CHLORIDE, DEXTROSE MONOHYDRATE AND SODIUM CHLORIDE: 150; 5; 450 INJECTION, SOLUTION INTRAVENOUS at 10:09

## 2022-09-24 RX ADMIN — DEXTROSE MONOHYDRATE 1 G: 5 INJECTION INTRAVENOUS at 08:09

## 2022-09-24 RX ADMIN — ENOXAPARIN SODIUM 40 MG: 40 INJECTION SUBCUTANEOUS at 05:09

## 2022-09-24 RX ADMIN — METOPROLOL TARTRATE 50 MG: 50 TABLET, FILM COATED ORAL at 08:09

## 2022-09-24 RX ADMIN — SODIUM CHLORIDE, POTASSIUM CHLORIDE, SODIUM LACTATE AND CALCIUM CHLORIDE 1000 ML: 600; 310; 30; 20 INJECTION, SOLUTION INTRAVENOUS at 02:09

## 2022-09-24 RX ADMIN — ONDANSETRON 4 MG: 2 INJECTION INTRAMUSCULAR; INTRAVENOUS at 02:09

## 2022-09-24 RX ADMIN — SODIUM CHLORIDE AND POTASSIUM CHLORIDE: .9; .15 SOLUTION INTRAVENOUS at 05:09

## 2022-09-24 RX ADMIN — SODIUM CHLORIDE, POTASSIUM CHLORIDE, SODIUM LACTATE AND CALCIUM CHLORIDE 1000 ML: 600; 310; 30; 20 INJECTION, SOLUTION INTRAVENOUS at 01:09

## 2022-09-24 NOTE — ASSESSMENT & PLAN NOTE
Eventually patient noncompliance.  If multiple hospitalization.    Patient with the poor long-term prognosis if continues to be noncompliant

## 2022-09-24 NOTE — H&P
[facility]  Emergency Dept  [facility] Medicine  History & Physical    Patient Name: Dorene Husain  MRN: [id number]  Patient Class: IP- Inpatient  Admission Date: 9/24/2022  Attending Physician: Segun Viramontes MD   Primary Care Provider: Kumar Ortiz NP         Patient information was obtained from patient and ER records.     Subjective:     Principal Problem:DKA (diabetic ketoacidosis)    Chief Complaint:   Chief Complaint   Patient presents with    Chest Pain     C/o continued chest pain since being discharged from the hospital yesterday. Pt also has generalized swelling and abdominal pain.     Chest discomfort/pain, DKA        HPI: 27-year-old  female past medical history of diabetes type 1, noncompliant, chronic pain syndrome/abuse presented emergency room complaining of DKA.    Patient was discharged the day before yesterday from [place] like [facility] after being treated for DKA.  Patient states that she was discharged from the hospital too early.  Not sure exactly what was too early but she claims that patient was still in DKA at time of discharge.    She went home she has not been eating much she is throwing up.  She has not been taking much medication other than long-acting insulin at night time.    This morning she has been having significant nausea vomiting and as of 5 goal pleuritic type pain.    Emergency room finding consistent with DKA on top of that patient has mild elevation of troponin.  Will admit patient to intensive care unit for further treatment.      Past Medical History:   Diagnosis Date    Diabetes mellitus     Diabetic gastroparesis associated with type 1 diabetes mellitus     DKA (diabetic ketoacidosis)     DKA (diabetic ketoacidosis)     DKA (diabetic ketoacidosis)     Gastroparesis     Gastroparesis due to DM     Hypertension     Ketoacidosis due to diabetes     Non compliance with medical treatment     Pneumonia     Secondary DM  with DKA        Past Surgical History:   Procedure Laterality Date    CHOLECYSTECTOMY      ESOPHAGOGASTRODUODENOSCOPY      Multiple    None         Review of patient's allergies indicates:  No Known Allergies    No current facility-administered medications on file prior to encounter.     Current Outpatient Medications on File Prior to Encounter   Medication Sig    dicyclomine (BENTYL) 10 MG capsule Take 10 mg by mouth 4 (four) times daily.    metoclopramide HCl (REGLAN) 10 MG tablet Take 1 tablet (10 mg total) by mouth every 6 (six) hours.    metoprolol tartrate (LOPRESSOR) 50 MG tablet Take 50 mg by mouth 2 (two) times daily.    ondansetron (ZOFRAN) 4 MG tablet Take 4 mg by mouth every 6 (six) hours as needed.    pantoprazole (PROTONIX) 40 MG tablet Take 40 mg by mouth once daily.    amitriptyline (ELAVIL) 25 MG tablet Take 25 mg by mouth nightly.    BASAGLAR KWIKPEN U-100 INSULIN glargine 100 units/mL (3mL) SubQ pen Inject into the skin.    hydrOXYzine (ATARAX) 50 MG tablet Take 50 mg by mouth daily as needed.    lisinopriL (PRINIVIL,ZESTRIL) 20 MG tablet Take 20 mg by mouth 2 (two) times daily.    olmesartan (BENICAR) 40 MG tablet Take 40 mg by mouth once daily.    [DISCONTINUED] insulin lispro (HUMALOG U-100 INSULIN) 100 unit/mL Crtg Inject into the skin.     Family History       Problem Relation (Age of Onset)    Cancer Father    Diabetes Mother    Heart disease Mother    Hyperlipidemia Mother, Father    Hypertension Mother, Father    Stroke Father          Tobacco Use    Smoking status: Never    Smokeless tobacco: Never   Substance and Sexual Activity    Alcohol use: Never    Drug use: Yes     Types: Marijuana    Sexual activity: Yes     Review of Systems   Constitutional:  Positive for activity change, appetite change and fatigue. Negative for chills, fever and unexpected weight change.   HENT: Negative.     Eyes: Negative.    Respiratory:  Positive for cough.    Cardiovascular:  Positive  for chest pain.   Gastrointestinal:  Positive for abdominal distention, abdominal pain, nausea and vomiting. Negative for constipation.   Endocrine: Negative.    Genitourinary: Negative.    Musculoskeletal: Negative.    Neurological: Negative.    Hematological: Negative.    Psychiatric/Behavioral: Negative.     All other systems reviewed and are negative.  Objective:     Vital Signs (Most Recent):  Temp: 98.8 °F (37.1 °C) (09/24/22 1215)  Pulse: (!) 117 (09/24/22 1555)  Resp: (!) 22 (09/24/22 1555)  BP: (!) 146/77 (09/24/22 1555)  SpO2: 99 % (09/24/22 1555)   Vital Signs (24h Range):  Temp:  [98.8 °F (37.1 °C)] 98.8 °F (37.1 °C)  Pulse:  [111-120] 117  Resp:  [18-22] 22  SpO2:  [99 %-100 %] 99 %  BP: (102-146)/(57-92) 146/77     Weight: 71.7 kg (158 lb)  Body mass index is 28.89 kg/m².    Physical Exam  Vitals and nursing note reviewed. Exam conducted with a chaperone present.   Constitutional:       Appearance: She is overweight. She is ill-appearing and toxic-appearing.      Interventions: Nasal cannula in place.   HENT:      Head: Normocephalic and atraumatic.      Jaw: No trismus.   Eyes:      General: Lids are normal. Lids are everted, no foreign bodies appreciated.   Cardiovascular:      Rate and Rhythm: Regular rhythm. Tachycardia present. Extrasystoles are present.     Pulses: Decreased pulses.   Pulmonary:      Effort: Tachypnea present.      Breath sounds: No decreased air movement.   Abdominal:      General: Abdomen is protuberant. Bowel sounds are absent. There is distension. There are no signs of injury.      Palpations: Abdomen is soft. There is no hepatomegaly or mass.      Tenderness: There is generalized abdominal tenderness. There is no guarding or rebound. Negative signs include Chaudhary's sign, McBurney's sign, psoas sign and obturator sign.   Genitourinary:     Comments: Normal.  Musculoskeletal:      Cervical back: Full passive range of motion without pain, normal range of motion and neck supple.    Skin:     Capillary Refill: Capillary refill takes 2 to 3 seconds.   Neurological:      Mental Status: She is oriented to person, place, and time. She is lethargic.      Cranial Nerves: Cranial nerves 2-12 are intact.   Psychiatric:         Attention and Perception: She is inattentive.         Mood and Affect: Mood is depressed. Affect is angry.         Behavior: Behavior is uncooperative.           Significant Labs: All pertinent labs within the past 24 hours have been reviewed.  Recent Lab Results  (Last 5 results in the past 24 hours)        09/24/22  1522   09/24/22  1436   09/24/22  1403   09/24/22  1335   09/24/22  1235        Albumin/Globulin Ratio         0.7       Albumin         2.5       Alkaline Phosphatase         84       Allens Test       Pass         ALT         12       Anion Gap 32.0               Appearance, UA   Clear             AST         15       Bacteria, UA   None Seen             Baso #         0.08       Basophil %         0.4       BILIRUBIN TOTAL         0.6       Bilirubin, UA   Negative             Site       RR         BUN 19.0         17.0       BUN/CREAT RATIO 13               Calcium 8.5         8.6       Chloride 90         88       CO2 9  Comment: Critical Result called and verified by verbal readback to: Indra Rodriguez on 09/24/2022 at 15:44. Reported by [id number].         11       Color, UA   Yellow             Creatinine 1.46         1.40       eGFR 50         53       Eos #         0.00       Eosinophil %         0.0       Globulin, Total         3.5       Glucose 518  Comment: Critical Result called and verified by verbal readback to: Indra Rodriguez on 09/24/2022 at 15:44. Reported by [id number].         521  Comment: Critical Result called and verified by verbal readback to: Sarah Rodriguez on 09/24/2022 at 13:05. Reported by [id number].       Glucose, UA   500             Hematocrit         26.5       Hemoglobin         8.7        Immature Grans (Abs)         0.81       Immature Granulocytes         4.5       Ketones, UA   >=160             Leukocytes, UA   Negative             Lipase         14       Lymph #         1.14       LYMPH %         6.4       Magnesium         1.80       MCH         27.4       MCHC         32.8       MCV         83.6       Mono #         0.93       Mono %         5.2       MPV         9.2       Neut #         15.0       Neut %         83.5       NITRITE UA   Negative             Occult Blood UA   Small             pH, UA   5.0             Phosphorus         3.5       Platelets         425       POC BE       -13         POC HCO3       11.9         POC PCO2       20.7         POC PH       7.369         POC PO2       118         POC SATURATED O2       99         POC TCO2       13         Potassium 3.9         3.7       Preg Test, Ur   Negative             PROTEIN TOTAL         6.0       Protein, UA   30             RBC         3.17       RBC, UA   0-2             RDW         12.6       Sample       ARTERIAL         Sodium 131         128       Specific Gravity,UA   1.020             Squam Epithel, UA   Rare             Troponin I     0.923     1.002       Urobilinogen, UA   0.2             WBC, UA   None Seen             WBC         17.9                              Significant Imaging:   Narrative & Impression  EXAMINATION:  XR CHEST 1 VIEW FOR LINE/TUBE PLACEMENT     CLINICAL HISTORY:  picc;     TECHNIQUE:  Single frontal view of the chest was performed.     COMPARISON:  Chest radiograph from 08/20/2022     FINDINGS:  Interval placement of right upper extremity PICC with tip in the SVC.  Heart size is normal.  Lungs are clear without focal consolidation, edema, effusion or pneumothorax.     Impression:     1. Right upper extremity PICC with tip in the SVC.        Electronically signed by: Francis Sharpe MD  Date:                                            09/24/2022  Time:                                            16:52           Exam Ended: 09/24/22 16:46 Last Resulted: 09/24/22 16:52                  Assessment/Plan:     * DKA (diabetic ketoacidosis)  Patient will be NPO  Admit to intensive care unit   Start DKA protocol  .  Check hemoglobin A1c in the morning and obtain medical record from [facility] x-ray also was in a.    Elevated troponin I level  Elevated troponin.    Arian case by Cardiology to make sure the patient does not require any intervention.  In the meantime is possible that troponin elevation might be secondary to heart demand.  Patient still is at high risk for coronary disease secondary to her noncompliance with diabetes treatment.      Acute blood loss anemia  Suspect anemia of blood loss.    Check H&H.  Check iron studies.      Chronic pain  Patient with a chronic pain behavior.    Check urine drug screening.  Will try to avoid narcotics as much as possible on LEs we have 2.      CHATA (acute kidney injury)  Will continue treatment of diabetic ketoacidosis.    DM gastroparesis  Patient has been diagnosed with gastroparesis in the past.    Poor understanding of gastroparesis.  Will address with patient again.    Noncompliance  Eventually patient noncompliance.  If multiple hospitalization.    Patient with the poor long-term prognosis if continues to be noncompliant      Hypertension  Will hold p.o. medication and use IV.      Drug-seeking behavior  Check urine drug screening.      VTE Risk Mitigation (From admission, onward)         Ordered     enoxaparin injection 40 mg  Daily         09/24/22 1646     Place CORNELIA hose  Until discontinued         09/24/22 1646     IP VTE LOW RISK PATIENT  Once         09/24/22 1646               Critical care time including face-to-face contact with patient was 40 minutes    Segun Viramontes MD  Department of Hospital Medicine   [facility]  Emergency Dept

## 2022-09-24 NOTE — ASSESSMENT & PLAN NOTE
Patient will be NPO  Admit to intensive care unit   Start DKA protocol  .  Check hemoglobin A1c in the morning and obtain medical record from [facility] x-ray also was in a.

## 2022-09-24 NOTE — ASSESSMENT & PLAN NOTE
Elevated troponin.    Arian case by Cardiology to make sure the patient does not require any intervention.  In the meantime is possible that troponin elevation might be secondary to heart demand.  Patient still is at high risk for coronary disease secondary to her noncompliance with diabetes treatment.

## 2022-09-24 NOTE — SUBJECTIVE & OBJECTIVE
Past Medical History:   Diagnosis Date    Diabetes mellitus     Diabetic gastroparesis associated with type 1 diabetes mellitus     DKA (diabetic ketoacidosis)     DKA (diabetic ketoacidosis)     DKA (diabetic ketoacidosis)     Gastroparesis     Gastroparesis due to DM     Hypertension     Ketoacidosis due to diabetes     Non compliance with medical treatment     Pneumonia     Secondary DM with DKA        Past Surgical History:   Procedure Laterality Date    CHOLECYSTECTOMY      ESOPHAGOGASTRODUODENOSCOPY      Multiple    None         Review of patient's allergies indicates:  No Known Allergies    No current facility-administered medications on file prior to encounter.     Current Outpatient Medications on File Prior to Encounter   Medication Sig    dicyclomine (BENTYL) 10 MG capsule Take 10 mg by mouth 4 (four) times daily.    metoclopramide HCl (REGLAN) 10 MG tablet Take 1 tablet (10 mg total) by mouth every 6 (six) hours.    metoprolol tartrate (LOPRESSOR) 50 MG tablet Take 50 mg by mouth 2 (two) times daily.    ondansetron (ZOFRAN) 4 MG tablet Take 4 mg by mouth every 6 (six) hours as needed.    pantoprazole (PROTONIX) 40 MG tablet Take 40 mg by mouth once daily.    amitriptyline (ELAVIL) 25 MG tablet Take 25 mg by mouth nightly.    BASAGLAR KWIKPEN U-100 INSULIN glargine 100 units/mL (3mL) SubQ pen Inject into the skin.    hydrOXYzine (ATARAX) 50 MG tablet Take 50 mg by mouth daily as needed.    lisinopriL (PRINIVIL,ZESTRIL) 20 MG tablet Take 20 mg by mouth 2 (two) times daily.    olmesartan (BENICAR) 40 MG tablet Take 40 mg by mouth once daily.    [DISCONTINUED] insulin lispro (HUMALOG U-100 INSULIN) 100 unit/mL Crtg Inject into the skin.     Family History       Problem Relation (Age of Onset)    Cancer Father    Diabetes Mother    Heart disease Mother    Hyperlipidemia Mother, Father    Hypertension Mother, Father    Stroke Father          Tobacco Use    Smoking status: Never    Smokeless tobacco: Never    Substance and Sexual Activity    Alcohol use: Never    Drug use: Yes     Types: Marijuana    Sexual activity: Yes     Review of Systems   Constitutional:  Positive for activity change, appetite change and fatigue. Negative for chills, fever and unexpected weight change.   HENT: Negative.     Eyes: Negative.    Respiratory:  Positive for cough.    Cardiovascular:  Positive for chest pain.   Gastrointestinal:  Positive for abdominal distention, abdominal pain, nausea and vomiting. Negative for constipation.   Endocrine: Negative.    Genitourinary: Negative.    Musculoskeletal: Negative.    Neurological: Negative.    Hematological: Negative.    Psychiatric/Behavioral: Negative.     All other systems reviewed and are negative.  Objective:     Vital Signs (Most Recent):  Temp: 98.8 °F (37.1 °C) (09/24/22 1215)  Pulse: (!) 117 (09/24/22 1555)  Resp: (!) 22 (09/24/22 1555)  BP: (!) 146/77 (09/24/22 1555)  SpO2: 99 % (09/24/22 1555)   Vital Signs (24h Range):  Temp:  [98.8 °F (37.1 °C)] 98.8 °F (37.1 °C)  Pulse:  [111-120] 117  Resp:  [18-22] 22  SpO2:  [99 %-100 %] 99 %  BP: (102-146)/(57-92) 146/77     Weight: 71.7 kg (158 lb)  Body mass index is 28.89 kg/m².    Physical Exam  Vitals and nursing note reviewed. Exam conducted with a chaperone present.   Constitutional:       Appearance: She is overweight. She is ill-appearing and toxic-appearing.      Interventions: Nasal cannula in place.   HENT:      Head: Normocephalic and atraumatic.      Jaw: No trismus.   Eyes:      General: Lids are normal. Lids are everted, no foreign bodies appreciated.   Cardiovascular:      Rate and Rhythm: Regular rhythm. Tachycardia present. Extrasystoles are present.     Pulses: Decreased pulses.   Pulmonary:      Effort: Tachypnea present.      Breath sounds: No decreased air movement.   Abdominal:      General: Abdomen is protuberant. Bowel sounds are absent. There is distension. There are no signs of injury.      Palpations: Abdomen is  soft. There is no hepatomegaly or mass.      Tenderness: There is generalized abdominal tenderness. There is no guarding or rebound. Negative signs include Chaudhary's sign, McBurney's sign, psoas sign and obturator sign.   Genitourinary:     Comments: Normal.  Musculoskeletal:      Cervical back: Full passive range of motion without pain, normal range of motion and neck supple.   Skin:     Capillary Refill: Capillary refill takes 2 to 3 seconds.   Neurological:      Mental Status: She is oriented to person, place, and time. She is lethargic.      Cranial Nerves: Cranial nerves 2-12 are intact.   Psychiatric:         Attention and Perception: She is inattentive.         Mood and Affect: Mood is depressed. Affect is angry.         Behavior: Behavior is uncooperative.           Significant Labs: All pertinent labs within the past 24 hours have been reviewed.  Recent Lab Results  (Last 5 results in the past 24 hours)        09/24/22  1522   09/24/22  1436   09/24/22  1403   09/24/22  1335   09/24/22  1235        Albumin/Globulin Ratio         0.7       Albumin         2.5       Alkaline Phosphatase         84       Allens Test       Pass         ALT         12       Anion Gap 32.0               Appearance, UA   Clear             AST         15       Bacteria, UA   None Seen             Baso #         0.08       Basophil %         0.4       BILIRUBIN TOTAL         0.6       Bilirubin, UA   Negative             Site       RR         BUN 19.0         17.0       BUN/CREAT RATIO 13               Calcium 8.5         8.6       Chloride 90         88       CO2 9  Comment: Critical Result called and verified by verbal readback to: Indra Rodriguez on 09/24/2022 at 15:44. Reported by [id number].         11       Color, UA   Yellow             Creatinine 1.46         1.40       eGFR 50         53       Eos #         0.00       Eosinophil %         0.0       Globulin, Total         3.5       Glucose 518  Comment: Critical  Result called and verified by verbal readback to: Indra Guzman  Amanda Rodriguez on 09/24/2022 at 15:44. Reported by [id number].         521  Comment: Critical Result called and verified by verbal readback to: Sarah Benjamin  Amanda Rodriguez on 09/24/2022 at 13:05. Reported by [id number].       Glucose, UA   500             Hematocrit         26.5       Hemoglobin         8.7       Immature Grans (Abs)         0.81       Immature Granulocytes         4.5       Ketones, UA   >=160             Leukocytes, UA   Negative             Lipase         14       Lymph #         1.14       LYMPH %         6.4       Magnesium         1.80       MCH         27.4       MCHC         32.8       MCV         83.6       Mono #         0.93       Mono %         5.2       MPV         9.2       Neut #         15.0       Neut %         83.5       NITRITE UA   Negative             Occult Blood UA   Small             pH, UA   5.0             Phosphorus         3.5       Platelets         425       POC BE       -13         POC HCO3       11.9         POC PCO2       20.7         POC PH       7.369         POC PO2       118         POC SATURATED O2       99         POC TCO2       13         Potassium 3.9         3.7       Preg Test, Ur   Negative             PROTEIN TOTAL         6.0       Protein, UA   30             RBC         3.17       RBC, UA   0-2             RDW         12.6       Sample       ARTERIAL         Sodium 131         128       Specific Gravity,UA   1.020             Squam Epithel, UA   Rare             Troponin I     0.923     1.002       Urobilinogen, UA   0.2             WBC, UA   None Seen             WBC         17.9                              Significant Imaging:   Narrative & Impression  EXAMINATION:  XR CHEST 1 VIEW FOR LINE/TUBE PLACEMENT     CLINICAL HISTORY:  picc;     TECHNIQUE:  Single frontal view of the chest was performed.     COMPARISON:  Chest radiograph from 08/20/2022     FINDINGS:  Interval placement of right  upper extremity PICC with tip in the SVC.  Heart size is normal.  Lungs are clear without focal consolidation, edema, effusion or pneumothorax.     Impression:     1. Right upper extremity PICC with tip in the SVC.        Electronically signed by: Francis Sharpe MD  Date:                                            09/24/2022  Time:                                           16:52           Exam Ended: 09/24/22 16:46 Last Resulted: 09/24/22 16:52

## 2022-09-24 NOTE — ED PROVIDER NOTES
Encounter Date: 9/24/2022       History     Chief Complaint   Patient presents with    Chest Pain     C/o continued chest pain since being discharged from the hospital yesterday. Pt also has generalized swelling and abdominal pain.     Chest discomfort/pain, DKA     HPI    This is a 27-year-old female who is well known to myself in this facility for poorly controlled type 1 diabetes and recurrent DKA presents emergency department for generalized pain, inability tolerate p.o. and chest pain.  Patient states that she was discharged yesterday from outside facility after being in DKA.  She was transferred from this facility on 09/20 with significant electrolyte abnormalities from DKA.  Patient states that she was discharged hospital to [facility].    Review of patient's allergies indicates:  No Known Allergies  Past Medical History:   Diagnosis Date    Diabetes mellitus     Diabetic gastroparesis associated with type 1 diabetes mellitus     DKA (diabetic ketoacidosis)     DKA (diabetic ketoacidosis)     DKA (diabetic ketoacidosis)     Gastroparesis     Gastroparesis due to DM     Hypertension     Ketoacidosis due to diabetes     Non compliance with medical treatment     Pneumonia     Secondary DM with DKA      Past Surgical History:   Procedure Laterality Date    CHOLECYSTECTOMY      ESOPHAGOGASTRODUODENOSCOPY      Multiple    None       Family History   Problem Relation Age of Onset    Heart disease Mother     Hypertension Mother     Diabetes Mother     Hyperlipidemia Mother     Cancer Father     Stroke Father     Hypertension Father     Hyperlipidemia Father      Social History     Tobacco Use    Smoking status: Never    Smokeless tobacco: Never   Substance Use Topics    Alcohol use: Never    Drug use: Yes     Types: Marijuana     Review of Systems   Constitutional:  Negative for fever.   Respiratory:  Negative for cough and shortness of breath.    Cardiovascular:  Positive for chest pain.   Gastrointestinal:   Positive for abdominal pain, nausea and vomiting. Negative for constipation and diarrhea.   Genitourinary:  Negative for dysuria.   All other systems reviewed and are negative.    Physical Exam     Initial Vitals [09/24/22 1215]   BP Pulse Resp Temp SpO2   (!) 102/57 (!) 111 18 98.8 °F (37.1 °C) 100 %      MAP       --         Physical Exam    Nursing note and vitals reviewed.  Constitutional: She appears well-developed and well-nourished. She appears distressed.   Cardiovascular:  Normal rate and regular rhythm.           Pulmonary/Chest: Breath sounds normal. No respiratory distress.   Abdominal: Abdomen is soft. Bowel sounds are normal. There is abdominal tenderness (generalized). There is no rebound and no guarding.   Musculoskeletal:         General: Normal range of motion.     Neurological: She is alert and oriented to person, place, and time.   Skin: Skin is warm. Capillary refill takes less than 2 seconds.   Psychiatric: She has a normal mood and affect. Thought content normal.       ED Course   Critical Care    Date/Time: 9/24/2022 3:30 PM  Performed by: Gagandeep Lockett MD  Authorized by: Gagandeep Lockett MD   Direct patient critical care time: 60 minutes  Additional history critical care time: 5 minutes  Ordering / reviewing critical care time: 5 minutes  Documentation critical care time: 2 minutes  Consulting other physicians critical care time: 1 minutes  Total critical care time (exclusive of procedural time) : 73 minutes  Critical care time was exclusive of separately billable procedures and treating other patients.  Critical care was necessary to treat or prevent imminent or life-threatening deterioration of the following conditions: metabolic crisis.  Critical care was time spent personally by me on the following activities: development of treatment plan with patient or surrogate, evaluation of patient's response to treatment, examination of patient, obtaining history from patient or surrogate,  ordering and performing treatments and interventions, ordering and review of laboratory studies, ordering and review of radiographic studies, re-evaluation of patient's condition and review of old charts.      Labs Reviewed   COMPREHENSIVE METABOLIC PANEL - Abnormal; Notable for the following components:       Result Value    Sodium Level 128 (*)     Chloride 88 (*)     Carbon Dioxide 11 (*)     Glucose Level 521 (*)     Creatinine 1.40 (*)     Protein Total 6.0 (*)     Albumin Level 2.5 (*)     Albumin/Globulin Ratio 0.7 (*)     All other components within normal limits   URINALYSIS, REFLEX TO URINE CULTURE - Abnormal; Notable for the following components:    Protein, UA 30 (*)     Glucose,  (*)     Blood, UA Small (*)     All other components within normal limits   CBC WITH DIFFERENTIAL - Abnormal; Notable for the following components:    WBC 17.9 (*)     RBC 3.17 (*)     Hgb 8.7 (*)     Hct 26.5 (*)     MCHC 32.8 (*)     Platelet 425 (*)     Neut # 15.0 (*)     IG# 0.81 (*)     All other components within normal limits   TROPONIN I - Abnormal; Notable for the following components:    Troponin-I 1.002 (*)     All other components within normal limits   TROPONIN I - Abnormal; Notable for the following components:    Troponin-I 0.923 (*)     All other components within normal limits   BASIC METABOLIC PANEL - Abnormal; Notable for the following components:    Sodium Level 131 (*)     Chloride 90 (*)     Carbon Dioxide 9 (*)     Glucose Level 518 (*)     Blood Urea Nitrogen 19.0 (*)     Creatinine 1.46 (*)     All other components within normal limits   POCT GLUCOSE, HAND-HELD DEVICE - Abnormal; Notable for the following components:    POC Glucose 459 (*)     All other components within normal limits   ISTAT PROCEDURE - Abnormal; Notable for the following components:    POC PCO2 20.7 (*)     POC PO2 118 (*)     POC HCO3 11.9 (*)     POC TCO2 13 (*)     All other components within normal limits   PREGNANCY TEST,  URINE RAPID - Normal   MAGNESIUM - Normal   LIPASE - Normal   PHOSPHORUS - Normal   URINALYSIS, MICROSCOPIC - Normal   CBC W/ AUTO DIFFERENTIAL    Narrative:     The following orders were created for panel order CBC auto differential.  Procedure                               Abnormality         Status                     ---------                               -----------         ------                     CBC with Differential[[id number]]        Abnormal            Final result                 Please view results for these tests on the individual orders.   BETA - HYDROXYBUTYRATE, SERUM   BASIC METABOLIC PANEL   BASIC METABOLIC PANEL   DRUG SCREEN, URINE (BEAKER)   POCT GLUCOSE, HAND-HELD DEVICE   POCT GLUCOSE MONITORING CONTINUOUS   POCT GLUCOSE MONITORING CONTINUOUS     EKG Readings: (Independently Interpreted)   Initial Reading: No STEMI. Rhythm: Sinus Tachycardia. Heart Rate: 109. Ectopy: No Ectopy. Conduction: Normal. ST Segments: Normal ST Segments. T Waves: Normal. Clinical Impression: Sinus Tachycardia     Imaging Results              X-Ray Chest 1 View for Line/Tube Placement (Final result)  Result time 09/24/22 16:52:19      Final result by Francis Sharpe MD (09/24/22 16:52:19)                   Impression:      1. Right upper extremity PICC with tip in the SVC.      Electronically signed by: Francis Sharpe MD  Date:    09/24/2022  Time:    16:52               Narrative:    EXAMINATION:  XR CHEST 1 VIEW FOR LINE/TUBE PLACEMENT    CLINICAL HISTORY:  picc;    TECHNIQUE:  Single frontal view of the chest was performed.    COMPARISON:  Chest radiograph from 08/20/2022    FINDINGS:  Interval placement of right upper extremity PICC with tip in the SVC.  Heart size is normal.  Lungs are clear without focal consolidation, edema, effusion or pneumothorax.                                       CT Abdomen Pelvis  Without Contrast (Final result)  Result time 09/24/22 15:24:39      Final result by Francis MCGUIRE  MD Dell (09/24/22 15:24:39)                   Impression:      1. Diffusely increased interstitial mesenteric edema.  Etiology is indeterminate.  2. Normal appendix.  3. Small amount of free fluid in the cul-de-sac.  Findings can be physiologic in a 27-year-old female.      Electronically signed by: Francis Sharpe MD  Date:    09/24/2022  Time:    15:24               Narrative:    EXAMINATION:  CT ABDOMEN PELVIS WITHOUT CONTRAST    CLINICAL HISTORY:  Abdominal pain    TECHNIQUE:  Axial CT images were obtained through the abdomen and pelvis without IV contrast.  Coronal and sagittal reconstructions submitted and interpreted.  Automated exposure control, dose radiation lowering technique, was utilized.    COMPARISON:  CT abdomen and pelvis from 08/14/2022    FINDINGS:  Heart size is normal.  Lung bases are clear.  Gallbladder is surgically absent.  The liver, spleen, adrenal glands and kidneys appear normal.  Pancreatic tail appears normal.  Pancreatic head and body are not well seen.    The bowel is nonobstructed.  There is increased interstitial opacity throughout the mesenteric fat.  Small amount of free fluid is present in the cul-de-sac.  No free air.  A normal appendix is present.    Unremarkable bladder.  Uterus is anteverted.  No suspicious osteolytic or osteoblastic lesion.                                       Medications   insulin regular bolus from bag/infusion 7.17 Units (has no administration in time range)   insulin regular in 0.9 % NaCl 100 unit/100 mL (1 unit/mL) infusion (has no administration in time range)   0.45% NaCl with KCl 20 mEq infusion (has no administration in time range)   0.9 % NaCl with KCl 20 mEq infusion (has no administration in time range)   dextrose 5 % and 0.45 % NaCl with KCl 20 mEq infusion (has no administration in time range)   dextrose 10% bolus 125 mL (has no administration in time range)   dextrose 10% bolus 250 mL (has no administration in time range)   sodium chloride  0.9% flush 10 mL (has no administration in time range)     And   sodium chloride 0.9% flush 10 mL (has no administration in time range)   metoprolol tartrate (LOPRESSOR) tablet 50 mg (has no administration in time range)   sodium chloride 0.9% flush 10 mL (has no administration in time range)   enoxaparin injection 40 mg (has no administration in time range)   magnesium sulfate 2g in water 50mL IVPB (premix) (has no administration in time range)   magnesium sulfate 2g in water 50mL IVPB (premix) (has no administration in time range)   sodium phosphate 15 mmol in dextrose 5 % 250 mL IVPB (has no administration in time range)   sodium phosphate 20.01 mmol in dextrose 5 % 250 mL IVPB (has no administration in time range)   sodium phosphate 30 mmol in dextrose 5 % 250 mL IVPB (has no administration in time range)   calcium gluconate 1 g in NS IVPB (premixed) (has no administration in time range)   calcium gluconate 1 g in NS IVPB (premixed) (has no administration in time range)   calcium gluconate 1 g in NS IVPB (premixed) (has no administration in time range)   acetaminophen tablet 650 mg (has no administration in time range)   morphine injection 2 mg (has no administration in time range)   famotidine (PF) injection 20 mg (has no administration in time range)   ondansetron injection 4 mg (has no administration in time range)   prochlorperazine injection Soln 5 mg (has no administration in time range)   albuterol-ipratropium 2.5 mg-0.5 mg/3 mL nebulizer solution 3 mL (has no administration in time range)   lactated ringers bolus 1,000 mL (0 mLs Intravenous Stopped 9/24/22 1452)   lactated ringers bolus 1,000 mL (0 mLs Intravenous Stopped 9/24/22 1705)   morphine injection 2 mg (2 mg Intravenous Given 9/24/22 1412)   ondansetron injection 4 mg (4 mg Intravenous Given 9/24/22 1412)     Medical Decision Making:   Differential Diagnosis:   DKA, metabolic derangement, ACS, chronic pain, medical noncompliance           ED  Course as of 09/24/22 1705   Sat Sep 24, 2022   1346 Sodium(!): 128 [BS]   1346 Chloride(!): 88 [BS]   1346 CO2(!): 11 [BS]   1346 POC PCO2(!!): 20.7  Patient with laboratories signs consistent for DKA.  Will give patient 2 L IVF then started insulin drip. [BS]   1346 Troponin I(!): 1.002  Likely type 2.  EKG normal.  Troponin 4 days ago was 0.05.  Unknown if they trend this out.  Will repeat 2 hour. [BS]   1408 Patient states she is having 10/10 back pain.  Will give some morphine.  Will obtain a scan.  Concern for opiate abuse. [BS]   1432 Troponin I(!): 0.923  Type II [BS]   1534 Dr. Viramontes agrees that patient is DKA but request that Cardiology make sure this is a type 2 prior to admitting here. [BS]   1704 Cardiology okay with admission here.  Type 2 [BS]      ED Course User Index  [BS] Gagandeep Lockett MD                 Clinical Impression:   Final diagnoses:  [R07.9] Chest pain  [E10.10] Diabetic ketoacidosis without coma associated with type 1 diabetes mellitus (Primary)  [R07.89] Atypical chest pain  [R77.8] Elevated troponin level not due myocardial infarction  [Z91.19] Medically noncompliant        ED Disposition Condition    Admit                 Gagandeep Lockett MD  09/24/22 1705

## 2022-09-24 NOTE — PROCEDURES
Dorene Husain is a 27 y.o. female patient.    Temp: 98.8 °F (37.1 °C) (09/24/22 1215)  Pulse: (!) 120 (09/24/22 1549)  Resp: 18 (09/24/22 1549)  BP: (!) 126/92 (09/24/22 1549)  SpO2: 99 % (09/24/22 1549)  Weight: 71.7 kg (158 lb) (09/24/22 1215)    PICC  Date/Time: 9/24/2022 4:30 PM  Performed by: Muna Zarate RN  Consent Done: Yes  Time out: Immediately prior to procedure a time out was called to verify the correct patient, procedure, equipment, support staff and site/side marked as required  Indications: med administration  Anesthesia: local infiltration  Local anesthetic: lidocaine 1% without epinephrine  Anesthetic Total (mL): 2  Preparation: skin prepped with ChloraPrep  Skin prep agent dried: skin prep agent completely dried prior to procedure  Sterile barriers: all five maximum sterile barriers used - cap, mask, sterile gown, sterile gloves, and large sterile sheet  Hand hygiene: hand hygiene performed prior to central venous catheter insertion  Location details: right brachial  Catheter type: double lumen  Catheter size: 5 Fr  Catheter Length: 36cm    Ultrasound guidance: yes  Vessel Caliber: medium and patent, compressibility normal  Needle advanced into vessel with real time Ultrasound guidance.  Guidewire confirmed in vessel.  Sterile sheath used.  Number of attempts: 1  Post-procedure: blood return through all ports, chlorhexidine patch and sterile dressing applied    Assessment: placement verified by x-ray, tip termination and successful placement  Complications: none  Comments: Pt difficult venous access, 24 in shoulder, needing continuous insulin drip, mult meds.        Muna zarate rn  9/24/2022

## 2022-09-24 NOTE — HPI
27-year-old  female past medical history of diabetes type 1, noncompliant, chronic pain syndrome/abuse presented emergency room complaining of DKA.    Patient was discharged the day before yesterday from [facility] like trials after being treated for DKA.  Patient states that she was discharged from the hospital too early.  Not sure exactly what was too early but she claims that patient was still in DKA at time of discharge.    She went home she has not been eating much she is throwing up.  She has not been taking much medication other than long-acting insulin at night time.    This morning she has been having significant nausea vomiting and as of 5 goal pleuritic type pain.    Emergency room finding consistent with DKA on top of that patient has mild elevation of troponin.  Will admit patient to intensive care unit for further treatment.

## 2022-09-24 NOTE — ASSESSMENT & PLAN NOTE
Patient with a chronic pain behavior.    Check urine drug screening.  Will try to avoid narcotics as much as possible on LEs we have 2.

## 2022-09-24 NOTE — ASSESSMENT & PLAN NOTE
Patient has been diagnosed with gastroparesis in the past.    Poor understanding of gastroparesis.  Will address with patient again.

## 2022-09-25 LAB
ANION GAP SERPL CALC-SCNC: 11 MEQ/L
ANION GAP SERPL CALC-SCNC: 12 MEQ/L
ANION GAP SERPL CALC-SCNC: 7 MEQ/L
ANION GAP SERPL CALC-SCNC: 9 MEQ/L
B-OH-BUTYR SERPL-MCNC: 12.63 MMOL/L
BUN SERPL-MCNC: 13 MG/DL (ref 7–18.7)
BUN SERPL-MCNC: 14 MG/DL (ref 7–18.7)
BUN SERPL-MCNC: 16 MG/DL (ref 7–18.7)
BUN SERPL-MCNC: 16 MG/DL (ref 7–18.7)
CALCIUM SERPL-MCNC: 7.9 MG/DL (ref 8.4–10.2)
CALCIUM SERPL-MCNC: 8.1 MG/DL (ref 8.4–10.2)
CALCIUM SERPL-MCNC: 8.2 MG/DL (ref 8.4–10.2)
CALCIUM SERPL-MCNC: 8.5 MG/DL (ref 8.4–10.2)
CHLORIDE SERPL-SCNC: 100 MMOL/L (ref 98–107)
CHLORIDE SERPL-SCNC: 100 MMOL/L (ref 98–107)
CHLORIDE SERPL-SCNC: 101 MMOL/L (ref 98–107)
CHLORIDE SERPL-SCNC: 102 MMOL/L (ref 98–107)
CK MB SERPL-MCNC: 0.9 NG/ML
CK SERPL-CCNC: 70 U/L (ref 29–168)
CO2 SERPL-SCNC: 19 MMOL/L (ref 22–29)
CO2 SERPL-SCNC: 21 MMOL/L (ref 22–29)
CO2 SERPL-SCNC: 23 MMOL/L (ref 22–29)
CO2 SERPL-SCNC: 24 MMOL/L (ref 22–29)
CREAT SERPL-MCNC: 1.11 MG/DL (ref 0.55–1.02)
CREAT SERPL-MCNC: 1.17 MG/DL (ref 0.55–1.02)
CREAT SERPL-MCNC: 1.23 MG/DL (ref 0.55–1.02)
CREAT SERPL-MCNC: 1.34 MG/DL (ref 0.55–1.02)
CREAT/UREA NIT SERPL: 12
CREAT/UREA NIT SERPL: 13
EST. AVERAGE GLUCOSE BLD GHB EST-MCNC: 260.4 MG/DL
GFR SERPLBLD CREATININE-BSD FMLA CKD-EPI: 56 MLS/MIN/1.73/M2
GFR SERPLBLD CREATININE-BSD FMLA CKD-EPI: >60 MLS/MIN/1.73/M2
GLUCOSE SERPL-MCNC: 129 MG/DL (ref 74–100)
GLUCOSE SERPL-MCNC: 130 MG/DL (ref 74–100)
GLUCOSE SERPL-MCNC: 141 MG/DL (ref 74–100)
GLUCOSE SERPL-MCNC: 153 MG/DL (ref 74–100)
HBA1C MFR BLD: 10.7 %
LIPASE SERPL-CCNC: 5 U/L
MAGNESIUM SERPL-MCNC: 1.6 MG/DL (ref 1.6–2.6)
PHOSPHATE SERPL-MCNC: 1.8 MG/DL (ref 2.3–4.7)
POCT GLUCOSE: 109 MG/DL (ref 70–110)
POCT GLUCOSE: 132 MG/DL (ref 70–110)
POCT GLUCOSE: 133 MG/DL (ref 70–110)
POCT GLUCOSE: 134 MG/DL (ref 70–110)
POCT GLUCOSE: 146 MG/DL (ref 70–110)
POCT GLUCOSE: 148 MG/DL (ref 70–110)
POCT GLUCOSE: 205 MG/DL (ref 70–110)
POCT GLUCOSE: 257 MG/DL (ref 70–110)
POCT GLUCOSE: 459 MG/DL (ref 70–110)
POCT GLUCOSE: 468 MG/DL (ref 70–110)
POTASSIUM SERPL-SCNC: 3.7 MMOL/L (ref 3.5–5.1)
POTASSIUM SERPL-SCNC: 3.7 MMOL/L (ref 3.5–5.1)
POTASSIUM SERPL-SCNC: 3.8 MMOL/L (ref 3.5–5.1)
POTASSIUM SERPL-SCNC: 3.8 MMOL/L (ref 3.5–5.1)
SODIUM SERPL-SCNC: 130 MMOL/L (ref 136–145)
SODIUM SERPL-SCNC: 133 MMOL/L (ref 136–145)

## 2022-09-25 PROCEDURE — 36415 COLL VENOUS BLD VENIPUNCTURE: CPT | Performed by: EMERGENCY MEDICINE

## 2022-09-25 PROCEDURE — 94761 N-INVAS EAR/PLS OXIMETRY MLT: CPT

## 2022-09-25 PROCEDURE — 83690 ASSAY OF LIPASE: CPT | Performed by: EMERGENCY MEDICINE

## 2022-09-25 PROCEDURE — 80048 BASIC METABOLIC PNL TOTAL CA: CPT | Performed by: EMERGENCY MEDICINE

## 2022-09-25 PROCEDURE — 63600175 PHARM REV CODE 636 W HCPCS: Performed by: INTERNAL MEDICINE

## 2022-09-25 PROCEDURE — 25000242 PHARM REV CODE 250 ALT 637 W/ HCPCS: Performed by: EMERGENCY MEDICINE

## 2022-09-25 PROCEDURE — A4216 STERILE WATER/SALINE, 10 ML: HCPCS | Performed by: EMERGENCY MEDICINE

## 2022-09-25 PROCEDURE — 83735 ASSAY OF MAGNESIUM: CPT | Performed by: EMERGENCY MEDICINE

## 2022-09-25 PROCEDURE — 83036 HEMOGLOBIN GLYCOSYLATED A1C: CPT | Performed by: EMERGENCY MEDICINE

## 2022-09-25 PROCEDURE — 94640 AIRWAY INHALATION TREATMENT: CPT

## 2022-09-25 PROCEDURE — 25000003 PHARM REV CODE 250: Performed by: EMERGENCY MEDICINE

## 2022-09-25 PROCEDURE — 63600175 PHARM REV CODE 636 W HCPCS: Performed by: EMERGENCY MEDICINE

## 2022-09-25 PROCEDURE — 99900035 HC TECH TIME PER 15 MIN (STAT)

## 2022-09-25 PROCEDURE — 11000001 HC ACUTE MED/SURG PRIVATE ROOM

## 2022-09-25 PROCEDURE — 84100 ASSAY OF PHOSPHORUS: CPT | Performed by: EMERGENCY MEDICINE

## 2022-09-25 RX ORDER — INSULIN ASPART 100 [IU]/ML
1-10 INJECTION, SOLUTION INTRAVENOUS; SUBCUTANEOUS
Status: DISCONTINUED | OUTPATIENT
Start: 2022-09-25 | End: 2022-09-26 | Stop reason: HOSPADM

## 2022-09-25 RX ORDER — INSULIN ASPART 100 [IU]/ML
4 INJECTION, SOLUTION INTRAVENOUS; SUBCUTANEOUS
Status: DISCONTINUED | OUTPATIENT
Start: 2022-09-25 | End: 2022-09-26

## 2022-09-25 RX ORDER — MUPIROCIN 20 MG/G
OINTMENT TOPICAL 2 TIMES DAILY
Status: DISCONTINUED | OUTPATIENT
Start: 2022-09-25 | End: 2022-09-26 | Stop reason: HOSPADM

## 2022-09-25 RX ORDER — MORPHINE SULFATE 4 MG/ML
2 INJECTION, SOLUTION INTRAMUSCULAR; INTRAVENOUS EVERY 6 HOURS PRN
Status: DISCONTINUED | OUTPATIENT
Start: 2022-09-25 | End: 2022-09-26 | Stop reason: HOSPADM

## 2022-09-25 RX ORDER — TRAMADOL HYDROCHLORIDE 50 MG/1
50 TABLET ORAL EVERY 6 HOURS PRN
Status: DISCONTINUED | OUTPATIENT
Start: 2022-09-25 | End: 2022-09-26

## 2022-09-25 RX ADMIN — IPRATROPIUM BROMIDE AND ALBUTEROL SULFATE 3 ML: 2.5; .5 SOLUTION RESPIRATORY (INHALATION) at 01:09

## 2022-09-25 RX ADMIN — INSULIN ASPART 4 UNITS: 100 INJECTION, SOLUTION INTRAVENOUS; SUBCUTANEOUS at 03:09

## 2022-09-25 RX ADMIN — METOPROLOL TARTRATE 50 MG: 50 TABLET, FILM COATED ORAL at 08:09

## 2022-09-25 RX ADMIN — SODIUM CHLORIDE, PRESERVATIVE FREE 10 ML: 5 INJECTION INTRAVENOUS at 11:09

## 2022-09-25 RX ADMIN — ENOXAPARIN SODIUM 40 MG: 40 INJECTION SUBCUTANEOUS at 03:09

## 2022-09-25 RX ADMIN — INSULIN DETEMIR 12 UNITS: 100 INJECTION, SOLUTION SUBCUTANEOUS at 05:09

## 2022-09-25 RX ADMIN — MORPHINE SULFATE 2 MG: 4 INJECTION INTRAVENOUS at 06:09

## 2022-09-25 RX ADMIN — DEXTROSE MONOHYDRATE 1 G: 5 INJECTION INTRAVENOUS at 03:09

## 2022-09-25 RX ADMIN — ONDANSETRON 4 MG: 2 INJECTION INTRAMUSCULAR; INTRAVENOUS at 04:09

## 2022-09-25 RX ADMIN — FAMOTIDINE 20 MG: 10 INJECTION, SOLUTION INTRAVENOUS at 09:09

## 2022-09-25 RX ADMIN — METOPROLOL TARTRATE 50 MG: 50 TABLET, FILM COATED ORAL at 09:09

## 2022-09-25 RX ADMIN — INSULIN ASPART 3 UNITS: 100 INJECTION, SOLUTION INTRAVENOUS; SUBCUTANEOUS at 09:09

## 2022-09-25 RX ADMIN — MORPHINE SULFATE 2 MG: 4 INJECTION INTRAVENOUS at 04:09

## 2022-09-25 RX ADMIN — SODIUM CHLORIDE, PRESERVATIVE FREE 10 ML: 5 INJECTION INTRAVENOUS at 05:09

## 2022-09-25 RX ADMIN — FAMOTIDINE 20 MG: 10 INJECTION, SOLUTION INTRAVENOUS at 08:09

## 2022-09-25 NOTE — SUBJECTIVE & OBJECTIVE
Interval History:  Patient doing much better.    Complains of back pain which is continuous in the low back pain does not get any better except pain medication.  This is a chronic pain patient has for while.    Review of Systems   Constitutional:  Positive for activity change and appetite change. Negative for chills, diaphoresis, fatigue, fever and unexpected weight change.   HENT: Negative.     Eyes: Negative.    Respiratory: Negative.     Cardiovascular: Negative.    Gastrointestinal: Negative.  Negative for diarrhea, nausea and vomiting.   Endocrine: Negative.    Genitourinary: Negative.    Musculoskeletal:  Positive for back pain.   All other systems reviewed and are negative.  Objective:     Vital Signs (Most Recent):  Temp: 97.7 °F (36.5 °C) (09/25/22 1211)  Pulse: 89 (09/25/22 1340)  Resp: 16 (09/25/22 1340)  BP: (!) 129/91 (09/25/22 1211)  SpO2: 100 % (09/25/22 1340)   Vital Signs (24h Range):  Temp:  [96.8 °F (36 °C)-97.7 °F (36.5 °C)] 97.7 °F (36.5 °C)  Pulse:  [] 89  Resp:  [9-33] 16  SpO2:  [80 %-100 %] 100 %  BP: (101-146)/() 129/91     Weight: 69 kg (152 lb 1.9 oz)  Body mass index is 27.38 kg/m².    Intake/Output Summary (Last 24 hours) at 9/25/2022 1342  Last data filed at 9/25/2022 0625  Gross per 24 hour   Intake 3976.57 ml   Output 1000 ml   Net 2976.57 ml      Physical Exam  Vitals and nursing note reviewed.   Constitutional:       General: She is not in acute distress.     Appearance: She is obese. She is ill-appearing. She is not toxic-appearing or diaphoretic.   HENT:      Head: Normocephalic.      Nose: Nose normal.      Mouth/Throat:      Mouth: Mucous membranes are moist.   Eyes:      Extraocular Movements: Extraocular movements intact.      Pupils: Pupils are equal, round, and reactive to light.   Cardiovascular:      Rate and Rhythm: Normal rate and regular rhythm.   Pulmonary:      Effort: Pulmonary effort is normal.      Breath sounds: Normal breath sounds.   Abdominal:       General: Bowel sounds are normal. There is distension.      Palpations: Abdomen is soft.      Tenderness: There is abdominal tenderness.   Musculoskeletal:         General: Normal range of motion.      Cervical back: Normal range of motion.      Comments: Mild discomfort in lumbar area in the spinous processes but no significant pain.   Skin:     General: Skin is warm.      Capillary Refill: Capillary refill takes less than 2 seconds.   Neurological:      General: No focal deficit present.      Mental Status: She is oriented to person, place, and time.   Psychiatric:         Mood and Affect: Mood normal.       Significant Labs: All pertinent labs within the past 24 hours have been reviewed.  Recent Lab Results  (Last 5 results in the past 24 hours)        09/25/22  1232   09/25/22  1140   09/25/22  0818   09/25/22  0621   09/25/22  0432        Anion Gap 11.0     9.0           BUN 13.0     14.0           BUN/CREAT RATIO 12     12           Calcium 8.5     8.1           Chloride 100     101           CO2 19     23           Creatinine 1.11     1.17           eGFR >60     >60           Estimated Avg Glucose         260.4       Glucose 141     130           Hemoglobin A1C External         10.7       POCT Glucose   146     109         Potassium 3.8     3.7           Sodium 130     133                                  Significant Imaging: I have reviewed all pertinent imaging results/findings within the past 24 hours.

## 2022-09-25 NOTE — HOSPITAL COURSE
27-year-old  female past medical history of diabetes type 1, noncompliant, chronic pain syndrome/abuse presented emergency room complaining of DKA.    Patient was discharged the day before yesterday from [facility] like trials after being treated for DKA.  Patient states that she was discharged from the hospital too early.  Not sure exactly what was too early but she claims that patient was still in DKA at time of discharge.    She went home she has not been eating much she is throwing up.  She has not been taking much medication other than long-acting insulin at night time.    This morning she has been having significant nausea vomiting and as of 5 goal pleuritic type pain.    Emergency room finding consistent with DKA on top of that patient has mild elevation of troponin.  Will admit patient to intensive care unit for further treatment.      09/25/2022.    Patient improving.    DKA resolved.  Gap close.    Patient complains of back pain and is seeking more pain medication.    She does not appear to be any discomfort   Will discontinue insulin drip DKA protocol start patient on long and short-acting insulin before meals.  Diabetic diet transfer patient on the floor.  Out of bed to chair with meals.  Will use as needed and judiciously pain medication.    09/26/2022.    Patient at her baseline.  Complains of abdominal pain as well.  At the time being seen evaluated she was resting but as soon as patient being questioned and examined her level of pain goes up.  Patient appears to be in some state of anxiety upon physician and during the room.    Suspect patient might have some psychological issues and might require some help.    Recommend patient to start on some Cymbalta which might help her with her thoughts, nerve pain.  Will discharge patient home on p.o. treatment for GERD, as needed nausea and vomiting.    Discussed with patient opiate behavior and treatment of her abdominal pain with  narcotics.  Recommend patient to start on some nerve pain medication as an outpatient included Neurontin on Lyrica on top of her Cymbalta.    If gastroparesis symptoms persist recommend patient to be referred to a gastroenterologist for possible further study and probably gastric pacing as another alternative of treatment of her symptoms.    Overall patient has conflicting information about how much insulin she takes at home.  Suspect there is no compliance with medical therapy.    At present time her diabetic ketoacidosis has been resolved.  Blood sugar fluctuating up and down.  Recommend patient to closely monitor and document and present blood sugar level at primary care physician in order to improve her short and long-term outcome.

## 2022-09-25 NOTE — ASSESSMENT & PLAN NOTE
Will judiciously use pain medication.    First will obtain x-ray of the lumbar area to make sure there is no any fracture.

## 2022-09-25 NOTE — PLAN OF CARE
Problem: Adult Inpatient Plan of Care  Goal: Plan of Care Review  Outcome: Ongoing, Progressing  Goal: Patient-Specific Goal (Individualized)  Outcome: Ongoing, Progressing  Goal: Absence of Hospital-Acquired Illness or Injury  Outcome: Ongoing, Progressing  Goal: Optimal Comfort and Wellbeing  Outcome: Ongoing, Progressing  Goal: Readiness for Transition of Care  Outcome: Ongoing, Progressing     Problem: Diabetes Comorbidity  Goal: Blood Glucose Level Within Targeted Range  Outcome: Ongoing, Progressing     Problem: Fluid and Electrolyte Imbalance (Acute Kidney Injury/Impairment)  Goal: Fluid and Electrolyte Balance  Outcome: Ongoing, Progressing     Problem: Oral Intake Inadequate (Acute Kidney Injury/Impairment)  Goal: Optimal Nutrition Intake  Outcome: Ongoing, Progressing     Problem: Renal Function Impairment (Acute Kidney Injury/Impairment)  Goal: Effective Renal Function  Outcome: Ongoing, Progressing     Problem: Infection  Goal: Absence of Infection Signs and Symptoms  Outcome: Ongoing, Progressing

## 2022-09-25 NOTE — PROGRESS NOTES
[facility] - ICU Hospital Medicine  Progress Note    Patient Name: Dorene Husain  MRN: [id number]  Patient Class: IP- Inpatient   Admission Date: 9/24/2022  Length of Stay: 1 days  Attending Physician: Segnu Viramontes MD  Primary Care Provider: Kumar Ortiz NP        Subjective:     Principal Problem:DKA (diabetic ketoacidosis)        HPI:  27-year-old  female past medical history of diabetes type 1, noncompliant, chronic pain syndrome/abuse presented emergency room complaining of DKA.    Patient was [facility] the day before yesterday [facility] after being treated for DKA.  Patient states that she was discharged from the hospital too early.  Not sure exactly what was too early but she claims that patient was still in DKA at time of discharge.    She went home she has not been eating much she is throwing up.  She has not been taking much medication other than long-acting insulin at night time.    This morning she has been having significant nausea vomiting and as of 5 goal pleuritic type pain.    Emergency room finding consistent with DKA on top of that patient has mild elevation of troponin.  Will admit patient to intensive care unit for further treatment.      Overview/Hospital Course:  09/25/2022.    Patient improving.    DKA resolved.  Gap close.    Patient complains of back pain and is seeking more pain medication.    She does not appear to be any discomfort   Will discontinue insulin drip DKA protocol start patient on long and short-acting insulin before meals.  Diabetic diet transfer patient on the floor.  Out of bed to chair with meals.  Will use as needed and judiciously pain medication.      Interval History:  Patient doing much better.    Complains of back pain which is continuous in the low back pain does not get any better except pain medication.  This is a chronic pain patient has for while.    Review of Systems   Constitutional:  Positive for  activity change and appetite change. Negative for chills, diaphoresis, fatigue, fever and unexpected weight change.   HENT: Negative.     Eyes: Negative.    Respiratory: Negative.     Cardiovascular: Negative.    Gastrointestinal: Negative.  Negative for diarrhea, nausea and vomiting.   Endocrine: Negative.    Genitourinary: Negative.    Musculoskeletal:  Positive for back pain.   All other systems reviewed and are negative.  Objective:     Vital Signs (Most Recent):  Temp: 97.7 °F (36.5 °C) (09/25/22 1211)  Pulse: 89 (09/25/22 1340)  Resp: 16 (09/25/22 1340)  BP: (!) 129/91 (09/25/22 1211)  SpO2: 100 % (09/25/22 1340)   Vital Signs (24h Range):  Temp:  [96.8 °F (36 °C)-97.7 °F (36.5 °C)] 97.7 °F (36.5 °C)  Pulse:  [] 89  Resp:  [9-33] 16  SpO2:  [80 %-100 %] 100 %  BP: (101-146)/() 129/91     Weight: 69 kg (152 lb 1.9 oz)  Body mass index is 27.38 kg/m².    Intake/Output Summary (Last 24 hours) at 9/25/2022 1342  Last data filed at 9/25/2022 0625  Gross per 24 hour   Intake 3976.57 ml   Output 1000 ml   Net 2976.57 ml      Physical Exam  Vitals and nursing note reviewed.   Constitutional:       General: She is not in acute distress.     Appearance: She is obese. She is ill-appearing. She is not toxic-appearing or diaphoretic.   HENT:      Head: Normocephalic.      Nose: Nose normal.      Mouth/Throat:      Mouth: Mucous membranes are moist.   Eyes:      Extraocular Movements: Extraocular movements intact.      Pupils: Pupils are equal, round, and reactive to light.   Cardiovascular:      Rate and Rhythm: Normal rate and regular rhythm.   Pulmonary:      Effort: Pulmonary effort is normal.      Breath sounds: Normal breath sounds.   Abdominal:      General: Bowel sounds are normal. There is distension.      Palpations: Abdomen is soft.      Tenderness: There is abdominal tenderness.   Musculoskeletal:         General: Normal range of motion.      Cervical back: Normal range of motion.      Comments: Mild  discomfort in lumbar area in the spinous processes but no significant pain.   Skin:     General: Skin is warm.      Capillary Refill: Capillary refill takes less than 2 seconds.   Neurological:      General: No focal deficit present.      Mental Status: She is oriented to person, place, and time.   Psychiatric:         Mood and Affect: Mood normal.       Significant Labs: All pertinent labs within the past 24 hours have been reviewed.  Recent Lab Results  (Last 5 results in the past 24 hours)        09/25/22  1232   09/25/22  1140   09/25/22  0818   09/25/22  0621   09/25/22  0432        Anion Gap 11.0     9.0           BUN 13.0     14.0           BUN/CREAT RATIO 12     12           Calcium 8.5     8.1           Chloride 100     101           CO2 19     23           Creatinine 1.11     1.17           eGFR >60     >60           Estimated Avg Glucose         260.4       Glucose 141     130           Hemoglobin A1C External         10.7       POCT Glucose   146     109         Potassium 3.8     3.7           Sodium 130     133                                  Significant Imaging: I have reviewed all pertinent imaging results/findings within the past 24 hours.      Assessment/Plan:      * DKA (diabetic ketoacidosis)  Diabetic ketoacidosis resolved.    Continue with treatment of diabetes    Elevated troponin I level  Follow-up with cardiology.    2D echocardiogram   Continue telemetry monitoring.      Acute blood loss anemia  Stable H&H.  Continue to monitor.      Chronic pain  Will judiciously use pain medication.    First will obtain x-ray of the lumbar area to make sure there is no any fracture.      CHATA (acute kidney injury)  Resolved at this point.  BUN and creatinine at patient's baseline    DM gastroparesis  Patient has been diagnosed with gastroparesis in the past.    Poor understanding of gastroparesis.  Will address with patient again.    Noncompliance  Eventually patient noncompliance.  If multiple  hospitalization.    Patient with the poor long-term prognosis if continues to be noncompliant      Hypertension  Start home medication for blood pressure.      Drug-seeking behavior  Check urine drug screening.      VTE Risk Mitigation (From admission, onward)         Ordered     enoxaparin injection 40 mg  Daily         09/24/22 1646     Place CORNELIA hose  Until discontinued         09/24/22 1646     IP VTE LOW RISK PATIENT  Once         09/24/22 1646                Discharge Planning   EBENEZER:      Code Status: Full Code   Is the patient medically ready for discharge?:     Reason for patient still in hospital (select all that apply): Treatment             Will transfer patient on the floor on telemetry.        Segun Viramontes MD  Department of Hospital Medicine   [facility]

## 2022-09-26 VITALS
HEART RATE: 103 BPM | HEIGHT: 63 IN | WEIGHT: 152.13 LBS | SYSTOLIC BLOOD PRESSURE: 119 MMHG | TEMPERATURE: 99 F | OXYGEN SATURATION: 100 % | RESPIRATION RATE: 20 BRPM | BODY MASS INDEX: 26.95 KG/M2 | DIASTOLIC BLOOD PRESSURE: 63 MMHG

## 2022-09-26 LAB
BACTERIA BLD CULT: NORMAL
BACTERIA BLD CULT: NORMAL
POCT GLUCOSE: 191 MG/DL (ref 70–110)
POCT GLUCOSE: 324 MG/DL (ref 70–110)
POCT GLUCOSE: 325 MG/DL (ref 70–110)

## 2022-09-26 PROCEDURE — A4216 STERILE WATER/SALINE, 10 ML: HCPCS | Performed by: EMERGENCY MEDICINE

## 2022-09-26 PROCEDURE — 63600175 PHARM REV CODE 636 W HCPCS: Performed by: EMERGENCY MEDICINE

## 2022-09-26 PROCEDURE — 25000003 PHARM REV CODE 250: Performed by: EMERGENCY MEDICINE

## 2022-09-26 PROCEDURE — 99900035 HC TECH TIME PER 15 MIN (STAT)

## 2022-09-26 PROCEDURE — 63600175 PHARM REV CODE 636 W HCPCS: Performed by: INTERNAL MEDICINE

## 2022-09-26 RX ORDER — METOCLOPRAMIDE 10 MG/1
5 TABLET ORAL
Qty: 30 TABLET | Refills: 0 | Status: CANCELLED | OUTPATIENT
Start: 2022-09-26 | End: 2022-10-06

## 2022-09-26 RX ORDER — DULOXETIN HYDROCHLORIDE 30 MG/1
30 CAPSULE, DELAYED RELEASE ORAL DAILY
Qty: 90 CAPSULE | Refills: 3 | Status: ON HOLD | OUTPATIENT
Start: 2022-09-26 | End: 2023-06-19

## 2022-09-26 RX ORDER — INSULIN ASPART 100 [IU]/ML
6 INJECTION, SOLUTION INTRAVENOUS; SUBCUTANEOUS
Status: DISCONTINUED | OUTPATIENT
Start: 2022-09-26 | End: 2022-09-26 | Stop reason: HOSPADM

## 2022-09-26 RX ORDER — PANTOPRAZOLE SODIUM 40 MG/1
40 TABLET, DELAYED RELEASE ORAL 2 TIMES DAILY
Qty: 60 TABLET | Refills: 3 | Status: CANCELLED | OUTPATIENT
Start: 2022-09-26 | End: 2022-10-26

## 2022-09-26 RX ORDER — ONDANSETRON 4 MG/1
4 TABLET, FILM COATED ORAL EVERY 8 HOURS PRN
Qty: 20 TABLET | Refills: 1 | Status: SHIPPED | OUTPATIENT
Start: 2022-09-26 | End: 2022-10-01

## 2022-09-26 RX ORDER — TRAMADOL HYDROCHLORIDE 50 MG/1
50 TABLET ORAL EVERY 8 HOURS PRN
Qty: 15 TABLET | Refills: 0 | Status: SHIPPED | OUTPATIENT
Start: 2022-09-26 | End: 2022-10-01

## 2022-09-26 RX ORDER — METOCLOPRAMIDE 10 MG/1
5 TABLET ORAL
Qty: 30 TABLET | Refills: 0 | Status: SHIPPED | OUTPATIENT
Start: 2022-09-26 | End: 2022-10-06

## 2022-09-26 RX ORDER — ONDANSETRON 4 MG/1
4 TABLET, FILM COATED ORAL EVERY 6 HOURS PRN
Qty: 30 TABLET | Refills: 2 | Status: CANCELLED | OUTPATIENT
Start: 2022-09-26 | End: 2022-10-16

## 2022-09-26 RX ORDER — TRAMADOL HYDROCHLORIDE 50 MG/1
50 TABLET ORAL EVERY 6 HOURS PRN
Status: DISCONTINUED | OUTPATIENT
Start: 2022-09-26 | End: 2022-09-26 | Stop reason: HOSPADM

## 2022-09-26 RX ORDER — PANTOPRAZOLE SODIUM 40 MG/1
40 TABLET, DELAYED RELEASE ORAL 2 TIMES DAILY
Qty: 60 TABLET | Refills: 1 | Status: ON HOLD | OUTPATIENT
Start: 2022-09-26 | End: 2023-06-19

## 2022-09-26 RX ADMIN — PROCHLORPERAZINE EDISYLATE 5 MG: 5 INJECTION INTRAMUSCULAR; INTRAVENOUS at 05:09

## 2022-09-26 RX ADMIN — INSULIN ASPART 8 UNITS: 100 INJECTION, SOLUTION INTRAVENOUS; SUBCUTANEOUS at 05:09

## 2022-09-26 RX ADMIN — ONDANSETRON 4 MG: 2 INJECTION INTRAMUSCULAR; INTRAVENOUS at 01:09

## 2022-09-26 RX ADMIN — MORPHINE SULFATE 2 MG: 4 INJECTION INTRAVENOUS at 06:09

## 2022-09-26 RX ADMIN — ENOXAPARIN SODIUM 40 MG: 40 INJECTION SUBCUTANEOUS at 05:09

## 2022-09-26 RX ADMIN — FAMOTIDINE 20 MG: 10 INJECTION, SOLUTION INTRAVENOUS at 08:09

## 2022-09-26 RX ADMIN — METOPROLOL TARTRATE 50 MG: 50 TABLET, FILM COATED ORAL at 08:09

## 2022-09-26 RX ADMIN — SODIUM CHLORIDE, PRESERVATIVE FREE 10 ML: 5 INJECTION INTRAVENOUS at 12:09

## 2022-09-26 RX ADMIN — SODIUM CHLORIDE 500 ML: 9 INJECTION, SOLUTION INTRAVENOUS at 12:09

## 2022-09-26 RX ADMIN — MORPHINE SULFATE 2 MG: 4 INJECTION INTRAVENOUS at 01:09

## 2022-09-26 RX ADMIN — INSULIN ASPART 8 UNITS: 100 INJECTION, SOLUTION INTRAVENOUS; SUBCUTANEOUS at 11:09

## 2022-09-26 RX ADMIN — INSULIN ASPART 2 UNITS: 100 INJECTION, SOLUTION INTRAVENOUS; SUBCUTANEOUS at 05:09

## 2022-09-26 RX ADMIN — INSULIN DETEMIR 12 UNITS: 100 INJECTION, SOLUTION SUBCUTANEOUS at 08:09

## 2022-09-26 RX ADMIN — SODIUM CHLORIDE, PRESERVATIVE FREE 10 ML: 5 INJECTION INTRAVENOUS at 05:09

## 2022-09-26 RX ADMIN — MORPHINE SULFATE 2 MG: 4 INJECTION INTRAVENOUS at 12:09

## 2022-09-26 NOTE — DISCHARGE SUMMARY
[facility] - Medical Surgical Unit  Hospital Medicine  Discharge Summary      Patient Name: Dorene Husain  MRN: [id number]  Patient Class: IP- Inpatient  Admission Date: 9/24/2022  Hospital Length of Stay: 2 days  Discharge Date and Time:  09/26/2022 11:20 AM  Attending Physician: Segun Viramontes MD   Discharging Provider: Segun Viramontes MD  Primary Care Provider: Kumar Ortiz NP      HPI:   27-year-old  female past medical history of diabetes type 1, noncompliant, chronic pain syndrome/abuse presented emergency room complaining of DKA.    Patient was discharged the day before yesterday from [facility] after being treated for DKA.  Patient states that she was discharged from the hospital too early.  Not sure exactly what was too early but she claims that patient was still in DKA at time of discharge.    She went home she has not been eating much she is throwing up.  She has not been taking much medication other than long-acting insulin at night time.    This morning she has been having significant nausea vomiting and as of 5 goal pleuritic type pain.    Emergency room finding consistent with DKA on top of that patient has mild elevation of troponin.  Will admit patient to intensive care unit for further treatment.      * No surgery found *      Hospital Course:       27-year-old  female past medical history of diabetes type 1, noncompliant, chronic pain syndrome/abuse presented emergency room complaining of DKA.    Patient was discharged the day before yesterday from [facility] after being treated for DKA.  Patient states that she was discharged from the hospital too early.  Not sure exactly what was too early but she claims that patient was still in DKA at time of discharge.    She went home she has not been eating much she is throwing up.  She has not been taking much medication other than long-acting insulin at night time.     This morning she has been having significant nausea vomiting and as of 5 goal pleuritic type pain.    Emergency room finding consistent with DKA on top of that patient has mild elevation of troponin.  Will admit patient to intensive care unit for further treatment.      09/25/2022.    Patient improving.    DKA resolved.  Gap close.    Patient complains of back pain and is seeking more pain medication.    She does not appear to be any discomfort   Will discontinue insulin drip DKA protocol start patient on long and short-acting insulin before meals.  Diabetic diet transfer patient on the floor.  Out of bed to chair with meals.  Will use as needed and judiciously pain medication.    09/26/2022.    Patient at her baseline.  Complains of abdominal pain as well.  At the time being seen evaluated she was resting but as soon as patient being questioned and examined her level of pain goes up.  Patient appears to be in some state of anxiety upon physician and during the room.    Suspect patient might have some psychological issues and might require some help.    Recommend patient to start on some Cymbalta which might help her with her thoughts, nerve pain.  Will discharge patient home on p.o. treatment for GERD, as needed nausea and vomiting.    Discussed with patient opiate behavior and treatment of her abdominal pain with narcotics.  Recommend patient to start on some nerve pain medication as an outpatient included Neurontin on Lyrica on top of her Cymbalta.    If gastroparesis symptoms persist recommend patient to be referred to a gastroenterologist for possible further study and probably gastric pacing as another alternative of treatment of her symptoms.    Overall patient has conflicting information about how much insulin she takes at home.  Suspect there is no compliance with medical therapy.    At present time her diabetic ketoacidosis has been resolved.  Blood sugar fluctuating up and down.  Recommend patient to  closely monitor and document and present blood sugar level at primary care physician in order to improve her short and long-term outcome.       Goals of Care Treatment Preferences:  Code Status: Full Code      Consults:   Consults (From admission, onward)        Status Ordering Provider     Inpatient consult to Telemedicine-Card  Once        Provider:  Osmany Grant MD    Acknowledged ROMEO SIDDIQUI          * DKA (diabetic ketoacidosis)  Resolved.     Elevated troponin I level  Improved and secondary to cardiac demands.       Acute blood loss anemia  Stable H&H.  Continue to monitor.      Chronic pain  Po tramadol, SSRI and f/u with pcp      CHATA (acute kidney injury)  Resolved at this point.  BUN and creatinine at patient's baseline    DM gastroparesis  Short course of reglan.     Final Active Diagnoses:    Diagnosis Date Noted POA    PRINCIPAL PROBLEM:  DKA (diabetic ketoacidosis) [E11.10]  Unknown     Chronic    Elevated troponin I level [R77.8] 09/24/2022 Yes     Chronic    Acute blood loss anemia [D62] 09/24/2022 Yes     Chronic    Chronic pain [G89.29] 05/31/2022 Yes     Chronic    CHATA (acute kidney injury) [N17.9] 05/21/2022 Yes     Chronic    DM gastroparesis [E11.43, K31.84] 05/25/2022 Yes     Chronic    Noncompliance [Z91.19] 09/24/2022 Not Applicable     Chronic    Hypertension [I10]  Yes     Chronic    Drug-seeking behavior [Z76.5] 06/30/2022 Yes     Chronic      Problems Resolved During this Admission:    Diagnosis Date Noted Date Resolved POA    Diabetic gastroparesis [E11.43, K31.84] 06/30/2022 09/24/2022 Yes     Chronic       Discharged Condition: good    Disposition:     Follow Up:   Follow-up Information     Kumar Ortiz NP Follow up in 1 week(s).    Specialty: Family Medicine  Contact information:  [place] [id number] [phone]                       Patient Instructions:   No discharge procedures on file.    Significant Diagnostic Studies: Labs:   BMP:    Recent Labs   Lab 09/24/22  1235 09/24/22  1522 09/25/22  0316 09/25/22  0818 09/25/22  1232   *   < > 133* 133* 130*   K 3.7   < > 3.8 3.7 3.8   CO2 11*   < > 24 23 19*   BUN 17.0   < > 16.0 14.0 13.0   CREATININE 1.40*   < > 1.23* 1.17* 1.11*   CALCIUM 8.6   < > 7.9* 8.1* 8.5   MG 1.80  --  1.60  --   --     < > = values in this interval not displayed.    and CMP   Recent Labs   Lab 09/24/22  1235 09/24/22  1522 09/25/22  0316 09/25/22  0818 09/25/22  1232   *   < > 133* 133* 130*   K 3.7   < > 3.8 3.7 3.8   CO2 11*   < > 24 23 19*   BUN 17.0   < > 16.0 14.0 13.0   CREATININE 1.40*   < > 1.23* 1.17* 1.11*   CALCIUM 8.6   < > 7.9* 8.1* 8.5   ALBUMIN 2.5*  --   --   --   --    BILITOT 0.6  --   --   --   --    ALKPHOS 84  --   --   --   --    AST 15  --   --   --   --    ALT 12  --   --   --   --     < > = values in this interval not displayed.     Microbiology: Blood Culture No results found for: LABBLOO    Pending Diagnostic Studies:     None         Medications:  Reconciled Home Medications:      Medication List      ASK your doctor about these medications    amitriptyline 25 MG tablet  Commonly known as: ELAVIL  Take 25 mg by mouth nightly.     BASAGLAR KWIKPEN U-100 INSULIN glargine 100 units/mL SubQ pen  Generic drug: insulin  Inject into the skin.     dicyclomine 10 MG capsule  Commonly known as: BENTYL  Take 10 mg by mouth 4 (four) times daily.     HumaLOG U-100 Insulin 100 unit/mL Crtg  Generic drug: insulin lispro  Inject into the skin.     hydrOXYzine 50 MG tablet  Commonly known as: ATARAX  Take 50 mg by mouth daily as needed.     lisinopriL 20 MG tablet  Commonly known as: PRINIVIL,ZESTRIL  Take 20 mg by mouth 2 (two) times daily.     metoclopramide HCl 10 MG tablet  Commonly known as: REGLAN  Take 1 tablet (10 mg total) by mouth every 6 (six) hours.     metoprolol tartrate 50 MG tablet  Commonly known as: LOPRESSOR  Take 50 mg by mouth 2 (two) times daily.     olmesartan 40 MG  tablet  Commonly known as: BENICAR  Take 40 mg by mouth once daily.     ondansetron 4 MG tablet  Commonly known as: ZOFRAN  Take 4 mg by mouth every 6 (six) hours as needed.     pantoprazole 40 MG tablet  Commonly known as: PROTONIX  Take 40 mg by mouth once daily.            Indwelling Lines/Drains at time of discharge:   Lines/Drains/Airways     Peripherally Inserted Central Catheter Line  Duration           PICC Double Lumen 09/24/22 1619 right brachial 1 day                Time spent on the discharge of patient: 39 minutes         Segun Viramontes MD  Department of Hospital Medicine  [facility] - Medical Surgical Unit

## 2022-09-27 ENCOUNTER — PATIENT OUTREACH (OUTPATIENT)
Dept: ADMINISTRATIVE | Facility: CLINIC | Age: 27
End: 2022-09-27
Payer: MEDICAID

## 2022-09-27 NOTE — PROGRESS NOTES
C3 nurse attempted to contact Dorene Husain for a TCC post hospital discharge follow up call. No answer. Left voicemail with callback information. The patient does not have a scheduled HOSFU appointment.

## 2022-10-19 DIAGNOSIS — D64.9 ANEMIA, UNSPECIFIED: Primary | ICD-10-CM

## 2022-10-27 DIAGNOSIS — E11.43 DM GASTROPARESIS: Primary | Chronic | ICD-10-CM

## 2022-10-27 DIAGNOSIS — R10.13 EPIGASTRIC PAIN: ICD-10-CM

## 2022-10-27 DIAGNOSIS — K31.84 DM GASTROPARESIS: Primary | Chronic | ICD-10-CM

## 2022-10-27 DIAGNOSIS — K21.9 GASTROESOPHAGEAL REFLUX DISEASE, UNSPECIFIED WHETHER ESOPHAGITIS PRESENT: ICD-10-CM

## 2022-12-19 ENCOUNTER — HOSPITAL ENCOUNTER (EMERGENCY)
Facility: HOSPITAL | Age: 27
Discharge: HOME | End: 2022-12-19
Attending: INTERNAL MEDICINE
Payer: MEDICAID

## 2022-12-19 VITALS
SYSTOLIC BLOOD PRESSURE: 115 MMHG | DIASTOLIC BLOOD PRESSURE: 72 MMHG | HEART RATE: 89 BPM | HEIGHT: 62 IN | OXYGEN SATURATION: 100 % | RESPIRATION RATE: 20 BRPM | BODY MASS INDEX: 23.74 KG/M2 | WEIGHT: 129 LBS | TEMPERATURE: 99 F

## 2022-12-19 DIAGNOSIS — J10.1 INFLUENZA A: Primary | ICD-10-CM

## 2022-12-19 LAB
ALBUMIN SERPL-MCNC: 4 G/DL (ref 3.5–5)
ALBUMIN/GLOB SERPL: 0.9 RATIO (ref 1.1–2)
ALP SERPL-CCNC: 95 UNIT/L (ref 40–150)
ALT SERPL-CCNC: 15 UNIT/L (ref 0–55)
AST SERPL-CCNC: 29 UNIT/L (ref 5–34)
B-OH-BUTYR SERPL-MCNC: 0.1 MMOL/L
BASOPHILS # BLD AUTO: 0.03 X10(3)/MCL (ref 0–0.2)
BASOPHILS NFR BLD AUTO: 0.6 %
BILIRUBIN DIRECT+TOT PNL SERPL-MCNC: 0.5 MG/DL
BUN SERPL-MCNC: 12 MG/DL (ref 7–18.7)
CALCIUM SERPL-MCNC: 10.1 MG/DL (ref 8.4–10.2)
CHLORIDE SERPL-SCNC: 97 MMOL/L (ref 98–107)
CO2 SERPL-SCNC: 24 MMOL/L (ref 22–29)
CREAT SERPL-MCNC: 1.38 MG/DL (ref 0.55–1.02)
EOSINOPHIL # BLD AUTO: 0.12 X10(3)/MCL (ref 0–0.9)
EOSINOPHIL NFR BLD AUTO: 2.6 %
ERYTHROCYTE [DISTWIDTH] IN BLOOD BY AUTOMATED COUNT: 12.5 % (ref 11–14.5)
FLUAV AG UPPER RESP QL IA.RAPID: DETECTED
FLUBV AG UPPER RESP QL IA.RAPID: NOT DETECTED
GFR SERPLBLD CREATININE-BSD FMLA CKD-EPI: 54 MLS/MIN/1.73/M2
GLOBULIN SER-MCNC: 4.6 GM/DL (ref 2.4–3.5)
GLUCOSE SERPL-MCNC: 298 MG/DL (ref 74–100)
HCT VFR BLD AUTO: 33.4 % (ref 37–47)
HGB BLD-MCNC: 10.9 GM/DL (ref 12–16)
IMM GRANULOCYTES # BLD AUTO: 0.02 X10(3)/MCL (ref 0–0.04)
IMM GRANULOCYTES NFR BLD AUTO: 0.4 %
LYMPHOCYTES # BLD AUTO: 1.17 X10(3)/MCL (ref 0.6–4.6)
LYMPHOCYTES NFR BLD AUTO: 25.2 %
MCH RBC QN AUTO: 27.5 PG
MCHC RBC AUTO-ENTMCNC: 32.6 MG/DL (ref 33–36)
MCV RBC AUTO: 84.1 FL (ref 80–94)
MONOCYTES # BLD AUTO: 0.61 X10(3)/MCL (ref 0.1–1.3)
MONOCYTES NFR BLD AUTO: 13.1 %
NEUTROPHILS # BLD AUTO: 2.7 X10(3)/MCL (ref 2.1–9.2)
NEUTROPHILS NFR BLD AUTO: 58.1 %
PLATELET # BLD AUTO: 345 X10(3)/MCL (ref 140–371)
PMV BLD AUTO: 9.2 FL (ref 9.4–12.4)
POTASSIUM SERPL-SCNC: 5.1 MMOL/L (ref 3.5–5.1)
PROT SERPL-MCNC: 8.6 GM/DL (ref 6.4–8.3)
RBC # BLD AUTO: 3.97 X10(6)/MCL (ref 4.2–5.4)
SARS-COV-2 RNA RESP QL NAA+PROBE: NOT DETECTED
SODIUM SERPL-SCNC: 133 MMOL/L (ref 136–145)
WBC # SPEC AUTO: 4.7 X10(3)/MCL (ref 4.5–11.5)

## 2022-12-19 PROCEDURE — 0240U COVID/FLU A&B PCR: CPT | Performed by: NURSE PRACTITIONER

## 2022-12-19 PROCEDURE — 99284 EMERGENCY DEPT VISIT MOD MDM: CPT | Mod: 25

## 2022-12-19 PROCEDURE — 85025 COMPLETE CBC W/AUTO DIFF WBC: CPT | Performed by: NURSE PRACTITIONER

## 2022-12-19 PROCEDURE — 80053 COMPREHEN METABOLIC PANEL: CPT | Performed by: NURSE PRACTITIONER

## 2022-12-19 PROCEDURE — 82962 GLUCOSE BLOOD TEST: CPT

## 2022-12-19 PROCEDURE — 63600175 PHARM REV CODE 636 W HCPCS: Performed by: INTERNAL MEDICINE

## 2022-12-19 PROCEDURE — 96361 HYDRATE IV INFUSION ADD-ON: CPT

## 2022-12-19 PROCEDURE — 82010 KETONE BODYS QUAN: CPT | Performed by: NURSE PRACTITIONER

## 2022-12-19 PROCEDURE — 25000003 PHARM REV CODE 250: Performed by: NURSE PRACTITIONER

## 2022-12-19 PROCEDURE — 96374 THER/PROPH/DIAG INJ IV PUSH: CPT

## 2022-12-19 RX ORDER — ACETAMINOPHEN 500 MG
1000 TABLET ORAL
Status: COMPLETED | OUTPATIENT
Start: 2022-12-19 | End: 2022-12-19

## 2022-12-19 RX ORDER — OSELTAMIVIR PHOSPHATE 75 MG/1
75 CAPSULE ORAL 2 TIMES DAILY
Qty: 10 CAPSULE | Refills: 0 | Status: SHIPPED | OUTPATIENT
Start: 2022-12-19 | End: 2022-12-24

## 2022-12-19 RX ADMIN — ACETAMINOPHEN 1000 MG: 500 TABLET, FILM COATED ORAL at 01:12

## 2022-12-19 RX ADMIN — SODIUM CHLORIDE 1000 ML: 9 INJECTION, SOLUTION INTRAVENOUS at 01:12

## 2022-12-19 RX ADMIN — HUMAN INSULIN 5 UNITS: 100 INJECTION, SOLUTION SUBCUTANEOUS at 01:12

## 2022-12-19 NOTE — ED PROVIDER NOTES
12/19/2022         1:36 PM    Source of History:  History obtained from the patient.       Chief complaint:  From Nurse Triage:  Shortness of Breath, Cough, Chills, and Sore Throat (C/o cough, runny nose, sneezing, SOB, and chills started yesterday)    HISTORY OF PRESENT ILLNES:  Dorene Husain is a 27 y.o. female presenting with Shortness of Breath, Cough, Chills, and Sore Throat (C/o cough, runny nose, sneezing, SOB, and chills started yesterday)       Patient with history of diabetes mellitus comes to the emergency room with complaint of upper respiratory symptoms since yesterday, she thinks it is flu she does not think it is her DKA.    REVIEW OF SYSTEMS:   Constitutional symptoms:  No Fever. Chills    Skin symptoms:  No Rash.    Eye symptoms:  No Visual disturbance reported.   ENMT symptoms:  Sore throat,  Runny Nose  Respiratory symptoms:  Shortness of Breath, Cough, no Wheezing.    Cardiovascular symptoms:  No Chest Pain, No Palpitations, No Tachycardia.    Gastrointestinal symptoms:  No Abdominal Pain, No Nausea, No Vomiting, No Diarrhea, No Constipation.    Genitourinary symptoms:  No Dysuria,    Musculoskeletal symptoms:  Back pain,    Neurologic symptoms:  No Headache, No Dizziness.    Psychiatric symptoms:  No Anxiety, No Depression, No Substance Abuse.              Additional review of systems information: Patient Denies Any Other Complaints.    All Other Systems Reviewed With Patient And Negative.    ALLEGIES:  Review of patient's allergies indicates:  No Known Allergies    MEDICINE LIST:  Current Outpatient Medications   Medication Instructions    amitriptyline (ELAVIL) 25 mg, Oral, Nightly    BASAGLAR KWIKPEN U-100 INSULIN glargine 100 units/mL (3mL) SubQ pen Subcutaneous    dicyclomine (BENTYL) 10 mg, Oral, 4 times daily    DULoxetine (CYMBALTA) 30 mg, Oral, Daily    hydrOXYzine (ATARAX) 50 mg, Oral, Daily PRN    lisinopriL (PRINIVIL,ZESTRIL) 20 mg, Oral, 2 times daily    metoprolol tartrate  (LOPRESSOR) 50 mg, Oral, 2 times daily    olmesartan (BENICAR) 40 mg, Oral, Daily    ondansetron (ZOFRAN) 4 mg, Oral, Every 6 hours PRN    oseltamivir (TAMIFLU) 75 mg, Oral, 2 times daily    pantoprazole (PROTONIX) 40 mg, Oral, 2 times daily        PMH:  As per HPI and below:    Reviewed and updated in chart.    PAST MEDICAL HISTORY:  Past Medical History:   Diagnosis Date    Diabetes mellitus     Diabetic gastroparesis associated with type 1 diabetes mellitus     DKA (diabetic ketoacidosis)     DKA (diabetic ketoacidosis)     DKA (diabetic ketoacidosis)     Gastroparesis     Gastroparesis due to DM     Hypertension     Ketoacidosis due to diabetes     Non compliance with medical treatment     Pneumonia     Secondary DM with DKA         PAST SURGICAL HISTORY:  Past Surgical History:   Procedure Laterality Date    CHOLECYSTECTOMY      ESOPHAGOGASTRODUODENOSCOPY      Multiple    None         SOCIAL HISTORY:  Social History     Tobacco Use    Smoking status: Never    Smokeless tobacco: Never   Substance Use Topics    Alcohol use: Never    Drug use: Not Currently     Types: Marijuana       FAMILY HISTORY:  Family History   Problem Relation Age of Onset    Heart disease Mother     Hypertension Mother     Diabetes Mother     Hyperlipidemia Mother     Cancer Father     Stroke Father     Hypertension Father     Hyperlipidemia Father         PROBLEM LIST:  Patient Active Problem List   Diagnosis    CHATA (acute kidney injury)    Hypokalemia    DM gastroparesis    Syncopal episodes    Hyperglycemia due to type 1 diabetes mellitus    Lumbar pain    Chronic pain    Drug-seeking behavior    Diabetic gastroparesis    Diabetes mellitus    Hypertension    Hyperglycemia    Acute cystitis with hematuria    Non-intractable vomiting with nausea    Epigastric pain    DKA (diabetic ketoacidosis)    Acute blood loss anemia    Elevated troponin I level    Noncompliance        PHYSICAL EXAM:      ED Triage Vitals [12/19/22 1320]   /74    Pulse 109   Resp 18   Temp 99.8 °F (37.7 °C)   SpO2 100 %        Vital Signs: Reviewed As In Chart.  General:  Alert, No Cardiorespiratory Distress Noted. Tachypnea  Skin: Normal For Ethnic Origin  Eye:  Extraocular Movements Are Intact.   ENT: Mucus membranes are moist.   Cardiovascular:  Regular Rate And Rhythm, No Murmur, No Pedal Edema.    Respiratory:  Respirations Nonlabored, No Respiratory Distress, Good Bilateral Air Entry, No Rales, No Rhonchi.    Musculoskeletal:  No Gross Deformity Noted.   Gastrointestinal:  Soft, Non Distended, Non Tenderness, Normal Bowel Sounds.    Neurological:  Alert And Oriented To Person, Place, Time, And Situation, Normal Motor Observed, Normal Speech Observed.    Psychiatric:  Cooperative, Appropriate Mood & Affect.    INITIAL IMPRESSION/ DIFFERENTIAL DX:      MEDICAL DECISION MAKING:      Reviewed Nurses Note. Reviewed Vital Signs.     MDM     Amount and/or Complexity of Data Reviewed  Clinical lab tests: ordered and reviewed  Tests in the radiology section of CPT®: ordered        Reviewed Pertinent old records, History and updated as necessary.    27 y.o. female with Shortness of Breath, Cough, Chills, and Sore Throat (C/o cough, runny nose, sneezing, SOB, and chills started yesterday)    Patient with history of diabetes mellitus, noncompliance comes in with complaint of cough, shortness of breath, chills, sore throat she says she does not think that she is in DKA her blood sugar is in 300 range, I will give her some IV fluids, give her insulin, will wait for the workup and decide further.    ED WORKUP AND COURSE:  ED ORDERS:  Orders Placed This Encounter   Procedures    COVID/FLU A&B PCR    CBC with Differential    Comprehensive Metabolic Panel    Beta-Hydroxybutyrate, Serum       ED MEDICINES:  Medications   acetaminophen tablet 1,000 mg (1,000 mg Oral Given 12/19/22 1330)   sodium chloride 0.9% bolus 1,000 mL 1,000 mL (0 mLs Intravenous Stopped 12/19/22 1430)   insulin  regular injection 5 Units 0.05 mL (5 Units Intravenous Given 12/19/22 1345)                ED LABS ORDERED AND REVIEWED:  Admission on 12/19/2022   Component Date Value Ref Range Status    Influenza A PCR 12/19/2022 Detected (A)  Not Detected Final    Influenza B PCR 12/19/2022 Not Detected  Not Detected Final    SARS-CoV-2 PCR 12/19/2022 Not Detected  Not Detected Final    WBC 12/19/2022 4.7  4.5 - 11.5 x10(3)/mcL Final    RBC 12/19/2022 3.97 (L)  4.20 - 5.40 x10(6)/mcL Final    Hgb 12/19/2022 10.9 (L)  12.0 - 16.0 gm/dL Final    Hct 12/19/2022 33.4 (L)  37.0 - 47.0 % Final    MCV 12/19/2022 84.1  80.0 - 94.0 fL Final    MCH 12/19/2022 27.5  pg Final    MCHC 12/19/2022 32.6 (L)  33.0 - 36.0 mg/dL Final    RDW 12/19/2022 12.5  11.0 - 14.5 % Final    Platelet 12/19/2022 345  140 - 371 x10(3)/mcL Final    MPV 12/19/2022 9.2 (L)  9.4 - 12.4 fL Final    Neut % 12/19/2022 58.1  % Final    Lymph % 12/19/2022 25.2  % Final    Mono % 12/19/2022 13.1  % Final    Eos % 12/19/2022 2.6  % Final    Basophil % 12/19/2022 0.6  % Final    Lymph # 12/19/2022 1.17  0.6 - 4.6 x10(3)/mcL Final    Neut # 12/19/2022 2.70  2.1 - 9.2 x10(3)/mcL Final    Mono # 12/19/2022 0.61  0.1 - 1.3 x10(3)/mcL Final    Eos # 12/19/2022 0.12  0 - 0.9 x10(3)/mcL Final    Baso # 12/19/2022 0.03  0 - 0.2 x10(3)/mcL Final    IG# 12/19/2022 0.02  0 - 0.04 x10(3)/mcL Final    IG% 12/19/2022 0.4  % Final    Sodium Level 12/19/2022 133 (L)  136 - 145 mmol/L Final    Potassium Level 12/19/2022 5.1  3.5 - 5.1 mmol/L Final    Chloride 12/19/2022 97 (L)  98 - 107 mmol/L Final    Carbon Dioxide 12/19/2022 24  22 - 29 mmol/L Final    Glucose Level 12/19/2022 298 (H)  74 - 100 mg/dL Final    Blood Urea Nitrogen 12/19/2022 12.0  7.0 - 18.7 mg/dL Final    Creatinine 12/19/2022 1.38 (H)  0.55 - 1.02 mg/dL Final    Calcium Level Total 12/19/2022 10.1  8.4 - 10.2 mg/dL Final    Protein Total 12/19/2022 8.6 (H)  6.4 - 8.3 gm/dL Final    Albumin Level 12/19/2022 4.0  3.5 -  5.0 g/dL Final    Globulin 12/19/2022 4.6 (H)  2.4 - 3.5 gm/dL Final    Albumin/Globulin Ratio 12/19/2022 0.9 (L)  1.1 - 2.0 ratio Final    Bilirubin Total 12/19/2022 0.5  <=1.5 mg/dL Final    Alkaline Phosphatase 12/19/2022 95  40 - 150 unit/L Final    Alanine Aminotransferase 12/19/2022 15  0 - 55 unit/L Final    Aspartate Aminotransferase 12/19/2022 29  5 - 34 unit/L Final    eGFR 12/19/2022 54  mls/min/1.73/m2 Final       RADIOLOGY STUDIES ORDERED AND REVIEWED:  Imaging Results    None         ED COURSE AND REEVALUATIONS:  Vitals:    12/19/22 1440   BP: 115/72   Pulse: 89   Resp: 20   Temp: 99.2 °F (37.3 °C)       PROCEDURES PERFORMED IN ED:  Procedures    ED Course as of 12/19/22 1443   Mon Dec 19, 2022   1437 Patient essentially has the influenza, I will put her on Tamiflu, her anion gap is only 12, she is not in DKA, I will advise her to drink plenty of liquids and see her family doctor for follow-up. [GQ]      ED Course User Index  [GQ] Millie Burr MD              DIAGNOSTIC IMPRESSION:      1. Influenza A         ED Disposition Condition    Discharge Stable               Medication List        START taking these medications      oseltamivir 75 MG capsule  Commonly known as: TAMIFLU  Take 1 capsule (75 mg total) by mouth 2 (two) times daily. for 5 days            ASK your doctor about these medications      amitriptyline 25 MG tablet  Commonly known as: ELAVIL     BASAGLAR KWIKPEN U-100 INSULIN glargine 100 units/mL SubQ pen  Generic drug: insulin     dicyclomine 10 MG capsule  Commonly known as: BENTYL     DULoxetine 30 MG capsule  Commonly known as: CYMBALTA  Take 1 capsule (30 mg total) by mouth once daily.     hydrOXYzine 50 MG tablet  Commonly known as: ATARAX     lisinopriL 20 MG tablet  Commonly known as: PRINIVIL,ZESTRIL     metoprolol tartrate 50 MG tablet  Commonly known as: LOPRESSOR     olmesartan 40 MG tablet  Commonly known as: BENICAR     ondansetron 4 MG tablet  Commonly known as:  ZOFRAN     pantoprazole 40 MG tablet  Commonly known as: PROTONIX  Take 1 tablet (40 mg total) by mouth 2 (two) times daily.               Where to Get Your Medications        These medications were sent to [facility] - [facility] - [address]  [facility] [place] [address]      Phone: [phone]   oseltamivir 75 MG capsule           Follow-up Information       Kumar Ortiz NP In 2 days.    Specialty: Family Medicine  Contact information:  [address] [place] [address]  [phone]                              ED Prescriptions       Medication Sig Dispense Start Date End Date Auth. Provider    oseltamivir (TAMIFLU) 75 MG capsule Take 1 capsule (75 mg total) by mouth 2 (two) times daily. for 5 days 10 capsule 12/19/2022 12/24/2022 Millie Burr MD          Follow-up Information       Follow up With Specialties Details Why Contact Info    Kumar Ortiz NP Family Medicine In 2 days  [address] [place] [address]  [phone]                 Millie Burr MD  12/19/22 1442

## 2022-12-19 NOTE — DISCHARGE INSTRUCTIONS
Take medicines as prescribed    See your family doctor in one to 2 days for further evaluation, workup, and treatment as necessary    Avoid driving or operating machinery while taking medicines as some medicines might cause drowsiness and may cause problems. Also pain medicines have potential of being addictive  so use Pain meds specially Narcotics Sparingly.    The exam and treatment you received in Emergency Room was for an urgent problem and NOT INTENDED AS COMPLETE CARE. It is important that you FOLLOW UP with a doctor for ongoing care. If your symptoms become WORSE or you DO NOT IMPROVE and you are unable to reach your health care provider, you should RETURN to the emergency department. The Emergency Room doctor has provided a PRELIMINARY INTERPRETATION of all your STUDIES. A final interpretation may be done after you are discharged. IF A CHANGE in your diagnosis or treatment is needed WE WILL CONTACT YOU. It is critical that we have a CURRENT PHONE NUMBER FOR YOU.

## 2022-12-19 NOTE — FIRST PROVIDER EVALUATION
"Medical screening examination initiated.  I have conducted a focused provider triage encounter, findings are as follows:    Brief history of present illness:  28 y/o female who presents with bodyaches, runny nose, cough. Type 1 DM, cbg 315     Vitals:    12/19/22 1320   BP: 111/74   Pulse: 109   Resp: 18   Temp: 99.8 °F (37.7 °C)   TempSrc: Tympanic   SpO2: 100%   Weight: 58.5 kg (129 lb)   Height: 5' 2" (1.575 m)       Pertinent physical exam:  alert, tachypneic, tachycardia, appears to not feel well    Brief workup plan:  labs, xray, swab    Preliminary workup initiated; this workup will be continued and followed by the physician or advanced practice provider that is assigned to the patient when roomed.  "

## 2022-12-19 NOTE — Clinical Note
"Dorene"Titi Husain was seen and treated in our emergency department on 12/19/2022.  She may return to work on 12/25/2022.  May return when fever free for 24 Hours     If you have any questions or concerns, please don't hesitate to call.      Millie Burr MD RN    "

## 2022-12-20 LAB — POCT GLUCOSE: 315 MG/DL (ref 70–110)

## 2022-12-26 PROBLEM — N17.9 AKI (ACUTE KIDNEY INJURY): Chronic | Status: RESOLVED | Noted: 2022-05-21 | Resolved: 2022-12-26

## 2023-02-09 NOTE — ASSESSMENT & PLAN NOTE
Continue with antiemetics,     zofran and phenergan if needed     Prescription approved per [facility] Refill Protocol.  Julie Behrendt RN

## 2023-02-23 ENCOUNTER — HOSPITAL ENCOUNTER (EMERGENCY)
Facility: HOSPITAL | Age: 28
Discharge: HOME | End: 2023-02-23
Attending: STUDENT IN AN ORGANIZED HEALTH CARE EDUCATION/TRAINING PROGRAM
Payer: MEDICAID

## 2023-02-23 VITALS
SYSTOLIC BLOOD PRESSURE: 181 MMHG | RESPIRATION RATE: 16 BRPM | HEIGHT: 62 IN | TEMPERATURE: 99 F | DIASTOLIC BLOOD PRESSURE: 127 MMHG | OXYGEN SATURATION: 99 % | BODY MASS INDEX: 24.48 KG/M2 | HEART RATE: 99 BPM | WEIGHT: 133 LBS

## 2023-02-23 DIAGNOSIS — G89.4 CHRONIC PAIN SYNDROME: ICD-10-CM

## 2023-02-23 DIAGNOSIS — E10.65 UNCONTROLLED TYPE 1 DIABETES MELLITUS WITH HYPERGLYCEMIA: ICD-10-CM

## 2023-02-23 DIAGNOSIS — R11.14 BILIOUS VOMITING WITH NAUSEA: Primary | ICD-10-CM

## 2023-02-23 DIAGNOSIS — I10 UNCONTROLLED HYPERTENSION: ICD-10-CM

## 2023-02-23 DIAGNOSIS — R11.10 VOMITING: ICD-10-CM

## 2023-02-23 DIAGNOSIS — E11.43 DIABETES MELLITUS WITH GASTROPARESIS: ICD-10-CM

## 2023-02-23 LAB
ALBUMIN SERPL-MCNC: 3.9 G/DL (ref 3.5–5)
ALBUMIN/GLOB SERPL: 1 RATIO (ref 1.1–2)
ALP SERPL-CCNC: 92 UNIT/L (ref 40–150)
ALT SERPL-CCNC: 18 UNIT/L (ref 0–55)
APPEARANCE UR: CLEAR
AST SERPL-CCNC: 20 UNIT/L (ref 5–34)
B-HCG SERPL QL: NEGATIVE
BACTERIA #/AREA URNS AUTO: NORMAL /HPF
BASOPHILS # BLD AUTO: 0.05 X10(3)/MCL (ref 0–0.2)
BASOPHILS NFR BLD AUTO: 0.5 %
BILIRUB UR QL STRIP.AUTO: NEGATIVE MG/DL
BILIRUBIN DIRECT+TOT PNL SERPL-MCNC: 0.9 MG/DL
BUN SERPL-MCNC: 20 MG/DL (ref 7–18.7)
CALCIUM SERPL-MCNC: 10 MG/DL (ref 8.4–10.2)
CHLORIDE SERPL-SCNC: 99 MMOL/L (ref 98–107)
CO2 SERPL-SCNC: 24 MMOL/L (ref 22–29)
COLOR UR AUTO: YELLOW
CREAT SERPL-MCNC: 1.21 MG/DL (ref 0.55–1.02)
DELSYS: ABNORMAL
EOSINOPHIL # BLD AUTO: 0.05 X10(3)/MCL (ref 0–0.9)
EOSINOPHIL NFR BLD AUTO: 0.5 %
ERYTHROCYTE [DISTWIDTH] IN BLOOD BY AUTOMATED COUNT: 12.2 % (ref 11.5–17)
GFR SERPLBLD CREATININE-BSD FMLA CKD-EPI: >60 MLS/MIN/1.73/M2
GLOBULIN SER-MCNC: 3.9 GM/DL (ref 2.4–3.5)
GLUCOSE SERPL-MCNC: 224 MG/DL (ref 70–110)
GLUCOSE SERPL-MCNC: 356 MG/DL (ref 70–110)
GLUCOSE SERPL-MCNC: 485 MG/DL (ref 74–100)
GLUCOSE UR QL STRIP.AUTO: >=1000 MG/DL
HCO3 UR-SCNC: 29.2 MMOL/L (ref 24–28)
HCT VFR BLD AUTO: 33.4 % (ref 37–47)
HGB BLD-MCNC: 11.3 G/DL (ref 12–16)
IMM GRANULOCYTES # BLD AUTO: 0.03 X10(3)/MCL (ref 0–0.04)
IMM GRANULOCYTES NFR BLD AUTO: 0.3 %
KETONES UR QL STRIP.AUTO: 15 MG/DL
LEUKOCYTE ESTERASE UR QL STRIP.AUTO: NEGATIVE UNIT/L
LIPASE SERPL-CCNC: 26 U/L
LYMPHOCYTES # BLD AUTO: 1.56 X10(3)/MCL (ref 0.6–4.6)
LYMPHOCYTES NFR BLD AUTO: 15.5 %
MAGNESIUM SERPL-MCNC: 1.9 MG/DL (ref 1.6–2.6)
MCH RBC QN AUTO: 27.2 PG
MCHC RBC AUTO-ENTMCNC: 33.8 G/DL (ref 33–36)
MCV RBC AUTO: 80.3 FL (ref 80–94)
MONOCYTES # BLD AUTO: 0.38 X10(3)/MCL (ref 0.1–1.3)
MONOCYTES NFR BLD AUTO: 3.8 %
NEUTROPHILS # BLD AUTO: 8.01 X10(3)/MCL (ref 2.1–9.2)
NEUTROPHILS NFR BLD AUTO: 79.4 %
NITRITE UR QL STRIP.AUTO: NEGATIVE
PCO2 BLDA: 37.3 MMHG (ref 35–45)
PH SMN: 7.5 [PH] (ref 7.35–7.45)
PH UR STRIP.AUTO: 7.5 [PH]
PLATELET # BLD AUTO: 453 X10(3)/MCL (ref 130–400)
PMV BLD AUTO: 9 FL (ref 7.4–10.4)
PO2 BLDA: 90 MMHG (ref 80–100)
POC BE: 6 MMOL/L
POC SATURATED O2: 98 % (ref 95–100)
POC TCO2: 30 MMOL/L (ref 23–27)
POCT GLUCOSE: 438 MG/DL (ref 70–110)
POTASSIUM SERPL-SCNC: 4 MMOL/L (ref 3.5–5.1)
PROT SERPL-MCNC: 7.8 GM/DL (ref 6.4–8.3)
PROT UR QL STRIP.AUTO: >=300 MG/DL
RBC # BLD AUTO: 4.16 X10(6)/MCL (ref 4.2–5.4)
RBC #/AREA URNS AUTO: NORMAL /HPF
RBC UR QL AUTO: ABNORMAL UNIT/L
SAMPLE: ABNORMAL
SITE: ABNORMAL
SODIUM SERPL-SCNC: 137 MMOL/L (ref 136–145)
SP GR UR STRIP.AUTO: 1.02
SQUAMOUS #/AREA URNS AUTO: NORMAL /HPF
UROBILINOGEN UR STRIP-ACNC: 0.2 MG/DL
WBC # SPEC AUTO: 10.1 X10(3)/MCL (ref 4.5–11.5)
WBC #/AREA URNS AUTO: NORMAL /HPF

## 2023-02-23 PROCEDURE — 96374 THER/PROPH/DIAG INJ IV PUSH: CPT

## 2023-02-23 PROCEDURE — 96375 TX/PRO/DX INJ NEW DRUG ADDON: CPT

## 2023-02-23 PROCEDURE — 82962 GLUCOSE BLOOD TEST: CPT

## 2023-02-23 PROCEDURE — 93010 EKG 12-LEAD: ICD-10-PCS | Mod: ,,, | Performed by: STUDENT IN AN ORGANIZED HEALTH CARE EDUCATION/TRAINING PROGRAM

## 2023-02-23 PROCEDURE — 81025 URINE PREGNANCY TEST: CPT | Performed by: STUDENT IN AN ORGANIZED HEALTH CARE EDUCATION/TRAINING PROGRAM

## 2023-02-23 PROCEDURE — 93005 ELECTROCARDIOGRAM TRACING: CPT

## 2023-02-23 PROCEDURE — 63600175 PHARM REV CODE 636 W HCPCS: Performed by: STUDENT IN AN ORGANIZED HEALTH CARE EDUCATION/TRAINING PROGRAM

## 2023-02-23 PROCEDURE — 81001 URINALYSIS AUTO W/SCOPE: CPT | Performed by: STUDENT IN AN ORGANIZED HEALTH CARE EDUCATION/TRAINING PROGRAM

## 2023-02-23 PROCEDURE — 96372 THER/PROPH/DIAG INJ SC/IM: CPT | Mod: 59 | Performed by: STUDENT IN AN ORGANIZED HEALTH CARE EDUCATION/TRAINING PROGRAM

## 2023-02-23 PROCEDURE — 25000003 PHARM REV CODE 250: Performed by: STUDENT IN AN ORGANIZED HEALTH CARE EDUCATION/TRAINING PROGRAM

## 2023-02-23 PROCEDURE — 93010 ELECTROCARDIOGRAM REPORT: CPT | Mod: ,,, | Performed by: STUDENT IN AN ORGANIZED HEALTH CARE EDUCATION/TRAINING PROGRAM

## 2023-02-23 PROCEDURE — 96361 HYDRATE IV INFUSION ADD-ON: CPT

## 2023-02-23 PROCEDURE — 82803 BLOOD GASES ANY COMBINATION: CPT

## 2023-02-23 PROCEDURE — 80053 COMPREHEN METABOLIC PANEL: CPT | Performed by: STUDENT IN AN ORGANIZED HEALTH CARE EDUCATION/TRAINING PROGRAM

## 2023-02-23 PROCEDURE — 83690 ASSAY OF LIPASE: CPT | Performed by: STUDENT IN AN ORGANIZED HEALTH CARE EDUCATION/TRAINING PROGRAM

## 2023-02-23 PROCEDURE — 99285 EMERGENCY DEPT VISIT HI MDM: CPT | Mod: 25

## 2023-02-23 PROCEDURE — 85025 COMPLETE CBC W/AUTO DIFF WBC: CPT | Performed by: STUDENT IN AN ORGANIZED HEALTH CARE EDUCATION/TRAINING PROGRAM

## 2023-02-23 PROCEDURE — 83735 ASSAY OF MAGNESIUM: CPT | Performed by: STUDENT IN AN ORGANIZED HEALTH CARE EDUCATION/TRAINING PROGRAM

## 2023-02-23 PROCEDURE — 99900035 HC TECH TIME PER 15 MIN (STAT)

## 2023-02-23 PROCEDURE — 36600 WITHDRAWAL OF ARTERIAL BLOOD: CPT

## 2023-02-23 RX ORDER — DIPHENHYDRAMINE HYDROCHLORIDE 50 MG/ML
25 INJECTION INTRAMUSCULAR; INTRAVENOUS
Status: COMPLETED | OUTPATIENT
Start: 2023-02-23 | End: 2023-02-23

## 2023-02-23 RX ORDER — ONDANSETRON 2 MG/ML
4 INJECTION INTRAMUSCULAR; INTRAVENOUS
Status: COMPLETED | OUTPATIENT
Start: 2023-02-23 | End: 2023-02-23

## 2023-02-23 RX ORDER — PROCHLORPERAZINE EDISYLATE 5 MG/ML
5 INJECTION INTRAMUSCULAR; INTRAVENOUS
Status: COMPLETED | OUTPATIENT
Start: 2023-02-23 | End: 2023-02-23

## 2023-02-23 RX ORDER — MORPHINE SULFATE 4 MG/ML
2 INJECTION, SOLUTION INTRAMUSCULAR; INTRAVENOUS
Status: COMPLETED | OUTPATIENT
Start: 2023-02-23 | End: 2023-02-23

## 2023-02-23 RX ORDER — CLONIDINE HYDROCHLORIDE 0.2 MG/1
0.2 TABLET ORAL
Status: COMPLETED | OUTPATIENT
Start: 2023-02-23 | End: 2023-02-23

## 2023-02-23 RX ORDER — PROMETHAZINE HYDROCHLORIDE 25 MG/ML
12.5 INJECTION, SOLUTION INTRAMUSCULAR; INTRAVENOUS
Status: DISCONTINUED | OUTPATIENT
Start: 2023-02-23 | End: 2023-02-23

## 2023-02-23 RX ADMIN — MORPHINE SULFATE 2 MG: 4 INJECTION, SOLUTION INTRAMUSCULAR; INTRAVENOUS at 08:02

## 2023-02-23 RX ADMIN — ONDANSETRON 4 MG: 2 INJECTION INTRAMUSCULAR; INTRAVENOUS at 07:02

## 2023-02-23 RX ADMIN — SODIUM CHLORIDE, POTASSIUM CHLORIDE, SODIUM LACTATE AND CALCIUM CHLORIDE 1000 ML: 600; 310; 30; 20 INJECTION, SOLUTION INTRAVENOUS at 08:02

## 2023-02-23 RX ADMIN — DIPHENHYDRAMINE HYDROCHLORIDE 25 MG: 50 INJECTION INTRAMUSCULAR; INTRAVENOUS at 09:02

## 2023-02-23 RX ADMIN — SODIUM CHLORIDE, POTASSIUM CHLORIDE, SODIUM LACTATE AND CALCIUM CHLORIDE 1000 ML: 600; 310; 30; 20 INJECTION, SOLUTION INTRAVENOUS at 07:02

## 2023-02-23 RX ADMIN — PROCHLORPERAZINE EDISYLATE 5 MG: 5 INJECTION INTRAMUSCULAR; INTRAVENOUS at 09:02

## 2023-02-23 RX ADMIN — CLONIDINE HYDROCHLORIDE 0.2 MG: 0.2 TABLET ORAL at 08:02

## 2023-02-23 RX ADMIN — HUMAN INSULIN 5 UNITS: 100 INJECTION, SOLUTION SUBCUTANEOUS at 09:02

## 2023-02-24 LAB
POCT GLUCOSE: 224 MG/DL (ref 70–110)
POCT GLUCOSE: 356 MG/DL (ref 70–110)

## 2023-02-24 NOTE — ED PROVIDER NOTES
Encounter Date: 2/23/2023       History     Chief Complaint   Patient presents with    Nausea     And vomiting along with pain all over according to patient. She is writhing in pain in triage has vomited yellow liquid in 2 emesis bags since arrival         HPI    27-year-old female with a past medical history of type 1 diabetes with poor medication compliance, hypertension and chronic pain syndrome presents emergency department for back pain and vomiting.  Patient states that she has pain all over.  States that it started at work today.  States that she is vomiting because of the pain.  States compliance to her diabetic regimen.  Does not know her last CBG.  Denies any abdominal pain.    Review of patient's allergies indicates:  No Known Allergies  Past Medical History:   Diagnosis Date    Diabetes mellitus     Diabetic gastroparesis associated with type 1 diabetes mellitus     DKA (diabetic ketoacidosis)     DKA (diabetic ketoacidosis)     DKA (diabetic ketoacidosis)     Gastroparesis     Gastroparesis due to DM     Hypertension     Ketoacidosis due to diabetes     Non compliance with medical treatment     Pneumonia     Secondary DM with DKA      Past Surgical History:   Procedure Laterality Date    CHOLECYSTECTOMY      ESOPHAGOGASTRODUODENOSCOPY      Multiple    None       Family History   Problem Relation Age of Onset    Heart disease Mother     Hypertension Mother     Diabetes Mother     Hyperlipidemia Mother     Cancer Father     Stroke Father     Hypertension Father     Hyperlipidemia Father      Social History     Tobacco Use    Smoking status: Never    Smokeless tobacco: Never   Substance Use Topics    Alcohol use: Never    Drug use: Not Currently     Types: Marijuana     Review of Systems   Constitutional:  Negative for fever.   Respiratory:  Negative for cough and shortness of breath.    Cardiovascular:  Negative for chest pain.   Gastrointestinal:  Positive for nausea and vomiting. Negative for abdominal  pain and diarrhea.   Musculoskeletal:  Positive for back pain.   All other systems reviewed and are negative.    Physical Exam     Initial Vitals [02/23/23 1839]   BP Pulse Resp Temp SpO2   (!) 146/97 (!) 114 20 99.1 °F (37.3 °C) 99 %      MAP       --         Physical Exam    Nursing note and vitals reviewed.  Constitutional: She appears well-developed and well-nourished. She appears distressed.   Cardiovascular:  Normal rate and regular rhythm.           Pulmonary/Chest: Breath sounds normal. No respiratory distress.   Abdominal: Abdomen is soft. Bowel sounds are normal. There is no abdominal tenderness.   Musculoskeletal:         General: Tenderness (Generalized tenderness to the entirety of the back to even the slightest touch.  Patient is rocking in the bed stating that moving does not make it hurt more) present. Normal range of motion.     Neurological: She is alert and oriented to person, place, and time.   Skin: Skin is warm. Capillary refill takes less than 2 seconds.   Psychiatric: She has a normal mood and affect. Thought content normal.       ED Course   Procedures  Labs Reviewed   URINALYSIS, REFLEX TO URINE CULTURE - Abnormal; Notable for the following components:       Result Value    Protein, UA >=300 (*)     Glucose, UA >=1000 (*)     Ketones, UA 15 (*)     Blood, UA Small (*)     All other components within normal limits   COMPREHENSIVE METABOLIC PANEL - Abnormal; Notable for the following components:    Glucose Level 485 (*)     Blood Urea Nitrogen 20.0 (*)     Creatinine 1.21 (*)     Globulin 3.9 (*)     Albumin/Globulin Ratio 1.0 (*)     All other components within normal limits   CBC WITH DIFFERENTIAL - Abnormal; Notable for the following components:    RBC 4.16 (*)     Hgb 11.3 (*)     Hct 33.4 (*)     Platelet 453 (*)     All other components within normal limits   POCT GLUCOSE, HAND-HELD DEVICE - Abnormal; Notable for the following components:    POC Glucose 356 (*)     All other components  within normal limits   POCT GLUCOSE, HAND-HELD DEVICE - Abnormal; Notable for the following components:    POC Glucose 224 (*)     All other components within normal limits   POCT GLUCOSE - Abnormal; Notable for the following components:    POCT Glucose 438 (*)     All other components within normal limits   ISTAT PROCEDURE - Abnormal; Notable for the following components:    POC PH 7.502 (*)     POC HCO3 29.2 (*)     POC TCO2 30 (*)     All other components within normal limits   PREGNANCY TEST, URINE RAPID - Normal   LIPASE - Normal   MAGNESIUM - Normal   URINALYSIS, MICROSCOPIC - Normal   CBC W/ AUTO DIFFERENTIAL    Narrative:     The following orders were created for panel order CBC auto differential.  Procedure                               Abnormality         Status                     ---------                               -----------         ------                     CBC with Differential[[id number]]        Abnormal            Final result                 Please view results for these tests on the individual orders.   POCT GLUCOSE, HAND-HELD DEVICE     EKG Readings: (Independently Interpreted)   Initial Reading: No STEMI. Rhythm: Normal Sinus Rhythm. Heart Rate: 97. Ectopy: No Ectopy. Conduction: Normal. ST Segments: Normal ST Segments. T Waves: Normal. Clinical Impression: Normal Sinus Rhythm   ECG Results              EKG 12-lead (In process)  Result time 02/23/23 22:03:50      In process by Interface, Lab In [facility] (02/23/23 22:03:50)                   Narrative:    Test Reason : R11.10,    Vent. Rate : 097 BPM     Atrial Rate : 097 BPM     P-R Int : 140 ms          QRS Dur : 084 ms      QT Int : 360 ms       P-R-T Axes : 059 048 031 degrees     QTc Int : 457 ms    Normal sinus rhythm  Normal ECG  When compared with ECG of 24-SEP-2022 12:38,  No significant change was found    Referred By: [email or web address]   SELF           Confirmed By:                                   Imaging Results              CT Abdomen  Pelvis  Without Contrast (Final result)  Result time 02/23/23 20:36:33      Final result by Dario Vu MD (02/23/23 20:36:33)                   Impression:      No acute abnormality within the abdomen or pelvis within the limits of a noncontrast exam.  No renal stones appreciated.  No secondary signs of obstruction identified.      Electronically signed by: Dario Vu  Date:    02/23/2023  Time:    20:36               Narrative:    EXAMINATION:  CT ABDOMEN PELVIS WITHOUT CONTRAST    CLINICAL HISTORY:  Flank pain, kidney stone suspected;Abdominal pain, acute, nonlocalized;Abdominal pain/back pain;    TECHNIQUE:  Multidetector non-contrast axial CT images of the abdomen and pelvis were obtained with coronal and sagittal reconstructions.    Automatic exposure control was utilized to reduce the patient's radiation dose.    DLP= 165    COMPARISON:  09/24/2022    FINDINGS:  01. HEPATOBILIARY: No focal hepatic lesion is identified, however evaluation is limited secondary to lack of IV contrast. Status post cholecystectomy    02. SPLEEN: Normal    03. PANCREAS: No focal masses or ductal dilatation.    04. ADRENALS: No adrenal nodules.    05. KIDNEYS: The right kidney demonstrates no stone, hydronephrosis, or hydroureter. No focal mass identified. The left kidney demonstrates no stone, hydronephrosis, or hydroureter. No focal mass identified.    06. LYMPHADENOPATHY/RETROPERITONEUM: There is no retroperitoneal lymphadenopathy. The abdominal aorta is normal in course and caliber.    07. BOWEL: No acute bowel related abnormalities. No evidence of appendiceal inflammation.    08. PELVIC VISCERA: Normal. No pelvic mass.    09. PELVIC LYMPH NODES: No lymphadenopathy.    10. PERITONEUM/ABDOMINAL WALL: No ascites or implant.    11. SKELETAL: No aggressive appearing lytic/blastic lesion. No acute fractures, subluxations or dislocations.    12. LUNG BASES: The visualized lungs are unremarkable.                                        Medications   lactated ringers bolus 1,000 mL (0 mLs Intravenous Stopped 2/23/23 2018)   ondansetron injection 4 mg (4 mg Intravenous Given 2/23/23 1920)   lactated ringers bolus 1,000 mL (0 mLs Intravenous Stopped 2/23/23 2101)   morphine injection 2 mg (2 mg Intravenous Given 2/23/23 2000)   cloNIDine tablet 0.2 mg (0.2 mg Oral Given 2/23/23 2052)   insulin regular injection 5 Units 0.05 mL (5 Units Intravenous Given 2/23/23 2123)   prochlorperazine injection Soln 5 mg (5 mg Intravenous Given 2/23/23 2134)   diphenhydrAMINE injection 25 mg (25 mg Intramuscular Given 2/23/23 2134)     Medical Decision Making:   Differential Diagnosis:   DKA, metabolic derangement, chronic pain syndrome, UTI, pyelonephritis, urolithiasis   Medical Decision Making  Problems Addressed:  Bilious vomiting with nausea: self-limited or minor problem  Chronic pain syndrome: chronic illness or injury  Diabetes mellitus with gastroparesis: chronic illness or injury with exacerbation, progression, or side effects of treatment  Uncontrolled hypertension: chronic illness or injury  Uncontrolled type 1 diabetes mellitus with hyperglycemia: chronic illness or injury with exacerbation, progression, or side effects of treatment    Amount and/or Complexity of Data Reviewed  External Data Reviewed: labs, radiology, ECG and notes.  Labs: ordered. Decision-making details documented in ED Course.  Radiology: ordered.  ECG/medicine tests: ordered and independent interpretation performed. Decision-making details documented in ED Course.    Risk  OTC drugs.  Prescription drug management.              ED Course as of 02/23/23 2213   Thu Feb 23, 2023 1913 POCT Glucose(!): 438 [BS]   1934 WBC: 10.1 [BS]   1934 Hemoglobin(!): 11.3 [BS]   1934 Hematocrit(!): 33.4 [BS]   1934 Platelets(!): 453 [BS]   1951 Sodium: 137 [BS]   1952 Potassium: 4.0 [BS]   1952 Chloride: 99 [BS]   1952 Creatinine(!): 1.21 [BS]   1952 Glucose(!!): 485 [BS]   2126 Patient  vomiting again.  Will give her some Compazine as the likely help with the gastroparesis.  CT negative.  Her CBG repeat is in the 300s still.  Will give her 5 of IV insulin. [BS]   2130 Bacteria, UA: None Seen [BS]   2208 Blood pressure improved.  Glucose in the 200s.  Labs normal.  Vomiting stopped.  She tolerated p.o..  Pain improved.  Will discharge.  ER precautions given. [BS]      ED Course User Index  [BS] Gagandeep Lockett MD                 Clinical Impression:   Final diagnoses:  [R11.10] Vomiting  [R11.14] Bilious vomiting with nausea (Primary)  [E11.43] Diabetes mellitus with gastroparesis  [G89.4] Chronic pain syndrome  [I10] Uncontrolled hypertension  [E10.65] Uncontrolled type 1 diabetes mellitus with hyperglycemia        ED Disposition Condition    Discharge Stable          ED Prescriptions    None       Follow-up Information       Follow up With Specialties Details Why Contact Info    Kumar Ortiz NP Family Medicine Schedule an appointment as soon as possible for a visit   [address] [place] [address]  [phone]      [facility] - Emergency Dept Emergency Medicine Go to  If symptoms worsen [address] [place] [address]  [phone]             Gagandeep Lockett MD  02/23/23 1635

## 2023-02-26 ENCOUNTER — HOSPITAL ENCOUNTER (INPATIENT)
Facility: HOSPITAL | Age: 28
LOS: 8 days | Discharge: HOME | DRG: 638 | End: 2023-03-06
Attending: EMERGENCY MEDICINE | Admitting: EMERGENCY MEDICINE
Payer: MEDICAID

## 2023-02-26 DIAGNOSIS — R10.84 GENERALIZED ABDOMINAL PAIN: ICD-10-CM

## 2023-02-26 DIAGNOSIS — E11.43 GASTROPARESIS DIABETICORUM: Primary | ICD-10-CM

## 2023-02-26 DIAGNOSIS — R73.9 HYPERGLYCEMIA WITHOUT KETOSIS: ICD-10-CM

## 2023-02-26 DIAGNOSIS — R73.9 HYPERGLYCEMIA: ICD-10-CM

## 2023-02-26 DIAGNOSIS — E87.20 METABOLIC ACIDOSIS: ICD-10-CM

## 2023-02-26 DIAGNOSIS — Z45.2 ENCOUNTER FOR CENTRAL LINE PLACEMENT: ICD-10-CM

## 2023-02-26 DIAGNOSIS — K31.84 GASTROPARESIS DIABETICORUM: Primary | ICD-10-CM

## 2023-02-26 DIAGNOSIS — E86.0 DEHYDRATION: ICD-10-CM

## 2023-02-26 PROBLEM — R79.89 ELEVATED TROPONIN I LEVEL: Chronic | Status: RESOLVED | Noted: 2022-09-24 | Resolved: 2023-02-26

## 2023-02-26 PROBLEM — M54.50 LUMBAR PAIN: Status: RESOLVED | Noted: 2022-05-31 | Resolved: 2023-02-26

## 2023-02-26 PROBLEM — R10.13 EPIGASTRIC PAIN: Status: RESOLVED | Noted: 2022-08-14 | Resolved: 2023-02-26

## 2023-02-26 PROBLEM — R55 SYNCOPAL EPISODES: Chronic | Status: RESOLVED | Noted: 2022-05-31 | Resolved: 2023-02-26

## 2023-02-26 PROBLEM — D62 ACUTE BLOOD LOSS ANEMIA: Chronic | Status: RESOLVED | Noted: 2022-09-24 | Resolved: 2023-02-26

## 2023-02-26 PROBLEM — G89.29 CHRONIC PAIN: Chronic | Status: RESOLVED | Noted: 2022-05-31 | Resolved: 2023-02-26

## 2023-02-26 PROBLEM — E87.6 HYPOKALEMIA: Status: RESOLVED | Noted: 2022-05-25 | Resolved: 2023-02-26

## 2023-02-26 PROBLEM — R11.2 NON-INTRACTABLE VOMITING WITH NAUSEA: Status: RESOLVED | Noted: 2022-08-14 | Resolved: 2023-02-26

## 2023-02-26 PROBLEM — Z76.5 DRUG-SEEKING BEHAVIOR: Chronic | Status: RESOLVED | Noted: 2022-06-30 | Resolved: 2023-02-26

## 2023-02-26 PROBLEM — N30.01 ACUTE CYSTITIS WITH HEMATURIA: Status: RESOLVED | Noted: 2022-08-14 | Resolved: 2023-02-26

## 2023-02-26 LAB
ALBUMIN SERPL-MCNC: 2.6 G/DL (ref 3.5–5)
ALBUMIN/GLOB SERPL: 0.9 RATIO (ref 1.1–2)
ALLENS TEST: NORMAL
ALP SERPL-CCNC: 77 UNIT/L (ref 40–150)
ALT SERPL-CCNC: 11 UNIT/L (ref 0–55)
AST SERPL-CCNC: 11 UNIT/L (ref 5–34)
BASOPHILS # BLD AUTO: 0.04 X10(3)/MCL (ref 0–0.2)
BASOPHILS NFR BLD AUTO: 0.3 %
BILIRUBIN DIRECT+TOT PNL SERPL-MCNC: 1 MG/DL
BUN SERPL-MCNC: 35 MG/DL (ref 7–18.7)
CALCIUM SERPL-MCNC: 8.1 MG/DL (ref 8.4–10.2)
CHLORIDE SERPL-SCNC: 88 MMOL/L (ref 98–107)
CO2 SERPL-SCNC: 15 MMOL/L (ref 22–29)
CREAT SERPL-MCNC: 1.16 MG/DL (ref 0.55–1.02)
DELSYS: NORMAL
EOSINOPHIL # BLD AUTO: 0.01 X10(3)/MCL (ref 0–0.9)
EOSINOPHIL NFR BLD AUTO: 0.1 %
ERYTHROCYTE [DISTWIDTH] IN BLOOD BY AUTOMATED COUNT: 12.1 % (ref 11.5–17)
EST. AVERAGE GLUCOSE BLD GHB EST-MCNC: 294.8 MG/DL
GFR SERPLBLD CREATININE-BSD FMLA CKD-EPI: >60 MLS/MIN/1.73/M2
GLOBULIN SER-MCNC: 2.9 GM/DL (ref 2.4–3.5)
GLUCOSE SERPL-MCNC: 360 MG/DL (ref 74–100)
HBA1C MFR BLD: 11.9 %
HCO3 UR-SCNC: 25.7 MMOL/L (ref 24–28)
HCT VFR BLD AUTO: 28.8 % (ref 37–47)
HGB BLD-MCNC: 9.7 G/DL (ref 12–16)
IMM GRANULOCYTES # BLD AUTO: 0.06 X10(3)/MCL (ref 0–0.04)
IMM GRANULOCYTES NFR BLD AUTO: 0.5 %
LACTATE SERPL-SCNC: 1.4 MMOL/L (ref 0.5–2.2)
LIPASE SERPL-CCNC: 9 U/L
LYMPHOCYTES # BLD AUTO: 1.69 X10(3)/MCL (ref 0.6–4.6)
LYMPHOCYTES NFR BLD AUTO: 13.3 %
MAGNESIUM SERPL-MCNC: 1.8 MG/DL (ref 1.6–2.6)
MCH RBC QN AUTO: 27.3 PG
MCHC RBC AUTO-ENTMCNC: 33.7 G/DL (ref 33–36)
MCV RBC AUTO: 81.1 FL (ref 80–94)
MONOCYTES # BLD AUTO: 1.08 X10(3)/MCL (ref 0.1–1.3)
MONOCYTES NFR BLD AUTO: 8.5 %
NEUTROPHILS # BLD AUTO: 9.86 X10(3)/MCL (ref 2.1–9.2)
NEUTROPHILS NFR BLD AUTO: 77.3 %
NRBC BLD AUTO-RTO: 0 %
PCO2 BLDA: 44.2 MMHG (ref 35–45)
PH SMN: 7.37 [PH] (ref 7.35–7.45)
PLATELET # BLD AUTO: 360 X10(3)/MCL (ref 130–400)
PMV BLD AUTO: 9 FL (ref 7.4–10.4)
PO2 BLDA: 83 MMHG (ref 80–100)
POC BE: 0 MMOL/L
POC SATURATED O2: 96 % (ref 95–100)
POC TCO2: 27 MMOL/L (ref 23–27)
POCT GLUCOSE: 240 MG/DL (ref 70–110)
POCT GLUCOSE: 272 MG/DL (ref 70–110)
POCT GLUCOSE: 401 MG/DL (ref 70–110)
POTASSIUM SERPL-SCNC: 3.9 MMOL/L (ref 3.5–5.1)
PROT SERPL-MCNC: 5.5 GM/DL (ref 6.4–8.3)
RBC # BLD AUTO: 3.55 X10(6)/MCL (ref 4.2–5.4)
SAMPLE: NORMAL
SITE: NORMAL
SODIUM SERPL-SCNC: 124 MMOL/L (ref 136–145)
TROPONIN I SERPL-MCNC: 0.03 NG/ML (ref 0–0.04)
WBC # SPEC AUTO: 12.7 X10(3)/MCL (ref 4.5–11.5)

## 2023-02-26 PROCEDURE — 96375 TX/PRO/DX INJ NEW DRUG ADDON: CPT

## 2023-02-26 PROCEDURE — 63600175 PHARM REV CODE 636 W HCPCS: Mod: TB,JG | Performed by: EMERGENCY MEDICINE

## 2023-02-26 PROCEDURE — 36600 WITHDRAWAL OF ARTERIAL BLOOD: CPT

## 2023-02-26 PROCEDURE — 93010 EKG 12-LEAD: ICD-10-PCS | Mod: ,,, | Performed by: INTERNAL MEDICINE

## 2023-02-26 PROCEDURE — 83605 ASSAY OF LACTIC ACID: CPT | Performed by: EMERGENCY MEDICINE

## 2023-02-26 PROCEDURE — 83690 ASSAY OF LIPASE: CPT | Performed by: EMERGENCY MEDICINE

## 2023-02-26 PROCEDURE — 25000003 PHARM REV CODE 250: Performed by: EMERGENCY MEDICINE

## 2023-02-26 PROCEDURE — 21400001 HC TELEMETRY ROOM

## 2023-02-26 PROCEDURE — 82803 BLOOD GASES ANY COMBINATION: CPT

## 2023-02-26 PROCEDURE — 82962 GLUCOSE BLOOD TEST: CPT | Mod: 59

## 2023-02-26 PROCEDURE — 96361 HYDRATE IV INFUSION ADD-ON: CPT | Mod: 59

## 2023-02-26 PROCEDURE — 85025 COMPLETE CBC W/AUTO DIFF WBC: CPT | Performed by: EMERGENCY MEDICINE

## 2023-02-26 PROCEDURE — 99285 EMERGENCY DEPT VISIT HI MDM: CPT | Mod: 25

## 2023-02-26 PROCEDURE — 84484 ASSAY OF TROPONIN QUANT: CPT | Performed by: EMERGENCY MEDICINE

## 2023-02-26 PROCEDURE — 93010 ELECTROCARDIOGRAM REPORT: CPT | Mod: ,,, | Performed by: INTERNAL MEDICINE

## 2023-02-26 PROCEDURE — 63600175 PHARM REV CODE 636 W HCPCS: Performed by: EMERGENCY MEDICINE

## 2023-02-26 PROCEDURE — 93005 ELECTROCARDIOGRAM TRACING: CPT

## 2023-02-26 PROCEDURE — 11000001 HC ACUTE MED/SURG PRIVATE ROOM

## 2023-02-26 PROCEDURE — 99900035 HC TECH TIME PER 15 MIN (STAT)

## 2023-02-26 PROCEDURE — 82010 KETONE BODYS QUAN: CPT | Performed by: EMERGENCY MEDICINE

## 2023-02-26 PROCEDURE — 80053 COMPREHEN METABOLIC PANEL: CPT | Performed by: EMERGENCY MEDICINE

## 2023-02-26 PROCEDURE — 36556 INSERT NON-TUNNEL CV CATH: CPT | Mod: 59

## 2023-02-26 PROCEDURE — 83036 HEMOGLOBIN GLYCOSYLATED A1C: CPT | Performed by: EMERGENCY MEDICINE

## 2023-02-26 PROCEDURE — 83735 ASSAY OF MAGNESIUM: CPT | Performed by: EMERGENCY MEDICINE

## 2023-02-26 PROCEDURE — 96374 THER/PROPH/DIAG INJ IV PUSH: CPT

## 2023-02-26 RX ORDER — MORPHINE SULFATE 4 MG/ML
2 INJECTION, SOLUTION INTRAMUSCULAR; INTRAVENOUS EVERY 4 HOURS PRN
Status: DISCONTINUED | OUTPATIENT
Start: 2023-02-26 | End: 2023-02-26

## 2023-02-26 RX ORDER — ONDANSETRON 2 MG/ML
4 INJECTION INTRAMUSCULAR; INTRAVENOUS EVERY 8 HOURS PRN
Status: DISCONTINUED | OUTPATIENT
Start: 2023-02-26 | End: 2023-02-26

## 2023-02-26 RX ORDER — GLUCAGON 1 MG
1 KIT INJECTION
Status: DISCONTINUED | OUTPATIENT
Start: 2023-02-26 | End: 2023-03-06 | Stop reason: HOSPADM

## 2023-02-26 RX ORDER — DROPERIDOL 2.5 MG/ML
2.5 INJECTION, SOLUTION INTRAMUSCULAR; INTRAVENOUS
Status: COMPLETED | OUTPATIENT
Start: 2023-02-26 | End: 2023-02-26

## 2023-02-26 RX ORDER — TALC
6 POWDER (GRAM) TOPICAL NIGHTLY PRN
Status: DISCONTINUED | OUTPATIENT
Start: 2023-02-26 | End: 2023-03-06 | Stop reason: HOSPADM

## 2023-02-26 RX ORDER — SODIUM CHLORIDE AND POTASSIUM CHLORIDE 150; 450 MG/100ML; MG/100ML
INJECTION, SOLUTION INTRAVENOUS CONTINUOUS
Status: DISCONTINUED | OUTPATIENT
Start: 2023-02-26 | End: 2023-03-04

## 2023-02-26 RX ORDER — SODIUM CHLORIDE 0.9 % (FLUSH) 0.9 %
10 SYRINGE (ML) INJECTION
Status: DISCONTINUED | OUTPATIENT
Start: 2023-02-26 | End: 2023-03-06 | Stop reason: HOSPADM

## 2023-02-26 RX ORDER — METOPROLOL TARTRATE 50 MG/1
50 TABLET ORAL 2 TIMES DAILY
Status: DISCONTINUED | OUTPATIENT
Start: 2023-02-26 | End: 2023-03-06 | Stop reason: HOSPADM

## 2023-02-26 RX ORDER — MORPHINE SULFATE 4 MG/ML
2 INJECTION, SOLUTION INTRAMUSCULAR; INTRAVENOUS EVERY 4 HOURS PRN
Status: DISCONTINUED | OUTPATIENT
Start: 2023-02-26 | End: 2023-02-27

## 2023-02-26 RX ORDER — DULOXETIN HYDROCHLORIDE 30 MG/1
30 CAPSULE, DELAYED RELEASE ORAL DAILY
Status: DISCONTINUED | OUTPATIENT
Start: 2023-02-27 | End: 2023-03-04

## 2023-02-26 RX ORDER — PROCHLORPERAZINE EDISYLATE 5 MG/ML
5 INJECTION INTRAMUSCULAR; INTRAVENOUS EVERY 6 HOURS PRN
Status: DISCONTINUED | OUTPATIENT
Start: 2023-02-26 | End: 2023-02-26

## 2023-02-26 RX ORDER — KETOROLAC TROMETHAMINE 30 MG/ML
30 INJECTION, SOLUTION INTRAMUSCULAR; INTRAVENOUS
Status: COMPLETED | OUTPATIENT
Start: 2023-02-26 | End: 2023-02-26

## 2023-02-26 RX ORDER — ACETAMINOPHEN 325 MG/1
650 TABLET ORAL EVERY 8 HOURS PRN
Status: DISCONTINUED | OUTPATIENT
Start: 2023-02-26 | End: 2023-03-06 | Stop reason: HOSPADM

## 2023-02-26 RX ORDER — PROCHLORPERAZINE EDISYLATE 5 MG/ML
5 INJECTION INTRAMUSCULAR; INTRAVENOUS EVERY 6 HOURS PRN
Status: DISCONTINUED | OUTPATIENT
Start: 2023-02-26 | End: 2023-03-06 | Stop reason: HOSPADM

## 2023-02-26 RX ORDER — FAMOTIDINE 10 MG/ML
20 INJECTION INTRAVENOUS EVERY 12 HOURS
Status: DISCONTINUED | OUTPATIENT
Start: 2023-02-26 | End: 2023-03-01

## 2023-02-26 RX ORDER — METOCLOPRAMIDE HYDROCHLORIDE 5 MG/ML
5 INJECTION INTRAMUSCULAR; INTRAVENOUS EVERY 8 HOURS
Status: DISCONTINUED | OUTPATIENT
Start: 2023-02-26 | End: 2023-03-01

## 2023-02-26 RX ORDER — ONDANSETRON 2 MG/ML
4 INJECTION INTRAMUSCULAR; INTRAVENOUS EVERY 8 HOURS PRN
Status: DISCONTINUED | OUTPATIENT
Start: 2023-02-26 | End: 2023-03-06 | Stop reason: HOSPADM

## 2023-02-26 RX ORDER — ENOXAPARIN SODIUM 100 MG/ML
40 INJECTION SUBCUTANEOUS EVERY 24 HOURS
Status: DISCONTINUED | OUTPATIENT
Start: 2023-02-26 | End: 2023-03-06 | Stop reason: HOSPADM

## 2023-02-26 RX ORDER — DIPHENHYDRAMINE HYDROCHLORIDE 50 MG/ML
25 INJECTION INTRAMUSCULAR; INTRAVENOUS
Status: COMPLETED | OUTPATIENT
Start: 2023-02-26 | End: 2023-02-26

## 2023-02-26 RX ORDER — INSULIN ASPART 100 [IU]/ML
0-5 INJECTION, SOLUTION INTRAVENOUS; SUBCUTANEOUS
Status: DISCONTINUED | OUTPATIENT
Start: 2023-02-26 | End: 2023-03-06 | Stop reason: HOSPADM

## 2023-02-26 RX ORDER — MORPHINE SULFATE 4 MG/ML
4 INJECTION, SOLUTION INTRAMUSCULAR; INTRAVENOUS EVERY 4 HOURS PRN
Status: DISCONTINUED | OUTPATIENT
Start: 2023-02-26 | End: 2023-02-27

## 2023-02-26 RX ORDER — POLYETHYLENE GLYCOL 3350 17 G/17G
17 POWDER, FOR SOLUTION ORAL DAILY
Status: DISCONTINUED | OUTPATIENT
Start: 2023-02-27 | End: 2023-03-06 | Stop reason: HOSPADM

## 2023-02-26 RX ORDER — SODIUM CHLORIDE, SODIUM LACTATE, POTASSIUM CHLORIDE, CALCIUM CHLORIDE 600; 310; 30; 20 MG/100ML; MG/100ML; MG/100ML; MG/100ML
INJECTION, SOLUTION INTRAVENOUS CONTINUOUS
Status: DISCONTINUED | OUTPATIENT
Start: 2023-02-26 | End: 2023-02-26

## 2023-02-26 RX ORDER — INSULIN ASPART 100 [IU]/ML
1-10 INJECTION, SOLUTION INTRAVENOUS; SUBCUTANEOUS
Status: DISCONTINUED | OUTPATIENT
Start: 2023-02-26 | End: 2023-03-06 | Stop reason: HOSPADM

## 2023-02-26 RX ORDER — MORPHINE SULFATE 4 MG/ML
4 INJECTION, SOLUTION INTRAMUSCULAR; INTRAVENOUS
Status: COMPLETED | OUTPATIENT
Start: 2023-02-26 | End: 2023-02-26

## 2023-02-26 RX ORDER — TALC
6 POWDER (GRAM) TOPICAL NIGHTLY PRN
Status: DISCONTINUED | OUTPATIENT
Start: 2023-02-26 | End: 2023-02-26

## 2023-02-26 RX ADMIN — MORPHINE SULFATE 4 MG: 4 INJECTION, SOLUTION INTRAMUSCULAR; INTRAVENOUS at 01:02

## 2023-02-26 RX ADMIN — ONDANSETRON 4 MG: 2 INJECTION INTRAMUSCULAR; INTRAVENOUS at 10:02

## 2023-02-26 RX ADMIN — DROPERIDOL 2.5 MG: 2.5 INJECTION, SOLUTION INTRAMUSCULAR; INTRAVENOUS at 01:02

## 2023-02-26 RX ADMIN — FAMOTIDINE 20 MG: 10 INJECTION INTRAVENOUS at 09:02

## 2023-02-26 RX ADMIN — MORPHINE SULFATE 2 MG: 4 INJECTION, SOLUTION INTRAMUSCULAR; INTRAVENOUS at 10:02

## 2023-02-26 RX ADMIN — INSULIN DETEMIR 15 UNITS: 100 INJECTION, SOLUTION SUBCUTANEOUS at 09:02

## 2023-02-26 RX ADMIN — METOPROLOL TARTRATE 50 MG: 50 TABLET, FILM COATED ORAL at 09:02

## 2023-02-26 RX ADMIN — ENOXAPARIN SODIUM 40 MG: 40 INJECTION SUBCUTANEOUS at 05:02

## 2023-02-26 RX ADMIN — SODIUM CHLORIDE AND POTASSIUM CHLORIDE 125 ML/HR: 4.5; 1.49 INJECTION, SOLUTION INTRAVENOUS at 05:02

## 2023-02-26 RX ADMIN — DIPHENHYDRAMINE HYDROCHLORIDE 25 MG: 50 INJECTION INTRAMUSCULAR; INTRAVENOUS at 01:02

## 2023-02-26 RX ADMIN — SODIUM CHLORIDE, POTASSIUM CHLORIDE, SODIUM LACTATE AND CALCIUM CHLORIDE 1000 ML: 600; 310; 30; 20 INJECTION, SOLUTION INTRAVENOUS at 01:02

## 2023-02-26 RX ADMIN — HUMAN INSULIN 6 UNITS: 100 INJECTION, SOLUTION SUBCUTANEOUS at 03:02

## 2023-02-26 RX ADMIN — SODIUM CHLORIDE 1000 ML: 9 INJECTION, SOLUTION INTRAVENOUS at 03:02

## 2023-02-26 RX ADMIN — KETOROLAC TROMETHAMINE 30 MG: 30 INJECTION, SOLUTION INTRAMUSCULAR; INTRAVENOUS at 01:02

## 2023-02-26 NOTE — ASSESSMENT & PLAN NOTE
Secondary to intractable nausea and vomiting.    Continue IV fluid and treat nausea and vomiting.    Recommend patient to address her gastroparesis with Gastroenterology for long-term management.

## 2023-02-26 NOTE — ASSESSMENT & PLAN NOTE
Patient is very noncompliant.  She is very lethargic at this time.    Will check urine drug screening and follow up in the morning

## 2023-02-26 NOTE — SUBJECTIVE & OBJECTIVE
Past Medical History:   Diagnosis Date    Diabetes mellitus     Diabetic gastroparesis associated with type 1 diabetes mellitus     DKA (diabetic ketoacidosis)     DKA (diabetic ketoacidosis)     DKA (diabetic ketoacidosis)     Gastroparesis     Gastroparesis due to DM     Hypertension     Ketoacidosis due to diabetes     Non compliance with medical treatment     Pneumonia     Secondary DM with DKA        Past Surgical History:   Procedure Laterality Date    CHOLECYSTECTOMY      ESOPHAGOGASTRODUODENOSCOPY      Multiple    None         Review of patient's allergies indicates:  No Known Allergies    No current facility-administered medications on file prior to encounter.     Current Outpatient Medications on File Prior to Encounter   Medication Sig    amitriptyline (ELAVIL) 25 MG tablet Take 25 mg by mouth nightly.    BASAGLAR KWIKPEN U-100 INSULIN glargine 100 units/mL (3mL) SubQ pen Inject into the skin.    dicyclomine (BENTYL) 10 MG capsule Take 10 mg by mouth 4 (four) times daily.    DULoxetine (CYMBALTA) 30 MG capsule Take 1 capsule (30 mg total) by mouth once daily.    hydrOXYzine (ATARAX) 50 MG tablet Take 50 mg by mouth daily as needed.    lisinopriL (PRINIVIL,ZESTRIL) 20 MG tablet Take 20 mg by mouth 2 (two) times daily.    metoprolol tartrate (LOPRESSOR) 50 MG tablet Take 50 mg by mouth 2 (two) times daily.    olmesartan (BENICAR) 40 MG tablet Take 40 mg by mouth once daily.    ondansetron (ZOFRAN) 4 MG tablet Take 4 mg by mouth every 6 (six) hours as needed.    pantoprazole (PROTONIX) 40 MG tablet Take 1 tablet (40 mg total) by mouth 2 (two) times daily.    [DISCONTINUED] insulin lispro (HUMALOG U-100 INSULIN) 100 unit/mL Crtg Inject into the skin.     Family History       Problem Relation (Age of Onset)    Cancer Father    Diabetes Mother    Heart disease Mother    Hyperlipidemia Mother, Father    Hypertension Mother, Father    Stroke Father          Tobacco Use    Smoking status: Never    Smokeless  tobacco: Never   Substance and Sexual Activity    Alcohol use: Never    Drug use: Not Currently     Types: Marijuana    Sexual activity: Yes     Review of Systems   Constitutional:  Positive for activity change and appetite change. Negative for fatigue and unexpected weight change.   HENT: Negative.     Eyes: Negative.    Respiratory: Negative.     Cardiovascular: Negative.    Gastrointestinal:  Positive for abdominal distention, anal bleeding, nausea and vomiting. Negative for abdominal pain and diarrhea.   Musculoskeletal: Negative.    All other systems reviewed and are negative.  Objective:     Vital Signs (Most Recent):  Temp: 98.8 °F (37.1 °C) (02/26/23 1109)  Pulse: 98 (02/26/23 1538)  Resp: 18 (02/26/23 1538)  BP: 134/78 (02/26/23 1538)  SpO2: 100 % (02/26/23 1538) Vital Signs (24h Range):  Temp:  [98.8 °F (37.1 °C)] 98.8 °F (37.1 °C)  Pulse:  [] 98  Resp:  [18-20] 18  SpO2:  [100 %] 100 %  BP: (134-150)/() 134/78     Weight: 60.3 kg (133 lb)  Body mass index is 24.33 kg/m².    Physical Exam  Vitals and nursing note reviewed.   Constitutional:       General: She is not in acute distress.     Appearance: Normal appearance. She is normal weight. She is ill-appearing.      Comments: Patient very lethargic but not distress resting comfortable.   HENT:      Head: Normocephalic.      Mouth/Throat:      Mouth: Mucous membranes are dry.   Eyes:      Pupils: Pupils are equal, round, and reactive to light.   Cardiovascular:      Rate and Rhythm: Regular rhythm. Tachycardia present.   Pulmonary:      Effort: Pulmonary effort is normal.      Breath sounds: Normal breath sounds.   Abdominal:      General: Abdomen is flat. Bowel sounds are normal. There is no distension.      Palpations: Abdomen is soft. There is no mass.      Tenderness: There is no abdominal tenderness. There is no guarding.   Musculoskeletal:         General: Normal range of motion.      Cervical back: Normal range of motion and neck supple.    Skin:     General: Skin is warm.      Capillary Refill: Capillary refill takes less than 2 seconds.   Neurological:      General: No focal deficit present.      Mental Status: Mental status is at baseline.   Psychiatric:         Mood and Affect: Mood normal.         CRANIAL NERVES     CN III, IV, VI   Pupils are equal, round, and reactive to light.     Significant Labs: All pertinent labs within the past 24 hours have been reviewed.  Recent Lab Results         02/26/23  1713   02/26/23  1337   02/26/23  1330   02/26/23  1112        Albumin/Globulin Ratio   0.9           Albumin   2.6           Alkaline Phosphatase   77           Allens Test Pass             ALT   11           AST   11           Baso #     0.04         Basophil %     0.3         BILIRUBIN TOTAL   1.0           Site RR             BUN   35.0           Calcium   8.1           Chloride   88           CO2   15           Creatinine   1.16           DelSys Room Air             eGFR   >60           Eos #     0.01         Eosinophil %     0.1         Globulin, Total   2.9           Glucose   360           Hematocrit     28.8         Hemoglobin     9.7         Immature Grans (Abs)     0.06         Immature Granulocytes     0.5         Lipase   9           Lymph #     1.69         LYMPH %     13.3         Magnesium   1.80           MCH     27.3         MCHC     33.7         MCV     81.1         Mono #     1.08         Mono %     8.5         MPV     9.0         Neut #     9.86         Neut %     77.3         nRBC     0.0         Platelets     360         POC BE 0             POC HCO3 25.7             POC PCO2 44.2             POC PH 7.373             POC PO2 83             POC SATURATED O2 96             POC TCO2 27             POCT Glucose       401       Potassium   3.9           PROTEIN TOTAL   5.5           RBC     3.55         RDW     12.1         Sample ARTERIAL             Sodium   124           WBC     12.7                 Significant Imaging: I have  reviewed all pertinent imaging results/findings within the past 24 hours.

## 2023-02-26 NOTE — HPI
Patient is a 27-year-old female with a long history of type 1 diabetes complicated with gastroparesis was brought to the hospital with nausea and vomiting.    Patient is somnolent, arousable to pain.  She follows commands but goes back to sleep.    She was in emergency room 2 days ago the very dehydrated secondary to the same medical problem.  She was given IV fluid discharged home.   She presented to emergency room with the same symptoms.    She is been hospitalized multiple times with the same medical problem.  She carries a diagnosis of gastroparesis secondary to above.  She follows up with her gastroenterologist with in [facility].    Her ABG in emergency room showed a pH of above 7.35.  Patient was given IV fluid.  She appears to be in acidotic status but not DKA.    Patient will be admitted to the hospital for further evaluation and management.  Asked patient if she is having a problem with infection, fever and chills she denies.  Not able to obtain further information due to patient being very somnolent.

## 2023-02-26 NOTE — H&P
[facility] - Emergency Dept  [facility] Medicine  History & Physical    Patient Name: Dorene Husain  MRN: [id number]  Patient Class: IP- Inpatient  Admission Date: 2/26/2023  Attending Physician: Sergio Hidalgo MD   Primary Care Provider: Kumar Ortiz NP         Patient information was obtained from patient and ER records.     Subjective:     Principal Problem:Hyperglycemia due to type 1 diabetes mellitus    Chief Complaint:   Chief Complaint   Patient presents with    Abdominal Pain     Abd and back pain   started again 2 days ago   pt believes she is in DKA again   seen here 2 days ago    Nausea and vomiting        HPI: Patient is a 27-year-old female with a long history of type 1 diabetes complicated with gastroparesis was brought to the hospital with nausea and vomiting.    Patient is somnolent, arousable to pain.  She follows commands but goes back to sleep.    She was in emergency room 2 days ago the very dehydrated secondary to the same medical problem.  She was given IV fluid discharged home.   She presented to emergency room with the same symptoms.    She is been hospitalized multiple times with the same medical problem.  She carries a diagnosis of gastroparesis secondary to above.  She follows up with her gastroenterologist with in [facility].    Her ABG in emergency room showed a pH of above 7.35.  Patient was given IV fluid.  She appears to be in acidotic status but not DKA.    Patient will be admitted to the hospital for further evaluation and management.  Asked patient if she is having a problem with infection, fever and chills she denies.  Not able to obtain further information due to patient being very somnolent.      Past Medical History:   Diagnosis Date    Diabetes mellitus     Diabetic gastroparesis associated with type 1 diabetes mellitus     DKA (diabetic ketoacidosis)     DKA (diabetic ketoacidosis)     DKA (diabetic ketoacidosis)     Gastroparesis      Gastroparesis due to DM     Hypertension     Ketoacidosis due to diabetes     Non compliance with medical treatment     Pneumonia     Secondary DM with DKA        Past Surgical History:   Procedure Laterality Date    CHOLECYSTECTOMY      ESOPHAGOGASTRODUODENOSCOPY      Multiple    None         Review of patient's allergies indicates:  No Known Allergies    No current facility-administered medications on file prior to encounter.     Current Outpatient Medications on File Prior to Encounter   Medication Sig    amitriptyline (ELAVIL) 25 MG tablet Take 25 mg by mouth nightly.    BASAGLAR KWIKPEN U-100 INSULIN glargine 100 units/mL (3mL) SubQ pen Inject into the skin.    dicyclomine (BENTYL) 10 MG capsule Take 10 mg by mouth 4 (four) times daily.    DULoxetine (CYMBALTA) 30 MG capsule Take 1 capsule (30 mg total) by mouth once daily.    hydrOXYzine (ATARAX) 50 MG tablet Take 50 mg by mouth daily as needed.    lisinopriL (PRINIVIL,ZESTRIL) 20 MG tablet Take 20 mg by mouth 2 (two) times daily.    metoprolol tartrate (LOPRESSOR) 50 MG tablet Take 50 mg by mouth 2 (two) times daily.    olmesartan (BENICAR) 40 MG tablet Take 40 mg by mouth once daily.    ondansetron (ZOFRAN) 4 MG tablet Take 4 mg by mouth every 6 (six) hours as needed.    pantoprazole (PROTONIX) 40 MG tablet Take 1 tablet (40 mg total) by mouth 2 (two) times daily.    [DISCONTINUED] insulin lispro (HUMALOG U-100 INSULIN) 100 unit/mL Crtg Inject into the skin.     Family History       Problem Relation (Age of Onset)    Cancer Father    Diabetes Mother    Heart disease Mother    Hyperlipidemia Mother, Father    Hypertension Mother, Father    Stroke Father          Tobacco Use    Smoking status: Never    Smokeless tobacco: Never   Substance and Sexual Activity    Alcohol use: Never    Drug use: Not Currently     Types: Marijuana    Sexual activity: Yes     Review of Systems   Constitutional:  Positive for activity change and appetite  change. Negative for fatigue and unexpected weight change.   HENT: Negative.     Eyes: Negative.    Respiratory: Negative.     Cardiovascular: Negative.    Gastrointestinal:  Positive for abdominal distention, anal bleeding, nausea and vomiting. Negative for abdominal pain and diarrhea.   Musculoskeletal: Negative.    All other systems reviewed and are negative.  Objective:     Vital Signs (Most Recent):  Temp: 98.8 °F (37.1 °C) (02/26/23 1109)  Pulse: 98 (02/26/23 1538)  Resp: 18 (02/26/23 1538)  BP: 134/78 (02/26/23 1538)  SpO2: 100 % (02/26/23 1538) Vital Signs (24h Range):  Temp:  [98.8 °F (37.1 °C)] 98.8 °F (37.1 °C)  Pulse:  [] 98  Resp:  [18-20] 18  SpO2:  [100 %] 100 %  BP: (134-150)/() 134/78     Weight: 60.3 kg (133 lb)  Body mass index is 24.33 kg/m².    Physical Exam  Vitals and nursing note reviewed.   Constitutional:       General: She is not in acute distress.     Appearance: Normal appearance. She is normal weight. She is ill-appearing.      Comments: Patient very lethargic but not distress resting comfortable.   HENT:      Head: Normocephalic.      Mouth/Throat:      Mouth: Mucous membranes are dry.   Eyes:      Pupils: Pupils are equal, round, and reactive to light.   Cardiovascular:      Rate and Rhythm: Regular rhythm. Tachycardia present.   Pulmonary:      Effort: Pulmonary effort is normal.      Breath sounds: Normal breath sounds.   Abdominal:      General: Abdomen is flat. Bowel sounds are normal. There is no distension.      Palpations: Abdomen is soft. There is no mass.      Tenderness: There is no abdominal tenderness. There is no guarding.   Musculoskeletal:         General: Normal range of motion.      Cervical back: Normal range of motion and neck supple.   Skin:     General: Skin is warm.      Capillary Refill: Capillary refill takes less than 2 seconds.   Neurological:      General: No focal deficit present.      Mental Status: Mental status is at baseline.   Psychiatric:          Mood and Affect: Mood normal.         CRANIAL NERVES     CN III, IV, VI   Pupils are equal, round, and reactive to light.     Significant Labs: All pertinent labs within the past 24 hours have been reviewed.  Recent Lab Results         02/26/23  1713   02/26/23  1337   02/26/23  1330   02/26/23  1112        Albumin/Globulin Ratio   0.9           Albumin   2.6           Alkaline Phosphatase   77           Allens Test Pass             ALT   11           AST   11           Baso #     0.04         Basophil %     0.3         BILIRUBIN TOTAL   1.0           Site RR             BUN   35.0           Calcium   8.1           Chloride   88           CO2   15           Creatinine   1.16           DelSys Room Air             eGFR   >60           Eos #     0.01         Eosinophil %     0.1         Globulin, Total   2.9           Glucose   360           Hematocrit     28.8         Hemoglobin     9.7         Immature Grans (Abs)     0.06         Immature Granulocytes     0.5         Lipase   9           Lymph #     1.69         LYMPH %     13.3         Magnesium   1.80           MCH     27.3         MCHC     33.7         MCV     81.1         Mono #     1.08         Mono %     8.5         MPV     9.0         Neut #     9.86         Neut %     77.3         nRBC     0.0         Platelets     360         POC BE 0             POC HCO3 25.7             POC PCO2 44.2             POC PH 7.373             POC PO2 83             POC SATURATED O2 96             POC TCO2 27             POCT Glucose       401       Potassium   3.9           PROTEIN TOTAL   5.5           RBC     3.55         RDW     12.1         Sample ARTERIAL             Sodium   124           WBC     12.7                 Significant Imaging: I have reviewed all pertinent imaging results/findings within the past 24 hours.    Assessment/Plan:     * Hyperglycemia due to type 1 diabetes mellitus  Appears to be nonketotic hyperglycemic hyperosmolar status.   Patient already  received 2 L of fluid emergency room.  Will give a 3rd bolus of IV fluids since she appeared to be on acute kidney injury and dehydrated.    PH shows a balance of 7.37 and no diabetic ketoacidosis.    Continue treatment for hyperglycemic status.    CHATA (acute kidney injury)  Secondary to intractable nausea and vomiting.    Continue IV fluid and treat nausea and vomiting.    Recommend patient to address her gastroparesis with Gastroenterology for long-term management.    DM gastroparesis  Will check hemoglobin A1c.    Short course of Reglan.    As needed Phenergan Zofran and Compazine for nausea and vomiting    Noncompliance  Patient is very noncompliant.  She is very lethargic at this time.    Will check urine drug screening and follow up in the morning      Hypertension  Will hold ACE inhibitors for now and start when patient's p.o. intake is installed.    VTE Risk Mitigation (From admission, onward)         Ordered     enoxaparin injection 40 mg  Daily         02/26/23 1731     IP VTE LOW RISK PATIENT  Once         02/26/23 1731     Place sequential compression device  Until discontinued         02/26/23 1731     Place CORNELIA hose  Until discontinued         02/26/23 1731               Patient will be admitted to the hospital as an inpatient.  Segun Viramontes MD  Department of Hospital Medicine   [facility] - Emergency Dept

## 2023-02-26 NOTE — ED PROVIDER NOTES
"Encounter Date: 2/26/2023       History     Chief Complaint   Patient presents with    Abdominal Pain     Abd and back pain   started again 2 days ago   pt believes she is in DKA again   seen here 2 days ago    Nausea and vomiting     The history is provided by the patient. A  was used.   Abdominal Pain  The current episode started two days ago. The onset of the illness was gradual. The problem has not changed since onset.The abdominal pain is generalized. The abdominal pain does not radiate. Relieved by: "rocking back and forth" The other symptoms of the illness include nausea and vomiting. The other symptoms of the illness do not include fever, shortness of breath, diarrhea or dysuria.   The patient states that she believes she is currently not pregnant. The patient has not had a change in bowel habit. Additional symptoms associated with the illness include back pain. Significant associated medical issues include diabetes.   Seen 2 days ago for same.  Well-known to this ED with frequent episode of DKA    Review of patient's allergies indicates:  No Known Allergies  Past Medical History:   Diagnosis Date    Diabetes mellitus     Diabetic gastroparesis associated with type 1 diabetes mellitus     DKA (diabetic ketoacidosis)     DKA (diabetic ketoacidosis)     DKA (diabetic ketoacidosis)     Gastroparesis     Gastroparesis due to DM     Hypertension     Ketoacidosis due to diabetes     Non compliance with medical treatment     Pneumonia     Secondary DM with DKA      Past Surgical History:   Procedure Laterality Date    CHOLECYSTECTOMY      ESOPHAGOGASTRODUODENOSCOPY      Multiple    None       Family History   Problem Relation Age of Onset    Heart disease Mother     Hypertension Mother     Diabetes Mother     Hyperlipidemia Mother     Cancer Father     Stroke Father     Hypertension Father     Hyperlipidemia Father      Social History     Tobacco Use    Smoking status: Never    Smokeless " tobacco: Never   Substance Use Topics    Alcohol use: Never    Drug use: Not Currently     Types: Marijuana     Review of Systems   Constitutional:  Negative for fever.   HENT:  Negative for sore throat.    Respiratory:  Negative for shortness of breath.    Cardiovascular:  Negative for chest pain.   Gastrointestinal:  Positive for abdominal pain, nausea and vomiting. Negative for diarrhea.   Genitourinary:  Negative for dysuria.   Musculoskeletal:  Positive for back pain.   Skin:  Negative for rash.   Neurological:  Negative for weakness.   Hematological:  Does not bruise/bleed easily.     Physical Exam     Initial Vitals [02/26/23 1109]   BP Pulse Resp Temp SpO2   (!) 150/103 (!) 121 18 98.8 °F (37.1 °C) 100 %      MAP       --         Physical Exam    Nursing note and vitals reviewed.  Constitutional: She appears well-developed and well-nourished.   HENT:   Head: Normocephalic and atraumatic.   Right Ear: External ear normal.   Left Ear: External ear normal.   Eyes: Conjunctivae and EOM are normal. Pupils are equal, round, and reactive to light.   Neck: Neck supple.   Normal range of motion.  Cardiovascular:  Regular rhythm, normal heart sounds and intact distal pulses.   Tachycardia present.         Pulmonary/Chest: Breath sounds normal.   Abdominal: Abdomen is soft. Bowel sounds are normal.   Musculoskeletal:         General: Normal range of motion.      Cervical back: Normal range of motion and neck supple.     Neurological: She is alert and oriented to person, place, and time. GCS score is 15. GCS eye subscore is 4. GCS verbal subscore is 5. GCS motor subscore is 6.   Skin: Skin is warm and dry. Capillary refill takes less than 2 seconds.   Psychiatric: She has a normal mood and affect. Her behavior is normal. Judgment and thought content normal.       ED Course   Central Line    Date/Time: 2/26/2023 9:27 PM  Performed by: Millie Burr MD  Authorized by: Millie Burr MD     Location procedure was  performed:  [facility] EMERGENCY DEPARTMENT  Pre-operative diagnosis:  Poor venous access  Post-operative diagnosis:  Poor venous access  Consent Done ?:  Emergent Situation  Time out complete?: Verified correct patient, procedure, equipment, staff, and site/side    Indications:  Med administration and vascular access  Anesthesia:  Local infiltration  Local anesthetic:  Lidocaine 1% without epinephrine  Anesthetic total (ml):  5  Preparation:  Skin prepped with ChloraPrep  Skin prep agent dried: Skin prep agent completely dried prior to procedure    Sterile barriers: All five maximal sterile barriers used - gloves, gown, cap, mask and large sterile sheet    Hand hygiene: Hand hygiene performed immediately prior to central venous catheter insertion    Location:  Right femoral  Site selection rationale:  To prevent complications  Catheter type:  Triple lumen  Catheter size:  7 Fr  Inserted Catheter Length (cm):  16  Ultrasound guidance: Yes    Vessel Caliber:  Large  Comprressibility:  Normal  Doppler:  Not done  Needle advanced into vessel with real time ultrasound guidance.    Guidewire confirmed in vessel.    Steril sheath on probe.    Sterile gel used.  Manometry: No    Number of attempts:  1  Securement:  Line sutured, blood return through all ports, chlorhexidine patch and sterile dressing applied  Technical Procedures Used:  Seldinger technique  Complications: No    Estimated blood loss (mL):  2  Adverse Events:  NoneTermination Site: iliac vein      Orders Placed This Encounter   Procedures    CENTRAL LINE    X-Ray Chest AP Portable    X-Ray Chest AP Portable    CBC auto differential    Comprehensive metabolic panel    Lactic acid, plasma    Lipase    Magnesium    Troponin I    Urinalysis, Reflex to Urine Culture    Pregnancy, urine rapid    CBC with Differential    Beta - Hydroxybutyrate, Serum    Hemoglobin A1C    Comprehensive metabolic panel    CBC auto differential    Drug Screen, Urine    Hemoglobin A1C     Magnesium    Phosphorus    Diet NPO    Diet clear liquid    Vital signs    Vital signs    Intake and output    Notify physician     Place sequential compression device    Vital signs    Intake and output    Notify physician     Vital signs    Cardiac Monitoring - Adult    Advance diet as tolerated to Regular diet    Place CORNELIA hose    If any glucose result is less than 50 or greater than 400:    If 2nd result is less than 50 or greater than 400:    If glucose greater than 400 mg/dL treat per correction scale.  If glucose remains elevated above 400 mg/dL at next scheduled check, notify provider    Do not admin Aspart correction between scheduled prandial Aspart    Recheck Blood Glucose:    Full code    POCT ARTERIAL BLOOD GAS    Pulse Oximetry Q4H    EKG 12-lead    Insert Saline lock IV    Saline lock IV    Place in Observation    Admit to Inpatient     Admission on 02/26/2023   Component Date Value Ref Range Status    POCT Glucose 02/26/2023 401 (H)  70 - 110 mg/dL Final    Sodium Level 02/26/2023 124 (L)  136 - 145 mmol/L Final    Potassium Level 02/26/2023 3.9  3.5 - 5.1 mmol/L Final    Chloride 02/26/2023 88 (L)  98 - 107 mmol/L Final    Carbon Dioxide 02/26/2023 15 (L)  22 - 29 mmol/L Final    Glucose Level 02/26/2023 360 (H)  74 - 100 mg/dL Final    Blood Urea Nitrogen 02/26/2023 35.0 (H)  7.0 - 18.7 mg/dL Final    Creatinine 02/26/2023 1.16 (H)  0.55 - 1.02 mg/dL Final    Calcium Level Total 02/26/2023 8.1 (L)  8.4 - 10.2 mg/dL Final    Protein Total 02/26/2023 5.5 (L)  6.4 - 8.3 gm/dL Final    Albumin Level 02/26/2023 2.6 (L)  3.5 - 5.0 g/dL Final    Globulin 02/26/2023 2.9  2.4 - 3.5 gm/dL Final    Albumin/Globulin Ratio 02/26/2023 0.9 (L)  1.1 - 2.0 ratio Final    Bilirubin Total 02/26/2023 1.0  <=1.5 mg/dL Final    Alkaline Phosphatase 02/26/2023 77  40 - 150 unit/L Final    Alanine Aminotransferase 02/26/2023 11  0 - 55 unit/L Final    Aspartate Aminotransferase 02/26/2023 11  5 - 34 unit/L Final    eGFR  02/26/2023 >60  mls/min/1.73/m2 Final    Lactic Acid Level 02/26/2023 1.4  0.5 - 2.2 mmol/L Final    Lipase Level 02/26/2023 9  <=60 U/L Final    Magnesium Level 02/26/2023 1.80  1.60 - 2.60 mg/dL Final    Troponin-I 02/26/2023 0.027  0.000 - 0.045 ng/mL Final    WBC 02/26/2023 12.7 (H)  4.5 - 11.5 x10(3)/mcL Final    RBC 02/26/2023 3.55 (L)  4.20 - 5.40 x10(6)/mcL Final    Hgb 02/26/2023 9.7 (L)  12.0 - 16.0 g/dL Final    Hct 02/26/2023 28.8 (L)  37.0 - 47.0 % Final    MCV 02/26/2023 81.1  80.0 - 94.0 fL Final    MCH 02/26/2023 27.3  pg Final    MCHC 02/26/2023 33.7  33.0 - 36.0 g/dL Final    RDW 02/26/2023 12.1  11.5 - 17.0 % Final    Platelet 02/26/2023 360  130 - 400 x10(3)/mcL Final    MPV 02/26/2023 9.0  7.4 - 10.4 fL Final    Neut % 02/26/2023 77.3  % Final    Lymph % 02/26/2023 13.3  % Final    Mono % 02/26/2023 8.5  % Final    Eos % 02/26/2023 0.1  % Final    Basophil % 02/26/2023 0.3  % Final    Lymph # 02/26/2023 1.69  0.6 - 4.6 x10(3)/mcL Final    Neut # 02/26/2023 9.86 (H)  2.1 - 9.2 x10(3)/mcL Final    Mono # 02/26/2023 1.08  0.1 - 1.3 x10(3)/mcL Final    Eos # 02/26/2023 0.01  0 - 0.9 x10(3)/mcL Final    Baso # 02/26/2023 0.04  0 - 0.2 x10(3)/mcL Final    IG# 02/26/2023 0.06 (H)  0 - 0.04 x10(3)/mcL Final    IG% 02/26/2023 0.5  % Final    NRBC% 02/26/2023 0.0  % Final    POC PH 02/26/2023 7.373  7.35 - 7.45 Final    POC PCO2 02/26/2023 44.2  35 - 45 mmHg Final    POC PO2 02/26/2023 83  80 - 100 mmHg Final    POC HCO3 02/26/2023 25.7  24 - 28 mmol/L Final    POC BE 02/26/2023 0  -2 to 2 mmol/L Final    POC SATURATED O2 02/26/2023 96  95 - 100 % Final    POC TCO2 02/26/2023 27  23 - 27 mmol/L Final    Sample 02/26/2023 ARTERIAL   Final    Site 02/26/2023 RR   Final    Allens Test 02/26/2023 Pass   Final    DelSys 02/26/2023 Room Air   Final    Hemoglobin A1c 02/26/2023 11.9 (H)  <=7.0 % Final    Estimated Average Glucose 02/26/2023 294.8  mg/dL Final       Labs Reviewed   COMPREHENSIVE METABOLIC PANEL -  Abnormal; Notable for the following components:       Result Value    Sodium Level 124 (*)     Chloride 88 (*)     Carbon Dioxide 15 (*)     Glucose Level 360 (*)     Blood Urea Nitrogen 35.0 (*)     Creatinine 1.16 (*)     Calcium Level Total 8.1 (*)     Protein Total 5.5 (*)     Albumin Level 2.6 (*)     Albumin/Globulin Ratio 0.9 (*)     All other components within normal limits   CBC WITH DIFFERENTIAL - Abnormal; Notable for the following components:    WBC 12.7 (*)     RBC 3.55 (*)     Hgb 9.7 (*)     Hct 28.8 (*)     Neut # 9.86 (*)     IG# 0.06 (*)     All other components within normal limits   HEMOGLOBIN A1C - Abnormal; Notable for the following components:    Hemoglobin A1c 11.9 (*)     All other components within normal limits   POCT GLUCOSE - Abnormal; Notable for the following components:    POCT Glucose 401 (*)     All other components within normal limits   LACTIC ACID, PLASMA - Normal   LIPASE - Normal   MAGNESIUM - Normal   TROPONIN I - Normal   CBC W/ AUTO DIFFERENTIAL    Narrative:     The following orders were created for panel order CBC auto differential.  Procedure                               Abnormality         Status                     ---------                               -----------         ------                     CBC with Differential[[id number]]        Abnormal            Final result                 Please view results for these tests on the individual orders.   URINALYSIS, REFLEX TO URINE CULTURE   PREGNANCY TEST, URINE RAPID   BETA - HYDROXYBUTYRATE, SERUM   DRUG SCREEN, URINE (BEAKER)   ISTAT PROCEDURE   POCT GLUCOSE MONITORING CONTINUOUS     EKG Readings: (Independently Interpreted)   Initial Reading: No STEMI. Rhythm: Normal Sinus Rhythm. Heart Rate: 96. Ectopy: No Ectopy. Conduction: Normal. ST Segments: Normal ST Segments. T Waves Flipped: III. Axis: Normal. Clinical Impression: Normal Sinus Rhythm   ECG Results              EKG 12-lead (Final result)  Result time 02/26/23  21:22:14      Final result by Interface, Lab In [facility] (02/26/23 21:22:14)                   Narrative:    Test Reason : R73.9,    Vent. Rate : 096 BPM     Atrial Rate : 096 BPM     P-R Int : 132 ms          QRS Dur : 084 ms      QT Int : 360 ms       P-R-T Axes : 058 061 007 degrees     QTc Int : 454 ms    Normal sinus rhythm  Nonspecific T wave abnormality  Abnormal ECG  When compared with ECG of 23-FEB-2023 18:59,  Nonspecific T wave abnormality, worse in Anterior leads  Confirmed by Brad Hoang MD ([id number]) on 2/26/2023 9:22:00 PM    Referred By: [email or web address]   SELF           Confirmed By:Brad Hoang MD                                  Imaging Results              X-Ray Chest AP Portable (In process)  Result time 02/26/23 16:56:48                         Medications   sodium chloride 0.9% flush 10 mL (has no administration in time range)   melatonin tablet 6 mg (has no administration in time range)   morphine injection 2 mg (has no administration in time range)   morphine injection 4 mg (has no administration in time range)   ondansetron injection 4 mg (has no administration in time range)   prochlorperazine injection Soln 5 mg (has no administration in time range)   DULoxetine DR capsule 30 mg (has no administration in time range)   metoprolol tartrate (LOPRESSOR) tablet 50 mg (has no administration in time range)   sodium chloride 0.9% flush 10 mL (has no administration in time range)   enoxaparin injection 40 mg (40 mg Subcutaneous Given 2/26/23 1750)   acetaminophen tablet 650 mg (has no administration in time range)   polyethylene glycol packet 17 g (has no administration in time range)   famotidine (PF) injection 20 mg (has no administration in time range)   insulin detemir U-100 injection 15 Units (has no administration in time range)   insulin aspart U-100 injection 0-5 Units (has no administration in time range)   dextrose 50% injection 12.5 g (has no administration in time range)   dextrose 50% injection 25  g (has no administration in time range)   glucagon (human recombinant) injection 1 mg (has no administration in time range)   insulin aspart U-100 injection 1-10 Units (has no administration in time range)   0.45 % NaCl with KCl 20 mEq infusion (125 mL/hr Intravenous New Bag 2/26/23 1750)   metoclopramide HCl injection 5 mg (has no administration in time range)   lactated ringers bolus 1,000 mL (0 mLs Intravenous Stopped 2/26/23 1424)   ketorolac injection 30 mg (30 mg Intravenous Given 2/26/23 1355)   droperidoL injection 2.5 mg (2.5 mg Intravenous Given 2/26/23 1354)   diphenhydrAMINE injection 25 mg (25 mg Intravenous Given 2/26/23 1355)   morphine injection 4 mg (4 mg Intravenous Given 2/26/23 1354)   sodium chloride 0.9% bolus 1,000 mL 1,000 mL (0 mLs Intravenous Stopped 2/26/23 1640)   insulin regular injection 6 Units 0.06 mL (6 Units Intravenous Given 2/26/23 1530)                 ED Course as of 02/26/23 2133   Sun Feb 26, 2023   1547 I spoke with Dr. Viramontes - requests ABG and if pH < 7.35, admit to ICU on DKA protocol.  ABG ordered. [CL]   2126 I am Dr. Burr I assumed the care of patient once patient was admitted, and they lost her IV, and could not get another IV, I tried myself inserting the IV in the peripheral vein but I could not get it either, then I decided to insert central line, I got the verbal consent from the patient who has had central line multiple times in the past, please see the procedure note. [GQ]      ED Course User Index  [CL] Francis Davies MD  [GQ] Millie Burr MD          ABG resulted - pH normal.  Will admit to floor for IVF, symptom control.  Likely an exacerbation of her gastroparesis.       Clinical Impression:   Final diagnoses:  [R73.9] Hyperglycemia  [E11.43, K31.84] Gastroparesis diabeticorum (Primary)  [R10.84] Generalized abdominal pain  [E86.0] Dehydration  [E87.20] Metabolic acidosis  [R73.9] Hyperglycemia without ketosis  [Z45.2] Encounter for  central line placement        ED Disposition Condition    Admit                 Millie Burr MD  02/26/23 2124

## 2023-02-26 NOTE — ASSESSMENT & PLAN NOTE
Will check hemoglobin A1c.    Short course of Reglan.    As needed Phenergan Zofran and Compazine for nausea and vomiting

## 2023-02-26 NOTE — ASSESSMENT & PLAN NOTE
Appears to be nonketotic hyperglycemic hyperosmolar status.   Patient already received 2 L of fluid emergency room.  Will give a 3rd bolus of IV fluids since she appeared to be on acute kidney injury and dehydrated.    PH shows a balance of 7.37 and no diabetic ketoacidosis.    Continue treatment for hyperglycemic status.

## 2023-02-26 NOTE — Clinical Note
Diagnosis: Gastroparesis diabeticorum [140572]   Future Attending Provider: ROMEO SIDDIQUI [[id number]]   Admitting Provider:: FIDELIA DURAND [[id number]]   Special Needs:: No Special Needs [1]

## 2023-02-27 LAB
ALBUMIN SERPL-MCNC: 2.9 G/DL (ref 3.5–5)
ALBUMIN/GLOB SERPL: 0.9 RATIO (ref 1.1–2)
ALP SERPL-CCNC: 75 UNIT/L (ref 40–150)
ALT SERPL-CCNC: 10 UNIT/L (ref 0–55)
AST SERPL-CCNC: 19 UNIT/L (ref 5–34)
B-OH-BUTYR SERPL-MCNC: 3.6 MMOL/L
BASOPHILS # BLD AUTO: 0.04 X10(3)/MCL (ref 0–0.2)
BASOPHILS NFR BLD AUTO: 0.4 %
BILIRUBIN DIRECT+TOT PNL SERPL-MCNC: 0.8 MG/DL
BUN SERPL-MCNC: 28 MG/DL (ref 7–18.7)
CALCIUM SERPL-MCNC: 8.7 MG/DL (ref 8.4–10.2)
CHLORIDE SERPL-SCNC: 97 MMOL/L (ref 98–107)
CO2 SERPL-SCNC: 22 MMOL/L (ref 22–29)
CREAT SERPL-MCNC: 1.08 MG/DL (ref 0.55–1.02)
EOSINOPHIL # BLD AUTO: 0.02 X10(3)/MCL (ref 0–0.9)
EOSINOPHIL NFR BLD AUTO: 0.2 %
ERYTHROCYTE [DISTWIDTH] IN BLOOD BY AUTOMATED COUNT: 12.1 % (ref 11.5–17)
EST. AVERAGE GLUCOSE BLD GHB EST-MCNC: 294.8 MG/DL
GFR SERPLBLD CREATININE-BSD FMLA CKD-EPI: >60 MLS/MIN/1.73/M2
GLOBULIN SER-MCNC: 3.4 GM/DL (ref 2.4–3.5)
GLUCOSE SERPL-MCNC: 140 MG/DL (ref 74–100)
HBA1C MFR BLD: 11.9 %
HCT VFR BLD AUTO: 31.6 % (ref 37–47)
HGB BLD-MCNC: 10.2 G/DL (ref 12–16)
IMM GRANULOCYTES # BLD AUTO: 0.05 X10(3)/MCL (ref 0–0.04)
IMM GRANULOCYTES NFR BLD AUTO: 0.5 %
LYMPHOCYTES # BLD AUTO: 1.91 X10(3)/MCL (ref 0.6–4.6)
LYMPHOCYTES NFR BLD AUTO: 17.7 %
MAGNESIUM SERPL-MCNC: 2 MG/DL (ref 1.6–2.6)
MCH RBC QN AUTO: 27.1 PG
MCHC RBC AUTO-ENTMCNC: 32.3 G/DL (ref 33–36)
MCV RBC AUTO: 83.8 FL (ref 80–94)
MONOCYTES # BLD AUTO: 1.12 X10(3)/MCL (ref 0.1–1.3)
MONOCYTES NFR BLD AUTO: 10.4 %
NEUTROPHILS # BLD AUTO: 7.67 X10(3)/MCL (ref 2.1–9.2)
NEUTROPHILS NFR BLD AUTO: 70.8 %
PHOSPHATE SERPL-MCNC: 1.4 MG/DL (ref 2.3–4.7)
PLATELET # BLD AUTO: 458 X10(3)/MCL (ref 130–400)
PMV BLD AUTO: 9 FL (ref 7.4–10.4)
POCT GLUCOSE: 98 MG/DL (ref 70–110)
POTASSIUM SERPL-SCNC: 4.1 MMOL/L (ref 3.5–5.1)
PROT SERPL-MCNC: 6.3 GM/DL (ref 6.4–8.3)
RBC # BLD AUTO: 3.77 X10(6)/MCL (ref 4.2–5.4)
SODIUM SERPL-SCNC: 129 MMOL/L (ref 136–145)
WBC # SPEC AUTO: 10.8 X10(3)/MCL (ref 4.5–11.5)

## 2023-02-27 PROCEDURE — 83036 HEMOGLOBIN GLYCOSYLATED A1C: CPT | Performed by: INTERNAL MEDICINE

## 2023-02-27 PROCEDURE — 94761 N-INVAS EAR/PLS OXIMETRY MLT: CPT

## 2023-02-27 PROCEDURE — 84100 ASSAY OF PHOSPHORUS: CPT | Performed by: EMERGENCY MEDICINE

## 2023-02-27 PROCEDURE — 25000003 PHARM REV CODE 250: Performed by: EMERGENCY MEDICINE

## 2023-02-27 PROCEDURE — 99900035 HC TECH TIME PER 15 MIN (STAT)

## 2023-02-27 PROCEDURE — 83735 ASSAY OF MAGNESIUM: CPT | Performed by: EMERGENCY MEDICINE

## 2023-02-27 PROCEDURE — 85025 COMPLETE CBC W/AUTO DIFF WBC: CPT | Performed by: EMERGENCY MEDICINE

## 2023-02-27 PROCEDURE — 80053 COMPREHEN METABOLIC PANEL: CPT | Performed by: EMERGENCY MEDICINE

## 2023-02-27 PROCEDURE — 63600175 PHARM REV CODE 636 W HCPCS: Performed by: EMERGENCY MEDICINE

## 2023-02-27 PROCEDURE — 83036 HEMOGLOBIN GLYCOSYLATED A1C: CPT | Performed by: EMERGENCY MEDICINE

## 2023-02-27 PROCEDURE — 63600175 PHARM REV CODE 636 W HCPCS: Performed by: INTERNAL MEDICINE

## 2023-02-27 PROCEDURE — 21400001 HC TELEMETRY ROOM

## 2023-02-27 RX ORDER — HYDRALAZINE HYDROCHLORIDE 20 MG/ML
10 INJECTION INTRAMUSCULAR; INTRAVENOUS EVERY 4 HOURS PRN
Status: DISCONTINUED | OUTPATIENT
Start: 2023-02-27 | End: 2023-03-06 | Stop reason: HOSPADM

## 2023-02-27 RX ORDER — MORPHINE SULFATE 4 MG/ML
2 INJECTION, SOLUTION INTRAMUSCULAR; INTRAVENOUS
Status: DISCONTINUED | OUTPATIENT
Start: 2023-02-27 | End: 2023-03-03

## 2023-02-27 RX ORDER — MORPHINE SULFATE 4 MG/ML
4 INJECTION, SOLUTION INTRAMUSCULAR; INTRAVENOUS
Status: DISCONTINUED | OUTPATIENT
Start: 2023-02-27 | End: 2023-03-03

## 2023-02-27 RX ADMIN — MORPHINE SULFATE 4 MG: 4 INJECTION, SOLUTION INTRAMUSCULAR; INTRAVENOUS at 05:02

## 2023-02-27 RX ADMIN — SODIUM CHLORIDE AND POTASSIUM CHLORIDE: 4.5; 1.49 INJECTION, SOLUTION INTRAVENOUS at 11:02

## 2023-02-27 RX ADMIN — MORPHINE SULFATE 2 MG: 4 INJECTION, SOLUTION INTRAMUSCULAR; INTRAVENOUS at 05:02

## 2023-02-27 RX ADMIN — MORPHINE SULFATE 2 MG: 4 INJECTION, SOLUTION INTRAMUSCULAR; INTRAVENOUS at 11:02

## 2023-02-27 RX ADMIN — MORPHINE SULFATE 4 MG: 4 INJECTION, SOLUTION INTRAMUSCULAR; INTRAVENOUS at 01:02

## 2023-02-27 RX ADMIN — HYDRALAZINE HYDROCHLORIDE 10 MG: 20 INJECTION INTRAMUSCULAR; INTRAVENOUS at 01:02

## 2023-02-27 RX ADMIN — MORPHINE SULFATE 4 MG: 4 INJECTION, SOLUTION INTRAMUSCULAR; INTRAVENOUS at 10:02

## 2023-02-27 RX ADMIN — FAMOTIDINE 20 MG: 10 INJECTION INTRAVENOUS at 10:02

## 2023-02-27 RX ADMIN — ONDANSETRON 4 MG: 2 INJECTION INTRAMUSCULAR; INTRAVENOUS at 04:02

## 2023-02-27 RX ADMIN — SODIUM CHLORIDE AND POTASSIUM CHLORIDE: 4.5; 1.49 INJECTION, SOLUTION INTRAVENOUS at 05:02

## 2023-02-27 RX ADMIN — MORPHINE SULFATE 4 MG: 4 INJECTION, SOLUTION INTRAMUSCULAR; INTRAVENOUS at 02:02

## 2023-02-27 RX ADMIN — HYDRALAZINE HYDROCHLORIDE 10 MG: 20 INJECTION INTRAMUSCULAR; INTRAVENOUS at 02:02

## 2023-02-27 RX ADMIN — SODIUM CHLORIDE AND POTASSIUM CHLORIDE: 4.5; 1.49 INJECTION, SOLUTION INTRAVENOUS at 02:02

## 2023-02-27 RX ADMIN — METOPROLOL TARTRATE 50 MG: 50 TABLET, FILM COATED ORAL at 10:02

## 2023-02-27 RX ADMIN — HYDRALAZINE HYDROCHLORIDE 10 MG: 20 INJECTION INTRAMUSCULAR; INTRAVENOUS at 05:02

## 2023-02-27 RX ADMIN — INSULIN DETEMIR 15 UNITS: 100 INJECTION, SOLUTION SUBCUTANEOUS at 10:02

## 2023-02-27 RX ADMIN — FAMOTIDINE 20 MG: 10 INJECTION INTRAVENOUS at 08:02

## 2023-02-27 RX ADMIN — DULOXETINE 30 MG: 30 CAPSULE, DELAYED RELEASE ORAL at 10:02

## 2023-02-27 NOTE — PLAN OF CARE
Problem: Adult Inpatient Plan of Care  Goal: Plan of Care Review  Outcome: Ongoing, Progressing  Goal: Patient-Specific Goal (Individualized)  Outcome: Ongoing, Progressing  Goal: Absence of Hospital-Acquired Illness or Injury  Outcome: Ongoing, Progressing  Goal: Optimal Comfort and Wellbeing  Outcome: Ongoing, Progressing  Goal: Readiness for Transition of Care  Outcome: Ongoing, Progressing     Problem: Diabetes Comorbidity  Goal: Blood Glucose Level Within Targeted Range  Outcome: Ongoing, Progressing  Intervention: Monitor and Manage Glycemia  Flowsheets (Taken 2/27/2023 0201)  Glycemic Management: blood glucose monitored     Problem: Fluid and Electrolyte Imbalance (Acute Kidney Injury/Impairment)  Goal: Fluid and Electrolyte Balance  Outcome: Ongoing, Progressing     Problem: Oral Intake Inadequate (Acute Kidney Injury/Impairment)  Goal: Optimal Nutrition Intake  Outcome: Ongoing, Progressing     Problem: Renal Function Impairment (Acute Kidney Injury/Impairment)  Goal: Effective Renal Function  Outcome: Ongoing, Progressing  Intervention: Monitor and Support Renal Function  Flowsheets (Taken 2/27/2023 0201)  Stabilization Measures: fluid resuscitation initiated     Problem: Infection  Goal: Absence of Infection Signs and Symptoms  Outcome: Ongoing, Progressing

## 2023-02-27 NOTE — PLAN OF CARE
02/27/23 1549   Discharge Assessment   Assessment Type Discharge Planning Assessment   Source of Information patient   When was your last doctors appointment? 12/14/22   Communicated EBENEZER with patient/caregiver Date not available/Unable to determine   Reason For Admission DKA   People in Home parent(s)   Do you expect to return to your current living situation? Yes   Do you have help at home or someone to help you manage your care at home? Yes   Who are your caregiver(s) and their phone number(s)? Dad: Bao Husain   Current cognitive status: Alert/Oriented   Equipment Currently Used at Home glucometer   Readmission within 30 days? No   Patient currently being followed by outpatient case management? No   Do you currently have service(s) that help you manage your care at home? No   Do you take prescription medications? Yes   Do you have any problems affording any of your prescribed medications? No   Is the patient taking medications as prescribed? yes   Who is going to help you get home at discharge? dad   How do you get to doctors appointments? car, drives self   Are you on dialysis? No   Do you take coumadin? No   Discharge Plan A Home   DME Needed Upon Discharge  none   Discharge Plan discussed with: Patient   Discharge Barriers Identified None   Physical Activity   On average, how many days per week do you engage in moderate to strenuous exercise (like a brisk walk)? 0 days   Financial Resource Strain   How hard is it for you to pay for the very basics like food, housing, medical care, and heating? Not very   Housing Stability   In the last 12 months, was there a time when you were not able to pay the mortgage or rent on time? N   In the last 12 months, how many places have you lived? 1   In the last 12 months, was there a time when you did not have a steady place to sleep or slept in a shelter (including now)? N   Transportation Needs   In the past 12 months, has lack of transportation kept you from medical  appointments or from getting medications? no   In the past 12 months, has lack of transportation kept you from meetings, work, or from getting things needed for daily living? No   Food Insecurity   Within the past 12 months, you worried that your food would run out before you got the money to buy more. Never true   Within the past 12 months, the food you bought just didn't last and you didn't have money to get more. Never true   Stress   Do you feel stress - tense, restless, nervous, or anxious, or unable to sleep at night because your mind is troubled all the time - these days? To some exte   Social Connections   In a typical week, how many times do you talk on the phone with family, friends, or neighbors? More than 3   How often do you get together with friends or relatives? More than 3   How often do you attend Scientologist or Islam services? More than 4   Are you , , , , never , or living with a partner? Never marrie   Alcohol Use   Q1: How often do you have a drink containing alcohol? Never   Q2: How many drinks containing alcohol do you have on a typical day when you are drinking? None   Q3: How often do you have six or more drinks on one occasion? Never   OTHER   Name(s) of People in Home Bao Husain (father)     Pharmacy: [facility]  No HH/DME  Pt denies any dc needs.

## 2023-02-27 NOTE — PROGRESS NOTES
"Inpatient Nutrition Evaluation    Admit Date: 2/26/2023   Total duration of encounter: 1 day    Nutrition Recommendation/Prescription     When able to lift NPO status and advance diet, pt would likely benefit from 5 to 6 small meals daily with Diabetic restriction.   Monitor NPO status, weight, and labs.     RD available as needed.  Thank you.     Nutrition Assessment     Chart Review    Reason Seen: continuous nutrition monitoring    Malnutrition Screening Tool Results   Have you recently lost weight without trying?: No  Have you been eating poorly because of a decreased appetite?: No   MST Score: 0     Diagnosis:  Abdominal Pain.  Nausea and vomiting.   DM Gastroparesis.  Noncompliance.  CHATA.  HTN.    Relevant Medical History:   DM.   Noncompliance.     Nutrition-Related Medications:   Lovenox.  Insulin.       Nutrition-Related Labs:  2/27: H/H 10.2/31.6(L); Na+ 129(L); BUN/Crea 28/1.08(H);(H); Phos 1.4(L); Alb 2.9(L).      Diet Order: Diet NPO  Diet clear liquid  Oral Supplement Order: none  Appetite/Oral Intake: poor/0-25% of meals  Factors Affecting Nutritional Intake: clear liquid diet and NPO  Food/Catholic/Cultural Preferences: none reported  Food Allergies: none reported       Wound(s):       Comments        Anthropometrics    Height: 5' 2" (157.5 cm)    Last Weight: 61.5 kg (135 lb 9.3 oz) (02/27/23 0038) Weight Method: Bed Scale  BMI (Calculated): 24.8  BMI Classification: normal (BMI 18.5-24.9)     Ideal Body Weight (IBW), Female: 110 lb     % Ideal Body Weight, Female (lb): 123.25 %                             Usual Weight Provided By: EMR weight history    Wt Readings from Last 3 Encounters:   02/27/23 0038 61.5 kg (135 lb 9.3 oz)   02/26/23 1109 60.3 kg (133 lb)   02/23/23 1839 60.3 kg (133 lb)   12/19/22 1320 58.5 kg (129 lb)      Weight Change(s) Since Admission:  Admit Weight: 60.3 kg (133 lb) (02/26/23 1109)      Patient Education    Not applicable.    Monitoring & Evaluation     Dietitian " will monitor food and beverage intake, energy intake, weight, electrolyte/renal panel, glucose/endocrine profile, and gastrointestinal profile.  Nutrition Risk/Follow-Up: low (follow-up in 5-7 days)  Patients assigned 'low nutrition risk' status do not qualify for a full nutritional assessment but will be monitored and re-evaluated in a 5-7 day time period. Please consult if re-evaluation needed sooner.

## 2023-02-28 LAB
GLUCOSE SERPL-MCNC: 196 MG/DL (ref 70–110)
GLUCOSE SERPL-MCNC: 200 MG/DL (ref 70–110)
POCT GLUCOSE: 111 MG/DL (ref 70–110)
POCT GLUCOSE: 112 MG/DL (ref 70–110)
POCT GLUCOSE: 153 MG/DL (ref 70–110)
POCT GLUCOSE: 196 MG/DL (ref 70–110)

## 2023-02-28 PROCEDURE — 94761 N-INVAS EAR/PLS OXIMETRY MLT: CPT

## 2023-02-28 PROCEDURE — 99900035 HC TECH TIME PER 15 MIN (STAT)

## 2023-02-28 PROCEDURE — 63600175 PHARM REV CODE 636 W HCPCS: Performed by: EMERGENCY MEDICINE

## 2023-02-28 PROCEDURE — 25000003 PHARM REV CODE 250: Performed by: INTERNAL MEDICINE

## 2023-02-28 PROCEDURE — 21400001 HC TELEMETRY ROOM

## 2023-02-28 PROCEDURE — 63600175 PHARM REV CODE 636 W HCPCS: Performed by: INTERNAL MEDICINE

## 2023-02-28 PROCEDURE — 25000003 PHARM REV CODE 250: Performed by: EMERGENCY MEDICINE

## 2023-02-28 RX ORDER — MUPIROCIN 20 MG/G
OINTMENT TOPICAL 2 TIMES DAILY
Status: DISPENSED | OUTPATIENT
Start: 2023-02-28 | End: 2023-03-05

## 2023-02-28 RX ADMIN — MORPHINE SULFATE 4 MG: 4 INJECTION, SOLUTION INTRAMUSCULAR; INTRAVENOUS at 03:02

## 2023-02-28 RX ADMIN — INSULIN ASPART 2 UNITS: 100 INJECTION, SOLUTION INTRAVENOUS; SUBCUTANEOUS at 10:02

## 2023-02-28 RX ADMIN — MORPHINE SULFATE 4 MG: 4 INJECTION, SOLUTION INTRAMUSCULAR; INTRAVENOUS at 10:02

## 2023-02-28 RX ADMIN — MUPIROCIN: 20 OINTMENT TOPICAL at 09:02

## 2023-02-28 RX ADMIN — FAMOTIDINE 20 MG: 10 INJECTION INTRAVENOUS at 09:02

## 2023-02-28 RX ADMIN — MORPHINE SULFATE 4 MG: 4 INJECTION, SOLUTION INTRAMUSCULAR; INTRAVENOUS at 09:02

## 2023-02-28 RX ADMIN — SODIUM CHLORIDE AND POTASSIUM CHLORIDE: 4.5; 1.49 INJECTION, SOLUTION INTRAVENOUS at 10:02

## 2023-02-28 RX ADMIN — DULOXETINE 30 MG: 30 CAPSULE, DELAYED RELEASE ORAL at 09:02

## 2023-02-28 RX ADMIN — MORPHINE SULFATE 4 MG: 4 INJECTION, SOLUTION INTRAMUSCULAR; INTRAVENOUS at 06:02

## 2023-02-28 RX ADMIN — POLYETHYLENE GLYCOL 3350 17 G: 17 POWDER, FOR SOLUTION ORAL at 09:02

## 2023-02-28 RX ADMIN — METOPROLOL TARTRATE 50 MG: 50 TABLET, FILM COATED ORAL at 08:02

## 2023-02-28 RX ADMIN — SODIUM CHLORIDE AND POTASSIUM CHLORIDE: 4.5; 1.49 INJECTION, SOLUTION INTRAVENOUS at 03:02

## 2023-02-28 RX ADMIN — METOPROLOL TARTRATE 50 MG: 50 TABLET, FILM COATED ORAL at 09:02

## 2023-02-28 RX ADMIN — INSULIN DETEMIR 15 UNITS: 100 INJECTION, SOLUTION SUBCUTANEOUS at 08:02

## 2023-03-01 LAB
POCT GLUCOSE: 155 MG/DL (ref 70–110)
POCT GLUCOSE: 278 MG/DL (ref 70–110)
POCT GLUCOSE: 59 MG/DL (ref 70–110)

## 2023-03-01 PROCEDURE — 25000003 PHARM REV CODE 250: Performed by: EMERGENCY MEDICINE

## 2023-03-01 PROCEDURE — 25000003 PHARM REV CODE 250: Performed by: INTERNAL MEDICINE

## 2023-03-01 PROCEDURE — 21400001 HC TELEMETRY ROOM

## 2023-03-01 PROCEDURE — 94761 N-INVAS EAR/PLS OXIMETRY MLT: CPT

## 2023-03-01 PROCEDURE — 63600175 PHARM REV CODE 636 W HCPCS: Performed by: INTERNAL MEDICINE

## 2023-03-01 PROCEDURE — 63600175 PHARM REV CODE 636 W HCPCS: Performed by: EMERGENCY MEDICINE

## 2023-03-01 RX ORDER — FAMOTIDINE 20 MG/1
20 TABLET, FILM COATED ORAL 2 TIMES DAILY
Status: DISCONTINUED | OUTPATIENT
Start: 2023-03-01 | End: 2023-03-06 | Stop reason: HOSPADM

## 2023-03-01 RX ORDER — METOCLOPRAMIDE 5 MG/1
5 TABLET ORAL
Status: DISCONTINUED | OUTPATIENT
Start: 2023-03-01 | End: 2023-03-06 | Stop reason: HOSPADM

## 2023-03-01 RX ADMIN — SODIUM CHLORIDE AND POTASSIUM CHLORIDE: 4.5; 1.49 INJECTION, SOLUTION INTRAVENOUS at 06:03

## 2023-03-01 RX ADMIN — MORPHINE SULFATE 4 MG: 4 INJECTION, SOLUTION INTRAMUSCULAR; INTRAVENOUS at 03:03

## 2023-03-01 RX ADMIN — MORPHINE SULFATE 4 MG: 4 INJECTION, SOLUTION INTRAMUSCULAR; INTRAVENOUS at 08:03

## 2023-03-01 RX ADMIN — FAMOTIDINE 20 MG: 20 TABLET, FILM COATED ORAL at 08:03

## 2023-03-01 RX ADMIN — INSULIN DETEMIR 15 UNITS: 100 INJECTION, SOLUTION SUBCUTANEOUS at 08:03

## 2023-03-01 RX ADMIN — DULOXETINE 30 MG: 30 CAPSULE, DELAYED RELEASE ORAL at 09:03

## 2023-03-01 RX ADMIN — ENOXAPARIN SODIUM 40 MG: 40 INJECTION SUBCUTANEOUS at 04:03

## 2023-03-01 RX ADMIN — METOPROLOL TARTRATE 50 MG: 50 TABLET, FILM COATED ORAL at 09:03

## 2023-03-01 RX ADMIN — METOPROLOL TARTRATE 50 MG: 50 TABLET, FILM COATED ORAL at 08:03

## 2023-03-01 RX ADMIN — MORPHINE SULFATE 4 MG: 4 INJECTION, SOLUTION INTRAMUSCULAR; INTRAVENOUS at 02:03

## 2023-03-01 RX ADMIN — MORPHINE SULFATE 4 MG: 4 INJECTION, SOLUTION INTRAMUSCULAR; INTRAVENOUS at 06:03

## 2023-03-01 RX ADMIN — FAMOTIDINE 20 MG: 10 INJECTION INTRAVENOUS at 08:03

## 2023-03-01 RX ADMIN — MORPHINE SULFATE 4 MG: 4 INJECTION, SOLUTION INTRAMUSCULAR; INTRAVENOUS at 09:03

## 2023-03-01 RX ADMIN — SODIUM CHLORIDE AND POTASSIUM CHLORIDE: 4.5; 1.49 INJECTION, SOLUTION INTRAVENOUS at 10:03

## 2023-03-01 RX ADMIN — INSULIN ASPART 6 UNITS: 100 INJECTION, SOLUTION INTRAVENOUS; SUBCUTANEOUS at 11:03

## 2023-03-01 RX ADMIN — MUPIROCIN: 20 OINTMENT TOPICAL at 09:03

## 2023-03-02 LAB — POCT GLUCOSE: 127 MG/DL (ref 70–110)

## 2023-03-02 PROCEDURE — 21400001 HC TELEMETRY ROOM

## 2023-03-02 PROCEDURE — 94761 N-INVAS EAR/PLS OXIMETRY MLT: CPT

## 2023-03-02 PROCEDURE — 25000003 PHARM REV CODE 250: Performed by: EMERGENCY MEDICINE

## 2023-03-02 PROCEDURE — 25000003 PHARM REV CODE 250: Performed by: INTERNAL MEDICINE

## 2023-03-02 PROCEDURE — 63600175 PHARM REV CODE 636 W HCPCS: Performed by: EMERGENCY MEDICINE

## 2023-03-02 PROCEDURE — 63600175 PHARM REV CODE 636 W HCPCS: Performed by: INTERNAL MEDICINE

## 2023-03-02 RX ADMIN — SODIUM CHLORIDE AND POTASSIUM CHLORIDE: 4.5; 1.49 INJECTION, SOLUTION INTRAVENOUS at 05:03

## 2023-03-02 RX ADMIN — MORPHINE SULFATE 4 MG: 4 INJECTION, SOLUTION INTRAMUSCULAR; INTRAVENOUS at 01:03

## 2023-03-02 RX ADMIN — INSULIN DETEMIR 15 UNITS: 100 INJECTION, SOLUTION SUBCUTANEOUS at 09:03

## 2023-03-02 RX ADMIN — METOPROLOL TARTRATE 50 MG: 50 TABLET, FILM COATED ORAL at 09:03

## 2023-03-02 RX ADMIN — MORPHINE SULFATE 4 MG: 4 INJECTION, SOLUTION INTRAMUSCULAR; INTRAVENOUS at 03:03

## 2023-03-02 RX ADMIN — INSULIN ASPART 8 UNITS: 100 INJECTION, SOLUTION INTRAVENOUS; SUBCUTANEOUS at 04:03

## 2023-03-02 RX ADMIN — MORPHINE SULFATE 4 MG: 4 INJECTION, SOLUTION INTRAMUSCULAR; INTRAVENOUS at 09:03

## 2023-03-02 RX ADMIN — MORPHINE SULFATE 2 MG: 4 INJECTION, SOLUTION INTRAMUSCULAR; INTRAVENOUS at 09:03

## 2023-03-02 RX ADMIN — DULOXETINE 30 MG: 30 CAPSULE, DELAYED RELEASE ORAL at 09:03

## 2023-03-02 RX ADMIN — SODIUM CHLORIDE AND POTASSIUM CHLORIDE: 4.5; 1.49 INJECTION, SOLUTION INTRAVENOUS at 09:03

## 2023-03-02 RX ADMIN — MUPIROCIN: 20 OINTMENT TOPICAL at 09:03

## 2023-03-02 RX ADMIN — MORPHINE SULFATE 4 MG: 4 INJECTION, SOLUTION INTRAMUSCULAR; INTRAVENOUS at 12:03

## 2023-03-02 RX ADMIN — POLYETHYLENE GLYCOL 3350 17 G: 17 POWDER, FOR SOLUTION ORAL at 09:03

## 2023-03-02 RX ADMIN — SODIUM CHLORIDE AND POTASSIUM CHLORIDE: 4.5; 1.49 INJECTION, SOLUTION INTRAVENOUS at 01:03

## 2023-03-02 RX ADMIN — FAMOTIDINE 20 MG: 20 TABLET, FILM COATED ORAL at 09:03

## 2023-03-02 RX ADMIN — MORPHINE SULFATE 4 MG: 4 INJECTION, SOLUTION INTRAMUSCULAR; INTRAVENOUS at 05:03

## 2023-03-02 NOTE — PLAN OF CARE
03/02/23 1501   Discharge Reassessment   Assessment Type Discharge Planning Reassessment   Discharge Plan discussed with: Patient   Discharge Plan A Home   Discharge Plan B Home   DME Needed Upon Discharge  none   Discharge Barriers Identified None   Why the patient remains in the hospital Requires continued medical care   Post-Acute Status   Discharge Delays None known at this time     Waiting on d/c orders to return home.

## 2023-03-03 LAB
ANION GAP SERPL CALC-SCNC: 12 MEQ/L
BUN SERPL-MCNC: 6 MG/DL (ref 7–18.7)
CALCIUM SERPL-MCNC: 9.9 MG/DL (ref 8.4–10.2)
CHLORIDE SERPL-SCNC: 100 MMOL/L (ref 98–107)
CO2 SERPL-SCNC: 21 MMOL/L (ref 22–29)
CREAT SERPL-MCNC: 0.78 MG/DL (ref 0.55–1.02)
CREAT/UREA NIT SERPL: 8
GFR SERPLBLD CREATININE-BSD FMLA CKD-EPI: >60 MLS/MIN/1.73/M2
GLUCOSE SERPL-MCNC: 180 MG/DL (ref 74–100)
POCT GLUCOSE: 183 MG/DL (ref 70–110)
POCT GLUCOSE: 203 MG/DL (ref 70–110)
POCT GLUCOSE: 227 MG/DL (ref 70–110)
POCT GLUCOSE: 229 MG/DL (ref 70–110)
POCT GLUCOSE: 328 MG/DL (ref 70–110)
POTASSIUM SERPL-SCNC: 5 MMOL/L (ref 3.5–5.1)
SODIUM SERPL-SCNC: 133 MMOL/L (ref 136–145)

## 2023-03-03 PROCEDURE — 25000003 PHARM REV CODE 250: Performed by: INTERNAL MEDICINE

## 2023-03-03 PROCEDURE — 25000003 PHARM REV CODE 250: Performed by: EMERGENCY MEDICINE

## 2023-03-03 PROCEDURE — 63600175 PHARM REV CODE 636 W HCPCS: Performed by: INTERNAL MEDICINE

## 2023-03-03 PROCEDURE — 94761 N-INVAS EAR/PLS OXIMETRY MLT: CPT

## 2023-03-03 PROCEDURE — 21400001 HC TELEMETRY ROOM

## 2023-03-03 PROCEDURE — 63600175 PHARM REV CODE 636 W HCPCS: Performed by: EMERGENCY MEDICINE

## 2023-03-03 PROCEDURE — 80048 BASIC METABOLIC PNL TOTAL CA: CPT | Performed by: INTERNAL MEDICINE

## 2023-03-03 RX ORDER — DIPHENHYDRAMINE HCL 25 MG
25 CAPSULE ORAL ONCE
Status: COMPLETED | OUTPATIENT
Start: 2023-03-03 | End: 2023-03-03

## 2023-03-03 RX ORDER — HYDROCODONE BITARTRATE AND ACETAMINOPHEN 5; 325 MG/1; MG/1
1 TABLET ORAL EVERY 6 HOURS PRN
Status: DISCONTINUED | OUTPATIENT
Start: 2023-03-03 | End: 2023-03-06 | Stop reason: HOSPADM

## 2023-03-03 RX ADMIN — MUPIROCIN: 20 OINTMENT TOPICAL at 09:03

## 2023-03-03 RX ADMIN — SODIUM CHLORIDE AND POTASSIUM CHLORIDE: 4.5; 1.49 INJECTION, SOLUTION INTRAVENOUS at 11:03

## 2023-03-03 RX ADMIN — FAMOTIDINE 20 MG: 20 TABLET, FILM COATED ORAL at 09:03

## 2023-03-03 RX ADMIN — INSULIN DETEMIR 15 UNITS: 100 INJECTION, SOLUTION SUBCUTANEOUS at 09:03

## 2023-03-03 RX ADMIN — METOPROLOL TARTRATE 50 MG: 50 TABLET, FILM COATED ORAL at 09:03

## 2023-03-03 RX ADMIN — ONDANSETRON 4 MG: 2 INJECTION INTRAMUSCULAR; INTRAVENOUS at 03:03

## 2023-03-03 RX ADMIN — MORPHINE SULFATE 4 MG: 4 INJECTION, SOLUTION INTRAMUSCULAR; INTRAVENOUS at 01:03

## 2023-03-03 RX ADMIN — MORPHINE SULFATE 4 MG: 4 INJECTION, SOLUTION INTRAMUSCULAR; INTRAVENOUS at 03:03

## 2023-03-03 RX ADMIN — INSULIN ASPART 2 UNITS: 100 INJECTION, SOLUTION INTRAVENOUS; SUBCUTANEOUS at 05:03

## 2023-03-03 RX ADMIN — MORPHINE SULFATE 4 MG: 4 INJECTION, SOLUTION INTRAMUSCULAR; INTRAVENOUS at 07:03

## 2023-03-03 RX ADMIN — SODIUM CHLORIDE AND POTASSIUM CHLORIDE: 4.5; 1.49 INJECTION, SOLUTION INTRAVENOUS at 05:03

## 2023-03-03 RX ADMIN — HYDROCODONE BITARTRATE AND ACETAMINOPHEN 1 TABLET: 5; 325 TABLET ORAL at 04:03

## 2023-03-03 RX ADMIN — SODIUM CHLORIDE AND POTASSIUM CHLORIDE: 4.5; 1.49 INJECTION, SOLUTION INTRAVENOUS at 03:03

## 2023-03-03 RX ADMIN — INSULIN ASPART 4 UNITS: 100 INJECTION, SOLUTION INTRAVENOUS; SUBCUTANEOUS at 11:03

## 2023-03-03 RX ADMIN — DIPHENHYDRAMINE HYDROCHLORIDE 25 MG: 25 CAPSULE ORAL at 11:03

## 2023-03-03 RX ADMIN — INSULIN ASPART 2 UNITS: 100 INJECTION, SOLUTION INTRAVENOUS; SUBCUTANEOUS at 09:03

## 2023-03-03 RX ADMIN — HYDROCODONE BITARTRATE AND ACETAMINOPHEN 1 TABLET: 5; 325 TABLET ORAL at 09:03

## 2023-03-03 NOTE — PROGRESS NOTES
[facility] Medicine Progress Note    Patient Name: Dorene Husain  Age: 27 y.o.   MRN: [id number]  Admission Date: 2/26/2023 12:38 PM   Patient Class: IP- Inpatient  Benefits: Payor: MEDICAID / Plan: [place] [employer] CONNECT / Product Type: Managed Medicaid /    Primary Care Provider: Kumar Ortiz NP   Pharmacy:   [facility] [address]  [place] [address]  Phone: [phone] Fax: [phone]    Code Status: Full Code      Chief Complaint:   Chief Complaint   Patient presents with    Abdominal Pain     Abd and back pain   started again 2 days ago   pt believes she is in DKA again   seen here 2 days ago    Nausea and vomiting        Admit Diagnosis:   Dehydration [E86.0]  Metabolic acidosis [E87.20]  Generalized abdominal pain [R10.84]  Hyperglycemia [R73.9]  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    Patient is somnolent, arousable to pain.  She follows commands but goes back to sleep.    She was in emergency room 2 days ago the very dehydrated secondary to the same medical problem.  She was given IV fluid discharged home.   She presented to emergency room with the same symptoms.    She is been hospitalized multiple times with the same medical problem.  She carries a diagnosis of gastroparesis secondary to above.  She follows up with her gastroenterologist with in [facility].    Her ABG in emergency room showed a pH of above 7.35.  Patient was given IV fluid.  She appears to be in acidotic status but not DKA.    Patient will be admitted to the hospital for further evaluation and management.  Asked patient if she is having a problem with infection, fever and chills she denies.  Not  able to obtain further information due to patient being very somnolent.   \    SUBJECTIVE:     Follow-up:  Hyperglycemia due to type 1 diabetes mellitus    Hospital Coarse:  2/27  - Still c/o nausea and not tolerating PO.    2/28  - No changes. Scant PO intake.      OBJECTIVE:   Vitals reviewed    Physical Exam  Constitutional:       General: She is sleeping.      Appearance: She is well-developed.   HENT:      Head: Normocephalic and atraumatic.   Cardiovascular:      Rate and Rhythm: Normal rate and regular rhythm.   Pulmonary:      Breath sounds: Normal breath sounds.   Abdominal:      Palpations: Abdomen is soft.   Neurological:      General: No focal deficit present.      Mental Status: She is easily aroused. Mental status is at baseline.        Recent Labs   Lab 02/26/23  1330 02/27/23  0508   WBC 12.7* 10.8   HGB 9.7* 10.2*   HCT 28.8* 31.6*    458*   MCV 81.1 83.8          Recent Labs   Lab 02/26/23  1337 02/27/23  0508   * 129*   K 3.9 4.1   CHLORIDE 88* 97*   CO2 15* 22   BUN 35.0* 28.0*   CREATININE 1.16* 1.08*   GLUCOSE 360* 140*   MG 1.80 2.00   PHOS  --  1.4*   CALCIUM 8.1* 8.7   ALBUMIN 2.6* 2.9*   BILITOT 1.0 0.8   ALKPHOS 77 75   AST 11 19   ALT 11 10         Lab Results   Component Value Date    HGBA1C 11.9 (H) 02/27/2023          X-Ray Chest AP Portable  Narrative: EXAMINATION:  XR CHEST AP PORTABLE    CLINICAL HISTORY:  Acidosis;, .    COMPARISON:  02/26/2023    FINDINGS:  An AP view or more reveals the heart to be normal in size.  The trachea is midline.  No infiltrate or effusion is seen.  Bony structures appear grossly intact.  Impression: 1. No active cardiopulmonary disease identified    Electronically signed by: Danilo Navarro  Date:    02/27/2023  Time:    09:59  X-Ray Chest AP Portable  Narrative: EXAMINATION:  XR CHEST AP PORTABLE    CLINICAL HISTORY:  hyperglycemia;, .    COMPARISON:  09/24/2022    FINDINGS:  An AP view or more reveals the heart to be normal in size.  The  trachea is midline.  No definite infiltrate or effusion is seen.  There is interim removal of the right PICC line since the prior exam.  No definite infiltrate or effusion is seen.  Bony structures appear grossly intact.  Impression: 1. No active cardiopulmonary disease identified    Electronically signed by: Danilo Navarro  Date:    02/27/2023  Time:    08:26           ASSESSMENT/PLAN:       Hyperglycemia due to type 1 diabetes mellitus    CHATA (acute kidney injury)    DM gastroparesis    Hypertension    Noncompliance       - cont current          VTE Risk Mitigation (From admission, onward)           Ordered     enoxaparin injection 40 mg  Daily         02/26/23 1731     IP VTE LOW RISK PATIENT  Once         02/26/23 1731     Place sequential compression device  Until discontinued         02/26/23 1731     Place CORNELIA hose  Until discontinued         02/26/23 1731                           Sergio Hidalgo MD  [facility]   [facility]

## 2023-03-03 NOTE — HOSPITAL COURSE
2/27  - Still c/o nausea and not tolerating PO.    2/28  - No changes. Scant PO intake.    3/1  - Feeling slightly better. States unable to go home.    3/2  - Still c/o pain and nausea. Poor PO intake.    3/5/23-Patient still c/o N/V and abdominal pain from her gastroparesis.  She has been refusing her reglan which she took today.  Also she has been getting pain meds which are not advised with gastroparesis.  Poor intake of food.  She also has a psych component to her illness as well which prohibits her treatment.

## 2023-03-03 NOTE — PROGRESS NOTES
[facility] Medicine Progress Note    Patient Name: Dorene Husain  Age: 27 y.o.   MRN: [id number]  Admission Date: 2/26/2023 12:38 PM   Patient Class: IP- Inpatient  Benefits: Payor: MEDICAID / Plan: [place] [employer] CONNECT / Product Type: Managed Medicaid /    Primary Care Provider: Kumar Ortiz NP   Pharmacy:   [facility] [address]  [place] [address]  Phone: [phone] Fax: [phone]    Code Status: Full Code      Chief Complaint:   Chief Complaint   Patient presents with    Abdominal Pain     Abd and back pain   started again 2 days ago   pt believes she is in DKA again   seen here 2 days ago    Nausea and vomiting        Admit Diagnosis:   Dehydration [E86.0]  Metabolic acidosis [E87.20]  Generalized abdominal pain [R10.84]  Hyperglycemia [R73.9]  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    Patient is somnolent, arousable to pain.  She follows commands but goes back to sleep.    She was in emergency room 2 days ago the very dehydrated secondary to the same medical problem.  She was given IV fluid discharged home.   She presented to emergency room with the same symptoms.    She is been hospitalized multiple times with the same medical problem.  She carries a diagnosis of gastroparesis secondary to above.  She follows up with her gastroenterologist with in [facility].    Her ABG in emergency room showed a pH of above 7.35.  Patient was given IV fluid.  She appears to be in acidotic status but not DKA.    Patient will be admitted to the hospital for further evaluation and management.  Asked patient if she is having a problem with infection, fever and chills she denies.  Not  able to obtain further information due to patient being very somnolent.   \    SUBJECTIVE:     Follow-up:  Hyperglycemia due to type 1 diabetes mellitus    Hospital Coarse:  2/27  - Still c/o nausea and not tolerating PO.      OBJECTIVE:   Vitals reviewed    Physical Exam  Constitutional:       General: She is sleeping.      Appearance: She is well-developed.   HENT:      Head: Normocephalic and atraumatic.   Cardiovascular:      Rate and Rhythm: Normal rate and regular rhythm.   Pulmonary:      Breath sounds: Normal breath sounds.   Abdominal:      Palpations: Abdomen is soft.   Neurological:      General: No focal deficit present.      Mental Status: She is easily aroused. Mental status is at baseline.        Recent Labs   Lab 02/26/23  1330 02/27/23  0508   WBC 12.7* 10.8   HGB 9.7* 10.2*   HCT 28.8* 31.6*    458*   MCV 81.1 83.8          Recent Labs   Lab 02/26/23  1337 02/27/23  0508   * 129*   K 3.9 4.1   CHLORIDE 88* 97*   CO2 15* 22   BUN 35.0* 28.0*   CREATININE 1.16* 1.08*   GLUCOSE 360* 140*   MG 1.80 2.00   PHOS  --  1.4*   CALCIUM 8.1* 8.7   ALBUMIN 2.6* 2.9*   BILITOT 1.0 0.8   ALKPHOS 77 75   AST 11 19   ALT 11 10         Lab Results   Component Value Date    HGBA1C 11.9 (H) 02/27/2023          X-Ray Chest AP Portable  Narrative: EXAMINATION:  XR CHEST AP PORTABLE    CLINICAL HISTORY:  Acidosis;, .    COMPARISON:  02/26/2023    FINDINGS:  An AP view or more reveals the heart to be normal in size.  The trachea is midline.  No infiltrate or effusion is seen.  Bony structures appear grossly intact.  Impression: 1. No active cardiopulmonary disease identified    Electronically signed by: Danilo Navarro  Date:    02/27/2023  Time:    09:59  X-Ray Chest AP Portable  Narrative: EXAMINATION:  XR CHEST AP PORTABLE    CLINICAL HISTORY:  hyperglycemia;, .    COMPARISON:  09/24/2022    FINDINGS:  An AP view or more reveals the heart to be normal in size.  The trachea is midline.  No definite  infiltrate or effusion is seen.  There is interim removal of the right PICC line since the prior exam.  No definite infiltrate or effusion is seen.  Bony structures appear grossly intact.  Impression: 1. No active cardiopulmonary disease identified    Electronically signed by: Danilo Navarro  Date:    02/27/2023  Time:    08:26           ASSESSMENT/PLAN:       Hyperglycemia due to type 1 diabetes mellitus    CHATA (acute kidney injury)    DM gastroparesis    Hypertension    Noncompliance       - cont current          VTE Risk Mitigation (From admission, onward)           Ordered     enoxaparin injection 40 mg  Daily         02/26/23 1731     IP VTE LOW RISK PATIENT  Once         02/26/23 1731     Place sequential compression device  Until discontinued         02/26/23 1731     Place CORNELIA hose  Until discontinued         02/26/23 1731                           Sergio Hidalgo MD  [facility] Medicine   [facility]

## 2023-03-03 NOTE — PROGRESS NOTES
[facility] Medicine Progress Note    Patient Name: Dorene Husain  Age: 27 y.o.   MRN: [id number]  Admission Date: 2/26/2023 12:38 PM   Patient Class: IP- Inpatient  Benefits: Payor: MEDICAID / Plan: [place] [employer] CONNECT / Product Type: Managed Medicaid /    Primary Care Provider: Kumar Ortiz NP   Pharmacy:   [facility] [address]  [place] [address]  Phone: [phone] Fax: [phone]    Code Status: Full Code      Chief Complaint:   Chief Complaint   Patient presents with    Abdominal Pain     Abd and back pain   started again 2 days ago   pt believes she is in DKA again   seen here 2 days ago    Nausea and vomiting        Admit Diagnosis:   Dehydration [E86.0]  Metabolic acidosis [E87.20]  Generalized abdominal pain [R10.84]  Hyperglycemia [R73.9]  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    Patient is somnolent, arousable to pain.  She follows commands but goes back to sleep.    She was in emergency room 2 days ago the very dehydrated secondary to the same medical problem.  She was given IV fluid discharged home.   She presented to emergency room with the same symptoms.    She is been hospitalized multiple times with the same medical problem.  She carries a diagnosis of gastroparesis secondary to above.  She follows up with her gastroenterologist with in [facility].    Her ABG in emergency room showed a pH of above 7.35.  Patient was given IV fluid.  She appears to be in acidotic status but not DKA.    Patient will be admitted to the hospital for further evaluation and management.  Asked patient if she is having a problem with infection, fever and chills she denies.  Not  able to obtain further information due to patient being very somnolent.   \    SUBJECTIVE:     Follow-up:  Hyperglycemia due to type 1 diabetes mellitus    Hospital Coarse:  2/27  - Still c/o nausea and not tolerating PO.    2/28  - No changes. Scant PO intake.    3/1  - Feeling slightly better. States unable to go home.    3/2  - Still c/o pain and nausea. Poor PO intake.         OBJECTIVE:   Vitals reviewed    Physical Exam  Constitutional:       General: She is sleeping.      Appearance: She is well-developed.   HENT:      Head: Normocephalic and atraumatic.   Cardiovascular:      Rate and Rhythm: Normal rate and regular rhythm.   Pulmonary:      Breath sounds: Normal breath sounds.   Abdominal:      Palpations: Abdomen is soft.   Neurological:      General: No focal deficit present.      Mental Status: She is easily aroused. Mental status is at baseline.        Recent Labs   Lab 02/26/23  1330 02/27/23  0508   WBC 12.7* 10.8   HGB 9.7* 10.2*   HCT 28.8* 31.6*    458*   MCV 81.1 83.8        Recent Labs   Lab 02/26/23  1337 02/27/23  0508   * 129*   K 3.9 4.1   CHLORIDE 88* 97*   CO2 15* 22   BUN 35.0* 28.0*   CREATININE 1.16* 1.08*   GLUCOSE 360* 140*   MG 1.80 2.00   PHOS  --  1.4*   CALCIUM 8.1* 8.7   ALBUMIN 2.6* 2.9*   BILITOT 1.0 0.8   ALKPHOS 77 75   AST 11 19   ALT 11 10       Lab Results   Component Value Date    HGBA1C 11.9 (H) 02/27/2023        X-Ray Chest AP Portable  Narrative: EXAMINATION:  XR CHEST AP PORTABLE    CLINICAL HISTORY:  Acidosis;, .    COMPARISON:  02/26/2023    FINDINGS:  An AP view or more reveals the heart to be normal in size.  The trachea is midline.  No infiltrate or effusion is seen.  Bony structures appear grossly intact.  Impression: 1. No active cardiopulmonary disease identified    Electronically signed by: Danilo Navarro  Date:    02/27/2023  Time:    09:59  X-Ray Chest AP Portable  Narrative: EXAMINATION:  XR CHEST AP PORTABLE    CLINICAL  HISTORY:  hyperglycemia;, .    COMPARISON:  09/24/2022    FINDINGS:  An AP view or more reveals the heart to be normal in size.  The trachea is midline.  No definite infiltrate or effusion is seen.  There is interim removal of the right PICC line since the prior exam.  No definite infiltrate or effusion is seen.  Bony structures appear grossly intact.  Impression: 1. No active cardiopulmonary disease identified    Electronically signed by: Danilo Navarro  Date:    02/27/2023  Time:    08:26           ASSESSMENT/PLAN:       Hyperglycemia due to type 1 diabetes mellitus    CHATA (acute kidney injury)    DM gastroparesis    Hypertension    Noncompliance       - cont current  - hopefully home soon        VTE Risk Mitigation (From admission, onward)           Ordered     enoxaparin injection 40 mg  Daily         02/26/23 1731     IP VTE LOW RISK PATIENT  Once         02/26/23 1731     Place sequential compression device  Until discontinued         02/26/23 1731     Place CORNEILA hose  Until discontinued         02/26/23 1731                           Sergio Hidalgo MD  [facility] Medicine   [facility]

## 2023-03-03 NOTE — PROGRESS NOTES
Hospital Medicine  Progress Note    Patient Name: Dorene Husain  MRN: [id number]  Status: IP- Inpatient   Admission Date: 2/26/2023  Length of Stay: 5      CC: hospital follow-up for abd pain       SUBJECTIVE   27 year old female with uncontrolled DM, associated gastroparesis, medical non-compliance, and frequent hospitalizations for abd issues, is admitted to [facility] with abd pain, nausea and hyperglycemia.    Today: Sugars 200-230 today.  No vomiting, but complains of ongoing nausea and abd pain.  Still getting frequent IV narcotics.      MEDICATIONS   Scheduled   DULoxetine  30 mg Oral Daily    enoxaparin  40 mg Subcutaneous Daily    famotidine  20 mg Oral BID    insulin detemir U-100  15 Units Subcutaneous QHS    metoclopramide HCl  5 mg Oral TID AC    metoprolol tartrate  50 mg Oral BID    mupirocin   Nasal BID    polyethylene glycol  17 g Oral Daily     Continuous Infusions   0.45 % NaCl with KCl 20 mEq 125 mL/hr at 03/03/23 1503         PHYSICAL EXAM   VITALS: T 99.1 °F (37.3 °C)   BP (!) 136/90   P 78   RR (!) 21   O2 100 %    GENERAL: Awake and in NAD  LUNGS: CTA anteriorly  CVS: Normal rate  GI/: Soft, ND, bowel sounds positive.  EXTREMITIES: No peripheral edema  NEURO: AAOx3  PSYCH: Cooperative      LABS   CBC  No results for input(s): WBC, RBC, HGB, HCT, MCV, MCH, MCHC, RDW, PLT, RETIC, ESR in the last 72 hours.  CHEM  No results for input(s): NA, K, CHLORIDE, CO2, BUN, CREATININE, GLUCOSE, CALCIUM, MG, PHOS, ALBUMIN, GLOBULIN, ALKPHOS, ALT, AST, BILITOT, LIPASE, CRP, LDH, HAPTOGLOBIN, FERRITIN, IRON, TIBC, TSH, FREET4 in the last 72 hours.      ASSESSMENT   Uncontrolled IDDM with hyperglycemia  Severe diabetic gastroparesis  Medical non-compliance  Pseudo-hyponatremia attributable to hyperglycemia  CKD II  Essential HTN    PLAN   Discontinue IV narcotics, transition to oral analgesics  Repeat BMP now, can likely stop current IVFs  Will scale back diet to diabetic clears and monitor intake  Continue current  insulin regimen, with CBGs and ISS as indicated  Plan to remove femoral line, will follow labs.   If continued IV fluids necessary, can place PIV or midline  Reglan on board gastroparesis  Otherwise continue current management and monitoring        Prophylaxis: SC Lovenotameka Vuong MD  [facility] Medicine

## 2023-03-03 NOTE — PROGRESS NOTES
[facility] Medicine Progress Note    Patient Name: Dorene Husain  Age: 27 y.o.   MRN: [id number]  Admission Date: 2/26/2023 12:38 PM   Patient Class: IP- Inpatient  Benefits: Payor: MEDICAID / Plan: [place] [employer] CONNECT / Product Type: Managed Medicaid /    Primary Care Provider: Kumar Ortiz NP   Pharmacy:   [facility] [address]  [place] [address]  Phone: [phone] Fax: [phone]    Code Status: Full Code      Chief Complaint:   Chief Complaint   Patient presents with    Abdominal Pain     Abd and back pain   started again 2 days ago   pt believes she is in DKA again   seen here 2 days ago    Nausea and vomiting        Admit Diagnosis:   Dehydration [E86.0]  Metabolic acidosis [E87.20]  Generalized abdominal pain [R10.84]  Hyperglycemia [R73.9]  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    Patient is somnolent, arousable to pain.  She follows commands but goes back to sleep.    She was in emergency room 2 days ago the very dehydrated secondary to the same medical problem.  She was given IV fluid discharged home.   She presented to emergency room with the same symptoms.    She is been hospitalized multiple times with the same medical problem.  She carries a diagnosis of gastroparesis secondary to above.  She follows up with her gastroenterologist with in [facility].    Her ABG in emergency room showed a pH of above 7.35.  Patient was given IV fluid.  She appears to be in acidotic status but not DKA.    Patient will be admitted to the hospital for further evaluation and management.  Asked patient if she is having a problem with infection, fever and chills she denies.  Not  able to obtain further information due to patient being very somnolent.   \    SUBJECTIVE:     Follow-up:  Hyperglycemia due to type 1 diabetes mellitus    Hospital Coarse:  2/27  - Still c/o nausea and not tolerating PO.    2/28  - No changes. Scant PO intake.    3/1  - Feeling slightly better. States unable to go home.        OBJECTIVE:   Vitals reviewed    Physical Exam  Constitutional:       General: She is sleeping.      Appearance: She is well-developed.   HENT:      Head: Normocephalic and atraumatic.   Cardiovascular:      Rate and Rhythm: Normal rate and regular rhythm.   Pulmonary:      Breath sounds: Normal breath sounds.   Abdominal:      Palpations: Abdomen is soft.   Neurological:      General: No focal deficit present.      Mental Status: She is easily aroused. Mental status is at baseline.        Recent Labs   Lab 02/26/23  1330 02/27/23  0508   WBC 12.7* 10.8   HGB 9.7* 10.2*   HCT 28.8* 31.6*    458*   MCV 81.1 83.8          Recent Labs   Lab 02/26/23  1337 02/27/23  0508   * 129*   K 3.9 4.1   CHLORIDE 88* 97*   CO2 15* 22   BUN 35.0* 28.0*   CREATININE 1.16* 1.08*   GLUCOSE 360* 140*   MG 1.80 2.00   PHOS  --  1.4*   CALCIUM 8.1* 8.7   ALBUMIN 2.6* 2.9*   BILITOT 1.0 0.8   ALKPHOS 77 75   AST 11 19   ALT 11 10         Lab Results   Component Value Date    HGBA1C 11.9 (H) 02/27/2023          X-Ray Chest AP Portable  Narrative: EXAMINATION:  XR CHEST AP PORTABLE    CLINICAL HISTORY:  Acidosis;, .    COMPARISON:  02/26/2023    FINDINGS:  An AP view or more reveals the heart to be normal in size.  The trachea is midline.  No infiltrate or effusion is seen.  Bony structures appear grossly intact.  Impression: 1. No active cardiopulmonary disease identified    Electronically signed by: Danilo Navarro  Date:    02/27/2023  Time:    09:59  X-Ray Chest AP Portable  Narrative: EXAMINATION:  XR CHEST AP PORTABLE    CLINICAL HISTORY:  hyperglycemia;, .    COMPARISON:  09/24/2022    FINDINGS:  An  AP view or more reveals the heart to be normal in size.  The trachea is midline.  No definite infiltrate or effusion is seen.  There is interim removal of the right PICC line since the prior exam.  No definite infiltrate or effusion is seen.  Bony structures appear grossly intact.  Impression: 1. No active cardiopulmonary disease identified    Electronically signed by: Danilo Navarro  Date:    02/27/2023  Time:    08:26           ASSESSMENT/PLAN:       Hyperglycemia due to type 1 diabetes mellitus    CHATA (acute kidney injury)    DM gastroparesis    Hypertension    Noncompliance       - cont current  - home soon        VTE Risk Mitigation (From admission, onward)           Ordered     enoxaparin injection 40 mg  Daily         02/26/23 1731     IP VTE LOW RISK PATIENT  Once         02/26/23 1731     Place sequential compression device  Until discontinued         02/26/23 1731     Place CORNELIA hose  Until discontinued         02/26/23 1731                           Sergio Hidalgo MD  [facility] Medicine   [facility]

## 2023-03-04 LAB
ANION GAP SERPL CALC-SCNC: 9 MEQ/L
BASOPHILS # BLD AUTO: 0.03 X10(3)/MCL (ref 0–0.2)
BASOPHILS NFR BLD AUTO: 0.4 %
BUN SERPL-MCNC: 6 MG/DL (ref 7–18.7)
CALCIUM SERPL-MCNC: 9.2 MG/DL (ref 8.4–10.2)
CHLORIDE SERPL-SCNC: 99 MMOL/L (ref 98–107)
CO2 SERPL-SCNC: 25 MMOL/L (ref 22–29)
CREAT SERPL-MCNC: 0.81 MG/DL (ref 0.55–1.02)
CREAT/UREA NIT SERPL: 7
EOSINOPHIL # BLD AUTO: 0.14 X10(3)/MCL (ref 0–0.9)
EOSINOPHIL NFR BLD AUTO: 2 %
ERYTHROCYTE [DISTWIDTH] IN BLOOD BY AUTOMATED COUNT: 12 % (ref 11.5–17)
GFR SERPLBLD CREATININE-BSD FMLA CKD-EPI: >60 MLS/MIN/1.73/M2
GLUCOSE SERPL-MCNC: 141 MG/DL (ref 74–100)
HCT VFR BLD AUTO: 30.4 % (ref 37–47)
HGB BLD-MCNC: 10.1 G/DL (ref 12–16)
IMM GRANULOCYTES # BLD AUTO: 0.05 X10(3)/MCL (ref 0–0.04)
IMM GRANULOCYTES NFR BLD AUTO: 0.7 %
LYMPHOCYTES # BLD AUTO: 2.76 X10(3)/MCL (ref 0.6–4.6)
LYMPHOCYTES NFR BLD AUTO: 40.3 %
MCH RBC QN AUTO: 27.4 PG
MCHC RBC AUTO-ENTMCNC: 33.2 G/DL (ref 33–36)
MCV RBC AUTO: 82.4 FL (ref 80–94)
MONOCYTES # BLD AUTO: 0.58 X10(3)/MCL (ref 0.1–1.3)
MONOCYTES NFR BLD AUTO: 8.5 %
NEUTROPHILS # BLD AUTO: 3.29 X10(3)/MCL (ref 2.1–9.2)
NEUTROPHILS NFR BLD AUTO: 48.1 %
PLATELET # BLD AUTO: 430 X10(3)/MCL (ref 130–400)
PMV BLD AUTO: 8.9 FL (ref 7.4–10.4)
POCT GLUCOSE: 149 MG/DL (ref 70–110)
POCT GLUCOSE: 276 MG/DL (ref 70–110)
POCT GLUCOSE: 447 MG/DL (ref 70–110)
POTASSIUM SERPL-SCNC: 3.9 MMOL/L (ref 3.5–5.1)
RBC # BLD AUTO: 3.69 X10(6)/MCL (ref 4.2–5.4)
SODIUM SERPL-SCNC: 133 MMOL/L (ref 136–145)
WBC # SPEC AUTO: 6.9 X10(3)/MCL (ref 4.5–11.5)

## 2023-03-04 PROCEDURE — 85025 COMPLETE CBC W/AUTO DIFF WBC: CPT | Performed by: INTERNAL MEDICINE

## 2023-03-04 PROCEDURE — 63600175 PHARM REV CODE 636 W HCPCS: Performed by: EMERGENCY MEDICINE

## 2023-03-04 PROCEDURE — 63600175 PHARM REV CODE 636 W HCPCS: Performed by: INTERNAL MEDICINE

## 2023-03-04 PROCEDURE — 25000003 PHARM REV CODE 250: Performed by: EMERGENCY MEDICINE

## 2023-03-04 PROCEDURE — 80048 BASIC METABOLIC PNL TOTAL CA: CPT | Performed by: INTERNAL MEDICINE

## 2023-03-04 PROCEDURE — 25000003 PHARM REV CODE 250: Performed by: INTERNAL MEDICINE

## 2023-03-04 PROCEDURE — 94761 N-INVAS EAR/PLS OXIMETRY MLT: CPT

## 2023-03-04 PROCEDURE — 21400001 HC TELEMETRY ROOM

## 2023-03-04 RX ADMIN — INSULIN DETEMIR 15 UNITS: 100 INJECTION, SOLUTION SUBCUTANEOUS at 08:03

## 2023-03-04 RX ADMIN — SODIUM CHLORIDE AND POTASSIUM CHLORIDE: 4.5; 1.49 INJECTION, SOLUTION INTRAVENOUS at 06:03

## 2023-03-04 RX ADMIN — METOPROLOL TARTRATE 50 MG: 50 TABLET, FILM COATED ORAL at 09:03

## 2023-03-04 RX ADMIN — HYDROCODONE BITARTRATE AND ACETAMINOPHEN 1 TABLET: 5; 325 TABLET ORAL at 05:03

## 2023-03-04 RX ADMIN — HYDRALAZINE HYDROCHLORIDE 10 MG: 20 INJECTION INTRAMUSCULAR; INTRAVENOUS at 01:03

## 2023-03-04 RX ADMIN — HYDROCODONE BITARTRATE AND ACETAMINOPHEN 1 TABLET: 5; 325 TABLET ORAL at 11:03

## 2023-03-04 RX ADMIN — INSULIN ASPART 1 UNITS: 100 INJECTION, SOLUTION INTRAVENOUS; SUBCUTANEOUS at 08:03

## 2023-03-04 RX ADMIN — PROCHLORPERAZINE EDISYLATE 5 MG: 5 INJECTION INTRAMUSCULAR; INTRAVENOUS at 09:03

## 2023-03-04 RX ADMIN — FAMOTIDINE 20 MG: 20 TABLET, FILM COATED ORAL at 08:03

## 2023-03-04 RX ADMIN — METOPROLOL TARTRATE 50 MG: 50 TABLET, FILM COATED ORAL at 08:03

## 2023-03-04 RX ADMIN — ONDANSETRON 4 MG: 2 INJECTION INTRAMUSCULAR; INTRAVENOUS at 05:03

## 2023-03-04 RX ADMIN — INSULIN ASPART 10 UNITS: 100 INJECTION, SOLUTION INTRAVENOUS; SUBCUTANEOUS at 05:03

## 2023-03-04 NOTE — PROGRESS NOTES
Hospital Medicine  Progress Note    Patient Name: Dorene Husain  MRN: [id number]  Status: IP- Inpatient   Admission Date: 2/26/2023  Length of Stay: 6      CC: hospital follow-up for abd pain       SUBJECTIVE   27 year old female with uncontrolled DM, associated gastroparesis, medical non-compliance, and frequent hospitalizations for abd issues, is admitted to [facility] with abd pain, nausea and hyperglycemia.    Today: Tolerating clears well.  No nausea or vomiting.      MEDICATIONS   Scheduled   enoxaparin  40 mg Subcutaneous Daily    famotidine  20 mg Oral BID    insulin detemir U-100  15 Units Subcutaneous QHS    metoclopramide HCl  5 mg Oral TID AC    metoprolol tartrate  50 mg Oral BID    mupirocin   Nasal BID    polyethylene glycol  17 g Oral Daily     Continuous Infusions  None        PHYSICAL EXAM   VITALS: T 99 °F (37.2 °C)   BP (!) 146/85   P 83   RR 16   O2 100 %    GENERAL: Awake and in NAD  LUNGS: CTA anteriorly  CVS: Normal rate  GI/: Soft, ND, bowel sounds positive.  EXTREMITIES: No peripheral edema  NEURO: AAOx3  PSYCH: Cooperative      LABS   CBC  Recent Labs     03/04/23  0527   WBC 6.9   RBC 3.69*   HGB 10.1*   HCT 30.4*   MCV 82.4   MCH 27.4   MCHC 33.2   RDW 12.0   *     CHEM  Recent Labs     03/03/23  1659 03/04/23  0527   * 133*   K 5.0 3.9   CHLORIDE 100 99   CO2 21* 25   BUN 6.0* 6.0*   CREATININE 0.78 0.81   GLUCOSE 180* 141*   CALCIUM 9.9 9.2         ASSESSMENT   Uncontrolled IDDM with hyperglycemia  Severe diabetic gastroparesis  Medical non-compliance  Pseudo-hyponatremia attributable to hyperglycemia  CKD II  Essential HTN    PLAN   Can likely stop current IVFs  Can advance to diabetic diet  Continue with oral analgesics as needed  Continue current insulin regimen, with CBGs and ISS as indicated  Reglan on board gastroparesis  Home in next 24 hrs if tolerating diet  Otherwise continue current management and monitoring        Prophylaxis: SC Lovenox        Bao Vuong,  [facility]

## 2023-03-05 LAB
POCT GLUCOSE: 195 MG/DL (ref 70–110)
POCT GLUCOSE: 280 MG/DL (ref 70–110)
POCT GLUCOSE: 300 MG/DL (ref 70–110)
POCT GLUCOSE: 315 MG/DL (ref 70–110)

## 2023-03-05 PROCEDURE — 63600175 PHARM REV CODE 636 W HCPCS: Performed by: INTERNAL MEDICINE

## 2023-03-05 PROCEDURE — 25000003 PHARM REV CODE 250: Performed by: EMERGENCY MEDICINE

## 2023-03-05 PROCEDURE — 21400001 HC TELEMETRY ROOM

## 2023-03-05 PROCEDURE — 94761 N-INVAS EAR/PLS OXIMETRY MLT: CPT

## 2023-03-05 PROCEDURE — 63600175 PHARM REV CODE 636 W HCPCS: Performed by: EMERGENCY MEDICINE

## 2023-03-05 PROCEDURE — 25000003 PHARM REV CODE 250: Performed by: INTERNAL MEDICINE

## 2023-03-05 RX ORDER — DIPHENHYDRAMINE HCL 25 MG
25 CAPSULE ORAL EVERY 6 HOURS PRN
Status: DISCONTINUED | OUTPATIENT
Start: 2023-03-05 | End: 2023-03-06 | Stop reason: HOSPADM

## 2023-03-05 RX ADMIN — FAMOTIDINE 20 MG: 20 TABLET, FILM COATED ORAL at 08:03

## 2023-03-05 RX ADMIN — INSULIN DETEMIR 15 UNITS: 100 INJECTION, SOLUTION SUBCUTANEOUS at 09:03

## 2023-03-05 RX ADMIN — INSULIN ASPART 1 UNITS: 100 INJECTION, SOLUTION INTRAVENOUS; SUBCUTANEOUS at 09:03

## 2023-03-05 RX ADMIN — METOCLOPRAMIDE 5 MG: 5 TABLET ORAL at 03:03

## 2023-03-05 RX ADMIN — INSULIN ASPART 4 UNITS: 100 INJECTION, SOLUTION INTRAVENOUS; SUBCUTANEOUS at 06:03

## 2023-03-05 RX ADMIN — INSULIN ASPART 4 UNITS: 100 INJECTION, SOLUTION INTRAVENOUS; SUBCUTANEOUS at 05:03

## 2023-03-05 RX ADMIN — METOPROLOL TARTRATE 50 MG: 50 TABLET, FILM COATED ORAL at 08:03

## 2023-03-05 RX ADMIN — PROCHLORPERAZINE EDISYLATE 5 MG: 5 INJECTION INTRAMUSCULAR; INTRAVENOUS at 09:03

## 2023-03-05 RX ADMIN — HYDRALAZINE HYDROCHLORIDE 10 MG: 20 INJECTION INTRAMUSCULAR; INTRAVENOUS at 09:03

## 2023-03-05 RX ADMIN — PROCHLORPERAZINE EDISYLATE 5 MG: 5 INJECTION INTRAMUSCULAR; INTRAVENOUS at 02:03

## 2023-03-05 RX ADMIN — METOCLOPRAMIDE 5 MG: 5 TABLET ORAL at 06:03

## 2023-03-05 RX ADMIN — DIPHENHYDRAMINE HYDROCHLORIDE 25 MG: 25 CAPSULE ORAL at 06:03

## 2023-03-05 RX ADMIN — HYDROCODONE BITARTRATE AND ACETAMINOPHEN 1 TABLET: 5; 325 TABLET ORAL at 08:03

## 2023-03-05 RX ADMIN — METOCLOPRAMIDE 5 MG: 5 TABLET ORAL at 11:03

## 2023-03-05 NOTE — PROGRESS NOTES
[facility] - [facility] Surgical Unit  Hospital Medicine  Progress Note    Patient Name: Dorene Husain  MRN: [id number]  Patient Class: IP- Inpatient   Admission Date: 2/26/2023  Length of Stay: 7 days  Attending Physician: Sergio Hidalgo MD  Primary Care Provider: Kumar Ortiz NP        Subjective:     Principal Problem:Hyperglycemia due to type 1 diabetes mellitus        HPI:  Patient is a 27-year-old female with a long history of type 1 diabetes complicated with gastroparesis was brought to the hospital with nausea and vomiting.    Patient is somnolent, arousable to pain.  She follows commands but goes back to sleep.    She was in emergency room 2 days ago the very dehydrated secondary to the same medical problem.  She was given IV fluid discharged home.   She presented to emergency room with the same symptoms.    She is been hospitalized multiple times with the same medical problem.  She carries a diagnosis of gastroparesis secondary to above.  She follows up with her gastroenterologist with in [facility].    Her ABG in emergency room showed a pH of above 7.35.  Patient was given IV fluid.  She appears to be in acidotic status but not DKA.    Patient will be admitted to the hospital for further evaluation and management.  Asked patient if she is having a problem with infection, fever and chills she denies.  Not able to obtain further information due to patient being very somnolent.      Overview/Hospital Course:  2/27  - Still c/o nausea and not tolerating PO.    2/28  - No changes. Scant PO intake.    3/1  - Feeling slightly better. States unable to go home.    3/2  - Still c/o pain and nausea. Poor PO intake.    3/5/23-Patient still c/o N/V and abdominal pain from her gastroparesis.  She has been refusing her reglan which she took today.  Also she has been getting pain meds which are not advised with gastroparesis.  Poor intake of food.  She also has a psych component to her illness as  well which prohibits her treatment.      Interval History:     Review of Systems   Constitutional:  Positive for appetite change and fatigue.   HENT: Negative.     Eyes: Negative.    Respiratory: Negative.     Cardiovascular: Negative.    Gastrointestinal:  Positive for abdominal pain, nausea and vomiting.   Endocrine: Negative.    Genitourinary: Negative.    Musculoskeletal: Negative.    Skin: Negative.    Allergic/Immunologic: Negative.    Neurological: Negative.    Hematological: Negative.    Psychiatric/Behavioral:  Positive for dysphoric mood.    Objective:     Vital Signs (Most Recent):  Temp: 98.6 °F (37 °C) (03/05/23 0816)  Pulse: 104 (03/05/23 0816)  Resp: 18 (03/05/23 0837)  BP: (!) 170/112 (03/05/23 0816)  SpO2: 100 % (03/05/23 0816)   Vital Signs (24h Range):  Temp:  [97.4 °F (36.3 °C)-99.7 °F (37.6 °C)] 98.6 °F (37 °C)  Pulse:  [] 104  Resp:  [18-20] 18  SpO2:  [99 %-100 %] 100 %  BP: (136-170)/() 170/112     Weight: 61.5 kg (135 lb 9.3 oz)  Body mass index is 24.8 kg/m².    Intake/Output Summary (Last 24 hours) at 3/5/2023 1138  Last data filed at 3/4/2023 1600  Gross per 24 hour   Intake 480 ml   Output --   Net 480 ml      Physical Exam  Constitutional:       Appearance: Normal appearance. She is normal weight.   HENT:      Head: Normocephalic and atraumatic.      Nose: Nose normal.      Mouth/Throat:      Mouth: Mucous membranes are moist.      Pharynx: Oropharynx is clear.   Eyes:      Extraocular Movements: Extraocular movements intact.      Conjunctiva/sclera: Conjunctivae normal.      Pupils: Pupils are equal, round, and reactive to light.   Cardiovascular:      Rate and Rhythm: Normal rate and regular rhythm.      Pulses: Normal pulses.      Heart sounds: Normal heart sounds.   Pulmonary:      Effort: Pulmonary effort is normal.      Breath sounds: Normal breath sounds.   Abdominal:      General: Bowel sounds are normal.      Palpations: Abdomen is soft.      Tenderness: There is  abdominal tenderness.   Musculoskeletal:         General: Normal range of motion.      Cervical back: Normal range of motion and neck supple.   Skin:     General: Skin is warm and dry.      Capillary Refill: Capillary refill takes 2 to 3 seconds.   Neurological:      General: No focal deficit present.      Mental Status: She is alert and oriented to person, place, and time. Mental status is at baseline.   Psychiatric:         Attention and Perception: Attention and perception normal.         Mood and Affect: Affect is flat.         Speech: Speech normal.         Behavior: Behavior is withdrawn.         Thought Content: Thought content normal.         Cognition and Memory: Cognition and memory normal.       Significant Labs: All pertinent labs within the past 24 hours have been reviewed.  BMP:   Recent Labs   Lab 03/04/23 0527   *   K 3.9   CO2 25   BUN 6.0*   CREATININE 0.81   CALCIUM 9.2     CBC:   Recent Labs   Lab 03/04/23 0527   WBC 6.9   HGB 10.1*   HCT 30.4*   *     CMP:   Recent Labs   Lab 03/03/23  1659 03/04/23 0527   * 133*   K 5.0 3.9   CO2 21* 25   BUN 6.0* 6.0*   CREATININE 0.78 0.81   CALCIUM 9.9 9.2     Magnesium: No results for input(s): MG in the last 48 hours.    Significant Imaging: I have reviewed all pertinent imaging results/findings within the past 24 hours.      Assessment/Plan:      * Hyperglycemia due to type 1 diabetes mellitus  Appears to be nonketotic hyperglycemic hyperosmolar status.   Patient already received 2 L of fluid emergency room.  Will give a 3rd bolus of IV fluids since she appeared to be on acute kidney injury and dehydrated.    PH shows a balance of 7.37 and no diabetic ketoacidosis.    Continue treatment for hyperglycemic status.    Noncompliance  Patient is very noncompliant.  She is very lethargic at this time.    Will check urine drug screening and follow up in the morning      Hypertension  Will hold ACE inhibitors for now and start when patient's  p.o. intake is installed.    DM gastroparesis  Will check hemoglobin A1c.    Short course of Reglan.    As needed Phenergan Zofran and Compazine for nausea and vomiting    CHATA (acute kidney injury)  Secondary to intractable nausea and vomiting.    Continue IV fluid and treat nausea and vomiting.    Recommend patient to address her gastroparesis with Gastroenterology for long-term management.      VTE Risk Mitigation (From admission, onward)         Ordered     enoxaparin injection 40 mg  Daily         02/26/23 1731     IP VTE LOW RISK PATIENT  Once         02/26/23 1731     Place sequential compression device  Until discontinued         02/26/23 1731     Place CORNELIA hose  Until discontinued         02/26/23 1731            follow labs  DVT prophylaxis  May need to stop narcotics if her condition worsens  reglan  Promote compliance with meds and diet  She has a GI appointment on Tuesday in [place]    Discharge Planning   EBENEZER:      Code Status: Full Code   Is the patient medically ready for discharge?:     Reason for patient still in hospital (select all that apply): Laboratory test, Treatment and Pending disposition  Discharge Plan A: Home   Discharge Delays: None known at this time              Francis Bloom MD  Department of Hospital Medicine   [facility] - [facility]

## 2023-03-05 NOTE — SUBJECTIVE & OBJECTIVE
Interval History:     Review of Systems   Constitutional:  Positive for appetite change and fatigue.   HENT: Negative.     Eyes: Negative.    Respiratory: Negative.     Cardiovascular: Negative.    Gastrointestinal:  Positive for abdominal pain, nausea and vomiting.   Endocrine: Negative.    Genitourinary: Negative.    Musculoskeletal: Negative.    Skin: Negative.    Allergic/Immunologic: Negative.    Neurological: Negative.    Hematological: Negative.    Psychiatric/Behavioral:  Positive for dysphoric mood.    Objective:     Vital Signs (Most Recent):  Temp: 98.6 °F (37 °C) (03/05/23 0816)  Pulse: 104 (03/05/23 0816)  Resp: 18 (03/05/23 0837)  BP: (!) 170/112 (03/05/23 0816)  SpO2: 100 % (03/05/23 0816)   Vital Signs (24h Range):  Temp:  [97.4 °F (36.3 °C)-99.7 °F (37.6 °C)] 98.6 °F (37 °C)  Pulse:  [] 104  Resp:  [18-20] 18  SpO2:  [99 %-100 %] 100 %  BP: (136-170)/() 170/112     Weight: 61.5 kg (135 lb 9.3 oz)  Body mass index is 24.8 kg/m².    Intake/Output Summary (Last 24 hours) at 3/5/2023 1138  Last data filed at 3/4/2023 1600  Gross per 24 hour   Intake 480 ml   Output --   Net 480 ml      Physical Exam  Constitutional:       Appearance: Normal appearance. She is normal weight.   HENT:      Head: Normocephalic and atraumatic.      Nose: Nose normal.      Mouth/Throat:      Mouth: Mucous membranes are moist.      Pharynx: Oropharynx is clear.   Eyes:      Extraocular Movements: Extraocular movements intact.      Conjunctiva/sclera: Conjunctivae normal.      Pupils: Pupils are equal, round, and reactive to light.   Cardiovascular:      Rate and Rhythm: Normal rate and regular rhythm.      Pulses: Normal pulses.      Heart sounds: Normal heart sounds.   Pulmonary:      Effort: Pulmonary effort is normal.      Breath sounds: Normal breath sounds.   Abdominal:      General: Bowel sounds are normal.      Palpations: Abdomen is soft.      Tenderness: There is abdominal tenderness.   Musculoskeletal:          General: Normal range of motion.      Cervical back: Normal range of motion and neck supple.   Skin:     General: Skin is warm and dry.      Capillary Refill: Capillary refill takes 2 to 3 seconds.   Neurological:      General: No focal deficit present.      Mental Status: She is alert and oriented to person, place, and time. Mental status is at baseline.   Psychiatric:         Attention and Perception: Attention and perception normal.         Mood and Affect: Affect is flat.         Speech: Speech normal.         Behavior: Behavior is withdrawn.         Thought Content: Thought content normal.         Cognition and Memory: Cognition and memory normal.       Significant Labs: All pertinent labs within the past 24 hours have been reviewed.  BMP:   Recent Labs   Lab 03/04/23 0527   *   K 3.9   CO2 25   BUN 6.0*   CREATININE 0.81   CALCIUM 9.2     CBC:   Recent Labs   Lab 03/04/23 0527   WBC 6.9   HGB 10.1*   HCT 30.4*   *     CMP:   Recent Labs   Lab 03/03/23  1659 03/04/23 0527   * 133*   K 5.0 3.9   CO2 21* 25   BUN 6.0* 6.0*   CREATININE 0.78 0.81   CALCIUM 9.9 9.2     Magnesium: No results for input(s): MG in the last 48 hours.    Significant Imaging: I have reviewed all pertinent imaging results/findings within the past 24 hours.

## 2023-03-06 VITALS
OXYGEN SATURATION: 100 % | HEART RATE: 89 BPM | SYSTOLIC BLOOD PRESSURE: 123 MMHG | RESPIRATION RATE: 16 BRPM | BODY MASS INDEX: 24.95 KG/M2 | DIASTOLIC BLOOD PRESSURE: 88 MMHG | HEIGHT: 62 IN | TEMPERATURE: 99 F | WEIGHT: 135.56 LBS

## 2023-03-06 PROBLEM — N17.9 AKI (ACUTE KIDNEY INJURY): Chronic | Status: RESOLVED | Noted: 2022-05-21 | Resolved: 2023-03-06

## 2023-03-06 LAB
ALBUMIN SERPL-MCNC: 2.8 G/DL (ref 3.5–5)
ALBUMIN/GLOB SERPL: 0.9 RATIO (ref 1.1–2)
ALP SERPL-CCNC: 61 UNIT/L (ref 40–150)
ALT SERPL-CCNC: 9 UNIT/L (ref 0–55)
AST SERPL-CCNC: 13 UNIT/L (ref 5–34)
BASOPHILS # BLD AUTO: 0.04 X10(3)/MCL (ref 0–0.2)
BASOPHILS NFR BLD AUTO: 0.4 %
BILIRUBIN DIRECT+TOT PNL SERPL-MCNC: 0.4 MG/DL
BUN SERPL-MCNC: 13 MG/DL (ref 7–18.7)
CALCIUM SERPL-MCNC: 8.6 MG/DL (ref 8.4–10.2)
CHLORIDE SERPL-SCNC: 95 MMOL/L (ref 98–107)
CO2 SERPL-SCNC: 28 MMOL/L (ref 22–29)
CREAT SERPL-MCNC: 1.23 MG/DL (ref 0.55–1.02)
EOSINOPHIL # BLD AUTO: 0.02 X10(3)/MCL (ref 0–0.9)
EOSINOPHIL NFR BLD AUTO: 0.2 %
ERYTHROCYTE [DISTWIDTH] IN BLOOD BY AUTOMATED COUNT: 12.6 % (ref 11.5–17)
GFR SERPLBLD CREATININE-BSD FMLA CKD-EPI: >60 MLS/MIN/1.73/M2
GLOBULIN SER-MCNC: 3.1 GM/DL (ref 2.4–3.5)
GLUCOSE SERPL-MCNC: 202 MG/DL (ref 74–100)
HCT VFR BLD AUTO: 29.5 % (ref 37–47)
HGB BLD-MCNC: 9.6 G/DL (ref 12–16)
IMM GRANULOCYTES # BLD AUTO: 0.07 X10(3)/MCL (ref 0–0.04)
IMM GRANULOCYTES NFR BLD AUTO: 0.6 %
LYMPHOCYTES # BLD AUTO: 2.08 X10(3)/MCL (ref 0.6–4.6)
LYMPHOCYTES NFR BLD AUTO: 18.3 %
MAGNESIUM SERPL-MCNC: 1.4 MG/DL (ref 1.6–2.6)
MCH RBC QN AUTO: 26.9 PG
MCHC RBC AUTO-ENTMCNC: 32.5 G/DL (ref 33–36)
MCV RBC AUTO: 82.6 FL (ref 80–94)
MONOCYTES # BLD AUTO: 1.12 X10(3)/MCL (ref 0.1–1.3)
MONOCYTES NFR BLD AUTO: 9.9 %
NEUTROPHILS # BLD AUTO: 8.03 X10(3)/MCL (ref 2.1–9.2)
NEUTROPHILS NFR BLD AUTO: 70.6 %
PLATELET # BLD AUTO: 518 X10(3)/MCL (ref 130–400)
PMV BLD AUTO: 9.1 FL (ref 7.4–10.4)
POCT GLUCOSE: 191 MG/DL (ref 70–110)
POCT GLUCOSE: 307 MG/DL (ref 70–110)
POCT GLUCOSE: 334 MG/DL (ref 70–110)
POTASSIUM SERPL-SCNC: 3.8 MMOL/L (ref 3.5–5.1)
PROT SERPL-MCNC: 5.9 GM/DL (ref 6.4–8.3)
RBC # BLD AUTO: 3.57 X10(6)/MCL (ref 4.2–5.4)
SODIUM SERPL-SCNC: 135 MMOL/L (ref 136–145)
WBC # SPEC AUTO: 11.4 X10(3)/MCL (ref 4.5–11.5)

## 2023-03-06 PROCEDURE — 25000003 PHARM REV CODE 250: Performed by: EMERGENCY MEDICINE

## 2023-03-06 PROCEDURE — 25000003 PHARM REV CODE 250: Performed by: INTERNAL MEDICINE

## 2023-03-06 PROCEDURE — 63600175 PHARM REV CODE 636 W HCPCS: Performed by: EMERGENCY MEDICINE

## 2023-03-06 PROCEDURE — 83735 ASSAY OF MAGNESIUM: CPT | Performed by: INTERNAL MEDICINE

## 2023-03-06 PROCEDURE — 80053 COMPREHEN METABOLIC PANEL: CPT | Performed by: INTERNAL MEDICINE

## 2023-03-06 PROCEDURE — 85025 COMPLETE CBC W/AUTO DIFF WBC: CPT | Performed by: INTERNAL MEDICINE

## 2023-03-06 PROCEDURE — 94761 N-INVAS EAR/PLS OXIMETRY MLT: CPT

## 2023-03-06 RX ORDER — METOCLOPRAMIDE 5 MG/1
5 TABLET ORAL
Qty: 90 TABLET | Refills: 0 | Status: SHIPPED | OUTPATIENT
Start: 2023-03-06 | End: 2023-04-05

## 2023-03-06 RX ADMIN — METOCLOPRAMIDE 5 MG: 5 TABLET ORAL at 05:03

## 2023-03-06 RX ADMIN — Medication 6 MG: at 01:03

## 2023-03-06 RX ADMIN — FAMOTIDINE 20 MG: 20 TABLET, FILM COATED ORAL at 01:03

## 2023-03-06 RX ADMIN — INSULIN ASPART 8 UNITS: 100 INJECTION, SOLUTION INTRAVENOUS; SUBCUTANEOUS at 05:03

## 2023-03-06 RX ADMIN — FAMOTIDINE 20 MG: 20 TABLET, FILM COATED ORAL at 09:03

## 2023-03-06 RX ADMIN — METOPROLOL TARTRATE 50 MG: 50 TABLET, FILM COATED ORAL at 09:03

## 2023-03-06 RX ADMIN — METOPROLOL TARTRATE 50 MG: 50 TABLET, FILM COATED ORAL at 01:03

## 2023-03-06 RX ADMIN — METOCLOPRAMIDE 5 MG: 5 TABLET ORAL at 12:03

## 2023-03-06 RX ADMIN — INSULIN ASPART 10 UNITS: 100 INJECTION, SOLUTION INTRAVENOUS; SUBCUTANEOUS at 01:03

## 2023-03-06 RX ADMIN — HYDROCODONE BITARTRATE AND ACETAMINOPHEN 1 TABLET: 5; 325 TABLET ORAL at 01:03

## 2023-03-06 NOTE — PLAN OF CARE
03/06/23 1138   Discharge Reassessment   Assessment Type Discharge Planning Reassessment   Discharge Plan discussed with: Patient   Discharge Plan A Home   Discharge Plan B Home   DME Needed Upon Discharge  none   Discharge Barriers Identified None   Why the patient remains in the hospital Requires continued medical care   Post-Acute Status   Discharge Delays Orders Needed     Plan d/c home today.  No needs for d/c.

## 2023-03-06 NOTE — PROGRESS NOTES
"Inpatient Nutrition Evaluation    Admit Date: 2/26/2023   Total duration of encounter: 8 days    Nutrition Recommendation/Prescription     Continue Soft & Bite Sized diet as tolerated. Pt would likely benefit from 5 to 6 small meals daily with Diabetic restriction.   If po intake does not improve, may consider parenteral nutrition. RD available to provide recs.   Replete lytes as necessary.   Monitor intake, weight, and labs.     RD available as needed.  Thank you.     Nutrition Assessment     Chart Review    Reason Seen: continuous nutrition monitoring and follow-up    Malnutrition Screening Tool Results   Have you recently lost weight without trying?: No  Have you been eating poorly because of a decreased appetite?: No   MST Score: 0     Diagnosis:  Abdominal Pain.  Nausea and vomiting.   DM Gastroparesis.  Noncompliance.  CHATA.  HTN.    Relevant Medical History:   DM.   Noncompliance.     Nutrition-Related Medications:   Lovenox.  Insulin.       Nutrition-Related Labs:  3/6: H/H 9.6/29.5(L); Na+ 135(L); Cl 95(L); Crea 1.23(H); (H); Mg+ 1.40(L); Alb 2.8(L).     2/27: H/H 10.2/31.6(L); Na+ 129(L); BUN/Crea 28/1.08(H);(H); Phos 1.4(L); Alb 2.9(L).      Diet Order: Diet Soft & Bite Sized  Oral Supplement Order: none  Appetite/Oral Intake: poor/0-25% of meals  Factors Affecting Nutritional Intake: clear liquid diet and NPO  Food/Restorationist/Cultural Preferences: none reported  Food Allergies: none reported       Wound(s):       Comments  3/6: Pt continues with poor appetite and intake. Diet modified to Soft & Bite Sized today.  Will continue to monitor during stay.       Anthropometrics    Height: 5' 2" (157.5 cm)    Last Weight: 61.5 kg (135 lb 9.3 oz) (02/27/23 0038) Weight Method: Bed Scale  BMI (Calculated): 24.8  BMI Classification: normal (BMI 18.5-24.9)     Ideal Body Weight (IBW), Female: 110 lb     % Ideal Body Weight, Female (lb): 123.25 %                             Usual Weight Provided By: EMR " weight history    Wt Readings from Last 3 Encounters:   02/27/23 0038 61.5 kg (135 lb 9.3 oz)   02/26/23 1109 60.3 kg (133 lb)   02/23/23 1839 60.3 kg (133 lb)   12/19/22 1320 58.5 kg (129 lb)      Weight Change(s) Since Admission:  Admit Weight: 60.3 kg (133 lb) (02/26/23 1109)      Patient Education    Not applicable.    Monitoring & Evaluation     Dietitian will monitor food and beverage intake, energy intake, weight, electrolyte/renal panel, glucose/endocrine profile, and gastrointestinal profile.  Nutrition Risk/Follow-Up: low (follow-up in 5-7 days)  Patients assigned 'low nutrition risk' status do not qualify for a full nutritional assessment but will be monitored and re-evaluated in a 5-7 day time period. Please consult if re-evaluation needed sooner.

## 2023-03-07 ENCOUNTER — PATIENT OUTREACH (OUTPATIENT)
Dept: ADMINISTRATIVE | Facility: CLINIC | Age: 28
End: 2023-03-07
Payer: MEDICAID

## 2023-03-07 NOTE — DISCHARGE SUMMARY
Hospital Medicine  Discharge Summary    Patient Name: Dorene Husain  MRN: [id number]  Admit Date: 2/26/2023  Discharge Date: 3/6/2023   Status: IP- Inpatient   Length of Stay: 8      PHYSICIANS   Admitting Physician: Sgeun Viramontes MD  Discharging Physician: Bao Vuong MD.  Primary Care Physician: Kumar Ortiz NP  Consults: None      DISCHARGE DIAGNOSES   Uncontrolled IDDM with hyperglycemia  Severe diabetic gastroparesis  Medical non-compliance  Pseudo-hyponatremia attributable to hyperglycemia  CKD II  Essential HTN      PROCEDURES   None      HOSPITAL COURSE    27 year old female with uncontrolled DM, associated gastroparesis, medical non-compliance, and frequent hospitalizations for abd issues, is admitted to [facility] with abd pain, nausea and hyperglycemia.  Started on Reglan.  Initially requiring IV narcotics for abd pain, this was discontinued.  She tolerating clears, but had some issues with full diabetic diet.  She has a dilation scheduled wit GI tomorrow.  At this point she is stable for discharge to have her procedure tomorrow.      STATUS  Stable    DISPOSITION  Discharge to home    DIET  Soft diet pending dilation    ACTIVITY  As tolerated      FOLLOW-UP      Kumar Ortiz NP. Schedule an appointment as soon as possible for a visit in 1 week(s).    Specialty: Family Medicine  Contact information:  [place] [id number] [phone]                 DISCHARGE MEDICATION RECONCILIATION     PRESCRIBED     metoclopramide HCl 5 MG tablet  Commonly known as: REGLAN  Take 1 tablet (5 mg total) by mouth 3 (three) times daily before meals.       CONTINUE     amitriptyline 25 MG tablet  Commonly known as: ELAVIL     BASAGLAR KWIKPEN U-100 INSULIN glargine 100 units/mL SubQ pen  Generic drug: insulin     dicyclomine 10 MG capsule  Commonly known as: BENTYL     DULoxetine 30 MG capsule  Commonly known as: CYMBALTA  Take 1 capsule (30 mg total) by mouth once daily.     hydrOXYzine 50 MG  tablet  Commonly known as: ATARAX     metoprolol tartrate 50 MG tablet  Commonly known as: LOPRESSOR     ondansetron 4 MG tablet  Commonly known as: ZOFRAN     pantoprazole 40 MG tablet  Commonly known as: PROTONIX  Take 1 tablet (40 mg total) by mouth 2 (two) times daily.            DISCONTINUE     lisinopriL 20 MG tablet  Commonly known as: PRINIVIL,ZESTRIL     olmesartan 40 MG tablet  Commonly known as: BENICAR            These medications were sent to   [facility] [address] [place] [id number]      Phone: [phone]   metoclopramide HCl 5 MG tablet           PHYSICAL EXAM   VITALS: T 99 °F (37.2 °C)   /88   P 89   RR 16   O2 100 %    GENERAL: Awake and in NAD  LUNGS: CTA anteriorly  CVS: Normal rate  GI/: Soft, ND, bowel sounds positive.  EXTREMITIES: No peripheral edema  NEURO: AAOx3  PSYCH: Cooperative        Discharge time: 33 minutes     Bao Vuong MD  [facility]       DIAGNOSITCS   CBC:   Recent Labs   Lab 03/04/23  0527 03/06/23  0449   WBC 6.9 11.4   HGB 10.1* 9.6*   HCT 30.4* 29.5*   * 518*     CMP:   Recent Labs   Lab 03/03/23  1659 03/04/23  0527 03/06/23  0449   CALCIUM 9.9 9.2 8.6   ALBUMIN  --   --  2.8*   * 133* 135*   K 5.0 3.9 3.8   CO2 21* 25 28   BUN 6.0* 6.0* 13.0   CREATININE 0.78 0.81 1.23*   ALKPHOS  --   --  61   ALT  --   --  9   AST  --   --  13   BILITOT  --   --  0.4   MG  --   --  1.40*     Estimated Creatinine Clearance: 59.3 mL/min (A) (based on SCr of 1.23 mg/dL (H)).      Recent Labs     03/05/23  1226 03/05/23  1719 03/05/23  2042 03/06/23  0555 03/06/23  1334 03/06/23  1634   POCTGLUCOSE 195* 300* 280* 191* 334* 307*          X-Ray Chest AP Portable  Result Date: 2/27/20234  EXAMINATION: XR CHEST AP PORTABLE CLINICAL HISTORY: Acidosis;, . COMPARISON: 02/26/2023 FINDINGS: An AP view or more reveals the heart to be normal in size.  The trachea is midline.  No infiltrate or effusion is seen.  Bony structures appear grossly  intact.   Impression:  1. No active cardiopulmonary disease identified   Electronically signed by: Danilo Navarro Date:    02/27/2023 Time:    09:59    X-Ray Chest AP Portable  Result Date: 2/27/2023  EXAMINATION: XR CHEST AP PORTABLE CLINICAL HISTORY: hyperglycemia;, . COMPARISON: 09/24/2022 FINDINGS: An AP view or more reveals the heart to be normal in size.  The trachea is midline.  No definite infiltrate or effusion is seen.  There is interim removal of the right PICC line since the prior exam.  No definite infiltrate or effusion is seen.  Bony structures appear grossly intact.     1. No active cardiopulmonary disease identified Electronically signed by: Danilo Navarro Date:    02/27/2023 Time:    08:26    CT Abdomen Pelvis  Without Contrast    Result Date: 2/23/2023  EXAMINATION: CT ABDOMEN PELVIS WITHOUT CONTRAST CLINICAL HISTORY: Flank pain, kidney stone suspected;Abdominal pain, acute, nonlocalized;Abdominal pain/back pain; TECHNIQUE: Multidetector non-contrast axial CT images of the abdomen and pelvis were obtained with coronal and sagittal reconstructions. Automatic exposure control was utilized to reduce the patient's radiation dose. DLP= 165 COMPARISON: 09/24/2022 FINDINGS: 01. HEPATOBILIARY: No focal hepatic lesion is identified, however evaluation is limited secondary to lack of IV contrast. Status post cholecystectomy 02. SPLEEN: Normal 03. PANCREAS: No focal masses or ductal dilatation. 04. ADRENALS: No adrenal nodules. 05. KIDNEYS: The right kidney demonstrates no stone, hydronephrosis, or hydroureter. No focal mass identified. The left kidney demonstrates no stone, hydronephrosis, or hydroureter. No focal mass identified. 06. LYMPHADENOPATHY/RETROPERITONEUM: There is no retroperitoneal lymphadenopathy. The abdominal aorta is normal in course and caliber. 07. BOWEL: No acute bowel related abnormalities. No evidence of appendiceal inflammation. 08. PELVIC VISCERA: Normal. No pelvic mass. 09. PELVIC  LYMPH NODES: No lymphadenopathy. 10. PERITONEUM/ABDOMINAL WALL: No ascites or implant. 11. SKELETAL: No aggressive appearing lytic/blastic lesion. No acute fractures, subluxations or dislocations. 12. LUNG BASES: The visualized lungs are unremarkable.     No acute abnormality within the abdomen or pelvis within the limits of a noncontrast exam.  No renal stones appreciated.  No secondary signs of obstruction identified. Electronically signed by: Dario Vu Date:    02/23/2023 Time:    20:36

## 2023-03-07 NOTE — PROGRESS NOTES
C3 nurse attempted to contact Dorene Husain  for a TCC post hospital discharge follow up call. No answer. The patient does not have a scheduled HOSFU appointment, and the pt does not have an [facility] PCP.

## 2023-03-07 NOTE — PLAN OF CARE
Problem: Adult Inpatient Plan of Care  Goal: Plan of Care Review  Outcome: Met  Goal: Patient-Specific Goal (Individualized)  Outcome: Met  Goal: Absence of Hospital-Acquired Illness or Injury  Outcome: Met  Goal: Optimal Comfort and Wellbeing  Outcome: Met  Goal: Readiness for Transition of Care  Outcome: Met     Problem: Diabetes Comorbidity  Goal: Blood Glucose Level Within Targeted Range  Outcome: Met     Problem: Fluid and Electrolyte Imbalance (Acute Kidney Injury/Impairment)  Goal: Fluid and Electrolyte Balance  Outcome: Met     Problem: Oral Intake Inadequate (Acute Kidney Injury/Impairment)  Goal: Optimal Nutrition Intake  Outcome: Met     Problem: Renal Function Impairment (Acute Kidney Injury/Impairment)  Goal: Effective Renal Function  Outcome: Met     Problem: Infection  Goal: Absence of Infection Signs and Symptoms  Outcome: Met     Problem: Skin Injury Risk Increased  Goal: Skin Health and Integrity  Outcome: Met

## 2023-03-07 NOTE — NURSING
Pt  discharged femoral iv access removed. Pt discharge AVS packet given. Pt wheeled to vehicle in wheel chair per staff.

## 2023-03-08 NOTE — PROGRESS NOTES
Second attempt C3 nurse attempted to contact Dorene Husain  for a TCC post hospital discharge follow up call. No answer. Left message w/ father w/ callback number and information. The patient does not have a scheduled HOSFU appointment, and the pt does not have an [facility] PCP.

## 2023-03-09 ENCOUNTER — HOSPITAL ENCOUNTER (EMERGENCY)
Facility: HOSPITAL | Age: 28
Discharge: HOME | End: 2023-03-09
Attending: EMERGENCY MEDICINE
Payer: MEDICAID

## 2023-03-09 VITALS
SYSTOLIC BLOOD PRESSURE: 128 MMHG | TEMPERATURE: 99 F | BODY MASS INDEX: 23.37 KG/M2 | WEIGHT: 127 LBS | DIASTOLIC BLOOD PRESSURE: 84 MMHG | RESPIRATION RATE: 20 BRPM | OXYGEN SATURATION: 100 % | HEART RATE: 92 BPM | HEIGHT: 62 IN

## 2023-03-09 DIAGNOSIS — M54.50 BILATERAL LOW BACK PAIN WITHOUT SCIATICA, UNSPECIFIED CHRONICITY: ICD-10-CM

## 2023-03-09 DIAGNOSIS — R11.2 NAUSEA AND VOMITING, UNSPECIFIED VOMITING TYPE: Primary | ICD-10-CM

## 2023-03-09 LAB
ALBUMIN SERPL-MCNC: 3.1 G/DL (ref 3.5–5)
ALBUMIN/GLOB SERPL: 1 RATIO (ref 1.1–2)
ALLENS TEST: ABNORMAL
ALP SERPL-CCNC: 70 UNIT/L (ref 40–150)
ALT SERPL-CCNC: 10 UNIT/L (ref 0–55)
APPEARANCE UR: CLEAR
AST SERPL-CCNC: 14 UNIT/L (ref 5–34)
BACTERIA #/AREA URNS AUTO: NORMAL /HPF
BASOPHILS # BLD AUTO: 0.04 X10(3)/MCL (ref 0–0.2)
BASOPHILS NFR BLD AUTO: 0.5 %
BILIRUB UR QL STRIP.AUTO: NEGATIVE MG/DL
BILIRUBIN DIRECT+TOT PNL SERPL-MCNC: 0.5 MG/DL
BUN SERPL-MCNC: 8 MG/DL (ref 7–18.7)
CALCIUM SERPL-MCNC: 8.8 MG/DL (ref 8.4–10.2)
CHLORIDE SERPL-SCNC: 92 MMOL/L (ref 98–107)
CO2 SERPL-SCNC: 27 MMOL/L (ref 22–29)
COLOR UR AUTO: YELLOW
CREAT SERPL-MCNC: 1.23 MG/DL (ref 0.55–1.02)
DELSYS: ABNORMAL
EOSINOPHIL # BLD AUTO: 0.13 X10(3)/MCL (ref 0–0.9)
EOSINOPHIL NFR BLD AUTO: 1.6 %
ERYTHROCYTE [DISTWIDTH] IN BLOOD BY AUTOMATED COUNT: 12.2 % (ref 11.5–17)
GFR SERPLBLD CREATININE-BSD FMLA CKD-EPI: >60 MLS/MIN/1.73/M2
GLOBULIN SER-MCNC: 3.1 GM/DL (ref 2.4–3.5)
GLUCOSE SERPL-MCNC: 441 MG/DL (ref 74–100)
GLUCOSE UR QL STRIP.AUTO: >=1000 MG/DL
HCO3 UR-SCNC: 28.2 MMOL/L (ref 24–28)
HCT VFR BLD AUTO: 30.1 % (ref 37–47)
HGB BLD-MCNC: 10.2 G/DL (ref 12–16)
IMM GRANULOCYTES # BLD AUTO: 0.03 X10(3)/MCL (ref 0–0.04)
IMM GRANULOCYTES NFR BLD AUTO: 0.4 %
KETONES UR QL STRIP.AUTO: 80 MG/DL
LEUKOCYTE ESTERASE UR QL STRIP.AUTO: NEGATIVE UNIT/L
LIPASE SERPL-CCNC: 22 U/L
LYMPHOCYTES # BLD AUTO: 1.87 X10(3)/MCL (ref 0.6–4.6)
LYMPHOCYTES NFR BLD AUTO: 23.7 %
MCH RBC QN AUTO: 27.3 PG
MCHC RBC AUTO-ENTMCNC: 33.9 G/DL (ref 33–36)
MCV RBC AUTO: 80.7 FL (ref 80–94)
MONOCYTES # BLD AUTO: 0.74 X10(3)/MCL (ref 0.1–1.3)
MONOCYTES NFR BLD AUTO: 9.4 %
NEUTROPHILS # BLD AUTO: 5.08 X10(3)/MCL (ref 2.1–9.2)
NEUTROPHILS NFR BLD AUTO: 64.4 %
NITRITE UR QL STRIP.AUTO: NEGATIVE
PCO2 BLDA: 38.3 MMHG (ref 35–45)
PH SMN: 7.47 [PH] (ref 7.35–7.45)
PH UR STRIP.AUTO: 6.5 [PH]
PLATELET # BLD AUTO: 517 X10(3)/MCL (ref 130–400)
PMV BLD AUTO: 8.4 FL (ref 7.4–10.4)
PO2 BLDA: 82 MMHG (ref 80–100)
POC BE: 5 MMOL/L
POC SATURATED O2: 97 % (ref 95–100)
POC TCO2: 29 MMOL/L (ref 23–27)
POTASSIUM SERPL-SCNC: 3.4 MMOL/L (ref 3.5–5.1)
PROT SERPL-MCNC: 6.2 GM/DL (ref 6.4–8.3)
PROT UR QL STRIP.AUTO: >=300 MG/DL
RBC # BLD AUTO: 3.73 X10(6)/MCL (ref 4.2–5.4)
RBC #/AREA URNS AUTO: NORMAL /HPF
RBC UR QL AUTO: ABNORMAL UNIT/L
SAMPLE: ABNORMAL
SITE: ABNORMAL
SODIUM SERPL-SCNC: 130 MMOL/L (ref 136–145)
SP GR UR STRIP.AUTO: 1.01
SQUAMOUS #/AREA URNS AUTO: NORMAL /HPF
UROBILINOGEN UR STRIP-ACNC: 0.2 MG/DL
WBC # SPEC AUTO: 7.9 X10(3)/MCL (ref 4.5–11.5)
WBC #/AREA URNS AUTO: NORMAL /HPF

## 2023-03-09 PROCEDURE — 96361 HYDRATE IV INFUSION ADD-ON: CPT

## 2023-03-09 PROCEDURE — 82962 GLUCOSE BLOOD TEST: CPT

## 2023-03-09 PROCEDURE — 82803 BLOOD GASES ANY COMBINATION: CPT

## 2023-03-09 PROCEDURE — 81001 URINALYSIS AUTO W/SCOPE: CPT | Performed by: NURSE PRACTITIONER

## 2023-03-09 PROCEDURE — 96374 THER/PROPH/DIAG INJ IV PUSH: CPT

## 2023-03-09 PROCEDURE — 80053 COMPREHEN METABOLIC PANEL: CPT | Performed by: NURSE PRACTITIONER

## 2023-03-09 PROCEDURE — 99900035 HC TECH TIME PER 15 MIN (STAT)

## 2023-03-09 PROCEDURE — 96375 TX/PRO/DX INJ NEW DRUG ADDON: CPT

## 2023-03-09 PROCEDURE — 63600175 PHARM REV CODE 636 W HCPCS: Performed by: NURSE PRACTITIONER

## 2023-03-09 PROCEDURE — 99284 EMERGENCY DEPT VISIT MOD MDM: CPT | Mod: 25

## 2023-03-09 PROCEDURE — 25000003 PHARM REV CODE 250: Performed by: NURSE PRACTITIONER

## 2023-03-09 PROCEDURE — 36600 WITHDRAWAL OF ARTERIAL BLOOD: CPT

## 2023-03-09 PROCEDURE — 83690 ASSAY OF LIPASE: CPT | Performed by: NURSE PRACTITIONER

## 2023-03-09 PROCEDURE — 85025 COMPLETE CBC W/AUTO DIFF WBC: CPT | Performed by: NURSE PRACTITIONER

## 2023-03-09 RX ORDER — MORPHINE SULFATE 4 MG/ML
4 INJECTION, SOLUTION INTRAMUSCULAR; INTRAVENOUS
Status: COMPLETED | OUTPATIENT
Start: 2023-03-09 | End: 2023-03-09

## 2023-03-09 RX ORDER — KETOROLAC TROMETHAMINE 30 MG/ML
15 INJECTION, SOLUTION INTRAMUSCULAR; INTRAVENOUS
Status: COMPLETED | OUTPATIENT
Start: 2023-03-09 | End: 2023-03-09

## 2023-03-09 RX ORDER — ONDANSETRON 2 MG/ML
4 INJECTION INTRAMUSCULAR; INTRAVENOUS
Status: COMPLETED | OUTPATIENT
Start: 2023-03-09 | End: 2023-03-09

## 2023-03-09 RX ORDER — PROMETHAZINE HYDROCHLORIDE 25 MG/1
25 TABLET ORAL EVERY 6 HOURS PRN
Qty: 15 TABLET | Refills: 0 | Status: SHIPPED | OUTPATIENT
Start: 2023-03-09 | End: 2023-03-28 | Stop reason: SDUPTHER

## 2023-03-09 RX ORDER — OXYCODONE AND ACETAMINOPHEN 5; 325 MG/1; MG/1
1 TABLET ORAL EVERY 4 HOURS PRN
Qty: 12 TABLET | Refills: 0 | Status: ON HOLD | OUTPATIENT
Start: 2023-03-09 | End: 2023-06-19

## 2023-03-09 RX ADMIN — ONDANSETRON 4 MG: 2 INJECTION INTRAMUSCULAR; INTRAVENOUS at 03:03

## 2023-03-09 RX ADMIN — SODIUM CHLORIDE 1000 ML: 9 INJECTION, SOLUTION INTRAVENOUS at 03:03

## 2023-03-09 RX ADMIN — KETOROLAC TROMETHAMINE 15 MG: 30 INJECTION, SOLUTION INTRAMUSCULAR; INTRAVENOUS at 03:03

## 2023-03-09 RX ADMIN — MORPHINE SULFATE 4 MG: 4 INJECTION, SOLUTION INTRAMUSCULAR; INTRAVENOUS at 04:03

## 2023-03-09 NOTE — PROGRESS NOTES
Third attempt C3 nurse attempted to contact Dorene Husain  for a TCC post hospital discharge follow up call. No answer. Left message w/ father w/ callback number and information. The patient does not have a scheduled HOSFU appointment, and the pt does not have an [facility] PCP.

## 2023-03-10 LAB — POCT GLUCOSE: 340 MG/DL (ref 70–110)

## 2023-03-10 NOTE — ED PROVIDER NOTES
Encounter Date: 3/9/2023       History     Chief Complaint   Patient presents with    Back Pain     Admitted here within the past 2 weeks for dehyrdation, nausea and vomiting. Patient states she was discharged a few days ago and her symptoms were never any better     27-year-old female in the emergency room with complaints of nausea vomiting and back pain.  Patient was seen admitted here 2 weeks ago in recently discharged she states she was discharged with no medications for pain or nausea.  She denies any fevers chills.  She denies any injury trauma to the back.  She denies any other associated symptoms.  She denies any worsening or alleviating factors.    Review of patient's allergies indicates:  No Known Allergies  Past Medical History:   Diagnosis Date    Diabetes mellitus     Diabetic gastroparesis associated with type 1 diabetes mellitus     DKA (diabetic ketoacidosis)     DKA (diabetic ketoacidosis)     DKA (diabetic ketoacidosis)     Gastroparesis     Gastroparesis due to DM     Hypertension     Ketoacidosis due to diabetes     Non compliance with medical treatment     Pneumonia     Secondary DM with DKA      Past Surgical History:   Procedure Laterality Date    CHOLECYSTECTOMY      ESOPHAGOGASTRODUODENOSCOPY      Multiple    None       Family History   Problem Relation Age of Onset    Heart disease Mother     Hypertension Mother     Diabetes Mother     Hyperlipidemia Mother     Cancer Father     Stroke Father     Hypertension Father     Hyperlipidemia Father      Social History     Tobacco Use    Smoking status: Never    Smokeless tobacco: Never   Substance Use Topics    Alcohol use: Never    Drug use: Not Currently     Types: Marijuana     Review of Systems   Constitutional:  Negative for activity change, appetite change and fever.   HENT:  Negative for congestion, dental problem and sore throat.    Eyes:  Negative for discharge and itching.   Respiratory:  Negative for apnea, chest tightness and shortness  of breath.    Cardiovascular:  Negative for chest pain.   Gastrointestinal:  Positive for nausea and vomiting. Negative for abdominal distention and abdominal pain.   Endocrine: Negative for cold intolerance and heat intolerance.   Genitourinary:  Negative for dysuria, vaginal bleeding, vaginal discharge and vaginal pain.   Musculoskeletal:  Positive for back pain. Negative for arthralgias and gait problem.   Skin:  Negative for rash.   Neurological:  Negative for dizziness, facial asymmetry and weakness.   Hematological:  Does not bruise/bleed easily.   Psychiatric/Behavioral:  Negative for agitation and behavioral problems.    All other systems reviewed and are negative.    Physical Exam     Initial Vitals [03/09/23 1346]   BP Pulse Resp Temp SpO2   138/88 (!) 116 18 98.5 °F (36.9 °C) 100 %      MAP       --         Physical Exam    Nursing note and vitals reviewed.  Constitutional: Vital signs are normal. She appears well-developed and well-nourished.  Non-toxic appearance. She does not have a sickly appearance.   HENT:   Head: Normocephalic and atraumatic.   Right Ear: External ear normal.   Left Ear: External ear normal.   Eyes: Conjunctivae, EOM and lids are normal. Pupils are equal, round, and reactive to light. Lids are everted and swept, no foreign bodies found.   Neck: Trachea normal and phonation normal. Neck supple. No thyroid mass and no thyromegaly present.   Normal range of motion.   Full passive range of motion without pain.     Cardiovascular:  Normal rate, regular rhythm, S1 normal, S2 normal, normal heart sounds, intact distal pulses and normal pulses.           Pulmonary/Chest: Breath sounds normal. No respiratory distress.   Abdominal: Abdomen is soft.   Musculoskeletal:         General: No tenderness or edema.      Cervical back: Full passive range of motion without pain, normal range of motion and neck supple.     Lymphadenopathy:     She has no cervical adenopathy.   Neurological: She is  alert and oriented to person, place, and time. She has normal strength.   Skin: Skin is warm, dry and intact. Capillary refill takes less than 2 seconds.   Psychiatric: She has a normal mood and affect. Her speech is normal and behavior is normal. Judgment normal. Cognition and memory are normal.       ED Course   Procedures  Labs Reviewed   COMPREHENSIVE METABOLIC PANEL - Abnormal; Notable for the following components:       Result Value    Sodium Level 130 (*)     Potassium Level 3.4 (*)     Chloride 92 (*)     Glucose Level 441 (*)     Creatinine 1.23 (*)     Protein Total 6.2 (*)     Albumin Level 3.1 (*)     Albumin/Globulin Ratio 1.0 (*)     All other components within normal limits   URINALYSIS, REFLEX TO URINE CULTURE - Abnormal; Notable for the following components:    Protein, UA >=300 (*)     Glucose, UA >=1000 (*)     Ketones, UA 80 (*)     Blood, UA Trace-Intact (*)     All other components within normal limits   CBC WITH DIFFERENTIAL - Abnormal; Notable for the following components:    RBC 3.73 (*)     Hgb 10.2 (*)     Hct 30.1 (*)     Platelet 517 (*)     All other components within normal limits   ISTAT PROCEDURE - Abnormal; Notable for the following components:    POC PH 7.475 (*)     POC HCO3 28.2 (*)     POC TCO2 29 (*)     All other components within normal limits   LIPASE - Normal   URINALYSIS, MICROSCOPIC - Normal   CBC W/ AUTO DIFFERENTIAL    Narrative:     The following orders were created for panel order CBC auto differential.  Procedure                               Abnormality         Status                     ---------                               -----------         ------                     CBC with Differential[[id number]]        Abnormal            Final result                 Please view results for these tests on the individual orders.          Imaging Results    None          Medications   lactated ringers bolus 1,000 mL (has no administration in time range)   sodium chloride  0.9% bolus 1,000 mL 1,000 mL (1,000 mLs Intravenous New Bag 3/9/23 1530)   ondansetron injection 4 mg (4 mg Intravenous Given 3/9/23 1530)   ketorolac injection 15 mg (15 mg Intravenous Given 3/9/23 1551)   morphine injection 4 mg (4 mg Intravenous Given 3/9/23 1604)                 ED Course as of 03/09/23 1826   Thu Mar 09, 2023   1820 Patient feeling better after IV fluids and nausea and pain medicine.  Patient not in DKA.  Hydrated here with IV fluids will discharge home with oral medications for pain and nausea and allow her to hydrate herself at home.  Patient aware that she does need to follow up with her PCP. [SL]      ED Course User Index  [SL] ORLANDO Rogers          Medical Decision Making  27-year-old female complaints of nausea vomiting and low back pain.  Patient denies any other associated symptoms.  Patient denies any worsening alleviating factors.  Patient has history of diabetes with DKA in the past.            Clinical Impression:   Final diagnoses:  [R11.2] Nausea and vomiting, unspecified vomiting type (Primary)  [M54.50] Bilateral low back pain without sciatica, unspecified chronicity        ED Disposition Condition    Discharge Stable          ED Prescriptions       Medication Sig Dispense Start Date End Date Auth. Provider    promethazine (PHENERGAN) 25 MG tablet Take 1 tablet (25 mg total) by mouth every 6 (six) hours as needed for Nausea. 15 tablet 3/9/2023 -- ORLANDO Rogers    oxyCODONE-acetaminophen (PERCOCET) 5-325 mg per tablet Take 1 tablet by mouth every 4 (four) hours as needed for Pain. 12 tablet 3/9/2023 -- ORLANDO Rogers          Follow-up Information       Follow up With Specialties Details Why Contact Info    Kumar Ortiz NP Family Medicine Call in 1 week As needed, For ER Follow Up. [id number] [place] [id number]  [phone]               ORLANDO Rogers  03/09/23 1826

## 2023-03-28 ENCOUNTER — HOSPITAL ENCOUNTER (EMERGENCY)
Facility: HOSPITAL | Age: 28
Discharge: HOME | End: 2023-03-28
Attending: INTERNAL MEDICINE
Payer: MEDICAID

## 2023-03-28 VITALS
HEIGHT: 62 IN | BODY MASS INDEX: 22.7 KG/M2 | WEIGHT: 123.38 LBS | OXYGEN SATURATION: 100 % | SYSTOLIC BLOOD PRESSURE: 116 MMHG | HEART RATE: 75 BPM | RESPIRATION RATE: 18 BRPM | TEMPERATURE: 99 F | DIASTOLIC BLOOD PRESSURE: 77 MMHG

## 2023-03-28 DIAGNOSIS — E11.65 HYPERGLYCEMIA DUE TO DIABETES MELLITUS: Primary | ICD-10-CM

## 2023-03-28 LAB
ALBUMIN SERPL-MCNC: 3.4 G/DL (ref 3.5–5)
ALBUMIN/GLOB SERPL: 0.9 RATIO (ref 1.1–2)
ALLENS TEST: ABNORMAL
ALP SERPL-CCNC: 108 UNIT/L (ref 40–150)
ALT SERPL-CCNC: 8 UNIT/L (ref 0–55)
AMPHET UR QL SCN: NEGATIVE
APPEARANCE UR: CLEAR
AST SERPL-CCNC: 13 UNIT/L (ref 5–34)
B-HCG SERPL QL: NEGATIVE
BACTERIA #/AREA URNS AUTO: ABNORMAL /HPF
BARBITURATE SCN PRESENT UR: NEGATIVE
BASOPHILS # BLD AUTO: 0.03 X10(3)/MCL (ref 0–0.2)
BASOPHILS NFR BLD AUTO: 0.4 %
BENZODIAZ UR QL SCN: NEGATIVE
BILIRUB UR QL STRIP.AUTO: NEGATIVE MG/DL
BILIRUBIN DIRECT+TOT PNL SERPL-MCNC: 0.4 MG/DL
BUN SERPL-MCNC: 21 MG/DL (ref 7–18.7)
CALCIUM SERPL-MCNC: 9.4 MG/DL (ref 8.4–10.2)
CANNABINOIDS UR QL SCN: NEGATIVE
CHLORIDE SERPL-SCNC: 90 MMOL/L (ref 98–107)
CO2 SERPL-SCNC: 22 MMOL/L (ref 22–29)
COCAINE UR QL SCN: NEGATIVE
COLOR UR AUTO: YELLOW
CREAT SERPL-MCNC: 1.52 MG/DL (ref 0.55–1.02)
DELSYS: ABNORMAL
EOSINOPHIL # BLD AUTO: 0.08 X10(3)/MCL (ref 0–0.9)
EOSINOPHIL NFR BLD AUTO: 0.9 %
ERYTHROCYTE [DISTWIDTH] IN BLOOD BY AUTOMATED COUNT: 12.9 % (ref 11.5–17)
ETHANOL SERPL-MCNC: <10 MG/DL
FENTANYL UR QL SCN: NEGATIVE
GFR SERPLBLD CREATININE-BSD FMLA CKD-EPI: 48 MLS/MIN/1.73/M2
GLOBULIN SER-MCNC: 4 GM/DL (ref 2.4–3.5)
GLUCOSE SERPL-MCNC: 699 MG/DL (ref 74–100)
GLUCOSE UR QL STRIP.AUTO: ABNORMAL MG/DL
HCO3 UR-SCNC: 24 MMOL/L (ref 24–28)
HCT VFR BLD AUTO: 30.7 % (ref 37–47)
HGB BLD-MCNC: 10.2 G/DL (ref 12–16)
IMM GRANULOCYTES # BLD AUTO: 0.1 X10(3)/MCL (ref 0–0.04)
IMM GRANULOCYTES NFR BLD AUTO: 1.2 %
KETONES UR QL STRIP.AUTO: NEGATIVE MG/DL
LEUKOCYTE ESTERASE UR QL STRIP.AUTO: NEGATIVE UNIT/L
LYMPHOCYTES # BLD AUTO: 2.59 X10(3)/MCL (ref 0.6–4.6)
LYMPHOCYTES NFR BLD AUTO: 30.5 %
MCH RBC QN AUTO: 27.6 PG (ref 27–31)
MCHC RBC AUTO-ENTMCNC: 33.2 G/DL (ref 33–36)
MCV RBC AUTO: 83.2 FL (ref 80–94)
MDMA UR QL SCN: NEGATIVE
MONOCYTES # BLD AUTO: 0.57 X10(3)/MCL (ref 0.1–1.3)
MONOCYTES NFR BLD AUTO: 6.7 %
NEUTROPHILS # BLD AUTO: 5.11 X10(3)/MCL (ref 2.1–9.2)
NEUTROPHILS NFR BLD AUTO: 60.3 %
NITRITE UR QL STRIP.AUTO: NEGATIVE
OPIATES UR QL SCN: NEGATIVE
PCO2 BLDA: 33.7 MMHG (ref 35–45)
PCP UR QL: NEGATIVE
PH SMN: 7.46 [PH] (ref 7.35–7.45)
PH UR STRIP.AUTO: 5.5 [PH]
PH UR: 5.5 [PH] (ref 3–11)
PLATELET # BLD AUTO: 499 X10(3)/MCL (ref 130–400)
PMV BLD AUTO: 9.1 FL (ref 7.4–10.4)
PO2 BLDA: 100 MMHG (ref 80–100)
POC BE: 0 MMOL/L
POC SATURATED O2: 98 % (ref 95–100)
POC TCO2: 25 MMOL/L (ref 23–27)
POCT GLUCOSE: 333 MG/DL (ref 70–110)
POCT GLUCOSE: 408 MG/DL (ref 70–110)
POCT GLUCOSE: 497 MG/DL (ref 70–110)
POTASSIUM SERPL-SCNC: 3.2 MMOL/L (ref 3.5–5.1)
PROT SERPL-MCNC: 7.4 GM/DL (ref 6.4–8.3)
PROT UR QL STRIP.AUTO: ABNORMAL MG/DL
RBC # BLD AUTO: 3.69 X10(6)/MCL (ref 4.2–5.4)
RBC #/AREA URNS AUTO: ABNORMAL /HPF
RBC UR QL AUTO: ABNORMAL UNIT/L
SAMPLE: ABNORMAL
SITE: ABNORMAL
SODIUM SERPL-SCNC: 126 MMOL/L (ref 136–145)
SP GR UR STRIP.AUTO: <=1.005
SQUAMOUS #/AREA URNS AUTO: ABNORMAL /HPF
UROBILINOGEN UR STRIP-ACNC: 0.2 MG/DL
WBC # SPEC AUTO: 8.5 X10(3)/MCL (ref 4.5–11.5)
WBC #/AREA URNS AUTO: ABNORMAL /HPF

## 2023-03-28 PROCEDURE — 80307 DRUG TEST PRSMV CHEM ANLYZR: CPT | Performed by: INTERNAL MEDICINE

## 2023-03-28 PROCEDURE — 80053 COMPREHEN METABOLIC PANEL: CPT | Performed by: INTERNAL MEDICINE

## 2023-03-28 PROCEDURE — 82962 GLUCOSE BLOOD TEST: CPT

## 2023-03-28 PROCEDURE — 82803 BLOOD GASES ANY COMBINATION: CPT

## 2023-03-28 PROCEDURE — 99284 EMERGENCY DEPT VISIT MOD MDM: CPT

## 2023-03-28 PROCEDURE — 63600175 PHARM REV CODE 636 W HCPCS: Performed by: INTERNAL MEDICINE

## 2023-03-28 PROCEDURE — 81001 URINALYSIS AUTO W/SCOPE: CPT | Performed by: INTERNAL MEDICINE

## 2023-03-28 PROCEDURE — 96372 THER/PROPH/DIAG INJ SC/IM: CPT | Performed by: EMERGENCY MEDICINE

## 2023-03-28 PROCEDURE — 36600 WITHDRAWAL OF ARTERIAL BLOOD: CPT

## 2023-03-28 PROCEDURE — 81025 URINE PREGNANCY TEST: CPT | Performed by: INTERNAL MEDICINE

## 2023-03-28 PROCEDURE — 63600175 PHARM REV CODE 636 W HCPCS: Performed by: EMERGENCY MEDICINE

## 2023-03-28 PROCEDURE — 96372 THER/PROPH/DIAG INJ SC/IM: CPT | Performed by: INTERNAL MEDICINE

## 2023-03-28 PROCEDURE — 82077 ASSAY SPEC XCP UR&BREATH IA: CPT | Performed by: INTERNAL MEDICINE

## 2023-03-28 PROCEDURE — 85025 COMPLETE CBC W/AUTO DIFF WBC: CPT | Performed by: INTERNAL MEDICINE

## 2023-03-28 PROCEDURE — 99900035 HC TECH TIME PER 15 MIN (STAT)

## 2023-03-28 RX ORDER — INSULIN ASPART 100 [IU]/ML
5 INJECTION, SOLUTION INTRAVENOUS; SUBCUTANEOUS
Status: COMPLETED | OUTPATIENT
Start: 2023-03-28 | End: 2023-03-28

## 2023-03-28 RX ORDER — PROMETHAZINE HYDROCHLORIDE 25 MG/1
25 TABLET ORAL EVERY 6 HOURS PRN
Qty: 15 TABLET | Refills: 0 | Status: ON HOLD | OUTPATIENT
Start: 2023-03-28 | End: 2023-06-19

## 2023-03-28 RX ADMIN — HUMAN INSULIN 15 UNITS: 100 INJECTION, SOLUTION SUBCUTANEOUS at 04:03

## 2023-03-28 RX ADMIN — INSULIN ASPART 5 UNITS: 100 INJECTION, SOLUTION INTRAVENOUS; SUBCUTANEOUS at 07:03

## 2023-03-28 RX ADMIN — HUMAN INSULIN 5 UNITS: 100 INJECTION, SOLUTION SUBCUTANEOUS at 05:03

## 2023-03-28 NOTE — ED PROVIDER NOTES
Encounter Date: 3/28/2023  History from patient     History     Chief Complaint   Patient presents with    Dizziness     Pt c/o dizziness since yesterday and state bp was< 100 systolic at home. CBG > 500 in triag.     HPI    27 y.o. female,  has a past medical history of Diabetes mellitus, Diabetic gastroparesis associated with type 1 diabetes mellitus, DKA (diabetic ketoacidosis), Hypertension, and Non compliance with medical treatment. Presenting with Dizziness (Pt c/o dizziness since yesterday and state bp was< 100 systolic at home. CBG > 500 in triag.)       Review of patient's allergies indicates:  No Known Allergies  Past Medical History:   Diagnosis Date    Diabetes mellitus     Diabetic gastroparesis associated with type 1 diabetes mellitus     DKA (diabetic ketoacidosis)     Hypertension     Non compliance with medical treatment      Past Surgical History:   Procedure Laterality Date    CHOLECYSTECTOMY      ESOPHAGOGASTRODUODENOSCOPY      Multiple    None       Family History   Problem Relation Age of Onset    Heart disease Mother     Hypertension Mother     Diabetes Mother     Hyperlipidemia Mother     Cancer Father     Stroke Father     Hypertension Father     Hyperlipidemia Father      Social History     Tobacco Use    Smoking status: Never    Smokeless tobacco: Never   Substance Use Topics    Alcohol use: Never    Drug use: Not Currently     Types: Marijuana     Review of Systems   Constitutional:  Negative for activity change and appetite change.   HENT:  Negative for trouble swallowing and voice change.    Eyes:  Negative for visual disturbance.   Respiratory:  Negative for cough and shortness of breath.    Cardiovascular:  Negative for chest pain.   Gastrointestinal:  Negative for abdominal pain, diarrhea and vomiting.   Genitourinary:  Negative for dysuria and hematuria.   Musculoskeletal:  Negative for gait problem.        No Pain.   Skin:  Negative for color change and rash.   Neurological:   Positive for dizziness and weakness. Negative for headaches.   Psychiatric/Behavioral:  Negative for behavioral problems and sleep disturbance.    All other systems reviewed and are negative.    Physical Exam     Initial Vitals [03/28/23 1522]   BP Pulse Resp Temp SpO2   (!) 93/58 82 18 98.5 °F (36.9 °C) 100 %      MAP       --         Physical Exam    Nursing note and vitals reviewed.  Constitutional: No distress.   HENT:   Head: Atraumatic.   Eyes: EOM are normal. Pupils are equal, round, and reactive to light.   Neck:   Normal range of motion.  Cardiovascular:  Normal rate, regular rhythm and normal heart sounds.           Pulmonary/Chest: Breath sounds normal. No respiratory distress.   Abdominal: Abdomen is soft. Bowel sounds are normal. She exhibits no distension. There is no abdominal tenderness. There is no rebound.   Musculoskeletal:         General: Normal range of motion.      Cervical back: Normal range of motion.     Neurological: She is alert and oriented to person, place, and time.   Skin: Skin is warm and dry.   Psychiatric: She has a normal mood and affect.       ED Course   Procedures  Labs Reviewed   COMPREHENSIVE METABOLIC PANEL - Abnormal; Notable for the following components:       Result Value    Sodium Level 126 (*)     Potassium Level 3.2 (*)     Chloride 90 (*)     Glucose Level 699 (*)     Blood Urea Nitrogen 21.0 (*)     Creatinine 1.52 (*)     Albumin Level 3.4 (*)     Globulin 4.0 (*)     Albumin/Globulin Ratio 0.9 (*)     All other components within normal limits   URINALYSIS, REFLEX TO URINE CULTURE - Abnormal; Notable for the following components:    Protein, UA 1+ (*)     Glucose, UA 3+ (*)     Blood, UA Trace-Intact (*)     All other components within normal limits   CBC WITH DIFFERENTIAL - Abnormal; Notable for the following components:    RBC 3.69 (*)     Hgb 10.2 (*)     Hct 30.7 (*)     Platelet 499 (*)     IG# 0.10 (*)     All other components within normal limits    URINALYSIS, MICROSCOPIC - Abnormal; Notable for the following components:    Squamous Epithelial Cells, UA Few (*)     All other components within normal limits   ISTAT PROCEDURE - Abnormal; Notable for the following components:    POC PH 7.461 (*)     POC PCO2 33.7 (*)     All other components within normal limits   POCT GLUCOSE - Abnormal; Notable for the following components:    POCT Glucose 497 (*)     All other components within normal limits   POCT GLUCOSE - Abnormal; Notable for the following components:    POCT Glucose 408 (*)     All other components within normal limits   POCT GLUCOSE - Abnormal; Notable for the following components:    POCT Glucose 333 (*)     All other components within normal limits   POCT GLUCOSE - Abnormal; Notable for the following components:    POCT Glucose >500 (*)     All other components within normal limits   PREGNANCY TEST, URINE RAPID - Normal   ALCOHOL,MEDICAL (ETHANOL) - Normal   DRUG SCREEN, URINE (BEAKER) - Normal    Narrative:     Cut off concentrations:    Amphetamines - 1000 ng/ml  Barbiturates - 200 ng/ml  Benzodiazepine - 200 ng/ml  Cannabinoids (THC) - 50 ng/ml  Cocaine - 300 ng/ml  Fentanyl - 1.0 ng/ml  MDMA - 500 ng/ml  Opiates - 300 ng/ml   Phencyclidine (PCP) - 25 ng/ml    Specimen submitted for drug analysis and tested for pH and specific gravity in order to evaluate sample integrity. Suspect tampering if specific gravity is <1.003 and/or pH is not within the range of 4.5 - 8.0  False negatives may result form substances such as bleach added to urine.  False positives may result for the presence of a substance with similar chemical structure to the drug or its metabolite.    This test provides only a PRELIMINARY analytical test result. A more specific alternate chemical method must be used in order to obtain a confirmed analytical result. Gas chromatography/mass spectrometry (GC/MS) is the preferred confirmatory method. Other chemical confirmation methods are  available. Clinical consideration and professional judgement should be applied to any drug of abuse test result, particularly when preliminary positive results are used.    Positive results will be confirmed only at the physicians request. Unconfirmed screening results are to be used only for medical purposes (treatment).        CBC W/ AUTO DIFFERENTIAL    Narrative:     The following orders were created for panel order CBC auto differential.  Procedure                               Abnormality         Status                     ---------                               -----------         ------                     CBC with Differential[[id number]]        Abnormal            Final result                 Please view results for these tests on the individual orders.          Imaging Results    None          Medications   insulin regular injection 15 Units 0.15 mL (15 Units Subcutaneous Given 3/28/23 1617)   insulin regular injection 5 Units 0.05 mL (5 Units Subcutaneous Given 3/28/23 1729)   insulin aspart U-100 injection 5 Units (5 Units Subcutaneous Given 3/28/23 1900)     Medical Decision Making:   Initial Assessment:     27 y.o. female,  has a past medical history of Diabetes mellitus, Diabetic gastroparesis associated with type 1 diabetes mellitus, DKA (diabetic ketoacidosis), Hypertension, and Non compliance with medical treatment. Presenting with Dizziness (Pt c/o dizziness since yesterday and state bp was< 100 systolic at home. CBG > 500 in triag.)     Patient says that she had headache, some dizziness, last week she had some black spot in front of the eye which appeared, she went to the eye doctor and did not find anything in there, the black spot has gone away, but today she was having some dizziness and checked her blood pressure it was in 90s at home so she decided to come to the emergency room because she was also having some headache.  She says she is taking her medicines regularly, on arrival in the  emergency room her blood sugar was more than 500.       ED Management:  Because of her recurrent DKA and noncompliance with medication she essentially does not have any IV access, nurses tried multiple times and could not get an IV on her, I decided to do a blood gas on her to determine if she is in DKA or not.    Fortunately she has normal blood gas, so I decided that instead of putting a central line on her which she gets almost every time she comes to the emergency room with DKA I will give her insulin subQ and give her oral rehydration and then will re-evaluate her.  Patient's glucose improved to 333.  At which time she was requesting discharge home.  She states that she has not run out of any of her diabetes medication and agrees to continue to monitor her blood sugar and return should she have any new or worsening symptoms.  She requests refill prescription for Phenergan which I will provide.  Given strict ED return precautions. I have spoken with the patient and/or caregivers. I have explained the patient's condition, diagnoses and treatment plan based on the information available to me at this time. I have answered the patient's and/or caregiver's questions and addressed any concerns. The patient and/or caregivers have as good an understanding of the patient's diagnosis, condition and treatment plan as can be expected at this point. The vital signs have been stable. The patient's condition is stable and appropriate for discharge from the emergency department.     The patient will pursue further outpatient evaluation with the primary care physician or other designated or consulting physician as outlined in the discharge instructions. The patient and/or caregivers are agreeable to this plan of care and follow-up instructions have been explained in detail. The patient and/or caregivers have received these instructions in written format and have expressed an understanding of the discharge instructions. The  patient and/or caregivers are aware that any significant change in condition or worsening of symptoms should prompt an immediate return to this or the closest emergency department or a call to 911.           ED Course as of 03/29/23 0616   Tue Mar 28, 2023   1721 Patient's repeat blood sugar is 497, I am going to give her 5 more units of insulin, and will repeat her blood sugar again in 1 hour. [GQ]      ED Course User Index  [GQ] Millie Burr MD                 Clinical Impression:   Final diagnoses:  [E11.65] Hyperglycemia due to diabetes mellitus (Primary)        ED Disposition Condition    Discharge Stable          ED Prescriptions       Medication Sig Dispense Start Date End Date Auth. Provider    promethazine (PHENERGAN) 25 MG tablet Take 1 tablet (25 mg total) by mouth every 6 (six) hours as needed for Nausea. 15 tablet 3/28/2023 -- Adi Lopez DO          Follow-up Information    None          Adi Lopez DO  03/29/23 0616

## 2023-03-29 LAB — POCT GLUCOSE: >500 MG/DL (ref 70–110)

## 2023-05-01 LAB — POCT GLUCOSE: 318 MG/DL (ref 70–110)

## 2023-06-16 ENCOUNTER — HOSPITAL ENCOUNTER (EMERGENCY)
Facility: HOSPITAL | Age: 28
Discharge: HOME | End: 2023-06-16
Attending: EMERGENCY MEDICINE
Payer: MEDICAID

## 2023-06-16 VITALS
RESPIRATION RATE: 16 BRPM | BODY MASS INDEX: 26.87 KG/M2 | OXYGEN SATURATION: 99 % | HEART RATE: 83 BPM | TEMPERATURE: 99 F | DIASTOLIC BLOOD PRESSURE: 86 MMHG | SYSTOLIC BLOOD PRESSURE: 135 MMHG | WEIGHT: 146 LBS | HEIGHT: 62 IN

## 2023-06-16 DIAGNOSIS — E86.0 DEHYDRATION: ICD-10-CM

## 2023-06-16 DIAGNOSIS — K59.00 CONSTIPATION, UNSPECIFIED CONSTIPATION TYPE: Primary | ICD-10-CM

## 2023-06-16 DIAGNOSIS — R73.9 HYPERGLYCEMIA: ICD-10-CM

## 2023-06-16 LAB
ALBUMIN SERPL-MCNC: 3.5 G/DL (ref 3.5–5)
ALBUMIN/GLOB SERPL: 0.9 RATIO (ref 1.1–2)
ALLENS TEST: ABNORMAL
ALP SERPL-CCNC: 103 UNIT/L (ref 40–150)
ALT SERPL-CCNC: 11 UNIT/L (ref 0–55)
APPEARANCE UR: CLEAR
AST SERPL-CCNC: 18 UNIT/L (ref 5–34)
B-HCG SERPL QL: NEGATIVE
BACTERIA #/AREA URNS AUTO: ABNORMAL /HPF
BASOPHILS # BLD AUTO: 0.05 X10(3)/MCL
BASOPHILS NFR BLD AUTO: 0.5 %
BILIRUB UR QL STRIP.AUTO: NEGATIVE MG/DL
BILIRUBIN DIRECT+TOT PNL SERPL-MCNC: 0.3 MG/DL
BUN SERPL-MCNC: 26 MG/DL (ref 7–18.7)
CALCIUM SERPL-MCNC: 9.6 MG/DL (ref 8.4–10.2)
CHLORIDE SERPL-SCNC: 104 MMOL/L (ref 98–107)
CO2 SERPL-SCNC: 22 MMOL/L (ref 22–29)
COLOR UR: YELLOW
CREAT SERPL-MCNC: 1.24 MG/DL (ref 0.55–1.02)
DELSYS: ABNORMAL
EOSINOPHIL # BLD AUTO: 0.15 X10(3)/MCL (ref 0–0.9)
EOSINOPHIL NFR BLD AUTO: 1.5 %
ERYTHROCYTE [DISTWIDTH] IN BLOOD BY AUTOMATED COUNT: 12.6 % (ref 11.5–17)
EST. AVERAGE GLUCOSE BLD GHB EST-MCNC: 277.6 MG/DL
GFR SERPLBLD CREATININE-BSD FMLA CKD-EPI: >60 MLS/MIN/1.73/M2
GLOBULIN SER-MCNC: 4 GM/DL (ref 2.4–3.5)
GLUCOSE SERPL-MCNC: 217 MG/DL (ref 74–100)
GLUCOSE UR QL STRIP.AUTO: ABNORMAL MG/DL
HBA1C MFR BLD: 11.3 %
HCO3 UR-SCNC: 24.7 MMOL/L (ref 24–28)
HCT VFR BLD AUTO: 33.2 % (ref 37–47)
HGB BLD-MCNC: 10.7 G/DL (ref 12–16)
IMM GRANULOCYTES # BLD AUTO: 0.08 X10(3)/MCL (ref 0–0.04)
IMM GRANULOCYTES NFR BLD AUTO: 0.8 %
KETONES UR QL STRIP.AUTO: NEGATIVE MG/DL
LACTATE SERPL-SCNC: 1 MMOL/L (ref 0.5–2.2)
LEUKOCYTE ESTERASE UR QL STRIP.AUTO: NEGATIVE UNIT/L
LIPASE SERPL-CCNC: 32 U/L
LYMPHOCYTES # BLD AUTO: 2.36 X10(3)/MCL (ref 0.6–4.6)
LYMPHOCYTES NFR BLD AUTO: 22.9 %
MCH RBC QN AUTO: 27.6 PG (ref 27–31)
MCHC RBC AUTO-ENTMCNC: 32.2 G/DL (ref 33–36)
MCV RBC AUTO: 85.6 FL (ref 80–94)
MONOCYTES # BLD AUTO: 0.45 X10(3)/MCL (ref 0.1–1.3)
MONOCYTES NFR BLD AUTO: 4.4 %
NEUTROPHILS # BLD AUTO: 7.2 X10(3)/MCL (ref 2.1–9.2)
NEUTROPHILS NFR BLD AUTO: 69.9 %
NITRITE UR QL STRIP.AUTO: NEGATIVE
PCO2 BLDA: 31.3 MMHG (ref 35–45)
PH SMN: 7.5 [PH] (ref 7.35–7.45)
PH UR STRIP.AUTO: 7 [PH]
PLATELET # BLD AUTO: 509 X10(3)/MCL (ref 130–400)
PMV BLD AUTO: 8.8 FL (ref 7.4–10.4)
PO2 BLDA: 91 MMHG (ref 80–100)
POC BE: 2 MMOL/L
POC SATURATED O2: 98 % (ref 95–100)
POC TCO2: 26 MMOL/L (ref 23–27)
POCT GLUCOSE: 208 MG/DL (ref 70–110)
POTASSIUM SERPL-SCNC: 4.1 MMOL/L (ref 3.5–5.1)
PROT SERPL-MCNC: 7.5 GM/DL (ref 6.4–8.3)
PROT UR QL STRIP.AUTO: ABNORMAL MG/DL
RBC # BLD AUTO: 3.88 X10(6)/MCL (ref 4.2–5.4)
RBC #/AREA URNS AUTO: ABNORMAL /HPF
RBC UR QL AUTO: ABNORMAL UNIT/L
SAMPLE: ABNORMAL
SITE: ABNORMAL
SODIUM SERPL-SCNC: 136 MMOL/L (ref 136–145)
SP GR UR STRIP.AUTO: 1.02
SQUAMOUS #/AREA URNS AUTO: ABNORMAL /HPF
UROBILINOGEN UR STRIP-ACNC: 0.2 MG/DL
WBC # SPEC AUTO: 10.29 X10(3)/MCL (ref 4.5–11.5)
WBC #/AREA URNS AUTO: ABNORMAL /HPF

## 2023-06-16 PROCEDURE — 82962 GLUCOSE BLOOD TEST: CPT

## 2023-06-16 PROCEDURE — 63600175 PHARM REV CODE 636 W HCPCS: Performed by: EMERGENCY MEDICINE

## 2023-06-16 PROCEDURE — 81001 URINALYSIS AUTO W/SCOPE: CPT | Performed by: EMERGENCY MEDICINE

## 2023-06-16 PROCEDURE — 99285 EMERGENCY DEPT VISIT HI MDM: CPT | Mod: 25

## 2023-06-16 PROCEDURE — 96361 HYDRATE IV INFUSION ADD-ON: CPT

## 2023-06-16 PROCEDURE — 96372 THER/PROPH/DIAG INJ SC/IM: CPT | Performed by: EMERGENCY MEDICINE

## 2023-06-16 PROCEDURE — 82803 BLOOD GASES ANY COMBINATION: CPT

## 2023-06-16 PROCEDURE — 25000003 PHARM REV CODE 250: Performed by: EMERGENCY MEDICINE

## 2023-06-16 PROCEDURE — 85025 COMPLETE CBC W/AUTO DIFF WBC: CPT | Performed by: EMERGENCY MEDICINE

## 2023-06-16 PROCEDURE — 25000003 PHARM REV CODE 250: Performed by: GENERAL ACUTE CARE HOSPITAL

## 2023-06-16 PROCEDURE — 83690 ASSAY OF LIPASE: CPT | Performed by: EMERGENCY MEDICINE

## 2023-06-16 PROCEDURE — 36600 WITHDRAWAL OF ARTERIAL BLOOD: CPT

## 2023-06-16 PROCEDURE — 83036 HEMOGLOBIN GLYCOSYLATED A1C: CPT | Performed by: EMERGENCY MEDICINE

## 2023-06-16 PROCEDURE — 80053 COMPREHEN METABOLIC PANEL: CPT | Performed by: EMERGENCY MEDICINE

## 2023-06-16 PROCEDURE — 96374 THER/PROPH/DIAG INJ IV PUSH: CPT

## 2023-06-16 PROCEDURE — 83605 ASSAY OF LACTIC ACID: CPT | Performed by: EMERGENCY MEDICINE

## 2023-06-16 PROCEDURE — 99900035 HC TECH TIME PER 15 MIN (STAT)

## 2023-06-16 PROCEDURE — 81025 URINE PREGNANCY TEST: CPT | Performed by: EMERGENCY MEDICINE

## 2023-06-16 RX ORDER — FENTANYL CITRATE 50 UG/ML
50 INJECTION, SOLUTION INTRAMUSCULAR; INTRAVENOUS
Status: DISCONTINUED | OUTPATIENT
Start: 2023-06-16 | End: 2023-06-16

## 2023-06-16 RX ORDER — PROMETHAZINE HYDROCHLORIDE 25 MG/ML
25 INJECTION, SOLUTION INTRAMUSCULAR; INTRAVENOUS
Status: COMPLETED | OUTPATIENT
Start: 2023-06-16 | End: 2023-06-16

## 2023-06-16 RX ORDER — HYDROMORPHONE HYDROCHLORIDE 2 MG/ML
1 INJECTION, SOLUTION INTRAMUSCULAR; INTRAVENOUS; SUBCUTANEOUS
Status: COMPLETED | OUTPATIENT
Start: 2023-06-16 | End: 2023-06-16

## 2023-06-16 RX ORDER — LACTULOSE 10 G/15ML
30 SOLUTION ORAL
Status: COMPLETED | OUTPATIENT
Start: 2023-06-16 | End: 2023-06-16

## 2023-06-16 RX ORDER — FENTANYL CITRATE 50 UG/ML
25 INJECTION, SOLUTION INTRAMUSCULAR; INTRAVENOUS
Status: COMPLETED | OUTPATIENT
Start: 2023-06-16 | End: 2023-06-16

## 2023-06-16 RX ORDER — ONDANSETRON 2 MG/ML
8 INJECTION INTRAMUSCULAR; INTRAVENOUS EVERY 4 HOURS PRN
Status: DISCONTINUED | OUTPATIENT
Start: 2023-06-16 | End: 2023-06-16 | Stop reason: HOSPADM

## 2023-06-16 RX ADMIN — HYDROMORPHONE HYDROCHLORIDE 1 MG: 2 INJECTION, SOLUTION INTRAMUSCULAR; INTRAVENOUS; SUBCUTANEOUS at 05:06

## 2023-06-16 RX ADMIN — PROMETHAZINE HYDROCHLORIDE 25 MG: 25 INJECTION INTRAMUSCULAR; INTRAVENOUS at 05:06

## 2023-06-16 RX ADMIN — SODIUM CHLORIDE 1000 ML: 9 INJECTION, SOLUTION INTRAVENOUS at 06:06

## 2023-06-16 RX ADMIN — LACTULOSE 30 G: 20 SOLUTION ORAL at 07:06

## 2023-06-16 RX ADMIN — FENTANYL CITRATE 25 MCG: 50 INJECTION, SOLUTION INTRAMUSCULAR; INTRAVENOUS at 05:06

## 2023-06-16 RX ADMIN — SODIUM CHLORIDE 1000 ML: 9 INJECTION, SOLUTION INTRAVENOUS at 05:06

## 2023-06-16 NOTE — ED PROVIDER NOTES
Encounter Date: 2023       History     Chief Complaint   Patient presents with    Back Pain    Vomiting     C/o lower back pain with vomiting started on Wednesday, states later that same day it resolved but then it returned today.      Ms. Husain presents emergency Department crying in pain complaining of severe mid lower back pain not associated with fever or chills but associated with vomiting.  The vomiting started at 2:30 p.m. Tuesday she had 1 episode of vomiting but then it passed.  She was able to continue with her daily activities and working.  She said she is been taking her medications.      Patient denies fever or chills she does not smoke she does not drink she said she does not use marijuana.  She is had 2 COVID vaccines no flu no tetanus no pneumonia.  She said she is had her gallbladder removed.  She is a  1 para 0 SAB 1.  Mr. Kumar tran is her primary healthcare provider.  She said she called him today and asked for Phenergan but he said he can not prescribe that without seeing her.  She is employed she is single.  Mother is alive with high blood pressure dad is alive with a high blood pressure and diabetes.  She has diabetic gastroparesis type 1 diabetes mellitus and frequent episodes of diabetic ketoacidosis.    She denies having any drug allergies.        The history is provided by the patient and medical records.   Review of patient's allergies indicates:  No Known Allergies  Past Medical History:   Diagnosis Date    Diabetes mellitus     Diabetic gastroparesis associated with type 1 diabetes mellitus     DKA (diabetic ketoacidosis)     Hypertension     Non compliance with medical treatment      Past Surgical History:   Procedure Laterality Date    CHOLECYSTECTOMY      ESOPHAGOGASTRODUODENOSCOPY      Multiple    None       Family History   Problem Relation Age of Onset    Heart disease Mother     Hypertension Mother     Diabetes Mother     Hyperlipidemia Mother     Cancer  Father     Stroke Father     Hypertension Father     Hyperlipidemia Father      Social History     Tobacco Use    Smoking status: Never    Smokeless tobacco: Never   Substance Use Topics    Alcohol use: Never    Drug use: Not Currently     Types: Marijuana     Review of Systems   Constitutional: Negative.    HENT: Negative.     Eyes: Negative.    Respiratory: Negative.     Cardiovascular: Negative.    Gastrointestinal:  Positive for nausea and vomiting. Negative for abdominal pain.        Not vomiting up any blood had a normal bowel movement this morning   Endocrine: Negative.    Genitourinary:  Positive for dysuria. Negative for decreased urine volume, difficulty urinating, flank pain and pelvic pain.   Musculoskeletal:  Positive for back pain.   Skin: Negative.    Allergic/Immunologic: Negative.    Neurological: Negative.    Hematological: Negative.    Psychiatric/Behavioral: Negative.     All other systems reviewed and are negative.    Physical Exam     Initial Vitals [06/16/23 1642]   BP Pulse Resp Temp SpO2   (!) 190/114 88 20 98.7 °F (37.1 °C) 99 %      MAP       --         Physical Exam    Nursing note and vitals reviewed.  Constitutional: She appears well-developed and well-nourished.   Tearful crying very dramatic presentation frequent visits to the emergency department with this bad diabetes that she suffers with.  And the diabetic gastroparesis.   HENT:   Head: Normocephalic and atraumatic.   Right Ear: Tympanic membrane and external ear normal.   Left Ear: Tympanic membrane and external ear normal.   Nose: Nose normal.   Mouth/Throat: Oropharynx is clear and moist and mucous membranes are normal.   Eyes: EOM are normal. Pupils are equal, round, and reactive to light.   Neck: Neck supple. No thyromegaly present. No tracheal deviation present. No JVD present.   Normal range of motion.  Cardiovascular:  Normal rate, regular rhythm and normal heart sounds.     Exam reveals no gallop and no friction rub.        No murmur heard.  Borderline tachycardia   Pulmonary/Chest: Breath sounds normal. No stridor. No respiratory distress. She has no wheezes. She has no rhonchi. She has no rales. She exhibits no tenderness.   Abdominal: Abdomen is soft. Bowel sounds are normal. She exhibits no distension and no mass. There is no abdominal tenderness.   Genitourinary:    Genitourinary Comments: No CVA tenderness     Musculoskeletal:         General: No tenderness or edema. Normal range of motion.      Cervical back: Normal range of motion and neck supple.      Comments: No palpable step-offs in the back  No erythematous changes no pilonidal cyst.  Tenderness is in the mid lumbar sacral spine area without palpable step-off touching it is mildly tender.  No tenderness in thoracic spine no tenderness in the cervical spine.  No CVA tenderness  Paraspinous muscle areas not tender it is tender in the midline     Lymphadenopathy:     She has no cervical adenopathy.   Neurological: She is alert and oriented to person, place, and time. She has normal strength and normal reflexes. No cranial nerve deficit. GCS score is 15. GCS eye subscore is 4. GCS verbal subscore is 5. GCS motor subscore is 6.   Skin: Skin is warm and dry. Capillary refill takes 2 to 3 seconds. No rash noted.   Psychiatric: She has a normal mood and affect. Her behavior is normal. Judgment and thought content normal.       ED Course   Procedures  Labs Reviewed   COMPREHENSIVE METABOLIC PANEL - Abnormal; Notable for the following components:       Result Value    Glucose Level 217 (*)     Blood Urea Nitrogen 26.0 (*)     Creatinine 1.24 (*)     Globulin 4.0 (*)     Albumin/Globulin Ratio 0.9 (*)     All other components within normal limits   URINALYSIS, REFLEX TO URINE CULTURE - Abnormal; Notable for the following components:    Protein, UA 3+ (*)     Glucose, UA 2+ (*)     Blood, UA 2+ (*)     All other components within normal limits   HEMOGLOBIN A1C - Abnormal; Notable  for the following components:    Hemoglobin A1c 11.3 (*)     All other components within normal limits   CBC WITH DIFFERENTIAL - Abnormal; Notable for the following components:    RBC 3.88 (*)     Hgb 10.7 (*)     Hct 33.2 (*)     MCHC 32.2 (*)     Platelet 509 (*)     IG# 0.08 (*)     All other components within normal limits   URINALYSIS, MICROSCOPIC - Abnormal; Notable for the following components:    RBC, UA 6-10 (*)     Squamous Epithelial Cells, UA Few (*)     All other components within normal limits   POCT GLUCOSE - Abnormal; Notable for the following components:    POCT Glucose 208 (*)     All other components within normal limits   ISTAT PROCEDURE - Abnormal; Notable for the following components:    POC PH 7.505 (*)     POC PCO2 31.3 (*)     All other components within normal limits   LIPASE - Normal   LACTIC ACID, PLASMA - Normal   HCG QUALITATIVE URINE - Normal   CBC W/ AUTO DIFFERENTIAL    Narrative:     The following orders were created for panel order CBC W/ AUTO DIFFERENTIAL.  Procedure                               Abnormality         Status                     ---------                               -----------         ------                     CBC with Differential[[id number]]        Abnormal            Final result                 Please view results for these tests on the individual orders.          Imaging Results              CT Abdomen Pelvis  Without Contrast (Final result)  Result time 06/16/23 19:22:44      Final result by Kevin Ferreira MD (06/16/23 19:22:44)                   Impression:        1. No hyperdense nephroureterolithiasis or hydroureteronephrosis is present.  2. Diffuse non nodular bladder wall thickening is presumed due to underdistention, but cystitis cannot be excluded.  3. Findings resemble a mild ileus secondary to constipation.  4. The findings can be consistent with the clinical diagnosis of constipation.  5. Interval development cardiomegaly.      Electronically  signed by: Kevin Ferreira MD  Date:    06/16/2023  Time:    19:22               Narrative:      CT SCAN OF ABDOMEN AND PELVIS WITHOUT CONTRAST:    CPT 51807    Total DLP: 220 mGy-cm; Automatic exposure control was utilized to limit the radiation dose to the patient.    HISTORY: Bilateral flank pain more prominent on the right radiating in lower back and with nausea and vomiting.    Comparison: 02/23/2023.    TECHNIQUE: Multiple contiguous axial images were acquired from the base of the chest through the pelvis without intravenous or oral contrast administration.    FINDINGS:    There is interval development of cardiomegaly.  The gallbladder is surgically absent.  No hyperdense calculi are in either kidney collecting system or ureter, there is no hydroureteronephrosis.  There is an unchanged subcentimeter phlebolith versus vascular calcification in the left hemipelvis.  There is diffuse non nodular bladder wall thickening with the bladder underdistended.  The appendix is visualized and is unremarkable.  There is mild distention of the loops of small bowel with air-fluid levels present but without transition point.  There is stool scattered in the colon to the rectum.  The other organs in the abdomen are grossly unremarkable. There is no free fluid, focal fluid collections, free air, or significant mesenteric inflammatory stranding.                                       Medications   ondansetron injection 8 mg (has no administration in time range)   lactulose 20 gram/30 mL solution Soln 30 g (has no administration in time range)   sodium chloride 0.9% bolus 1,000 mL 1,000 mL ( Intravenous Stopped 6/16/23 1816)   promethazine injection 25 mg (25 mg Intramuscular Given 6/16/23 1703)   HYDROmorphone (PF) injection 1 mg (1 mg Intramuscular Given 6/16/23 1703)   fentaNYL injection 25 mcg (25 mcg Intravenous Given 6/16/23 1716)   sodium chloride 0.9% bolus 1,000 mL 1,000 mL ( Intravenous Verify Only 6/16/23 1836)      Medical Decision Making:   Initial Assessment:   Nausea vomiting severe low back pain diabetic history of DKA history of diabetic gastroparesis.  Very dramatic presentation moist mucous membranes still noted heart is regular rate and rhythm lungs are clear abdomen seems benign no tachycardia no palpable step-off in the back 2+ femoral pulses  Patient said she had normal bowel movement this morning patient said she is not had any changes in her urination frequency or blood.  Had a normal cycle last week  Differential Diagnosis:   Diabetic ketoacidosis.  Diabetic gastroparesis with nausea and vomiting, back pain uncertain etiology.  Kidney stone.  Clinical Tests:   Lab Tests: Ordered  ED Management:  Treatments evaluation she is usually very hard to get an IV so we ordered a mg of Dilaudid in 25 of Phenergan IM.  We also ordered 8 of Zofran and a mL the 0.25 mcg of fentanyl should we get an IV.  MDM  Problems addressed  Co-morbidities and/or factors adding to the complexity or risk for the patient:  Diabetes, frequent DKA, diabetic gastroparesis  Problems addressed:  Nausea vomiting back pain  Acute problem/illness or progression/exacerbation of chronic problem with potential threat to life/bodily dysfunction?:  Diabetic with diabetic gastroparesis now with back pain and vomiting  Differential diagnoses/problems considered: see above     Amount and/or Complexity of Data Reviewed  Independent Historian: none (see above for summary)  External Data Reviewed: notes from previous ED visits, prior labs, and prescription medications  (see above for summary)  Risk and benefits of testing: discussed   Labs: Labs: ordered and reviewed  Radiology:Radiology: ordered and independent interpretation performed (see above or ED course)  ECG/medicine tests:Radiology: ordered and independent interpretation performed (see above or ED course)      Risk      Critical Care             ED Course as of 06/16/23 1936 Fri Jun 16, 2023    1714 Patient's blood gas comes back no acidosis hyperventilation 7.501 CO2 down to 31 and PO2 91.  No acidosis present [DM]   1743 The anion gap is 10 that plus her pH she is obviously not in DKA. [DM]   1744 We are waiting on the urinalysis pregnancy test so we can get her x-rays done [DM]   1800 At 18:00 patient's care was assumed from Dr. 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, carrying and moving the patient's, she received Phenergan, Dilaudid and IV fluids, currently pain significantly subsided, patient is lying down comfortably in the bed, sleeping, labs revealed signs of CHATA with elevated BUN (26), creatinine 1.24, patient denies urinary symptoms, no previous history of kidney stones, no family history of kidney stone, the patient is not complaint with diabetic diet and insulin regimen, she is on Lantus 30 units BID and Novolog 7 units TOD [IP]   1843 UA revealed proteinuria, glucosuria, and occult blood, there is no signs of infection, CT of abdomen/pelvis is currently pending to rule out kidney stone, patient received 2 L of IV NS [IP]   193 Ct of abd/pelvis:  1. No hyperdense nephroureterolithiasis or hydroureteronephrosis is present.  2. Diffuse non nodular bladder wall thickening is presumed due to underdistention, but cystitis cannot be excluded.  3. Findings resemble a mild ileus secondary to constipation.  4. The findings can be consistent with the clinical diagnosis of constipation.  5. Interval development cardiomegaly    Patient received MiraLax, was notified about CT results [IP]      ED Course User Index  [DM] Danilo العلي MD  [IP] Shana Brown MD                   Clinical Impression:   Final diagnoses:  [K59.00] Constipation, unspecified constipation type (Primary)  [E86.0]  Dehydration  [R73.9] Hyperglycemia        ED Disposition Condition    Discharge Stable          ED Prescriptions    None       Follow-up Information       Follow up With Specialties Details Why Contact Info    Kumar Ortiz NP Family Medicine In 3 days If symptoms worsen [address] [place] [id number]  [phone]               Shana Brown MD  06/16/23 1936

## 2023-06-17 ENCOUNTER — HOSPITAL ENCOUNTER (EMERGENCY)
Facility: HOSPITAL | Age: 28
Discharge: HOME | End: 2023-06-17
Attending: GENERAL ACUTE CARE HOSPITAL
Payer: MEDICAID

## 2023-06-17 VITALS
HEART RATE: 91 BPM | HEIGHT: 60 IN | TEMPERATURE: 98 F | BODY MASS INDEX: 28.66 KG/M2 | RESPIRATION RATE: 19 BRPM | OXYGEN SATURATION: 97 % | WEIGHT: 146 LBS | SYSTOLIC BLOOD PRESSURE: 179 MMHG | DIASTOLIC BLOOD PRESSURE: 99 MMHG

## 2023-06-17 DIAGNOSIS — R11.2 NAUSEA AND VOMITING, UNSPECIFIED VOMITING TYPE: Primary | ICD-10-CM

## 2023-06-17 DIAGNOSIS — K59.00 CONSTIPATION, UNSPECIFIED CONSTIPATION TYPE: ICD-10-CM

## 2023-06-17 PROCEDURE — 63600175 PHARM REV CODE 636 W HCPCS: Performed by: GENERAL ACUTE CARE HOSPITAL

## 2023-06-17 PROCEDURE — 96375 TX/PRO/DX INJ NEW DRUG ADDON: CPT

## 2023-06-17 PROCEDURE — 99284 EMERGENCY DEPT VISIT MOD MDM: CPT | Mod: 25

## 2023-06-17 PROCEDURE — 96374 THER/PROPH/DIAG INJ IV PUSH: CPT

## 2023-06-17 PROCEDURE — 96361 HYDRATE IV INFUSION ADD-ON: CPT

## 2023-06-17 PROCEDURE — 25000003 PHARM REV CODE 250: Performed by: GENERAL ACUTE CARE HOSPITAL

## 2023-06-17 PROCEDURE — 96372 THER/PROPH/DIAG INJ SC/IM: CPT | Performed by: GENERAL ACUTE CARE HOSPITAL

## 2023-06-17 RX ORDER — ADHESIVE BANDAGE
10 BANDAGE TOPICAL
Status: COMPLETED | OUTPATIENT
Start: 2023-06-17 | End: 2023-06-17

## 2023-06-17 RX ORDER — KETOROLAC TROMETHAMINE 30 MG/ML
30 INJECTION, SOLUTION INTRAMUSCULAR; INTRAVENOUS
Status: COMPLETED | OUTPATIENT
Start: 2023-06-17 | End: 2023-06-17

## 2023-06-17 RX ORDER — ONDANSETRON 2 MG/ML
8 INJECTION INTRAMUSCULAR; INTRAVENOUS
Status: COMPLETED | OUTPATIENT
Start: 2023-06-17 | End: 2023-06-17

## 2023-06-17 RX ORDER — MEPERIDINE HYDROCHLORIDE 25 MG/ML
12.5 INJECTION INTRAMUSCULAR; INTRAVENOUS; SUBCUTANEOUS
Status: COMPLETED | OUTPATIENT
Start: 2023-06-17 | End: 2023-06-17

## 2023-06-17 RX ADMIN — ONDANSETRON 8 MG: 2 INJECTION INTRAMUSCULAR; INTRAVENOUS at 06:06

## 2023-06-17 RX ADMIN — METHYLNALTREXONE BROMIDE 12 MG: 12 INJECTION, SOLUTION SUBCUTANEOUS at 06:06

## 2023-06-17 RX ADMIN — SODIUM CHLORIDE 1000 ML: 9 INJECTION, SOLUTION INTRAVENOUS at 06:06

## 2023-06-17 RX ADMIN — KETOROLAC TROMETHAMINE 30 MG: 30 INJECTION, SOLUTION INTRAMUSCULAR; INTRAVENOUS at 06:06

## 2023-06-17 RX ADMIN — MEPERIDINE HYDROCHLORIDE 12.5 MG: 25 INJECTION INTRAMUSCULAR; INTRAVENOUS; SUBCUTANEOUS at 07:06

## 2023-06-17 RX ADMIN — MAGNESIUM HYDROXIDE 800 MG: 400 SUSPENSION ORAL at 07:06

## 2023-06-17 NOTE — ED PROVIDER NOTES
Encounter Date: 6/17/2023       History     Chief Complaint   Patient presents with    Vomiting     Vomitting and back pain   started again this morning   seen here yesterday for the same pain     The patient came in ER with c/c on abd pain with nausea and emesis, she was seen in ER yesterday  and had all work up done including Ct of abd/pelvis which revealed constipation. She has uncontrolled IDDM, non complaint with insulin and diabetic diet, came in emergency room, screaming, crying, requesting strong pain medications (yesterday patient received Phenergan and Dilaudid prior CT of abdomen/pelvis returned negative),    The history is provided by the patient.   Review of patient's allergies indicates:  No Known Allergies  Past Medical History:   Diagnosis Date    Diabetes mellitus     Diabetic gastroparesis associated with type 1 diabetes mellitus     DKA (diabetic ketoacidosis)     Hypertension     Non compliance with medical treatment      Past Surgical History:   Procedure Laterality Date    CHOLECYSTECTOMY      ESOPHAGOGASTRODUODENOSCOPY      Multiple    None       Family History   Problem Relation Age of Onset    Heart disease Mother     Hypertension Mother     Diabetes Mother     Hyperlipidemia Mother     Cancer Father     Stroke Father     Hypertension Father     Hyperlipidemia Father      Social History     Tobacco Use    Smoking status: Never    Smokeless tobacco: Never   Substance Use Topics    Alcohol use: Never    Drug use: Not Currently     Types: Marijuana     Review of Systems   Gastrointestinal:  Positive for abdominal pain, constipation, nausea and vomiting.   All other systems reviewed and are negative.    Physical Exam     Initial Vitals [06/17/23 1753]   BP Pulse Resp Temp SpO2   (!) 101/38 (!) 116 20 98.3 °F (36.8 °C) 97 %      MAP       --         Physical Exam    Nursing note and vitals reviewed.  Constitutional: She appears well-developed and well-nourished.   HENT:   Right Ear: External ear  normal.   Left Ear: External ear normal.   Eyes: Pupils are equal, round, and reactive to light.   Neck:   Normal range of motion.  Cardiovascular:  Normal rate and regular rhythm.           Pulmonary/Chest: Breath sounds normal.   Abdominal: Abdomen is soft. Bowel sounds are normal. There is abdominal tenderness in the periumbilical area.   Musculoskeletal:         General: Normal range of motion.      Cervical back: Normal range of motion.     Neurological: She is alert and oriented to person, place, and time. GCS score is 15. GCS eye subscore is 4. GCS verbal subscore is 5. GCS motor subscore is 6.   Skin: Skin is warm. Capillary refill takes 2 to 3 seconds.   Psychiatric: Her behavior is normal. Thought content normal.       ED Course   Procedures  Labs Reviewed - No data to display       Imaging Results    None          Medications   sodium chloride 0.9% bolus 1,000 mL 1,000 mL ( Intravenous Stopped 6/17/23 1913)   ketorolac injection 30 mg (30 mg Intravenous Given 6/17/23 1808)   methylnaltrexone 12 mg/0.6 mL subcutaneous injection 12 mg (12 mg Subcutaneous Given 6/17/23 1819)   ondansetron injection 8 mg (8 mg Intravenous Given 6/17/23 1818)   magnesium hydroxide 400 mg/5 ml suspension 800 mg (800 mg Oral Given 6/17/23 1918)   meperidine (PF) injection 12.5 mg (12.5 mg Intravenous Given 6/17/23 1957)     Medical Decision Making:   Initial Assessment:   The patient came in ER with c/c on abd pain with nausea and emesis, she was seen in ER yesterday  and had all work up done including Ct of abd/pelvis which revealed constipation. She has uncontrolled IDDM, non complaint with insulin and diabetic diet, came in emergency room, screaming, crying, requesting strong pain medications (yesterday patient received Phenergan and Dilaudid prior CT of abdomen/pelvis returned negative), when she was informed that she will not get stronger pain medication, but she will get only Toradol IV for pain, patient started to request  IV Phenergan, states that she was given yesterday, and it made her feel very well  Differential Diagnosis:   Constipation, diabetic gastroparesis, gastritis, GERD  ED Management:  Because yesterday all labs revealed mild dehydration, patient received yesterday 2 L of NS IV, she started to have a IV sign lock with IV fluids, received IV Toradol for pain, Zofran for nausea, she also received Relistor for opioid related constipation, patient got IV line placed with 1st attempt (usually patient is very hard for peripheral IV stick, it points to proper IV hydration of her body, there is no dehydration sign)    [place]  website revealed Overdose risk score is 280, last med refill was at 5/16/2023 Tylenol 3, #20 pills, the patient is very well known to use many pain narcotic meds    18:40, the patient is comfortable laying in a bed, sleeping, no emesis           ED Course as of 06/17/23 2046   Sat Jun 17, 202317, 2023 1915 The patient is not throwing up, has no retching, but still requests pain and nausea medicine, she received Zofran 8 mg IV, [IP]   1946 The patient is screaming and asking for more stronger narcotic pain medications, she refused to take Compasin, requesting Phenergan with narcotic pain meds,she is hysterical. [IP]   2013 The patient's pain got better after 12.5 mg of Demerol, she requested ice water to drink ( provided) [IP]   2045 Patient is doing much better, tolerated fluid challenge (no emesis after 3 cups of water), requested work excuse [IP]      ED Course User Index  [IP] Shana Brown MD                 Clinical Impression:   Final diagnoses:  [R11.2] Nausea and vomiting, unspecified vomiting type (Primary)  [K59.00] Constipation, unspecified constipation type        ED Disposition Condition    Discharge Stable          ED Prescriptions    None       Follow-up Information    None          Shana Brown MD  06/17/23 2046

## 2023-06-17 NOTE — Clinical Note
"Dorene"Titi Husain was seen and treated in our emergency department on 6/17/2023.  She may return to work on 06/18/2023.       If you have any questions or concerns, please don't hesitate to call.      Shana Brown MD"

## 2023-06-19 ENCOUNTER — HOSPITAL ENCOUNTER (INPATIENT)
Facility: HOSPITAL | Age: 28
LOS: 11 days | Discharge: HOME | DRG: 638 | End: 2023-06-30
Attending: INTERNAL MEDICINE | Admitting: STUDENT IN AN ORGANIZED HEALTH CARE EDUCATION/TRAINING PROGRAM
Payer: MEDICAID

## 2023-06-19 DIAGNOSIS — E10.10 DIABETIC KETOACIDOSIS WITHOUT COMA ASSOCIATED WITH TYPE 1 DIABETES MELLITUS: Primary | ICD-10-CM

## 2023-06-19 DIAGNOSIS — R73.9 HYPERGLYCEMIA: ICD-10-CM

## 2023-06-19 LAB
ABS NEUT CALC (OHS): 21.44 X10(3)/MCL (ref 2.1–9.2)
ALBUMIN SERPL-MCNC: 2.7 G/DL (ref 3.5–5)
ALBUMIN SERPL-MCNC: 2.7 G/DL (ref 3.5–5)
ALBUMIN SERPL-MCNC: 3.3 G/DL (ref 3.5–5)
ALBUMIN/GLOB SERPL: 0.8 RATIO (ref 1.1–2)
ALLENS TEST: ABNORMAL
ALP SERPL-CCNC: 104 UNIT/L (ref 40–150)
ALP SERPL-CCNC: 106 UNIT/L (ref 40–150)
ALP SERPL-CCNC: 128 UNIT/L (ref 40–150)
ALT SERPL-CCNC: 10 UNIT/L (ref 0–55)
ALT SERPL-CCNC: 13 UNIT/L (ref 0–55)
ALT SERPL-CCNC: 15 UNIT/L (ref 0–55)
AMPHET UR QL SCN: NEGATIVE
APPEARANCE UR: ABNORMAL
AST SERPL-CCNC: 19 UNIT/L (ref 5–34)
AST SERPL-CCNC: 19 UNIT/L (ref 5–34)
AST SERPL-CCNC: 34 UNIT/L (ref 5–34)
B-HCG SERPL QL: NEGATIVE
B-OH-BUTYR SERPL-MCNC: 9.9 MMOL/L
BACTERIA #/AREA URNS AUTO: ABNORMAL /HPF
BARBITURATE SCN PRESENT UR: NEGATIVE
BENZODIAZ UR QL SCN: NEGATIVE
BILIRUB UR QL STRIP.AUTO: NEGATIVE MG/DL
BILIRUBIN DIRECT+TOT PNL SERPL-MCNC: 0.9 MG/DL
BILIRUBIN DIRECT+TOT PNL SERPL-MCNC: 1 MG/DL
BILIRUBIN DIRECT+TOT PNL SERPL-MCNC: 1.4 MG/DL
BUN SERPL-MCNC: 44 MG/DL (ref 7–18.7)
BUN SERPL-MCNC: 46 MG/DL (ref 7–18.7)
BUN SERPL-MCNC: 47 MG/DL (ref 7–18.7)
CALCIUM SERPL-MCNC: 8.3 MG/DL (ref 8.4–10.2)
CALCIUM SERPL-MCNC: 8.3 MG/DL (ref 8.4–10.2)
CALCIUM SERPL-MCNC: 9 MG/DL (ref 8.4–10.2)
CANNABINOIDS UR QL SCN: NEGATIVE
CHLORIDE SERPL-SCNC: 82 MMOL/L (ref 98–107)
CHLORIDE SERPL-SCNC: 90 MMOL/L (ref 98–107)
CHLORIDE SERPL-SCNC: 94 MMOL/L (ref 98–107)
CO2 SERPL-SCNC: 15 MMOL/L (ref 22–29)
CO2 SERPL-SCNC: 24 MMOL/L (ref 22–29)
CO2 SERPL-SCNC: 9 MMOL/L (ref 22–29)
COCAINE UR QL SCN: NEGATIVE
COLOR UR: YELLOW
CREAT SERPL-MCNC: 1.37 MG/DL (ref 0.55–1.02)
CREAT SERPL-MCNC: 1.69 MG/DL (ref 0.55–1.02)
CREAT SERPL-MCNC: 1.69 MG/DL (ref 0.55–1.02)
DELSYS: ABNORMAL
ERYTHROCYTE [DISTWIDTH] IN BLOOD BY AUTOMATED COUNT: 12.2 % (ref 11.5–17)
FENTANYL UR QL SCN: NEGATIVE
GFR SERPLBLD CREATININE-BSD FMLA CKD-EPI: 42 MLS/MIN/1.73/M2
GFR SERPLBLD CREATININE-BSD FMLA CKD-EPI: 42 MLS/MIN/1.73/M2
GFR SERPLBLD CREATININE-BSD FMLA CKD-EPI: 54 MLS/MIN/1.73/M2
GLOBULIN SER-MCNC: 3.3 GM/DL (ref 2.4–3.5)
GLOBULIN SER-MCNC: 3.4 GM/DL (ref 2.4–3.5)
GLOBULIN SER-MCNC: 4.3 GM/DL (ref 2.4–3.5)
GLUCOSE SERPL-MCNC: 168 MG/DL (ref 74–100)
GLUCOSE SERPL-MCNC: 374 MG/DL (ref 74–100)
GLUCOSE SERPL-MCNC: 545 MG/DL (ref 74–100)
GLUCOSE UR QL STRIP.AUTO: 1000 MG/DL
HCO3 UR-SCNC: 12.3 MMOL/L (ref 24–28)
HCT VFR BLD AUTO: 39.9 % (ref 37–47)
HGB BLD-MCNC: 11.9 G/DL (ref 12–16)
KETONES UR QL STRIP.AUTO: 160 MG/DL
LEUKOCYTE ESTERASE UR QL STRIP.AUTO: NEGATIVE UNIT/L
LYMPHOCYTES NFR BLD MANUAL: 1.88 X10(3)/MCL
LYMPHOCYTES NFR BLD MANUAL: 8 % (ref 13–40)
MAGNESIUM SERPL-MCNC: 2.1 MG/DL (ref 1.6–2.6)
MAGNESIUM SERPL-MCNC: 2.1 MG/DL (ref 1.6–2.6)
MCH RBC QN AUTO: 27.4 PG (ref 27–31)
MCHC RBC AUTO-ENTMCNC: 29.8 G/DL (ref 33–36)
MCV RBC AUTO: 91.7 FL (ref 80–94)
MDMA UR QL SCN: NEGATIVE
MONOCYTES NFR BLD MANUAL: 0.24 X10(3)/MCL (ref 0.1–1.3)
MONOCYTES NFR BLD MANUAL: 1 % (ref 2–11)
MUCOUS THREADS URNS QL MICRO: ABNORMAL /LPF
NEUTROPHILS NFR BLD MANUAL: 91 % (ref 47–80)
NITRITE UR QL STRIP.AUTO: NEGATIVE
OPIATES UR QL SCN: NEGATIVE
PCO2 BLDA: 24.5 MMHG (ref 35–45)
PCP UR QL: NEGATIVE
PH SMN: 7.31 [PH] (ref 7.35–7.45)
PH UR STRIP.AUTO: 6 [PH]
PH UR: 6 [PH] (ref 3–11)
PHOSPHATE SERPL-MCNC: 2.2 MG/DL (ref 2.3–4.7)
PHOSPHATE SERPL-MCNC: 3.7 MG/DL (ref 2.3–4.7)
PLATELET # BLD AUTO: 569 X10(3)/MCL (ref 130–400)
PMV BLD AUTO: 8.9 FL (ref 7.4–10.4)
PO2 BLDA: 100 MMHG (ref 80–100)
POC BE: -14 MMOL/L
POC SATURATED O2: 97 % (ref 95–100)
POC TCO2: 13 MMOL/L (ref 23–27)
POCT GLUCOSE: 172 MG/DL (ref 70–110)
POCT GLUCOSE: 236 MG/DL (ref 70–110)
POCT GLUCOSE: 258 MG/DL (ref 70–110)
POCT GLUCOSE: 318 MG/DL (ref 70–110)
POCT GLUCOSE: 419 MG/DL (ref 70–110)
POCT GLUCOSE: 467 MG/DL (ref 70–110)
POCT GLUCOSE: >500 MG/DL (ref 70–110)
POTASSIUM SERPL-SCNC: 3.2 MMOL/L (ref 3.5–5.1)
POTASSIUM SERPL-SCNC: 3.3 MMOL/L (ref 3.5–5.1)
POTASSIUM SERPL-SCNC: 5.1 MMOL/L (ref 3.5–5.1)
PROT SERPL-MCNC: 6 GM/DL (ref 6.4–8.3)
PROT SERPL-MCNC: 6.1 GM/DL (ref 6.4–8.3)
PROT SERPL-MCNC: 7.6 GM/DL (ref 6.4–8.3)
PROT UR QL STRIP.AUTO: 300 MG/DL
RBC # BLD AUTO: 4.35 X10(6)/MCL (ref 4.2–5.4)
RBC #/AREA URNS AUTO: ABNORMAL /HPF
RBC UR QL AUTO: ABNORMAL UNIT/L
SAMPLE: ABNORMAL
SITE: ABNORMAL
SODIUM SERPL-SCNC: 120 MMOL/L (ref 136–145)
SODIUM SERPL-SCNC: 127 MMOL/L (ref 136–145)
SODIUM SERPL-SCNC: 130 MMOL/L (ref 136–145)
SP GR UR STRIP.AUTO: 1.02
SQUAMOUS #/AREA URNS AUTO: ABNORMAL /HPF
UROBILINOGEN UR STRIP-ACNC: 0.2 MG/DL
WBC # SPEC AUTO: 23.56 X10(3)/MCL (ref 4.5–11.5)
WBC #/AREA URNS AUTO: ABNORMAL /HPF

## 2023-06-19 PROCEDURE — 80053 COMPREHEN METABOLIC PANEL: CPT | Performed by: PHYSICIAN ASSISTANT

## 2023-06-19 PROCEDURE — 99285 EMERGENCY DEPT VISIT HI MDM: CPT | Mod: 25

## 2023-06-19 PROCEDURE — 84100 ASSAY OF PHOSPHORUS: CPT | Performed by: STUDENT IN AN ORGANIZED HEALTH CARE EDUCATION/TRAINING PROGRAM

## 2023-06-19 PROCEDURE — 85027 COMPLETE CBC AUTOMATED: CPT | Performed by: PHYSICIAN ASSISTANT

## 2023-06-19 PROCEDURE — 82803 BLOOD GASES ANY COMBINATION: CPT

## 2023-06-19 PROCEDURE — 25000003 PHARM REV CODE 250: Performed by: STUDENT IN AN ORGANIZED HEALTH CARE EDUCATION/TRAINING PROGRAM

## 2023-06-19 PROCEDURE — 93010 EKG 12-LEAD: ICD-10-PCS | Mod: ,,, | Performed by: STUDENT IN AN ORGANIZED HEALTH CARE EDUCATION/TRAINING PROGRAM

## 2023-06-19 PROCEDURE — 25000003 PHARM REV CODE 250: Performed by: PHYSICIAN ASSISTANT

## 2023-06-19 PROCEDURE — 82962 GLUCOSE BLOOD TEST: CPT

## 2023-06-19 PROCEDURE — 63600175 PHARM REV CODE 636 W HCPCS: Performed by: STUDENT IN AN ORGANIZED HEALTH CARE EDUCATION/TRAINING PROGRAM

## 2023-06-19 PROCEDURE — 96374 THER/PROPH/DIAG INJ IV PUSH: CPT

## 2023-06-19 PROCEDURE — 93005 ELECTROCARDIOGRAM TRACING: CPT

## 2023-06-19 PROCEDURE — S5010 5% DEXTROSE AND 0.45% SALINE: HCPCS | Performed by: STUDENT IN AN ORGANIZED HEALTH CARE EDUCATION/TRAINING PROGRAM

## 2023-06-19 PROCEDURE — 96361 HYDRATE IV INFUSION ADD-ON: CPT

## 2023-06-19 PROCEDURE — 80053 COMPREHEN METABOLIC PANEL: CPT | Performed by: STUDENT IN AN ORGANIZED HEALTH CARE EDUCATION/TRAINING PROGRAM

## 2023-06-19 PROCEDURE — 81001 URINALYSIS AUTO W/SCOPE: CPT | Performed by: PHYSICIAN ASSISTANT

## 2023-06-19 PROCEDURE — 80307 DRUG TEST PRSMV CHEM ANLYZR: CPT | Performed by: PHYSICIAN ASSISTANT

## 2023-06-19 PROCEDURE — 81025 URINE PREGNANCY TEST: CPT | Performed by: PHYSICIAN ASSISTANT

## 2023-06-19 PROCEDURE — 99900035 HC TECH TIME PER 15 MIN (STAT)

## 2023-06-19 PROCEDURE — 82010 KETONE BODYS QUAN: CPT | Performed by: PHYSICIAN ASSISTANT

## 2023-06-19 PROCEDURE — 63600175 PHARM REV CODE 636 W HCPCS: Performed by: PHYSICIAN ASSISTANT

## 2023-06-19 PROCEDURE — 83735 ASSAY OF MAGNESIUM: CPT | Performed by: STUDENT IN AN ORGANIZED HEALTH CARE EDUCATION/TRAINING PROGRAM

## 2023-06-19 PROCEDURE — 93010 ELECTROCARDIOGRAM REPORT: CPT | Mod: ,,, | Performed by: STUDENT IN AN ORGANIZED HEALTH CARE EDUCATION/TRAINING PROGRAM

## 2023-06-19 PROCEDURE — 20000000 HC ICU ROOM

## 2023-06-19 PROCEDURE — 36600 WITHDRAWAL OF ARTERIAL BLOOD: CPT

## 2023-06-19 RX ORDER — DEXTROSE MONOHYDRATE AND SODIUM CHLORIDE 5; .45 G/100ML; G/100ML
INJECTION, SOLUTION INTRAVENOUS CONTINUOUS PRN
Status: DISCONTINUED | OUTPATIENT
Start: 2023-06-19 | End: 2023-06-20

## 2023-06-19 RX ORDER — DROPERIDOL 2.5 MG/ML
1.25 INJECTION, SOLUTION INTRAMUSCULAR; INTRAVENOUS
Status: COMPLETED | OUTPATIENT
Start: 2023-06-19 | End: 2023-06-19

## 2023-06-19 RX ORDER — PROMETHAZINE HYDROCHLORIDE 25 MG/ML
25 INJECTION, SOLUTION INTRAMUSCULAR; INTRAVENOUS EVERY 6 HOURS PRN
Status: DISCONTINUED | OUTPATIENT
Start: 2023-06-19 | End: 2023-06-23

## 2023-06-19 RX ORDER — HYDROXYZINE HYDROCHLORIDE 25 MG/1
50 TABLET, FILM COATED ORAL DAILY PRN
Status: DISCONTINUED | OUTPATIENT
Start: 2023-06-19 | End: 2023-06-30 | Stop reason: HOSPADM

## 2023-06-19 RX ORDER — SODIUM CHLORIDE 0.9 % (FLUSH) 0.9 %
10 SYRINGE (ML) INJECTION
Status: DISCONTINUED | OUTPATIENT
Start: 2023-06-19 | End: 2023-06-30 | Stop reason: HOSPADM

## 2023-06-19 RX ORDER — DEXTROSE MONOHYDRATE 100 MG/ML
INJECTION, SOLUTION INTRAVENOUS
Status: DISCONTINUED | OUTPATIENT
Start: 2023-06-19 | End: 2023-06-19

## 2023-06-19 RX ORDER — DROPERIDOL 2.5 MG/ML
1.25 INJECTION, SOLUTION INTRAMUSCULAR; INTRAVENOUS EVERY 6 HOURS SCHEDULED
Status: DISCONTINUED | OUTPATIENT
Start: 2023-06-19 | End: 2023-06-19

## 2023-06-19 RX ORDER — MUPIROCIN 20 MG/G
OINTMENT TOPICAL 2 TIMES DAILY
Status: DISPENSED | OUTPATIENT
Start: 2023-06-19 | End: 2023-06-24

## 2023-06-19 RX ORDER — SODIUM CHLORIDE 0.9 % (FLUSH) 0.9 %
10 SYRINGE (ML) INJECTION
Status: DISCONTINUED | OUTPATIENT
Start: 2023-06-19 | End: 2023-06-19

## 2023-06-19 RX ORDER — OXYCODONE AND ACETAMINOPHEN 5; 325 MG/1; MG/1
1 TABLET ORAL EVERY 4 HOURS PRN
Status: DISCONTINUED | OUTPATIENT
Start: 2023-06-19 | End: 2023-06-20

## 2023-06-19 RX ORDER — DEXTROSE MONOHYDRATE 100 MG/ML
INJECTION, SOLUTION INTRAVENOUS
Status: DISCONTINUED | OUTPATIENT
Start: 2023-06-19 | End: 2023-06-20

## 2023-06-19 RX ORDER — AMITRIPTYLINE HYDROCHLORIDE 25 MG/1
25 TABLET, FILM COATED ORAL NIGHTLY
Status: DISCONTINUED | OUTPATIENT
Start: 2023-06-19 | End: 2023-06-30 | Stop reason: HOSPADM

## 2023-06-19 RX ORDER — ONDANSETRON 2 MG/ML
4 INJECTION INTRAMUSCULAR; INTRAVENOUS EVERY 6 HOURS PRN
Status: DISCONTINUED | OUTPATIENT
Start: 2023-06-19 | End: 2023-06-30 | Stop reason: HOSPADM

## 2023-06-19 RX ORDER — DICYCLOMINE HYDROCHLORIDE 10 MG/1
10 CAPSULE ORAL 4 TIMES DAILY
Status: DISCONTINUED | OUTPATIENT
Start: 2023-06-19 | End: 2023-06-23

## 2023-06-19 RX ORDER — PANTOPRAZOLE SODIUM 40 MG/1
40 TABLET, DELAYED RELEASE ORAL DAILY
Status: DISCONTINUED | OUTPATIENT
Start: 2023-06-20 | End: 2023-06-30 | Stop reason: HOSPADM

## 2023-06-19 RX ORDER — SODIUM CHLORIDE, SODIUM LACTATE, POTASSIUM CHLORIDE, CALCIUM CHLORIDE 600; 310; 30; 20 MG/100ML; MG/100ML; MG/100ML; MG/100ML
INJECTION, SOLUTION INTRAVENOUS CONTINUOUS
Status: DISCONTINUED | OUTPATIENT
Start: 2023-06-19 | End: 2023-06-20

## 2023-06-19 RX ORDER — PANTOPRAZOLE SODIUM 40 MG/1
40 TABLET, DELAYED RELEASE ORAL 2 TIMES DAILY
Status: DISCONTINUED | OUTPATIENT
Start: 2023-06-19 | End: 2023-06-19

## 2023-06-19 RX ORDER — SODIUM CHLORIDE 9 MG/ML
1000 INJECTION, SOLUTION INTRAVENOUS CONTINUOUS
Status: DISCONTINUED | OUTPATIENT
Start: 2023-06-19 | End: 2023-06-19

## 2023-06-19 RX ORDER — DULOXETIN HYDROCHLORIDE 30 MG/1
30 CAPSULE, DELAYED RELEASE ORAL DAILY
Status: DISCONTINUED | OUTPATIENT
Start: 2023-06-20 | End: 2023-06-30 | Stop reason: HOSPADM

## 2023-06-19 RX ADMIN — DICYCLOMINE HYDROCHLORIDE 10 MG: 10 CAPSULE ORAL at 05:06

## 2023-06-19 RX ADMIN — DICYCLOMINE HYDROCHLORIDE 10 MG: 10 CAPSULE ORAL at 08:06

## 2023-06-19 RX ADMIN — SODIUM CHLORIDE, POTASSIUM CHLORIDE, SODIUM LACTATE AND CALCIUM CHLORIDE: 600; 310; 30; 20 INJECTION, SOLUTION INTRAVENOUS at 05:06

## 2023-06-19 RX ADMIN — MUPIROCIN: 20 OINTMENT TOPICAL at 08:06

## 2023-06-19 RX ADMIN — ONDANSETRON 4 MG: 2 INJECTION INTRAMUSCULAR; INTRAVENOUS at 07:06

## 2023-06-19 RX ADMIN — SODIUM CHLORIDE 1000 ML: 9 INJECTION, SOLUTION INTRAVENOUS at 02:06

## 2023-06-19 RX ADMIN — DEXTROSE AND SODIUM CHLORIDE: 5; 450 INJECTION, SOLUTION INTRAVENOUS at 10:06

## 2023-06-19 RX ADMIN — AMITRIPTYLINE HYDROCHLORIDE 25 MG: 25 TABLET, FILM COATED ORAL at 08:06

## 2023-06-19 RX ADMIN — DROPERIDOL 1.25 MG: 2.5 INJECTION, SOLUTION INTRAMUSCULAR; INTRAVENOUS at 02:06

## 2023-06-19 RX ADMIN — OXYCODONE HYDROCHLORIDE AND ACETAMINOPHEN 1 TABLET: 5; 325 TABLET ORAL at 08:06

## 2023-06-19 RX ADMIN — INSULIN HUMAN 0.1 UNITS/KG/HR: 1 INJECTION, SOLUTION INTRAVENOUS at 03:06

## 2023-06-19 RX ADMIN — SODIUM CHLORIDE, POTASSIUM CHLORIDE, SODIUM LACTATE AND CALCIUM CHLORIDE 1000 ML: 600; 310; 30; 20 INJECTION, SOLUTION INTRAVENOUS at 03:06

## 2023-06-19 NOTE — HPI
Patient is a 28 yo female w/ DM1 who presents after 2 days of nausea and vomiting, reporting that she can not keep oral intake down. Patient reports she has not been taking her insulin during this time. Patient currently denies fever, headache, SOB, CP, cough, dysuria or other s/s of infection at this time. Patient reports she does frequently have issues with nausea and vomiting.    Patient to be admitted to ICU for DKA and higher level of care.

## 2023-06-19 NOTE — SUBJECTIVE & OBJECTIVE
Past Medical History:   Diagnosis Date    Diabetes mellitus     Diabetic gastroparesis associated with type 1 diabetes mellitus     DKA (diabetic ketoacidosis)     Hypertension     Non compliance with medical treatment        Past Surgical History:   Procedure Laterality Date    CHOLECYSTECTOMY      ESOPHAGOGASTRODUODENOSCOPY      Multiple    None         Review of patient's allergies indicates:  No Known Allergies    No current facility-administered medications on file prior to encounter.     Current Outpatient Medications on File Prior to Encounter   Medication Sig    BASAGLAR KWIKPEN U-100 INSULIN glargine 100 units/mL (3mL) SubQ pen Inject into the skin.    dicyclomine (BENTYL) 10 MG capsule Take 10 mg by mouth 4 (four) times daily.    hydrOXYzine (ATARAX) 50 MG tablet Take 50 mg by mouth daily as needed.    metoprolol tartrate (LOPRESSOR) 50 MG tablet Take 50 mg by mouth 2 (two) times daily.    amitriptyline (ELAVIL) 25 MG tablet Take 25 mg by mouth nightly.    [DISCONTINUED] DULoxetine (CYMBALTA) 30 MG capsule Take 1 capsule (30 mg total) by mouth once daily.    [DISCONTINUED] insulin lispro (HUMALOG U-100 INSULIN) 100 unit/mL Crtg Inject into the skin.    [DISCONTINUED] ondansetron (ZOFRAN) 4 MG tablet Take 4 mg by mouth every 6 (six) hours as needed.    [DISCONTINUED] oxyCODONE-acetaminophen (PERCOCET) 5-325 mg per tablet Take 1 tablet by mouth every 4 (four) hours as needed for Pain.    [DISCONTINUED] pantoprazole (PROTONIX) 40 MG tablet Take 1 tablet (40 mg total) by mouth 2 (two) times daily.    [DISCONTINUED] promethazine (PHENERGAN) 25 MG tablet Take 1 tablet (25 mg total) by mouth every 6 (six) hours as needed for Nausea.     Family History       Problem Relation (Age of Onset)    Cancer Father    Diabetes Mother    Heart disease Mother    Hyperlipidemia Mother, Father    Hypertension Mother, Father    Stroke Father          Tobacco Use    Smoking status: Never    Smokeless tobacco: Never   Substance  and Sexual Activity    Alcohol use: Never    Drug use: Not Currently     Types: Marijuana    Sexual activity: Yes     Review of Systems   Constitutional:  Negative for chills and fever.   Eyes: Negative.    Respiratory:  Negative for cough and shortness of breath.    Cardiovascular:  Negative for chest pain.   Gastrointestinal:  Positive for nausea and vomiting. Negative for abdominal pain and diarrhea.   Endocrine: Negative.    Genitourinary:  Negative for difficulty urinating.   Musculoskeletal: Negative.    Neurological:  Negative for headaches.   Objective:     Vital Signs (Most Recent):  Temp: 98.8 °F (37.1 °C) (06/19/23 1641)  Pulse: (!) 126 (06/19/23 1745)  Resp: 16 (06/19/23 1745)  BP: (!) 172/81 (06/19/23 1745)  SpO2: 100 % (06/19/23 1745) Vital Signs (24h Range):  Temp:  [98 °F (36.7 °C)-98.8 °F (37.1 °C)] 98.8 °F (37.1 °C)  Pulse:  [116-127] 126  Resp:  [12-60] 16  SpO2:  [99 %-100 %] 100 %  BP: (142-174)/(73-93) 172/81     Weight: 67.1 kg (148 lb)  Body mass index is 28.9 kg/m².     Physical Exam  Vitals and nursing note reviewed.   Constitutional:       General: She is not in acute distress.     Appearance: She is well-developed. She is not diaphoretic.   HENT:      Head: Normocephalic and atraumatic.      Mouth/Throat:      Pharynx: No oropharyngeal exudate.   Eyes:      General: No scleral icterus.     Pupils: Pupils are equal, round, and reactive to light.   Neck:      Vascular: No JVD.   Cardiovascular:      Rate and Rhythm: Normal rate and regular rhythm.      Heart sounds: Normal heart sounds. No murmur heard.    No friction rub.   Pulmonary:      Effort: Pulmonary effort is normal. No respiratory distress.      Breath sounds: Normal breath sounds. No wheezing.   Abdominal:      General: Bowel sounds are normal. There is no distension.      Palpations: Abdomen is soft. There is no mass.      Tenderness: There is no abdominal tenderness.   Musculoskeletal:         General: Normal range of motion.       Cervical back: Normal range of motion and neck supple.   Skin:     General: Skin is warm and dry.      Capillary Refill: Capillary refill takes less than 2 seconds.      Findings: No erythema.   Neurological:      Mental Status: She is alert and oriented to person, place, and time.      Cranial Nerves: No cranial nerve deficit.      Coordination: Coordination normal.            CRANIAL NERVES     CN III, IV, VI   Pupils are equal, round, and reactive to light.     Significant Labs: All pertinent labs within the past 24 hours have been reviewed.    Significant Imaging: I have reviewed all pertinent imaging results/findings within the past 24 hours.

## 2023-06-19 NOTE — ED PROVIDER NOTES
Encounter Date: 6/19/2023       History     Chief Complaint   Patient presents with    Back Pain     Lower back pain and vomiting for the past couple days. Patient states she cant keep anything down.     27 y.o. female with a history of T1DM, gastroparesis, and hypertension presents to the ED with diffuse back pain for the last 5 days with persistent nausea and vomiting. Patient has been seen here twice in the ED for same symptoms. Denies fever, chills, dysuria, abdominal pain, injury/fall/trauma. Notes LMP was last week. States she has been compliant with all of her medications     The history is provided by the patient. No  was used.   Review of patient's allergies indicates:  No Known Allergies  Past Medical History:   Diagnosis Date    Diabetes mellitus     Diabetic gastroparesis associated with type 1 diabetes mellitus     DKA (diabetic ketoacidosis)     Hypertension     Non compliance with medical treatment      Past Surgical History:   Procedure Laterality Date    CHOLECYSTECTOMY      ESOPHAGOGASTRODUODENOSCOPY      Multiple    None       Family History   Problem Relation Age of Onset    Heart disease Mother     Hypertension Mother     Diabetes Mother     Hyperlipidemia Mother     Cancer Father     Stroke Father     Hypertension Father     Hyperlipidemia Father      Social History     Tobacco Use    Smoking status: Never    Smokeless tobacco: Never   Substance Use Topics    Alcohol use: Never    Drug use: Not Currently     Types: Marijuana     Review of Systems   Constitutional:  Negative for chills and fever.   Eyes:  Negative for visual disturbance.   Respiratory:  Negative for cough and shortness of breath.    Cardiovascular:  Negative for chest pain.   Gastrointestinal:  Positive for nausea and vomiting. Negative for abdominal pain.   Genitourinary:  Negative for dysuria, frequency, pelvic pain and vaginal discharge.   Musculoskeletal:  Positive for back pain. Negative for arthralgias.    Skin:  Negative for color change and rash.   Neurological:  Negative for dizziness and headaches.   Psychiatric/Behavioral:  Negative for behavioral problems.    All other systems reviewed and are negative.    Physical Exam     Initial Vitals [06/19/23 1317]   BP Pulse Resp Temp SpO2   (!) 160/82 (!) 117 18 98 °F (36.7 °C) 99 %      MAP       --         Physical Exam    Nursing note and vitals reviewed.  Constitutional: She appears well-developed and well-nourished. She appears ill.   Patient writhing around in bed   HENT:   Head: Normocephalic and atraumatic.   Mouth/Throat: Uvula is midline. Mucous membranes are dry.   Eyes: EOM are normal. Pupils are equal, round, and reactive to light.   Neck: Neck supple.   Cardiovascular:  Normal rate, regular rhythm and normal heart sounds.           Pulmonary/Chest: Breath sounds normal.   Abdominal: Abdomen is soft. Bowel sounds are normal. There is no abdominal tenderness.   Musculoskeletal:         General: Normal range of motion.      Cervical back: Neck supple.      Comments: Tenderness to palpation over entire back, exaggerated response to palpation      Neurological: She is alert and oriented to person, place, and time. She has normal strength. GCS score is 15. GCS eye subscore is 4. GCS verbal subscore is 5. GCS motor subscore is 6.   Skin: Skin is warm and dry.   Psychiatric: She has a normal mood and affect.       ED Course   Critical Care    Date/Time: 6/19/2023 3:02 PM  Performed by: Philip Hugo PA-C  Authorized by: Italia Koehler MD   Direct patient critical care time: 15 minutes  Additional history critical care time: 10 minutes  Ordering / reviewing critical care time: 5 minutes  Documentation critical care time: 10 minutes  Consulting other physicians critical care time: 3 minutes  Consult with family critical care time: 5 minutes  Other critical care time: 0 minutes  Total critical care time (exclusive of procedural time) : 48 minutes  Critical  care was necessary to treat or prevent imminent or life-threatening deterioration of the following conditions: metabolic crisis.  Critical care was time spent personally by me on the following activities: discussions with consultants, development of treatment plan with patient or surrogate, evaluation of patient's response to treatment, examination of patient, obtaining history from patient or surrogate, ordering and performing treatments and interventions, ordering and review of laboratory studies, ordering and review of radiographic studies, pulse oximetry, re-evaluation of patient's condition and review of old charts.      Labs Reviewed   COMPREHENSIVE METABOLIC PANEL - Abnormal; Notable for the following components:       Result Value    Sodium Level 120 (*)     Chloride 82 (*)     Carbon Dioxide 9 (*)     Glucose Level 545 (*)     Blood Urea Nitrogen 46.0 (*)     Creatinine 1.69 (*)     Albumin Level 3.3 (*)     Globulin 4.3 (*)     Albumin/Globulin Ratio 0.8 (*)     All other components within normal limits   URINALYSIS, REFLEX TO URINE CULTURE - Abnormal; Notable for the following components:    Appearance, UA Slightly Cloudy (*)     Protein,  (*)     Glucose, UA 1000 (*)     Ketones,  (*)     Blood, UA Moderate (*)     All other components within normal limits   CBC WITH DIFFERENTIAL - Abnormal; Notable for the following components:    WBC 23.56 (*)     Hgb 11.9 (*)     MCHC 29.8 (*)     Platelet 569 (*)     All other components within normal limits   MANUAL DIFFERENTIAL - Abnormal; Notable for the following components:    Neut Man 91 (*)     Lymph Man 8 (*)     Monocyte Man 1 (*)     Abs Neut calc 21.4396 (*)     All other components within normal limits   URINALYSIS, MICROSCOPIC - Abnormal; Notable for the following components:    Bacteria, UA 1+ (*)     Mucous, UA Trace (*)     RBC, UA 6-10 (*)     Squamous Epithelial Cells, UA Few (*)     All other components within normal limits   POCT  GLUCOSE - Abnormal; Notable for the following components:    POCT Glucose >500 (*)     All other components within normal limits   PREGNANCY TEST, URINE RAPID - Normal   DRUG SCREEN, URINE (BEAKER) - Normal    Narrative:     Cut off concentrations:    Amphetamines - 1000 ng/ml  Barbiturates - 200 ng/ml  Benzodiazepine - 200 ng/ml  Cannabinoids (THC) - 50 ng/ml  Cocaine - 300 ng/ml  Fentanyl - 1.0 ng/ml  MDMA - 500 ng/ml  Opiates - 300 ng/ml   Phencyclidine (PCP) - 25 ng/ml    Specimen submitted for drug analysis and tested for pH and specific gravity in order to evaluate sample integrity. Suspect tampering if specific gravity is <1.003 and/or pH is not within the range of 4.5 - 8.0  False negatives may result form substances such as bleach added to urine.  False positives may result for the presence of a substance with similar chemical structure to the drug or its metabolite.    This test provides only a PRELIMINARY analytical test result. A more specific alternate chemical method must be used in order to obtain a confirmed analytical result. Gas chromatography/mass spectrometry (GC/MS) is the preferred confirmatory method. Other chemical confirmation methods are available. Clinical consideration and professional judgement should be applied to any drug of abuse test result, particularly when preliminary positive results are used.    Positive results will be confirmed only at the physicians request. Unconfirmed screening results are to be used only for medical purposes (treatment).        CBC W/ AUTO DIFFERENTIAL    Narrative:     The following orders were created for panel order CBC auto differential.  Procedure                               Abnormality         Status                     ---------                               -----------         ------                     CBC with Differential[[id number]]        Abnormal            Final result               Manual Differential[[id number]]          Abnormal             Final result                 Please view results for these tests on the individual orders.   BETA - HYDROXYBUTYRATE, SERUM   POCT GLUCOSE, HAND-HELD DEVICE   POCT GLUCOSE MONITORING CONTINUOUS   POCT GLUCOSE MONITORING CONTINUOUS     EKG Readings: (Independently Interpreted)   Initial Reading: No STEMI. Rhythm: Normal Sinus Rhythm. Heart Rate: 115. Ectopy: No Ectopy. Conduction: Normal. ST Segments: Normal ST Segments. T Waves: Normal. Axis: Normal.   ECG Results              EKG 12-lead (Preliminary result)  Result time 06/19/23 14:46:49      Wet Read by Millie Burr MD (06/19/23 14:46:49, [facility] - Emergency Dept, Emergency Medicine)    EKG Initial Reading: Independently Interpreted by Millie Burr MD. independently as: No STEMI. Rhythm: Normal Sinus Rhythm, Rate 115. Ectopy: No Ectopy. Conduction: Normal. ST Segments: Normal ST Segments. T Waves: Normal. Axis: Normal.  Sinus tachycardia otherwise normal EKG                                  Imaging Results              X-Ray Chest AP Portable (In process)                      Medications   lactated ringers bolus 1,000 mL (has no administration in time range)   sodium chloride 0.9% flush 10 mL (has no administration in time range)   dextrose 5 % and 0.45 % NaCl infusion (has no administration in time range)   dextrose 10 % infusion (has no administration in time range)   dextrose 10 % infusion (has no administration in time range)   dextrose 10% bolus 125 mL 125 mL (has no administration in time range)   dextrose 10% bolus 250 mL 250 mL (has no administration in time range)   lactated ringers bolus 1,000 mL (has no administration in time range)   amitriptyline tablet 25 mg (has no administration in time range)   dicyclomine capsule 10 mg (has no administration in time range)   DULoxetine DR capsule 30 mg (has no administration in time range)   hydrOXYzine HCL tablet 50 mg (has no administration in time range)   oxyCODONE-acetaminophen 5-325  mg per tablet 1 tablet (has no administration in time range)   pantoprazole EC tablet 40 mg (has no administration in time range)   sodium chloride 0.9% flush 10 mL (has no administration in time range)   0.9%  NaCl infusion (has no administration in time range)   dextrose 5 % and 0.45 % NaCl infusion (has no administration in time range)   dextrose 10 % infusion (has no administration in time range)   dextrose 10 % infusion (has no administration in time range)   dextrose 10% bolus 125 mL 125 mL (has no administration in time range)   dextrose 10% bolus 250 mL 250 mL (has no administration in time range)   insulin regular bolus from bag/infusion 10 Units 10 mL (has no administration in time range)   insulin regular in 0.9 % NaCl 100 unit/100 mL (1 unit/mL) infusion (has no administration in time range)   droPERidol injection 1.25 mg (1.25 mg Intravenous Given 6/19/23 1407)     Medical Decision Making:   Initial Assessment:   27 y.o. female with a history of T1DM, gastroparesis, and hypertension presents to the ED with diffuse back pain for the last 5 days with persistent nausea and vomiting. Patient has been seen here twice in the ED for same symptoms. Denies fever, chills, dysuria, abdominal pain, injury/fall/trauma. Notes LMP was last week. States she has been compliant with all of her medications   Differential Diagnosis:   Dehydration, CHATA, electrolyte abnormality, DKA, UTI, malingering   Clinical Tests:   Lab Tests: Reviewed and Ordered           ED Course as of 06/19/23 1503   Mon Jun 19, 2023   1436 Notified patient of lab findings, DKA. Will admit patient to the ICU for insulin drip [MA]   1445 Dr Koehler with  came down to ED to see patient; will admit to ICU [MA]      ED Course User Index  [MA] Philip Hugo PA-C                 Clinical Impression:   Final diagnoses:  [R73.9] Hyperglycemia  [E10.10] Diabetic ketoacidosis without coma associated with type 1 diabetes mellitus (Primary)        ED  Disposition Condition    Admit Stable                Philip Hugo PA-C  06/19/23 8608       Philip Hugo PA-C  06/19/23 7881

## 2023-06-19 NOTE — ASSESSMENT & PLAN NOTE
Patient's FSGs are uncontrolled due to hyperglycemia on current medication regimen.  Last A1c reviewed-   Lab Results   Component Value Date    HGBA1C 11.3 (H) 06/16/2023     Most recent fingerstick glucose reviewed-   Recent Labs   Lab 06/19/23  1443 06/19/23  1559 06/19/23  1658 06/19/23  1800   POCTGLUCOSE >500* 467* 419* 318*     Current correctional scale  Medium  Increase anti-hyperglycemic dose as follows-   Antihyperglycemics (From admission, onward)    Start     Stop Route Frequency Ordered    06/19/23 1515  insulin regular in 0.9 % NaCl 100 unit/100 mL (1 unit/mL) infusion        Question Answer Comment   Insulin Rate Adjustment (DO NOT MODIFY ANSWER) \\[email or web address]    Initial dose (DO NOT CHANGE): 0.1 units/kg/hr        -- IV Continuous 06/19/23 1503        Hold Oral hypoglycemics while patient is in the hospital.    -Admit to ICU  -Insulin ggt  -Q1hr Accu checks  -Q4hr CMP, Mg, Phos  -LR at 150 cc/hr  -Replace electrolytes for K>4, Mg>2

## 2023-06-19 NOTE — H&P
[facility] - ICU Hospital Medicine  History & Physical    Patient Name: Dorene Husain  MRN: [id number]  Patient Class: IP- Inpatient  Admission Date: 6/19/2023  Attending Physician: Italia Koehler MD   Primary Care Provider: Kumar Ortiz NP         Patient information was obtained from patient and ER records.     Subjective:     Principal Problem:Type 1 diabetes mellitus with ketoacidosis without coma    Chief Complaint:   Chief Complaint   Patient presents with    Back Pain     Lower back pain and vomiting for the past couple days. Patient states she cant keep anything down.        HPI: Patient is a 26 yo female w/ DM1 who presents after 2 days of nausea and vomiting, reporting that she can not keep oral intake down. Patient reports she has not been taking her insulin during this time. Patient currently denies fever, headache, SOB, CP, cough, dysuria or other s/s of infection at this time. Patient reports she does frequently have issues with nausea and vomiting.    Patient to be admitted to ICU for DKA and higher level of care.       Past Medical History:   Diagnosis Date    Diabetes mellitus     Diabetic gastroparesis associated with type 1 diabetes mellitus     DKA (diabetic ketoacidosis)     Hypertension     Non compliance with medical treatment        Past Surgical History:   Procedure Laterality Date    CHOLECYSTECTOMY      ESOPHAGOGASTRODUODENOSCOPY      Multiple    None         Review of patient's allergies indicates:  No Known Allergies    No current facility-administered medications on file prior to encounter.     Current Outpatient Medications on File Prior to Encounter   Medication Sig    BASAGLAR KWIKPEN U-100 INSULIN glargine 100 units/mL (3mL) SubQ pen Inject into the skin.    dicyclomine (BENTYL) 10 MG capsule Take 10 mg by mouth 4 (four) times daily.    hydrOXYzine (ATARAX) 50 MG tablet Take 50 mg by mouth daily as needed.    metoprolol tartrate (LOPRESSOR) 50 MG  tablet Take 50 mg by mouth 2 (two) times daily.    amitriptyline (ELAVIL) 25 MG tablet Take 25 mg by mouth nightly.    [DISCONTINUED] DULoxetine (CYMBALTA) 30 MG capsule Take 1 capsule (30 mg total) by mouth once daily.    [DISCONTINUED] insulin lispro (HUMALOG U-100 INSULIN) 100 unit/mL Crtg Inject into the skin.    [DISCONTINUED] ondansetron (ZOFRAN) 4 MG tablet Take 4 mg by mouth every 6 (six) hours as needed.    [DISCONTINUED] oxyCODONE-acetaminophen (PERCOCET) 5-325 mg per tablet Take 1 tablet by mouth every 4 (four) hours as needed for Pain.    [DISCONTINUED] pantoprazole (PROTONIX) 40 MG tablet Take 1 tablet (40 mg total) by mouth 2 (two) times daily.    [DISCONTINUED] promethazine (PHENERGAN) 25 MG tablet Take 1 tablet (25 mg total) by mouth every 6 (six) hours as needed for Nausea.     Family History       Problem Relation (Age of Onset)    Cancer Father    Diabetes Mother    Heart disease Mother    Hyperlipidemia Mother, Father    Hypertension Mother, Father    Stroke Father          Tobacco Use    Smoking status: Never    Smokeless tobacco: Never   Substance and Sexual Activity    Alcohol use: Never    Drug use: Not Currently     Types: Marijuana    Sexual activity: Yes     Review of Systems   Constitutional:  Negative for chills and fever.   Eyes: Negative.    Respiratory:  Negative for cough and shortness of breath.    Cardiovascular:  Negative for chest pain.   Gastrointestinal:  Positive for nausea and vomiting. Negative for abdominal pain and diarrhea.   Endocrine: Negative.    Genitourinary:  Negative for difficulty urinating.   Musculoskeletal: Negative.    Neurological:  Negative for headaches.   Objective:     Vital Signs (Most Recent):  Temp: 98.8 °F (37.1 °C) (06/19/23 1641)  Pulse: (!) 126 (06/19/23 1745)  Resp: 16 (06/19/23 1745)  BP: (!) 172/81 (06/19/23 1745)  SpO2: 100 % (06/19/23 1745) Vital Signs (24h Range):  Temp:  [98 °F (36.7 °C)-98.8 °F (37.1 °C)] 98.8 °F (37.1  °C)  Pulse:  [116-127] 126  Resp:  [12-60] 16  SpO2:  [99 %-100 %] 100 %  BP: (142-174)/(73-93) 172/81     Weight: 67.1 kg (148 lb)  Body mass index is 28.9 kg/m².     Physical Exam  Vitals and nursing note reviewed.   Constitutional:       General: She is not in acute distress.     Appearance: She is well-developed. She is not diaphoretic.   HENT:      Head: Normocephalic and atraumatic.      Mouth/Throat:      Pharynx: No oropharyngeal exudate.   Eyes:      General: No scleral icterus.     Pupils: Pupils are equal, round, and reactive to light.   Neck:      Vascular: No JVD.   Cardiovascular:      Rate and Rhythm: Normal rate and regular rhythm.      Heart sounds: Normal heart sounds. No murmur heard.    No friction rub.   Pulmonary:      Effort: Pulmonary effort is normal. No respiratory distress.      Breath sounds: Normal breath sounds. No wheezing.   Abdominal:      General: Bowel sounds are normal. There is no distension.      Palpations: Abdomen is soft. There is no mass.      Tenderness: There is no abdominal tenderness.   Musculoskeletal:         General: Normal range of motion.      Cervical back: Normal range of motion and neck supple.   Skin:     General: Skin is warm and dry.      Capillary Refill: Capillary refill takes less than 2 seconds.      Findings: No erythema.   Neurological:      Mental Status: She is alert and oriented to person, place, and time.      Cranial Nerves: No cranial nerve deficit.      Coordination: Coordination normal.            CRANIAL NERVES     CN III, IV, VI   Pupils are equal, round, and reactive to light.     Significant Labs: All pertinent labs within the past 24 hours have been reviewed.    Significant Imaging: I have reviewed all pertinent imaging results/findings within the past 24 hours.    Assessment/Plan:     * Type 1 diabetes mellitus with ketoacidosis without coma  Patient's FSGs are uncontrolled due to hyperglycemia on current medication regimen.  Last A1c  reviewed-   Lab Results   Component Value Date    HGBA1C 11.3 (H) 06/16/2023     Most recent fingerstick glucose reviewed-   Recent Labs   Lab 06/19/23  1443 06/19/23  1559 06/19/23  1658 06/19/23  1800   POCTGLUCOSE >500* 467* 419* 318*     Current correctional scale  Medium  Increase anti-hyperglycemic dose as follows-   Antihyperglycemics (From admission, onward)    Start     Stop Route Frequency Ordered    06/19/23 1515  insulin regular in 0.9 % NaCl 100 unit/100 mL (1 unit/mL) infusion        Question Answer Comment   Insulin Rate Adjustment (DO NOT MODIFY ANSWER) \\[email or web address]\INSULIN ADJUSTMENT DKA version [email or web address]    Initial dose (DO NOT CHANGE): 0.1 units/kg/hr        -- IV Continuous 06/19/23 1503        Hold Oral hypoglycemics while patient is in the hospital.    -Admit to ICU  -Insulin ggt  -Q1hr Accu checks  -Q4hr CMP, Mg, Phos  -LR at 150 cc/hr  -Replace electrolytes for K>4, Mg>2     Noncompliance  See principle problem        VTE Risk Mitigation (From admission, onward)         Ordered     Place sequential compression device  Until discontinued         06/19/23 1434     IP VTE LOW RISK PATIENT  Once         06/19/23 1434                           Italia Koehler MD  Department of Hospital Medicine  [facility] - ICU

## 2023-06-20 LAB
ALBUMIN SERPL-MCNC: 2.7 G/DL (ref 3.5–5)
ALBUMIN SERPL-MCNC: 2.7 G/DL (ref 3.5–5)
ALBUMIN/GLOB SERPL: 0.8 RATIO (ref 1.1–2)
ALBUMIN/GLOB SERPL: 0.8 RATIO (ref 1.1–2)
ALP SERPL-CCNC: 95 UNIT/L (ref 40–150)
ALP SERPL-CCNC: 99 UNIT/L (ref 40–150)
ALT SERPL-CCNC: 11 UNIT/L (ref 0–55)
ALT SERPL-CCNC: 9 UNIT/L (ref 0–55)
AST SERPL-CCNC: 20 UNIT/L (ref 5–34)
AST SERPL-CCNC: 23 UNIT/L (ref 5–34)
BASOPHILS # BLD AUTO: 0.05 X10(3)/MCL
BASOPHILS NFR BLD AUTO: 0.2 %
BILIRUBIN DIRECT+TOT PNL SERPL-MCNC: 0.9 MG/DL
BILIRUBIN DIRECT+TOT PNL SERPL-MCNC: 0.9 MG/DL
BUN SERPL-MCNC: 32 MG/DL (ref 7–18.7)
BUN SERPL-MCNC: 38 MG/DL (ref 7–18.7)
CALCIUM SERPL-MCNC: 8.4 MG/DL (ref 8.4–10.2)
CALCIUM SERPL-MCNC: 8.4 MG/DL (ref 8.4–10.2)
CHLORIDE SERPL-SCNC: 95 MMOL/L (ref 98–107)
CHLORIDE SERPL-SCNC: 96 MMOL/L (ref 98–107)
CO2 SERPL-SCNC: 23 MMOL/L (ref 22–29)
CO2 SERPL-SCNC: 26 MMOL/L (ref 22–29)
CREAT SERPL-MCNC: 1.15 MG/DL (ref 0.55–1.02)
CREAT SERPL-MCNC: 1.32 MG/DL (ref 0.55–1.02)
EOSINOPHIL # BLD AUTO: 0.02 X10(3)/MCL (ref 0–0.9)
EOSINOPHIL NFR BLD AUTO: 0.1 %
ERYTHROCYTE [DISTWIDTH] IN BLOOD BY AUTOMATED COUNT: 12.3 % (ref 11.5–17)
GFR SERPLBLD CREATININE-BSD FMLA CKD-EPI: 57 MLS/MIN/1.73/M2
GFR SERPLBLD CREATININE-BSD FMLA CKD-EPI: >60 MLS/MIN/1.73/M2
GLOBULIN SER-MCNC: 3.3 GM/DL (ref 2.4–3.5)
GLOBULIN SER-MCNC: 3.4 GM/DL (ref 2.4–3.5)
GLUCOSE SERPL-MCNC: 149 MG/DL (ref 74–100)
GLUCOSE SERPL-MCNC: 156 MG/DL (ref 74–100)
HCT VFR BLD AUTO: 32.1 % (ref 37–47)
HGB BLD-MCNC: 10.7 G/DL (ref 12–16)
IMM GRANULOCYTES # BLD AUTO: 0.14 X10(3)/MCL (ref 0–0.04)
IMM GRANULOCYTES NFR BLD AUTO: 0.7 %
LYMPHOCYTES # BLD AUTO: 2.25 X10(3)/MCL (ref 0.6–4.6)
LYMPHOCYTES NFR BLD AUTO: 10.7 %
MAGNESIUM SERPL-MCNC: 2.2 MG/DL (ref 1.6–2.6)
MAGNESIUM SERPL-MCNC: 2.3 MG/DL (ref 1.6–2.6)
MCH RBC QN AUTO: 27.5 PG (ref 27–31)
MCHC RBC AUTO-ENTMCNC: 33.3 G/DL (ref 33–36)
MCV RBC AUTO: 82.5 FL (ref 80–94)
MONOCYTES # BLD AUTO: 2.72 X10(3)/MCL (ref 0.1–1.3)
MONOCYTES NFR BLD AUTO: 12.9 %
NEUTROPHILS # BLD AUTO: 15.91 X10(3)/MCL (ref 2.1–9.2)
NEUTROPHILS NFR BLD AUTO: 75.4 %
PHOSPHATE SERPL-MCNC: 2.7 MG/DL (ref 2.3–4.7)
PHOSPHATE SERPL-MCNC: 3 MG/DL (ref 2.3–4.7)
PLATELET # BLD AUTO: 557 X10(3)/MCL (ref 130–400)
PMV BLD AUTO: 8.8 FL (ref 7.4–10.4)
POCT GLUCOSE: 134 MG/DL (ref 70–110)
POCT GLUCOSE: 135 MG/DL (ref 70–110)
POCT GLUCOSE: 139 MG/DL (ref 70–110)
POCT GLUCOSE: 141 MG/DL (ref 70–110)
POCT GLUCOSE: 148 MG/DL (ref 70–110)
POCT GLUCOSE: 148 MG/DL (ref 70–110)
POCT GLUCOSE: 152 MG/DL (ref 70–110)
POCT GLUCOSE: 152 MG/DL (ref 70–110)
POCT GLUCOSE: 154 MG/DL (ref 70–110)
POCT GLUCOSE: 163 MG/DL (ref 70–110)
POCT GLUCOSE: 169 MG/DL (ref 70–110)
POCT GLUCOSE: 196 MG/DL (ref 70–110)
POCT GLUCOSE: 222 MG/DL (ref 70–110)
POTASSIUM SERPL-SCNC: 3.1 MMOL/L (ref 3.5–5.1)
POTASSIUM SERPL-SCNC: 3.4 MMOL/L (ref 3.5–5.1)
PROT SERPL-MCNC: 6 GM/DL (ref 6.4–8.3)
PROT SERPL-MCNC: 6.1 GM/DL (ref 6.4–8.3)
RBC # BLD AUTO: 3.89 X10(6)/MCL (ref 4.2–5.4)
SODIUM SERPL-SCNC: 129 MMOL/L (ref 136–145)
SODIUM SERPL-SCNC: 131 MMOL/L (ref 136–145)
WBC # SPEC AUTO: 21.09 X10(3)/MCL (ref 4.5–11.5)

## 2023-06-20 PROCEDURE — 84100 ASSAY OF PHOSPHORUS: CPT | Performed by: STUDENT IN AN ORGANIZED HEALTH CARE EDUCATION/TRAINING PROGRAM

## 2023-06-20 PROCEDURE — 94761 N-INVAS EAR/PLS OXIMETRY MLT: CPT

## 2023-06-20 PROCEDURE — 20000000 HC ICU ROOM

## 2023-06-20 PROCEDURE — 83735 ASSAY OF MAGNESIUM: CPT | Performed by: STUDENT IN AN ORGANIZED HEALTH CARE EDUCATION/TRAINING PROGRAM

## 2023-06-20 PROCEDURE — S5010 5% DEXTROSE AND 0.45% SALINE: HCPCS | Performed by: STUDENT IN AN ORGANIZED HEALTH CARE EDUCATION/TRAINING PROGRAM

## 2023-06-20 PROCEDURE — 63600175 PHARM REV CODE 636 W HCPCS: Performed by: INTERNAL MEDICINE

## 2023-06-20 PROCEDURE — 80053 COMPREHEN METABOLIC PANEL: CPT | Performed by: STUDENT IN AN ORGANIZED HEALTH CARE EDUCATION/TRAINING PROGRAM

## 2023-06-20 PROCEDURE — 63600175 PHARM REV CODE 636 W HCPCS: Performed by: STUDENT IN AN ORGANIZED HEALTH CARE EDUCATION/TRAINING PROGRAM

## 2023-06-20 PROCEDURE — 25000003 PHARM REV CODE 250: Performed by: STUDENT IN AN ORGANIZED HEALTH CARE EDUCATION/TRAINING PROGRAM

## 2023-06-20 PROCEDURE — 25000003 PHARM REV CODE 250: Performed by: INTERNAL MEDICINE

## 2023-06-20 PROCEDURE — 85025 COMPLETE CBC W/AUTO DIFF WBC: CPT | Performed by: INTERNAL MEDICINE

## 2023-06-20 RX ORDER — DEXTROSE 40 %
15 GEL (GRAM) ORAL
Status: DISCONTINUED | OUTPATIENT
Start: 2023-06-20 | End: 2023-06-26

## 2023-06-20 RX ORDER — MORPHINE SULFATE 4 MG/ML
4 INJECTION, SOLUTION INTRAMUSCULAR; INTRAVENOUS EVERY 4 HOURS PRN
Status: DISCONTINUED | OUTPATIENT
Start: 2023-06-20 | End: 2023-06-21

## 2023-06-20 RX ORDER — ENOXAPARIN SODIUM 100 MG/ML
30 INJECTION SUBCUTANEOUS EVERY 24 HOURS
Status: DISCONTINUED | OUTPATIENT
Start: 2023-06-20 | End: 2023-06-30 | Stop reason: HOSPADM

## 2023-06-20 RX ORDER — GLUCAGON 1 MG
1 KIT INJECTION
Status: DISCONTINUED | OUTPATIENT
Start: 2023-06-20 | End: 2023-06-25

## 2023-06-20 RX ORDER — POTASSIUM CHLORIDE 7.45 MG/ML
10 INJECTION INTRAVENOUS
Status: COMPLETED | OUTPATIENT
Start: 2023-06-20 | End: 2023-06-20

## 2023-06-20 RX ORDER — METOPROLOL TARTRATE 50 MG/1
50 TABLET ORAL 2 TIMES DAILY
Status: DISCONTINUED | OUTPATIENT
Start: 2023-06-20 | End: 2023-06-30 | Stop reason: HOSPADM

## 2023-06-20 RX ORDER — INSULIN ASPART 100 [IU]/ML
1-10 INJECTION, SOLUTION INTRAVENOUS; SUBCUTANEOUS
Status: DISCONTINUED | OUTPATIENT
Start: 2023-06-20 | End: 2023-06-24

## 2023-06-20 RX ORDER — LABETALOL HYDROCHLORIDE 5 MG/ML
20 INJECTION, SOLUTION INTRAVENOUS EVERY 4 HOURS PRN
Status: DISCONTINUED | OUTPATIENT
Start: 2023-06-20 | End: 2023-06-30 | Stop reason: HOSPADM

## 2023-06-20 RX ORDER — DEXTROSE 40 %
30 GEL (GRAM) ORAL
Status: DISCONTINUED | OUTPATIENT
Start: 2023-06-20 | End: 2023-06-26

## 2023-06-20 RX ORDER — SODIUM CHLORIDE 9 MG/ML
INJECTION, SOLUTION INTRAVENOUS CONTINUOUS
Status: DISCONTINUED | OUTPATIENT
Start: 2023-06-20 | End: 2023-06-22

## 2023-06-20 RX ORDER — HYDRALAZINE HYDROCHLORIDE 20 MG/ML
10 INJECTION INTRAMUSCULAR; INTRAVENOUS EVERY 4 HOURS PRN
Status: DISCONTINUED | OUTPATIENT
Start: 2023-06-20 | End: 2023-06-30 | Stop reason: HOSPADM

## 2023-06-20 RX ADMIN — OXYCODONE HYDROCHLORIDE AND ACETAMINOPHEN 1 TABLET: 5; 325 TABLET ORAL at 01:06

## 2023-06-20 RX ADMIN — SODIUM CHLORIDE: 9 INJECTION, SOLUTION INTRAVENOUS at 11:06

## 2023-06-20 RX ADMIN — INSULIN ASPART 2 UNITS: 100 INJECTION, SOLUTION INTRAVENOUS; SUBCUTANEOUS at 10:06

## 2023-06-20 RX ADMIN — SODIUM CHLORIDE: 9 INJECTION, SOLUTION INTRAVENOUS at 10:06

## 2023-06-20 RX ADMIN — DEXTROSE AND SODIUM CHLORIDE: 5; 450 INJECTION, SOLUTION INTRAVENOUS at 07:06

## 2023-06-20 RX ADMIN — ONDANSETRON 4 MG: 2 INJECTION INTRAMUSCULAR; INTRAVENOUS at 08:06

## 2023-06-20 RX ADMIN — HYDRALAZINE HYDROCHLORIDE 10 MG: 20 INJECTION INTRAMUSCULAR; INTRAVENOUS at 10:06

## 2023-06-20 RX ADMIN — ENOXAPARIN SODIUM 30 MG: 30 INJECTION SUBCUTANEOUS at 04:06

## 2023-06-20 RX ADMIN — METOPROLOL TARTRATE 50 MG: 50 TABLET, FILM COATED ORAL at 08:06

## 2023-06-20 RX ADMIN — POTASSIUM CHLORIDE 10 MEQ: 7.46 INJECTION, SOLUTION INTRAVENOUS at 10:06

## 2023-06-20 RX ADMIN — DICYCLOMINE HYDROCHLORIDE 10 MG: 10 CAPSULE ORAL at 08:06

## 2023-06-20 RX ADMIN — DICYCLOMINE HYDROCHLORIDE 10 MG: 10 CAPSULE ORAL at 04:06

## 2023-06-20 RX ADMIN — MORPHINE SULFATE 4 MG: 4 INJECTION, SOLUTION INTRAMUSCULAR; INTRAVENOUS at 04:06

## 2023-06-20 RX ADMIN — POTASSIUM CHLORIDE 10 MEQ: 7.46 INJECTION, SOLUTION INTRAVENOUS at 12:06

## 2023-06-20 RX ADMIN — MUPIROCIN 1 G: 20 OINTMENT TOPICAL at 08:06

## 2023-06-20 RX ADMIN — ONDANSETRON 4 MG: 2 INJECTION INTRAMUSCULAR; INTRAVENOUS at 01:06

## 2023-06-20 RX ADMIN — MUPIROCIN: 20 OINTMENT TOPICAL at 10:06

## 2023-06-20 RX ADMIN — AMITRIPTYLINE HYDROCHLORIDE 25 MG: 25 TABLET, FILM COATED ORAL at 08:06

## 2023-06-20 RX ADMIN — MORPHINE SULFATE 4 MG: 4 INJECTION, SOLUTION INTRAMUSCULAR; INTRAVENOUS at 01:06

## 2023-06-20 RX ADMIN — MORPHINE SULFATE 4 MG: 4 INJECTION, SOLUTION INTRAMUSCULAR; INTRAVENOUS at 08:06

## 2023-06-20 RX ADMIN — MORPHINE SULFATE 4 MG: 4 INJECTION, SOLUTION INTRAMUSCULAR; INTRAVENOUS at 10:06

## 2023-06-20 RX ADMIN — INSULIN DETEMIR 15 UNITS: 100 INJECTION, SOLUTION SUBCUTANEOUS at 10:06

## 2023-06-20 RX ADMIN — METOPROLOL TARTRATE 50 MG: 50 TABLET, FILM COATED ORAL at 09:06

## 2023-06-20 RX ADMIN — SODIUM CHLORIDE: 9 INJECTION, SOLUTION INTRAVENOUS at 04:06

## 2023-06-20 RX ADMIN — ONDANSETRON 4 MG: 2 INJECTION INTRAMUSCULAR; INTRAVENOUS at 04:06

## 2023-06-20 RX ADMIN — POTASSIUM CHLORIDE 10 MEQ: 7.46 INJECTION, SOLUTION INTRAVENOUS at 01:06

## 2023-06-20 NOTE — PLAN OF CARE
Problem: Adult Inpatient Plan of Care  Goal: Plan of Care Review  Outcome: Ongoing, Progressing  Goal: Patient-Specific Goal (Individualized)  Outcome: Ongoing, Progressing  Goal: Absence of Hospital-Acquired Illness or Injury  Outcome: Ongoing, Progressing  Goal: Optimal Comfort and Wellbeing  Outcome: Ongoing, Progressing  Goal: Readiness for Transition of Care  Outcome: Ongoing, Progressing     Problem: Diabetes Comorbidity  Goal: Blood Glucose Level Within Targeted Range  Outcome: Ongoing, Progressing     Problem: Hypertension Comorbidity  Goal: Blood Pressure in Desired Range  Outcome: Ongoing, Progressing     Problem: Diabetic Ketoacidosis  Goal: Fluid and Electrolyte Balance with Absence of Ketosis  Outcome: Ongoing, Progressing

## 2023-06-20 NOTE — PROGRESS NOTES
"Hospital Medicine  Progress Note    Patient Name: Dorene Husain  MRN: [id number]  Status: IP- Inpatient   Admission Date: 6/19/2023  Length of Stay: 1  Date of Service: 06/20/2023       CC: hospital follow-up for DKA       SUBJECTIVE   26 yo female with a history that includes IDDM I, presented to ED 6/19 after 2 days of nausea and vomiting, reporting that she could not keep oral intake down.  Patient reports she has not been taking her insulin during this time.  Patient denied fever, headache, SOB, CP, cough, dysuria or other s/s of infection at this time.  Work up in ED consistent with DKA, and she was admitted to ICU for DKA protocol.    Today: Patient seen and examined at bedside, and chart reviewed. Gap has closed.  Patient main complaint today is "back pain".        MEDICATIONS   Scheduled   amitriptyline  25 mg Oral Nightly    dicyclomine  10 mg Oral QID    DULoxetine  30 mg Oral Daily    insulin detemir U-100  15 Units Subcutaneous Daily    metoprolol tartrate  50 mg Oral BID    mupirocin   Nasal BID    pantoprazole  40 mg Oral Daily    potassium chloride  10 mEq Intravenous Q1H     Continuous Infusions   sodium chloride 0.9% 150 mL/hr at 06/20/23 1001         PHYSICAL EXAM   VITALS: T 97.9 °F (36.6 °C)   BP (!) 156/122   P (!) 116   RR (!) 26   O2 96 %    GENERAL: Awake and in NAD  LUNGS: CTA anteriorly  CVS: Normal rate  GI/: Soft, NT, bowel sounds positive.  EXTREMITIES: No peripheral edema  NEURO: AAOx3  PSYCH: Cooperative      LABS   CBC  Recent Labs     06/19/23  1405 06/20/23  0702   WBC 23.56* 21.09*   RBC 4.35 3.89*   HGB 11.9* 10.7*   HCT 39.9 32.1*   MCV 91.7 82.5   MCH 27.4 27.5   MCHC 29.8* 33.3   RDW 12.2 12.3   * 557*     CHEM  Recent Labs     06/20/23  0418 06/20/23  0701   * 129*   K 3.1* 3.4*   CHLORIDE 95* 96*   CO2 26 23   BUN 38.0* 32.0*   CREATININE 1.32* 1.15*   GLUCOSE 156* 149*   CALCIUM 8.4 8.4   MG 2.20 2.30   PHOS 3.0 2.7   ALBUMIN 2.7* 2.7*   GLOBULIN 3.4 3.3   ALKPHOS " 99 95   ALT 9 11   AST 20 23   BILITOT 0.9 0.9         ASSESSMENT   IDDM I, uncontrolled with DKA  CHATA s/t IVVD, on CKD II/IIIA  Medication noncompliance  Acute Hyponatremia  Hypokalemia    PLAN   Can transition to SC insulin, d/c insulin infusion  Start diabetic clears  Continue with analgesics and antiemetics as needed  Switch fluids to NS @150cc/hr, give 40 KCL IV  Discontinue BMP Q4hrs, repeat labs tomorrow AM  If stable possible downgrade later today          Prophylaxis: SC Lovenox        Bao Vuong MD  [facility]      Critical Care Time: 35 minutes spent in direct hands on care, review of labs, imaging and medical record, and discussion of diagnosis, treatment, prognosis with patient/family.  Patient remains at high-risk for clinical decompensation  Critical Care diagnosis: DKA

## 2023-06-20 NOTE — PROGRESS NOTES
Inpatient Nutrition Evaluation    Admit Date: 6/19/2023   Total duration of encounter: 1 day    Nutrition Recommendation/Prescription     Rec'd continue Diabetic, CL diet as tolerated and when medically appropriate, advance to GI Soft, Diabetic as tolerated.   Boost Glucose Control, BID, when able to advance diet beyond CL. Provides 190 kcal, 16 g protein per serving.  Continue to encourage Diet and medication compliance.   Monitor diet advancement, intake, tolerance, weight, and labs.     RD following and available as needed. Thank you.     Nutrition Assessment     Chart Review    Reason Seen: continuous nutrition monitoring    Malnutrition Screening Tool Results   Have you recently lost weight without trying?: No  Have you been eating poorly because of a decreased appetite?: No   MST Score: 0     Diagnosis:  Type 1 diabetes mellitus with ketoacidosis without coma.  Noncompliance with diet and medicine.     Relevant Medical History:   Diabetes mellitus      Diabetic gastroparesis associated with type 1 diabetes mellitus      DKA (diabetic ketoacidosis)      Hypertension      Non compliance with medical treatment          Nutrition-Related Medications:   IV Fluids: 0.9%NaCl@150mL/hr.   Lovenox; Insuin; Protonix; Kcl.     Nutrition-Related Labs:  6/20: WBC 21.09(H); H/H 10.7/32.1(L); Na+ 129(L); K+ 3.4(L); Cl 96(L); BUN/Crea 32/1.15(H); GFR WNL; (H); Alb 2.7(L).    6/16: HgbA1C 11.3(HIGH).     Diet Order: Diet clear liquid Diabetic  Oral Supplement Order: none  Appetite/Oral Intake: not applicable/not applicable New admit. No recorded intake yet.   Factors Affecting Nutritional Intake: abdominal pain, nausea, and Non compliance.   Food/Confucianist/Cultural Preferences: none reported  Food Allergies: none reported       Wound(s):       Comments    6/20: Pt with vast history of noncompliance with diet and medical treatment. Known to hospital for frequent visits with DKA. Staff continues to educate pt on importance  of compliance with diet and medicine and will continue to emphasize the importance of compliance. Last HgbA1C 11.3 on 6/16/2023. Labs and meds reviewed. No recent weight loss noted/reported. Will continue to monitor during stay.     Anthropometrics    Height: 5' (152.4 cm) Height Method: Measured  Last Weight: 67.1 kg (148 lb) (06/19/23 1317) Weight Method: Standard Scale  BMI (Calculated): 28.9  BMI Classification: overweight (BMI 25-29.9)     Ideal Body Weight (IBW), Female: 100 lb     % Ideal Body Weight, Female (lb): 148 %                             Usual Weight Provided By: EMR weight history    Wt Readings from Last 5 Encounters:   06/19/23 67.1 kg (148 lb)   06/17/23 66.2 kg (146 lb)   06/16/23 66.2 kg (146 lb)   03/28/23 56 kg (123 lb 6.4 oz)   03/09/23 57.6 kg (127 lb)     Weight Change(s) Since Admission:  Admit Weight: 67.1 kg (148 lb) (06/19/23 1317)      Patient Education    Not applicable.    Monitoring & Evaluation     Dietitian will monitor food and beverage intake, energy intake, weight, weight change, electrolyte/renal panel, glucose/endocrine profile, and gastrointestinal profile.  Nutrition Risk/Follow-Up: low (follow-up in 5-7 days)  Patients assigned 'low nutrition risk' status do not qualify for a full nutritional assessment but will be monitored and re-evaluated in a 5-7 day time period. Please consult if re-evaluation needed sooner.

## 2023-06-20 NOTE — NURSING
Initial order for placement of midline, unable to thread midline. Pt with one peripheral access and order changed to PIV. 2.25 inch 20g PIV placed to R forearm using USG, good blood return, flushes easily

## 2023-06-21 LAB
ANION GAP SERPL CALC-SCNC: 5 MEQ/L
BASOPHILS # BLD AUTO: 0.04 X10(3)/MCL
BASOPHILS NFR BLD AUTO: 0.4 %
BUN SERPL-MCNC: 18 MG/DL (ref 7–18.7)
CALCIUM SERPL-MCNC: 8 MG/DL (ref 8.4–10.2)
CHLORIDE SERPL-SCNC: 102 MMOL/L (ref 98–107)
CO2 SERPL-SCNC: 24 MMOL/L (ref 22–29)
CREAT SERPL-MCNC: 0.94 MG/DL (ref 0.55–1.02)
CREAT/UREA NIT SERPL: 19
EOSINOPHIL # BLD AUTO: 0.08 X10(3)/MCL (ref 0–0.9)
EOSINOPHIL NFR BLD AUTO: 0.8 %
ERYTHROCYTE [DISTWIDTH] IN BLOOD BY AUTOMATED COUNT: 12.6 % (ref 11.5–17)
GFR SERPLBLD CREATININE-BSD FMLA CKD-EPI: >60 MLS/MIN/1.73/M2
GLUCOSE SERPL-MCNC: 220 MG/DL (ref 74–100)
HCT VFR BLD AUTO: 29.7 % (ref 37–47)
HGB BLD-MCNC: 9.5 G/DL (ref 12–16)
IMM GRANULOCYTES # BLD AUTO: 0.05 X10(3)/MCL (ref 0–0.04)
IMM GRANULOCYTES NFR BLD AUTO: 0.5 %
LYMPHOCYTES # BLD AUTO: 2.77 X10(3)/MCL (ref 0.6–4.6)
LYMPHOCYTES NFR BLD AUTO: 26.4 %
MAGNESIUM SERPL-MCNC: 2 MG/DL (ref 1.6–2.6)
MCH RBC QN AUTO: 27.2 PG (ref 27–31)
MCHC RBC AUTO-ENTMCNC: 32 G/DL (ref 33–36)
MCV RBC AUTO: 85.1 FL (ref 80–94)
MONOCYTES # BLD AUTO: 1.22 X10(3)/MCL (ref 0.1–1.3)
MONOCYTES NFR BLD AUTO: 11.6 %
NEUTROPHILS # BLD AUTO: 6.35 X10(3)/MCL (ref 2.1–9.2)
NEUTROPHILS NFR BLD AUTO: 60.3 %
PLATELET # BLD AUTO: 467 X10(3)/MCL (ref 130–400)
PMV BLD AUTO: 9 FL (ref 7.4–10.4)
POCT GLUCOSE: 195 MG/DL (ref 70–110)
POCT GLUCOSE: 65 MG/DL (ref 70–110)
POCT GLUCOSE: 82 MG/DL (ref 70–110)
POCT GLUCOSE: 95 MG/DL (ref 70–110)
POTASSIUM SERPL-SCNC: 3.7 MMOL/L (ref 3.5–5.1)
RBC # BLD AUTO: 3.49 X10(6)/MCL (ref 4.2–5.4)
SODIUM SERPL-SCNC: 131 MMOL/L (ref 136–145)
WBC # SPEC AUTO: 10.51 X10(3)/MCL (ref 4.5–11.5)

## 2023-06-21 PROCEDURE — 63600175 PHARM REV CODE 636 W HCPCS: Performed by: INTERNAL MEDICINE

## 2023-06-21 PROCEDURE — 11000001 HC ACUTE MED/SURG PRIVATE ROOM

## 2023-06-21 PROCEDURE — 94761 N-INVAS EAR/PLS OXIMETRY MLT: CPT

## 2023-06-21 PROCEDURE — 85025 COMPLETE CBC W/AUTO DIFF WBC: CPT | Performed by: INTERNAL MEDICINE

## 2023-06-21 PROCEDURE — 25000003 PHARM REV CODE 250: Performed by: INTERNAL MEDICINE

## 2023-06-21 PROCEDURE — 63600175 PHARM REV CODE 636 W HCPCS: Performed by: STUDENT IN AN ORGANIZED HEALTH CARE EDUCATION/TRAINING PROGRAM

## 2023-06-21 PROCEDURE — 25000003 PHARM REV CODE 250: Performed by: STUDENT IN AN ORGANIZED HEALTH CARE EDUCATION/TRAINING PROGRAM

## 2023-06-21 PROCEDURE — 80048 BASIC METABOLIC PNL TOTAL CA: CPT | Performed by: INTERNAL MEDICINE

## 2023-06-21 PROCEDURE — 83735 ASSAY OF MAGNESIUM: CPT | Performed by: INTERNAL MEDICINE

## 2023-06-21 PROCEDURE — 21400001 HC TELEMETRY ROOM

## 2023-06-21 RX ORDER — HYDROCODONE BITARTRATE AND ACETAMINOPHEN 5; 325 MG/1; MG/1
1 TABLET ORAL EVERY 6 HOURS PRN
Status: DISCONTINUED | OUTPATIENT
Start: 2023-06-21 | End: 2023-06-22

## 2023-06-21 RX ORDER — MORPHINE SULFATE 4 MG/ML
4 INJECTION, SOLUTION INTRAMUSCULAR; INTRAVENOUS EVERY 8 HOURS PRN
Status: DISCONTINUED | OUTPATIENT
Start: 2023-06-21 | End: 2023-06-22

## 2023-06-21 RX ADMIN — DICYCLOMINE HYDROCHLORIDE 10 MG: 10 CAPSULE ORAL at 02:06

## 2023-06-21 RX ADMIN — MUPIROCIN: 20 OINTMENT TOPICAL at 08:06

## 2023-06-21 RX ADMIN — HYDROCODONE BITARTRATE AND ACETAMINOPHEN 1 TABLET: 5; 325 TABLET ORAL at 08:06

## 2023-06-21 RX ADMIN — METOPROLOL TARTRATE 50 MG: 50 TABLET, FILM COATED ORAL at 08:06

## 2023-06-21 RX ADMIN — INSULIN ASPART 2 UNITS: 100 INJECTION, SOLUTION INTRAVENOUS; SUBCUTANEOUS at 11:06

## 2023-06-21 RX ADMIN — ENOXAPARIN SODIUM 30 MG: 30 INJECTION SUBCUTANEOUS at 04:06

## 2023-06-21 RX ADMIN — DULOXETINE 30 MG: 30 CAPSULE, DELAYED RELEASE ORAL at 08:06

## 2023-06-21 RX ADMIN — PANTOPRAZOLE SODIUM 40 MG: 40 TABLET, DELAYED RELEASE ORAL at 08:06

## 2023-06-21 RX ADMIN — INSULIN ASPART 4 UNITS: 100 INJECTION, SOLUTION INTRAVENOUS; SUBCUTANEOUS at 06:06

## 2023-06-21 RX ADMIN — AMITRIPTYLINE HYDROCHLORIDE 25 MG: 25 TABLET, FILM COATED ORAL at 08:06

## 2023-06-21 RX ADMIN — DICYCLOMINE HYDROCHLORIDE 10 MG: 10 CAPSULE ORAL at 08:06

## 2023-06-21 RX ADMIN — INSULIN DETEMIR 15 UNITS: 100 INJECTION, SOLUTION SUBCUTANEOUS at 08:06

## 2023-06-21 RX ADMIN — HYDROCODONE BITARTRATE AND ACETAMINOPHEN 1 TABLET: 5; 325 TABLET ORAL at 02:06

## 2023-06-21 RX ADMIN — DICYCLOMINE HYDROCHLORIDE 10 MG: 10 CAPSULE ORAL at 04:06

## 2023-06-21 RX ADMIN — MORPHINE SULFATE 4 MG: 4 INJECTION, SOLUTION INTRAMUSCULAR; INTRAVENOUS at 01:06

## 2023-06-21 RX ADMIN — MORPHINE SULFATE 4 MG: 4 INJECTION, SOLUTION INTRAMUSCULAR; INTRAVENOUS at 04:06

## 2023-06-21 RX ADMIN — ONDANSETRON 4 MG: 2 INJECTION INTRAMUSCULAR; INTRAVENOUS at 01:06

## 2023-06-21 RX ADMIN — MORPHINE SULFATE 4 MG: 4 INJECTION, SOLUTION INTRAMUSCULAR; INTRAVENOUS at 08:06

## 2023-06-21 RX ADMIN — SODIUM CHLORIDE: 9 INJECTION, SOLUTION INTRAVENOUS at 04:06

## 2023-06-21 RX ADMIN — SODIUM CHLORIDE: 9 INJECTION, SOLUTION INTRAVENOUS at 06:06

## 2023-06-21 NOTE — PLAN OF CARE
06/21/23 1352   Discharge Assessment   Assessment Type Discharge Planning Assessment   Source of Information health record;patient   People in Home parent(s)   Do you expect to return to your current living situation? Yes   Do you have help at home or someone to help you manage your care at home? No   Prior to hospitilization cognitive status: Alert/Oriented;No Deficits   Current cognitive status: Alert/Oriented;No Deficits   Equipment Currently Used at Home glucometer   Do you currently have service(s) that help you manage your care at home? No   Do you take prescription medications? Yes   Do you have prescription coverage? Yes   Do you have any problems affording any of your prescribed medications? No   Is the patient taking medications as prescribed? yes   Who is going to help you get home at discharge? parents   How do you get to doctors appointments? family or friend will provide;car, drives self   Are you on dialysis? No   Do you take coumadin? No   Discharge Plan A Home with family   Discharge Plan B Home with family   DME Needed Upon Discharge  none   Discharge Plan discussed with: Patient   Transition of Care Barriers None   Physical Activity   On average, how many days per week do you engage in moderate to strenuous exercise (like a brisk walk)? Patient refu   On average, how many minutes do you engage in exercise at this level? Patient refu   Financial Resource Strain   How hard is it for you to pay for the very basics like food, housing, medical care, and heating? Patient refu   Housing Stability   In the last 12 months, was there a time when you were not able to pay the mortgage or rent on time? SC   In the last 12 months, was there a time when you did not have a steady place to sleep or slept in a shelter (including now)? SC   Transportation Needs   In the past 12 months, has lack of transportation kept you from medical appointments or from getting medications? Patient refu   In the past 12 months,  has lack of transportation kept you from meetings, work, or from getting things needed for daily living? Patient refu   Food Insecurity   Within the past 12 months, you worried that your food would run out before you got the money to buy more. Patient refu   Within the past 12 months, the food you bought just didn't last and you didn't have money to get more. Patient refu   Stress   Do you feel stress - tense, restless, nervous, or anxious, or unable to sleep at night because your mind is troubled all the time - these days? Patient refu   Social Connections   In a typical week, how many times do you talk on the phone with family, friends, or neighbors? Patient refu   How often do you get together with friends or relatives? Patient refu   How often do you attend Islam or Congregation services? Patient refu   Do you belong to any clubs or organizations such as Islam groups, unions, fraternal or athletic groups, or school groups? Patient refu   How often do you attend meetings of the clubs or organizations you belong to? Patient refu   Are you , , , , never , or living with a partner? Patient refu   Alcohol Use   Q1: How often do you have a drink containing alcohol? Patient refu   Q2: How many drinks containing alcohol do you have on a typical day when you are drinking? Patient refu   Q3: How often do you have six or more drinks on one occasion? Patient refu

## 2023-06-21 NOTE — PROGRESS NOTES
Hospital Medicine  Progress Note    Patient Name: Dorene Husain  MRN: [id number]  Status: IP- Inpatient   Admission Date: 6/19/2023  Length of Stay: 2  Date of Service: 06/21/2023       CC: hospital follow-up for DKA       SUBJECTIVE   28 yo female with a history that includes IDDM I, presented to ED 6/19 after 2 days of nausea and vomiting, reporting that she could not keep oral intake down.  Patient reports she has not been taking her insulin during this time.  Patient denied fever, headache, SOB, CP, cough, dysuria or other s/s of infection at this time.  Work up in ED consistent with DKA, and she was admitted to ICU for DKA protocol. Gap closed, patient transitioned to SC insulin, diet started.    Today: Patient seen and examined at bedside, and chart reviewed. Still complains of pain, but otherwise stable.  Sugars mostly 130s-170s, but patient not eating much as of yet.  Only now asking to eat.      MEDICATIONS   Scheduled   amitriptyline  25 mg Oral Nightly    dicyclomine  10 mg Oral QID    DULoxetine  30 mg Oral Daily    enoxaparin  30 mg Subcutaneous Daily    insulin detemir U-100  15 Units Subcutaneous Daily    metoprolol tartrate  50 mg Oral BID    mupirocin   Nasal BID    pantoprazole  40 mg Oral Daily     Continuous Infusions   sodium chloride 0.9% 150 mL/hr at 06/21/23 0606         PHYSICAL EXAM   VITALS: T 97.7 °F (36.5 °C)   BP (!) 152/98   P 81   RR 13   O2 100 %    GENERAL: Awake and in NAD  LUNGS: CTA anteriorly  CVS: Normal rate  GI/: Soft, NT, bowel sounds positive.  EXTREMITIES: No peripheral edema  NEURO: AAOx3  PSYCH: Cooperative      LABS   CBC  Recent Labs     06/20/23  0702 06/21/23  0425   WBC 21.09* 10.51   RBC 3.89* 3.49*   HGB 10.7* 9.5*   HCT 32.1* 29.7*   MCV 82.5 85.1   MCH 27.5 27.2   MCHC 33.3 32.0*   RDW 12.3 12.6   * 467*       CHEM  Recent Labs     06/20/23  0418 06/20/23  0701 06/21/23  0425   * 129* 131*   K 3.1* 3.4* 3.7   CHLORIDE 95* 96* 102   CO2 26 23 24   BUN  38.0* 32.0* 18.0   CREATININE 1.32* 1.15* 0.94   GLUCOSE 156* 149* 220*   CALCIUM 8.4 8.4 8.0*   MG 2.20 2.30 2.00   PHOS 3.0 2.7  --    ALBUMIN 2.7* 2.7*  --    GLOBULIN 3.4 3.3  --    ALKPHOS 99 95  --    ALT 9 11  --    AST 20 23  --    BILITOT 0.9 0.9  --            ASSESSMENT   IDDM I, uncontrolled with DKA  CHATA s/t IVVD, on CKD II/IIIA  Medication noncompliance  Acute Hyponatremia  Hypokalemia    PLAN   Continue current insulin regimen  Continue clears, advance as tolerated  Continue with analgesics and antiemetics as needed  Continue gentle IV hydration  Can downgrade to floor today      Prophylaxis: SC Lovenotameka Vuong MD  [facility]

## 2023-06-22 LAB
ANION GAP SERPL CALC-SCNC: 7 MEQ/L
BUN SERPL-MCNC: 13 MG/DL (ref 7–18.7)
CALCIUM SERPL-MCNC: 7.9 MG/DL (ref 8.4–10.2)
CHLORIDE SERPL-SCNC: 105 MMOL/L (ref 98–107)
CO2 SERPL-SCNC: 20 MMOL/L (ref 22–29)
CREAT SERPL-MCNC: 0.84 MG/DL (ref 0.55–1.02)
CREAT/UREA NIT SERPL: 15
GFR SERPLBLD CREATININE-BSD FMLA CKD-EPI: >60 MLS/MIN/1.73/M2
GLUCOSE SERPL-MCNC: 193 MG/DL (ref 74–100)
POCT GLUCOSE: 126 MG/DL (ref 70–110)
POCT GLUCOSE: 191 MG/DL (ref 70–110)
POCT GLUCOSE: 281 MG/DL (ref 70–110)
POCT GLUCOSE: 77 MG/DL (ref 70–110)
POTASSIUM SERPL-SCNC: 3.8 MMOL/L (ref 3.5–5.1)
SODIUM SERPL-SCNC: 132 MMOL/L (ref 136–145)

## 2023-06-22 PROCEDURE — 25000003 PHARM REV CODE 250: Performed by: STUDENT IN AN ORGANIZED HEALTH CARE EDUCATION/TRAINING PROGRAM

## 2023-06-22 PROCEDURE — 25000003 PHARM REV CODE 250: Performed by: INTERNAL MEDICINE

## 2023-06-22 PROCEDURE — 94761 N-INVAS EAR/PLS OXIMETRY MLT: CPT

## 2023-06-22 PROCEDURE — 63600175 PHARM REV CODE 636 W HCPCS: Performed by: STUDENT IN AN ORGANIZED HEALTH CARE EDUCATION/TRAINING PROGRAM

## 2023-06-22 PROCEDURE — 21400001 HC TELEMETRY ROOM

## 2023-06-22 PROCEDURE — 80048 BASIC METABOLIC PNL TOTAL CA: CPT | Performed by: INTERNAL MEDICINE

## 2023-06-22 PROCEDURE — 11000001 HC ACUTE MED/SURG PRIVATE ROOM

## 2023-06-22 PROCEDURE — 63600175 PHARM REV CODE 636 W HCPCS: Performed by: INTERNAL MEDICINE

## 2023-06-22 RX ORDER — HYDROCODONE BITARTRATE AND ACETAMINOPHEN 10; 325 MG/1; MG/1
1 TABLET ORAL EVERY 6 HOURS PRN
Status: DISCONTINUED | OUTPATIENT
Start: 2023-06-22 | End: 2023-06-25

## 2023-06-22 RX ADMIN — SODIUM CHLORIDE: 9 INJECTION, SOLUTION INTRAVENOUS at 02:06

## 2023-06-22 RX ADMIN — DICYCLOMINE HYDROCHLORIDE 10 MG: 10 CAPSULE ORAL at 10:06

## 2023-06-22 RX ADMIN — INSULIN ASPART 2 UNITS: 100 INJECTION, SOLUTION INTRAVENOUS; SUBCUTANEOUS at 07:06

## 2023-06-22 RX ADMIN — ONDANSETRON 4 MG: 2 INJECTION INTRAMUSCULAR; INTRAVENOUS at 11:06

## 2023-06-22 RX ADMIN — HYDROCODONE BITARTRATE AND ACETAMINOPHEN 1 TABLET: 10; 325 TABLET ORAL at 11:06

## 2023-06-22 RX ADMIN — METOPROLOL TARTRATE 50 MG: 50 TABLET, FILM COATED ORAL at 10:06

## 2023-06-22 RX ADMIN — DULOXETINE 30 MG: 30 CAPSULE, DELAYED RELEASE ORAL at 09:06

## 2023-06-22 RX ADMIN — INSULIN ASPART 6 UNITS: 100 INJECTION, SOLUTION INTRAVENOUS; SUBCUTANEOUS at 11:06

## 2023-06-22 RX ADMIN — MUPIROCIN 1 G: 20 OINTMENT TOPICAL at 09:06

## 2023-06-22 RX ADMIN — MORPHINE SULFATE 4 MG: 4 INJECTION, SOLUTION INTRAMUSCULAR; INTRAVENOUS at 02:06

## 2023-06-22 RX ADMIN — INSULIN ASPART 3 UNITS: 100 INJECTION, SOLUTION INTRAVENOUS; SUBCUTANEOUS at 10:06

## 2023-06-22 RX ADMIN — PANTOPRAZOLE SODIUM 40 MG: 40 TABLET, DELAYED RELEASE ORAL at 09:06

## 2023-06-22 RX ADMIN — AMITRIPTYLINE HYDROCHLORIDE 25 MG: 25 TABLET, FILM COATED ORAL at 10:06

## 2023-06-22 RX ADMIN — DICYCLOMINE HYDROCHLORIDE 10 MG: 10 CAPSULE ORAL at 09:06

## 2023-06-22 RX ADMIN — DICYCLOMINE HYDROCHLORIDE 10 MG: 10 CAPSULE ORAL at 04:06

## 2023-06-22 RX ADMIN — ENOXAPARIN SODIUM 30 MG: 30 INJECTION SUBCUTANEOUS at 04:06

## 2023-06-22 RX ADMIN — MUPIROCIN 1 G: 20 OINTMENT TOPICAL at 10:06

## 2023-06-22 RX ADMIN — HYDROCODONE BITARTRATE AND ACETAMINOPHEN 1 TABLET: 10; 325 TABLET ORAL at 04:06

## 2023-06-22 RX ADMIN — SODIUM CHLORIDE: 9 INJECTION, SOLUTION INTRAVENOUS at 09:06

## 2023-06-22 RX ADMIN — DICYCLOMINE HYDROCHLORIDE 10 MG: 10 CAPSULE ORAL at 01:06

## 2023-06-22 RX ADMIN — METOPROLOL TARTRATE 50 MG: 50 TABLET, FILM COATED ORAL at 09:06

## 2023-06-22 RX ADMIN — MORPHINE SULFATE 4 MG: 4 INJECTION, SOLUTION INTRAMUSCULAR; INTRAVENOUS at 09:06

## 2023-06-22 NOTE — PLAN OF CARE
Pt is alert and orient x 4 at the time of assessment. Pain is controlled with current regimen. Pt's cbg is stable at this time. Pt denies any nausea. Will continue to monitor

## 2023-06-22 NOTE — PROGRESS NOTES
Hospital Medicine  Progress Note    Patient Name: Dorene Husain  MRN: [id number]  Status: IP- Inpatient   Admission Date: 6/19/2023  Length of Stay: 3  Date of Service: 06/22/2023       CC: hospital follow-up for DKA       SUBJECTIVE   28 yo female with a history that includes IDDM I, presented to ED 6/19 after 2 days of nausea and vomiting, reporting that she could not keep oral intake down.  Patient reports she has not been taking her insulin during this time.  Patient denied fever, headache, SOB, CP, cough, dysuria or other s/s of infection at this time.  Work up in ED consistent with DKA, and she was admitted to ICU for DKA protocol. Gap closed, patient transitioned to SC insulin, diet started.    Today: Patient seen and examined at bedside, and chart reviewed. No nausea, vomiting today.  Asking for diet advancement.      MEDICATIONS   Scheduled   amitriptyline  25 mg Oral Nightly    dicyclomine  10 mg Oral QID    DULoxetine  30 mg Oral Daily    enoxaparin  30 mg Subcutaneous Daily    insulin detemir U-100  15 Units Subcutaneous Daily    metoprolol tartrate  50 mg Oral BID    mupirocin   Nasal BID    pantoprazole  40 mg Oral Daily     Continuous Infusions   sodium chloride 0.9% 100 mL/hr at 06/22/23 0907         PHYSICAL EXAM   VITALS: T 98.4 °F (36.9 °C)   BP (!) 140/83   P 80   RR 18   O2 100 %    GENERAL: Awake and in NAD  LUNGS: CTA anteriorly  CVS: Normal rate  GI/: Soft, NT, bowel sounds positive.  EXTREMITIES: No peripheral edema  NEURO: AAOx3  PSYCH: Cooperative      LABS   CBC  Recent Labs     06/20/23  0702 06/21/23  0425   WBC 21.09* 10.51   RBC 3.89* 3.49*   HGB 10.7* 9.5*   HCT 32.1* 29.7*   MCV 82.5 85.1   MCH 27.5 27.2   MCHC 33.3 32.0*   RDW 12.3 12.6   * 467*     CHEM  Recent Labs     06/20/23  0418 06/20/23  0701 06/21/23  0425 06/22/23  0416   * 129* 131* 132*   K 3.1* 3.4* 3.7 3.8   CHLORIDE 95* 96* 102 105   CO2 26 23 24 20*   BUN 38.0* 32.0* 18.0 13.0   CREATININE 1.32* 1.15*  0.94 0.84   GLUCOSE 156* 149* 220* 193*   CALCIUM 8.4 8.4 8.0* 7.9*   MG 2.20 2.30 2.00  --    PHOS 3.0 2.7  --   --    ALBUMIN 2.7* 2.7*  --   --    GLOBULIN 3.4 3.3  --   --    ALKPHOS 99 95  --   --    ALT 9 11  --   --    AST 20 23  --   --    BILITOT 0.9 0.9  --   --          ASSESSMENT   IDDM I, uncontrolled with DKA  CHATA s/t IVVD, on CKD II/IIIA  Medication noncompliance  Acute Hyponatremia  Hypokalemia    PLAN   Continue current insulin regimen  Continue ISS coverage and CBG monitoring  Can advance diet to soft  Transition from IV narcotics  Antiemetics as needed  Can discontinue IV fluids      Prophylaxis: SC Lovenox        Bao Vuong MD  [facility]

## 2023-06-23 LAB
ANION GAP SERPL CALC-SCNC: 22 MEQ/L
BUN SERPL-MCNC: 15 MG/DL (ref 7–18.7)
CALCIUM SERPL-MCNC: 9 MG/DL (ref 8.4–10.2)
CHLORIDE SERPL-SCNC: 96 MMOL/L (ref 98–107)
CO2 SERPL-SCNC: 14 MMOL/L (ref 22–29)
CREAT SERPL-MCNC: 1.03 MG/DL (ref 0.55–1.02)
CREAT/UREA NIT SERPL: 15
ERYTHROCYTE [DISTWIDTH] IN BLOOD BY AUTOMATED COUNT: 11.9 % (ref 11.5–17)
GFR SERPLBLD CREATININE-BSD FMLA CKD-EPI: >60 MLS/MIN/1.73/M2
GLUCOSE SERPL-MCNC: 352 MG/DL (ref 74–100)
HCT VFR BLD AUTO: 33.4 % (ref 37–47)
HGB BLD-MCNC: 10.8 G/DL (ref 12–16)
MCH RBC QN AUTO: 26.9 PG (ref 27–31)
MCHC RBC AUTO-ENTMCNC: 32.3 G/DL (ref 33–36)
MCV RBC AUTO: 83.1 FL (ref 80–94)
PLATELET # BLD AUTO: 571 X10(3)/MCL (ref 130–400)
PMV BLD AUTO: 8.7 FL (ref 7.4–10.4)
POCT GLUCOSE: 268 MG/DL (ref 70–110)
POCT GLUCOSE: 273 MG/DL (ref 70–110)
POCT GLUCOSE: 275 MG/DL (ref 70–110)
POCT GLUCOSE: 344 MG/DL (ref 70–110)
POCT GLUCOSE: 391 MG/DL (ref 70–110)
POTASSIUM SERPL-SCNC: 3.4 MMOL/L (ref 3.5–5.1)
RBC # BLD AUTO: 4.02 X10(6)/MCL (ref 4.2–5.4)
SODIUM SERPL-SCNC: 132 MMOL/L (ref 136–145)
WBC # SPEC AUTO: 11.13 X10(3)/MCL (ref 4.5–11.5)

## 2023-06-23 PROCEDURE — 21400001 HC TELEMETRY ROOM

## 2023-06-23 PROCEDURE — 80048 BASIC METABOLIC PNL TOTAL CA: CPT | Performed by: INTERNAL MEDICINE

## 2023-06-23 PROCEDURE — 25000003 PHARM REV CODE 250: Performed by: STUDENT IN AN ORGANIZED HEALTH CARE EDUCATION/TRAINING PROGRAM

## 2023-06-23 PROCEDURE — 25000003 PHARM REV CODE 250: Performed by: INTERNAL MEDICINE

## 2023-06-23 PROCEDURE — 63600175 PHARM REV CODE 636 W HCPCS: Performed by: STUDENT IN AN ORGANIZED HEALTH CARE EDUCATION/TRAINING PROGRAM

## 2023-06-23 PROCEDURE — 85027 COMPLETE CBC AUTOMATED: CPT | Performed by: INTERNAL MEDICINE

## 2023-06-23 PROCEDURE — 94761 N-INVAS EAR/PLS OXIMETRY MLT: CPT

## 2023-06-23 PROCEDURE — 63600175 PHARM REV CODE 636 W HCPCS: Performed by: INTERNAL MEDICINE

## 2023-06-23 RX ORDER — DICYCLOMINE HYDROCHLORIDE 10 MG/1
10 CAPSULE ORAL 4 TIMES DAILY PRN
Status: DISCONTINUED | OUTPATIENT
Start: 2023-06-23 | End: 2023-06-30 | Stop reason: HOSPADM

## 2023-06-23 RX ORDER — SODIUM CHLORIDE 9 MG/ML
INJECTION, SOLUTION INTRAVENOUS
Status: DISCONTINUED | OUTPATIENT
Start: 2023-06-23 | End: 2023-06-26

## 2023-06-23 RX ORDER — POTASSIUM CHLORIDE 7.45 MG/ML
10 INJECTION INTRAVENOUS
Status: COMPLETED | OUTPATIENT
Start: 2023-06-23 | End: 2023-06-24

## 2023-06-23 RX ORDER — METOCLOPRAMIDE 10 MG/1
10 TABLET ORAL
Status: DISCONTINUED | OUTPATIENT
Start: 2023-06-23 | End: 2023-06-25

## 2023-06-23 RX ADMIN — POTASSIUM CHLORIDE 10 MEQ: 7.46 INJECTION, SOLUTION INTRAVENOUS at 08:06

## 2023-06-23 RX ADMIN — ONDANSETRON 4 MG: 2 INJECTION INTRAMUSCULAR; INTRAVENOUS at 05:06

## 2023-06-23 RX ADMIN — POTASSIUM CHLORIDE 10 MEQ: 7.46 INJECTION, SOLUTION INTRAVENOUS at 05:06

## 2023-06-23 RX ADMIN — ONDANSETRON 4 MG: 2 INJECTION INTRAMUSCULAR; INTRAVENOUS at 12:06

## 2023-06-23 RX ADMIN — PROMETHAZINE HYDROCHLORIDE 25 MG: 25 INJECTION INTRAMUSCULAR; INTRAVENOUS at 01:06

## 2023-06-23 RX ADMIN — INSULIN ASPART 6 UNITS: 100 INJECTION, SOLUTION INTRAVENOUS; SUBCUTANEOUS at 12:06

## 2023-06-23 RX ADMIN — INSULIN ASPART 10 UNITS: 100 INJECTION, SOLUTION INTRAVENOUS; SUBCUTANEOUS at 05:06

## 2023-06-23 RX ADMIN — HYDROCODONE BITARTRATE AND ACETAMINOPHEN 1 TABLET: 10; 325 TABLET ORAL at 08:06

## 2023-06-23 RX ADMIN — METOCLOPRAMIDE 10 MG: 10 TABLET ORAL at 08:06

## 2023-06-23 RX ADMIN — INSULIN ASPART 8 UNITS: 100 INJECTION, SOLUTION INTRAVENOUS; SUBCUTANEOUS at 06:06

## 2023-06-23 RX ADMIN — HYDRALAZINE HYDROCHLORIDE 10 MG: 20 INJECTION INTRAMUSCULAR; INTRAVENOUS at 01:06

## 2023-06-23 RX ADMIN — METOPROLOL TARTRATE 50 MG: 50 TABLET, FILM COATED ORAL at 08:06

## 2023-06-23 RX ADMIN — POTASSIUM CHLORIDE 10 MEQ: 7.46 INJECTION, SOLUTION INTRAVENOUS at 03:06

## 2023-06-23 RX ADMIN — PANTOPRAZOLE SODIUM 40 MG: 40 TABLET, DELAYED RELEASE ORAL at 02:06

## 2023-06-23 RX ADMIN — SODIUM CHLORIDE: 9 INJECTION, SOLUTION INTRAVENOUS at 08:06

## 2023-06-23 RX ADMIN — AMITRIPTYLINE HYDROCHLORIDE 25 MG: 25 TABLET, FILM COATED ORAL at 08:06

## 2023-06-23 RX ADMIN — METOCLOPRAMIDE 10 MG: 10 TABLET ORAL at 05:06

## 2023-06-23 RX ADMIN — PROMETHAZINE HYDROCHLORIDE 25 MG: 25 INJECTION INTRAMUSCULAR; INTRAVENOUS at 08:06

## 2023-06-23 RX ADMIN — POTASSIUM CHLORIDE 10 MEQ: 7.46 INJECTION, SOLUTION INTRAVENOUS at 10:06

## 2023-06-23 RX ADMIN — MUPIROCIN 1 G: 20 OINTMENT TOPICAL at 08:06

## 2023-06-23 NOTE — PROGRESS NOTES
Hospital Medicine  Progress Note    Patient Name: Dorene Husain  MRN: [id number]  Status: IP- Inpatient   Admission Date: 6/19/2023  Length of Stay: 4  Date of Service: 06/23/2023       CC: hospital follow-up for DKA       SUBJECTIVE   28 yo female with a history that includes IDDM I, presented to ED 6/19 after 2 days of nausea and vomiting, reporting that she could not keep oral intake down.  Patient reports she has not been taking her insulin during this time.  Patient denied fever, headache, SOB, CP, cough, dysuria or other s/s of infection at this time.  Work up in ED consistent with DKA, and she was admitted to ICU for DKA protocol. Gap closed, patient transitioned to SC insulin, diet started.    Today: Patient seen and examined at bedside, and chart reviewed.  Reports nausea/vomiting today.        MEDICATIONS   Scheduled   amitriptyline  25 mg Oral Nightly    DULoxetine  30 mg Oral Daily    enoxaparin  30 mg Subcutaneous Daily    insulin detemir U-100  15 Units Subcutaneous Daily    metoclopramide HCl  10 mg Oral QID (AC & HS)    metoprolol tartrate  50 mg Oral BID    mupirocin   Nasal BID    pantoprazole  40 mg Oral Daily     Continuous Infusions  None      PHYSICAL EXAM   VITALS: T 98.9 °F (37.2 °C)   BP (!) 151/79   P (!) 127   RR 18   O2 99 %    GENERAL: Awake and in NAD  LUNGS: CTA anteriorly  CVS: Normal rate  GI/: Soft, NT, bowel sounds positive.  EXTREMITIES: No peripheral edema  NEURO: AAOx3  PSYCH: Cooperative      LABS   CBC  Recent Labs     06/21/23  0425 06/23/23  0652   WBC 10.51 11.13   RBC 3.49* 4.02*   HGB 9.5* 10.8*   HCT 29.7* 33.4*   MCV 85.1 83.1   MCH 27.2 26.9*   MCHC 32.0* 32.3*   RDW 12.6 11.9   * 571*       CHEM  Recent Labs     06/21/23  0425 06/22/23  0416 06/23/23  0652   * 132* 132*   K 3.7 3.8 3.4*   CHLORIDE 102 105 96*   CO2 24 20* 14*   BUN 18.0 13.0 15.0   CREATININE 0.94 0.84 1.03*   GLUCOSE 220* 193* 352*   CALCIUM 8.0* 7.9* 9.0   MG 2.00  --   --           ASSESSMENT   IDDM I, uncontrolled with DKA  CHATA s/t IVVD, on CKD II/IIIA  Medication noncompliance  Acute Hyponatremia  Hypokalemia    PLAN   Scale back to diabetic clears  Add scheduled Reglan  Avoid IV narcotics  Continue current insulin regimen  Continue ISS coverage and CBG monitoring      Prophylaxis: SC Lovenox        Bao Vuong MD  [facility]

## 2023-06-23 NOTE — PLAN OF CARE
Pt is alert and orient x 4 at the time of assessment. Pain is controlled with current regimen. Blood glucose is being monitored. Pt did have to get coverage this evening. Will continue to monitor

## 2023-06-24 LAB
ANION GAP SERPL CALC-SCNC: 20 MEQ/L
BUN SERPL-MCNC: 21 MG/DL (ref 7–18.7)
CALCIUM SERPL-MCNC: 8.5 MG/DL (ref 8.4–10.2)
CHLORIDE SERPL-SCNC: 98 MMOL/L (ref 98–107)
CO2 SERPL-SCNC: 13 MMOL/L (ref 22–29)
CREAT SERPL-MCNC: 1.43 MG/DL (ref 0.55–1.02)
CREAT/UREA NIT SERPL: 15
GFR SERPLBLD CREATININE-BSD FMLA CKD-EPI: 52 MLS/MIN/1.73/M2
GLUCOSE SERPL-MCNC: 379 MG/DL (ref 74–100)
POCT GLUCOSE: 126 MG/DL (ref 70–110)
POCT GLUCOSE: 188 MG/DL (ref 70–110)
POCT GLUCOSE: 195 MG/DL (ref 70–110)
POCT GLUCOSE: 201 MG/DL (ref 70–110)
POCT GLUCOSE: 230 MG/DL (ref 70–110)
POCT GLUCOSE: 341 MG/DL (ref 70–110)
POCT GLUCOSE: 346 MG/DL (ref 70–110)
POTASSIUM SERPL-SCNC: 3.9 MMOL/L (ref 3.5–5.1)
SODIUM SERPL-SCNC: 131 MMOL/L (ref 136–145)

## 2023-06-24 PROCEDURE — 25000003 PHARM REV CODE 250: Performed by: STUDENT IN AN ORGANIZED HEALTH CARE EDUCATION/TRAINING PROGRAM

## 2023-06-24 PROCEDURE — 80048 BASIC METABOLIC PNL TOTAL CA: CPT | Performed by: INTERNAL MEDICINE

## 2023-06-24 PROCEDURE — 94761 N-INVAS EAR/PLS OXIMETRY MLT: CPT

## 2023-06-24 PROCEDURE — 63600175 PHARM REV CODE 636 W HCPCS: Performed by: STUDENT IN AN ORGANIZED HEALTH CARE EDUCATION/TRAINING PROGRAM

## 2023-06-24 PROCEDURE — 63600175 PHARM REV CODE 636 W HCPCS: Performed by: INTERNAL MEDICINE

## 2023-06-24 PROCEDURE — 11000001 HC ACUTE MED/SURG PRIVATE ROOM

## 2023-06-24 PROCEDURE — 25000003 PHARM REV CODE 250: Performed by: INTERNAL MEDICINE

## 2023-06-24 RX ORDER — ONDANSETRON 2 MG/ML
4 INJECTION INTRAMUSCULAR; INTRAVENOUS ONCE
Status: COMPLETED | OUTPATIENT
Start: 2023-06-24 | End: 2023-06-24

## 2023-06-24 RX ORDER — INSULIN ASPART 100 [IU]/ML
1-10 INJECTION, SOLUTION INTRAVENOUS; SUBCUTANEOUS EVERY 4 HOURS
Status: DISCONTINUED | OUTPATIENT
Start: 2023-06-24 | End: 2023-06-25

## 2023-06-24 RX ADMIN — INSULIN ASPART 2 UNITS: 100 INJECTION, SOLUTION INTRAVENOUS; SUBCUTANEOUS at 10:06

## 2023-06-24 RX ADMIN — ONDANSETRON 4 MG: 2 INJECTION INTRAMUSCULAR; INTRAVENOUS at 02:06

## 2023-06-24 RX ADMIN — METOPROLOL TARTRATE 50 MG: 50 TABLET, FILM COATED ORAL at 10:06

## 2023-06-24 RX ADMIN — ONDANSETRON 4 MG: 2 INJECTION INTRAMUSCULAR; INTRAVENOUS at 05:06

## 2023-06-24 RX ADMIN — MUPIROCIN 1 G: 20 OINTMENT TOPICAL at 10:06

## 2023-06-24 RX ADMIN — INSULIN ASPART 8 UNITS: 100 INJECTION, SOLUTION INTRAVENOUS; SUBCUTANEOUS at 05:06

## 2023-06-24 RX ADMIN — METOCLOPRAMIDE 10 MG: 10 TABLET ORAL at 03:06

## 2023-06-24 RX ADMIN — LABETALOL HYDROCHLORIDE 20 MG: 5 INJECTION INTRAVENOUS at 01:06

## 2023-06-24 RX ADMIN — ONDANSETRON 4 MG: 2 INJECTION INTRAMUSCULAR; INTRAVENOUS at 10:06

## 2023-06-24 RX ADMIN — PANTOPRAZOLE SODIUM 40 MG: 40 TABLET, DELAYED RELEASE ORAL at 10:06

## 2023-06-24 RX ADMIN — AMITRIPTYLINE HYDROCHLORIDE 25 MG: 25 TABLET, FILM COATED ORAL at 09:06

## 2023-06-24 RX ADMIN — HYDROCODONE BITARTRATE AND ACETAMINOPHEN 1 TABLET: 10; 325 TABLET ORAL at 04:06

## 2023-06-24 RX ADMIN — HYDROCODONE BITARTRATE AND ACETAMINOPHEN 1 TABLET: 10; 325 TABLET ORAL at 01:06

## 2023-06-24 RX ADMIN — HYDROCODONE BITARTRATE AND ACETAMINOPHEN 1 TABLET: 10; 325 TABLET ORAL at 10:06

## 2023-06-24 RX ADMIN — INSULIN ASPART 2 UNITS: 100 INJECTION, SOLUTION INTRAVENOUS; SUBCUTANEOUS at 12:06

## 2023-06-24 RX ADMIN — METOCLOPRAMIDE 10 MG: 10 TABLET ORAL at 05:06

## 2023-06-24 RX ADMIN — METOCLOPRAMIDE 10 MG: 10 TABLET ORAL at 11:06

## 2023-06-24 RX ADMIN — INSULIN DETEMIR 15 UNITS: 100 INJECTION, SOLUTION SUBCUTANEOUS at 10:06

## 2023-06-24 RX ADMIN — INSULIN ASPART 2 UNITS: 100 INJECTION, SOLUTION INTRAVENOUS; SUBCUTANEOUS at 02:06

## 2023-06-24 RX ADMIN — DULOXETINE 30 MG: 30 CAPSULE, DELAYED RELEASE ORAL at 10:06

## 2023-06-24 RX ADMIN — METOCLOPRAMIDE 10 MG: 10 TABLET ORAL at 09:06

## 2023-06-24 NOTE — PROGRESS NOTES
Hospital Medicine  Progress Note    Patient Name: Dorene Husain  MRN: [id number]  Status: IP- Inpatient   Admission Date: 6/19/2023  Length of Stay: 5  Date of Service: 06/24/2023       CC: hospital follow-up for DKA       SUBJECTIVE   26 yo female with a history that includes IDDM I, presented to ED 6/19 after 2 days of nausea and vomiting, reporting that she could not keep oral intake down.  Patient reports she has not been taking her insulin during this time.  Patient denied fever, headache, SOB, CP, cough, dysuria or other s/s of infection at this time.  Work up in ED consistent with DKA, and she was admitted to ICU for DKA protocol. Gap closed, patient transitioned to SC insulin, diet started.    Today: Patient seen and examined at bedside, and chart reviewed.  Sugars up, worsening metabolic derangements today.       MEDICATIONS   Scheduled   amitriptyline  25 mg Oral Nightly    DULoxetine  30 mg Oral Daily    enoxaparin  30 mg Subcutaneous Daily    insulin aspart U-100  1-10 Units Subcutaneous Q4H    insulin detemir U-100  15 Units Subcutaneous Daily    metoclopramide HCl  10 mg Oral QID (AC & HS)    metoprolol tartrate  50 mg Oral BID    mupirocin   Nasal BID    pantoprazole  40 mg Oral Daily     Continuous Infusions  None      PHYSICAL EXAM   VITALS: T 98.2 °F (36.8 °C)   BP (!) 170/110   P (!) 111   RR (!) 22   O2 100 %    GENERAL: Awake and in NAD  LUNGS: CTA anteriorly  CVS: Normal rate  GI/: Soft, NT, bowel sounds positive.  EXTREMITIES: No peripheral edema  NEURO: AAOx3  PSYCH: Cooperative      LABS   CBC  Recent Labs     06/23/23  0652   WBC 11.13   RBC 4.02*   HGB 10.8*   HCT 33.4*   MCV 83.1   MCH 26.9*   MCHC 32.3*   RDW 11.9   *       CHEM  Recent Labs     06/23/23  0652 06/24/23  0611   * 131*   K 3.4* 3.9   CHLORIDE 96* 98   CO2 14* 13*   BUN 15.0 21.0*   CREATININE 1.03* 1.43*   GLUCOSE 352* 379*   CALCIUM 9.0 8.5         ASSESSMENT   IDDM I, uncontrolled with DKA  CHATA s/t IVVD, on  CKD II/IIIA  Medication noncompliance  Acute Hyponatremia  Hypokalemia    PLAN   Continue current Levemir, will increase CBGs/ISS to Q4 hours for now  Continue Reglan  Avoid IV narcotics  Otherwise continue current management and monitoring.        Prophylaxis: SC Elton Vuong MD  [facility]

## 2023-06-25 LAB
ALLENS TEST: ABNORMAL
ANION GAP SERPL CALC-SCNC: 11 MEQ/L
ANION GAP SERPL CALC-SCNC: 17 MEQ/L
ANION GAP SERPL CALC-SCNC: 20 MEQ/L
B-OH-BUTYR SERPL-MCNC: 5.5 MMOL/L
BUN SERPL-MCNC: 13 MG/DL (ref 7–18.7)
BUN SERPL-MCNC: 14 MG/DL (ref 7–18.7)
CALCIUM SERPL-MCNC: 8.2 MG/DL (ref 8.4–10.2)
CALCIUM SERPL-MCNC: 8.3 MG/DL (ref 8.4–10.2)
CALCIUM SERPL-MCNC: 8.4 MG/DL (ref 8.4–10.2)
CALCIUM SERPL-MCNC: 8.5 MG/DL (ref 8.4–10.2)
CALCIUM SERPL-MCNC: 9 MG/DL (ref 8.4–10.2)
CHLORIDE SERPL-SCNC: 100 MMOL/L (ref 98–107)
CHLORIDE SERPL-SCNC: 96 MMOL/L (ref 98–107)
CHLORIDE SERPL-SCNC: 98 MMOL/L (ref 98–107)
CO2 SERPL-SCNC: 14 MMOL/L (ref 22–29)
CO2 SERPL-SCNC: 20 MMOL/L (ref 22–29)
CO2 SERPL-SCNC: 23 MMOL/L (ref 22–29)
CO2 SERPL-SCNC: 24 MMOL/L (ref 22–29)
CO2 SERPL-SCNC: 25 MMOL/L (ref 22–29)
CREAT SERPL-MCNC: 1.21 MG/DL (ref 0.55–1.02)
CREAT SERPL-MCNC: 1.23 MG/DL (ref 0.55–1.02)
CREAT SERPL-MCNC: 1.24 MG/DL (ref 0.55–1.02)
CREAT SERPL-MCNC: 1.25 MG/DL (ref 0.55–1.02)
CREAT SERPL-MCNC: 1.27 MG/DL (ref 0.55–1.02)
CREAT/UREA NIT SERPL: 11
DELSYS: ABNORMAL
GFR SERPLBLD CREATININE-BSD FMLA CKD-EPI: 60 MLS/MIN/1.73/M2
GFR SERPLBLD CREATININE-BSD FMLA CKD-EPI: >60 MLS/MIN/1.73/M2
GLUCOSE SERPL-MCNC: 114 MG/DL (ref 74–100)
GLUCOSE SERPL-MCNC: 115 MG/DL (ref 74–100)
GLUCOSE SERPL-MCNC: 246 MG/DL (ref 74–100)
GLUCOSE SERPL-MCNC: 277 MG/DL (ref 74–100)
GLUCOSE SERPL-MCNC: 84 MG/DL (ref 74–100)
HCO3 UR-SCNC: 19.1 MMOL/L (ref 24–28)
PCO2 BLDA: 26 MMHG (ref 35–45)
PH SMN: 7.47 [PH] (ref 7.35–7.45)
PO2 BLDA: 87 MMHG (ref 80–100)
POC BE: -4 MMOL/L
POC SATURATED O2: 97 % (ref 95–100)
POC TCO2: 20 MMOL/L (ref 23–27)
POCT GLUCOSE: 100 MG/DL (ref 70–110)
POCT GLUCOSE: 107 MG/DL (ref 70–110)
POCT GLUCOSE: 113 MG/DL (ref 70–110)
POCT GLUCOSE: 119 MG/DL (ref 70–110)
POCT GLUCOSE: 145 MG/DL (ref 70–110)
POCT GLUCOSE: 226 MG/DL (ref 70–110)
POCT GLUCOSE: 228 MG/DL (ref 70–110)
POCT GLUCOSE: 76 MG/DL (ref 70–110)
POCT GLUCOSE: 83 MG/DL (ref 70–110)
POCT GLUCOSE: 85 MG/DL (ref 70–110)
POCT GLUCOSE: 94 MG/DL (ref 70–110)
POCT GLUCOSE: 96 MG/DL (ref 70–110)
POTASSIUM SERPL-SCNC: 2.8 MMOL/L (ref 3.5–5.1)
POTASSIUM SERPL-SCNC: 3.3 MMOL/L (ref 3.5–5.1)
POTASSIUM SERPL-SCNC: 3.4 MMOL/L (ref 3.5–5.1)
POTASSIUM SERPL-SCNC: 3.7 MMOL/L (ref 3.5–5.1)
POTASSIUM SERPL-SCNC: 3.9 MMOL/L (ref 3.5–5.1)
SAMPLE: ABNORMAL
SITE: ABNORMAL
SODIUM SERPL-SCNC: 132 MMOL/L (ref 136–145)
SODIUM SERPL-SCNC: 133 MMOL/L (ref 136–145)
SODIUM SERPL-SCNC: 134 MMOL/L (ref 136–145)
SODIUM SERPL-SCNC: 135 MMOL/L (ref 136–145)
SODIUM SERPL-SCNC: 136 MMOL/L (ref 136–145)

## 2023-06-25 PROCEDURE — 25000003 PHARM REV CODE 250: Performed by: STUDENT IN AN ORGANIZED HEALTH CARE EDUCATION/TRAINING PROGRAM

## 2023-06-25 PROCEDURE — 25000003 PHARM REV CODE 250: Performed by: INTERNAL MEDICINE

## 2023-06-25 PROCEDURE — 63600175 PHARM REV CODE 636 W HCPCS: Performed by: INTERNAL MEDICINE

## 2023-06-25 PROCEDURE — S5010 5% DEXTROSE AND 0.45% SALINE: HCPCS | Performed by: INTERNAL MEDICINE

## 2023-06-25 PROCEDURE — 80048 BASIC METABOLIC PNL TOTAL CA: CPT | Performed by: INTERNAL MEDICINE

## 2023-06-25 PROCEDURE — 94761 N-INVAS EAR/PLS OXIMETRY MLT: CPT

## 2023-06-25 PROCEDURE — 82803 BLOOD GASES ANY COMBINATION: CPT

## 2023-06-25 PROCEDURE — 36600 WITHDRAWAL OF ARTERIAL BLOOD: CPT

## 2023-06-25 PROCEDURE — 99900035 HC TECH TIME PER 15 MIN (STAT)

## 2023-06-25 PROCEDURE — 82010 KETONE BODYS QUAN: CPT | Performed by: INTERNAL MEDICINE

## 2023-06-25 PROCEDURE — 20000000 HC ICU ROOM

## 2023-06-25 RX ORDER — SODIUM CHLORIDE 0.9 % (FLUSH) 0.9 %
10 SYRINGE (ML) INJECTION
Status: DISCONTINUED | OUTPATIENT
Start: 2023-06-25 | End: 2023-06-30 | Stop reason: HOSPADM

## 2023-06-25 RX ORDER — SODIUM CHLORIDE 9 MG/ML
1000 INJECTION, SOLUTION INTRAVENOUS CONTINUOUS
Status: ACTIVE | OUTPATIENT
Start: 2023-06-25 | End: 2023-06-25

## 2023-06-25 RX ORDER — POTASSIUM CHLORIDE 7.45 MG/ML
40 INJECTION INTRAVENOUS
Status: DISCONTINUED | OUTPATIENT
Start: 2023-06-25 | End: 2023-06-26

## 2023-06-25 RX ORDER — MORPHINE SULFATE 4 MG/ML
4 INJECTION, SOLUTION INTRAMUSCULAR; INTRAVENOUS EVERY 4 HOURS PRN
Status: DISCONTINUED | OUTPATIENT
Start: 2023-06-25 | End: 2023-06-27

## 2023-06-25 RX ORDER — DEXTROSE MONOHYDRATE, SODIUM CHLORIDE, AND POTASSIUM CHLORIDE 50; 1.49; 4.5 G/1000ML; G/1000ML; G/1000ML
INJECTION, SOLUTION INTRAVENOUS CONTINUOUS
Status: DISCONTINUED | OUTPATIENT
Start: 2023-06-25 | End: 2023-06-26

## 2023-06-25 RX ORDER — POTASSIUM CHLORIDE 7.45 MG/ML
80 INJECTION INTRAVENOUS
Status: DISCONTINUED | OUTPATIENT
Start: 2023-06-25 | End: 2023-06-26

## 2023-06-25 RX ORDER — POTASSIUM CHLORIDE 7.45 MG/ML
60 INJECTION INTRAVENOUS
Status: DISCONTINUED | OUTPATIENT
Start: 2023-06-25 | End: 2023-06-26

## 2023-06-25 RX ORDER — DEXTROSE MONOHYDRATE 100 MG/ML
INJECTION, SOLUTION INTRAVENOUS
Status: DISCONTINUED | OUTPATIENT
Start: 2023-06-25 | End: 2023-06-26

## 2023-06-25 RX ORDER — DEXTROSE MONOHYDRATE AND SODIUM CHLORIDE 5; .45 G/100ML; G/100ML
INJECTION, SOLUTION INTRAVENOUS CONTINUOUS PRN
Status: DISCONTINUED | OUTPATIENT
Start: 2023-06-25 | End: 2023-06-26

## 2023-06-25 RX ORDER — TROLAMINE SALICYLATE 10 G/100G
CREAM TOPICAL
Status: DISCONTINUED | OUTPATIENT
Start: 2023-06-25 | End: 2023-06-30 | Stop reason: HOSPADM

## 2023-06-25 RX ORDER — PROMETHAZINE HYDROCHLORIDE 25 MG/ML
12.5 INJECTION, SOLUTION INTRAMUSCULAR; INTRAVENOUS EVERY 4 HOURS PRN
Status: DISCONTINUED | OUTPATIENT
Start: 2023-06-25 | End: 2023-06-30 | Stop reason: HOSPADM

## 2023-06-25 RX ORDER — DIPHENHYDRAMINE HCL 25 MG
25 CAPSULE ORAL EVERY 6 HOURS PRN
Status: DISCONTINUED | OUTPATIENT
Start: 2023-06-25 | End: 2023-06-30 | Stop reason: HOSPADM

## 2023-06-25 RX ADMIN — DEXTROSE MONOHYDRATE: 100 INJECTION, SOLUTION INTRAVENOUS at 08:06

## 2023-06-25 RX ADMIN — DEXTROSE AND SODIUM CHLORIDE: 5; 450 INJECTION, SOLUTION INTRAVENOUS at 01:06

## 2023-06-25 RX ADMIN — SODIUM CHLORIDE 1000 ML: 9 INJECTION, SOLUTION INTRAVENOUS at 12:06

## 2023-06-25 RX ADMIN — INSULIN HUMAN 0.2 UNITS/KG/HR: 1 INJECTION, SOLUTION INTRAVENOUS at 12:06

## 2023-06-25 RX ADMIN — LABETALOL HYDROCHLORIDE 20 MG: 5 INJECTION INTRAVENOUS at 08:06

## 2023-06-25 RX ADMIN — PANTOPRAZOLE SODIUM 40 MG: 40 TABLET, DELAYED RELEASE ORAL at 08:06

## 2023-06-25 RX ADMIN — INSULIN ASPART 6 UNITS: 100 INJECTION, SOLUTION INTRAVENOUS; SUBCUTANEOUS at 06:06

## 2023-06-25 RX ADMIN — INSULIN DETEMIR 15 UNITS: 100 INJECTION, SOLUTION SUBCUTANEOUS at 08:06

## 2023-06-25 RX ADMIN — POTASSIUM CHLORIDE, DEXTROSE MONOHYDRATE AND SODIUM CHLORIDE: 150; 5; 450 INJECTION, SOLUTION INTRAVENOUS at 02:06

## 2023-06-25 RX ADMIN — MORPHINE SULFATE 4 MG: 4 INJECTION, SOLUTION INTRAMUSCULAR; INTRAVENOUS at 10:06

## 2023-06-25 RX ADMIN — POTASSIUM CHLORIDE 40 MEQ: 7.46 INJECTION, SOLUTION INTRAVENOUS at 02:06

## 2023-06-25 RX ADMIN — DULOXETINE 30 MG: 30 CAPSULE, DELAYED RELEASE ORAL at 08:06

## 2023-06-25 RX ADMIN — AMITRIPTYLINE HYDROCHLORIDE 25 MG: 25 TABLET, FILM COATED ORAL at 09:06

## 2023-06-25 RX ADMIN — METOPROLOL TARTRATE 50 MG: 50 TABLET, FILM COATED ORAL at 09:06

## 2023-06-25 RX ADMIN — ENOXAPARIN SODIUM 30 MG: 30 INJECTION SUBCUTANEOUS at 04:06

## 2023-06-25 RX ADMIN — INSULIN ASPART 1 UNITS: 100 INJECTION, SOLUTION INTRAVENOUS; SUBCUTANEOUS at 02:06

## 2023-06-25 RX ADMIN — METOPROLOL TARTRATE 50 MG: 50 TABLET, FILM COATED ORAL at 08:06

## 2023-06-25 RX ADMIN — MORPHINE SULFATE 4 MG: 4 INJECTION, SOLUTION INTRAMUSCULAR; INTRAVENOUS at 09:06

## 2023-06-25 RX ADMIN — METOCLOPRAMIDE 10 MG: 10 TABLET ORAL at 06:06

## 2023-06-25 RX ADMIN — HYDROCODONE BITARTRATE AND ACETAMINOPHEN 1 TABLET: 10; 325 TABLET ORAL at 07:06

## 2023-06-25 RX ADMIN — MORPHINE SULFATE 4 MG: 4 INJECTION, SOLUTION INTRAMUSCULAR; INTRAVENOUS at 04:06

## 2023-06-25 RX ADMIN — DIPHENHYDRAMINE HYDROCHLORIDE 25 MG: 25 CAPSULE ORAL at 10:06

## 2023-06-25 NOTE — NURSING
I GAVE A THOROUGH BEDSIDE REPORT TO NURSE ALIE RN ICU. ROOM STILL BEING CLEANED. WILL TRANSFER PATIENT WHEN ROOM READY

## 2023-06-25 NOTE — PROGRESS NOTES
"Hospital Medicine  Progress Note    Patient Name: Dorene Husain  MRN: [id number]  Status: IP- Inpatient   Admission Date: 6/19/2023  Length of Stay: 6  Date of Service: 06/25/2023       CC: hospital follow-up for DKA       SUBJECTIVE   28 yo female with a history that includes IDDM I, presented to ED 6/19 after 2 days of nausea and vomiting, reporting that she could not keep oral intake down.  Patient reports she has not been taking her insulin during this time.  Patient denied fever, headache, SOB, CP, cough, dysuria or other s/s of infection at this time.  Work up in ED consistent with DKA, and she was admitted to ICU for DKA protocol. Gap closed, patient transitioned to SC insulin, diet started.    Today: Patient seen and examined at bedside, and chart reviewed.  Complaining of severe pain today "all over".  Labs note she is again developing DKA.      MEDICATIONS   Scheduled   amitriptyline  25 mg Oral Nightly    DULoxetine  30 mg Oral Daily    enoxaparin  30 mg Subcutaneous Daily    metoprolol tartrate  50 mg Oral BID    pantoprazole  40 mg Oral Daily     Continuous Infusions   sodium chloride 0.9%      dextrose 5 % and 0.45 % NaCl      insulin regular 1 units/mL infusion orderable (DKA)           PHYSICAL EXAM   VITALS: T 99.4 °F (37.4 °C)   BP (!) 171/107   P 105   RR (!) 22   O2 100 %    GENERAL: Awake and in visible distress s/t pain  LUNGS: CTA anteriorly  CVS: Normal rate  GI/: Soft, NT, bowel sounds positive.  EXTREMITIES: No peripheral edema  NEURO: AAOx3  PSYCH: Cooperative      LABS   CBC  Recent Labs     06/23/23  0652   WBC 11.13   RBC 4.02*   HGB 10.8*   HCT 33.4*   MCV 83.1   MCH 26.9*   MCHC 32.3*   RDW 11.9   *       CHEM  Recent Labs     06/24/23  0611 06/25/23  0614   * 132*   K 3.9 3.9   CHLORIDE 98 98   CO2 13* 14*   BUN 21.0* 14.0   CREATININE 1.43* 1.25*   GLUCOSE 379* 277*   CALCIUM 8.5 8.5         ASSESSMENT   IDDM I, uncontrolled with recurrent DKA  CHATA s/t IVVD, on CKD " II/IIIA  Medication noncompliance  Acute Hyponatremia  Hypokalemia    PLAN   NPO,check ABG, serum ketones  Plan for transfer back to ICU for insulin infusion and DKA protocol  Otherwise continue close clincal monitoring.        Prophylaxis: SC Lovenox        Bao Vuong MD  [facility]      Critical Care Time: 37 minutes spent in direct hands on care, review of labs, imaging and medical record, and discussion of diagnosis, treatment, prognosis with patient/family.  Patient remains at high-risk for clinical decompensation  Critical Care diagnosis: DKA

## 2023-06-26 LAB
ANION GAP SERPL CALC-SCNC: 12 MEQ/L
ANION GAP SERPL CALC-SCNC: 13 MEQ/L
ANION GAP SERPL CALC-SCNC: 8 MEQ/L
BUN SERPL-MCNC: 10 MG/DL (ref 7–18.7)
BUN SERPL-MCNC: 12 MG/DL (ref 7–18.7)
BUN SERPL-MCNC: 9 MG/DL (ref 7–18.7)
CALCIUM SERPL-MCNC: 8.2 MG/DL (ref 8.4–10.2)
CALCIUM SERPL-MCNC: 8.7 MG/DL (ref 8.4–10.2)
CALCIUM SERPL-MCNC: 8.9 MG/DL (ref 8.4–10.2)
CHLORIDE SERPL-SCNC: 100 MMOL/L (ref 98–107)
CHLORIDE SERPL-SCNC: 101 MMOL/L (ref 98–107)
CHLORIDE SERPL-SCNC: 101 MMOL/L (ref 98–107)
CO2 SERPL-SCNC: 21 MMOL/L (ref 22–29)
CO2 SERPL-SCNC: 23 MMOL/L (ref 22–29)
CO2 SERPL-SCNC: 26 MMOL/L (ref 22–29)
CREAT SERPL-MCNC: 1.13 MG/DL (ref 0.55–1.02)
CREAT SERPL-MCNC: 1.17 MG/DL (ref 0.55–1.02)
CREAT SERPL-MCNC: 1.22 MG/DL (ref 0.55–1.02)
CREAT/UREA NIT SERPL: 10
CREAT/UREA NIT SERPL: 8
CREAT/UREA NIT SERPL: 9
GFR SERPLBLD CREATININE-BSD FMLA CKD-EPI: >60 MLS/MIN/1.73/M2
GLUCOSE SERPL-MCNC: 107 MG/DL (ref 74–100)
GLUCOSE SERPL-MCNC: 144 MG/DL (ref 74–100)
GLUCOSE SERPL-MCNC: 180 MG/DL (ref 74–100)
POCT GLUCOSE: 101 MG/DL (ref 70–110)
POCT GLUCOSE: 109 MG/DL (ref 70–110)
POCT GLUCOSE: 133 MG/DL (ref 70–110)
POCT GLUCOSE: 141 MG/DL (ref 70–110)
POCT GLUCOSE: 144 MG/DL (ref 70–110)
POCT GLUCOSE: 151 MG/DL (ref 70–110)
POCT GLUCOSE: 162 MG/DL (ref 70–110)
POCT GLUCOSE: 186 MG/DL (ref 70–110)
POCT GLUCOSE: 200 MG/DL (ref 70–110)
POCT GLUCOSE: 250 MG/DL (ref 70–110)
POCT GLUCOSE: 251 MG/DL (ref 70–110)
POCT GLUCOSE: 262 MG/DL (ref 70–110)
POCT GLUCOSE: 93 MG/DL (ref 70–110)
POTASSIUM SERPL-SCNC: 3.3 MMOL/L (ref 3.5–5.1)
POTASSIUM SERPL-SCNC: 3.4 MMOL/L (ref 3.5–5.1)
POTASSIUM SERPL-SCNC: 3.8 MMOL/L (ref 3.5–5.1)
SODIUM SERPL-SCNC: 134 MMOL/L (ref 136–145)
SODIUM SERPL-SCNC: 135 MMOL/L (ref 136–145)
SODIUM SERPL-SCNC: 136 MMOL/L (ref 136–145)

## 2023-06-26 PROCEDURE — 63600175 PHARM REV CODE 636 W HCPCS: Performed by: STUDENT IN AN ORGANIZED HEALTH CARE EDUCATION/TRAINING PROGRAM

## 2023-06-26 PROCEDURE — 25000003 PHARM REV CODE 250: Performed by: STUDENT IN AN ORGANIZED HEALTH CARE EDUCATION/TRAINING PROGRAM

## 2023-06-26 PROCEDURE — 25000003 PHARM REV CODE 250: Performed by: INTERNAL MEDICINE

## 2023-06-26 PROCEDURE — 94761 N-INVAS EAR/PLS OXIMETRY MLT: CPT

## 2023-06-26 PROCEDURE — 20000000 HC ICU ROOM

## 2023-06-26 PROCEDURE — 80048 BASIC METABOLIC PNL TOTAL CA: CPT | Performed by: INTERNAL MEDICINE

## 2023-06-26 PROCEDURE — 63600175 PHARM REV CODE 636 W HCPCS: Performed by: INTERNAL MEDICINE

## 2023-06-26 RX ORDER — INSULIN ASPART 100 [IU]/ML
0-15 INJECTION, SOLUTION INTRAVENOUS; SUBCUTANEOUS
Status: DISCONTINUED | OUTPATIENT
Start: 2023-06-26 | End: 2023-06-29

## 2023-06-26 RX ORDER — GLUCAGON 1 MG
1 KIT INJECTION
Status: DISCONTINUED | OUTPATIENT
Start: 2023-06-26 | End: 2023-06-30 | Stop reason: HOSPADM

## 2023-06-26 RX ORDER — DEXTROSE 40 %
15 GEL (GRAM) ORAL
Status: DISCONTINUED | OUTPATIENT
Start: 2023-06-26 | End: 2023-06-30 | Stop reason: HOSPADM

## 2023-06-26 RX ORDER — POTASSIUM CHLORIDE 7.45 MG/ML
10 INJECTION INTRAVENOUS
Status: DISCONTINUED | OUTPATIENT
Start: 2023-06-26 | End: 2023-06-26

## 2023-06-26 RX ORDER — DEXTROSE 40 %
30 GEL (GRAM) ORAL
Status: DISCONTINUED | OUTPATIENT
Start: 2023-06-26 | End: 2023-06-30 | Stop reason: HOSPADM

## 2023-06-26 RX ORDER — SODIUM CHLORIDE 9 MG/ML
INJECTION, SOLUTION INTRAVENOUS CONTINUOUS
Status: DISCONTINUED | OUTPATIENT
Start: 2023-06-26 | End: 2023-06-30 | Stop reason: HOSPADM

## 2023-06-26 RX ADMIN — MORPHINE SULFATE 4 MG: 4 INJECTION, SOLUTION INTRAMUSCULAR; INTRAVENOUS at 12:06

## 2023-06-26 RX ADMIN — ONDANSETRON 4 MG: 2 INJECTION INTRAMUSCULAR; INTRAVENOUS at 03:06

## 2023-06-26 RX ADMIN — LABETALOL HYDROCHLORIDE 20 MG: 5 INJECTION INTRAVENOUS at 02:06

## 2023-06-26 RX ADMIN — MORPHINE SULFATE 4 MG: 4 INJECTION, SOLUTION INTRAMUSCULAR; INTRAVENOUS at 07:06

## 2023-06-26 RX ADMIN — POTASSIUM CHLORIDE 40 MEQ: 7.46 INJECTION, SOLUTION INTRAVENOUS at 06:06

## 2023-06-26 RX ADMIN — SODIUM CHLORIDE: 9 INJECTION, SOLUTION INTRAVENOUS at 09:06

## 2023-06-26 RX ADMIN — INSULIN ASPART 9 UNITS: 100 INJECTION, SOLUTION INTRAVENOUS; SUBCUTANEOUS at 11:06

## 2023-06-26 RX ADMIN — INSULIN ASPART 4 UNITS: 100 INJECTION, SOLUTION INTRAVENOUS; SUBCUTANEOUS at 09:06

## 2023-06-26 RX ADMIN — MORPHINE SULFATE 4 MG: 4 INJECTION, SOLUTION INTRAMUSCULAR; INTRAVENOUS at 09:06

## 2023-06-26 RX ADMIN — POTASSIUM CHLORIDE, DEXTROSE MONOHYDRATE AND SODIUM CHLORIDE: 150; 5; 450 INJECTION, SOLUTION INTRAVENOUS at 03:06

## 2023-06-26 RX ADMIN — METOPROLOL TARTRATE 50 MG: 50 TABLET, FILM COATED ORAL at 08:06

## 2023-06-26 RX ADMIN — AMITRIPTYLINE HYDROCHLORIDE 25 MG: 25 TABLET, FILM COATED ORAL at 08:06

## 2023-06-26 RX ADMIN — MORPHINE SULFATE 4 MG: 4 INJECTION, SOLUTION INTRAMUSCULAR; INTRAVENOUS at 02:06

## 2023-06-26 RX ADMIN — SODIUM CHLORIDE: 9 INJECTION, SOLUTION INTRAVENOUS at 06:06

## 2023-06-26 RX ADMIN — ENOXAPARIN SODIUM 30 MG: 30 INJECTION SUBCUTANEOUS at 05:06

## 2023-06-26 RX ADMIN — DULOXETINE 30 MG: 30 CAPSULE, DELAYED RELEASE ORAL at 09:06

## 2023-06-26 RX ADMIN — MORPHINE SULFATE 4 MG: 4 INJECTION, SOLUTION INTRAMUSCULAR; INTRAVENOUS at 04:06

## 2023-06-26 RX ADMIN — PANTOPRAZOLE SODIUM 40 MG: 40 TABLET, DELAYED RELEASE ORAL at 09:06

## 2023-06-26 RX ADMIN — METOPROLOL TARTRATE 50 MG: 50 TABLET, FILM COATED ORAL at 09:06

## 2023-06-26 NOTE — PROGRESS NOTES
Hospital Medicine  Progress Note    Patient Name: Dorene Husain  MRN: [id number]  Status: IP- Inpatient   Admission Date: 6/19/2023  Length of Stay: 7  Date of Service: 06/26/2023       CC: hospital follow-up for DKA       SUBJECTIVE   26 yo female with a history that includes IDDM I, presented to ED 6/19 after 2 days of nausea and vomiting, reporting that she could not keep oral intake down.  Patient reports she has not been taking her insulin during this time.  Patient denied fever, headache, SOB, CP, cough, dysuria or other s/s of infection at this time.  Work up in ED consistent with DKA, and she was admitted to ICU for DKA protocol. Gap closed, patient transitioned to SC insulin, diet started.    Today: Patient seen and examined at bedside, and chart reviewed.  Gap closed.  Still with nausea, but no vomiting.      MEDICATIONS   Scheduled   amitriptyline  25 mg Oral Nightly    DULoxetine  30 mg Oral Daily    enoxaparin  30 mg Subcutaneous Daily    insulin detemir U-100  15 Units Subcutaneous Daily    metoprolol tartrate  50 mg Oral BID    pantoprazole  40 mg Oral Daily     Continuous Infusions  None      PHYSICAL EXAM   VITALS: T 97.3 °F (36.3 °C)   /67   P 96   RR 15   O2 96 %    GENERAL: Awake and in visible distress s/t pain  LUNGS: CTA anteriorly  CVS: Normal rate  GI/: Soft, NT, bowel sounds positive.  EXTREMITIES: No peripheral edema  NEURO: AAOx3  PSYCH: Cooperative      LABS   CBC  No results for input(s): WBC, RBC, HGB, HCT, MCV, MCH, MCHC, RDW, PLT, RETIC, ESR in the last 72 hours.    CHEM  Recent Labs     06/26/23  0428 06/26/23  0748   * 135*   K 3.3* 3.8   CHLORIDE 100 101   CO2 21* 26   BUN 10.0 9.0   CREATININE 1.17* 1.13*   GLUCOSE 180* 144*   CALCIUM 8.7 8.2*         ASSESSMENT   IDDM I, uncontrolled with recurrent DKA  CHATA s/t IVVD, on CKD II/IIIA  Medication noncompliance  Acute Hyponatremia  Hypokalemia    PLAN   Can d/c insulin infusion, start SC ISS  Start diabetic  clears  Continue NS, replace potassium with 40 IV  Continue to monitor  Otherwise continue close monitoring  in ICU for now.        Prophylaxis: SC Lovenox        Bao Vuong MD  [facility]      Critical Care Time: 32 minutes spent in direct hands on care, review of labs, imaging and medical record, and discussion of diagnosis, treatment, prognosis with patient/family.  Patient remains at high-risk for clinical decompensation  Critical Care diagnosis: DKA

## 2023-06-27 LAB
ANION GAP SERPL CALC-SCNC: 6 MEQ/L
BASOPHILS # BLD AUTO: 0.04 X10(3)/MCL
BASOPHILS NFR BLD AUTO: 0.7 %
BUN SERPL-MCNC: 8 MG/DL (ref 7–18.7)
CALCIUM SERPL-MCNC: 7.9 MG/DL (ref 8.4–10.2)
CHLORIDE SERPL-SCNC: 106 MMOL/L (ref 98–107)
CO2 SERPL-SCNC: 24 MMOL/L (ref 22–29)
CREAT SERPL-MCNC: 0.91 MG/DL (ref 0.55–1.02)
CREAT/UREA NIT SERPL: 9
EOSINOPHIL # BLD AUTO: 0.16 X10(3)/MCL (ref 0–0.9)
EOSINOPHIL NFR BLD AUTO: 2.8 %
ERYTHROCYTE [DISTWIDTH] IN BLOOD BY AUTOMATED COUNT: 13.2 % (ref 11.5–17)
GFR SERPLBLD CREATININE-BSD FMLA CKD-EPI: >60 MLS/MIN/1.73/M2
GLUCOSE P FAST SERPL-MCNC: 338 MG/DL (ref 70–155)
GLUCOSE SERPL-MCNC: 216 MG/DL (ref 74–100)
HCT VFR BLD AUTO: 25.6 % (ref 37–47)
HGB BLD-MCNC: 8 G/DL (ref 12–16)
IMM GRANULOCYTES # BLD AUTO: 0.09 X10(3)/MCL (ref 0–0.04)
IMM GRANULOCYTES NFR BLD AUTO: 1.6 %
LYMPHOCYTES # BLD AUTO: 2.59 X10(3)/MCL (ref 0.6–4.6)
LYMPHOCYTES NFR BLD AUTO: 44.7 %
MCH RBC QN AUTO: 27.1 PG (ref 27–31)
MCHC RBC AUTO-ENTMCNC: 31.3 G/DL (ref 33–36)
MCV RBC AUTO: 86.8 FL (ref 80–94)
MONOCYTES # BLD AUTO: 0.71 X10(3)/MCL (ref 0.1–1.3)
MONOCYTES NFR BLD AUTO: 12.3 %
NEUTROPHILS # BLD AUTO: 2.2 X10(3)/MCL (ref 2.1–9.2)
NEUTROPHILS NFR BLD AUTO: 37.9 %
PLATELET # BLD AUTO: 406 X10(3)/MCL (ref 130–400)
PMV BLD AUTO: 8.5 FL (ref 7.4–10.4)
POCT GLUCOSE: 104 MG/DL (ref 70–110)
POCT GLUCOSE: 341 MG/DL (ref 70–110)
POCT GLUCOSE: 346 MG/DL (ref 70–110)
POTASSIUM SERPL-SCNC: 4.3 MMOL/L (ref 3.5–5.1)
RBC # BLD AUTO: 2.95 X10(6)/MCL (ref 4.2–5.4)
SODIUM SERPL-SCNC: 136 MMOL/L (ref 136–145)
WBC # SPEC AUTO: 5.79 X10(3)/MCL (ref 4.5–11.5)

## 2023-06-27 PROCEDURE — 63600175 PHARM REV CODE 636 W HCPCS: Performed by: INTERNAL MEDICINE

## 2023-06-27 PROCEDURE — 25000003 PHARM REV CODE 250: Performed by: STUDENT IN AN ORGANIZED HEALTH CARE EDUCATION/TRAINING PROGRAM

## 2023-06-27 PROCEDURE — 94761 N-INVAS EAR/PLS OXIMETRY MLT: CPT

## 2023-06-27 PROCEDURE — 80048 BASIC METABOLIC PNL TOTAL CA: CPT | Performed by: INTERNAL MEDICINE

## 2023-06-27 PROCEDURE — 82947 ASSAY GLUCOSE BLOOD QUANT: CPT | Performed by: INTERNAL MEDICINE

## 2023-06-27 PROCEDURE — 85025 COMPLETE CBC W/AUTO DIFF WBC: CPT | Performed by: INTERNAL MEDICINE

## 2023-06-27 PROCEDURE — 25000003 PHARM REV CODE 250: Performed by: INTERNAL MEDICINE

## 2023-06-27 PROCEDURE — 20000000 HC ICU ROOM

## 2023-06-27 RX ORDER — MORPHINE SULFATE 4 MG/ML
4 INJECTION, SOLUTION INTRAMUSCULAR; INTRAVENOUS EVERY 6 HOURS PRN
Status: DISCONTINUED | OUTPATIENT
Start: 2023-06-27 | End: 2023-06-29

## 2023-06-27 RX ADMIN — INSULIN ASPART 6 UNITS: 100 INJECTION, SOLUTION INTRAVENOUS; SUBCUTANEOUS at 11:06

## 2023-06-27 RX ADMIN — METOPROLOL TARTRATE 50 MG: 50 TABLET, FILM COATED ORAL at 09:06

## 2023-06-27 RX ADMIN — AMITRIPTYLINE HYDROCHLORIDE 25 MG: 25 TABLET, FILM COATED ORAL at 08:06

## 2023-06-27 RX ADMIN — INSULIN DETEMIR 20 UNITS: 100 INJECTION, SOLUTION SUBCUTANEOUS at 08:06

## 2023-06-27 RX ADMIN — INSULIN ASPART 8 UNITS: 100 INJECTION, SOLUTION INTRAVENOUS; SUBCUTANEOUS at 08:06

## 2023-06-27 RX ADMIN — SODIUM CHLORIDE: 9 INJECTION, SOLUTION INTRAVENOUS at 03:06

## 2023-06-27 RX ADMIN — MORPHINE SULFATE 4 MG: 4 INJECTION, SOLUTION INTRAMUSCULAR; INTRAVENOUS at 10:06

## 2023-06-27 RX ADMIN — MORPHINE SULFATE 4 MG: 4 INJECTION, SOLUTION INTRAMUSCULAR; INTRAVENOUS at 09:06

## 2023-06-27 RX ADMIN — ENOXAPARIN SODIUM 30 MG: 30 INJECTION SUBCUTANEOUS at 06:06

## 2023-06-27 RX ADMIN — METOPROLOL TARTRATE 50 MG: 50 TABLET, FILM COATED ORAL at 08:06

## 2023-06-27 RX ADMIN — PANTOPRAZOLE SODIUM 40 MG: 40 TABLET, DELAYED RELEASE ORAL at 09:06

## 2023-06-27 RX ADMIN — DULOXETINE 30 MG: 30 CAPSULE, DELAYED RELEASE ORAL at 09:06

## 2023-06-27 RX ADMIN — MORPHINE SULFATE 4 MG: 4 INJECTION, SOLUTION INTRAMUSCULAR; INTRAVENOUS at 03:06

## 2023-06-27 RX ADMIN — SODIUM CHLORIDE: 9 INJECTION, SOLUTION INTRAVENOUS at 09:06

## 2023-06-27 RX ADMIN — SODIUM CHLORIDE: 9 INJECTION, SOLUTION INTRAVENOUS at 08:06

## 2023-06-27 RX ADMIN — INSULIN ASPART 12 UNITS: 100 INJECTION, SOLUTION INTRAVENOUS; SUBCUTANEOUS at 08:06

## 2023-06-27 RX ADMIN — MORPHINE SULFATE 4 MG: 4 INJECTION, SOLUTION INTRAMUSCULAR; INTRAVENOUS at 05:06

## 2023-06-27 RX ADMIN — MORPHINE SULFATE 4 MG: 4 INJECTION, SOLUTION INTRAMUSCULAR; INTRAVENOUS at 01:06

## 2023-06-27 RX ADMIN — SODIUM CHLORIDE: 9 INJECTION, SOLUTION INTRAVENOUS at 12:06

## 2023-06-27 NOTE — PROGRESS NOTES
[facility] - ICU Hospital Medicine  Progress Note    Patient Name: Dorene Husain  MRN: [id number]  Patient Class: IP- Inpatient   Admission Date: 6/19/2023  Length of Stay: 8 days  Attending Physician: Bao Vuong MD  Primary Care Provider: Kumar Ortiz NP        Subjective:     Principal Problem:Type 1 diabetes mellitus with ketoacidosis without coma        HPI:  Patient is a 28 yo female w/ DM1 who presents after 2 days of nausea and vomiting, reporting that she can not keep oral intake down. Patient reports she has not been taking her insulin during this time. Patient currently denies fever, headache, SOB, CP, cough, dysuria or other s/s of infection at this time. Patient reports she does frequently have issues with nausea and vomiting.    Patient to be admitted to ICU for DKA and higher level of care.       Overview/Hospital Course:  6/27/23-Patient is still c/o pain.  She has h/o gastroparesis and has been getting morphine q4 for pain.  She continues to have nausea as well which is likely due to the morphine which is typically not used for gastroparesis.  I will start weaning her off the morphine.  Will increase Levimer as well for better glucose control.  She has not been eating or wanting to get OOB.  Foul breath from not wanting to brush her teeth as well      Interval History:     Review of Systems   Constitutional:  Positive for activity change and fatigue.   HENT: Negative.     Eyes: Negative.    Respiratory: Negative.     Cardiovascular: Negative.    Gastrointestinal:  Positive for abdominal pain and nausea.   Endocrine: Negative.    Genitourinary: Negative.    Skin: Negative.    Allergic/Immunologic: Negative.    Neurological: Negative.    Hematological: Negative.    Psychiatric/Behavioral:  Positive for behavioral problems.    Objective:     Vital Signs (Most Recent):  Temp: 97.7 °F (36.5 °C) (06/27/23 1259)  Pulse: 85 (06/27/23 1259)  Resp: (!) 28 (06/27/23 1259)  BP: (!) 140/77  (06/27/23 1300)  SpO2: 100 % (06/27/23 1259) Vital Signs (24h Range):  Temp:  [97.3 °F (36.3 °C)-98.2 °F (36.8 °C)] 97.7 °F (36.5 °C)  Pulse:  [77-91] 85  Resp:  [11-39] 28  SpO2:  [97 %-100 %] 100 %  BP: ()/(64-87) 140/77     Weight: 67.1 kg (148 lb)  Body mass index is 28.9 kg/m².    Intake/Output Summary (Last 24 hours) at 6/27/2023 1326  Last data filed at 6/27/2023 0529  Gross per 24 hour   Intake 3157.31 ml   Output 1300 ml   Net 1857.31 ml         Physical Exam  Constitutional:       Appearance: Normal appearance. She is obese.   HENT:      Head: Normocephalic and atraumatic.      Nose: Nose normal.      Mouth/Throat:      Mouth: Mucous membranes are dry.      Pharynx: Oropharynx is clear.   Eyes:      Extraocular Movements: Extraocular movements intact.      Conjunctiva/sclera: Conjunctivae normal.      Pupils: Pupils are equal, round, and reactive to light.   Cardiovascular:      Rate and Rhythm: Normal rate.      Pulses: Normal pulses.      Heart sounds: Normal heart sounds.   Pulmonary:      Effort: Pulmonary effort is normal.      Breath sounds: Normal breath sounds.   Abdominal:      General: Bowel sounds are normal.      Palpations: Abdomen is soft.      Tenderness: There is abdominal tenderness.   Musculoskeletal:         General: Normal range of motion.      Cervical back: Normal range of motion and neck supple.   Skin:     General: Skin is warm and dry.      Capillary Refill: Capillary refill takes 2 to 3 seconds.   Neurological:      General: No focal deficit present.      Mental Status: She is alert and oriented to person, place, and time. Mental status is at baseline.   Psychiatric:         Attention and Perception: Attention and perception normal.         Mood and Affect: Mood normal. Affect is blunt.         Speech: Speech normal.         Behavior: Behavior normal.         Thought Content: Thought content normal.         Cognition and Memory: Cognition and memory normal.         Judgment:  Judgment normal.           Significant Labs: All pertinent labs within the past 24 hours have been reviewed.  BMP:   Recent Labs   Lab 06/27/23  0346      K 4.3   CO2 24   BUN 8.0   CREATININE 0.91   CALCIUM 7.9*     CBC:   Recent Labs   Lab 06/27/23  0346   WBC 5.79   HGB 8.0*   HCT 25.6*   *     CMP:   Recent Labs   Lab 06/26/23  0428 06/26/23  0748 06/27/23  0346   * 135* 136   K 3.3* 3.8 4.3   CO2 21* 26 24   BUN 10.0 9.0 8.0   CREATININE 1.17* 1.13* 0.91   CALCIUM 8.7 8.2* 7.9*     Magnesium: No results for input(s): MG in the last 48 hours.    Significant Imaging: I have reviewed all pertinent imaging results/findings within the past 24 hours.      Assessment/Plan:      * Type 1 diabetes mellitus with ketoacidosis without coma  Patient's FSGs are uncontrolled due to hyperglycemia on current medication regimen.  Last A1c reviewed-   Lab Results   Component Value Date    HGBA1C 11.3 (H) 06/16/2023     Most recent fingerstick glucose reviewed-   Recent Labs   Lab 06/19/23  1443 06/19/23  1559 06/19/23  1658 06/19/23  1800   POCTGLUCOSE >500* 467* 419* 318*     Current correctional scale  Medium  Increase anti-hyperglycemic dose as follows-   Antihyperglycemics (From admission, onward)      Start     Stop Route Frequency Ordered    06/19/23 1515  insulin regular in 0.9 % NaCl 100 unit/100 mL (1 unit/mL) infusion        Question Answer Comment   Insulin Rate Adjustment (DO NOT MODIFY ANSWER) [email or web address] DKA version [email or web address]    Initial dose (DO NOT CHANGE): 0.1 units/kg/hr        -- IV Continuous 06/19/23 1503          Hold Oral hypoglycemics while patient is in the hospital.    -Admit to ICU  -Insulin ggt  -Q1hr Accu checks  -Q4hr CMP, Mg, Phos  -LR at 150 cc/hr  -Replace electrolytes for K>4, Mg>2     Noncompliance  See principle problem      Hypertension    monitor    DM gastroparesis  Patient's FSGs are uncontrolled due to  hyperglycemia on current medication regimen.  Last A1c reviewed-   Lab Results   Component Value Date    HGBA1C 11.3 (H) 06/16/2023     Most recent fingerstick glucose reviewed-   Recent Labs   Lab 06/26/23  1708 06/26/23  2116 06/27/23  0654   POCTGLUCOSE 133* 250* 346*     Current correctional scale  High  Increase anti-hyperglycemic dose as follows-   Antihyperglycemics (From admission, onward)      Start     Stop Route Frequency Ordered    06/28/23 0900  insulin detemir U-100 injection 20 Units         -- SubQ Daily 06/27/23 1323    06/26/23 0950  insulin aspart U-100 injection 0-15 Units         -- SubQ Before meals & nightly PRN 06/26/23 0851          Hold Oral hypoglycemics while patient is in the hospital.      VTE Risk Mitigation (From admission, onward)           Ordered     enoxaparin injection 30 mg  Daily         06/20/23 1042     Place sequential compression device  Until discontinued         06/19/23 1434     IP VTE LOW RISK PATIENT  Once         06/19/23 1434                  Wean off morphine  Follow labs  DVT prophylaxis  Advance diet  OOB  Discharge Planning   EBENEZER:      Code Status: Full Code   Is the patient medically ready for discharge?:     Reason for patient still in hospital (select all that apply): Patient trending condition, Laboratory test, Treatment and Pending disposition  Discharge Plan A: Home with family                  Francis Bloom MD  Department of Hospital Medicine   [facility]

## 2023-06-27 NOTE — SUBJECTIVE & OBJECTIVE
Interval History:     Review of Systems   Constitutional:  Positive for activity change and fatigue.   HENT: Negative.     Eyes: Negative.    Respiratory: Negative.     Cardiovascular: Negative.    Gastrointestinal:  Positive for abdominal pain and nausea.   Endocrine: Negative.    Genitourinary: Negative.    Skin: Negative.    Allergic/Immunologic: Negative.    Neurological: Negative.    Hematological: Negative.    Psychiatric/Behavioral:  Positive for behavioral problems.    Objective:     Vital Signs (Most Recent):  Temp: 97.7 °F (36.5 °C) (06/27/23 1259)  Pulse: 85 (06/27/23 1259)  Resp: (!) 28 (06/27/23 1259)  BP: (!) 140/77 (06/27/23 1300)  SpO2: 100 % (06/27/23 1259) Vital Signs (24h Range):  Temp:  [97.3 °F (36.3 °C)-98.2 °F (36.8 °C)] 97.7 °F (36.5 °C)  Pulse:  [77-91] 85  Resp:  [11-39] 28  SpO2:  [97 %-100 %] 100 %  BP: ()/(64-87) 140/77     Weight: 67.1 kg (148 lb)  Body mass index is 28.9 kg/m².    Intake/Output Summary (Last 24 hours) at 6/27/2023 1326  Last data filed at 6/27/2023 0529  Gross per 24 hour   Intake 3157.31 ml   Output 1300 ml   Net 1857.31 ml         Physical Exam  Constitutional:       Appearance: Normal appearance. She is obese.   HENT:      Head: Normocephalic and atraumatic.      Nose: Nose normal.      Mouth/Throat:      Mouth: Mucous membranes are dry.      Pharynx: Oropharynx is clear.   Eyes:      Extraocular Movements: Extraocular movements intact.      Conjunctiva/sclera: Conjunctivae normal.      Pupils: Pupils are equal, round, and reactive to light.   Cardiovascular:      Rate and Rhythm: Normal rate.      Pulses: Normal pulses.      Heart sounds: Normal heart sounds.   Pulmonary:      Effort: Pulmonary effort is normal.      Breath sounds: Normal breath sounds.   Abdominal:      General: Bowel sounds are normal.      Palpations: Abdomen is soft.      Tenderness: There is abdominal tenderness.   Musculoskeletal:         General: Normal range of motion.      Cervical  back: Normal range of motion and neck supple.   Skin:     General: Skin is warm and dry.      Capillary Refill: Capillary refill takes 2 to 3 seconds.   Neurological:      General: No focal deficit present.      Mental Status: She is alert and oriented to person, place, and time. Mental status is at baseline.   Psychiatric:         Attention and Perception: Attention and perception normal.         Mood and Affect: Mood normal. Affect is blunt.         Speech: Speech normal.         Behavior: Behavior normal.         Thought Content: Thought content normal.         Cognition and Memory: Cognition and memory normal.         Judgment: Judgment normal.           Significant Labs: All pertinent labs within the past 24 hours have been reviewed.  BMP:   Recent Labs   Lab 06/27/23  0346      K 4.3   CO2 24   BUN 8.0   CREATININE 0.91   CALCIUM 7.9*     CBC:   Recent Labs   Lab 06/27/23 0346   WBC 5.79   HGB 8.0*   HCT 25.6*   *     CMP:   Recent Labs   Lab 06/26/23  0428 06/26/23  0748 06/27/23  0346   * 135* 136   K 3.3* 3.8 4.3   CO2 21* 26 24   BUN 10.0 9.0 8.0   CREATININE 1.17* 1.13* 0.91   CALCIUM 8.7 8.2* 7.9*     Magnesium: No results for input(s): MG in the last 48 hours.    Significant Imaging: I have reviewed all pertinent imaging results/findings within the past 24 hours.

## 2023-06-27 NOTE — PROGRESS NOTES
Inpatient Nutrition Evaluation    Admit Date: 6/19/2023   Total duration of encounter: 8 days    Nutrition Recommendation/Prescription     Rec'd advance diet to GI Soft, Diabetic as tolerated as soon as medically appropriate.   Encourage intake and offer Diabetic Snacks in between meals.   Boost Glucose Control, BID, when able to advance diet beyond CL. Provides 190 kcal, 16 g protein per serving.  Continue to encourage Diet and medication compliance.   Monitor diet advancement, intake, tolerance, weight, and labs.     RD following and available as needed. Thank you.     Nutrition Assessment     Chart Review    Reason Seen: continuous nutrition monitoring and follow-up    Malnutrition Screening Tool Results   Have you recently lost weight without trying?: No  Have you been eating poorly because of a decreased appetite?: No   MST Score: 0     Diagnosis:  Type 1 diabetes mellitus with ketoacidosis without coma.  Noncompliance with diet and medicine.     Relevant Medical History:   Diabetes mellitus      Diabetic gastroparesis associated with type 1 diabetes mellitus      DKA (diabetic ketoacidosis)      Hypertension      Non compliance with medical treatment          Nutrition-Related Medications:   IV Fluids: 0.9%NaCl@150mL/hr.   Lovenox; Insuin; Protonix; Kcl.     Nutrition-Related Labs:  6/27: (H); Ca+ 7.9(L); H/H 8.0/25.6(L).   6/20: WBC 21.09(H); H/H 10.7/32.1(L); Na+ 129(L); K+ 3.4(L); Cl 96(L); BUN/Crea 32/1.15(H); GFR WNL; (H); Alb 2.7(L).    6/16: HgbA1C 11.3(HIGH).     Diet Order: Diet full liquid No Concentrated Sweets  Oral Supplement Order: none  Appetite/Oral Intake: poor/0-25% of meals.   Factors Affecting Nutritional Intake: abdominal pain, nausea, and Non compliance.   Food/Sabianist/Cultural Preferences: none reported  Food Allergies: none reported       Wound(s):       Comments  6/27: Pt currently on [place] Diabetic Diet. Discussed with MD and nsg who report, Pt sleeps a lot and does not  want to eat much. Ask for pain meds. Per MD notes: .  She has h/o gastroparesis and has been getting morphine q4 for pain.  She continues to have nausea as well which is likely due to the morphine which is typically not used for gastroparesis.  I will start weaning her off the morphine.  Will increase Levimer as well for better glucose control.  She has not been eating or wanting to get OOB.  Foul breath from not wanting to brush her teeth as well. Labs and meds reviewed. Will continue to monitor during stay.          6/20: Pt with vast history of noncompliance with diet and medical treatment. Known to hospital for frequent visits with DKA. Staff continues to educate pt on importance of compliance with diet and medicine and will continue to emphasize the importance of compliance. Last HgbA1C 11.3 on 6/16/2023. Labs and meds reviewed. No recent weight loss noted/reported. No new weights. Will continue to monitor during stay.     Anthropometrics    Height: 5' (152.4 cm) Height Method: Measured  Last Weight: 67.1 kg (148 lb) (06/19/23 1317) Weight Method: Standard Scale  BMI (Calculated): 28.9  BMI Classification: overweight (BMI 25-29.9)     Ideal Body Weight (IBW), Female: 100 lb     % Ideal Body Weight, Female (lb): 148 %                             Usual Weight Provided By: EMR weight history    Wt Readings from Last 5 Encounters:   06/19/23 67.1 kg (148 lb)   06/17/23 66.2 kg (146 lb)   06/16/23 66.2 kg (146 lb)   03/28/23 56 kg (123 lb 6.4 oz)   03/09/23 57.6 kg (127 lb)     Weight Change(s) Since Admission:  Admit Weight: 67.1 kg (148 lb) (06/19/23 1317)      Patient Education    Not applicable.    Monitoring & Evaluation     Dietitian will monitor food and beverage intake, energy intake, weight, weight change, electrolyte/renal panel, glucose/endocrine profile, and gastrointestinal profile.  Nutrition Risk/Follow-Up: low (follow-up in 5-7 days)  Patients assigned 'low nutrition risk' status do not qualify for a  full nutritional assessment but will be monitored and re-evaluated in a 5-7 day time period. Please consult if re-evaluation needed sooner.

## 2023-06-27 NOTE — ASSESSMENT & PLAN NOTE
Patient's FSGs are uncontrolled due to hyperglycemia on current medication regimen.  Last A1c reviewed-   Lab Results   Component Value Date    HGBA1C 11.3 (H) 06/16/2023     Most recent fingerstick glucose reviewed-   Recent Labs   Lab 06/26/23  1708 06/26/23  2116 06/27/23  0654   POCTGLUCOSE 133* 250* 346*     Current correctional scale  High  Increase anti-hyperglycemic dose as follows-   Antihyperglycemics (From admission, onward)    Start     Stop Route Frequency Ordered    06/28/23 0900  insulin detemir U-100 injection 20 Units         -- SubQ Daily 06/27/23 1323    06/26/23 0950  insulin aspart U-100 injection 0-15 Units         -- SubQ Before meals & nightly PRN 06/26/23 0851        Hold Oral hypoglycemics while patient is in the hospital.

## 2023-06-27 NOTE — HOSPITAL COURSE
6/27/23-Patient is still c/o pain.  She has h/o gastroparesis and has been getting morphine q4 for pain.  She continues to have nausea as well which is likely due to the morphine which is typically not used for gastroparesis.  I will start weaning her off the morphine.  Will increase Levimer as well for better glucose control.    6/28/23-Will advance diet today and transfer to floor.  She is not really wanting to do much.  Need to wean off her pain meds due to her gastroparesis.  Will get therapy to get her up out of the bed.  Hopefully she can be discharged in 1-2 days.  She is not very compliant as well.

## 2023-06-27 NOTE — NURSING
, BMP from am lab draw around 0345 shows glucose 216. Nothing taken orally.  Will recheck stat serum glucose level before insulin administration

## 2023-06-28 LAB
ALBUMIN SERPL-MCNC: 2.2 G/DL (ref 3.5–5)
ALBUMIN/GLOB SERPL: 0.8 RATIO (ref 1.1–2)
ALP SERPL-CCNC: 65 UNIT/L (ref 40–150)
ALT SERPL-CCNC: 10 UNIT/L (ref 0–55)
AST SERPL-CCNC: 20 UNIT/L (ref 5–34)
BASOPHILS # BLD AUTO: 0.04 X10(3)/MCL
BASOPHILS NFR BLD AUTO: 0.6 %
BILIRUBIN DIRECT+TOT PNL SERPL-MCNC: 0.2 MG/DL
BUN SERPL-MCNC: 6 MG/DL (ref 7–18.7)
CALCIUM SERPL-MCNC: 8 MG/DL (ref 8.4–10.2)
CHLORIDE SERPL-SCNC: 105 MMOL/L (ref 98–107)
CO2 SERPL-SCNC: 22 MMOL/L (ref 22–29)
CREAT SERPL-MCNC: 0.79 MG/DL (ref 0.55–1.02)
EOSINOPHIL # BLD AUTO: 0.25 X10(3)/MCL (ref 0–0.9)
EOSINOPHIL NFR BLD AUTO: 3.9 %
ERYTHROCYTE [DISTWIDTH] IN BLOOD BY AUTOMATED COUNT: 12.7 % (ref 11.5–17)
GFR SERPLBLD CREATININE-BSD FMLA CKD-EPI: >60 MLS/MIN/1.73/M2
GLOBULIN SER-MCNC: 2.7 GM/DL (ref 2.4–3.5)
GLUCOSE SERPL-MCNC: 66 MG/DL (ref 70–110)
GLUCOSE SERPL-MCNC: 72 MG/DL (ref 70–110)
GLUCOSE SERPL-MCNC: 98 MG/DL (ref 74–100)
HCT VFR BLD AUTO: 25.6 % (ref 37–47)
HGB BLD-MCNC: 8.2 G/DL (ref 12–16)
IMM GRANULOCYTES # BLD AUTO: 0.07 X10(3)/MCL (ref 0–0.04)
IMM GRANULOCYTES NFR BLD AUTO: 1.1 %
LYMPHOCYTES # BLD AUTO: 2.89 X10(3)/MCL (ref 0.6–4.6)
LYMPHOCYTES NFR BLD AUTO: 45.4 %
MAGNESIUM SERPL-MCNC: 1.4 MG/DL (ref 1.6–2.6)
MCH RBC QN AUTO: 27.5 PG (ref 27–31)
MCHC RBC AUTO-ENTMCNC: 32 G/DL (ref 33–36)
MCV RBC AUTO: 85.9 FL (ref 80–94)
MONOCYTES # BLD AUTO: 0.63 X10(3)/MCL (ref 0.1–1.3)
MONOCYTES NFR BLD AUTO: 9.9 %
NEUTROPHILS # BLD AUTO: 2.48 X10(3)/MCL (ref 2.1–9.2)
NEUTROPHILS NFR BLD AUTO: 39.1 %
PLATELET # BLD AUTO: 426 X10(3)/MCL (ref 130–400)
PMV BLD AUTO: 8.8 FL (ref 7.4–10.4)
POCT GLUCOSE: 152 MG/DL (ref 70–110)
POCT GLUCOSE: 211 MG/DL (ref 70–110)
POCT GLUCOSE: 219 MG/DL (ref 70–110)
POCT GLUCOSE: 59 MG/DL (ref 70–110)
POCT GLUCOSE: 66 MG/DL (ref 70–110)
POCT GLUCOSE: 72 MG/DL (ref 70–110)
POTASSIUM SERPL-SCNC: 3.8 MMOL/L (ref 3.5–5.1)
PROT SERPL-MCNC: 4.9 GM/DL (ref 6.4–8.3)
RBC # BLD AUTO: 2.98 X10(6)/MCL (ref 4.2–5.4)
SODIUM SERPL-SCNC: 135 MMOL/L (ref 136–145)
WBC # SPEC AUTO: 6.36 X10(3)/MCL (ref 4.5–11.5)

## 2023-06-28 PROCEDURE — 83735 ASSAY OF MAGNESIUM: CPT | Performed by: INTERNAL MEDICINE

## 2023-06-28 PROCEDURE — 85025 COMPLETE CBC W/AUTO DIFF WBC: CPT | Performed by: INTERNAL MEDICINE

## 2023-06-28 PROCEDURE — 94761 N-INVAS EAR/PLS OXIMETRY MLT: CPT

## 2023-06-28 PROCEDURE — 97161 PT EVAL LOW COMPLEX 20 MIN: CPT

## 2023-06-28 PROCEDURE — 25000003 PHARM REV CODE 250: Performed by: INTERNAL MEDICINE

## 2023-06-28 PROCEDURE — 97530 THERAPEUTIC ACTIVITIES: CPT

## 2023-06-28 PROCEDURE — 25000003 PHARM REV CODE 250: Performed by: STUDENT IN AN ORGANIZED HEALTH CARE EDUCATION/TRAINING PROGRAM

## 2023-06-28 PROCEDURE — 63600175 PHARM REV CODE 636 W HCPCS: Performed by: INTERNAL MEDICINE

## 2023-06-28 PROCEDURE — 21400001 HC TELEMETRY ROOM

## 2023-06-28 PROCEDURE — 11000001 HC ACUTE MED/SURG PRIVATE ROOM

## 2023-06-28 PROCEDURE — 80053 COMPREHEN METABOLIC PANEL: CPT | Performed by: INTERNAL MEDICINE

## 2023-06-28 RX ORDER — MAGNESIUM SULFATE HEPTAHYDRATE 40 MG/ML
4 INJECTION, SOLUTION INTRAVENOUS ONCE
Status: COMPLETED | OUTPATIENT
Start: 2023-06-28 | End: 2023-06-28

## 2023-06-28 RX ADMIN — AMITRIPTYLINE HYDROCHLORIDE 25 MG: 25 TABLET, FILM COATED ORAL at 08:06

## 2023-06-28 RX ADMIN — MORPHINE SULFATE 4 MG: 4 INJECTION, SOLUTION INTRAMUSCULAR; INTRAVENOUS at 04:06

## 2023-06-28 RX ADMIN — SODIUM CHLORIDE: 9 INJECTION, SOLUTION INTRAVENOUS at 11:06

## 2023-06-28 RX ADMIN — SODIUM CHLORIDE: 9 INJECTION, SOLUTION INTRAVENOUS at 06:06

## 2023-06-28 RX ADMIN — METOPROLOL TARTRATE 50 MG: 50 TABLET, FILM COATED ORAL at 08:06

## 2023-06-28 RX ADMIN — LABETALOL HYDROCHLORIDE 20 MG: 5 INJECTION INTRAVENOUS at 05:06

## 2023-06-28 RX ADMIN — MORPHINE SULFATE 4 MG: 4 INJECTION, SOLUTION INTRAMUSCULAR; INTRAVENOUS at 10:06

## 2023-06-28 RX ADMIN — MAGNESIUM SULFATE HEPTAHYDRATE 4 G: 40 INJECTION, SOLUTION INTRAVENOUS at 10:06

## 2023-06-28 RX ADMIN — ENOXAPARIN SODIUM 30 MG: 30 INJECTION SUBCUTANEOUS at 05:06

## 2023-06-28 RX ADMIN — PANTOPRAZOLE SODIUM 40 MG: 40 TABLET, DELAYED RELEASE ORAL at 08:06

## 2023-06-28 RX ADMIN — INSULIN DETEMIR 20 UNITS: 100 INJECTION, SOLUTION SUBCUTANEOUS at 05:06

## 2023-06-28 RX ADMIN — INSULIN ASPART 5 UNITS: 100 INJECTION, SOLUTION INTRAVENOUS; SUBCUTANEOUS at 08:06

## 2023-06-28 RX ADMIN — DULOXETINE 30 MG: 30 CAPSULE, DELAYED RELEASE ORAL at 08:06

## 2023-06-28 RX ADMIN — SODIUM CHLORIDE: 9 INJECTION, SOLUTION INTRAVENOUS at 04:06

## 2023-06-28 NOTE — PROGRESS NOTES
[facility] - ICU Hospital Medicine  Progress Note    Patient Name: Dorene Husain  MRN: [id number]  Patient Class: IP- Inpatient   Admission Date: 6/19/2023  Length of Stay: 9 days  Attending Physician: Bao Vuong MD  Primary Care Provider: Kumar Ortiz NP        Subjective:     Principal Problem:Type 1 diabetes mellitus with ketoacidosis without coma        HPI:  Patient is a 26 yo female w/ DM1 who presents after 2 days of nausea and vomiting, reporting that she can not keep oral intake down. Patient reports she has not been taking her insulin during this time. Patient currently denies fever, headache, SOB, CP, cough, dysuria or other s/s of infection at this time. Patient reports she does frequently have issues with nausea and vomiting.    Patient to be admitted to ICU for DKA and higher level of care.       Overview/Hospital Course:  6/27/23-Patient is still c/o pain.  She has h/o gastroparesis and has been getting morphine q4 for pain.  She continues to have nausea as well which is likely due to the morphine which is typically not used for gastroparesis.  I will start weaning her off the morphine.  Will increase Levimer as well for better glucose control.    6/28/23-Will advance diet today and transfer to floor.  She is not really wanting to do much.  Need to wean off her pain meds due to her gastroparesis.  Will get therapy to get her up out of the bed.  Hopefully she can be discharged in 1-2 days.  She is not very compliant as well.      Interval History:     Review of Systems   Constitutional:  Positive for activity change and fatigue.   HENT: Negative.     Eyes: Negative.    Respiratory: Negative.     Cardiovascular: Negative.    Gastrointestinal:  Positive for abdominal pain and nausea.   Endocrine: Negative.    Genitourinary: Negative.    Musculoskeletal: Negative.    Skin: Negative.    Allergic/Immunologic: Negative.    Neurological: Negative.    Hematological: Negative.     Psychiatric/Behavioral:  Positive for dysphoric mood.    Objective:     Vital Signs (Most Recent):  Temp: 98.6 °F (37 °C) (06/28/23 1145)  Pulse: 78 (06/28/23 1145)  Resp: 13 (06/28/23 1145)  BP: (!) 152/102 (06/28/23 1130)  SpO2: 100 % (06/28/23 1145) Vital Signs (24h Range):  Temp:  [97.2 °F (36.2 °C)-98.6 °F (37 °C)] 98.6 °F (37 °C)  Pulse:  [] 78  Resp:  [0-31] 13  SpO2:  [64 %-100 %] 100 %  BP: (100-166)/() 152/102     Weight: 67.5 kg (148 lb 13 oz)  Body mass index is 29.06 kg/m².    Intake/Output Summary (Last 24 hours) at 6/28/2023 1237  Last data filed at 6/28/2023 0430  Gross per 24 hour   Intake 3583.54 ml   Output 2200 ml   Net 1383.54 ml         Physical Exam  Constitutional:       Appearance: Normal appearance. She is normal weight.   HENT:      Head: Normocephalic and atraumatic.      Nose: Nose normal.      Mouth/Throat:      Mouth: Mucous membranes are dry.      Pharynx: Oropharynx is clear.   Eyes:      Extraocular Movements: Extraocular movements intact.      Conjunctiva/sclera: Conjunctivae normal.      Pupils: Pupils are equal, round, and reactive to light.   Cardiovascular:      Rate and Rhythm: Normal rate and regular rhythm.      Pulses: Normal pulses.      Heart sounds: Normal heart sounds.   Pulmonary:      Effort: Pulmonary effort is normal.      Breath sounds: Normal breath sounds.   Abdominal:      General: Bowel sounds are normal.      Palpations: Abdomen is soft.      Tenderness: There is abdominal tenderness.   Musculoskeletal:         General: Normal range of motion.      Cervical back: Normal range of motion and neck supple.   Skin:     General: Skin is warm and dry.      Capillary Refill: Capillary refill takes 2 to 3 seconds.   Neurological:      General: No focal deficit present.      Mental Status: She is alert and oriented to person, place, and time. Mental status is at baseline.   Psychiatric:         Attention and Perception: Attention normal.         Mood and  Affect: Affect is labile and flat.         Speech: Speech normal.         Behavior: Behavior is cooperative.         Thought Content: Thought content normal.         Cognition and Memory: Cognition normal.         Judgment: Judgment normal.           Significant Labs: All pertinent labs within the past 24 hours have been reviewed.  BMP:   Recent Labs   Lab 06/28/23  0418   *   K 3.8   CO2 22   BUN 6.0*   CREATININE 0.79   CALCIUM 8.0*   MG 1.40*     CBC:   Recent Labs   Lab 06/27/23  0346 06/28/23 0418   WBC 5.79 6.36   HGB 8.0* 8.2*   HCT 25.6* 25.6*   * 426*     CMP:   Recent Labs   Lab 06/27/23  0346 06/28/23  0418    135*   K 4.3 3.8   CO2 24 22   BUN 8.0 6.0*   CREATININE 0.91 0.79   CALCIUM 7.9* 8.0*   ALBUMIN  --  2.2*   BILITOT  --  0.2   ALKPHOS  --  65   AST  --  20   ALT  --  10     Magnesium:   Recent Labs   Lab 06/28/23  0418   MG 1.40*       Significant Imaging: I have reviewed all pertinent imaging results/findings within the past 24 hours.      Assessment/Plan:      * Type 1 diabetes mellitus with ketoacidosis without coma  Patient's FSGs are uncontrolled due to hyperglycemia on current medication regimen.  Last A1c reviewed-   Lab Results   Component Value Date    HGBA1C 11.3 (H) 06/16/2023     Most recent fingerstick glucose reviewed-   Recent Labs   Lab 06/19/23  1443 06/19/23  1559 06/19/23  1658 06/19/23  1800   POCTGLUCOSE >500* 467* 419* 318*     Current correctional scale  Medium  Increase anti-hyperglycemic dose as follows-   Antihyperglycemics (From admission, onward)    Start     Stop Route Frequency Ordered    06/19/23 1515  insulin regular in 0.9 % NaCl 100 unit/100 mL (1 unit/mL) infusion        Question Answer Comment   Insulin Rate Adjustment (DO NOT MODIFY ANSWER) [email or web address] DKA version [email or web address]    Initial dose (DO NOT CHANGE): 0.1 units/kg/hr        -- IV Continuous 06/19/23 1503        Hold Oral  hypoglycemics while patient is in the hospital.    -Admit to ICU  -Insulin ggt  -Q1hr Accu checks  -Q4hr CMP, Mg, Phos  -LR at 150 cc/hr  -Replace electrolytes for K>4, Mg>2     Noncompliance  See principle problem      Hypertension    monitor    DM gastroparesis  Patient's FSGs are uncontrolled due to hyperglycemia on current medication regimen.  Last A1c reviewed-   Lab Results   Component Value Date    HGBA1C 11.3 (H) 06/16/2023     Most recent fingerstick glucose reviewed-   Recent Labs   Lab 06/26/23  1708 06/26/23  2116 06/27/23  0654   POCTGLUCOSE 133* 250* 346*     Current correctional scale  High  Increase anti-hyperglycemic dose as follows-   Antihyperglycemics (From admission, onward)    Start     Stop Route Frequency Ordered    06/28/23 0900  insulin detemir U-100 injection 20 Units         -- SubQ Daily 06/27/23 1323    06/26/23 0950  insulin aspart U-100 injection 0-15 Units         -- SubQ Before meals & nightly PRN 06/26/23 0851        Hold Oral hypoglycemics while patient is in the hospital.      VTE Risk Mitigation (From admission, onward)         Ordered     enoxaparin injection 30 mg  Daily         06/20/23 1042     Place sequential compression device  Until discontinued         06/19/23 1434     IP VTE LOW RISK PATIENT  Once         06/19/23 1434            transfer to floor  DVT prophylaxis  Follow labs  OOB  Advance diet  PT consult    Discharge Planning   EBENEZER:      Code Status: Full Code   Is the patient medically ready for discharge?:     Reason for patient still in hospital (select all that apply): Patient trending condition, Laboratory test, Treatment, Consult recommendations, PT / OT recommendations and Pending disposition  Discharge Plan A: Home with family                  Francis Bloom MD  Department of Hospital Medicine   [facility] - ICU

## 2023-06-28 NOTE — SUBJECTIVE & OBJECTIVE
Interval History:     Review of Systems   Constitutional:  Positive for activity change and fatigue.   HENT: Negative.     Eyes: Negative.    Respiratory: Negative.     Cardiovascular: Negative.    Gastrointestinal:  Positive for abdominal pain and nausea.   Endocrine: Negative.    Genitourinary: Negative.    Musculoskeletal: Negative.    Skin: Negative.    Allergic/Immunologic: Negative.    Neurological: Negative.    Hematological: Negative.    Psychiatric/Behavioral:  Positive for dysphoric mood.    Objective:     Vital Signs (Most Recent):  Temp: 98.6 °F (37 °C) (06/28/23 1145)  Pulse: 78 (06/28/23 1145)  Resp: 13 (06/28/23 1145)  BP: (!) 152/102 (06/28/23 1130)  SpO2: 100 % (06/28/23 1145) Vital Signs (24h Range):  Temp:  [97.2 °F (36.2 °C)-98.6 °F (37 °C)] 98.6 °F (37 °C)  Pulse:  [] 78  Resp:  [0-31] 13  SpO2:  [64 %-100 %] 100 %  BP: (100-166)/() 152/102     Weight: 67.5 kg (148 lb 13 oz)  Body mass index is 29.06 kg/m².    Intake/Output Summary (Last 24 hours) at 6/28/2023 1237  Last data filed at 6/28/2023 0430  Gross per 24 hour   Intake 3583.54 ml   Output 2200 ml   Net 1383.54 ml         Physical Exam  Constitutional:       Appearance: Normal appearance. She is normal weight.   HENT:      Head: Normocephalic and atraumatic.      Nose: Nose normal.      Mouth/Throat:      Mouth: Mucous membranes are dry.      Pharynx: Oropharynx is clear.   Eyes:      Extraocular Movements: Extraocular movements intact.      Conjunctiva/sclera: Conjunctivae normal.      Pupils: Pupils are equal, round, and reactive to light.   Cardiovascular:      Rate and Rhythm: Normal rate and regular rhythm.      Pulses: Normal pulses.      Heart sounds: Normal heart sounds.   Pulmonary:      Effort: Pulmonary effort is normal.      Breath sounds: Normal breath sounds.   Abdominal:      General: Bowel sounds are normal.      Palpations: Abdomen is soft.      Tenderness: There is abdominal tenderness.   Musculoskeletal:          General: Normal range of motion.      Cervical back: Normal range of motion and neck supple.   Skin:     General: Skin is warm and dry.      Capillary Refill: Capillary refill takes 2 to 3 seconds.   Neurological:      General: No focal deficit present.      Mental Status: She is alert and oriented to person, place, and time. Mental status is at baseline.   Psychiatric:         Attention and Perception: Attention normal.         Mood and Affect: Affect is labile and flat.         Speech: Speech normal.         Behavior: Behavior is cooperative.         Thought Content: Thought content normal.         Cognition and Memory: Cognition normal.         Judgment: Judgment normal.           Significant Labs: All pertinent labs within the past 24 hours have been reviewed.  BMP:   Recent Labs   Lab 06/28/23 0418   *   K 3.8   CO2 22   BUN 6.0*   CREATININE 0.79   CALCIUM 8.0*   MG 1.40*     CBC:   Recent Labs   Lab 06/27/23 0346 06/28/23 0418   WBC 5.79 6.36   HGB 8.0* 8.2*   HCT 25.6* 25.6*   * 426*     CMP:   Recent Labs   Lab 06/27/23 0346 06/28/23 0418    135*   K 4.3 3.8   CO2 24 22   BUN 8.0 6.0*   CREATININE 0.91 0.79   CALCIUM 7.9* 8.0*   ALBUMIN  --  2.2*   BILITOT  --  0.2   ALKPHOS  --  65   AST  --  20   ALT  --  10     Magnesium:   Recent Labs   Lab 06/28/23 0418   MG 1.40*       Significant Imaging: I have reviewed all pertinent imaging results/findings within the past 24 hours.

## 2023-06-29 LAB
ALBUMIN SERPL-MCNC: 2.2 G/DL (ref 3.5–5)
ALBUMIN/GLOB SERPL: 0.8 RATIO (ref 1.1–2)
ALP SERPL-CCNC: 65 UNIT/L (ref 40–150)
ALT SERPL-CCNC: 8 UNIT/L (ref 0–55)
AST SERPL-CCNC: 14 UNIT/L (ref 5–34)
BASOPHILS # BLD AUTO: 0.02 X10(3)/MCL
BASOPHILS NFR BLD AUTO: 0.4 %
BILIRUBIN DIRECT+TOT PNL SERPL-MCNC: 0.1 MG/DL
BUN SERPL-MCNC: 5 MG/DL (ref 7–18.7)
CALCIUM SERPL-MCNC: 8 MG/DL (ref 8.4–10.2)
CHLORIDE SERPL-SCNC: 105 MMOL/L (ref 98–107)
CO2 SERPL-SCNC: 26 MMOL/L (ref 22–29)
CREAT SERPL-MCNC: 0.96 MG/DL (ref 0.55–1.02)
EOSINOPHIL # BLD AUTO: 0.15 X10(3)/MCL (ref 0–0.9)
EOSINOPHIL NFR BLD AUTO: 3 %
ERYTHROCYTE [DISTWIDTH] IN BLOOD BY AUTOMATED COUNT: 12.6 % (ref 11.5–17)
GFR SERPLBLD CREATININE-BSD FMLA CKD-EPI: >60 MLS/MIN/1.73/M2
GLOBULIN SER-MCNC: 2.8 GM/DL (ref 2.4–3.5)
GLUCOSE SERPL-MCNC: 253 MG/DL (ref 74–100)
HCT VFR BLD AUTO: 25.5 % (ref 37–47)
HGB BLD-MCNC: 8.2 G/DL (ref 12–16)
IMM GRANULOCYTES # BLD AUTO: 0.03 X10(3)/MCL (ref 0–0.04)
IMM GRANULOCYTES NFR BLD AUTO: 0.6 %
LYMPHOCYTES # BLD AUTO: 1.52 X10(3)/MCL (ref 0.6–4.6)
LYMPHOCYTES NFR BLD AUTO: 30.5 %
MAGNESIUM SERPL-MCNC: 1.6 MG/DL (ref 1.6–2.6)
MCH RBC QN AUTO: 27.7 PG (ref 27–31)
MCHC RBC AUTO-ENTMCNC: 32.2 G/DL (ref 33–36)
MCV RBC AUTO: 86.1 FL (ref 80–94)
MONOCYTES # BLD AUTO: 0.46 X10(3)/MCL (ref 0.1–1.3)
MONOCYTES NFR BLD AUTO: 9.2 %
NEUTROPHILS # BLD AUTO: 2.81 X10(3)/MCL (ref 2.1–9.2)
NEUTROPHILS NFR BLD AUTO: 56.3 %
PLATELET # BLD AUTO: 375 X10(3)/MCL (ref 130–400)
PMV BLD AUTO: 8 FL (ref 7.4–10.4)
POCT GLUCOSE: 121 MG/DL (ref 70–110)
POCT GLUCOSE: 200 MG/DL (ref 70–110)
POCT GLUCOSE: 247 MG/DL (ref 70–110)
POCT GLUCOSE: 373 MG/DL (ref 70–110)
POCT GLUCOSE: 417 MG/DL (ref 70–110)
POCT GLUCOSE: 45 MG/DL (ref 70–110)
POCT GLUCOSE: 458 MG/DL (ref 70–110)
POCT GLUCOSE: 468 MG/DL (ref 70–110)
POTASSIUM SERPL-SCNC: 3.9 MMOL/L (ref 3.5–5.1)
PROT SERPL-MCNC: 5 GM/DL (ref 6.4–8.3)
RBC # BLD AUTO: 2.96 X10(6)/MCL (ref 4.2–5.4)
SODIUM SERPL-SCNC: 136 MMOL/L (ref 136–145)
WBC # SPEC AUTO: 4.99 X10(3)/MCL (ref 4.5–11.5)

## 2023-06-29 PROCEDURE — 80053 COMPREHEN METABOLIC PANEL: CPT | Performed by: INTERNAL MEDICINE

## 2023-06-29 PROCEDURE — 63600175 PHARM REV CODE 636 W HCPCS: Performed by: INTERNAL MEDICINE

## 2023-06-29 PROCEDURE — 25000003 PHARM REV CODE 250: Performed by: INTERNAL MEDICINE

## 2023-06-29 PROCEDURE — 21400001 HC TELEMETRY ROOM

## 2023-06-29 PROCEDURE — 83735 ASSAY OF MAGNESIUM: CPT | Performed by: INTERNAL MEDICINE

## 2023-06-29 PROCEDURE — 85025 COMPLETE CBC W/AUTO DIFF WBC: CPT | Performed by: INTERNAL MEDICINE

## 2023-06-29 PROCEDURE — 94761 N-INVAS EAR/PLS OXIMETRY MLT: CPT

## 2023-06-29 PROCEDURE — 25000003 PHARM REV CODE 250: Performed by: STUDENT IN AN ORGANIZED HEALTH CARE EDUCATION/TRAINING PROGRAM

## 2023-06-29 RX ORDER — METOCLOPRAMIDE 10 MG/1
10 TABLET ORAL
Status: DISCONTINUED | OUTPATIENT
Start: 2023-06-29 | End: 2023-06-30 | Stop reason: HOSPADM

## 2023-06-29 RX ORDER — IBUPROFEN 200 MG
24 TABLET ORAL
Status: DISCONTINUED | OUTPATIENT
Start: 2023-06-29 | End: 2023-06-30 | Stop reason: HOSPADM

## 2023-06-29 RX ORDER — INSULIN ASPART 100 [IU]/ML
0-5 INJECTION, SOLUTION INTRAVENOUS; SUBCUTANEOUS
Status: DISCONTINUED | OUTPATIENT
Start: 2023-06-29 | End: 2023-06-30 | Stop reason: HOSPADM

## 2023-06-29 RX ORDER — HYDROCODONE BITARTRATE AND ACETAMINOPHEN 10; 325 MG/1; MG/1
1 TABLET ORAL EVERY 6 HOURS PRN
Status: DISCONTINUED | OUTPATIENT
Start: 2023-06-29 | End: 2023-06-30 | Stop reason: HOSPADM

## 2023-06-29 RX ORDER — IBUPROFEN 200 MG
16 TABLET ORAL
Status: DISCONTINUED | OUTPATIENT
Start: 2023-06-29 | End: 2023-06-30 | Stop reason: HOSPADM

## 2023-06-29 RX ADMIN — DULOXETINE 30 MG: 30 CAPSULE, DELAYED RELEASE ORAL at 10:06

## 2023-06-29 RX ADMIN — SODIUM CHLORIDE: 9 INJECTION, SOLUTION INTRAVENOUS at 01:06

## 2023-06-29 RX ADMIN — MORPHINE SULFATE 4 MG: 4 INJECTION, SOLUTION INTRAMUSCULAR; INTRAVENOUS at 02:06

## 2023-06-29 RX ADMIN — MORPHINE SULFATE 4 MG: 4 INJECTION, SOLUTION INTRAMUSCULAR; INTRAVENOUS at 09:06

## 2023-06-29 RX ADMIN — METOCLOPRAMIDE 10 MG: 10 TABLET ORAL at 10:06

## 2023-06-29 RX ADMIN — INSULIN DETEMIR 20 UNITS: 100 INJECTION, SOLUTION SUBCUTANEOUS at 04:06

## 2023-06-29 RX ADMIN — METOPROLOL TARTRATE 50 MG: 50 TABLET, FILM COATED ORAL at 10:06

## 2023-06-29 RX ADMIN — MORPHINE SULFATE 4 MG: 4 INJECTION, SOLUTION INTRAMUSCULAR; INTRAVENOUS at 04:06

## 2023-06-29 RX ADMIN — METOCLOPRAMIDE 10 MG: 10 TABLET ORAL at 04:06

## 2023-06-29 RX ADMIN — HYDROCODONE BITARTRATE AND ACETAMINOPHEN 1 TABLET: 10; 325 TABLET ORAL at 05:06

## 2023-06-29 RX ADMIN — INSULIN ASPART 4 UNITS: 100 INJECTION, SOLUTION INTRAVENOUS; SUBCUTANEOUS at 05:06

## 2023-06-29 RX ADMIN — SODIUM CHLORIDE: 9 INJECTION, SOLUTION INTRAVENOUS at 10:06

## 2023-06-29 RX ADMIN — ENOXAPARIN SODIUM 30 MG: 30 INJECTION SUBCUTANEOUS at 04:06

## 2023-06-29 RX ADMIN — AMITRIPTYLINE HYDROCHLORIDE 25 MG: 25 TABLET, FILM COATED ORAL at 10:06

## 2023-06-29 RX ADMIN — INSULIN ASPART 5 UNITS: 100 INJECTION, SOLUTION INTRAVENOUS; SUBCUTANEOUS at 03:06

## 2023-06-29 RX ADMIN — SODIUM CHLORIDE: 9 INJECTION, SOLUTION INTRAVENOUS at 06:06

## 2023-06-29 RX ADMIN — PANTOPRAZOLE SODIUM 40 MG: 40 TABLET, DELAYED RELEASE ORAL at 10:06

## 2023-06-29 RX ADMIN — HYDRALAZINE HYDROCHLORIDE 10 MG: 20 INJECTION INTRAMUSCULAR; INTRAVENOUS at 10:06

## 2023-06-29 NOTE — PROGRESS NOTES
Hospital Medicine  Progress Note    Patient Name: Dorene Husain  MRN: [id number]  Status: IP- Inpatient   Admission Date: 6/19/2023  Length of Stay: 10  Date of Service: 06/29/2023       CC: hospital follow-up for DKA       SUBJECTIVE   28 yo female with a history that includes IDDM I, presented to ED 6/19 after 2 days of nausea and vomiting, reporting that she could not keep oral intake down.  Patient reports she has not been taking her insulin during this time.  Patient denied fever, headache, SOB, CP, cough, dysuria or other s/s of infection at this time.  Work up in ED consistent with DKA, and she was admitted to ICU for DKA protocol. Gap closed, patient transitioned to SC insulin, diet started.    Today: Patient seen and examined at bedside, and chart reviewed.  labs stable, on soft diet.      MEDICATIONS   Scheduled   amitriptyline  25 mg Oral Nightly    DULoxetine  30 mg Oral Daily    enoxaparin  30 mg Subcutaneous Daily    insulin detemir U-100  20 Units Subcutaneous Daily    metoclopramide HCl  10 mg Oral QID (AC & HS)    metoprolol tartrate  50 mg Oral BID    pantoprazole  40 mg Oral Daily     Continuous Infusions   sodium chloride 0.9% 150 mL/hr at 06/29/23 1010   None      PHYSICAL EXAM   VITALS: T 98.4 °F (36.9 °C)   /87   P 95   RR 20   O2 100 %    GENERAL: Awake and in visible distress s/t pain  LUNGS: CTA anteriorly  CVS: Normal rate  GI/: Soft, NT, bowel sounds positive.  EXTREMITIES: No peripheral edema  NEURO: AAOx3  PSYCH: Cooperative      LABS   CBC  Recent Labs     06/28/23  0418 06/29/23  0753   WBC 6.36 4.99   RBC 2.98* 2.96*   HGB 8.2* 8.2*   HCT 25.6* 25.5*   MCV 85.9 86.1   MCH 27.5 27.7   MCHC 32.0* 32.2*   RDW 12.7 12.6   * 375     CHEM  Recent Labs     06/28/23  0418 06/29/23  0753   * 136   K 3.8 3.9   CHLORIDE 105 105   CO2 22 26   BUN 6.0* 5.0*   CREATININE 0.79 0.96   GLUCOSE 98 253*   CALCIUM 8.0* 8.0*   MG 1.40* 1.60   ALBUMIN 2.2* 2.2*   GLOBULIN 2.7 2.8    ALKPHOS 65 65   ALT 10 8   AST 20 14   BILITOT 0.2 0.1       ASSESSMENT   IDDM I, uncontrolled with recurrent DKA  CHATA s/t IVVD, on CKD II/IIIA  Medication noncompliance  Acute Hyponatremia  Hypokalemia    PLAN   Will discontinue IV narcotics  Start oral Reglan, continue soft/diabetic diet  Otherwise continue current management  Approaching discharge if tolerating diet.        Prophylaxis: SC Lovenox        Bao Vuong MD  [facility] Medicine

## 2023-06-29 NOTE — PLAN OF CARE
Problem: Adult Inpatient Plan of Care  Goal: Plan of Care Review  Outcome: Ongoing, Progressing  Goal: Patient-Specific Goal (Individualized)  Outcome: Ongoing, Progressing  Goal: Absence of Hospital-Acquired Illness or Injury  Outcome: Ongoing, Progressing  Goal: Optimal Comfort and Wellbeing  Outcome: Ongoing, Progressing  Intervention: Monitor Pain and Promote Comfort  Flowsheets (Taken 6/29/2023 0532)  Pain Management Interventions:   around-the-clock dosing utilized   pillow support provided  Intervention: Provide Person-Centered Care  Flowsheets (Taken 6/29/2023 0532)  Trust Relationship/Rapport:   care explained   choices provided

## 2023-06-29 NOTE — NURSING
After covering patient HS 2U for blood sugar of 211, patient dropped to 45, 2 packets of sugar and 12 oz of grape juice as well as renard crackers were given.  BG is now 200. Hospitalist will be contacted.

## 2023-06-30 VITALS
OXYGEN SATURATION: 99 % | HEIGHT: 60 IN | HEART RATE: 102 BPM | DIASTOLIC BLOOD PRESSURE: 90 MMHG | RESPIRATION RATE: 18 BRPM | TEMPERATURE: 99 F | SYSTOLIC BLOOD PRESSURE: 154 MMHG | WEIGHT: 148.81 LBS | BODY MASS INDEX: 29.22 KG/M2

## 2023-06-30 PROBLEM — E10.10 TYPE 1 DIABETES MELLITUS WITH KETOACIDOSIS WITHOUT COMA: Status: RESOLVED | Noted: 2023-06-19 | Resolved: 2023-06-30

## 2023-06-30 LAB
ANION GAP SERPL CALC-SCNC: 5 MEQ/L
BUN SERPL-MCNC: 8 MG/DL (ref 7–18.7)
CALCIUM SERPL-MCNC: 7.9 MG/DL (ref 8.4–10.2)
CHLORIDE SERPL-SCNC: 106 MMOL/L (ref 98–107)
CO2 SERPL-SCNC: 24 MMOL/L (ref 22–29)
CREAT SERPL-MCNC: 0.89 MG/DL (ref 0.55–1.02)
CREAT/UREA NIT SERPL: 9
GFR SERPLBLD CREATININE-BSD FMLA CKD-EPI: >60 MLS/MIN/1.73/M2
GLUCOSE SERPL-MCNC: 139 MG/DL (ref 74–100)
POCT GLUCOSE: 131 MG/DL (ref 70–110)
POCT GLUCOSE: 343 MG/DL (ref 70–110)
POTASSIUM SERPL-SCNC: 3.7 MMOL/L (ref 3.5–5.1)
SODIUM SERPL-SCNC: 135 MMOL/L (ref 136–145)

## 2023-06-30 PROCEDURE — 80048 BASIC METABOLIC PNL TOTAL CA: CPT | Performed by: INTERNAL MEDICINE

## 2023-06-30 PROCEDURE — 25000003 PHARM REV CODE 250: Performed by: INTERNAL MEDICINE

## 2023-06-30 PROCEDURE — 25000003 PHARM REV CODE 250: Performed by: STUDENT IN AN ORGANIZED HEALTH CARE EDUCATION/TRAINING PROGRAM

## 2023-06-30 PROCEDURE — 94761 N-INVAS EAR/PLS OXIMETRY MLT: CPT

## 2023-06-30 PROCEDURE — 63600175 PHARM REV CODE 636 W HCPCS: Performed by: INTERNAL MEDICINE

## 2023-06-30 RX ORDER — HYDROCODONE BITARTRATE AND ACETAMINOPHEN 10; 325 MG/1; MG/1
1 TABLET ORAL EVERY 6 HOURS PRN
Qty: 10 TABLET | Refills: 0 | Status: SHIPPED | OUTPATIENT
Start: 2023-06-30 | End: 2023-08-09 | Stop reason: SDUPTHER

## 2023-06-30 RX ORDER — METOCLOPRAMIDE 10 MG/1
10 TABLET ORAL
Qty: 120 TABLET | Refills: 0 | Status: SHIPPED | OUTPATIENT
Start: 2023-06-30 | End: 2023-06-30 | Stop reason: HOSPADM

## 2023-06-30 RX ORDER — BENZTROPINE MESYLATE 1 MG/ML
1 INJECTION, SOLUTION INTRAMUSCULAR; INTRAVENOUS ONCE
Status: COMPLETED | OUTPATIENT
Start: 2023-06-30 | End: 2023-06-30

## 2023-06-30 RX ORDER — PANTOPRAZOLE SODIUM 40 MG/1
40 TABLET, DELAYED RELEASE ORAL DAILY
Qty: 30 TABLET | Refills: 0 | Status: SHIPPED | OUTPATIENT
Start: 2023-07-01 | End: 2024-03-18

## 2023-06-30 RX ADMIN — INSULIN ASPART 4 UNITS: 100 INJECTION, SOLUTION INTRAVENOUS; SUBCUTANEOUS at 12:06

## 2023-06-30 RX ADMIN — BENZTROPINE MESYLATE 1 MG: 1 INJECTION INTRAMUSCULAR; INTRAVENOUS at 04:06

## 2023-06-30 RX ADMIN — SODIUM CHLORIDE: 9 INJECTION, SOLUTION INTRAVENOUS at 01:06

## 2023-06-30 RX ADMIN — METOCLOPRAMIDE 10 MG: 10 TABLET ORAL at 05:06

## 2023-06-30 RX ADMIN — DULOXETINE 30 MG: 30 CAPSULE, DELAYED RELEASE ORAL at 08:06

## 2023-06-30 RX ADMIN — HYDROCODONE BITARTRATE AND ACETAMINOPHEN 1 TABLET: 10; 325 TABLET ORAL at 12:06

## 2023-06-30 RX ADMIN — METOPROLOL TARTRATE 50 MG: 50 TABLET, FILM COATED ORAL at 08:06

## 2023-06-30 RX ADMIN — PANTOPRAZOLE SODIUM 40 MG: 40 TABLET, DELAYED RELEASE ORAL at 08:06

## 2023-06-30 RX ADMIN — METOCLOPRAMIDE 10 MG: 10 TABLET ORAL at 12:06

## 2023-06-30 NOTE — PLAN OF CARE
Problem: Adult Inpatient Plan of Care  Goal: Plan of Care Review  6/29/2023 2003 by Lucas Gomez LPN  Outcome: Ongoing, Progressing  6/29/2023 2002 by Lucas Gomez LPN  Outcome: Ongoing, Progressing  Goal: Patient-Specific Goal (Individualized)  6/29/2023 2003 by Lucas Gomez LPN  Outcome: Ongoing, Progressing  6/29/2023 2002 by Lucas Gomez LPN  Outcome: Ongoing, Progressing  Goal: Absence of Hospital-Acquired Illness or Injury  6/29/2023 2003 by Lucas Gomez LPN  Outcome: Ongoing, Progressing  6/29/2023 2002 by Lucas Gomez LPN  Outcome: Ongoing, Progressing  Goal: Optimal Comfort and Wellbeing  6/29/2023 2003 by Lucas Gomez LPN  Outcome: Ongoing, Progressing  6/29/2023 2002 by Lucas Gomez LPN  Outcome: Ongoing, Progressing  Goal: Readiness for Transition of Care  6/29/2023 2003 by Lucas Gomez LPN  Outcome: Ongoing, Progressing  6/29/2023 2002 by Lucas Gomez LPN  Outcome: Ongoing, Progressing     Problem: Diabetes Comorbidity  Goal: Blood Glucose Level Within Targeted Range  6/29/2023 2003 by Lucas Gomez LPN  Outcome: Ongoing, Progressing  6/29/2023 2002 by Lucas Gomez LPN  Outcome: Ongoing, Progressing     Problem: Hypertension Comorbidity  Goal: Blood Pressure in Desired Range  6/29/2023 2003 by Lucas Gomez LPN  Outcome: Ongoing, Progressing  6/29/2023 2002 by Lucas Gomez LPN  Outcome: Ongoing, Progressing     Problem: Diabetic Ketoacidosis  Goal: Fluid and Electrolyte Balance with Absence of Ketosis  6/29/2023 2003 by Lucas Gomez LPN  Outcome: Ongoing, Progressing  6/29/2023 2002 by Lucas Gomez LPN  Outcome: Ongoing, Progressing

## 2023-06-30 NOTE — PLAN OF CARE
06/30/23 1002   Discharge Reassessment   Assessment Type Discharge Planning Reassessment   Discharge Plan discussed with: Patient   Discharge Plan A Home with family   Discharge Plan B Home with family   Transition of Care Barriers None   Why the patient remains in the hospital Requires continued medical care   Post-Acute Status   Discharge Delays (!) Procedure Scheduling (IR, OR, Labs, Echo, Cath, Echo, EEG)     Medication adjusting.

## 2023-06-30 NOTE — NURSING
@[id number]  This nurse spoke with Dr. Vuong about patients recent increased blood sugars and wanted to know if he wanted me to cover her again for her blood sugar, Telephone order  read back, verified and carried out. Will check patients sugar at change of shift as a follow up.

## 2023-07-02 NOTE — DISCHARGE SUMMARY
Hospital Medicine  Discharge Summary    Patient Name: Dorene Husain  MRN: [id number]  Admit Date: 6/19/2023  Discharge Date: 6/30/2023   Status: IP- Inpatient   Length of Stay: 11      PHYSICIANS   Admitting Physician: Italia Koehler MD  Discharging Physician: Bao Vuong MD.  Primary Care Physician: Kumar Ortiz NP  Consults: None      DISCHARGE DIAGNOSES   IDDM I, uncontrolled with recurrent DKA  CHATA s/t IVVD, on CKD II/IIIA  Medication noncompliance  Acute Hyponatremia  Hypokalemia      PROCEDURES   None      HOSPITAL COURSE    26 yo female with a history that includes IDDM I, presented to ED 6/19 after 2 days of nausea and vomiting, reporting that she could not keep oral intake down.  Patient reports she has not been taking her insulin during this time.  Patient denied fever, headache, SOB, CP, cough, dysuria or other s/s of infection at this time.  Work up in ED consistent with DKA, and she was admitted to ICU for DKA protocol. Gap closed, patient transitioned to SC insulin, diet started.  Transitioned to out of ICU to floor.  Continued with poor oral intake and labile blood sugars.  She ended up going back into DKA and required transfer back to ICU.  DKA quickly resolved, downgraded back to floor.  Sugars relatively stable and patient was tolerating diet.  She is stable for discharge home.      STATUS  Improved    DISPOSITION  Discharge to home    DIET  Diabetic    ACTIVITY  As tolerated      FOLLOW-UP      Kumar Ortiz NP. Schedule an appointment as soon as possible for a visit in 1 week(s).    Specialty: Family Medicine  Contact information:  [address] [place] [address]  [phone]                 DISCHARGE MEDICATION RECONCILIATION     PRESCRIBED     HYDROcodone-acetaminophen  mg per tablet  Commonly known as: NORCO  Take 1 tablet by mouth every 6 (six) hours as needed for Pain.            CONTINUE with CHANGES      pantoprazole 40 MG tablet  Commonly known as: PROTONIX  Take  1 tablet (40 mg total) by mouth once daily.  What changed: when to take this            CONTINUE      amitriptyline 25 MG tablet  Commonly known as: ELAVIL     BASAGLAR KWIKPEN U-100 INSULIN glargine 100 units/mL SubQ pen  Generic drug: insulin     dicyclomine 10 MG capsule  Commonly known as: BENTYL     hydrOXYzine 50 MG tablet  Commonly known as: ATARAX     metoprolol tartrate 50 MG tablet  Commonly known as: LOPRESSOR            These medications were sent to   [facility] [address] [place] [id number]      Phone: [phone]   HYDROcodone-acetaminophen  mg per tablet  pantoprazole 40 MG tablet         PHYSICAL EXAM   VITALS: T 98.9 °F (37.2 °C)   BP (!) 154/90   P 102   RR 18   O2 99 %    GENERAL: Awake and in visible distress s/t pain  LUNGS: CTA anteriorly  CVS: Normal rate  GI/: Soft, NT, bowel sounds positive.  EXTREMITIES: No peripheral edema  NEURO: AAOx3  PSYCH: Cooperative      Discharge time: 33 minutes     Bao Vuong MD  [facility]       DIAGNOSITCS   CBC:   Recent Labs   Lab 06/27/23  0346 06/28/23  0418 06/29/23  0753   WBC 5.79 6.36 4.99   HGB 8.0* 8.2* 8.2*   HCT 25.6* 25.6* 25.5*   * 426* 375     CMP:   Recent Labs   Lab 06/28/23  0418 06/29/23  0753 06/30/23  0503   CALCIUM 8.0* 8.0* 7.9*   ALBUMIN 2.2* 2.2*  --    * 136 135*   K 3.8 3.9 3.7   CO2 22 26 24   BUN 6.0* 5.0* 8.0   CREATININE 0.79 0.96 0.89   ALKPHOS 65 65  --    ALT 10 8  --    AST 20 14  --    BILITOT 0.2 0.1  --    MG 1.40* 1.60  --      Estimated Creatinine Clearance: 81.4 mL/min (based on SCr of 0.89 mg/dL).      Recent Labs     06/29/23  1631 06/29/23  1719 06/29/23  1950 06/29/23  2224 06/30/23  0523 06/30/23  1052   POCTGLUCOSE 468* 417* 247* 121* 131* 343*          X-Ray Chest AP Portable  Result Date: 6/19/2023  EXAMINATION: XR CHEST AP PORTABLE CLINICAL HISTORY: hyperglycemia;, . COMPARISON: 02/27/2023 FINDINGS: An AP view or more reveals the heart to be borderline  enlarged.  The trachea is midline.  No consolidative infiltrate or effusion is seen.  There is mild hazy increased density at the right infrahilar region.  Bony structures appear grossly intact.   Impression:  1. Borderline cardiomegaly   2. Mild hazy increased is right the infrahilar region suspicious for early infiltrate and or atelectasis   Electronically signed by: Danilo Navarro Date:    06/19/2023 Time:    15:26

## 2023-07-03 ENCOUNTER — PATIENT OUTREACH (OUTPATIENT)
Dept: ADMINISTRATIVE | Facility: CLINIC | Age: 28
End: 2023-07-03
Payer: MEDICAID

## 2023-07-03 NOTE — PROGRESS NOTES
C3 nurse attempted to contact patient for a TCC post hospital discharge follow-up call. The patient declined call at this time. Pt continued to hang up phone.

## 2023-08-09 ENCOUNTER — HOSPITAL ENCOUNTER (EMERGENCY)
Facility: HOSPITAL | Age: 28
Discharge: HOME | End: 2023-08-09
Attending: EMERGENCY MEDICINE
Payer: MEDICAID

## 2023-08-09 VITALS
HEART RATE: 88 BPM | RESPIRATION RATE: 18 BRPM | DIASTOLIC BLOOD PRESSURE: 90 MMHG | WEIGHT: 150 LBS | BODY MASS INDEX: 27.6 KG/M2 | HEIGHT: 62 IN | TEMPERATURE: 98 F | SYSTOLIC BLOOD PRESSURE: 156 MMHG | OXYGEN SATURATION: 98 %

## 2023-08-09 DIAGNOSIS — U07.1 COVID: Primary | ICD-10-CM

## 2023-08-09 DIAGNOSIS — Z86.39 HISTORY OF DIABETES MELLITUS, TYPE I: ICD-10-CM

## 2023-08-09 LAB
ALBUMIN SERPL-MCNC: 3 G/DL (ref 3.5–5)
ALBUMIN/GLOB SERPL: 0.8 RATIO (ref 1.1–2)
ALP SERPL-CCNC: 91 UNIT/L (ref 40–150)
ALT SERPL-CCNC: 9 UNIT/L (ref 0–55)
APPEARANCE UR: CLEAR
AST SERPL-CCNC: 16 UNIT/L (ref 5–34)
B-HCG SERPL QL: NEGATIVE
BACTERIA #/AREA URNS AUTO: ABNORMAL /HPF
BASOPHILS # BLD AUTO: 0.04 X10(3)/MCL
BASOPHILS NFR BLD AUTO: 0.5 %
BILIRUB SERPL-MCNC: 0.2 MG/DL
BILIRUB UR QL STRIP.AUTO: NEGATIVE
BUN SERPL-MCNC: 22 MG/DL (ref 7–18.7)
CALCIUM SERPL-MCNC: 9 MG/DL (ref 8.4–10.2)
CHLORIDE SERPL-SCNC: 98 MMOL/L (ref 98–107)
CO2 SERPL-SCNC: 27 MMOL/L (ref 22–29)
COLOR UR: YELLOW
CREAT SERPL-MCNC: 1.14 MG/DL (ref 0.55–1.02)
EOSINOPHIL # BLD AUTO: 0.17 X10(3)/MCL (ref 0–0.9)
EOSINOPHIL NFR BLD AUTO: 2.3 %
ERYTHROCYTE [DISTWIDTH] IN BLOOD BY AUTOMATED COUNT: 13.2 % (ref 11.5–17)
GFR SERPLBLD CREATININE-BSD FMLA CKD-EPI: >60 MLS/MIN/1.73/M2
GLOBULIN SER-MCNC: 3.7 GM/DL (ref 2.4–3.5)
GLUCOSE SERPL-MCNC: 398 MG/DL (ref 74–100)
GLUCOSE UR QL STRIP.AUTO: ABNORMAL
HCT VFR BLD AUTO: 28.3 % (ref 37–47)
HGB BLD-MCNC: 9.1 G/DL (ref 12–16)
IMM GRANULOCYTES # BLD AUTO: 0.06 X10(3)/MCL (ref 0–0.04)
IMM GRANULOCYTES NFR BLD AUTO: 0.8 %
KETONES UR QL STRIP.AUTO: NEGATIVE
LEUKOCYTE ESTERASE UR QL STRIP.AUTO: NEGATIVE
LYMPHOCYTES # BLD AUTO: 1.32 X10(3)/MCL (ref 0.6–4.6)
LYMPHOCYTES NFR BLD AUTO: 17.7 %
MCH RBC QN AUTO: 27.2 PG (ref 27–31)
MCHC RBC AUTO-ENTMCNC: 32.2 G/DL (ref 33–36)
MCV RBC AUTO: 84.7 FL (ref 80–94)
MONOCYTES # BLD AUTO: 0.82 X10(3)/MCL (ref 0.1–1.3)
MONOCYTES NFR BLD AUTO: 11 %
NEUTROPHILS # BLD AUTO: 5.04 X10(3)/MCL (ref 2.1–9.2)
NEUTROPHILS NFR BLD AUTO: 67.7 %
NITRITE UR QL STRIP.AUTO: NEGATIVE
PH UR STRIP.AUTO: 7 [PH]
PLATELET # BLD AUTO: 382 X10(3)/MCL (ref 130–400)
PMV BLD AUTO: 8.9 FL (ref 7.4–10.4)
POTASSIUM SERPL-SCNC: 4.5 MMOL/L (ref 3.5–5.1)
PROT SERPL-MCNC: 6.7 GM/DL (ref 6.4–8.3)
PROT UR QL STRIP.AUTO: ABNORMAL
RBC # BLD AUTO: 3.34 X10(6)/MCL (ref 4.2–5.4)
RBC #/AREA URNS AUTO: ABNORMAL /HPF
RBC UR QL AUTO: ABNORMAL
SODIUM SERPL-SCNC: 134 MMOL/L (ref 136–145)
SP GR UR STRIP.AUTO: 1.02
SQUAMOUS #/AREA URNS AUTO: ABNORMAL /HPF
UROBILINOGEN UR STRIP-ACNC: 0.2
WBC # SPEC AUTO: 7.45 X10(3)/MCL (ref 4.5–11.5)
WBC #/AREA URNS AUTO: ABNORMAL /HPF

## 2023-08-09 PROCEDURE — 25000003 PHARM REV CODE 250: Performed by: EMERGENCY MEDICINE

## 2023-08-09 PROCEDURE — 81025 URINE PREGNANCY TEST: CPT | Performed by: EMERGENCY MEDICINE

## 2023-08-09 PROCEDURE — 99284 EMERGENCY DEPT VISIT MOD MDM: CPT | Mod: 25

## 2023-08-09 PROCEDURE — 63600175 PHARM REV CODE 636 W HCPCS: Performed by: EMERGENCY MEDICINE

## 2023-08-09 PROCEDURE — 96374 THER/PROPH/DIAG INJ IV PUSH: CPT

## 2023-08-09 PROCEDURE — 85025 COMPLETE CBC W/AUTO DIFF WBC: CPT | Performed by: EMERGENCY MEDICINE

## 2023-08-09 PROCEDURE — 81001 URINALYSIS AUTO W/SCOPE: CPT | Performed by: EMERGENCY MEDICINE

## 2023-08-09 PROCEDURE — 80053 COMPREHEN METABOLIC PANEL: CPT | Performed by: EMERGENCY MEDICINE

## 2023-08-09 PROCEDURE — 96361 HYDRATE IV INFUSION ADD-ON: CPT

## 2023-08-09 RX ORDER — ONDANSETRON 2 MG/ML
4 INJECTION INTRAMUSCULAR; INTRAVENOUS ONCE
Status: COMPLETED | OUTPATIENT
Start: 2023-08-09 | End: 2023-08-09

## 2023-08-09 RX ORDER — ONDANSETRON 2 MG/ML
4 INJECTION INTRAMUSCULAR; INTRAVENOUS EVERY 4 HOURS PRN
Status: DISCONTINUED | OUTPATIENT
Start: 2023-08-09 | End: 2023-08-09

## 2023-08-09 RX ORDER — OXYCODONE AND ACETAMINOPHEN 10; 325 MG/1; MG/1
1 TABLET ORAL EVERY 8 HOURS PRN
Qty: 6 TABLET | Refills: 0 | Status: ON HOLD | OUTPATIENT
Start: 2023-08-09 | End: 2023-10-06 | Stop reason: SDUPTHER

## 2023-08-09 RX ORDER — OXYCODONE AND ACETAMINOPHEN 5; 325 MG/1; MG/1
2 TABLET ORAL ONCE
Status: COMPLETED | OUTPATIENT
Start: 2023-08-09 | End: 2023-08-09

## 2023-08-09 RX ADMIN — ONDANSETRON 4 MG: 2 INJECTION INTRAMUSCULAR; INTRAVENOUS at 12:08

## 2023-08-09 RX ADMIN — SODIUM CHLORIDE 1000 ML: 9 INJECTION, SOLUTION INTRAVENOUS at 12:08

## 2023-08-09 RX ADMIN — OXYCODONE AND ACETAMINOPHEN 2 TABLET: 5; 325 TABLET ORAL at 11:08

## 2023-08-09 RX ADMIN — SODIUM CHLORIDE 1000 ML: 9 INJECTION, SOLUTION INTRAVENOUS at 10:08

## 2023-08-09 NOTE — ED PROVIDER NOTES
Encounter Date: 2023       History     Chief Complaint   Patient presents with    Back Pain     Pt dx with Covid today and and is here  because she is having back pain, nausea and runny nose.     Ms. Husain is a brittle type 1 diabetic who frequently comes emergency department with sugar out of control and/or DKA.  She said yesterday she started having pain across all of her back and burning in her nose when she went to work today the test during told she was COVID positive her back pain is so bad she came to the emergency department here said she is nauseated but not throwing up.  She says whole back hurts in burning in her nose and air severe.  She has a history of type 1 diabetes mellitus with a history of DKA as well as diabetic gastroparesis.  She has not vomited today.  She has no known drug allergies.  She does not use tobacco alcohol or drugs.  She is single she is employed mother and father are both still alive both suffer with hypertension.      She is had a tetanus shot COVID shots she is not had pneumonia or flu shots.  Regular physician healthcare provider Kumar tran at [facility]. She finished a normal cycle last week she is a  1 para 0 SAB 1.  She is had a cholecystectomy.    She denies being around anybody else it is acutely ill with the same symptoms.        Review of patient's allergies indicates:  No Known Allergies  Past Medical History:   Diagnosis Date    Diabetes mellitus     Diabetic gastroparesis associated with type 1 diabetes mellitus     DKA (diabetic ketoacidosis)     Hypertension     Non compliance with medical treatment      Past Surgical History:   Procedure Laterality Date    CHOLECYSTECTOMY      ESOPHAGOGASTRODUODENOSCOPY      Multiple    None       Family History   Problem Relation Age of Onset    Heart disease Mother     Hypertension Mother     Diabetes Mother     Hyperlipidemia Mother     Cancer Father     Stroke Father     Hypertension Father     Hyperlipidemia Father       Social History     Tobacco Use    Smoking status: Never    Smokeless tobacco: Never   Substance Use Topics    Alcohol use: Never    Drug use: Not Currently     Types: Marijuana     Review of Systems   Constitutional: Negative.    HENT:  Negative for rhinorrhea.         She complains of burning in her nose bilateral   Eyes: Negative.    Respiratory: Negative.     Cardiovascular: Negative.    Gastrointestinal:  Positive for nausea.   Endocrine: Negative.    Genitourinary: Negative.    Musculoskeletal:  Positive for back pain (Diffuse back pain paraspinous muscle both sides top to bottom).   Skin: Negative.    Allergic/Immunologic: Negative.    Neurological: Negative.    Hematological: Negative.    Psychiatric/Behavioral: Negative.     All other systems reviewed and are negative.      Physical Exam     Initial Vitals [08/09/23 1002]   BP Pulse Resp Temp SpO2   (!) 159/101 93 18 98.2 °F (36.8 °C) 100 %      MAP       --         Physical Exam    Nursing note and vitals reviewed.  Constitutional: She appears well-developed and well-nourished.   Dramatic on presentation awake oriented tearful and crying with the back pain   HENT:   Head: Normocephalic and atraumatic.   Right Ear: Tympanic membrane and external ear normal.   Left Ear: Tympanic membrane and external ear normal.   Nose: Nose normal.   Mouth/Throat: Oropharynx is clear and moist and mucous membranes are normal.   Eyes: EOM are normal. Pupils are equal, round, and reactive to light.   Neck: Neck supple. No thyromegaly present. No tracheal deviation present. No JVD present.   Normal range of motion.  Cardiovascular:  Normal rate, regular rhythm, normal heart sounds and intact distal pulses.     Exam reveals no gallop and no friction rub.       No murmur heard.  Pulmonary/Chest: Breath sounds normal. No stridor. No respiratory distress. She has no wheezes. She has no rhonchi. She has no rales. She exhibits no tenderness.   Abdominal: Abdomen is soft. Bowel  sounds are normal. She exhibits no distension and no mass. There is no abdominal tenderness.   Genitourinary:    Genitourinary Comments: No CVA tenderness on exam     Musculoskeletal:         General: Tenderness present. No edema. Normal range of motion.      Cervical back: Normal range of motion and neck supple.      Comments: Is tender and paraspinous muscle areas both sides in the back top to bottom no focal deficits     Lymphadenopathy:     She has no cervical adenopathy.   Neurological: She is alert and oriented to person, place, and time. She has normal strength and normal reflexes. No cranial nerve deficit. GCS score is 15. GCS eye subscore is 4. GCS verbal subscore is 5. GCS motor subscore is 6.   Skin: Skin is warm and dry. Capillary refill takes less than 2 seconds. No rash noted.   Psychiatric: She has a normal mood and affect. Her behavior is normal. Judgment and thought content normal.         ED Course   Procedures  Labs Reviewed   URINALYSIS, REFLEX TO URINE CULTURE - Abnormal; Notable for the following components:       Result Value    Protein, UA 3+ (*)     Glucose, UA 3+ (*)     Blood, UA 2+ (*)     All other components within normal limits   COMPREHENSIVE METABOLIC PANEL - Abnormal; Notable for the following components:    Sodium Level 134 (*)     Glucose Level 398 (*)     Blood Urea Nitrogen 22.0 (*)     Creatinine 1.14 (*)     Albumin Level 3.0 (*)     Globulin 3.7 (*)     Albumin/Globulin Ratio 0.8 (*)     All other components within normal limits   CBC WITH DIFFERENTIAL - Abnormal; Notable for the following components:    RBC 3.34 (*)     Hgb 9.1 (*)     Hct 28.3 (*)     MCHC 32.2 (*)     IG# 0.06 (*)     All other components within normal limits   URINALYSIS, MICROSCOPIC - Abnormal; Notable for the following components:    Squamous Epithelial Cells, UA Few (*)     All other components within normal limits   PREGNANCY TEST, URINE RAPID - Normal   CBC W/ AUTO DIFFERENTIAL    Narrative:     The  following orders were created for panel order CBC auto differential.  Procedure                               Abnormality         Status                     ---------                               -----------         ------                     CBC with Differential[[id number]]        Abnormal            Final result                 Please view results for these tests on the individual orders.          Imaging Results              X-Ray Chest AP Portable (Preliminary result)  Result time 08/09/23 12:54:20      Wet Read by Danilo العلي MD (08/09/23 12:54:20, [facility] - Emergency Dept, Emergency Medicine)    Minimum cardiomegaly but no acute cardiopulmonary disease                                     Medications   sodium chloride 0.9% bolus 1,000 mL 1,000 mL (0 mLs Intravenous Stopped 8/9/23 1148)   oxyCODONE-acetaminophen 5-325 mg per tablet 2 tablet (2 tablets Oral Given 8/9/23 1139)   ondansetron injection 4 mg (4 mg Intravenous Given 8/9/23 1225)   sodium chloride 0.9% bolus 1,000 mL 1,000 mL (0 mLs Intravenous Stopped 8/9/23 1300)     Medical Decision Making:   Initial Assessment:   Type 1 diabetic history of DKA and hyperglycemia presents emergency department complaining of burning in the nose diffuse back pain and positive COVID test today no fever no chills no vomiting with the nausea.  No dysuria no frequency.  Has diabetes type 1 brittle has diabetic gastroparesis.  Has chronic hypertension but her blood pressure medication does not work she says  Pleasant lady dramatic in presentation crying awake oriented heart is regular rate and rhythm lungs are clear abdomen seems benign no masses no rebound   Extremities without clubbing or cyanosis edema tender in the paraspinous muscle area is moist mucous membranes no gross exudate in oropharynx or nasal passages.  Extremities without clubbing or cyanosis edema  Differential Diagnosis:   DKA, hyperglycemia, COVID infection with myalgia, urinary  tract infection with back pain,  Clinical Tests:   Lab Tests: Ordered and Reviewed       <> Summary of Lab: One hundred thirty-four sodium bicarb 27 chloride 98 gap of 9 glucose 398 BUN 22 creatinine 1.14 white count 7.45 hemoglobin hematocrit 9.128.3 in the same range she normally runs.  Urinalysis is pending  ED Management:  Evaluation L of fluid 10 mg Percocet given by mouth.  2 L of fluid hung patient is going to try to give us a urine specimen at this point.  Pain has been improved  MDM  Problems addressed  Co-morbidities and/or factors adding to the complexity or risk for the patient:  Brittle type 1 diabetes with history of DKA and history of diabetic gastroparesis  Problems addressed:  Back pain diffuse nausea, burning of nose  Acute problem/illness or progression/exacerbation of chronic problem with potential threat to life/bodily dysfunction?:  Positive COVID test this morning  Differential diagnoses/problems considered: see above     Amount and/or Complexity of Data Reviewed  Independent Historian: none (see above for summary)  External Data Reviewed: notes from previous ED visits, prior labs, and prescription medications  (see above for summary)  Risk and benefits of testing: discussed   Labs: Labs: ordered and reviewed  Radiology:Radiology: ordered and independent interpretation performed (see above or ED course)  ECG/medicine tests:Radiology: ordered and independent interpretation performed (see above or ED course)  none    Risk  Prescription drug management   Parenteral controlled substances   Diagnosis or treatment significantly impacted by social determinants of health: none   Shared decision making     Critical Care  none              ED Course as of 08/09/23 1316   Wed Aug 09, 2023   1213 On repeat evaluation patient is curled up fetal position head covered sleeping woke her up obviously feeling better she is got a L of fluids we will try to get a urinalysis she is going to the bathroom now 2nd  bag of fluid is hung it will be run wide open until the urine returns [DM]   1254 Patient is feeling better we are going to excuse her for 5 days not including today because of the positive COVID we are going to prescribe 6 Percocet pills she can take 1 every 8 hours as needed for pain 10 mg type keep up with her diabetes is I instructed her.  Please follow-up with her regular primary care doctor if not improving [DM]      ED Course User Index  [DM] Danilo العلي MD                   Clinical Impression:   Final diagnoses:  [U07.1] COVID (Primary)  [Z86.39] History of diabetes mellitus, type I        ED Disposition Condition    Discharge Stable          ED Prescriptions       Medication Sig Dispense Start Date End Date Auth. Provider    oxyCODONE-acetaminophen (PERCOCET)  mg per tablet Take 1 tablet by mouth every 8 (eight) hours as needed for Pain. 6 tablet 8/9/2023 -- Danilo العلي MD          Follow-up Information       Follow up With Specialties Details Why Contact Info    Kumar Ortiz NP Family Medicine In 2 days  [address] [place] [address]  [phone]               Danilo العلي MD  08/09/23 4723

## 2023-08-09 NOTE — DISCHARGE INSTRUCTIONS
Final x-ray interpretation will be within the next 12 hours    Keep up with your diabetes mellitus check your sugar use your medications as prescribed    You have 2 days of the 10 mg Percocet you can take 1 every 8 hours as needed for pain    If you do not have any symptoms in 5 days you can return to work    Return for any emergency problem

## 2023-09-25 ENCOUNTER — HOSPITAL ENCOUNTER (EMERGENCY)
Facility: HOSPITAL | Age: 28
Discharge: HOME | End: 2023-09-26
Attending: INTERNAL MEDICINE
Payer: MEDICAID

## 2023-09-25 VITALS
SYSTOLIC BLOOD PRESSURE: 187 MMHG | HEART RATE: 79 BPM | HEIGHT: 62 IN | RESPIRATION RATE: 20 BRPM | WEIGHT: 148.56 LBS | TEMPERATURE: 98 F | DIASTOLIC BLOOD PRESSURE: 107 MMHG | OXYGEN SATURATION: 100 % | BODY MASS INDEX: 27.34 KG/M2

## 2023-09-25 DIAGNOSIS — Z91.199 NONCOMPLIANCE: ICD-10-CM

## 2023-09-25 DIAGNOSIS — E11.43 DIABETIC GASTROPARESIS: ICD-10-CM

## 2023-09-25 DIAGNOSIS — I10 UNCONTROLLED HYPERTENSION: ICD-10-CM

## 2023-09-25 DIAGNOSIS — R11.2 NAUSEA AND VOMITING, UNSPECIFIED VOMITING TYPE: Primary | ICD-10-CM

## 2023-09-25 DIAGNOSIS — E10.65 UNCONTROLLED TYPE 1 DIABETES MELLITUS WITH HYPERGLYCEMIA: ICD-10-CM

## 2023-09-25 DIAGNOSIS — K31.84 DIABETIC GASTROPARESIS: ICD-10-CM

## 2023-09-25 LAB
ALBUMIN SERPL-MCNC: 3.1 G/DL (ref 3.5–5)
ALBUMIN/GLOB SERPL: 0.8 RATIO (ref 1.1–2)
ALLENS TEST: NORMAL
ALP SERPL-CCNC: 90 UNIT/L (ref 40–150)
ALT SERPL-CCNC: 13 UNIT/L (ref 0–55)
APPEARANCE UR: CLEAR
AST SERPL-CCNC: 25 UNIT/L (ref 5–34)
B-HCG SERPL QL: NEGATIVE
BACTERIA #/AREA URNS AUTO: ABNORMAL /HPF
BASOPHILS # BLD AUTO: 0.04 X10(3)/MCL
BASOPHILS NFR BLD AUTO: 0.5 %
BILIRUB SERPL-MCNC: 0.5 MG/DL
BILIRUB UR QL STRIP.AUTO: NEGATIVE
BUN SERPL-MCNC: 17 MG/DL (ref 7–18.7)
CALCIUM SERPL-MCNC: 9 MG/DL (ref 8.4–10.2)
CHLORIDE SERPL-SCNC: 102 MMOL/L (ref 98–107)
CO2 SERPL-SCNC: 20 MMOL/L (ref 22–29)
COLOR UR AUTO: YELLOW
CREAT SERPL-MCNC: 1.03 MG/DL (ref 0.55–1.02)
EOSINOPHIL # BLD AUTO: 0.09 X10(3)/MCL (ref 0–0.9)
EOSINOPHIL NFR BLD AUTO: 1.2 %
ERYTHROCYTE [DISTWIDTH] IN BLOOD BY AUTOMATED COUNT: 12.3 % (ref 11.5–17)
GFR SERPLBLD CREATININE-BSD FMLA CKD-EPI: >60 MLS/MIN/1.73/M2
GLOBULIN SER-MCNC: 3.7 GM/DL (ref 2.4–3.5)
GLUCOSE SERPL-MCNC: 335 MG/DL (ref 74–100)
GLUCOSE SERPL-MCNC: >500 MG/DL (ref 70–110)
GLUCOSE UR QL STRIP.AUTO: ABNORMAL
HCO3 UR-SCNC: 25.4 MMOL/L
HCT VFR BLD AUTO: 28.6 % (ref 37–47)
HGB BLD-MCNC: 9.4 G/DL (ref 12–16)
IMM GRANULOCYTES # BLD AUTO: 0.03 X10(3)/MCL (ref 0–0.04)
IMM GRANULOCYTES NFR BLD AUTO: 0.4 %
KETONES UR QL STRIP.AUTO: NEGATIVE
LEUKOCYTE ESTERASE UR QL STRIP.AUTO: NEGATIVE
LYMPHOCYTES # BLD AUTO: 1.88 X10(3)/MCL (ref 0.6–4.6)
LYMPHOCYTES NFR BLD AUTO: 25.2 %
MCH RBC QN AUTO: 26.9 PG (ref 27–31)
MCHC RBC AUTO-ENTMCNC: 32.9 G/DL (ref 33–36)
MCV RBC AUTO: 81.9 FL (ref 80–94)
MONOCYTES # BLD AUTO: 0.45 X10(3)/MCL (ref 0.1–1.3)
MONOCYTES NFR BLD AUTO: 6 %
NEUTROPHILS # BLD AUTO: 4.98 X10(3)/MCL (ref 2.1–9.2)
NEUTROPHILS NFR BLD AUTO: 66.7 %
NITRITE UR QL STRIP.AUTO: NEGATIVE
PCO2 BLDA: 38.9 MMHG (ref 35–45)
PH SMN: 7.42 [PH] (ref 7.35–7.45)
PH UR STRIP.AUTO: 7 [PH]
PLATELET # BLD AUTO: 386 X10(3)/MCL (ref 130–400)
PMV BLD AUTO: 9 FL (ref 7.4–10.4)
PO2 BLDA: 92 MMHG (ref 80–100)
POC BE: 1 MMOL/L
POC SATURATED O2: 97 % (ref 95–100)
POC TCO2: 27 MMOL/L (ref 23–27)
POTASSIUM SERPL-SCNC: 4.2 MMOL/L (ref 3.5–5.1)
PROT SERPL-MCNC: 6.8 GM/DL (ref 6.4–8.3)
PROT UR QL STRIP.AUTO: ABNORMAL
RBC # BLD AUTO: 3.49 X10(6)/MCL (ref 4.2–5.4)
RBC #/AREA URNS AUTO: ABNORMAL /HPF
RBC UR QL AUTO: ABNORMAL
SAMPLE: NORMAL
SITE: NORMAL
SODIUM SERPL-SCNC: 132 MMOL/L (ref 136–145)
SP GR UR STRIP.AUTO: 1.01 (ref 1–1.03)
SQUAMOUS #/AREA URNS AUTO: ABNORMAL /HPF
UROBILINOGEN UR STRIP-ACNC: 0.2
WBC # SPEC AUTO: 7.47 X10(3)/MCL (ref 4.5–11.5)
WBC #/AREA URNS AUTO: ABNORMAL /HPF

## 2023-09-25 PROCEDURE — 99284 EMERGENCY DEPT VISIT MOD MDM: CPT | Mod: 25

## 2023-09-25 PROCEDURE — 81001 URINALYSIS AUTO W/SCOPE: CPT | Performed by: INTERNAL MEDICINE

## 2023-09-25 PROCEDURE — 80053 COMPREHEN METABOLIC PANEL: CPT | Performed by: INTERNAL MEDICINE

## 2023-09-25 PROCEDURE — 63600175 PHARM REV CODE 636 W HCPCS: Performed by: NURSE PRACTITIONER

## 2023-09-25 PROCEDURE — 96372 THER/PROPH/DIAG INJ SC/IM: CPT | Performed by: INTERNAL MEDICINE

## 2023-09-25 PROCEDURE — 63600175 PHARM REV CODE 636 W HCPCS: Performed by: INTERNAL MEDICINE

## 2023-09-25 PROCEDURE — 96375 TX/PRO/DX INJ NEW DRUG ADDON: CPT

## 2023-09-25 PROCEDURE — 82803 BLOOD GASES ANY COMBINATION: CPT

## 2023-09-25 PROCEDURE — 99900035 HC TECH TIME PER 15 MIN (STAT)

## 2023-09-25 PROCEDURE — 81025 URINE PREGNANCY TEST: CPT | Performed by: INTERNAL MEDICINE

## 2023-09-25 PROCEDURE — 85025 COMPLETE CBC W/AUTO DIFF WBC: CPT | Performed by: INTERNAL MEDICINE

## 2023-09-25 PROCEDURE — 82962 GLUCOSE BLOOD TEST: CPT

## 2023-09-25 PROCEDURE — 25000003 PHARM REV CODE 250: Performed by: INTERNAL MEDICINE

## 2023-09-25 PROCEDURE — 96374 THER/PROPH/DIAG INJ IV PUSH: CPT

## 2023-09-25 PROCEDURE — 36600 WITHDRAWAL OF ARTERIAL BLOOD: CPT

## 2023-09-25 PROCEDURE — 96361 HYDRATE IV INFUSION ADD-ON: CPT

## 2023-09-25 RX ORDER — SODIUM CHLORIDE 9 MG/ML
1000 INJECTION, SOLUTION INTRAVENOUS CONTINUOUS
Status: DISCONTINUED | OUTPATIENT
Start: 2023-09-25 | End: 2023-09-26 | Stop reason: HOSPADM

## 2023-09-25 RX ORDER — SODIUM CHLORIDE 0.9 % (FLUSH) 0.9 %
10 SYRINGE (ML) INJECTION
Status: DISCONTINUED | OUTPATIENT
Start: 2023-09-25 | End: 2023-09-26 | Stop reason: HOSPADM

## 2023-09-25 RX ORDER — DIPHENHYDRAMINE HYDROCHLORIDE 50 MG/ML
INJECTION INTRAMUSCULAR; INTRAVENOUS
Status: DISCONTINUED
Start: 2023-09-25 | End: 2023-09-26 | Stop reason: HOSPADM

## 2023-09-25 RX ORDER — DEXTROSE MONOHYDRATE 100 MG/ML
INJECTION, SOLUTION INTRAVENOUS
Status: DISCONTINUED | OUTPATIENT
Start: 2023-09-25 | End: 2023-09-25

## 2023-09-25 RX ORDER — DROPERIDOL 2.5 MG/ML
2.5 INJECTION, SOLUTION INTRAMUSCULAR; INTRAVENOUS
Status: COMPLETED | OUTPATIENT
Start: 2023-09-25 | End: 2023-09-25

## 2023-09-25 RX ORDER — DROPERIDOL 2.5 MG/ML
1.25 INJECTION, SOLUTION INTRAMUSCULAR; INTRAVENOUS
Status: DISCONTINUED | OUTPATIENT
Start: 2023-09-25 | End: 2023-09-25

## 2023-09-25 RX ORDER — DEXTROSE MONOHYDRATE AND SODIUM CHLORIDE 5; .45 G/100ML; G/100ML
INJECTION, SOLUTION INTRAVENOUS CONTINUOUS PRN
Status: DISCONTINUED | OUTPATIENT
Start: 2023-09-25 | End: 2023-09-26 | Stop reason: HOSPADM

## 2023-09-25 RX ORDER — BENZTROPINE MESYLATE 1 MG/ML
1 INJECTION, SOLUTION INTRAMUSCULAR; INTRAVENOUS
Status: COMPLETED | OUTPATIENT
Start: 2023-09-25 | End: 2023-09-25

## 2023-09-25 RX ORDER — SODIUM CHLORIDE, SODIUM LACTATE, POTASSIUM CHLORIDE, CALCIUM CHLORIDE 600; 310; 30; 20 MG/100ML; MG/100ML; MG/100ML; MG/100ML
1000 INJECTION, SOLUTION INTRAVENOUS
Status: COMPLETED | OUTPATIENT
Start: 2023-09-25 | End: 2023-09-25

## 2023-09-25 RX ORDER — METOPROLOL TARTRATE 50 MG/1
50 TABLET ORAL
Status: COMPLETED | OUTPATIENT
Start: 2023-09-25 | End: 2023-09-25

## 2023-09-25 RX ORDER — DIPHENHYDRAMINE HYDROCHLORIDE 50 MG/ML
25 INJECTION INTRAMUSCULAR; INTRAVENOUS
Status: COMPLETED | OUTPATIENT
Start: 2023-09-25 | End: 2023-09-25

## 2023-09-25 RX ADMIN — SODIUM CHLORIDE, POTASSIUM CHLORIDE, SODIUM LACTATE AND CALCIUM CHLORIDE 1000 ML: 600; 310; 30; 20 INJECTION, SOLUTION INTRAVENOUS at 09:09

## 2023-09-25 RX ADMIN — SODIUM CHLORIDE, POTASSIUM CHLORIDE, SODIUM LACTATE AND CALCIUM CHLORIDE 2022 ML: 600; 310; 30; 20 INJECTION, SOLUTION INTRAVENOUS at 08:09

## 2023-09-25 RX ADMIN — DIPHENHYDRAMINE HYDROCHLORIDE 25 MG: 50 INJECTION INTRAMUSCULAR; INTRAVENOUS at 09:09

## 2023-09-25 RX ADMIN — DROPERIDOL 2.5 MG: 2.5 INJECTION, SOLUTION INTRAMUSCULAR; INTRAVENOUS at 09:09

## 2023-09-25 RX ADMIN — METOPROLOL TARTRATE 50 MG: 50 TABLET, FILM COATED ORAL at 10:09

## 2023-09-25 RX ADMIN — SODIUM CHLORIDE 1000 ML: 9 INJECTION, SOLUTION INTRAVENOUS at 09:09

## 2023-09-25 RX ADMIN — BENZTROPINE MESYLATE 1 MG: 1 INJECTION INTRAMUSCULAR; INTRAVENOUS at 11:09

## 2023-09-26 ENCOUNTER — HOSPITAL ENCOUNTER (EMERGENCY)
Facility: HOSPITAL | Age: 28
Discharge: HOME | End: 2023-09-26
Attending: FAMILY MEDICINE
Payer: MEDICAID

## 2023-09-26 VITALS
SYSTOLIC BLOOD PRESSURE: 190 MMHG | RESPIRATION RATE: 18 BRPM | DIASTOLIC BLOOD PRESSURE: 116 MMHG | HEIGHT: 62 IN | HEART RATE: 103 BPM | WEIGHT: 148 LBS | TEMPERATURE: 99 F | BODY MASS INDEX: 27.23 KG/M2 | OXYGEN SATURATION: 100 %

## 2023-09-26 DIAGNOSIS — R11.2 NAUSEA AND VOMITING, UNSPECIFIED VOMITING TYPE: Primary | ICD-10-CM

## 2023-09-26 DIAGNOSIS — K31.84 GASTROPARESIS: ICD-10-CM

## 2023-09-26 LAB
ALBUMIN SERPL-MCNC: 3.4 G/DL (ref 3.5–5)
ALBUMIN/GLOB SERPL: 0.9 RATIO (ref 1.1–2)
ALP SERPL-CCNC: 97 UNIT/L (ref 40–150)
ALT SERPL-CCNC: 12 UNIT/L (ref 0–55)
AST SERPL-CCNC: 18 UNIT/L (ref 5–34)
BASOPHILS # BLD AUTO: 0.05 X10(3)/MCL
BASOPHILS NFR BLD AUTO: 0.6 %
BILIRUB SERPL-MCNC: 0.7 MG/DL
BUN SERPL-MCNC: 16 MG/DL (ref 7–18.7)
CALCIUM SERPL-MCNC: 9.2 MG/DL (ref 8.4–10.2)
CHLORIDE SERPL-SCNC: 103 MMOL/L (ref 98–107)
CO2 SERPL-SCNC: 24 MMOL/L (ref 22–29)
CREAT SERPL-MCNC: 1.21 MG/DL (ref 0.55–1.02)
EOSINOPHIL # BLD AUTO: 0.05 X10(3)/MCL (ref 0–0.9)
EOSINOPHIL NFR BLD AUTO: 0.6 %
ERYTHROCYTE [DISTWIDTH] IN BLOOD BY AUTOMATED COUNT: 12.9 % (ref 11.5–17)
GFR SERPLBLD CREATININE-BSD FMLA CKD-EPI: >60 MLS/MIN/1.73/M2
GLOBULIN SER-MCNC: 3.7 GM/DL (ref 2.4–3.5)
GLUCOSE SERPL-MCNC: 246 MG/DL (ref 74–100)
HCT VFR BLD AUTO: 34.9 % (ref 37–47)
HGB BLD-MCNC: 10.6 G/DL (ref 12–16)
IMM GRANULOCYTES # BLD AUTO: 0.02 X10(3)/MCL (ref 0–0.04)
IMM GRANULOCYTES NFR BLD AUTO: 0.2 %
LYMPHOCYTES # BLD AUTO: 1.73 X10(3)/MCL (ref 0.6–4.6)
LYMPHOCYTES NFR BLD AUTO: 21.4 %
MCH RBC QN AUTO: 27.2 PG (ref 27–31)
MCHC RBC AUTO-ENTMCNC: 30.4 G/DL (ref 33–36)
MCV RBC AUTO: 89.5 FL (ref 80–94)
MONOCYTES # BLD AUTO: 0.51 X10(3)/MCL (ref 0.1–1.3)
MONOCYTES NFR BLD AUTO: 6.3 %
NEUTROPHILS # BLD AUTO: 5.74 X10(3)/MCL (ref 2.1–9.2)
NEUTROPHILS NFR BLD AUTO: 70.9 %
NRBC BLD AUTO-RTO: 0 %
PLATELET # BLD AUTO: 391 X10(3)/MCL (ref 130–400)
PMV BLD AUTO: 9 FL (ref 7.4–10.4)
POTASSIUM SERPL-SCNC: 3.6 MMOL/L (ref 3.5–5.1)
PROT SERPL-MCNC: 7.1 GM/DL (ref 6.4–8.3)
RBC # BLD AUTO: 3.9 X10(6)/MCL (ref 4.2–5.4)
SODIUM SERPL-SCNC: 139 MMOL/L (ref 136–145)
WBC # SPEC AUTO: 8.1 X10(3)/MCL (ref 4.5–11.5)

## 2023-09-26 PROCEDURE — 96375 TX/PRO/DX INJ NEW DRUG ADDON: CPT

## 2023-09-26 PROCEDURE — 96372 THER/PROPH/DIAG INJ SC/IM: CPT | Performed by: NURSE PRACTITIONER

## 2023-09-26 PROCEDURE — 96361 HYDRATE IV INFUSION ADD-ON: CPT

## 2023-09-26 PROCEDURE — 82962 GLUCOSE BLOOD TEST: CPT

## 2023-09-26 PROCEDURE — 99284 EMERGENCY DEPT VISIT MOD MDM: CPT | Mod: 25

## 2023-09-26 PROCEDURE — 80053 COMPREHEN METABOLIC PANEL: CPT | Performed by: NURSE PRACTITIONER

## 2023-09-26 PROCEDURE — 63600175 PHARM REV CODE 636 W HCPCS: Performed by: NURSE PRACTITIONER

## 2023-09-26 PROCEDURE — 96374 THER/PROPH/DIAG INJ IV PUSH: CPT

## 2023-09-26 PROCEDURE — 85025 COMPLETE CBC W/AUTO DIFF WBC: CPT | Performed by: NURSE PRACTITIONER

## 2023-09-26 PROCEDURE — 25000003 PHARM REV CODE 250: Performed by: NURSE PRACTITIONER

## 2023-09-26 RX ORDER — KETOROLAC TROMETHAMINE 30 MG/ML
30 INJECTION, SOLUTION INTRAMUSCULAR; INTRAVENOUS ONCE
Status: COMPLETED | OUTPATIENT
Start: 2023-09-26 | End: 2023-09-26

## 2023-09-26 RX ORDER — METOCLOPRAMIDE HYDROCHLORIDE 5 MG/ML
10 INJECTION INTRAMUSCULAR; INTRAVENOUS
Status: DISCONTINUED | OUTPATIENT
Start: 2023-09-26 | End: 2023-09-27 | Stop reason: HOSPADM

## 2023-09-26 RX ORDER — PROMETHAZINE HYDROCHLORIDE 25 MG/1
25 TABLET ORAL EVERY 6 HOURS PRN
Qty: 15 TABLET | Refills: 0 | Status: SHIPPED | OUTPATIENT
Start: 2023-09-26 | End: 2024-03-18

## 2023-09-26 RX ORDER — PROMETHAZINE HYDROCHLORIDE 25 MG/ML
25 INJECTION, SOLUTION INTRAMUSCULAR; INTRAVENOUS
Status: COMPLETED | OUTPATIENT
Start: 2023-09-26 | End: 2023-09-26

## 2023-09-26 RX ORDER — METOCLOPRAMIDE HYDROCHLORIDE 5 MG/ML
10 INJECTION INTRAMUSCULAR; INTRAVENOUS
Status: DISCONTINUED | OUTPATIENT
Start: 2023-09-26 | End: 2023-09-26

## 2023-09-26 RX ORDER — CLONIDINE HYDROCHLORIDE 0.2 MG/1
0.2 TABLET ORAL
Status: DISCONTINUED | OUTPATIENT
Start: 2023-09-26 | End: 2023-09-26

## 2023-09-26 RX ORDER — MORPHINE SULFATE 4 MG/ML
4 INJECTION, SOLUTION INTRAMUSCULAR; INTRAVENOUS
Status: COMPLETED | OUTPATIENT
Start: 2023-09-26 | End: 2023-09-26

## 2023-09-26 RX ORDER — HALOPERIDOL 5 MG/ML
2.5 INJECTION INTRAMUSCULAR
Status: DISCONTINUED | OUTPATIENT
Start: 2023-09-26 | End: 2023-09-26

## 2023-09-26 RX ORDER — LABETALOL HYDROCHLORIDE 5 MG/ML
10 INJECTION, SOLUTION INTRAVENOUS
Status: COMPLETED | OUTPATIENT
Start: 2023-09-26 | End: 2023-09-26

## 2023-09-26 RX ORDER — DIPHENHYDRAMINE HYDROCHLORIDE 50 MG/ML
25 INJECTION INTRAMUSCULAR; INTRAVENOUS
Status: DISCONTINUED | OUTPATIENT
Start: 2023-09-26 | End: 2023-09-26

## 2023-09-26 RX ORDER — DIPHENHYDRAMINE HYDROCHLORIDE 50 MG/ML
25 INJECTION INTRAMUSCULAR; INTRAVENOUS
Status: COMPLETED | OUTPATIENT
Start: 2023-09-26 | End: 2023-09-26

## 2023-09-26 RX ADMIN — DIPHENHYDRAMINE HYDROCHLORIDE 25 MG: 50 INJECTION INTRAMUSCULAR; INTRAVENOUS at 08:09

## 2023-09-26 RX ADMIN — SODIUM CHLORIDE 1000 ML: 9 INJECTION, SOLUTION INTRAVENOUS at 07:09

## 2023-09-26 RX ADMIN — KETOROLAC TROMETHAMINE 30 MG: 30 INJECTION, SOLUTION INTRAMUSCULAR; INTRAVENOUS at 06:09

## 2023-09-26 RX ADMIN — MORPHINE SULFATE 4 MG: 4 INJECTION, SOLUTION INTRAMUSCULAR; INTRAVENOUS at 09:09

## 2023-09-26 RX ADMIN — PROMETHAZINE HYDROCHLORIDE 25 MG: 25 INJECTION INTRAMUSCULAR; INTRAVENOUS at 08:09

## 2023-09-26 RX ADMIN — LABETALOL HYDROCHLORIDE 10 MG: 5 INJECTION, SOLUTION INTRAVENOUS at 08:09

## 2023-09-26 RX ADMIN — PROMETHAZINE HYDROCHLORIDE 25 MG: 25 INJECTION INTRAMUSCULAR; INTRAVENOUS at 05:09

## 2023-09-26 NOTE — ED PROVIDER NOTES
Encounter Date: 9/26/2023       History     Chief Complaint   Patient presents with    Back Pain    Vomiting     C/o upper and lower back pain and vomiting, was here for same thing last night and it didn't get any better,      Patient is a 28-year-old female presents emerged department with complaints of constant vomiting as well as upper and lower back pain.  She is was here yesterday for similar complaints and states she just continued to vomit throughout the day.  She states that she has had no fevers no chills.  She denies any diarrhea or abdominal pain.  She has history of gastroparesis and is vomiting in triage.  She has no other complaints at this time.  She is very anxious and tearful at this time.      Review of patient's allergies indicates:  No Known Allergies  Past Medical History:   Diagnosis Date    Diabetes mellitus     Diabetic gastroparesis associated with type 1 diabetes mellitus     DKA (diabetic ketoacidosis)     Hypertension     Non compliance with medical treatment      Past Surgical History:   Procedure Laterality Date    CHOLECYSTECTOMY      ESOPHAGOGASTRODUODENOSCOPY      Multiple    None       Family History   Problem Relation Age of Onset    Heart disease Mother     Hypertension Mother     Diabetes Mother     Hyperlipidemia Mother     Cancer Father     Stroke Father     Hypertension Father     Hyperlipidemia Father      Social History     Tobacco Use    Smoking status: Never    Smokeless tobacco: Never   Substance Use Topics    Alcohol use: Never    Drug use: Not Currently     Types: Marijuana     Review of Systems   Constitutional:  Negative for activity change, appetite change and fever.   HENT:  Negative for congestion, dental problem and sore throat.    Respiratory:  Negative for shortness of breath.    Cardiovascular:  Negative for chest pain.   Gastrointestinal:  Positive for nausea and vomiting.   Genitourinary:  Negative for dysuria.   Musculoskeletal:  Positive for back  pain.   Skin:  Negative for rash.   Neurological:  Negative for dizziness, facial asymmetry and weakness.   Hematological:  Does not bruise/bleed easily.   Psychiatric/Behavioral:  Positive for agitation. Negative for behavioral problems. The patient is nervous/anxious.    All other systems reviewed and are negative.      Physical Exam     Initial Vitals [09/26/23 1649]   BP Pulse Resp Temp SpO2   (!) 164/112 105 20 99.1 °F (37.3 °C) 99 %      MAP       --         Physical Exam    Nursing note and vitals reviewed.  Constitutional: Vital signs are normal. She appears well-developed and well-nourished.  Non-toxic appearance. She does not have a sickly appearance.   HENT:   Head: Normocephalic and atraumatic.   Right Ear: External ear normal.   Left Ear: External ear normal.   Eyes: Conjunctivae, EOM and lids are normal. Pupils are equal, round, and reactive to light. Lids are everted and swept, no foreign bodies found.   Neck: Trachea normal and phonation normal. Neck supple. No thyroid mass and no thyromegaly present.   Normal range of motion.   Full passive range of motion without pain.     Cardiovascular:  Normal rate, regular rhythm, S1 normal, S2 normal, normal heart sounds, intact distal pulses and normal pulses.           Pulmonary/Chest: Breath sounds normal. No respiratory distress.   Abdominal: Abdomen is soft. She exhibits no distension. There is no abdominal tenderness.   Musculoskeletal:      Cervical back: Full passive range of motion without pain, normal range of motion and neck supple.     Lymphadenopathy:     She has no cervical adenopathy.   Neurological: She is alert and oriented to person, place, and time. She has normal strength. GCS score is 15. GCS eye subscore is 4. GCS verbal subscore is 5. GCS motor subscore is 6.   Skin: Skin is warm, dry and intact. Capillary refill takes less than 2 seconds.   Psychiatric: She has a normal mood and affect. Her speech is normal and behavior is normal.  Judgment normal. Cognition and memory are normal.         ED Course   Procedures  Labs Reviewed   COMPREHENSIVE METABOLIC PANEL - Abnormal; Notable for the following components:       Result Value    Glucose Level 246 (*)     Creatinine 1.21 (*)     Albumin Level 3.4 (*)     Globulin 3.7 (*)     Albumin/Globulin Ratio 0.9 (*)     All other components within normal limits   CBC WITH DIFFERENTIAL - Abnormal; Notable for the following components:    RBC 3.90 (*)     Hgb 10.6 (*)     Hct 34.9 (*)     MCHC 30.4 (*)     All other components within normal limits   CBC W/ AUTO DIFFERENTIAL    Narrative:     The following orders were created for panel order CBC auto differential.  Procedure                               Abnormality         Status                     ---------                               -----------         ------                     CBC with Differential[[id number]]       Abnormal            Final result                 Please view results for these tests on the individual orders.   URINALYSIS, REFLEX TO URINE CULTURE          Imaging Results    None          Medications   metoclopramide HCl injection 10 mg (10 mg Intravenous Not Given 9/26/23 2005)   promethazine injection 25 mg (25 mg Intramuscular Given 9/26/23 1709)   sodium chloride 0.9% bolus 1,000 mL 1,000 mL (0 mLs Intravenous Stopped 9/26/23 2022)   ketorolac injection 30 mg (30 mg Intramuscular Given 9/26/23 1815)   diphenhydrAMINE injection 25 mg (25 mg Intravenous Given 9/26/23 2005)   promethazine injection 25 mg (25 mg Intramuscular Given 9/26/23 2016)   labetaloL injection 10 mg (10 mg Intravenous Given 9/26/23 2057)   morphine injection 4 mg (4 mg Intravenous Given 9/26/23 2135)     Medical Decision Making  Patient is a 28-year-old female presents emerged department complaints of nausea vomiting.  She has history of gastroparesis.  She states medications Montero not working.  Besides nausea vomiting she denies any abdominal pain diarrhea  fevers or chills.  She does complain of upper lower back pain which is typical with her symptoms when she presents here to the emergency room often.    Problems Addressed:  Gastroparesis: acute illness or injury     Details: Nausea vomiting resolved after several doses medications.  Will discharge home with instructions to continue home medications and follow up with PCP.  Nausea and vomiting, unspecified vomiting type: acute illness or injury     Details: Resolved here with medication.  Denies any other complaints.  Strict ED return precautions discussed for any change or worsening symptoms.    Amount and/or Complexity of Data Reviewed  External Data Reviewed: labs, radiology and notes.  Labs: ordered. Decision-making details documented in ED Course.    Risk  Prescription drug management.               ED Course as of 09/26/23 2144 Tue Sep 26, 2023   1801 Patient reports Phenergan did not help her nausea however she is not vomited and was very calm in the room before I walked in however on a walked inch began to cry and rock back and forth in bed.  She is here often for similar complaints so I do not know if this is malingering behavior but it is nonetheless something to note. [SL]   2127 Patient is feeling somewhat better and states the nausea has almost resolved but still has the back pain.  Will give her a dose of medication here for the pain and discharged home to continue the home medication she is prescribed. [SL]      ED Course User Index  [SL] Omid Hilliard FNP                    Clinical Impression:   Final diagnoses:  [R11.2] Nausea and vomiting, unspecified vomiting type (Primary)  [K31.84] Gastroparesis        ED Disposition Condition    Discharge Stable          ED Prescriptions       Medication Sig Dispense Start Date End Date Auth. Provider    promethazine (PHENERGAN) 25 MG tablet Take 1 tablet (25 mg total) by mouth every 6 (six) hours as needed for Nausea. 15 tablet 9/26/2023 -- Arminda  [place] Grigsby          Follow-up Information       Follow up With Specialties Details Why Contact Info    Kumar Ortiz NP Family Medicine Schedule an appointment as soon as possible for a visit  As needed, For ER Follow Up. [address] [place] [address]  [phone]               Omid Hilliard, [place]  09/26/23 1056

## 2023-09-26 NOTE — ED PROVIDER NOTES
Encounter Date: 9/25/2023  History from patient     History     Chief Complaint   Patient presents with    Emesis     Pt reports to ED with nausea and vomiting x1 hr. Pt actively vomiting in triage. Pt reports back pain, laid down to nap and when woke up started vomiting.      STEFANY Husain is 28 y.o. female who  has a past medical history of Diabetes mellitus, Diabetic gastroparesis associated with type 1 diabetes mellitus, DKA (diabetic ketoacidosis), Hypertension, and Non compliance with medical treatment. arrives in ER with c/o Emesis (Pt reports to ED with nausea and vomiting x1 hr. Pt actively vomiting in triage. Pt reports back pain, laid down to nap and when woke up started vomiting. )      Review of patient's allergies indicates:  No Known Allergies  Past Medical History:   Diagnosis Date    Diabetes mellitus     Diabetic gastroparesis associated with type 1 diabetes mellitus     DKA (diabetic ketoacidosis)     Hypertension     Non compliance with medical treatment      Past Surgical History:   Procedure Laterality Date    CHOLECYSTECTOMY      ESOPHAGOGASTRODUODENOSCOPY      Multiple    None       Family History   Problem Relation Age of Onset    Heart disease Mother     Hypertension Mother     Diabetes Mother     Hyperlipidemia Mother     Cancer Father     Stroke Father     Hypertension Father     Hyperlipidemia Father      Social History     Tobacco Use    Smoking status: Never    Smokeless tobacco: Never   Substance Use Topics    Alcohol use: Never    Drug use: Not Currently     Types: Marijuana     Review of Systems   HENT:  Negative for trouble swallowing and voice change.    Eyes:  Negative for visual disturbance.   Respiratory:  Negative for cough and shortness of breath.    Cardiovascular:  Negative for chest pain.   Gastrointestinal:  Positive for nausea and vomiting. Negative for abdominal pain and diarrhea.   Genitourinary:  Negative for dysuria and hematuria.   Musculoskeletal:  Positive  for back pain. Negative for gait problem.        No Pain.   Skin:  Negative for color change and rash.   Neurological:  Negative for headaches.   Psychiatric/Behavioral:  Negative for behavioral problems and sleep disturbance.    All other systems reviewed and are negative.      Physical Exam     Initial Vitals [09/25/23 1920]   BP Pulse Resp Temp SpO2   (!) 211/133 102 20 98.3 °F (36.8 °C) 99 %      MAP       --         Physical Exam    Nursing note and vitals reviewed.  Constitutional: She appears well-developed and well-nourished. No distress.   Anxious   HENT:   Head: Atraumatic.   Eyes: EOM are normal. Pupils are equal, round, and reactive to light.   Neck: Neck supple.   Cardiovascular:  Normal rate and regular rhythm.           Pulmonary/Chest: Breath sounds normal. No respiratory distress.   Abdominal: Abdomen is soft. Bowel sounds are normal. She exhibits no distension. There is abdominal tenderness. There is no rebound and no guarding.   Musculoskeletal:         General: Normal range of motion.      Cervical back: Neck supple.     Neurological: She is alert and oriented to person, place, and time. GCS score is 15. GCS eye subscore is 4. GCS verbal subscore is 5. GCS motor subscore is 6.   Skin: Skin is warm and dry.   Psychiatric: She has a normal mood and affect.   Anxious, tearful         ED Course   Procedures    Orders Placed This Encounter   Procedures    Urinalysis, Reflex to Urine Culture    CBC auto differential    Comprehensive metabolic panel    CBC with Differential    Pregnancy, urine rapid    Urinalysis, Microscopic    Diet NPO    Vital signs per ICU/CCU orders    Height and weight    Saline lock IV    Intake and output Notify physician if urinary output is less than 250 mL in 8 hours.    When DKA has resolved and provider has ordered subcutaneous basal insulin,    Notify Physician    Notify physician for any DKA/HHS patient that is not responding as expected to Step 2 (running infusion at set  dose without titration).  Not responding is defined as any increase in glucose from previous glucose OR not achieving  less than 200 after 6 hours on the continuous dose.    Notify physician for hypoglycemia <100 mg/dl    BG  mg/dL    If any glucose result is less than 50 or greater than 400:    If 2nd result is less than 50 or greater than 400:    Place CORNELIA hose    POCT ARTERIAL BLOOD GAS Blood Gas    POCT Glucose, Hand-Held Device    POCT Glucose, Hand-Held Device    Insert Saline lock IV     Medications   sodium chloride 0.9% flush 10 mL (has no administration in time range)   0.9%  NaCl infusion (0 mLs Intravenous Stopped 9/25/23 2355)   dextrose 5 % and 0.45 % NaCl infusion (has no administration in time range)   insulin regular in 0.9 % NaCl 100 unit/100 mL (1 unit/mL) infusion ( Intravenous Not Given 9/25/23 2015)   dextrose 10% bolus 125 mL 125 mL (has no administration in time range)   dextrose 10% bolus 250 mL 250 mL (has no administration in time range)   lactated ringers bolus 2,022 mL (0 mLs Intravenous Stopped 9/25/23 2117)   lactated ringers infusion (0 mLs Intravenous Stopped 9/25/23 2355)   droPERidol injection 2.5 mg (2.5 mg Intravenous Given 9/25/23 2109)   diphenhydrAMINE injection 25 mg (25 mg Intravenous Given 9/25/23 2123)   metoprolol tartrate (LOPRESSOR) tablet 50 mg (50 mg Oral Given 9/25/23 2253)   benztropine mesylate injection 1 mg (1 mg Intramuscular Given 9/25/23 2338)     Admission on 09/25/2023, Discharged on 09/26/2023   Component Date Value Ref Range Status    POC Glucose 09/25/2023 >500  70 - 110 MG/DL Final    Color, UA 09/25/2023 Yellow  Yellow, Light-Yellow, Dark Yellow, Katarina, Straw Final    Appearance, UA 09/25/2023 Clear  Clear Final    Specific Gravity, UA 09/25/2023 1.015  1.005 - 1.030 Final    pH, UA 09/25/2023 7.0  5.0 - 8.5 Final    Protein, UA 09/25/2023 3+ (A)  Negative Final    Glucose, UA 09/25/2023 3+ (A)  Negative, Normal Final    Ketones, UA 09/25/2023  Negative  Negative Final    Blood, UA 09/25/2023 3+ (A)  Negative Final    Bilirubin, UA 09/25/2023 Negative  Negative Final    Urobilinogen, UA 09/25/2023 0.2  0.2, 1.0, Normal Final    Nitrites, UA 09/25/2023 Negative  Negative Final    Leukocyte Esterase, UA 09/25/2023 Negative  Negative Final    Sodium Level 09/25/2023 132 (L)  136 - 145 mmol/L Final    Potassium Level 09/25/2023 4.2  3.5 - 5.1 mmol/L Final    Chloride 09/25/2023 102  98 - 107 mmol/L Final    Carbon Dioxide 09/25/2023 20 (L)  22 - 29 mmol/L Final    Glucose Level 09/25/2023 335 (H)  74 - 100 mg/dL Final    Blood Urea Nitrogen 09/25/2023 17.0  7.0 - 18.7 mg/dL Final    Creatinine 09/25/2023 1.03 (H)  0.55 - 1.02 mg/dL Final    Calcium Level Total 09/25/2023 9.0  8.4 - 10.2 mg/dL Final    Protein Total 09/25/2023 6.8  6.4 - 8.3 gm/dL Final    Albumin Level 09/25/2023 3.1 (L)  3.5 - 5.0 g/dL Final    Globulin 09/25/2023 3.7 (H)  2.4 - 3.5 gm/dL Final    Albumin/Globulin Ratio 09/25/2023 0.8 (L)  1.1 - 2.0 ratio Final    Bilirubin Total 09/25/2023 0.5  <=1.5 mg/dL Final    Alkaline Phosphatase 09/25/2023 90  40 - 150 unit/L Final    Alanine Aminotransferase 09/25/2023 13  0 - 55 unit/L Final    Aspartate Aminotransferase 09/25/2023 25  5 - 34 unit/L Final    eGFR 09/25/2023 >60  mls/min/1.73/m2 Final    WBC 09/25/2023 7.47  4.50 - 11.50 x10(3)/mcL Final    RBC 09/25/2023 3.49 (L)  4.20 - 5.40 x10(6)/mcL Final    Hgb 09/25/2023 9.4 (L)  12.0 - 16.0 g/dL Final    Hct 09/25/2023 28.6 (L)  37.0 - 47.0 % Final    MCV 09/25/2023 81.9  80.0 - 94.0 fL Final    MCH 09/25/2023 26.9 (L)  27.0 - 31.0 pg Final    MCHC 09/25/2023 32.9 (L)  33.0 - 36.0 g/dL Final    RDW 09/25/2023 12.3  11.5 - 17.0 % Final    Platelet 09/25/2023 386  130 - 400 x10(3)/mcL Final    MPV 09/25/2023 9.0  7.4 - 10.4 fL Final    Neut % 09/25/2023 66.7  % Final    Lymph % 09/25/2023 25.2  % Final    Mono % 09/25/2023 6.0  % Final    Eos % 09/25/2023 1.2  % Final    Basophil % 09/25/2023  0.5  % Final    Lymph # 09/25/2023 1.88  0.6 - 4.6 x10(3)/mcL Final    Neut # 09/25/2023 4.98  2.1 - 9.2 x10(3)/mcL Final    Mono # 09/25/2023 0.45  0.1 - 1.3 x10(3)/mcL Final    Eos # 09/25/2023 0.09  0 - 0.9 x10(3)/mcL Final    Baso # 09/25/2023 0.04  <=0.2 x10(3)/mcL Final    IG# 09/25/2023 0.03  0 - 0.04 x10(3)/mcL Final    IG% 09/25/2023 0.4  % Final    Beta hCG Qualitative, Urine 09/25/2023 Negative  Negative Final    POC PH 09/25/2023 7.422  7.35 - 7.45 Final    POC PCO2 09/25/2023 38.9  35 - 45 mmHg Final    POC PO2 09/25/2023 92  80 - 100 mmHg Final    POC HCO3 09/25/2023 25.4  mmol/L Final    POC BE 09/25/2023 1  mmol/L Final    POC SATURATED O2 09/25/2023 97  95 - 100 % Final    POC TCO2 09/25/2023 27  23 - 27 mmol/L Final    Sample 09/25/2023 ARTERIAL   Final    Site 09/25/2023 RB   Final    Allens Test 09/25/2023 Pass   Final    Bacteria, UA 09/25/2023 None Seen  None Seen, Rare, Occasional /HPF Final    RBC, UA 09/25/2023 6-10 (A)  None Seen, 0-2, 3-5, 0-5 /HPF Final    WBC, UA 09/25/2023 0-2  None Seen, 0-2, 3-5, 0-5 /HPF Final    Squamous Epithelial Cells, UA 09/25/2023 Few (A)  None Seen, Rare, Occasional, Occ /HPF Final       Labs Reviewed   URINALYSIS, REFLEX TO URINE CULTURE - Abnormal; Notable for the following components:       Result Value    Protein, UA 3+ (*)     Glucose, UA 3+ (*)     Blood, UA 3+ (*)     All other components within normal limits   COMPREHENSIVE METABOLIC PANEL - Abnormal; Notable for the following components:    Sodium Level 132 (*)     Carbon Dioxide 20 (*)     Glucose Level 335 (*)     Creatinine 1.03 (*)     Albumin Level 3.1 (*)     Globulin 3.7 (*)     Albumin/Globulin Ratio 0.8 (*)     All other components within normal limits   CBC WITH DIFFERENTIAL - Abnormal; Notable for the following components:    RBC 3.49 (*)     Hgb 9.4 (*)     Hct 28.6 (*)     MCH 26.9 (*)     MCHC 32.9 (*)     All other components within normal limits   URINALYSIS, MICROSCOPIC - Abnormal;  Notable for the following components:    RBC, UA 6-10 (*)     Squamous Epithelial Cells, UA Few (*)     All other components within normal limits   PREGNANCY TEST, URINE RAPID - Normal   CBC W/ AUTO DIFFERENTIAL    Narrative:     The following orders were created for panel order CBC auto differential.  Procedure                               Abnormality         Status                     ---------                               -----------         ------                     CBC with Differential[[id number]]       Abnormal            Final result                 Please view results for these tests on the individual orders.   POCT GLUCOSE, HAND-HELD DEVICE   POCT GLUCOSE, HAND-HELD DEVICE   ISTAT PROCEDURE   POCT GLUCOSE MONITORING CONTINUOUS          Imaging Results    None          Medications   sodium chloride 0.9% flush 10 mL (has no administration in time range)   0.9%  NaCl infusion (0 mLs Intravenous Stopped 9/25/23 2355)   dextrose 5 % and 0.45 % NaCl infusion (has no administration in time range)   insulin regular in 0.9 % NaCl 100 unit/100 mL (1 unit/mL) infusion ( Intravenous Not Given 9/25/23 2015)   dextrose 10% bolus 125 mL 125 mL (has no administration in time range)   dextrose 10% bolus 250 mL 250 mL (has no administration in time range)   lactated ringers bolus 2,022 mL (0 mLs Intravenous Stopped 9/25/23 2117)   lactated ringers infusion (0 mLs Intravenous Stopped 9/25/23 2355)   droPERidol injection 2.5 mg (2.5 mg Intravenous Given 9/25/23 2109)   diphenhydrAMINE injection 25 mg (25 mg Intravenous Given 9/25/23 2123)   metoprolol tartrate (LOPRESSOR) tablet 50 mg (50 mg Oral Given 9/25/23 2253)   benztropine mesylate injection 1 mg (1 mg Intramuscular Given 9/25/23 2338)     Medical Decision Making    Dorene Husain is 28 y.o. female who  has a past medical history of Diabetes mellitus, Diabetic gastroparesis associated with type 1 diabetes mellitus, DKA (diabetic ketoacidosis), Hypertension, and Non  compliance with medical treatment. arrives in ER with c/o Emesis (Pt reports to ED with nausea and vomiting x1 hr. Pt actively vomiting in triage. Pt reports back pain, laid down to nap and when woke up started vomiting. )      Patient very well known to us for diabetic ketoacidosis recurrent episodes with noncompliance with food today she says her blood sugar was in 300 range, and then in the evening she started having nausea vomiting, and got worse so she decided to come to the emergency room.  On arrival her blood sugar is more than 500 we are going to do a DKA workup on her and also I am going to give her some Inapsine to help her with the nausea vomiting and abdominal cramping and she has diabetic gastroparesis.    Patient says that she has taken an extra dose of insulin before coming to the emergency room.    And she does not want them to started IV line except for in the hand.  She does not want central line no matter what happens.    Amount and/or Complexity of Data Reviewed  Labs: ordered. Decision-making details documented in ED Course.    Risk  Prescription drug management.               ED Course as of 09/26/23 0105   Mon Sep 25, 2023   2122 After getting 2.5 mg of Inapsine IV, patient's stood up and standing by the bedside saying that the her tongue is swollen, and she is drooling, she is able to talk, no swelling on the tongue observed, decided to give her Benadryl 25 mg IV push to help her with the EPS. [GQ]   2134 With Ringer's lactate bolus, before we could start the insulin drip patient's blood sugar came down to 300 range, I am going to hold the insulin for the time being will do the blood work and decide about further treatment. [GQ]   2217 Patient would not let us start an IV on her, we were able to get 22 gauge in her thumb, and gave her Inapsine from there, gave her Ringer's lactate bolus, wanted to put a 2nd IV to start the insulin as insulin is not compatible with lactated Ringer's, but she  refused, I did a blood gas which shows that patient is not in DKA she has a normal pH with a normal bicarb and normal oxygen saturation.  Decided to not start her on insulin drip.  Her blood sugar has come down in 300 range anyway with IV fluids only [GQ]   2236 Patient does not have any ketones in the urine, her pH is normal, her blood sugars down to 335, with no other acute abnormality for which she needs to be put in the hospital at this time I am going to let her go home with instruction to continue taking her usual medication for diabetic gastroparesis and see her family doctor for further treatment as needed. [GQ]   2247 Patient apparently did not take her blood pressure medicine today, because of the nausea and vomiting, I am going to give her a dose of metoprolol now which she is usually on.  She is not throwing [GQ]   2311 Apparently patient has extra dose of insulin from home his work, her blood sugar has come down, with IV fluids only and she does not need insulin drip she is not in DKA, I will again let her go home with instruction to follow the instruction for diet and medicines and stop drinking soda. [GQ]      ED Course User Index  [GQ] Millie Burr MD                    Clinical Impression:   Final diagnoses:  [R11.2] Nausea and vomiting, unspecified vomiting type (Primary)  [E11.43, K31.84] Diabetic gastroparesis  [I10] Uncontrolled hypertension  [E10.65] Uncontrolled type 1 diabetes mellitus with hyperglycemia  [Z91.199] Noncompliance        ED Disposition Condition    Discharge Stable          ED Prescriptions    None       Follow-up Information       Follow up With Specialties Details Why Contact Info    Kumar Ortiz NP Family Medicine In 2 days  [address] [place] [address]  [phone]               Millie Burr MD  09/25/23 7518       Millie Burr MD  09/26/23 3853

## 2023-09-27 LAB
POCT GLUCOSE: 250 MG/DL (ref 70–110)
POCT GLUCOSE: 338 MG/DL (ref 70–110)
POCT GLUCOSE: >500 MG/DL (ref 70–110)

## 2023-10-01 ENCOUNTER — HOSPITAL ENCOUNTER (EMERGENCY)
Facility: HOSPITAL | Age: 28
Discharge: ANOTHER HOSPITAL | End: 2023-10-02
Attending: INTERNAL MEDICINE
Payer: MEDICAID

## 2023-10-01 DIAGNOSIS — Z91.199 HISTORY OF NONCOMPLIANCE WITH MEDICAL TREATMENT: ICD-10-CM

## 2023-10-01 DIAGNOSIS — E10.11 DIABETIC KETOACIDOSIS WITH COMA ASSOCIATED WITH TYPE 1 DIABETES MELLITUS: Primary | ICD-10-CM

## 2023-10-01 DIAGNOSIS — E10.65 UNCONTROLLED TYPE 1 DIABETES MELLITUS WITH HYPERGLYCEMIA: ICD-10-CM

## 2023-10-01 DIAGNOSIS — N17.9 AKI (ACUTE KIDNEY INJURY): ICD-10-CM

## 2023-10-01 DIAGNOSIS — R73.9 HYPERGLYCEMIA: ICD-10-CM

## 2023-10-01 LAB
ABS NEUT CALC (OHS): 21.36 X10(3)/MCL (ref 2.1–9.2)
ALBUMIN SERPL-MCNC: 2.4 G/DL (ref 3.5–5)
ALBUMIN/GLOB SERPL: 0.8 RATIO (ref 1.1–2)
ALP SERPL-CCNC: 109 UNIT/L (ref 40–150)
ALT SERPL-CCNC: 25 UNIT/L (ref 0–55)
AMPHET UR QL SCN: NEGATIVE
APPEARANCE UR: CLEAR
AST SERPL-CCNC: 28 UNIT/L (ref 5–34)
B-HCG SERPL QL: NEGATIVE
BACTERIA #/AREA URNS AUTO: ABNORMAL /HPF
BARBITURATE SCN PRESENT UR: NEGATIVE
BENZODIAZ UR QL SCN: NEGATIVE
BILIRUB SERPL-MCNC: 0.5 MG/DL
BILIRUB UR QL STRIP.AUTO: ABNORMAL
BUN SERPL-MCNC: 67 MG/DL (ref 7–18.7)
BURR CELLS (OLG): SLIGHT
CALCIUM SERPL-MCNC: 7.7 MG/DL (ref 8.4–10.2)
CANNABINOIDS UR QL SCN: NEGATIVE
CHLORIDE SERPL-SCNC: 69 MMOL/L (ref 98–107)
CO2 SERPL-SCNC: <5 MMOL/L (ref 22–29)
COCAINE UR QL SCN: NEGATIVE
COLOR UR AUTO: YELLOW
CREAT SERPL-MCNC: 2.84 MG/DL (ref 0.55–1.02)
ERYTHROCYTE [DISTWIDTH] IN BLOOD BY AUTOMATED COUNT: 12.4 % (ref 11.5–17)
ETHANOL SERPL-MCNC: <10 MG/DL
FENTANYL UR QL SCN: NEGATIVE
GFR SERPLBLD CREATININE-BSD FMLA CKD-EPI: 23 MLS/MIN/1.73/M2
GLOBULIN SER-MCNC: 3.1 GM/DL (ref 2.4–3.5)
GLUCOSE SERPL-MCNC: 1058 MG/DL (ref 74–100)
GLUCOSE UR QL STRIP.AUTO: ABNORMAL
HCT VFR BLD AUTO: 30.6 % (ref 37–47)
HGB BLD-MCNC: 9.1 G/DL (ref 12–16)
KETONES UR QL STRIP.AUTO: ABNORMAL
LACTATE SERPL-SCNC: 3.8 MMOL/L (ref 0.5–2.2)
LEUKOCYTE ESTERASE UR QL STRIP.AUTO: NEGATIVE
LYMPHOCYTES NFR BLD MANUAL: 1.2 X10(3)/MCL
LYMPHOCYTES NFR BLD MANUAL: 5 % (ref 13–40)
MAGNESIUM SERPL-MCNC: 2.9 MG/DL (ref 1.6–2.6)
MCH RBC QN AUTO: 27.9 PG (ref 27–31)
MCHC RBC AUTO-ENTMCNC: 29.7 G/DL (ref 33–36)
MCV RBC AUTO: 93.9 FL (ref 80–94)
MDMA UR QL SCN: NEGATIVE
METAMYELOCYTES NFR BLD MANUAL: 3 %
MONOCYTES NFR BLD MANUAL: 0.72 X10(3)/MCL (ref 0.1–1.3)
MONOCYTES NFR BLD MANUAL: 3 % (ref 2–11)
NEUTROPHILS NFR BLD MANUAL: 85 % (ref 47–80)
NEUTS BAND NFR BLD MANUAL: 4 % (ref 0–11)
NITRITE UR QL STRIP.AUTO: NEGATIVE
OPIATES UR QL SCN: NEGATIVE
PCP UR QL: NEGATIVE
PH UR STRIP.AUTO: 5 [PH]
PH UR: 5 [PH] (ref 3–11)
PLATELET # BLD AUTO: 560 X10(3)/MCL (ref 130–400)
PLATELET # BLD EST: ABNORMAL 10*3/UL
PMV BLD AUTO: 9.6 FL (ref 7.4–10.4)
POCT GLUCOSE: >500 MG/DL (ref 70–110)
POIKILOCYTOSIS BLD QL SMEAR: SLIGHT
POTASSIUM SERPL-SCNC: 7.5 MMOL/L (ref 3.5–5.1)
PROT SERPL-MCNC: 5.5 GM/DL (ref 6.4–8.3)
PROT UR QL STRIP.AUTO: ABNORMAL
RBC # BLD AUTO: 3.26 X10(6)/MCL (ref 4.2–5.4)
RBC #/AREA URNS AUTO: ABNORMAL /HPF
RBC MORPH BLD: ABNORMAL
RBC UR QL AUTO: ABNORMAL
SODIUM SERPL-SCNC: 112 MMOL/L (ref 136–145)
SP GR UR STRIP.AUTO: 1.01 (ref 1–1.03)
SQUAMOUS #/AREA URNS AUTO: ABNORMAL /HPF
UROBILINOGEN UR STRIP-ACNC: 0.2
WBC # SPEC AUTO: 24 X10(3)/MCL (ref 4.5–11.5)
WBC #/AREA URNS AUTO: ABNORMAL /HPF

## 2023-10-01 PROCEDURE — 80307 DRUG TEST PRSMV CHEM ANLYZR: CPT | Performed by: INTERNAL MEDICINE

## 2023-10-01 PROCEDURE — 36556 INSERT NON-TUNNEL CV CATH: CPT

## 2023-10-01 PROCEDURE — 99900035 HC TECH TIME PER 15 MIN (STAT)

## 2023-10-01 PROCEDURE — 82077 ASSAY SPEC XCP UR&BREATH IA: CPT | Performed by: INTERNAL MEDICINE

## 2023-10-01 PROCEDURE — 36600 WITHDRAWAL OF ARTERIAL BLOOD: CPT

## 2023-10-01 PROCEDURE — 82803 BLOOD GASES ANY COMBINATION: CPT

## 2023-10-01 PROCEDURE — 80053 COMPREHEN METABOLIC PANEL: CPT | Performed by: INTERNAL MEDICINE

## 2023-10-01 PROCEDURE — 63600175 PHARM REV CODE 636 W HCPCS: Performed by: INTERNAL MEDICINE

## 2023-10-01 PROCEDURE — 99291 CRITICAL CARE FIRST HOUR: CPT

## 2023-10-01 PROCEDURE — 25000003 PHARM REV CODE 250: Performed by: INTERNAL MEDICINE

## 2023-10-01 PROCEDURE — 96361 HYDRATE IV INFUSION ADD-ON: CPT

## 2023-10-01 PROCEDURE — 99292 CRITICAL CARE ADDL 30 MIN: CPT

## 2023-10-01 PROCEDURE — 96374 THER/PROPH/DIAG INJ IV PUSH: CPT | Mod: 59

## 2023-10-01 PROCEDURE — 83735 ASSAY OF MAGNESIUM: CPT | Performed by: INTERNAL MEDICINE

## 2023-10-01 PROCEDURE — 83605 ASSAY OF LACTIC ACID: CPT | Performed by: INTERNAL MEDICINE

## 2023-10-01 PROCEDURE — 85027 COMPLETE CBC AUTOMATED: CPT | Performed by: INTERNAL MEDICINE

## 2023-10-01 PROCEDURE — 81003 URINALYSIS AUTO W/O SCOPE: CPT | Performed by: INTERNAL MEDICINE

## 2023-10-01 PROCEDURE — 82962 GLUCOSE BLOOD TEST: CPT

## 2023-10-01 PROCEDURE — 81025 URINE PREGNANCY TEST: CPT | Performed by: INTERNAL MEDICINE

## 2023-10-01 RX ORDER — SODIUM CHLORIDE 9 MG/ML
1000 INJECTION, SOLUTION INTRAVENOUS CONTINUOUS
Status: DISCONTINUED | OUTPATIENT
Start: 2023-10-01 | End: 2023-10-02 | Stop reason: HOSPADM

## 2023-10-01 RX ORDER — DEXTROSE MONOHYDRATE 100 MG/ML
INJECTION, SOLUTION INTRAVENOUS
Status: DISCONTINUED | OUTPATIENT
Start: 2023-10-01 | End: 2023-10-02 | Stop reason: HOSPADM

## 2023-10-01 RX ORDER — SODIUM CHLORIDE 9 MG/ML
125 INJECTION, SOLUTION INTRAVENOUS CONTINUOUS
Status: DISCONTINUED | OUTPATIENT
Start: 2023-10-01 | End: 2023-10-02 | Stop reason: HOSPADM

## 2023-10-01 RX ORDER — SODIUM CHLORIDE 0.9 % (FLUSH) 0.9 %
10 SYRINGE (ML) INJECTION
Status: DISCONTINUED | OUTPATIENT
Start: 2023-10-01 | End: 2023-10-02 | Stop reason: HOSPADM

## 2023-10-01 RX ORDER — DEXTROSE MONOHYDRATE AND SODIUM CHLORIDE 5; .45 G/100ML; G/100ML
INJECTION, SOLUTION INTRAVENOUS CONTINUOUS PRN
Status: DISCONTINUED | OUTPATIENT
Start: 2023-10-01 | End: 2023-10-02 | Stop reason: HOSPADM

## 2023-10-01 RX ADMIN — SODIUM CHLORIDE 1974 ML: 9 INJECTION, SOLUTION INTRAVENOUS at 10:10

## 2023-10-01 RX ADMIN — SODIUM CHLORIDE, POTASSIUM CHLORIDE, SODIUM LACTATE AND CALCIUM CHLORIDE 1000 ML: 600; 310; 30; 20 INJECTION, SOLUTION INTRAVENOUS at 10:10

## 2023-10-01 RX ADMIN — SODIUM CHLORIDE 1000 ML: 9 INJECTION, SOLUTION INTRAVENOUS at 10:10

## 2023-10-01 RX ADMIN — HUMAN INSULIN 10 UNITS: 100 INJECTION, SOLUTION SUBCUTANEOUS at 10:10

## 2023-10-01 RX ADMIN — INSULIN HUMAN 0.1 UNITS/KG/HR: 1 INJECTION, SOLUTION INTRAVENOUS at 10:10

## 2023-10-02 ENCOUNTER — HOSPITAL ENCOUNTER (INPATIENT)
Facility: HOSPITAL | Age: 28
LOS: 2 days | Discharge: HOME | DRG: 638 | End: 2023-10-04
Attending: FAMILY MEDICINE | Admitting: INTERNAL MEDICINE
Payer: MEDICAID

## 2023-10-02 VITALS
BODY MASS INDEX: 26.52 KG/M2 | RESPIRATION RATE: 20 BRPM | SYSTOLIC BLOOD PRESSURE: 122 MMHG | OXYGEN SATURATION: 99 % | TEMPERATURE: 97 F | WEIGHT: 145 LBS | DIASTOLIC BLOOD PRESSURE: 79 MMHG | HEART RATE: 112 BPM

## 2023-10-02 DIAGNOSIS — I42.9 CARDIOMYOPATHY: ICD-10-CM

## 2023-10-02 DIAGNOSIS — E11.10 DKA (DIABETIC KETOACIDOSIS): ICD-10-CM

## 2023-10-02 DIAGNOSIS — I51.7 CARDIOMEGALY: ICD-10-CM

## 2023-10-02 DIAGNOSIS — N17.9 ACUTE KIDNEY INJURY: ICD-10-CM

## 2023-10-02 DIAGNOSIS — E10.65 HYPERGLYCEMIA DUE TO TYPE 1 DIABETES MELLITUS: Primary | Chronic | ICD-10-CM

## 2023-10-02 PROBLEM — E10.9 DM I (DIABETES MELLITUS, TYPE I): Status: ACTIVE | Noted: 2023-10-02

## 2023-10-02 LAB
ALBUMIN SERPL-MCNC: 2.3 G/DL (ref 3.5–5)
ALBUMIN/GLOB SERPL: 0.8 RATIO (ref 1.1–2)
ALLENS TEST BLOOD GAS (OHS): ABNORMAL
ALLENS TEST: ABNORMAL
ALP SERPL-CCNC: 81 UNIT/L (ref 40–150)
ALT SERPL-CCNC: 23 UNIT/L (ref 0–55)
ANION GAP SERPL CALC-SCNC: 17 MEQ/L
ANION GAP SERPL CALC-SCNC: 23 MEQ/L
ANION GAP SERPL CALC-SCNC: 8 MEQ/L
ANION GAP SERPL CALC-SCNC: >30 MEQ/L
AST SERPL-CCNC: 31 UNIT/L (ref 5–34)
AV INDEX (PROSTH): 0.98
AV MEAN GRADIENT: 10 MMHG
AV PEAK GRADIENT: 16 MMHG
AV VALVE AREA BY VELOCITY RATIO: 2.16 CM²
AV VALVE AREA: 2.93 CM²
AV VELOCITY RATIO: 0.72
B-OH-BUTYR SERPL-MCNC: 5.2 MMOL/L
BASE EXCESS BLD CALC-SCNC: -19.2 MMOL/L
BILIRUB SERPL-MCNC: 0.3 MG/DL
BLOOD GAS SAMPLE TYPE (OHS): ABNORMAL
BSA FOR ECHO PROCEDURE: 1.69 M2
BUN SERPL-MCNC: 31.6 MG/DL (ref 7–18.7)
BUN SERPL-MCNC: 40.2 MG/DL (ref 7–18.7)
BUN SERPL-MCNC: 50.1 MG/DL (ref 7–18.7)
BUN SERPL-MCNC: 56.4 MG/DL (ref 7–18.7)
BUN SERPL-MCNC: 57 MG/DL (ref 7–18.7)
CALCIUM SERPL-MCNC: 6.7 MG/DL (ref 8.4–10.2)
CALCIUM SERPL-MCNC: 6.9 MG/DL (ref 8.4–10.2)
CALCIUM SERPL-MCNC: 7.1 MG/DL (ref 8.4–10.2)
CALCIUM SERPL-MCNC: 7.4 MG/DL (ref 8.4–10.2)
CALCIUM SERPL-MCNC: 7.7 MG/DL (ref 8.4–10.2)
CHLORIDE SERPL-SCNC: 103 MMOL/L (ref 98–107)
CHLORIDE SERPL-SCNC: 104 MMOL/L (ref 98–107)
CHLORIDE SERPL-SCNC: 87 MMOL/L (ref 98–107)
CHLORIDE SERPL-SCNC: 92 MMOL/L (ref 98–107)
CHLORIDE SERPL-SCNC: 97 MMOL/L (ref 98–107)
CO2 BLDA-SCNC: 8.9 MMOL/L
CO2 SERPL-SCNC: 14 MMOL/L (ref 22–29)
CO2 SERPL-SCNC: 19 MMOL/L (ref 22–29)
CO2 SERPL-SCNC: 22 MMOL/L (ref 22–29)
CO2 SERPL-SCNC: 8 MMOL/L (ref 22–29)
CO2 SERPL-SCNC: <5 MMOL/L (ref 22–29)
COHGB MFR BLDA: 4.1 %
CREAT SERPL-MCNC: 1.44 MG/DL (ref 0.55–1.02)
CREAT SERPL-MCNC: 1.59 MG/DL (ref 0.55–1.02)
CREAT SERPL-MCNC: 1.88 MG/DL (ref 0.55–1.02)
CREAT SERPL-MCNC: 2.15 MG/DL (ref 0.55–1.02)
CREAT SERPL-MCNC: 2.41 MG/DL (ref 0.55–1.02)
CREAT/UREA NIT SERPL: 22
CREAT/UREA NIT SERPL: 24
CREAT/UREA NIT SERPL: 26
CREAT/UREA NIT SERPL: 27
CV ECHO LV RWT: 0.84 CM
DOP CALC AO PEAK VEL: 2.03 M/S
DOP CALC AO VTI: 28.8 CM
DOP CALC LVOT AREA: 3 CM2
DOP CALC LVOT DIAMETER: 1.95 CM
DOP CALC LVOT PEAK VEL: 1.47 M/S
DOP CALC LVOT STROKE VOLUME: 84.47 CM3
DOP CALC MV VTI: 19.1 CM
DOP CALCLVOT PEAK VEL VTI: 28.3 CM
DRAWN BY BLOOD GAS (OHS): ABNORMAL
E WAVE DECELERATION TIME: 116.58 MSEC
E/A RATIO: 1.61
E/E' RATIO: 12.12 M/S
ECHO LV POSTERIOR WALL: 1.51 CM (ref 0.6–1.1)
FIO2 BLOOD GAS (OHS): 21 %
FRACTIONAL SHORTENING: 41 % (ref 28–44)
GFR SERPLBLD CREATININE-BSD FMLA CKD-EPI: 27 MLS/MIN/1.73/M2
GFR SERPLBLD CREATININE-BSD FMLA CKD-EPI: 31 MLS/MIN/1.73/M2
GFR SERPLBLD CREATININE-BSD FMLA CKD-EPI: 37 MLS/MIN/1.73/M2
GFR SERPLBLD CREATININE-BSD FMLA CKD-EPI: 45 MLS/MIN/1.73/M2
GFR SERPLBLD CREATININE-BSD FMLA CKD-EPI: 51 MLS/MIN/1.73/M2
GLOBULIN SER-MCNC: 3 GM/DL (ref 2.4–3.5)
GLUCOSE SERPL-MCNC: 154 MG/DL (ref 74–100)
GLUCOSE SERPL-MCNC: 167 MG/DL (ref 74–100)
GLUCOSE SERPL-MCNC: 352 MG/DL (ref 74–100)
GLUCOSE SERPL-MCNC: 497 MG/DL (ref 74–100)
GLUCOSE SERPL-MCNC: 704 MG/DL (ref 74–100)
GLUCOSE SERPL-MCNC: >500 MG/DL (ref 70–110)
HCO3 BLDA-SCNC: 8.2 MMOL/L
HCO3 UR-SCNC: 5 MMOL/L (ref 24–28)
INTERVENTRICULAR SEPTUM: 1.37 CM (ref 0.6–1.1)
LACTATE SERPL-SCNC: 1.4 MMOL/L (ref 0.5–2.2)
LACTATE SERPL-SCNC: 3.8 MMOL/L (ref 0.5–2.2)
LEFT ATRIUM SIZE: 3.47 CM
LEFT INTERNAL DIMENSION IN SYSTOLE: 2.13 CM (ref 2.1–4)
LEFT VENTRICLE DIASTOLIC VOLUME INDEX: 32.56 ML/M2
LEFT VENTRICLE DIASTOLIC VOLUME: 54.05 ML
LEFT VENTRICLE MASS INDEX: 113 G/M2
LEFT VENTRICLE SYSTOLIC VOLUME INDEX: 9 ML/M2
LEFT VENTRICLE SYSTOLIC VOLUME: 14.98 ML
LEFT VENTRICULAR INTERNAL DIMENSION IN DIASTOLE: 3.59 CM (ref 3.5–6)
LEFT VENTRICULAR MASS: 187.44 G
LV LATERAL E/E' RATIO: 10.3 M/S
LV SEPTAL E/E' RATIO: 14.71 M/S
LVOT MG: 6.2 MMHG
LVOT MV: 1.16 CM/S
METHGB MFR BLDA: 0.8 %
MV MEAN GRADIENT: 2 MMHG
MV PEAK A VEL: 0.64 M/S
MV PEAK E VEL: 1.03 M/S
MV PEAK GRADIENT: 4 MMHG
MV STENOSIS PRESSURE HALF TIME: 33.81 MS
MV VALVE AREA BY CONTINUITY EQUATION: 4.42 CM2
MV VALVE AREA P 1/2 METHOD: 6.51 CM2
O2 HB BLOOD GAS (OHS): 93.7 %
OHS LV EJECTION FRACTION SIMPSONS BIPLANE MOD: 69 %
OXYHGB MFR BLDA: 9.7 G/DL
PCO2 BLDA: 12.8 MMHG (ref 35–45)
PCO2 BLDA: 24 MMHG (ref 40–50)
PH BLDA: 7.14 [PH] (ref 7.3–7.4)
PH SMN: 7.2 [PH] (ref 7.35–7.45)
PISA TR MAX VEL: 2.49 M/S
PO2 BLDA: 113 MMHG (ref 30–40)
PO2 BLDA: 117 MMHG (ref 80–100)
POC BE: -23 MMOL/L
POC SATURATED O2: 98 % (ref 95–100)
POC TCO2: 5 MMOL/L (ref 23–27)
POCT GLUCOSE: 149 MG/DL (ref 70–110)
POCT GLUCOSE: 157 MG/DL (ref 70–110)
POCT GLUCOSE: 158 MG/DL (ref 70–110)
POCT GLUCOSE: 164 MG/DL (ref 70–110)
POCT GLUCOSE: 171 MG/DL (ref 70–110)
POCT GLUCOSE: 222 MG/DL (ref 70–110)
POCT GLUCOSE: 240 MG/DL (ref 70–110)
POCT GLUCOSE: 304 MG/DL (ref 70–110)
POCT GLUCOSE: 333 MG/DL (ref 70–110)
POCT GLUCOSE: 343 MG/DL (ref 70–110)
POCT GLUCOSE: 400 MG/DL (ref 70–110)
POCT GLUCOSE: 407 MG/DL (ref 70–110)
POCT GLUCOSE: 468 MG/DL (ref 70–110)
POTASSIUM SERPL-SCNC: 3.5 MMOL/L (ref 3.5–5.1)
POTASSIUM SERPL-SCNC: 4 MMOL/L (ref 3.5–5.1)
POTASSIUM SERPL-SCNC: 4.5 MMOL/L (ref 3.5–5.1)
POTASSIUM SERPL-SCNC: 5 MMOL/L (ref 3.5–5.1)
POTASSIUM SERPL-SCNC: 5.1 MMOL/L (ref 3.5–5.1)
PROT SERPL-MCNC: 5.3 GM/DL (ref 6.4–8.3)
RA PRESSURE ESTIMATED: 3 MMHG
RIGHT VENTRICULAR END-DIASTOLIC DIMENSION: 2.74 CM
RV TB RVSP: 5 MMHG
SAMPLE: ABNORMAL
SAO2 % BLDA: 98.6 %
SITE: ABNORMAL
SODIUM SERPL-SCNC: 122 MMOL/L (ref 136–145)
SODIUM SERPL-SCNC: 123 MMOL/L (ref 136–145)
SODIUM SERPL-SCNC: 128 MMOL/L (ref 136–145)
SODIUM SERPL-SCNC: 133 MMOL/L (ref 136–145)
SODIUM SERPL-SCNC: 134 MMOL/L (ref 136–145)
TDI LATERAL: 0.1 M/S
TDI SEPTAL: 0.07 M/S
TDI: 0.09 M/S
TR MAX PG: 25 MMHG
TV REST PULMONARY ARTERY PRESSURE: 28 MMHG
Z-SCORE OF LEFT VENTRICULAR DIMENSION IN END DIASTOLE: -2.59
Z-SCORE OF LEFT VENTRICULAR DIMENSION IN END SYSTOLE: -2.39

## 2023-10-02 PROCEDURE — 99900035 HC TECH TIME PER 15 MIN (STAT)

## 2023-10-02 PROCEDURE — 80048 BASIC METABOLIC PNL TOTAL CA: CPT | Performed by: INTERNAL MEDICINE

## 2023-10-02 PROCEDURE — 25000003 PHARM REV CODE 250

## 2023-10-02 PROCEDURE — 25000003 PHARM REV CODE 250: Performed by: INTERNAL MEDICINE

## 2023-10-02 PROCEDURE — 83605 ASSAY OF LACTIC ACID: CPT | Performed by: INTERNAL MEDICINE

## 2023-10-02 PROCEDURE — 82803 BLOOD GASES ANY COMBINATION: CPT

## 2023-10-02 PROCEDURE — 36600 WITHDRAWAL OF ARTERIAL BLOOD: CPT

## 2023-10-02 PROCEDURE — 82962 GLUCOSE BLOOD TEST: CPT

## 2023-10-02 PROCEDURE — 96361 HYDRATE IV INFUSION ADD-ON: CPT

## 2023-10-02 PROCEDURE — 82010 KETONE BODYS QUAN: CPT | Performed by: STUDENT IN AN ORGANIZED HEALTH CARE EDUCATION/TRAINING PROGRAM

## 2023-10-02 PROCEDURE — 63600175 PHARM REV CODE 636 W HCPCS: Performed by: INTERNAL MEDICINE

## 2023-10-02 PROCEDURE — 63600175 PHARM REV CODE 636 W HCPCS

## 2023-10-02 PROCEDURE — 63600175 PHARM REV CODE 636 W HCPCS: Performed by: STUDENT IN AN ORGANIZED HEALTH CARE EDUCATION/TRAINING PROGRAM

## 2023-10-02 PROCEDURE — 99291 PR CRITICAL CARE, E/M 30-74 MINUTES: ICD-10-PCS | Mod: ,,, | Performed by: INTERNAL MEDICINE

## 2023-10-02 PROCEDURE — 94761 N-INVAS EAR/PLS OXIMETRY MLT: CPT

## 2023-10-02 PROCEDURE — 36556 INSERT NON-TUNNEL CV CATH: CPT

## 2023-10-02 PROCEDURE — 99285 EMERGENCY DEPT VISIT HI MDM: CPT | Mod: 25

## 2023-10-02 PROCEDURE — 96375 TX/PRO/DX INJ NEW DRUG ADDON: CPT

## 2023-10-02 PROCEDURE — 99291 CRITICAL CARE FIRST HOUR: CPT | Mod: ,,, | Performed by: INTERNAL MEDICINE

## 2023-10-02 PROCEDURE — S5010 5% DEXTROSE AND 0.45% SALINE: HCPCS

## 2023-10-02 PROCEDURE — 80053 COMPREHEN METABOLIC PANEL: CPT

## 2023-10-02 PROCEDURE — 80048 BASIC METABOLIC PNL TOTAL CA: CPT

## 2023-10-02 PROCEDURE — 87040 BLOOD CULTURE FOR BACTERIA: CPT | Performed by: STUDENT IN AN ORGANIZED HEALTH CARE EDUCATION/TRAINING PROGRAM

## 2023-10-02 PROCEDURE — 80048 BASIC METABOLIC PNL TOTAL CA: CPT | Performed by: FAMILY MEDICINE

## 2023-10-02 PROCEDURE — 83605 ASSAY OF LACTIC ACID: CPT

## 2023-10-02 PROCEDURE — 25000003 PHARM REV CODE 250: Performed by: STUDENT IN AN ORGANIZED HEALTH CARE EDUCATION/TRAINING PROGRAM

## 2023-10-02 PROCEDURE — 20000000 HC ICU ROOM

## 2023-10-02 PROCEDURE — 96365 THER/PROPH/DIAG IV INF INIT: CPT

## 2023-10-02 RX ORDER — HYDROMORPHONE HYDROCHLORIDE 1 MG/ML
0.2 INJECTION, SOLUTION INTRAMUSCULAR; INTRAVENOUS; SUBCUTANEOUS EVERY 4 HOURS PRN
Status: DISCONTINUED | OUTPATIENT
Start: 2023-10-02 | End: 2023-10-03

## 2023-10-02 RX ORDER — SODIUM CHLORIDE 0.9 % (FLUSH) 0.9 %
10 SYRINGE (ML) INJECTION
Status: DISCONTINUED | OUTPATIENT
Start: 2023-10-02 | End: 2023-10-04 | Stop reason: HOSPADM

## 2023-10-02 RX ORDER — FAMOTIDINE 10 MG/ML
20 INJECTION INTRAVENOUS DAILY
Status: DISCONTINUED | OUTPATIENT
Start: 2023-10-02 | End: 2023-10-04 | Stop reason: HOSPADM

## 2023-10-02 RX ORDER — HYDROMORPHONE HYDROCHLORIDE 1 MG/ML
0.5 INJECTION, SOLUTION INTRAMUSCULAR; INTRAVENOUS; SUBCUTANEOUS EVERY 6 HOURS PRN
Status: DISCONTINUED | OUTPATIENT
Start: 2023-10-02 | End: 2023-10-02

## 2023-10-02 RX ORDER — ACETAMINOPHEN 500 MG
1000 TABLET ORAL ONCE
Status: COMPLETED | OUTPATIENT
Start: 2023-10-02 | End: 2023-10-02

## 2023-10-02 RX ORDER — IBUPROFEN 200 MG
16 TABLET ORAL
Status: DISCONTINUED | OUTPATIENT
Start: 2023-10-02 | End: 2023-10-04 | Stop reason: HOSPADM

## 2023-10-02 RX ORDER — POTASSIUM CHLORIDE 29.8 MG/ML
80 INJECTION INTRAVENOUS
Status: DISCONTINUED | OUTPATIENT
Start: 2023-10-02 | End: 2023-10-04 | Stop reason: HOSPADM

## 2023-10-02 RX ORDER — INSULIN ASPART 100 [IU]/ML
0-5 INJECTION, SOLUTION INTRAVENOUS; SUBCUTANEOUS
Status: DISCONTINUED | OUTPATIENT
Start: 2023-10-02 | End: 2023-10-04 | Stop reason: HOSPADM

## 2023-10-02 RX ORDER — INSULIN ASPART 100 [IU]/ML
8 INJECTION, SOLUTION INTRAVENOUS; SUBCUTANEOUS
Status: DISCONTINUED | OUTPATIENT
Start: 2023-10-03 | End: 2023-10-04 | Stop reason: HOSPADM

## 2023-10-02 RX ORDER — IBUPROFEN 200 MG
24 TABLET ORAL
Status: DISCONTINUED | OUTPATIENT
Start: 2023-10-02 | End: 2023-10-04 | Stop reason: HOSPADM

## 2023-10-02 RX ORDER — POTASSIUM CHLORIDE 14.9 MG/ML
60 INJECTION INTRAVENOUS
Status: DISCONTINUED | OUTPATIENT
Start: 2023-10-02 | End: 2023-10-04 | Stop reason: HOSPADM

## 2023-10-02 RX ORDER — DEXTROSE MONOHYDRATE AND SODIUM CHLORIDE 5; .45 G/100ML; G/100ML
INJECTION, SOLUTION INTRAVENOUS CONTINUOUS PRN
Status: DISCONTINUED | OUTPATIENT
Start: 2023-10-02 | End: 2023-10-04 | Stop reason: HOSPADM

## 2023-10-02 RX ORDER — HYDROMORPHONE HYDROCHLORIDE 1 MG/ML
0.2 INJECTION, SOLUTION INTRAMUSCULAR; INTRAVENOUS; SUBCUTANEOUS ONCE
Status: COMPLETED | OUTPATIENT
Start: 2023-10-02 | End: 2023-10-02

## 2023-10-02 RX ORDER — SODIUM CHLORIDE 9 MG/ML
1000 INJECTION, SOLUTION INTRAVENOUS CONTINUOUS
Status: ACTIVE | OUTPATIENT
Start: 2023-10-02 | End: 2023-10-02

## 2023-10-02 RX ORDER — GLUCAGON 1 MG
1 KIT INJECTION
Status: DISCONTINUED | OUTPATIENT
Start: 2023-10-02 | End: 2023-10-04 | Stop reason: HOSPADM

## 2023-10-02 RX ORDER — DEXTROSE MONOHYDRATE 100 MG/ML
INJECTION, SOLUTION INTRAVENOUS
Status: DISCONTINUED | OUTPATIENT
Start: 2023-10-02 | End: 2023-10-04 | Stop reason: HOSPADM

## 2023-10-02 RX ORDER — MUPIROCIN 20 MG/G
OINTMENT TOPICAL 2 TIMES DAILY
Status: DISCONTINUED | OUTPATIENT
Start: 2023-10-02 | End: 2023-10-04 | Stop reason: HOSPADM

## 2023-10-02 RX ORDER — PROCHLORPERAZINE EDISYLATE 5 MG/ML
10 INJECTION INTRAMUSCULAR; INTRAVENOUS
Status: COMPLETED | OUTPATIENT
Start: 2023-10-02 | End: 2023-10-02

## 2023-10-02 RX ORDER — POTASSIUM CHLORIDE 29.8 MG/ML
40 INJECTION INTRAVENOUS
Status: DISCONTINUED | OUTPATIENT
Start: 2023-10-02 | End: 2023-10-04 | Stop reason: HOSPADM

## 2023-10-02 RX ORDER — ONDANSETRON 2 MG/ML
8 INJECTION INTRAMUSCULAR; INTRAVENOUS ONCE
Status: DISCONTINUED | OUTPATIENT
Start: 2023-10-02 | End: 2023-10-03

## 2023-10-02 RX ORDER — LABETALOL HCL 20 MG/4 ML
10 SYRINGE (ML) INTRAVENOUS EVERY 6 HOURS PRN
Status: DISCONTINUED | OUTPATIENT
Start: 2023-10-02 | End: 2023-10-04 | Stop reason: HOSPADM

## 2023-10-02 RX ORDER — HEPARIN SODIUM 5000 [USP'U]/ML
5000 INJECTION, SOLUTION INTRAVENOUS; SUBCUTANEOUS EVERY 12 HOURS
Status: DISCONTINUED | OUTPATIENT
Start: 2023-10-02 | End: 2023-10-04 | Stop reason: HOSPADM

## 2023-10-02 RX ADMIN — MUPIROCIN: 20 OINTMENT TOPICAL at 08:10

## 2023-10-02 RX ADMIN — INSULIN DETEMIR 30 UNITS: 100 INJECTION, SOLUTION SUBCUTANEOUS at 09:10

## 2023-10-02 RX ADMIN — HEPARIN SODIUM 5000 UNITS: 5000 INJECTION, SOLUTION INTRAVENOUS; SUBCUTANEOUS at 08:10

## 2023-10-02 RX ADMIN — LABETALOL HYDROCHLORIDE 10 MG: 5 INJECTION, SOLUTION INTRAVENOUS at 08:10

## 2023-10-02 RX ADMIN — HEPARIN SODIUM 5000 UNITS: 5000 INJECTION, SOLUTION INTRAVENOUS; SUBCUTANEOUS at 09:10

## 2023-10-02 RX ADMIN — INSULIN HUMAN 0.1 UNITS/KG/HR: 1 INJECTION, SOLUTION INTRAVENOUS at 04:10

## 2023-10-02 RX ADMIN — MUPIROCIN: 20 OINTMENT TOPICAL at 09:10

## 2023-10-02 RX ADMIN — HYDROMORPHONE HYDROCHLORIDE 0.2 MG: 0.5 INJECTION, SOLUTION INTRAMUSCULAR; INTRAVENOUS; SUBCUTANEOUS at 12:10

## 2023-10-02 RX ADMIN — HYDROMORPHONE HYDROCHLORIDE 0.2 MG: 0.5 INJECTION, SOLUTION INTRAMUSCULAR; INTRAVENOUS; SUBCUTANEOUS at 07:10

## 2023-10-02 RX ADMIN — DEXTROSE AND SODIUM CHLORIDE: 5; 450 INJECTION, SOLUTION INTRAVENOUS at 03:10

## 2023-10-02 RX ADMIN — SODIUM CHLORIDE 1000 ML: 9 INJECTION, SOLUTION INTRAVENOUS at 05:10

## 2023-10-02 RX ADMIN — PROCHLORPERAZINE EDISYLATE 10 MG: 5 INJECTION INTRAMUSCULAR; INTRAVENOUS at 12:10

## 2023-10-02 RX ADMIN — SODIUM CHLORIDE 125 ML/HR: 9 INJECTION, SOLUTION INTRAVENOUS at 12:10

## 2023-10-02 RX ADMIN — ACETAMINOPHEN 1000 MG: 500 TABLET, FILM COATED ORAL at 06:10

## 2023-10-02 RX ADMIN — FAMOTIDINE 20 MG: 10 INJECTION, SOLUTION INTRAVENOUS at 08:10

## 2023-10-02 RX ADMIN — SODIUM CHLORIDE 1974 ML: 9 INJECTION, SOLUTION INTRAVENOUS at 02:10

## 2023-10-02 NOTE — ED PROVIDER NOTES
Encounter Date: 10/2/2023       History     Chief Complaint   Patient presents with    Hyperglycemia     Transferred from [facility] for ICU admission Dx DKA.  CBG remains greater than 600 when leaving [facility].       Patient is a 28-year-old female transferred from [facility] for ICU admission for diabetic ketoacidosis.  Patient currently lethargic, history of noncompliance.  Currently on insulin drip.  Patient awakens to answer questions, but still very lethargic.    The history is provided by the patient and medical records (Sending physician).     Review of patient's allergies indicates:  No Known Allergies  Past Medical History:   Diagnosis Date    Diabetes mellitus     Diabetic gastroparesis associated with type 1 diabetes mellitus     DKA (diabetic ketoacidosis)     Hypertension     Non compliance with medical treatment      Past Surgical History:   Procedure Laterality Date    CHOLECYSTECTOMY      ESOPHAGOGASTRODUODENOSCOPY      Multiple    None       Family History   Problem Relation Age of Onset    Heart disease Mother     Hypertension Mother     Diabetes Mother     Hyperlipidemia Mother     Cancer Father     Stroke Father     Hypertension Father     Hyperlipidemia Father      Social History     Tobacco Use    Smoking status: Never    Smokeless tobacco: Never   Substance Use Topics    Alcohol use: Never    Drug use: Not Currently     Types: Marijuana     Review of Systems   Unable to perform ROS: Acuity of condition       Physical Exam     Initial Vitals [10/02/23 0406]   BP Pulse Resp Temp SpO2   (!) 144/79 (!) 127 14 97.2 °F (36.2 °C) 100 %      MAP       --         Physical Exam    Nursing note and vitals reviewed.  Constitutional: She appears well-developed and well-nourished. No distress.   Lethargic, ill-appearing, nontoxic   HENT:   Head: Normocephalic and atraumatic.   Mouth/Throat: Oropharynx is clear and moist.   Eyes: Conjunctivae and EOM are normal. Pupils are equal, round, and reactive  to light.   Neck: Neck supple.   Normal range of motion.  Cardiovascular:  Regular rhythm, normal heart sounds and intact distal pulses.     Exam reveals no gallop and no friction rub.       No murmur heard.  Tachycardia, regular rhythm   Pulmonary/Chest: No respiratory distress. She has no wheezes. She has no rhonchi. She has no rales.   Tachypnea, lungs clear to auscultation bilaterally   Abdominal: Abdomen is soft. Bowel sounds are normal. She exhibits no distension. There is no abdominal tenderness. There is no rebound and no guarding.   Musculoskeletal:         General: Normal range of motion.      Cervical back: Normal range of motion and neck supple.     Neurological: She is alert and oriented to person, place, and time.   Skin: Skin is warm and dry. Capillary refill takes less than 2 seconds. No erythema.   Psychiatric: She has a normal mood and affect. Her behavior is normal. Judgment and thought content normal.         ED Course   Procedures  Labs Reviewed   BASIC METABOLIC PANEL - Abnormal; Notable for the following components:       Result Value    Sodium Level 123 (*)     Chloride 92 (*)     Carbon Dioxide 8 (*)     Glucose Level 497 (*)     Blood Urea Nitrogen 56.4 (*)     Creatinine 2.15 (*)     Calcium Level Total 7.1 (*)     All other components within normal limits   BLOOD GAS - Abnormal; Notable for the following components:    pH, Blood gas 7.140 (*)     pCO2, Blood gas 24.0 (*)     pO2, Blood gas 113.0 (*)     All other components within normal limits   POCT GLUCOSE - Abnormal; Notable for the following components:    POCT Glucose 468 (*)     All other components within normal limits   EXTRA TUBES    Narrative:     The following orders were created for panel order EXTRA TUBES.  Procedure                               Abnormality         Status                     ---------                               -----------         ------                     Light Green Top Hold[[id number]]                                                        Lavender Top Hold[[id number]]                               In process                 Gold Top Hold[[id number]]                                   In process                   Please view results for these tests on the individual orders.   LIGHT GREEN TOP HOLD   LAVENDER TOP HOLD   GOLD TOP HOLD   POCT GLUCOSE, HAND-HELD DEVICE   POCT GLUCOSE MONITORING CONTINUOUS          Imaging Results    None          Medications   sodium chloride 0.9% flush 10 mL (has no administration in time range)   0.9%  NaCl infusion (1,000 mLs Intravenous New Bag 10/2/23 0500)   dextrose 5 % and 0.45 % NaCl infusion (has no administration in time range)   dextrose 10 % infusion (has no administration in time range)   dextrose 10 % infusion (has no administration in time range)   heparin (porcine) injection 5,000 Units (has no administration in time range)   insulin regular in 0.9 % NaCl 100 unit/100 mL (1 unit/mL) infusion (0.1 Units/kg/hr × 65.7 kg Intravenous New Bag 10/2/23 7994)   potassium chloride 40 mEq in 100 mL IVPB (FOR CENTRAL LINE ADMINISTRATION ONLY) (has no administration in time range)     And   potassium chloride 20 mEq in 100 mL IVPB (FOR CENTRAL LINE ADMINISTRATION ONLY) (has no administration in time range)     And   potassium chloride 40 mEq in 100 mL IVPB (FOR CENTRAL LINE ADMINISTRATION ONLY) (has no administration in time range)   dextrose 10% bolus 125 mL 125 mL (has no administration in time range)   dextrose 10% bolus 250 mL 250 mL (has no administration in time range)   famotidine (PF) injection 20 mg (has no administration in time range)     Medical Decision Making  28-year-old female history of diabetes mellitus type 1 and history of noncompliance transferred from [facility] for ICU admission.  Patient noted to be lethargic, very poor IV access therefore central line placed.  Initial glucose was over a 1000 with a anion gap of 38-43 (bicarb level was  less than 5).  Patient's initial pH on ABG was 7.2 O2.  Patient given IV fluids with improvement of glucose from over a 1000 to 700s.  Will repeat venous blood gas and a BMP in order to track patient's current glucose, and also to recheck potassium and bicarb level.  Internal Medicine has been consulted for ICU admission    Amount and/or Complexity of Data Reviewed  Labs: ordered.    Risk  Decision regarding hospitalization.               ED Course as of 10/02/23 0603   Mon Oct 02, 2023   0602 Patient's anion gap has improved with anion gap now being 23. [MW]      ED Course User Index  [MW] Alejandro [facility] MD                    Clinical Impression:   Final diagnoses:  [E11.10] DKA (diabetic ketoacidosis)        ED Disposition Condition    Admit                 Alejandro [facility] MD  10/02/23 0603

## 2023-10-02 NOTE — H&P
[facility] - Emergency Dept  Pulmonary Critical Care Note    Patient Name: Dorene Husain  MRN: [id number]  Admission Date: 10/2/2023  Hospital Length of Stay: 0 days  Code Status: Full Code  Attending Provider: Bob Torres Jr., MD,*  Primary Care Provider: Kumar Ortiz NP     Subjective:     HPI:   Dorene Husain is a 28 y.o. female with PMH of DM 1 with one admission this year for DKA who presented to the ED via transfer from [facility] due to DKA on insulin gtt in need of ICU admission due to no ICU bed availability at outside facility. Patient was somnolent on presentation to this facility and unable to answer more than one question without falling back to sleep. She is unable to provide her home insulin regimen but did take 30 U long-acting insulin and another 10 U long-acting prior to presenting to outside facility. She does report no PO intake over the past few days due feeling ill and fatigued. Lab work was significant for anion gap metabolic acidosis with a gap of 35 and bicarb undetectable.  Initial sodium 112 corrected to 132 due to hyperglycemia of 1,058.    Hospital Course/Significant events:  10/02/2023 - Admitted to ICU    Past Medical History:   Diagnosis Date    Diabetes mellitus     Diabetic gastroparesis associated with type 1 diabetes mellitus     DKA (diabetic ketoacidosis)     Hypertension     Non compliance with medical treatment        Past Surgical History:   Procedure Laterality Date    CHOLECYSTECTOMY      ESOPHAGOGASTRODUODENOSCOPY      Multiple    None         Social History     Socioeconomic History    Marital status: Single   Tobacco Use    Smoking status: Never    Smokeless tobacco: Never   Substance and Sexual Activity    Alcohol use: Never    Drug use: Not Currently     Types: Marijuana    Sexual activity: Yes     Social Determinants of Health     Financial Resource Strain: Unknown (6/21/2023)    Overall Financial Resource Strain (CARDIA)     Difficulty of Paying Living  Expenses: Patient refused   Food Insecurity: Unknown (6/21/2023)    Hunger Vital Sign     Worried About Running Out of Food in the Last Year: Patient refused     Ran Out of Food in the Last Year: Patient refused   Transportation Needs: Unknown (6/21/2023)    PRAPARE - Transportation     Lack of Transportation (Medical): Patient refused     Lack of Transportation (Non-Medical): Patient refused   Physical Activity: Unknown (6/21/2023)    Exercise Vital Sign     Days of Exercise per Week: Patient refused     Minutes of Exercise per Session: Patient refused   Stress: Unknown (6/21/2023)    [employer] - Occupational Stress Questionnaire     Feeling of Stress : Patient refused   Social Connections: Unknown (6/21/2023)    Social Connection and Isolation Panel [NHANES]     Frequency of Communication with Friends and Family: Patient refused     Frequency of Social Gatherings with Friends and Family: Patient refused     Attends Jainism Services: Patient refused     Active Member of Clubs or Organizations: Patient refused     Attends Club or Organization Meetings: Patient refused     Marital Status: Patient refused   Housing Stability: Unknown (6/21/2023)    Housing Stability Vital Sign     Unable to Pay for Housing in the Last Year: Patient refused     Number of Places Lived in the Last Year: 1     Unstable Housing in the Last Year: Patient refused       Current Outpatient Medications   Medication Instructions    amitriptyline (ELAVIL) 25 mg, Oral, Nightly    BASAGLAR KWIKPEN U-100 INSULIN glargine 100 units/mL (3mL) SubQ pen Subcutaneous    dicyclomine (BENTYL) 10 mg, Oral, 4 times daily    hydrOXYzine (ATARAX) 50 mg, Oral, Daily PRN    metoprolol tartrate (LOPRESSOR) 50 mg, Oral, 2 times daily    oxyCODONE-acetaminophen (PERCOCET)  mg per tablet 1 tablet, Oral, Every 8 hours PRN    pantoprazole (PROTONIX) 40 mg, Oral, Daily    promethazine (PHENERGAN) 25 mg, Oral, Every 6 hours PRN        Current Inpatient Medications   heparin (porcine)  5,000 Units Subcutaneous Q12H       Current Intravenous Infusions   sodium chloride 0.9% 1,000 mL (10/02/23 0500)    dextrose 5 % and 0.45 % NaCl      insulin regular 1 units/mL infusion orderable (DKA) 0.1 Units/kg/hr (10/02/23 0414)       Review of Systems   Unable to perform ROS: Acuity of condition        Objective:     No intake or output data in the 24 hours ending 10/02/23 0533    Vital Signs (Most Recent):  Temp: 97.2 °F (36.2 °C) (10/02/23 0406)  Pulse: (!) 127 (10/02/23 0406)  Resp: 14 (10/02/23 0406)  BP: (!) 144/79 (10/02/23 0406)  SpO2: 100 % (10/02/23 0500)  Body mass index is 26.49 kg/m².  Weight: 65.7 kg (144 lb 13.5 oz) Vital Signs (24h Range):  Temp:  [97.1 °F (36.2 °C)-97.2 °F (36.2 °C)] 97.2 °F (36.2 °C)  Pulse:  [] 127  Resp:  [14-20] 14  SpO2:  [80 %-100 %] 100 %  BP: (104-144)/(66-82) 144/79     Physical Exam  Vitals reviewed.   Constitutional:       General: She is in acute distress.      Appearance: She is ill-appearing.      Comments: Somnolent arousable to voice only, will answer 1 question for volume back asleep   HENT:      Head: Normocephalic and atraumatic.      Mouth/Throat:      Mouth: Mucous membranes are dry.      Pharynx: Oropharynx is clear.   Eyes:      General: No scleral icterus.     Extraocular Movements: Extraocular movements intact.      Pupils: Pupils are equal, round, and reactive to light.   Cardiovascular:      Rate and Rhythm: Regular rhythm. Tachycardia present.      Heart sounds: Murmur heard.      No friction rub. No gallop.      Comments: Grade 2/6 blowing mid systolic murmur heard best at the apex  Pulmonary:      Breath sounds: Normal breath sounds. No wheezing, rhonchi or rales.      Comments: Intermittent Kussmaul breathing  Abdominal:      General: Bowel sounds are normal. There is no distension.      Palpations: Abdomen is soft.      Tenderness: There is no abdominal tenderness. There is no  guarding.   Musculoskeletal:      Cervical back: Neck supple. No rigidity.      Right lower leg: No edema.      Left lower leg: No edema.   Lymphadenopathy:      Cervical: No cervical adenopathy.   Skin:     General: Skin is warm and dry.      Capillary Refill: Capillary refill takes 2 to 3 seconds.      Coloration: Skin is not jaundiced.      Findings: No lesion.   Neurological:      Mental Status: She is oriented to person, place, and time.      Motor: No weakness.         Lines/Drains/Airways       Central Venous Catheter Line  Duration             Percutaneous Central Line Insertion/Assessment - Triple Lumen  10/01/23 Internal Jugular Left 1 day                    Significant Labs:  Lab Results   Component Value Date    WBC 24.00 (H) 10/01/2023    HGB 9.1 (L) 10/01/2023    HCT 30.6 (L) 10/01/2023    MCV 93.9 10/01/2023     (H) 10/01/2023         BMP  Lab Results   Component Value Date     (L) 10/02/2023    K 5.0 10/02/2023    CHLORIDE 87 (L) 10/02/2023    CO2 <5 (LL) 10/02/2023    BUN 57.0 (H) 10/02/2023    CREATININE 2.41 (H) 10/02/2023    CALCIUM 6.7 (LL) 10/02/2023    AGAP >30.0 10/02/2023    ESTGFRAFRICA 81 10/08/2022    EGFRNONAA >60 04/24/2022       ABG  Recent Labs   Lab 10/02/23  0506   PH 7.140*   PO2 113.0*   PCO2 24.0*   HCO3 8.2   POCBASEDEF -19.20       Mechanical Ventilation Support:       Significant Imaging:  I have reviewed the pertinent imaging within the past 24 hours.    Assessment/Plan:     Assessment  Diabetic ketoacidosis in a type I diabetic 2/2 medication noncompliance with initial anion gap of 35 and bicarb undetectable, pH 7.2 on ABG. 2nd admission this year for DKA  CHATA with BUN/creatinine ratio >20 suggesting prerenal likely secondary to intravascular volume depletion  Grade 2/6 mid systolic murmur suspected to be flow murmur, reassess once euvolemic  Lactic acidosis initially 3.8 given 2 L bolus at outside facility.    Plan  - Admitted to ICU for ongoing monitoring and  medical management  - DKA protocol initiated  - Holding bicarb replacement for now due to pH >7  - BMP q4 hour  - Monitor electrolytes, correct as needed  - Repeat lactic acid ordered with next BMP at 0800 hours    DVT Prophylaxis:  Heparin 5k  GI Prophylaxis:  Famotidine     32 minutes of critical care was time spent personally by me on the following activities: development of treatment plan with patient or surrogate and bedside caregivers, discussions with consultants, evaluation of patient's response to treatment, examination of patient, ordering and performing treatments and interventions, ordering and review of laboratory studies, ordering and review of radiographic studies, pulse oximetry, re-evaluation of patient's condition.  This critical care time did not overlap with that of any other provider or involve time for any procedures.     Fabio Kingsley MD PGY-II  Pulmonary Critical Care Medicine  [facility] - Emergency Dept

## 2023-10-02 NOTE — ED PROVIDER NOTES
Encounter Date: 10/1/2023       History     Chief Complaint   Patient presents with    Hyperglycemia     Not feeling well over the last couple of days, has been unable to eat. Took 30 units then 10 units of long acting insulin. CBG >500     HPI  Review of patient's allergies indicates:  No Known Allergies  Past Medical History:   Diagnosis Date    Diabetes mellitus     Diabetic gastroparesis associated with type 1 diabetes mellitus     DKA (diabetic ketoacidosis)     Hypertension     Non compliance with medical treatment      Past Surgical History:   Procedure Laterality Date    CHOLECYSTECTOMY      ESOPHAGOGASTRODUODENOSCOPY      Multiple    None       Family History   Problem Relation Age of Onset    Heart disease Mother     Hypertension Mother     Diabetes Mother     Hyperlipidemia Mother     Cancer Father     Stroke Father     Hypertension Father     Hyperlipidemia Father      Social History     Tobacco Use    Smoking status: Never    Smokeless tobacco: Never   Substance Use Topics    Alcohol use: Never    Drug use: Not Currently     Types: Marijuana     Review of Systems    Physical Exam     Initial Vitals [10/01/23 2005]   BP Pulse Resp Temp SpO2   133/82 (!) 127 18 97.1 °F (36.2 °C) 100 %      MAP       --         Physical Exam    Nursing note and vitals reviewed.  Constitutional: She appears well-developed and well-nourished. She appears lethargic. She is not diaphoretic. No distress.   HENT:   Head: Normocephalic and atraumatic.   Mouth/Throat: No oropharyngeal exudate.   Dry mucus membranes   Eyes: EOM are normal. Pupils are equal, round, and reactive to light. No scleral icterus.   Neck: Neck supple. No JVD present.   Normal range of motion.  Cardiovascular:  Regular rhythm, normal heart sounds and intact distal pulses.     Exam reveals no gallop and no friction rub.       No murmur heard.  tachycardia   Pulmonary/Chest: Breath sounds normal. No respiratory distress. She has no wheezes. She exhibits no  tenderness.   Abdominal: Abdomen is soft. Bowel sounds are normal. She exhibits no distension. There is no rebound.   Musculoskeletal:         General: Normal range of motion.      Cervical back: Normal range of motion and neck supple.     Neurological: She appears lethargic.   Skin: Skin is dry. Capillary refill takes 2 to 3 seconds.   Poor skin turgur         ED Course   Central Line    Date/Time: 10/1/2023 10:07 PM    Performed by: Chuy Mathews MD  Authorized by: Chuy Mathews MD    Location procedure was performed[facility]  Consent Done ?:  Yes  Time out complete?: Verified correct patient, procedure, equipment, staff, and site/side    Indications:  Med administration and vascular access  Anesthesia:  Local infiltration  Anesthetic total (ml):  4  Preparation:  Skin prepped with ChloraPrep  Skin prep agent dried: Skin prep agent completely dried prior to procedure    Sterile barriers: All five maximal sterile barriers used - gloves, gown, cap, mask and large sterile sheet    Hand hygiene: Hand hygiene performed immediately prior to central venous catheter insertion    Location:  Left internal jugular  Catheter size:  7 Fr  Ultrasound guidance: Yes    Vessel Caliber:  Small   patent  Comprressibility:  Normal  Needle advanced into vessel with real time ultrasound guidance.    Guidewire confirmed in vessel.    Steril sheath on probe.    Sterile gel used.  Manometry: No    Number of attempts:  3  Securement:  Line sutured, chlorhexidine patch, sterile dressing applied and blood return through all ports  Complications: No    Estimated blood loss (mL):  4  Specimens: No    Implants: Yes (specify)    XRay:  Placement verified by x-ray  Adverse Events:  NoneTermination Site: superior vena cava  Critical Care    Date/Time: 10/2/2023 2:52 AM    Performed by: Chuy Mathews MD  Authorized by: Chuy Mathews MD  Direct patient critical care time: 42 minutes  Additional  history critical care time: 19 minutes  Ordering / reviewing critical care time: 16 minutes  Documentation critical care time: 18 minutes  Consulting other physicians critical care time: 4 minutes  Consult with family critical care time: 7 minutes  Total critical care time (exclusive of procedural time) : 106 minutes  Critical care time was exclusive of separately billable procedures and treating other patients.  Critical care was necessary to treat or prevent imminent or life-threatening deterioration of the following conditions: circulatory failure, endocrine crisis, dehydration and metabolic crisis.  Critical care was time spent personally by me on the following activities: blood draw for specimens, development of treatment plan with patient or surrogate, interpretation of cardiac output measurements, evaluation of patient's response to treatment, examination of patient, obtaining history from patient or surrogate, ordering and performing treatments and interventions, ordering and review of laboratory studies, ordering and review of radiographic studies, pulse oximetry, re-evaluation of patient's condition, review of old charts and vascular access procedures.        No results displayed because visit has over 200 results.      Admission on 10/01/2023, Discharged on 10/02/2023   Component Date Value Ref Range Status    POCT Glucose 10/01/2023 >500 (HH)  70 - 110 mg/dL Final    Sodium Level 10/01/2023 112 (LL)  136 - 145 mmol/L Final    Potassium Level 10/01/2023 7.5 (HH)  3.5 - 5.1 mmol/L Final    Chloride 10/01/2023 69 (LL)  98 - 107 mmol/L Final    Carbon Dioxide 10/01/2023 <5 (LL)  22 - 29 mmol/L Final    Glucose Level 10/01/2023 1,058 (HH)  74 - 100 mg/dL Final    Blood Urea Nitrogen 10/01/2023 67.0 (H)  7.0 - 18.7 mg/dL Final    Creatinine 10/01/2023 2.84 (H)  0.55 - 1.02 mg/dL Final    Calcium Level Total 10/01/2023 7.7 (L)  8.4 - 10.2 mg/dL Final    Protein Total 10/01/2023 5.5 (L)  6.4 - 8.3 gm/dL Final     Albumin Level 10/01/2023 2.4 (L)  3.5 - 5.0 g/dL Final    Globulin 10/01/2023 3.1  2.4 - 3.5 gm/dL Final    Albumin/Globulin Ratio 10/01/2023 0.8 (L)  1.1 - 2.0 ratio Final    Bilirubin Total 10/01/2023 0.5  <=1.5 mg/dL Final    Alkaline Phosphatase 10/01/2023 109  40 - 150 unit/L Final    Alanine Aminotransferase 10/01/2023 25  0 - 55 unit/L Final    Aspartate Aminotransferase 10/01/2023 28  5 - 34 unit/L Final    eGFR 10/01/2023 23  mls/min/1.73/m2 Final    Color, UA 10/01/2023 Yellow  Yellow, Light-Yellow, Dark Yellow, [place], Straw Final    Appearance, UA 10/01/2023 Clear  Clear Final    Specific Gravity, UA 10/01/2023 1.015  1.005 - 1.030 Final    pH, UA 10/01/2023 5.0  5.0 - 8.5 Final    Protein, UA 10/01/2023 2+ (A)  Negative Final    Glucose, UA 10/01/2023 2+ (A)  Negative, Normal Final    Ketones, UA 10/01/2023 3+ (A)  Negative Final    Blood, UA 10/01/2023 1+ (A)  Negative Final    Bilirubin, UA 10/01/2023 1+ (A)  Negative Final    Urobilinogen, UA 10/01/2023 0.2  0.2, 1.0, Normal Final    Nitrites, UA 10/01/2023 Negative  Negative Final    Leukocyte Esterase, UA 10/01/2023 Negative  Negative Final    Beta hCG Qualitative, Urine 10/01/2023 Negative  Negative Final    Amphetamines, Urine 10/01/2023 Negative  Negative Final    Barbituates, Urine 10/01/2023 Negative  Negative Final    Benzodiazepine, Urine 10/01/2023 Negative  Negative Final    Cannabinoids, Urine 10/01/2023 Negative  Negative Final    Cocaine, Urine 10/01/2023 Negative  Negative Final    Fentanyl, Urine 10/01/2023 Negative  Negative Final    MDMA, Urine 10/01/2023 Negative  Negative Final    Opiates, Urine 10/01/2023 Negative  Negative Final    Phencyclidine, Urine 10/01/2023 Negative  Negative Final    pH, Urine 10/01/2023 5.0  3.0 - 11.0 Final    Ethanol Level 10/01/2023 <10.0  <=10.0 mg/dL Final    Lactic Acid Level 10/01/2023 3.8 (HH)  0.5 - 2.2 mmol/L Final    Magnesium Level 10/01/2023 2.90 (H)  1.60 - 2.60 mg/dL Final    WBC 10/01/2023  24.00 (H)  4.50 - 11.50 x10(3)/mcL Final    RBC 10/01/2023 3.26 (L)  4.20 - 5.40 x10(6)/mcL Final    Hgb 10/01/2023 9.1 (L)  12.0 - 16.0 g/dL Final    Hct 10/01/2023 30.6 (L)  37.0 - 47.0 % Final    MCV 10/01/2023 93.9  80.0 - 94.0 fL Final    MCH 10/01/2023 27.9  27.0 - 31.0 pg Final    MCHC 10/01/2023 29.7 (L)  33.0 - 36.0 g/dL Final    RDW 10/01/2023 12.4  11.5 - 17.0 % Final    Platelet 10/01/2023 560 (H)  130 - 400 x10(3)/mcL Final    MPV 10/01/2023 9.6  7.4 - 10.4 fL Final    Neutrophils % 10/01/2023 85 (H)  47 - 80 % Final    Bands % 10/01/2023 4  0 - 11 % Final    Lymphs % 10/01/2023 5 (L)  13 - 40 % Final    Monocytes % 10/01/2023 3  2 - 11 % Final    Metamyelocytes % 10/01/2023 3  % Final    Neutrophils Abs Calc 10/01/2023 21.36 (H)  2.1 - 9.2 x10(3)/mcL Final    Lymphs Abs 10/01/2023 1.2  0.6 - 4.6 x10(3)/mcL Final    Monocytes Abs 10/01/2023 0.72  0.1 - 1.3 x10(3)/mcL Final    Platelets 10/01/2023 Increased (A)  Normal, Adequate Final    RBC Morph 10/01/2023 Abnormal (A)  Normal Final    Poikilocytosis 10/01/2023 Slight (A)  (none) Final    [place]is Cells 10/01/2023 Slight (A)  (none) Final    Lactic Acid Level 10/02/2023 3.8 (HH)  0.5 - 2.2 mmol/L Final    POC Glucose 10/02/2023 >500  70 - 110 MG/DL Final    Bacteria, UA 10/01/2023 Few (A)  None Seen, Rare, Occasional /HPF Final    RBC, UA 10/01/2023 0-2  None Seen, 0-2, 3-5, 0-5 /HPF Final    WBC, UA 10/01/2023 0-2  None Seen, 0-2, 3-5, 0-5 /HPF Final    Squamous Epithelial Cells, UA 10/01/2023 Few (A)  None Seen, Rare, Occasional, Occ /HPF Final    Sodium Level 10/02/2023 122 (L)  136 - 145 mmol/L Final    Potassium Level 10/02/2023 5.0  3.5 - 5.1 mmol/L Final    Chloride 10/02/2023 87 (L)  98 - 107 mmol/L Final    Carbon Dioxide 10/02/2023 <5 (LL)  22 - 29 mmol/L Final    Glucose Level 10/02/2023 704 (HH)  74 - 100 mg/dL Final    Blood Urea Nitrogen 10/02/2023 57.0 (H)  7.0 - 18.7 mg/dL Final    Creatinine 10/02/2023 2.41 (H)  0.55 - 1.02 mg/dL Final     BUN/Creatinine Ratio 10/02/2023 24   Final    Calcium Level Total 10/02/2023 6.7 (LL)  8.4 - 10.2 mg/dL Final    Anion Gap 10/02/2023 >30.0  mEq/L Final    eGFR 10/02/2023 27  mls/min/1.73/m2 Final    POC PH 10/02/2023 7.202 (LL)  7.35 - 7.45 Final    POC PCO2 10/02/2023 12.8 (LL)  35 - 45 mmHg Final    POC PO2 10/02/2023 117 (H)  80 - 100 mmHg Final    POC HCO3 10/02/2023 5.0 (L)  24 - 28 mmol/L Final    POC BE 10/02/2023 -23  -2 to 2 mmol/L Final    POC SATURATED O2 10/02/2023 98  95 - 100 % Final    POC TCO2 10/02/2023 5 (L)  23 - 27 mmol/L Final    Sample 10/02/2023 ARTERIAL   Final    Site 10/02/2023 LB   Final    Allens Test 10/02/2023 Pass   Final    POC PH 10/01/2023 7.102 (LL)  7.35 - 7.45 Final    POC PCO2 10/01/2023 9.5 (LL)  35 - 45 mmHg Final    POC PO2 10/01/2023 127 (H)  80 - 100 mmHg Final    POC HCO3 10/01/2023 3.0 (L)  24 - 28 mmol/L Final    POC BE 10/01/2023 -27  -2 to 2 mmol/L Final    POC SATURATED O2 10/01/2023 98  95 - 100 % Final    POC TCO2 10/01/2023 <5 (L)  23 - 27 mmol/L Final    Sample 10/01/2023 ARTERIAL   Final    Site 10/01/2023 LB   Final    Allens Test 10/01/2023 N/A   Final    DelSys 10/01/2023 Room Air   Final    POCT Glucose 10/01/2023 >500 (HH)  70 - 110 mg/dL Final    POCT Glucose 10/02/2023 >500 (HH)  70 - 110 mg/dL Final       Labs Reviewed   COMPREHENSIVE METABOLIC PANEL - Abnormal; Notable for the following components:       Result Value    Sodium Level 112 (*)     Potassium Level 7.5 (*)     Chloride 69 (*)     Carbon Dioxide <5 (*)     Glucose Level 1,058 (*)     Blood Urea Nitrogen 67.0 (*)     Creatinine 2.84 (*)     Calcium Level Total 7.7 (*)     Protein Total 5.5 (*)     Albumin Level 2.4 (*)     Albumin/Globulin Ratio 0.8 (*)     All other components within normal limits   URINALYSIS, REFLEX TO URINE CULTURE - Abnormal; Notable for the following components:    Protein, UA 2+ (*)     Glucose, UA 2+ (*)     Ketones, UA 3+ (*)     Blood, UA 1+ (*)     Bilirubin, UA 1+  (*)     All other components within normal limits   LACTIC ACID, PLASMA - Abnormal; Notable for the following components:    Lactic Acid Level 3.8 (*)     All other components within normal limits   MAGNESIUM - Abnormal; Notable for the following components:    Magnesium Level 2.90 (*)     All other components within normal limits   CBC WITH DIFFERENTIAL - Abnormal; Notable for the following components:    WBC 24.00 (*)     RBC 3.26 (*)     Hgb 9.1 (*)     Hct 30.6 (*)     MCHC 29.7 (*)     Platelet 560 (*)     All other components within normal limits   MANUAL DIFFERENTIAL - Abnormal; Notable for the following components:    Neutrophils % 85 (*)     Lymphs % 5 (*)     Neutrophils Abs Calc 21.36 (*)     Platelets Increased (*)     RBC Morph Abnormal (*)     Poikilocytosis Slight (*)     [place] Cells Slight (*)     All other components within normal limits   LACTIC ACID, PLASMA - Abnormal; Notable for the following components:    Lactic Acid Level 3.8 (*)     All other components within normal limits   URINALYSIS, MICROSCOPIC - Abnormal; Notable for the following components:    Bacteria, UA Few (*)     Squamous Epithelial Cells, UA Few (*)     All other components within normal limits   BASIC METABOLIC PANEL - Abnormal; Notable for the following components:    Sodium Level 122 (*)     Chloride 87 (*)     Carbon Dioxide <5 (*)     Glucose Level 704 (*)     Blood Urea Nitrogen 57.0 (*)     Creatinine 2.41 (*)     Calcium Level Total 6.7 (*)     All other components within normal limits   POCT GLUCOSE - Abnormal; Notable for the following components:    POCT Glucose >500 (*)     All other components within normal limits   ISTAT PROCEDURE - Abnormal; Notable for the following components:    POC PH 7.202 (*)     POC PCO2 12.8 (*)     POC PO2 117 (*)     POC HCO3 5.0 (*)     POC TCO2 5 (*)     All other components within normal limits   ISTAT PROCEDURE - Abnormal; Notable for the following components:    POC PH 7.102 (*)      POC PCO2 9.5 (*)     POC PO2 127 (*)     POC HCO3 3.0 (*)     POC TCO2 <5 (*)     All other components within normal limits   POCT GLUCOSE - Abnormal; Notable for the following components:    POCT Glucose >500 (*)     All other components within normal limits   POCT GLUCOSE - Abnormal; Notable for the following components:    POCT Glucose >500 (*)     All other components within normal limits   PREGNANCY TEST, URINE RAPID - Normal   DRUG SCREEN, URINE (BEAKER) - Normal    Narrative:     Cut off concentrations:    Amphetamines - 1000 ng/ml  Barbiturates - 200 ng/ml  Benzodiazepine - 200 ng/ml  Cannabinoids (THC) - 50 ng/ml  Cocaine - 300 ng/ml  Fentanyl - 1.0 ng/ml  MDMA - 500 ng/ml  Opiates - 300 ng/ml   Phencyclidine (PCP) - 25 ng/ml    Specimen submitted for drug analysis and tested for pH and specific gravity in order to evaluate sample integrity. Suspect tampering if specific gravity is <1.003 and/or pH is not within the range of 4.5 - 8.0  False negatives may result form substances such as bleach added to urine.  False positives may result for the presence of a substance with similar chemical structure to the drug or its metabolite.    This test provides only a PRELIMINARY analytical test result. A more specific alternate chemical method must be used in order to obtain a confirmed analytical result. Gas chromatography/mass spectrometry (GC/MS) is the preferred confirmatory method. Other chemical confirmation methods are available. Clinical consideration and professional judgement should be applied to any drug of abuse test result, particularly when preliminary positive results are used.    Positive results will be confirmed only at the physicians request. Unconfirmed screening results are to be used only for medical purposes (treatment).        ALCOHOL,MEDICAL (ETHANOL) - Normal   CBC W/ AUTO DIFFERENTIAL    Narrative:     The following orders were created for panel order CBC auto differential.  Procedure                                Abnormality         Status                     ---------                               -----------         ------                     CBC with Differential[[id number]]       Abnormal            Final result               Manual Differential[[id number]]         Abnormal            Final result                 Please view results for these tests on the individual orders.   POCT GLUCOSE MONITORING CONTINUOUS          Imaging Results              X-Ray Chest AP Portable (Final result)  Result time 10/02/23 10:06:47      Final result by Danilo Navarro MD (10/02/23 10:06:47)                   Impression:      1. Mild cardiomegaly  2. Prominent central pulmonary vasculature  3. Left central line      Electronically signed by: Danilo Navarro  Date:    10/02/2023  Time:    10:06               Narrative:    EXAMINATION:  XR CHEST AP PORTABLE    CLINICAL HISTORY:  Line placement;, .    COMPARISON:  08/09/2023    FINDINGS:  An AP view or more reveals the heart to be mildly enlarged.  The trachea is midline.  Central pulmonary vasculature is prominent.  No consolidative infiltrate or effusion is seen.  There is interim placement of a left central line with the tip superimposed over the SVC.  Bony structures appear grossly intact.                        Wet Read by Chuy Mathews MD (10/01/23 23:51:37, [facility] - Emergency Dept, Emergency Medicine)    Single-view rotated view of the chest shows catheter entering from the left terminating just above the right atrium                                     Medications   lactated ringers bolus 1,000 mL (0 mLs Intravenous Stopped 10/1/23 2315)   sodium chloride 0.9% bolus 1,000 mL 1,000 mL (0 mLs Intravenous Stopped 10/1/23 2313)   sodium chloride 0.9% bolus 1,974 mL 1,974 mL (0 mLs Intravenous Stopped 10/1/23 2351)   insulin regular injection 10 Units 0.1 mL (10 Units Intravenous Given 10/1/23 2252)   insulin regular bolus from bag/infusion  6.58 Units 6.58 mL (6.58 Units Intravenous Bolus from Bag 10/1/23 5543)   prochlorperazine injection Soln 10 mg (10 mg Intravenous Given 10/2/23 0047)   sodium chloride 0.9% bolus 1,974 mL 1,974 mL (1,974 mLs Intravenous New Bag 10/2/23 0250)     Medical Decision Making  28-year-old black female who is well known to this emergency department for uncontrolled diabetes secondary to noncompliance.  She presents looking very ill and states that she is not taken her insulin in several days.  Several attempts from the nursing staff for me to it get IV access and more unsuccessful in fact the phlebotomist was able to get blood for analysis either.  Her that time made the decision to place a left internal jugular triple-lumen catheter.  See op note for details on that.  Once IV access was obtained blood draws were done in her blood sugar was greater than 1000 in her bicarb was undetectable with numerous other lab abnormality secondary to her hyper osmolar issue.  IV fluids were given her lactic acid came back elevated so she was given a 30 milliliter/kilogram bolus and 10 units intravenously of insulin.  After the fluid bolus was given an insulin repeat lactic acid was still elevated in her blood sugar was down to 700 and at that point a continuous insulin infusion was initiated.  Once again another 30 milliliter/kilogram IV body weight bolus was given and 2 hours after BNP showed that her sugar was the 700s bicarb is still undetectable burst sodium had come up to 122.  She is much more alert now had previous refused an ABG but when his ordered now for me to decide if she needs a bicarb infusion.  We did not have any ICU beds at this facility so will be placing her transfer portal    Problems Addressed:  CHATA (acute kidney injury): acute illness or injury  Diabetic ketoacidosis with coma associated with type 1 diabetes mellitus: acute illness or injury with systemic symptoms that poses a threat to life or bodily  functions  History of noncompliance with medical treatment: chronic illness or injury  Hyperglycemia: chronic illness or injury with severe exacerbation, progression, or side effects of treatment  Uncontrolled type 1 diabetes mellitus with hyperglycemia: chronic illness or injury with severe exacerbation, progression, or side effects of treatment    Amount and/or Complexity of Data Reviewed  External Data Reviewed: labs, radiology and notes.  Labs: ordered. Decision-making details documented in ED Course.  Radiology: ordered and independent interpretation performed. Decision-making details documented in ED Course.    Risk  OTC drugs.  Prescription drug management.  Decision regarding hospitalization.  Diagnosis or treatment significantly limited by social determinants of health.               ED Course as of 11/02/23 0523   Sun Oct 01, 2023   2233 Lab called with numerous critical lab values including glucose over a 1000 so I have ordered an IV insulin bolus and placed orders for insulin infusion with more saline bolus fluids [PL]   Mon Oct 02, 2023   0254 Spoke with Dr. Stratton at [facility] who accepts as ER to ER transfer so patient could be admitted to the intensive care unit [PL]      ED Course User Index  [PL] Chuy Mathews MD                    Clinical Impression:   Final diagnoses:  [R73.9] Hyperglycemia  [E10.65] Uncontrolled type 1 diabetes mellitus with hyperglycemia  [Z91.199] History of noncompliance with medical treatment  [E10.11] Diabetic ketoacidosis with coma associated with type 1 diabetes mellitus (Primary)  [N17.9] CHATA (acute kidney injury)        ED Disposition Condition    Transfer to Another Facility [facility]          Umu Guzman is a certified MA and was present during the entire interaction with this patient         Chuy Mathews MD  10/02/23 0254       Chuy Mathews MD  11/02/23 0523

## 2023-10-03 LAB
ALBUMIN SERPL-MCNC: 2.2 G/DL (ref 3.5–5)
ALBUMIN/GLOB SERPL: 0.7 RATIO (ref 1.1–2)
ALP SERPL-CCNC: 81 UNIT/L (ref 40–150)
ALT SERPL-CCNC: 20 UNIT/L (ref 0–55)
ANION GAP SERPL CALC-SCNC: 8 MEQ/L
AST SERPL-CCNC: 28 UNIT/L (ref 5–34)
BASOPHILS # BLD AUTO: 0.04 X10(3)/MCL
BASOPHILS NFR BLD AUTO: 0.2 %
BILIRUB SERPL-MCNC: 0.4 MG/DL
BUN SERPL-MCNC: 23.4 MG/DL (ref 7–18.7)
BUN SERPL-MCNC: 28 MG/DL (ref 7–18.7)
CALCIUM SERPL-MCNC: 7.6 MG/DL (ref 8.4–10.2)
CALCIUM SERPL-MCNC: 8 MG/DL (ref 8.4–10.2)
CHLORIDE SERPL-SCNC: 103 MMOL/L (ref 98–107)
CHLORIDE SERPL-SCNC: 103 MMOL/L (ref 98–107)
CO2 SERPL-SCNC: 21 MMOL/L (ref 22–29)
CO2 SERPL-SCNC: 22 MMOL/L (ref 22–29)
CREAT SERPL-MCNC: 1.26 MG/DL (ref 0.55–1.02)
CREAT SERPL-MCNC: 1.38 MG/DL (ref 0.55–1.02)
CREAT/UREA NIT SERPL: 20
EOSINOPHIL # BLD AUTO: 0.08 X10(3)/MCL (ref 0–0.9)
EOSINOPHIL NFR BLD AUTO: 0.4 %
ERYTHROCYTE [DISTWIDTH] IN BLOOD BY AUTOMATED COUNT: 11.8 % (ref 11.5–17)
GFR SERPLBLD CREATININE-BSD FMLA CKD-EPI: 54 MLS/MIN/1.73/M2
GFR SERPLBLD CREATININE-BSD FMLA CKD-EPI: 60 MLS/MIN/1.73/M2
GLOBULIN SER-MCNC: 3.1 GM/DL (ref 2.4–3.5)
GLUCOSE SERPL-MCNC: 186 MG/DL (ref 74–100)
GLUCOSE SERPL-MCNC: 66 MG/DL (ref 74–100)
HCT VFR BLD AUTO: 22.6 % (ref 37–47)
HGB BLD-MCNC: 7.7 G/DL (ref 12–16)
IMM GRANULOCYTES # BLD AUTO: 0.27 X10(3)/MCL (ref 0–0.04)
IMM GRANULOCYTES NFR BLD AUTO: 1.5 %
LYMPHOCYTES # BLD AUTO: 2.51 X10(3)/MCL (ref 0.6–4.6)
LYMPHOCYTES NFR BLD AUTO: 13.6 %
MAGNESIUM SERPL-MCNC: 1.9 MG/DL (ref 1.6–2.6)
MCH RBC QN AUTO: 27.1 PG (ref 27–31)
MCHC RBC AUTO-ENTMCNC: 34.1 G/DL (ref 33–36)
MCV RBC AUTO: 79.6 FL (ref 80–94)
MONOCYTES # BLD AUTO: 1.68 X10(3)/MCL (ref 0.1–1.3)
MONOCYTES NFR BLD AUTO: 9.1 %
NEUTROPHILS # BLD AUTO: 13.88 X10(3)/MCL (ref 2.1–9.2)
NEUTROPHILS NFR BLD AUTO: 75.2 %
NRBC BLD AUTO-RTO: 0 %
PHOSPHATE SERPL-MCNC: 1.5 MG/DL (ref 2.3–4.7)
PLATELET # BLD AUTO: 334 X10(3)/MCL (ref 130–400)
PLATELETS.RETICULATED NFR BLD AUTO: 2.9 % (ref 0.9–11.2)
PMV BLD AUTO: 10 FL (ref 7.4–10.4)
POCT GLUCOSE: 138 MG/DL (ref 70–110)
POCT GLUCOSE: 149 MG/DL (ref 70–110)
POCT GLUCOSE: 80 MG/DL (ref 70–110)
POTASSIUM SERPL-SCNC: 3.5 MMOL/L (ref 3.5–5.1)
POTASSIUM SERPL-SCNC: 3.6 MMOL/L (ref 3.5–5.1)
PROT SERPL-MCNC: 5.3 GM/DL (ref 6.4–8.3)
RBC # BLD AUTO: 2.84 X10(6)/MCL (ref 4.2–5.4)
SODIUM SERPL-SCNC: 133 MMOL/L (ref 136–145)
SODIUM SERPL-SCNC: 134 MMOL/L (ref 136–145)
WBC # SPEC AUTO: 18.46 X10(3)/MCL (ref 4.5–11.5)

## 2023-10-03 PROCEDURE — 63600175 PHARM REV CODE 636 W HCPCS

## 2023-10-03 PROCEDURE — 84100 ASSAY OF PHOSPHORUS: CPT | Performed by: STUDENT IN AN ORGANIZED HEALTH CARE EDUCATION/TRAINING PROGRAM

## 2023-10-03 PROCEDURE — 11000001 HC ACUTE MED/SURG PRIVATE ROOM

## 2023-10-03 PROCEDURE — 25000003 PHARM REV CODE 250: Performed by: STUDENT IN AN ORGANIZED HEALTH CARE EDUCATION/TRAINING PROGRAM

## 2023-10-03 PROCEDURE — 85025 COMPLETE CBC W/AUTO DIFF WBC: CPT

## 2023-10-03 PROCEDURE — 21400001 HC TELEMETRY ROOM

## 2023-10-03 PROCEDURE — 63600175 PHARM REV CODE 636 W HCPCS: Performed by: STUDENT IN AN ORGANIZED HEALTH CARE EDUCATION/TRAINING PROGRAM

## 2023-10-03 PROCEDURE — 25000003 PHARM REV CODE 250

## 2023-10-03 PROCEDURE — A4216 STERILE WATER/SALINE, 10 ML: HCPCS | Performed by: STUDENT IN AN ORGANIZED HEALTH CARE EDUCATION/TRAINING PROGRAM

## 2023-10-03 PROCEDURE — 83735 ASSAY OF MAGNESIUM: CPT

## 2023-10-03 PROCEDURE — 93005 ELECTROCARDIOGRAM TRACING: CPT

## 2023-10-03 PROCEDURE — 80053 COMPREHEN METABOLIC PANEL: CPT

## 2023-10-03 PROCEDURE — 99232 PR SUBSEQUENT HOSPITAL CARE,LEVL II: ICD-10-PCS | Mod: ,,, | Performed by: INTERNAL MEDICINE

## 2023-10-03 PROCEDURE — C1751 CATH, INF, PER/CENT/MIDLINE: HCPCS

## 2023-10-03 PROCEDURE — 80048 BASIC METABOLIC PNL TOTAL CA: CPT

## 2023-10-03 PROCEDURE — 36410 VNPNXR 3YR/> PHY/QHP DX/THER: CPT

## 2023-10-03 PROCEDURE — 99232 SBSQ HOSP IP/OBS MODERATE 35: CPT | Mod: ,,, | Performed by: INTERNAL MEDICINE

## 2023-10-03 RX ORDER — SODIUM CHLORIDE 0.9 % (FLUSH) 0.9 %
10 SYRINGE (ML) INJECTION
Status: DISCONTINUED | OUTPATIENT
Start: 2023-10-03 | End: 2023-10-04 | Stop reason: HOSPADM

## 2023-10-03 RX ORDER — DIPHENHYDRAMINE HCL 25 MG
50 CAPSULE ORAL ONCE
Status: COMPLETED | OUTPATIENT
Start: 2023-10-03 | End: 2023-10-03

## 2023-10-03 RX ORDER — SODIUM CHLORIDE, SODIUM LACTATE, POTASSIUM CHLORIDE, CALCIUM CHLORIDE 600; 310; 30; 20 MG/100ML; MG/100ML; MG/100ML; MG/100ML
INJECTION, SOLUTION INTRAVENOUS CONTINUOUS
Status: CANCELLED | OUTPATIENT
Start: 2023-10-03

## 2023-10-03 RX ORDER — ONDANSETRON 2 MG/ML
4 INJECTION INTRAMUSCULAR; INTRAVENOUS EVERY 6 HOURS PRN
Status: DISCONTINUED | OUTPATIENT
Start: 2023-10-03 | End: 2023-10-04 | Stop reason: HOSPADM

## 2023-10-03 RX ORDER — HYDROCODONE BITARTRATE AND ACETAMINOPHEN 5; 325 MG/1; MG/1
1 TABLET ORAL EVERY 6 HOURS PRN
Status: DISCONTINUED | OUTPATIENT
Start: 2023-10-03 | End: 2023-10-04 | Stop reason: HOSPADM

## 2023-10-03 RX ORDER — ONDANSETRON 2 MG/ML
4 INJECTION INTRAMUSCULAR; INTRAVENOUS ONCE
Status: COMPLETED | OUTPATIENT
Start: 2023-10-03 | End: 2023-10-03

## 2023-10-03 RX ORDER — SODIUM CHLORIDE 0.9 % (FLUSH) 0.9 %
10 SYRINGE (ML) INJECTION EVERY 6 HOURS
Status: DISCONTINUED | OUTPATIENT
Start: 2023-10-03 | End: 2023-10-04 | Stop reason: HOSPADM

## 2023-10-03 RX ADMIN — HYDROMORPHONE HYDROCHLORIDE 0.2 MG: 0.5 INJECTION, SOLUTION INTRAMUSCULAR; INTRAVENOUS; SUBCUTANEOUS at 12:10

## 2023-10-03 RX ADMIN — SODIUM CHLORIDE, PRESERVATIVE FREE 10 ML: 5 INJECTION INTRAVENOUS at 05:10

## 2023-10-03 RX ADMIN — HEPARIN SODIUM 5000 UNITS: 5000 INJECTION, SOLUTION INTRAVENOUS; SUBCUTANEOUS at 09:10

## 2023-10-03 RX ADMIN — FAMOTIDINE 20 MG: 10 INJECTION, SOLUTION INTRAVENOUS at 09:10

## 2023-10-03 RX ADMIN — MUPIROCIN: 20 OINTMENT TOPICAL at 09:10

## 2023-10-03 RX ADMIN — INSULIN ASPART 8 UNITS: 100 INJECTION, SOLUTION INTRAVENOUS; SUBCUTANEOUS at 05:10

## 2023-10-03 RX ADMIN — ONDANSETRON 4 MG: 2 INJECTION INTRAMUSCULAR; INTRAVENOUS at 06:10

## 2023-10-03 RX ADMIN — HYDROMORPHONE HYDROCHLORIDE 0.2 MG: 0.5 INJECTION, SOLUTION INTRAMUSCULAR; INTRAVENOUS; SUBCUTANEOUS at 03:10

## 2023-10-03 RX ADMIN — HYDROCODONE BITARTRATE AND ACETAMINOPHEN 1 TABLET: 5; 325 TABLET ORAL at 10:10

## 2023-10-03 RX ADMIN — INSULIN DETEMIR 30 UNITS: 100 INJECTION, SOLUTION SUBCUTANEOUS at 09:10

## 2023-10-03 RX ADMIN — INSULIN ASPART 1 UNITS: 100 INJECTION, SOLUTION INTRAVENOUS; SUBCUTANEOUS at 09:10

## 2023-10-03 RX ADMIN — SODIUM CHLORIDE, PRESERVATIVE FREE 10 ML: 5 INJECTION INTRAVENOUS at 11:10

## 2023-10-03 RX ADMIN — DIPHENHYDRAMINE HYDROCHLORIDE 50 MG: 25 CAPSULE ORAL at 11:10

## 2023-10-03 RX ADMIN — HYDROMORPHONE HYDROCHLORIDE 0.2 MG: 0.5 INJECTION, SOLUTION INTRAMUSCULAR; INTRAVENOUS; SUBCUTANEOUS at 09:10

## 2023-10-03 RX ADMIN — HYDROMORPHONE HYDROCHLORIDE 0.2 MG: 0.5 INJECTION, SOLUTION INTRAMUSCULAR; INTRAVENOUS; SUBCUTANEOUS at 07:10

## 2023-10-03 RX ADMIN — HYDROMORPHONE HYDROCHLORIDE 0.2 MG: 0.5 INJECTION, SOLUTION INTRAMUSCULAR; INTRAVENOUS; SUBCUTANEOUS at 05:10

## 2023-10-03 RX ADMIN — INSULIN ASPART 8 UNITS: 100 INJECTION, SOLUTION INTRAVENOUS; SUBCUTANEOUS at 12:10

## 2023-10-03 NOTE — PROGRESS NOTES
"[facility] Emergency Dept  Pulmonary Critical Care Note    Patient Name: Dorene Husain  MRN: [id number]  Admission Date: 10/2/2023  Hospital Length of Stay: 1 days  Code Status: Full Code  Attending Provider: Bob Torres Jr., MD,*  Primary Care Provider: Kumar Ortiz NP     Subjective:     HPI:   Dorene Husain is a 28 y.o. female with PMH of DM 1 with one admission this year for DKA who presented to the ED via transfer from [facility] due to DKA on insulin gtt in need of ICU admission due to no ICU bed availability at outside facility. Patient was somnolent on presentation to this facility and unable to answer more than one question without falling back to sleep. She is unable to provide her home insulin regimen but did take 30 U long-acting insulin and another 10 U long-acting prior to presenting to outside facility. She does report no PO intake over the past few days due feeling ill and fatigued. Lab work was significant for anion gap metabolic acidosis with a gap of 35 and bicarb undetectable.  Initial sodium 112 corrected to 132 due to hyperglycemia of 1,058.    Hospital Course/Significant events:  10/02/2023 - Admitted to ICU per DKA protocol for Insulin gtt    Interval History  No acute events overnight. Insulin gtt was discontinued yesterday evening with continued closure of anion gap and normalization of presenting acidosis. She does continue to complain of pain near cervical/thoracic spinal/paraspinal region, stating "pain began after they put this in my neck" while pointing to Left IJ Central Venous Catheter. Assessed for signs of meningitis, bleeding, pneumothorax though so far unremarkable. Labs stable otherwise. She denies any headache, vision changes, nausea/vomiting, chest pain, shortness of breath, hemoptysis. TTE & CXR results from imaging yesterday below.    Past Medical History:   Diagnosis Date    Diabetes mellitus     Diabetic gastroparesis associated with type 1 diabetes " mellitus     DKA (diabetic ketoacidosis)     Hypertension     Non compliance with medical treatment        Past Surgical History:   Procedure Laterality Date    CHOLECYSTECTOMY      ESOPHAGOGASTRODUODENOSCOPY      Multiple    None         Social History     Socioeconomic History    Marital status: Single   Tobacco Use    Smoking status: Never    Smokeless tobacco: Never   Substance and Sexual Activity    Alcohol use: Never    Drug use: Not Currently     Types: Marijuana    Sexual activity: Yes     Social Determinants of Health     Financial Resource Strain: Unknown (6/21/2023)    Overall Financial Resource Strain (CARDIA)     Difficulty of Paying Living Expenses: Patient refused   Food Insecurity: Unknown (6/21/2023)    Hunger Vital Sign     Worried About Running Out of Food in the Last Year: Patient refused     Ran Out of Food in the Last Year: Patient refused   Transportation Needs: Unknown (6/21/2023)    PRAPARE - Transportation     Lack of Transportation (Medical): Patient refused     Lack of Transportation (Non-Medical): Patient refused   Physical Activity: Unknown (6/21/2023)    Exercise Vital Sign     Days of Exercise per Week: Patient refused     Minutes of Exercise per Session: Patient refused   Stress: Unknown (6/21/2023)    [employer] [place] [employer] - Occupational Stress Questionnaire     Feeling of Stress : Patient refused   Social Connections: Unknown (6/21/2023)    Social Connection and Isolation Panel [NHANES]     Frequency of Communication with Friends and Family: Patient refused     Frequency of Social Gatherings with Friends and Family: Patient refused     Attends Mormonism Services: Patient refused     Active Member of Clubs or Organizations: Patient refused     Attends Club or Organization Meetings: Patient refused     Marital Status: Patient refused   Housing Stability: Unknown (6/21/2023)    Housing Stability Vital Sign     Unable to Pay for Housing in the Last Year: Patient  refused     Number of Places Lived in the Last Year: 1     Unstable Housing in the Last Year: Patient refused       Current Outpatient Medications   Medication Instructions    amitriptyline (ELAVIL) 25 mg, Oral, Nightly    BASAGLAR KWIKPEN U-100 INSULIN glargine 100 units/mL (3mL) SubQ pen Subcutaneous    dicyclomine (BENTYL) 10 mg, Oral, 4 times daily    hydrOXYzine (ATARAX) 50 mg, Oral, Daily PRN    metoprolol tartrate (LOPRESSOR) 50 mg, Oral, 2 times daily    oxyCODONE-acetaminophen (PERCOCET)  mg per tablet 1 tablet, Oral, Every 8 hours PRN    pantoprazole (PROTONIX) 40 mg, Oral, Daily    promethazine (PHENERGAN) 25 mg, Oral, Every 6 hours PRN       Current Inpatient Medications   famotidine (PF)  20 mg Intravenous Daily    heparin (porcine)  5,000 Units Subcutaneous Q12H    insulin aspart U-100  8 Units Subcutaneous TIDWM    insulin detemir U-100  30 Units Subcutaneous QHS    mupirocin   Nasal BID    ondansetron  8 mg Intravenous Once       Current Intravenous Infusions   dextrose 5 % and 0.45 % NaCl Stopped (10/02/23 2244)    insulin regular 1 units/mL infusion orderable (DKA) Stopped (10/02/23 2243)       Review of Systems   Constitutional:  Negative for chills and fever.   Eyes:  Negative for blurred vision and double vision.   Respiratory:  Negative for cough and hemoptysis.    Cardiovascular:  Negative for chest pain and palpitations.   Gastrointestinal:  Negative for abdominal pain, constipation, diarrhea, heartburn, nausea and vomiting.   Musculoskeletal:  Positive for back pain, myalgias and neck pain (located to cervical/thoracic region).   Neurological:  Negative for dizziness, tremors, sensory change, speech change, focal weakness and headaches.        Objective:       Intake/Output Summary (Last 24 hours) at 10/3/2023 0608  Last data filed at 10/3/2023 0513  Gross per 24 hour   Intake 2292.31 ml   Output 2000 ml   Net 292.31 ml       Vital Signs (Most Recent):  Temp: 98.4 °F (36.9 °C)  (10/03/23 0404)  Pulse: 107 (10/03/23 0505)  Resp: 13 (10/03/23 0505)  BP: (!) 149/108 (10/03/23 0505)  SpO2: 99 % (10/03/23 0505)  Body mass index is 26.34 kg/m².  Weight: 65.3 kg (144 lb) Vital Signs (24h Range):  Temp:  [98.4 °F (36.9 °C)-98.6 °F (37 °C)] 98.4 °F (36.9 °C)  Pulse:  [] 107  Resp:  [10-36] 13  SpO2:  [96 %-100 %] 99 %  BP: (119-177)/() 149/108     Physical Exam  Vitals reviewed.   Constitutional:       Comments: Patient AAOx4, lying on side, in mild distress   HENT:      Head: Normocephalic and atraumatic.      Mouth/Throat:      Pharynx: Oropharynx is clear.   Eyes:      General: No scleral icterus.     Extraocular Movements: Extraocular movements intact.      Pupils: Pupils are equal, round, and reactive to light.   Cardiovascular:      Rate and Rhythm: Regular rhythm. Tachycardia present.      Heart sounds: Murmur heard.      No friction rub. No gallop.      Comments: Grade 2/6 blowing mid systolic murmur heard best at the apex & pulmonic regions  Pulmonary:      Breath sounds: Normal breath sounds. No wheezing, rhonchi or rales.   Abdominal:      General: Bowel sounds are normal. There is no distension.      Palpations: Abdomen is soft.      Tenderness: There is no abdominal tenderness. There is no guarding.   Musculoskeletal:      Cervical back: Neck supple. No rigidity.      Right lower leg: No edema.      Left lower leg: No edema.      Comments: Tenderness to cervical/thoracic region (paravertebral > spinal) w/out appreciable bruising, skin breakdown, erythema, or ulceration   Lymphadenopathy:      Cervical: No cervical adenopathy.   Skin:     General: Skin is warm and dry.      Capillary Refill: Capillary refill takes less than 2 seconds.      Coloration: Skin is not jaundiced.      Findings: No lesion.   Neurological:      Mental Status: She is oriented to person, place, and time.      Motor: No weakness.         Lines/Drains/Airways       Central Venous Catheter Line  Duration              Percutaneous Central Line Insertion/Assessment - Triple Lumen  10/01/23 Internal Jugular Left 2 days              Drain  Duration             Female External Urinary Catheter 10/02/23 0830 <1 day                    Significant Labs:  Lab Results   Component Value Date    WBC 18.46 (H) 10/03/2023    HGB 7.7 (L) 10/03/2023    HCT 22.6 (L) 10/03/2023    MCV 79.6 (L) 10/03/2023     10/03/2023         BMP  Lab Results   Component Value Date     (L) 10/03/2023    K 3.5 10/03/2023    CHLORIDE 103 10/03/2023    CO2 21 (L) 10/03/2023    BUN 23.4 (H) 10/03/2023    CREATININE 1.26 (H) 10/03/2023    CALCIUM 8.0 (L) 10/03/2023    AGAP 8.0 10/03/2023    ESTGFRAFRICA 81 10/08/2022    EGFRNONAA >60 04/24/2022       ABG  Recent Labs   Lab 10/02/23  0506   PH 7.140*   PO2 113.0*   PCO2 24.0*   HCO3 8.2   POCBASEDEF -19.20         Mechanical Ventilation Support:       Significant Imaging:  I have reviewed the pertinent imaging within the past 24 hours.    Assessment/Plan:     Assessment  Diabetic ketoacidosis in a type I diabetic 2/2 medication noncompliance with initial anion gap of 35 and bicarb undetectable, pH 7.2 on ABG. 2nd admission this year for DKA - resolved  CHATA with BUN/creatinine ratio >20 suggesting prerenal likely secondary to intravascular volume depletion - resolved  HFpEF (EF of 70-75% w/ unspecified DD & 2-3+ tricuspid regurg)  Grade 2/6 mid systolic murmur  Lactic acidosis - resolved  Leukocytosis, initially though to be 2/2 volume depletion/contraction  No obvious signs of infx on CXR, U/A unremarkable, pending blood cultures    Plan  - Admitted to ICU for ongoing monitoring and medical management  - DKA protocol initiated (10/2), discontinued yesterday evening w/ resolution of anion gap metabolic acidosis  - CHATA improving w/ recent volume expansion via IV crystalloids, will hold additional fluids at this time with recent discovery of HFpEF (EF 70-75% w/ unspecified Diastolic  Dysfunction  - Monitor electrolytes, correct as needed  - Will discuss possible downgrade to med/tele with intensivist as patient no longer requiring IV gtt titratable medications     DVT Prophylaxis:  Heparin 5k  GI Prophylaxis:  Famotidine     32 minutes of critical care was time spent personally by me on the following activities: development of treatment plan with patient or surrogate and bedside caregivers, discussions with consultants, evaluation of patient's response to treatment, examination of patient, ordering and performing treatments and interventions, ordering and review of laboratory studies, ordering and review of radiographic studies, pulse oximetry, re-evaluation of patient's condition.  This critical care time did not overlap with that of any other provider or involve time for any procedures.     Jonathan Lomeli MD PGY-II  Pulmonary Critical Care Medicine  [facility] Dept

## 2023-10-03 NOTE — PROGRESS NOTES
"Inpatient Nutrition Evaluation    Admit Date: 10/2/2023   Total duration of encounter: 1 day    Nutrition Recommendation/Prescription     Continue diabetic diet  Will order chocolate boost glucose control tid; Boost Glucose Control (provides 190 kcal, 16 g protein per serving)   Pt education on diet /complete  MVI/fe  Biweekly wt  Will monitor nutrition status     Nutrition Assessment     Chart Review    Reason Seen: continuous nutrition monitoring    Malnutrition Screening Tool Results   Have you recently lost weight without trying?: No  Have you been eating poorly because of a decreased appetite?: No   MST Score: 0     Diagnosis:  DKA/DM, CHATA, lactic acidosis     Relevant Medical History: DM, DKA, HTN, gastroparesis     Nutrition-Related Medications: insulin, famotidine, zofran     Nutrition-Related Labs:  (10-3) H/H 7.7/22.6(L) Gluc 66 Bun 23.4 Cr 1.2 K 3.5 Alb 2.2(L)     Diet Order: Diet diabetic  Oral Supplement Order: none  Appetite/Oral Intake: fair/50-75% of meals  Factors Affecting Nutritional Intake: decreased appetite and nausea  Food/Christian/Cultural Preferences: none reported  Food Allergies: none reported       Wound(s):   none    Comments    (10/3) Pt reported appetite  3-4 days; + N/V PTA; still some nausea; hx DM/gastroparesis. NO wt loss reported. Pt on zofran for nausea. Pt willing to drinlk oral supplement; will order. Labs acknowledged.     Anthropometrics    Height: 5' 2" (157.5 cm)    Last Weight: 65.3 kg (144 lb) (10/02/23 1100) Weight Method: Bed Scale  BMI (Calculated): 26.3  BMI Classification: overweight (BMI 25-29.9)     Ideal Body Weight (IBW), Female: 110 lb     % Ideal Body Weight, Female (lb): 130.91 %                    Usual Body Weight (UBW), k.3 kg  % Usual Body Weight: 100.24     Usual Weight Provided By: patient and EMR weight history    Wt Readings from Last 5 Encounters:   10/02/23 65.3 kg (144 lb)   10/01/23 65.8 kg (145 lb)   23 67.1 kg (148 lb) "   09/25/23 67.4 kg (148 lb 9.4 oz)   08/09/23 68 kg (150 lb)     Weight Change(s) Since Admission:  Admit Weight: 65.7 kg (144 lb 13.5 oz) (10/02/23 7733)  Pt reported #     Patient Education    Education Provided: diabetic diet  Teaching Method: explanation and printed materials  Comprehension: verbalizes understanding  Barriers to Learning: none evident  Expected Compliance: fair  Comments: All questions were answered and dietitian's contact information was provided.     Monitoring & Evaluation     Dietitian will monitor food and beverage intake, weight, food/nutrition knowledge skill, and glucose/endocrine profile.  Nutrition Risk/Follow-Up: low (follow-up in 5-7 days)  Patients assigned 'low nutrition risk' status do not qualify for a full nutritional assessment but will be monitored and re-evaluated in a 5-7 day time period. Please consult if re-evaluation needed sooner.

## 2023-10-03 NOTE — PROCEDURES
"Dorene Husain is a 28 y.o. female patient.    Temp: 98.6 °F (37 °C) (10/03/23 0701)  Pulse: 94 (10/03/23 1000)  Resp: 11 (10/03/23 1000)  BP: (!) 145/80 (10/03/23 1000)  SpO2: 98 % (10/03/23 1000)  Weight: 65.3 kg (144 lb) (10/02/23 1100)  Height: 5' 2" (157.5 cm) (10/02/23 1100)    PICC  Date/Time: 10/3/2023 12:15 PM  Performed by: Gaviota Demarco, SAMANTA  Consent Done: Yes  Time out: Immediately prior to procedure a time out was called to verify the correct patient, procedure, equipment, support staff and site/side marked as required  Indications: vascular access  Anesthesia: local infiltration  Local anesthetic: lidocaine 1% without epinephrine  Anesthetic Total (mL): 5  Preparation: skin prepped with ChloraPrep  Skin prep agent dried: skin prep agent completely dried prior to procedure  Sterile barriers: all five maximum sterile barriers used - cap, mask, sterile gown, sterile gloves, and large sterile sheet  Hand hygiene: hand hygiene performed prior to central venous catheter insertion  Location details: right brachial  Catheter type: single lumen  Catheter size: 4 Fr  Catheter Length: 14cm    Ultrasound guidance: yes  Vessel Caliber: medium and patent, compressibility normal  Needle advanced into vessel with real time Ultrasound guidance.  Guidewire confirmed in vessel.  Sterile sheath used.  Number of attempts: 1  Post-procedure: blood return through all ports, sterile dressing applied and chlorhexidine patch    Complications: none  Comments: Arm circumference 31 cm. Left IJ cvc in place. Left arm, hand noted to be edematous. Hematoma noted just above right AC. Right upper basilic accessed easily, dilated without difficulty, unable to thread last 4cm. Accessed right upper brachial without further complications.           Name Gaviota Demarco RN  10/3/2023    "

## 2023-10-03 NOTE — PT/OT/SLP PROGRESS
Physical Therapy    Missed Treatment Session    Patient Name:  Dorene Husain   MRN:  [id number]      -patient not seen at this time secondary to pt not available  -PICC nurse present on unit to place a PICC line  -will follow-up as patient is appropriate/available to participate and as therapists' schedule allows

## 2023-10-03 NOTE — PT/OT/SLP PROGRESS
Occupational Therapy      Patient Name:  Dorene Husain   MRN:  [id number]    Patient not seen today secondary to pt getting a PICC line, OT attempted 1211 . Will follow-up as schedule permits.    10/3/2023

## 2023-10-04 VITALS
WEIGHT: 144 LBS | TEMPERATURE: 99 F | HEART RATE: 96 BPM | OXYGEN SATURATION: 97 % | DIASTOLIC BLOOD PRESSURE: 87 MMHG | RESPIRATION RATE: 18 BRPM | SYSTOLIC BLOOD PRESSURE: 133 MMHG | HEIGHT: 62 IN | BODY MASS INDEX: 26.5 KG/M2

## 2023-10-04 LAB
ALBUMIN SERPL-MCNC: 2.3 G/DL (ref 3.5–5)
ALBUMIN/GLOB SERPL: 0.7 RATIO (ref 1.1–2)
ALLENS TEST: ABNORMAL
ALP SERPL-CCNC: 75 UNIT/L (ref 40–150)
ALT SERPL-CCNC: 17 UNIT/L (ref 0–55)
AST SERPL-CCNC: 25 UNIT/L (ref 5–34)
BASOPHILS # BLD AUTO: 0.02 X10(3)/MCL
BASOPHILS NFR BLD AUTO: 0.2 %
BILIRUB SERPL-MCNC: 0.3 MG/DL
BUN SERPL-MCNC: 11.4 MG/DL (ref 7–18.7)
CALCIUM SERPL-MCNC: 8.4 MG/DL (ref 8.4–10.2)
CHLORIDE SERPL-SCNC: 99 MMOL/L (ref 98–107)
CHOLEST SERPL-MCNC: 155 MG/DL
CHOLEST/HDLC SERPL: 3 {RATIO} (ref 0–5)
CO2 SERPL-SCNC: 30 MMOL/L (ref 22–29)
CREAT SERPL-MCNC: 0.95 MG/DL (ref 0.55–1.02)
DELSYS: ABNORMAL
EOSINOPHIL # BLD AUTO: 0.04 X10(3)/MCL (ref 0–0.9)
EOSINOPHIL NFR BLD AUTO: 0.3 %
ERYTHROCYTE [DISTWIDTH] IN BLOOD BY AUTOMATED COUNT: 12.2 % (ref 11.5–17)
EST. AVERAGE GLUCOSE BLD GHB EST-MCNC: 283.4 MG/DL
GFR SERPLBLD CREATININE-BSD FMLA CKD-EPI: >60 MLS/MIN/1.73/M2
GLOBULIN SER-MCNC: 3.2 GM/DL (ref 2.4–3.5)
GLUCOSE SERPL-MCNC: 68 MG/DL (ref 74–100)
HBA1C MFR BLD: 11.5 %
HCO3 UR-SCNC: 3 MMOL/L (ref 24–28)
HCT VFR BLD AUTO: 24.4 % (ref 37–47)
HDLC SERPL-MCNC: 57 MG/DL (ref 35–60)
HGB BLD-MCNC: 8.4 G/DL (ref 12–16)
IMM GRANULOCYTES # BLD AUTO: 0.23 X10(3)/MCL (ref 0–0.04)
IMM GRANULOCYTES NFR BLD AUTO: 2 %
LDLC SERPL CALC-MCNC: 71 MG/DL (ref 50–140)
LYMPHOCYTES # BLD AUTO: 1.3 X10(3)/MCL (ref 0.6–4.6)
LYMPHOCYTES NFR BLD AUTO: 11.2 %
MAGNESIUM SERPL-MCNC: 1.6 MG/DL (ref 1.6–2.6)
MCH RBC QN AUTO: 27.5 PG (ref 27–31)
MCHC RBC AUTO-ENTMCNC: 34.4 G/DL (ref 33–36)
MCV RBC AUTO: 80 FL (ref 80–94)
MONOCYTES # BLD AUTO: 1.34 X10(3)/MCL (ref 0.1–1.3)
MONOCYTES NFR BLD AUTO: 11.5 %
NEUTROPHILS # BLD AUTO: 8.71 X10(3)/MCL (ref 2.1–9.2)
NEUTROPHILS NFR BLD AUTO: 74.8 %
NRBC BLD AUTO-RTO: 0 %
PCO2 BLDA: 9.5 MMHG (ref 35–45)
PH SMN: 7.1 [PH] (ref 7.35–7.45)
PLATELET # BLD AUTO: 390 X10(3)/MCL (ref 130–400)
PMV BLD AUTO: 9.3 FL (ref 7.4–10.4)
PO2 BLDA: 127 MMHG (ref 80–100)
POC BE: -27 MMOL/L
POC SATURATED O2: 98 % (ref 95–100)
POC TCO2: <5 MMOL/L (ref 23–27)
POCT GLUCOSE: 134 MG/DL (ref 70–110)
POCT GLUCOSE: 154 MG/DL (ref 70–110)
POCT GLUCOSE: 247 MG/DL (ref 70–110)
POCT GLUCOSE: 50 MG/DL (ref 70–110)
POCT GLUCOSE: >500 MG/DL (ref 70–110)
POCT GLUCOSE: >500 MG/DL (ref 70–110)
POTASSIUM SERPL-SCNC: 3.2 MMOL/L (ref 3.5–5.1)
PROT SERPL-MCNC: 5.5 GM/DL (ref 6.4–8.3)
RBC # BLD AUTO: 3.05 X10(6)/MCL (ref 4.2–5.4)
SAMPLE: ABNORMAL
SITE: ABNORMAL
SODIUM SERPL-SCNC: 137 MMOL/L (ref 136–145)
TRIGL SERPL-MCNC: 134 MG/DL (ref 37–140)
VLDLC SERPL CALC-MCNC: 27 MG/DL
WBC # SPEC AUTO: 11.64 X10(3)/MCL (ref 4.5–11.5)

## 2023-10-04 PROCEDURE — 83735 ASSAY OF MAGNESIUM: CPT

## 2023-10-04 PROCEDURE — 63600175 PHARM REV CODE 636 W HCPCS: Performed by: STUDENT IN AN ORGANIZED HEALTH CARE EDUCATION/TRAINING PROGRAM

## 2023-10-04 PROCEDURE — 80053 COMPREHEN METABOLIC PANEL: CPT

## 2023-10-04 PROCEDURE — 25000003 PHARM REV CODE 250: Performed by: STUDENT IN AN ORGANIZED HEALTH CARE EDUCATION/TRAINING PROGRAM

## 2023-10-04 PROCEDURE — 63600175 PHARM REV CODE 636 W HCPCS

## 2023-10-04 PROCEDURE — 83036 HEMOGLOBIN GLYCOSYLATED A1C: CPT | Performed by: STUDENT IN AN ORGANIZED HEALTH CARE EDUCATION/TRAINING PROGRAM

## 2023-10-04 PROCEDURE — 25000003 PHARM REV CODE 250

## 2023-10-04 PROCEDURE — 85025 COMPLETE CBC W/AUTO DIFF WBC: CPT

## 2023-10-04 PROCEDURE — A4216 STERILE WATER/SALINE, 10 ML: HCPCS | Performed by: STUDENT IN AN ORGANIZED HEALTH CARE EDUCATION/TRAINING PROGRAM

## 2023-10-04 PROCEDURE — 80061 LIPID PANEL: CPT

## 2023-10-04 RX ORDER — HYDROMORPHONE HYDROCHLORIDE 1 MG/ML
0.2 INJECTION, SOLUTION INTRAMUSCULAR; INTRAVENOUS; SUBCUTANEOUS ONCE
Status: COMPLETED | OUTPATIENT
Start: 2023-10-04 | End: 2023-10-04

## 2023-10-04 RX ADMIN — MUPIROCIN: 20 OINTMENT TOPICAL at 09:10

## 2023-10-04 RX ADMIN — SODIUM CHLORIDE, PRESERVATIVE FREE 10 ML: 5 INJECTION INTRAVENOUS at 05:10

## 2023-10-04 RX ADMIN — ONDANSETRON 4 MG: 2 INJECTION INTRAMUSCULAR; INTRAVENOUS at 01:10

## 2023-10-04 RX ADMIN — LABETALOL HYDROCHLORIDE 10 MG: 5 INJECTION, SOLUTION INTRAVENOUS at 03:10

## 2023-10-04 RX ADMIN — FAMOTIDINE 20 MG: 10 INJECTION, SOLUTION INTRAVENOUS at 12:10

## 2023-10-04 RX ADMIN — Medication 16 G: at 07:10

## 2023-10-04 RX ADMIN — HYDROCODONE BITARTRATE AND ACETAMINOPHEN 1 TABLET: 5; 325 TABLET ORAL at 09:10

## 2023-10-04 RX ADMIN — SODIUM CHLORIDE, PRESERVATIVE FREE 10 ML: 5 INJECTION INTRAVENOUS at 12:10

## 2023-10-04 RX ADMIN — HYDROMORPHONE HYDROCHLORIDE 0.2 MG: 1 INJECTION, SOLUTION INTRAMUSCULAR; INTRAVENOUS; SUBCUTANEOUS at 12:10

## 2023-10-04 RX ADMIN — HEPARIN SODIUM 5000 UNITS: 5000 INJECTION, SOLUTION INTRAVENOUS; SUBCUTANEOUS at 09:10

## 2023-10-04 NOTE — PT/OT/SLP PROGRESS
"Occupational Therapy      Patient Name:  Dorene Husain   MRN:  [id number]  OT attempted at 1237, pt in R sidelying , c/o of severe back pain, nursing bedside admin meds. Pt stated "I will do something today. I just can't right now"  2nd attempt 1352 pt  declined therapy     10/4/2023  "

## 2023-10-04 NOTE — PLAN OF CARE
10/04/23 1100   Discharge Assessment   Assessment Type Discharge Planning Assessment   Source of Information   (Pt was asleep & refused to answer questions.)   Reason For Admission Elevated BP, Cp

## 2023-10-04 NOTE — DISCHARGE SUMMARY
[facility] Medicine Discharge Summary    Admitting Physician: Bob Torres Jr., MD, [facility]  Attending Physician: Gabriel Duffy MD  Date of Admit: 10/2/2023  Date of Discharge: 10/4/2023    Condition: Good  Outcome: Patient tolerated treatment/procedure well without complication and is now ready for discharge.  DISPOSITION: Home or Self Care          Discharge Diagnoses     Patient Active Problem List   Diagnosis    DM gastroparesis    Hyperglycemia due to type 1 diabetes mellitus    Diabetes mellitus    Hypertension    Noncompliance    DKA (diabetic ketoacidosis)    DM I (diabetes mellitus, type I)       Principal Problem:  DKA (diabetic ketoacidosis)    Consultants and Procedures     Consultants:  IP CONSULT TO HOSPITAL MEDICINE  IP CONSULT TO MIDLINE TEAM    Procedures:   * No surgery found *     Brief Admission History      Dorene Husain is a 28 y.o. female with PMH of DM 1 with one admission this year for DKA who presented to the ED via transfer from [facility] due to DKA on insulin gtt in need of ICU admission due to no ICU bed availability at outside facility. Patient was somnolent on presentation to this facility and unable to answer more than one question without falling back to sleep. She is unable to provide her home insulin regimen but did take 30 U long-acting insulin and another 10 U long-acting prior to presenting to outside facility. She does report no PO intake over the past few days due feeling ill and fatigued. Lab work was significant for anion gap metabolic acidosis with a gap of 35 and bicarb undetectable.  Initial sodium 112 corrected to 132 due to hyperglycemia of 1,058.    Hospital Course with Pertinent Findings     Patient was admitted to ICU and started on insulin drip for DKA.  Insulin drip was eventually able to be discontinued when anion gap had closed and bicarb had remain greater than 18.  Patient was able to tolerate a diet without any issues and was able to be downgraded to  "floor.  During her stay, patient did have to have central line placed in left IJ due to having poor venous access which was eventually removed.  Patient does complain of chronic lumbar back pain.  Aside from her back pain, patient reports feeling better than her presentation.  When asked about what brought her initially to the hospital, patient reported that she had just forgotten to take her medications because "life is hard". Patient reports that she does have her diabetic medications at home and does not need refills at this time. Educated patient on importance of taking diabetic medications at home to avoid frequent hospitalizations.  Patient does see PCP and says she will schedule an appointment upon discharge.  Plan to discharge patient home at this time with close follow up with her PCP for management of her back pain and diabetes.    Discharge physical exam:  Vitals  BP: 133/87  Temp: 98.6 °F (37 °C)  Temp Source: Oral  Pulse: 96  Resp: 18  SpO2: 97 %  Height: 5' 2" (157.5 cm)  Weight: 65.3 kg (144 lb)    Physical Exam  Constitutional:       Appearance: Normal appearance.   HENT:      Head: Normocephalic and atraumatic.      Mouth/Throat:      Mouth: Mucous membranes are moist.      Pharynx: Oropharynx is clear.   Eyes:      Conjunctiva/sclera: Conjunctivae normal.      Pupils: Pupils are equal, round, and reactive to light.   Cardiovascular:      Rate and Rhythm: Normal rate and regular rhythm.      Pulses: Normal pulses.      Heart sounds: No murmur heard.  Pulmonary:      Effort: Pulmonary effort is normal. No respiratory distress.      Breath sounds: No wheezing.   Chest:      Chest wall: No tenderness.   Abdominal:      General: Abdomen is flat. Bowel sounds are normal. There is no distension.      Palpations: Abdomen is soft.      Tenderness: There is no abdominal tenderness.   Musculoskeletal:         General: No swelling. Normal range of motion.      Cervical back: Normal range of motion.      " Comments: Mild tenderness to palpation to lumbar spine   Skin:     General: Skin is warm.   Neurological:      General: No focal deficit present.      Mental Status: She is alert and oriented to person, place, and time.          TIME SPENT ON DISCHARGE: 60 minutes    Discharge Medications        Medication List        CONTINUE taking these medications      amitriptyline 25 MG tablet  Commonly known as: ELAVIL     BASAGLAR KWIKPEN U-100 INSULIN glargine 100 units/mL SubQ pen  Generic drug: insulin     dicyclomine 10 MG capsule  Commonly known as: BENTYL     hydrOXYzine 50 MG tablet  Commonly known as: ATARAX     metoprolol tartrate 50 MG tablet  Commonly known as: LOPRESSOR     oxyCODONE-acetaminophen  mg per tablet  Commonly known as: PERCOCET  Take 1 tablet by mouth every 8 (eight) hours as needed for Pain.     pantoprazole 40 MG tablet  Commonly known as: PROTONIX  Take 1 tablet (40 mg total) by mouth once daily.     promethazine 25 MG tablet  Commonly known as: PHENERGAN  Take 1 tablet (25 mg total) by mouth every 6 (six) hours as needed for Nausea.              Discharge Information:      Follow-up Information       [facility] Follow up.    Specialty: Emergency Medicine  Why: If symptoms worsen, As needed  Contact information:  [facility] [address] [phone]             Kumar Ortiz NP. Schedule an appointment as soon as possible for a visit in 1 week(s).    Specialty: Family Medicine  Contact information:  [address] [place] [address]  [phone]                               Bertrand Otero MD  [facility] Internal Medicine, -

## 2023-10-04 NOTE — PT/OT/SLP PROGRESS
"Physical Therapy    Missed Treatment Session    Patient Name:  Dorene Husain   MRN:  [id number]    -patient side lying Rt in bed w/ HOB elevated  -patient not seen at this time secondary to patient unwilling to participate  -per patient - "had dilaudid earlier and it's making me sleepy...maybe later"  -patients nurse Sarah notified of patients decision concerning therapy attempt (evaluation)  -will follow-up as patient is agreeable/available to participate and as therapists' schedule allows  "

## 2023-10-05 ENCOUNTER — HOSPITAL ENCOUNTER (INPATIENT)
Facility: HOSPITAL | Age: 28
LOS: 2 days | Discharge: HOME | DRG: 639 | End: 2023-10-07
Attending: EMERGENCY MEDICINE | Admitting: STUDENT IN AN ORGANIZED HEALTH CARE EDUCATION/TRAINING PROGRAM
Payer: MEDICAID

## 2023-10-05 ENCOUNTER — PATIENT OUTREACH (OUTPATIENT)
Dept: ADMINISTRATIVE | Facility: CLINIC | Age: 28
End: 2023-10-05
Payer: MEDICAID

## 2023-10-05 DIAGNOSIS — Z45.2 ENCOUNTER FOR CENTRAL LINE PLACEMENT: ICD-10-CM

## 2023-10-05 DIAGNOSIS — E10.10 DIABETIC KETOACIDOSIS WITHOUT COMA ASSOCIATED WITH TYPE 1 DIABETES MELLITUS: Primary | ICD-10-CM

## 2023-10-05 DIAGNOSIS — E11.10 DKA (DIABETIC KETOACIDOSIS): ICD-10-CM

## 2023-10-05 LAB
ALBUMIN SERPL-MCNC: 2.1 G/DL (ref 3.5–5)
ALBUMIN SERPL-MCNC: 2.8 G/DL (ref 3.5–5)
ALBUMIN/GLOB SERPL: 0.7 RATIO (ref 1.1–2)
ALBUMIN/GLOB SERPL: 0.7 RATIO (ref 1.1–2)
ALP SERPL-CCNC: 68 UNIT/L (ref 40–150)
ALP SERPL-CCNC: 94 UNIT/L (ref 40–150)
ALT SERPL-CCNC: 14 UNIT/L (ref 0–55)
ALT SERPL-CCNC: 19 UNIT/L (ref 0–55)
APPEARANCE UR: CLEAR
AST SERPL-CCNC: 15 UNIT/L (ref 5–34)
AST SERPL-CCNC: 28 UNIT/L (ref 5–34)
B-HCG SERPL QL: NEGATIVE
BACTERIA #/AREA URNS AUTO: NORMAL /HPF
BASOPHILS # BLD AUTO: 0.03 X10(3)/MCL
BASOPHILS NFR BLD AUTO: 0.3 %
BILIRUB SERPL-MCNC: 0.3 MG/DL
BILIRUB SERPL-MCNC: 0.5 MG/DL
BILIRUB UR QL STRIP.AUTO: ABNORMAL
BUN SERPL-MCNC: 12 MG/DL (ref 7–18.7)
BUN SERPL-MCNC: 14 MG/DL (ref 7–18.7)
CALCIUM SERPL-MCNC: 7.9 MG/DL (ref 8.4–10.2)
CALCIUM SERPL-MCNC: 9.1 MG/DL (ref 8.4–10.2)
CHLORIDE SERPL-SCNC: 90 MMOL/L (ref 98–107)
CHLORIDE SERPL-SCNC: 96 MMOL/L (ref 98–107)
CO2 SERPL-SCNC: 14 MMOL/L (ref 22–29)
CO2 SERPL-SCNC: 20 MMOL/L (ref 22–29)
COLOR UR AUTO: YELLOW
CREAT SERPL-MCNC: 1.17 MG/DL (ref 0.55–1.02)
CREAT SERPL-MCNC: 1.37 MG/DL (ref 0.55–1.02)
EOSINOPHIL # BLD AUTO: 0.05 X10(3)/MCL (ref 0–0.9)
EOSINOPHIL NFR BLD AUTO: 0.5 %
ERYTHROCYTE [DISTWIDTH] IN BLOOD BY AUTOMATED COUNT: 13.2 % (ref 11.5–17)
GFR SERPLBLD CREATININE-BSD FMLA CKD-EPI: 54 MLS/MIN/1.73/M2
GFR SERPLBLD CREATININE-BSD FMLA CKD-EPI: >60 MLS/MIN/1.73/M2
GLOBULIN SER-MCNC: 3.2 GM/DL (ref 2.4–3.5)
GLOBULIN SER-MCNC: 4.2 GM/DL (ref 2.4–3.5)
GLUCOSE SERPL-MCNC: 232 MG/DL (ref 70–110)
GLUCOSE SERPL-MCNC: 350 MG/DL (ref 74–100)
GLUCOSE SERPL-MCNC: 441 MG/DL (ref 74–100)
GLUCOSE UR QL STRIP.AUTO: ABNORMAL
HCT VFR BLD AUTO: 36.4 % (ref 37–47)
HGB BLD-MCNC: 10.6 G/DL (ref 12–16)
IMM GRANULOCYTES # BLD AUTO: 0.5 X10(3)/MCL (ref 0–0.04)
IMM GRANULOCYTES NFR BLD AUTO: 4.6 %
KETONES UR QL STRIP.AUTO: ABNORMAL
LEUKOCYTE ESTERASE UR QL STRIP.AUTO: NEGATIVE
LYMPHOCYTES # BLD AUTO: 1.52 X10(3)/MCL (ref 0.6–4.6)
LYMPHOCYTES NFR BLD AUTO: 14.1 %
MCH RBC QN AUTO: 27.5 PG (ref 27–31)
MCHC RBC AUTO-ENTMCNC: 29.1 G/DL (ref 33–36)
MCV RBC AUTO: 94.5 FL (ref 80–94)
MONOCYTES # BLD AUTO: 1.01 X10(3)/MCL (ref 0.1–1.3)
MONOCYTES NFR BLD AUTO: 9.4 %
NEUTROPHILS # BLD AUTO: 7.69 X10(3)/MCL (ref 2.1–9.2)
NEUTROPHILS NFR BLD AUTO: 71.1 %
NITRITE UR QL STRIP.AUTO: NEGATIVE
NRBC BLD AUTO-RTO: 0 %
PH UR STRIP.AUTO: 5.5 [PH]
PHOSPHATE SERPL-MCNC: 2 MG/DL (ref 2.3–4.7)
PLATELET # BLD AUTO: 457 X10(3)/MCL (ref 130–400)
PMV BLD AUTO: 9.4 FL (ref 7.4–10.4)
POCT GLUCOSE: 132 MG/DL (ref 70–110)
POCT GLUCOSE: 157 MG/DL (ref 70–110)
POCT GLUCOSE: 200 MG/DL (ref 70–110)
POTASSIUM SERPL-SCNC: 3.5 MMOL/L (ref 3.5–5.1)
POTASSIUM SERPL-SCNC: 4.5 MMOL/L (ref 3.5–5.1)
PROT SERPL-MCNC: 5.3 GM/DL (ref 6.4–8.3)
PROT SERPL-MCNC: 7 GM/DL (ref 6.4–8.3)
PROT UR QL STRIP.AUTO: ABNORMAL
RBC # BLD AUTO: 3.85 X10(6)/MCL (ref 4.2–5.4)
RBC #/AREA URNS AUTO: NORMAL /HPF
RBC UR QL AUTO: ABNORMAL
SODIUM SERPL-SCNC: 127 MMOL/L (ref 136–145)
SODIUM SERPL-SCNC: 131 MMOL/L (ref 136–145)
SP GR UR STRIP.AUTO: <=1.005 (ref 1–1.03)
SQUAMOUS #/AREA URNS AUTO: NORMAL /HPF
UROBILINOGEN UR STRIP-ACNC: 0.2
WBC # SPEC AUTO: 10.8 X10(3)/MCL (ref 4.5–11.5)
WBC #/AREA URNS AUTO: NORMAL /HPF

## 2023-10-05 PROCEDURE — 85025 COMPLETE CBC W/AUTO DIFF WBC: CPT | Performed by: EMERGENCY MEDICINE

## 2023-10-05 PROCEDURE — 99291 CRITICAL CARE FIRST HOUR: CPT

## 2023-10-05 PROCEDURE — 83735 ASSAY OF MAGNESIUM: CPT | Performed by: STUDENT IN AN ORGANIZED HEALTH CARE EDUCATION/TRAINING PROGRAM

## 2023-10-05 PROCEDURE — 25000003 PHARM REV CODE 250: Performed by: STUDENT IN AN ORGANIZED HEALTH CARE EDUCATION/TRAINING PROGRAM

## 2023-10-05 PROCEDURE — 36556 INSERT NON-TUNNEL CV CATH: CPT | Mod: LT

## 2023-10-05 PROCEDURE — 81001 URINALYSIS AUTO W/SCOPE: CPT | Performed by: EMERGENCY MEDICINE

## 2023-10-05 PROCEDURE — 81025 URINE PREGNANCY TEST: CPT | Performed by: EMERGENCY MEDICINE

## 2023-10-05 PROCEDURE — 80053 COMPREHEN METABOLIC PANEL: CPT | Performed by: EMERGENCY MEDICINE

## 2023-10-05 PROCEDURE — 63600175 PHARM REV CODE 636 W HCPCS: Performed by: EMERGENCY MEDICINE

## 2023-10-05 PROCEDURE — 25000003 PHARM REV CODE 250: Performed by: EMERGENCY MEDICINE

## 2023-10-05 PROCEDURE — 80053 COMPREHEN METABOLIC PANEL: CPT | Performed by: STUDENT IN AN ORGANIZED HEALTH CARE EDUCATION/TRAINING PROGRAM

## 2023-10-05 PROCEDURE — S5010 5% DEXTROSE AND 0.45% SALINE: HCPCS | Performed by: STUDENT IN AN ORGANIZED HEALTH CARE EDUCATION/TRAINING PROGRAM

## 2023-10-05 PROCEDURE — 20000000 HC ICU ROOM

## 2023-10-05 PROCEDURE — 82962 GLUCOSE BLOOD TEST: CPT

## 2023-10-05 PROCEDURE — 84100 ASSAY OF PHOSPHORUS: CPT | Performed by: STUDENT IN AN ORGANIZED HEALTH CARE EDUCATION/TRAINING PROGRAM

## 2023-10-05 RX ORDER — SODIUM CHLORIDE, SODIUM LACTATE, POTASSIUM CHLORIDE, CALCIUM CHLORIDE 600; 310; 30; 20 MG/100ML; MG/100ML; MG/100ML; MG/100ML
INJECTION, SOLUTION INTRAVENOUS CONTINUOUS
Status: DISCONTINUED | OUTPATIENT
Start: 2023-10-05 | End: 2023-10-07 | Stop reason: HOSPADM

## 2023-10-05 RX ORDER — MUPIROCIN 20 MG/G
OINTMENT TOPICAL 2 TIMES DAILY
Status: DISCONTINUED | OUTPATIENT
Start: 2023-10-05 | End: 2023-10-07 | Stop reason: HOSPADM

## 2023-10-05 RX ORDER — SODIUM CHLORIDE 0.9 % (FLUSH) 0.9 %
10 SYRINGE (ML) INJECTION
Status: DISCONTINUED | OUTPATIENT
Start: 2023-10-05 | End: 2023-10-06

## 2023-10-05 RX ORDER — SODIUM CHLORIDE 0.9 % (FLUSH) 0.9 %
10 SYRINGE (ML) INJECTION
Status: DISCONTINUED | OUTPATIENT
Start: 2023-10-05 | End: 2023-10-07 | Stop reason: HOSPADM

## 2023-10-05 RX ORDER — DEXTROSE MONOHYDRATE 100 MG/ML
INJECTION, SOLUTION INTRAVENOUS
Status: DISCONTINUED | OUTPATIENT
Start: 2023-10-05 | End: 2023-10-07 | Stop reason: HOSPADM

## 2023-10-05 RX ORDER — MORPHINE SULFATE 4 MG/ML
8 INJECTION, SOLUTION INTRAMUSCULAR; INTRAVENOUS
Status: COMPLETED | OUTPATIENT
Start: 2023-10-05 | End: 2023-10-05

## 2023-10-05 RX ORDER — SODIUM CHLORIDE 9 MG/ML
1000 INJECTION, SOLUTION INTRAVENOUS CONTINUOUS
Status: DISCONTINUED | OUTPATIENT
Start: 2023-10-05 | End: 2023-10-06

## 2023-10-05 RX ORDER — OXYCODONE AND ACETAMINOPHEN 5; 325 MG/1; MG/1
1 TABLET ORAL EVERY 8 HOURS PRN
Status: DISCONTINUED | OUTPATIENT
Start: 2023-10-05 | End: 2023-10-05

## 2023-10-05 RX ORDER — PANTOPRAZOLE SODIUM 40 MG/1
40 TABLET, DELAYED RELEASE ORAL DAILY
Status: DISCONTINUED | OUTPATIENT
Start: 2023-10-06 | End: 2023-10-07 | Stop reason: HOSPADM

## 2023-10-05 RX ORDER — METOPROLOL TARTRATE 50 MG/1
50 TABLET ORAL 2 TIMES DAILY
Status: DISCONTINUED | OUTPATIENT
Start: 2023-10-05 | End: 2023-10-07 | Stop reason: HOSPADM

## 2023-10-05 RX ORDER — DICYCLOMINE HYDROCHLORIDE 10 MG/1
10 CAPSULE ORAL 4 TIMES DAILY
Status: DISCONTINUED | OUTPATIENT
Start: 2023-10-05 | End: 2023-10-07 | Stop reason: HOSPADM

## 2023-10-05 RX ORDER — DEXTROSE MONOHYDRATE AND SODIUM CHLORIDE 5; .45 G/100ML; G/100ML
INJECTION, SOLUTION INTRAVENOUS CONTINUOUS PRN
Status: DISCONTINUED | OUTPATIENT
Start: 2023-10-05 | End: 2023-10-06

## 2023-10-05 RX ORDER — AMITRIPTYLINE HYDROCHLORIDE 25 MG/1
25 TABLET, FILM COATED ORAL NIGHTLY
Status: DISCONTINUED | OUTPATIENT
Start: 2023-10-05 | End: 2023-10-07 | Stop reason: HOSPADM

## 2023-10-05 RX ORDER — ACETAMINOPHEN 325 MG/1
650 TABLET ORAL EVERY 6 HOURS PRN
Status: DISCONTINUED | OUTPATIENT
Start: 2023-10-05 | End: 2023-10-07 | Stop reason: HOSPADM

## 2023-10-05 RX ORDER — HYDROXYZINE HYDROCHLORIDE 25 MG/1
50 TABLET, FILM COATED ORAL DAILY PRN
Status: DISCONTINUED | OUTPATIENT
Start: 2023-10-05 | End: 2023-10-07 | Stop reason: HOSPADM

## 2023-10-05 RX ORDER — ONDANSETRON 2 MG/ML
4 INJECTION INTRAMUSCULAR; INTRAVENOUS EVERY 4 HOURS PRN
Status: DISCONTINUED | OUTPATIENT
Start: 2023-10-05 | End: 2023-10-07 | Stop reason: HOSPADM

## 2023-10-05 RX ADMIN — INSULIN HUMAN 0.1 UNITS/KG/HR: 1 INJECTION, SOLUTION INTRAVENOUS at 06:10

## 2023-10-05 RX ADMIN — AMITRIPTYLINE HYDROCHLORIDE 25 MG: 25 TABLET, FILM COATED ORAL at 10:10

## 2023-10-05 RX ADMIN — MORPHINE SULFATE 8 MG: 4 INJECTION, SOLUTION INTRAMUSCULAR; INTRAVENOUS at 05:10

## 2023-10-05 RX ADMIN — METOPROLOL TARTRATE 50 MG: 50 TABLET, FILM COATED ORAL at 10:10

## 2023-10-05 RX ADMIN — DEXTROSE AND SODIUM CHLORIDE: 5; 450 INJECTION, SOLUTION INTRAVENOUS at 08:10

## 2023-10-05 RX ADMIN — DICYCLOMINE HYDROCHLORIDE 10 MG: 10 CAPSULE ORAL at 10:10

## 2023-10-05 RX ADMIN — SODIUM CHLORIDE 2000 ML: 9 INJECTION, SOLUTION INTRAVENOUS at 05:10

## 2023-10-05 RX ADMIN — ONDANSETRON 4 MG: 2 INJECTION INTRAMUSCULAR; INTRAVENOUS at 05:10

## 2023-10-05 NOTE — PROGRESS NOTES
C3 nurse attempted to contact Dorene Husain  for a TCC post hospital discharge follow up call. No answer. No voicemail available.The patient does not have a scheduled HOSFU appointment noted. Unable to message PCP staff.

## 2023-10-05 NOTE — PT/OT/SLP PROGRESS
POST DISCHARGE DOCUMENTATION - 10/05/2023 7:37 AM    Physical Therapy    Missed Treatment Session & Discharge Note    Patient Name:  Dorene Husain   MRN:  [id number]      Patient not seen at this time secondary to patient discharged prior to initiation of evaluation    PT SERVICES orders received 10/03/2023 0548  PT SERVICES attempted therapy session 10/03/2023 - cx'ed 2/2 PICC line insertion ongoing   PT SERVICES attempted therapy session 10/04/2023 - patient declined participation at that time

## 2023-10-05 NOTE — PROGRESS NOTES
10/05/23 0936   Missed Time Reason   Missed Treatment Reason Patient discharged prior to initiation of evaluation

## 2023-10-05 NOTE — ED PROVIDER NOTES
Encounter Date: 10/5/2023       History     Chief Complaint   Patient presents with    Hyperglycemia     Pt states she was discharged for hospital yesterday after being admitted for hyperglycemia, pt states still not feeling well and having abd pain. CBG >415     Frequent visitor of the emergency department type 1 diabetes often in DKA.  Was just released from [facility].  She said they released her too early.  She said her last sugar was 68 she was not eating anything.  She got out yesterday she comes this emergency department today complaining of back pain which she always has and feeling terrible overall.  No associated fever chills no associated nausea vomiting.  She goes to the [facility] for primary care  She recently had a PICC line in the right arm it sounds like.  She denies having any drug allergies      Review of patient's allergies indicates:  No Known Allergies  Past Medical History:   Diagnosis Date    Diabetes mellitus     Diabetic gastroparesis associated with type 1 diabetes mellitus     DKA (diabetic ketoacidosis)     Hypertension     Non compliance with medical treatment      Past Surgical History:   Procedure Laterality Date    CHOLECYSTECTOMY      ESOPHAGOGASTRODUODENOSCOPY      Multiple    None       Family History   Problem Relation Age of Onset    Heart disease Mother     Hypertension Mother     Diabetes Mother     Hyperlipidemia Mother     Cancer Father     Stroke Father     Hypertension Father     Hyperlipidemia Father      Social History     Tobacco Use    Smoking status: Never    Smokeless tobacco: Never   Substance Use Topics    Alcohol use: Never    Drug use: Not Currently     Types: Marijuana     Review of Systems   Constitutional:  Positive for appetite change.   HENT: Negative.     Eyes: Negative.    Respiratory: Negative.     Cardiovascular: Negative.    Gastrointestinal: Negative.    Endocrine: Positive for polydipsia and polyuria.   Musculoskeletal:  Negative for back  pain.   Skin:  Negative for rash.   Allergic/Immunologic: Negative.    Neurological:  Positive for dizziness and weakness.   Hematological:  Negative for adenopathy.   Psychiatric/Behavioral:  Positive for dysphoric mood.    All other systems reviewed and are negative.      Physical Exam     Initial Vitals [10/05/23 1357]   BP Pulse Resp Temp SpO2   (!) 167/104 (!) 116 18 98.5 °F (36.9 °C) 100 %      MAP       --         Physical Exam    Nursing note and vitals reviewed.  Constitutional: She appears well-developed and well-nourished.   Complaining and crying very dramatic about her back pain   HENT:   Head: Normocephalic and atraumatic.   Right Ear: Tympanic membrane and external ear normal.   Left Ear: Tympanic membrane and external ear normal.   Nose: Nose normal.   Mouth/Throat: Oropharynx is clear and moist and mucous membranes are normal.   Eyes: EOM are normal. Pupils are equal, round, and reactive to light.   Neck: Neck supple. No thyromegaly present. No tracheal deviation present. No JVD present.   Normal range of motion.  Cardiovascular:  Normal rate, regular rhythm and normal heart sounds.     Exam reveals no gallop and no friction rub.       No murmur heard.  Pulmonary/Chest: Breath sounds normal. No stridor. No respiratory distress. She has no wheezes. She has no rhonchi. She has no rales. She exhibits no tenderness.   Abdominal: Abdomen is soft. Bowel sounds are normal. She exhibits no distension and no mass. There is no abdominal tenderness.   Genitourinary:    Genitourinary Comments: No CVA tenderness     Musculoskeletal:         General: No tenderness or edema. Normal range of motion.      Cervical back: Normal range of motion and neck supple.     Lymphadenopathy:     She has no cervical adenopathy.   Neurological: She is alert and oriented to person, place, and time. She has normal strength and normal reflexes. No cranial nerve deficit. GCS score is 15. GCS eye subscore is 4. GCS verbal subscore is  5. GCS motor subscore is 6.   Skin: Skin is warm and dry. Capillary refill takes 2 to 3 seconds. No rash noted.   Psychiatric: She has a normal mood and affect. Her behavior is normal. Judgment and thought content normal.         ED Course   Central Line    Date/Time: 10/5/2023 6:13 PM    Performed by: Danilo العلي MD  Authorized by: Italia Koehler MD    Location procedure was performed:  [facility] EMERGENCY DEPARTMENT  Pre-operative diagnosis:  DKA  Consent Done ?:  Yes  Time out complete?: Verified correct patient, procedure, equipment, staff, and site/side    Indications:  Vascular access  Anesthesia:  Local infiltration  Local anesthetic:  Lidocaine 1% without epinephrine  Description of findings:  No peripheral access say they 22 in the left hand.  Patient frequently in hospital no good peripheral access had a PICC line recently in the right arm central line indicated discussed risks and benefits with patient prior to administering any narcotics patient agreed to allow placement  Preparation:  Skin prepped with ChloraPrep  Skin prep agent dried: Skin prep agent completely dried prior to procedure    Sterile barriers: All five maximal sterile barriers used - gloves, gown, cap, mask and large sterile sheet    Hand hygiene: Hand hygiene performed immediately prior to central venous catheter insertion    Location:  Left subclavian  Catheter type:  Triple lumen  Catheter size:  7 Fr  Inserted Catheter Length (cm):  13.5  Ultrasound guidance: No    Manometry: No    Number of attempts:  1  Securement:  Line sutured, chlorhexidine patch and blood return through all ports  Estimated blood loss (mL):  3.5  XRay:  Placement verified by x-ray, no pneumothorax on x-ray, tip termination and successful placement  Adverse Events:  None  Critical Care    Date/Time: 10/5/2023 6:15 PM    Performed by: Danilo العلي MD  Authorized by: Italia Koehler MD  Direct patient critical care time: 10 minutes  Additional  history critical care time: 5 minutes  Ordering / reviewing critical care time: 10 minutes  Documentation critical care time: 12 minutes  Consulting other physicians critical care time: 6 minutes  Total critical care time (exclusive of procedural time) : 43 minutes  Critical care time was exclusive of separately billable procedures and treating other patients.  Critical care was necessary to treat or prevent imminent or life-threatening deterioration of the following conditions: metabolic crisis.  Critical care was time spent personally by me on the following activities: ordering and performing treatments and interventions.        Labs Reviewed   COMPREHENSIVE METABOLIC PANEL - Abnormal; Notable for the following components:       Result Value    Sodium Level 127 (*)     Chloride 90 (*)     Carbon Dioxide 14 (*)     Glucose Level 441 (*)     Creatinine 1.37 (*)     Albumin Level 2.8 (*)     Globulin 4.2 (*)     Albumin/Globulin Ratio 0.7 (*)     All other components within normal limits   URINALYSIS, REFLEX TO URINE CULTURE - Abnormal; Notable for the following components:    Protein, UA 2+ (*)     Glucose, UA 2+ (*)     Ketones, UA 4+ (*)     Blood, UA Trace-Intact (*)     Bilirubin, UA 1+ (*)     All other components within normal limits   CBC WITH DIFFERENTIAL - Abnormal; Notable for the following components:    RBC 3.85 (*)     Hgb 10.6 (*)     Hct 36.4 (*)     MCV 94.5 (*)     MCHC 29.1 (*)     Platelet 457 (*)     IG# 0.50 (*)     All other components within normal limits   HCG QUALITATIVE URINE - Normal   URINALYSIS, MICROSCOPIC - Normal   CBC W/ AUTO DIFFERENTIAL    Narrative:     The following orders were created for panel order CBC auto differential.  Procedure                               Abnormality         Status                     ---------                               -----------         ------                     CBC with Differential[[id number]]       Abnormal            Final result                  Please view results for these tests on the individual orders.   COMPREHENSIVE METABOLIC PANEL   PHOSPHORUS   POCT GLUCOSE MONITORING CONTINUOUS          Imaging Results              X-Ray Chest AP Portable (Preliminary result)  Result time 10/05/23 18:25:08      Wet Read by Danilo العلي MD (10/05/23 18:25:08, [facility] - Emergency Dept, Emergency Medicine)    Left subclavian line tip terminates just outside right atrium no pneumothorax appreciated                                     Medications   amitriptyline tablet 25 mg (has no administration in time range)   dicyclomine capsule 10 mg (has no administration in time range)   hydrOXYzine HCL tablet 50 mg (has no administration in time range)   metoprolol tartrate (LOPRESSOR) tablet 50 mg (has no administration in time range)   oxyCODONE-acetaminophen 5-325 mg per tablet 1 tablet (has no administration in time range)   pantoprazole EC tablet 40 mg (has no administration in time range)   sodium chloride 0.9% flush 10 mL (has no administration in time range)   sodium chloride 0.9% flush 10 mL (has no administration in time range)   0.9%  NaCl infusion (has no administration in time range)   dextrose 5 % and 0.45 % NaCl infusion (has no administration in time range)   dextrose 10 % infusion (has no administration in time range)   dextrose 10 % infusion (has no administration in time range)   insulin regular bolus from bag/infusion 6.8 Units 6.8 mL (has no administration in time range)   insulin regular in 0.9 % NaCl 100 unit/100 mL (1 unit/mL) infusion (has no administration in time range)   dextrose 10% bolus 125 mL 125 mL (has no administration in time range)   dextrose 10% bolus 250 mL 250 mL (has no administration in time range)   lactated ringers infusion (has no administration in time range)   ondansetron injection 4 mg (4 mg Intravenous Given 10/5/23 1701)   sodium chloride 0.9% bolus 2,000 mL 2,000 mL (2,000 mLs Intravenous New Bag 10/5/23  1709)   morphine injection 8 mg (8 mg Intravenous Given 10/5/23 1703)     Medical Decision Making    Frequent visitor of the emergency department type 1 diabetes often in DKA.  Was just released from [facility].  She said they released her too early.  She said her last sugar was 68 she was not eating anything.  She got out yesterday she comes this emergency department today complaining of back pain which she always has and feeling terrible overall.  No associated fever chills no associated nausea vomiting.  She goes to the [facility] for primary care  She recently had a PICC line in the right arm it sounds like.  She denies having any drug allergies    Obese female awake oriented very dramatic in her presentation heart is regular rate and rhythm with a resting tachycardia lungs are clear abdomen seems benign good bowel sounds all quadrants extremities without clubbing cyanosis edema skin is warm and dry there is no open sores or lesions.  There is no obvious source for infection          Amount and/or Complexity of Data Reviewed  External Data Reviewed: labs and notes.     Details: Just discharged from  [facility] admit for DKA  Labs: ordered. Decision-making details documented in ED Course.     Details: Anion gap is 23  Radiology: ordered.     Details: Post central line placement tip terminates at the right atrium  Discussion of management or test interpretation with external provider(s): Differential diagnosis  Malingering, diabetic ketoacidosis, pyelonephritis, dehydration,    Risk  Prescription drug management.  Decision regarding hospitalization.  Risk Details: MDM  Problems addressed  Co-morbidities and/or factors adding to the complexity or risk for the patient:  Frequent admissions for DKA  Problems addressed:  DKA  Acute problem/illness or progression/exacerbation of chronic problem with potential threat to life/bodily dysfunction?:  Frequent admissions for DKA poor compliance with diabetic  treatment  Differential diagnoses/problems considered: see above     Amount and/or Complexity of Data Reviewed  Independent Historian: none (see above for summary)  External Data Reviewed: notes from previous admissions, notes from previous ED visits, and prior labs (see above for summary)  Risk and benefits of testing: discussed   Labs: Labs: ordered and reviewed  Radiology:Radiology: ordered and independent interpretation performed (see above or ED course)  ECG/medicine tests:Radiology: ordered and independent interpretation performed (see above or ED course)  discussed with hospitalist physician    Risk  Decision regarding hospitalization  Diagnosis or treatment significantly impacted by social determinants of health: psychiatric illness, access to care, transport limitation, financial insecurity , and stress  Shared decision making     Critical Care  30-74 minutes      Critical Care  Total time providing critical care: 43 minutes               ED Course as of 10/05/23 1828   Thu Oct 05, 2023   1631 We are told by the nursing supervisor we will have a bed later tonight I spoke to the hospitalist we will make plans to admit the patient [DM]   1821 Anion gap is 23 hemoglobin hematocrit 10.6 and 36.4 bicarb is down to 14 sodium is 127.  Glucose is 441 creatinine 1.37 consultation has been obtained with hospitalist patient is admitted we are supposed to have an ICU bed and patient is admitted there bed will open up later I am told [DM]      ED Course User Index  [DM] Danilo العلي MD                    Clinical Impression:   Final diagnoses:  [E11.10] DKA (diabetic ketoacidosis)  [Z45.2] Encounter for central line placement  [E10.10] Diabetic ketoacidosis without coma associated with type 1 diabetes mellitus (Primary)        ED Disposition Condition    Admit                 Danilo العلي MD  10/05/23 1828

## 2023-10-06 LAB
ABS NEUT CALC (OHS): 10.07 X10(3)/MCL (ref 2.1–9.2)
ALBUMIN SERPL-MCNC: 1.9 G/DL (ref 3.5–5)
ALBUMIN SERPL-MCNC: 2.1 G/DL (ref 3.5–5)
ALBUMIN/GLOB SERPL: 0.7 RATIO (ref 1.1–2)
ALBUMIN/GLOB SERPL: 0.7 RATIO (ref 1.1–2)
ALP SERPL-CCNC: 60 UNIT/L (ref 40–150)
ALP SERPL-CCNC: 65 UNIT/L (ref 40–150)
ALT SERPL-CCNC: 11 UNIT/L (ref 0–55)
ALT SERPL-CCNC: 12 UNIT/L (ref 0–55)
AST SERPL-CCNC: 14 UNIT/L (ref 5–34)
AST SERPL-CCNC: 16 UNIT/L (ref 5–34)
BILIRUB SERPL-MCNC: 0.3 MG/DL
BILIRUB SERPL-MCNC: 0.4 MG/DL
BUN SERPL-MCNC: 8 MG/DL (ref 7–18.7)
BUN SERPL-MCNC: 9 MG/DL (ref 7–18.7)
CALCIUM SERPL-MCNC: 7.4 MG/DL (ref 8.4–10.2)
CALCIUM SERPL-MCNC: 7.9 MG/DL (ref 8.4–10.2)
CHLORIDE SERPL-SCNC: 101 MMOL/L (ref 98–107)
CHLORIDE SERPL-SCNC: 101 MMOL/L (ref 98–107)
CO2 SERPL-SCNC: 27 MMOL/L (ref 22–29)
CO2 SERPL-SCNC: 28 MMOL/L (ref 22–29)
CREAT SERPL-MCNC: 0.92 MG/DL (ref 0.55–1.02)
CREAT SERPL-MCNC: 0.96 MG/DL (ref 0.55–1.02)
EOSINOPHIL NFR BLD MANUAL: 0.31 X10(3)/MCL (ref 0–0.9)
EOSINOPHIL NFR BLD MANUAL: 2 % (ref 0–8)
ERYTHROCYTE [DISTWIDTH] IN BLOOD BY AUTOMATED COUNT: 13.1 % (ref 11.5–17)
GFR SERPLBLD CREATININE-BSD FMLA CKD-EPI: >60 MLS/MIN/1.73/M2
GFR SERPLBLD CREATININE-BSD FMLA CKD-EPI: >60 MLS/MIN/1.73/M2
GLOBULIN SER-MCNC: 2.9 GM/DL (ref 2.4–3.5)
GLOBULIN SER-MCNC: 3.2 GM/DL (ref 2.4–3.5)
GLUCOSE SERPL-MCNC: 117 MG/DL (ref 74–100)
GLUCOSE SERPL-MCNC: 129 MG/DL (ref 74–100)
HCT VFR BLD AUTO: 24.2 % (ref 37–47)
HGB BLD-MCNC: 8.1 G/DL (ref 12–16)
HYPOCHROMIA BLD QL SMEAR: ABNORMAL
LYMPHOCYTES NFR BLD MANUAL: 20 % (ref 13–40)
LYMPHOCYTES NFR BLD MANUAL: 3.15 X10(3)/MCL
MAGNESIUM SERPL-MCNC: 1.4 MG/DL (ref 1.6–2.6)
MAGNESIUM SERPL-MCNC: 1.4 MG/DL (ref 1.6–2.6)
MCH RBC QN AUTO: 27.6 PG (ref 27–31)
MCHC RBC AUTO-ENTMCNC: 33.5 G/DL (ref 33–36)
MCV RBC AUTO: 82.3 FL (ref 80–94)
MONOCYTES NFR BLD MANUAL: 14 % (ref 2–11)
MONOCYTES NFR BLD MANUAL: 2.2 X10(3)/MCL (ref 0.1–1.3)
NEUTROPHILS NFR BLD MANUAL: 62 % (ref 47–80)
NEUTS BAND NFR BLD MANUAL: 2 % (ref 0–11)
PHOSPHATE SERPL-MCNC: 1.8 MG/DL (ref 2.3–4.7)
PHOSPHATE SERPL-MCNC: 1.9 MG/DL (ref 2.3–4.7)
PLATELET # BLD AUTO: 447 X10(3)/MCL (ref 130–400)
PLATELET # BLD EST: ADEQUATE 10*3/UL
PMV BLD AUTO: 8.3 FL (ref 7.4–10.4)
POCT GLUCOSE: 103 MG/DL (ref 70–110)
POCT GLUCOSE: 111 MG/DL (ref 70–110)
POCT GLUCOSE: 112 MG/DL (ref 70–110)
POCT GLUCOSE: 134 MG/DL (ref 70–110)
POCT GLUCOSE: 139 MG/DL (ref 70–110)
POCT GLUCOSE: 33 MG/DL (ref 70–110)
POCT GLUCOSE: 415 MG/DL (ref 70–110)
POCT GLUCOSE: 72 MG/DL (ref 70–110)
POCT GLUCOSE: 76 MG/DL (ref 70–110)
POCT GLUCOSE: 96 MG/DL (ref 70–110)
POTASSIUM SERPL-SCNC: 2.9 MMOL/L (ref 3.5–5.1)
POTASSIUM SERPL-SCNC: 3.3 MMOL/L (ref 3.5–5.1)
PROT SERPL-MCNC: 4.8 GM/DL (ref 6.4–8.3)
PROT SERPL-MCNC: 5.3 GM/DL (ref 6.4–8.3)
RBC # BLD AUTO: 2.94 X10(6)/MCL (ref 4.2–5.4)
RBC MORPH BLD: ABNORMAL
SODIUM SERPL-SCNC: 135 MMOL/L (ref 136–145)
SODIUM SERPL-SCNC: 136 MMOL/L (ref 136–145)
WBC # SPEC AUTO: 15.74 X10(3)/MCL (ref 4.5–11.5)

## 2023-10-06 PROCEDURE — 11000001 HC ACUTE MED/SURG PRIVATE ROOM

## 2023-10-06 PROCEDURE — 94761 N-INVAS EAR/PLS OXIMETRY MLT: CPT

## 2023-10-06 PROCEDURE — 83735 ASSAY OF MAGNESIUM: CPT | Performed by: STUDENT IN AN ORGANIZED HEALTH CARE EDUCATION/TRAINING PROGRAM

## 2023-10-06 PROCEDURE — S5010 5% DEXTROSE AND 0.45% SALINE: HCPCS | Performed by: STUDENT IN AN ORGANIZED HEALTH CARE EDUCATION/TRAINING PROGRAM

## 2023-10-06 PROCEDURE — 25000003 PHARM REV CODE 250: Performed by: INTERNAL MEDICINE

## 2023-10-06 PROCEDURE — 25000003 PHARM REV CODE 250: Performed by: STUDENT IN AN ORGANIZED HEALTH CARE EDUCATION/TRAINING PROGRAM

## 2023-10-06 PROCEDURE — 63600175 PHARM REV CODE 636 W HCPCS: Performed by: STUDENT IN AN ORGANIZED HEALTH CARE EDUCATION/TRAINING PROGRAM

## 2023-10-06 PROCEDURE — 80053 COMPREHEN METABOLIC PANEL: CPT | Performed by: STUDENT IN AN ORGANIZED HEALTH CARE EDUCATION/TRAINING PROGRAM

## 2023-10-06 PROCEDURE — 63600175 PHARM REV CODE 636 W HCPCS: Performed by: INTERNAL MEDICINE

## 2023-10-06 PROCEDURE — 85027 COMPLETE CBC AUTOMATED: CPT | Performed by: STUDENT IN AN ORGANIZED HEALTH CARE EDUCATION/TRAINING PROGRAM

## 2023-10-06 PROCEDURE — 84100 ASSAY OF PHOSPHORUS: CPT | Performed by: STUDENT IN AN ORGANIZED HEALTH CARE EDUCATION/TRAINING PROGRAM

## 2023-10-06 RX ORDER — IBUPROFEN 600 MG/1
600 TABLET ORAL EVERY 6 HOURS PRN
Status: DISCONTINUED | OUTPATIENT
Start: 2023-10-06 | End: 2023-10-06

## 2023-10-06 RX ORDER — GLUCAGON 1 MG
1 KIT INJECTION
Status: DISCONTINUED | OUTPATIENT
Start: 2023-10-06 | End: 2023-10-07 | Stop reason: HOSPADM

## 2023-10-06 RX ORDER — INSULIN GLARGINE 100 [IU]/ML
26 INJECTION, SOLUTION SUBCUTANEOUS DAILY
Qty: 7.8 ML | Refills: 11 | Status: SHIPPED | OUTPATIENT
Start: 2023-10-06 | End: 2024-10-05

## 2023-10-06 RX ORDER — OXYCODONE AND ACETAMINOPHEN 10; 325 MG/1; MG/1
1 TABLET ORAL EVERY 8 HOURS PRN
Qty: 15 TABLET | Refills: 0 | Status: SHIPPED | OUTPATIENT
Start: 2023-10-06 | End: 2024-03-18

## 2023-10-06 RX ORDER — MORPHINE SULFATE 4 MG/ML
2 INJECTION, SOLUTION INTRAMUSCULAR; INTRAVENOUS ONCE
Status: COMPLETED | OUTPATIENT
Start: 2023-10-06 | End: 2023-10-06

## 2023-10-06 RX ORDER — INSULIN ASPART 100 [IU]/ML
0-10 INJECTION, SOLUTION INTRAVENOUS; SUBCUTANEOUS
Status: DISCONTINUED | OUTPATIENT
Start: 2023-10-06 | End: 2023-10-07 | Stop reason: HOSPADM

## 2023-10-06 RX ORDER — MAGNESIUM SULFATE HEPTAHYDRATE 40 MG/ML
2 INJECTION, SOLUTION INTRAVENOUS ONCE
Status: COMPLETED | OUTPATIENT
Start: 2023-10-06 | End: 2023-10-06

## 2023-10-06 RX ORDER — POTASSIUM CHLORIDE 7.45 MG/ML
40 INJECTION INTRAVENOUS ONCE
Status: COMPLETED | OUTPATIENT
Start: 2023-10-06 | End: 2023-10-06

## 2023-10-06 RX ORDER — SODIUM,POTASSIUM PHOSPHATES 280-250MG
2 POWDER IN PACKET (EA) ORAL ONCE
Status: COMPLETED | OUTPATIENT
Start: 2023-10-06 | End: 2023-10-06

## 2023-10-06 RX ORDER — SODIUM CHLORIDE, SODIUM LACTATE, POTASSIUM CHLORIDE, CALCIUM CHLORIDE 600; 310; 30; 20 MG/100ML; MG/100ML; MG/100ML; MG/100ML
INJECTION, SOLUTION INTRAVENOUS CONTINUOUS
Status: DISCONTINUED | OUTPATIENT
Start: 2023-10-06 | End: 2023-10-06

## 2023-10-06 RX ADMIN — DEXTROSE AND SODIUM CHLORIDE: 5; 450 INJECTION, SOLUTION INTRAVENOUS at 03:10

## 2023-10-06 RX ADMIN — POTASSIUM CHLORIDE 40 MEQ: 7.46 INJECTION, SOLUTION INTRAVENOUS at 01:10

## 2023-10-06 RX ADMIN — DICYCLOMINE HYDROCHLORIDE 10 MG: 10 CAPSULE ORAL at 08:10

## 2023-10-06 RX ADMIN — IBUPROFEN 600 MG: 600 TABLET ORAL at 01:10

## 2023-10-06 RX ADMIN — PANTOPRAZOLE SODIUM 40 MG: 40 TABLET, DELAYED RELEASE ORAL at 08:10

## 2023-10-06 RX ADMIN — METOPROLOL TARTRATE 50 MG: 50 TABLET, FILM COATED ORAL at 08:10

## 2023-10-06 RX ADMIN — ACETAMINOPHEN 650 MG: 325 TABLET, FILM COATED ORAL at 07:10

## 2023-10-06 RX ADMIN — METOPROLOL TARTRATE 50 MG: 50 TABLET, FILM COATED ORAL at 09:10

## 2023-10-06 RX ADMIN — POTASSIUM, SODIUM PHOSPHATES 280 MG-160 MG-250 MG ORAL POWDER PACKET 2 PACKET: POWDER IN PACKET at 10:10

## 2023-10-06 RX ADMIN — MORPHINE SULFATE 2 MG: 4 INJECTION, SOLUTION INTRAMUSCULAR; INTRAVENOUS at 11:10

## 2023-10-06 RX ADMIN — HYDROXYZINE HYDROCHLORIDE 50 MG: 25 TABLET, FILM COATED ORAL at 07:10

## 2023-10-06 RX ADMIN — DEXTROSE MONOHYDRATE 250 ML: 100 INJECTION, SOLUTION INTRAVENOUS at 03:10

## 2023-10-06 RX ADMIN — SODIUM CHLORIDE, POTASSIUM CHLORIDE, SODIUM LACTATE AND CALCIUM CHLORIDE: 600; 310; 30; 20 INJECTION, SOLUTION INTRAVENOUS at 11:10

## 2023-10-06 RX ADMIN — MUPIROCIN 1 G: 20 OINTMENT TOPICAL at 09:10

## 2023-10-06 RX ADMIN — INSULIN DETEMIR 26 UNITS: 100 INJECTION, SOLUTION SUBCUTANEOUS at 10:10

## 2023-10-06 RX ADMIN — MAGNESIUM SULFATE HEPTAHYDRATE 2 G: 40 INJECTION, SOLUTION INTRAVENOUS at 10:10

## 2023-10-06 RX ADMIN — ACETAMINOPHEN 650 MG: 325 TABLET, FILM COATED ORAL at 06:10

## 2023-10-06 RX ADMIN — ONDANSETRON 4 MG: 2 INJECTION INTRAMUSCULAR; INTRAVENOUS at 11:10

## 2023-10-06 NOTE — DISCHARGE SUMMARY
"[facility] - Medical Surgical Unit  Hospital Medicine  Discharge Summary      Patient Name: Dorene Husain  MRN: [id number]  Admission Date: 10/5/2023  Hospital Length of Stay: 1 days  Discharge Date and Time:  10/06/2023 5:43 PM  Attending Physician: Italia Koehler MD   Discharging Provider: Italia Koehler MD  Discharge Provider Team: Networked reference to record PCT   Primary Care Provider: Kumar Ortiz NP        HPI: Patient is a frequent visitor of the emergency department type 1 diabetes often in DKA.  Was just released from [facility].  She said they released her too early.  She said her last sugar was 68 because she was refusing to eat anything.  She got out yesterday and comes to this emergency department today complaining of back pain which she always has and feeling terrible overall.  No associated fever chills no associated nausea vomiting.  She goes to the [facility] for primary care.     Patient denies any CP, SOB, N/V/D, headache, fevers or other s/s of infectious process.      Patient is exhibiting narcotic seeking behavior upon intake. This sounds like a common occurrence when she is hospitalized.     * No surgery found *      Hospital Course: Patient with several admissions for DKA that was admitted to the ICU for DKA on insulin ggt and aggressive volume resuscitation. Patient stabilized and was transitioned to subq insulin. Patient continued to complain about back pain and is unable to localize the back pain. Patient requesting pain medications and states that she left the last medical facility she was at because they cut off her narcotics. Patient complaining of nausea for which zofran was helpful, likely she has some component of gastroporesis with her poorly controlled DM1. We discussed the importance of using her insulin, when asked why she has not been adherent her response was "life gets in the way." Reports she can afford insulin. Refill sent to pharmacy. " Patient stable for discharge.     Consults:     Final Active Diagnoses:    Diagnosis Date Noted POA    PRINCIPAL PROBLEM:  DKA (diabetic ketoacidosis) [E11.10] 10/02/2023 Yes    Diabetes mellitus [E11.9] 06/30/2022 Yes      Problems Resolved During this Admission:      Discharged Condition: fair    Disposition: Home or Self Care    Follow Up:   Follow-up Information       Kumar Ortiz NP In 1 week.    Specialty: Family Medicine  Contact information:  [place] [address]  [phone]                           Patient Instructions:   No discharge procedures on file.  Medications:  Reconciled Home Medications:      Medication List        START taking these medications      insulin glargine 100 unit/mL injection  Commonly known as: Lantus  Inject 26 Units into the skin once daily.  Replaces: BASAGLAR KWIKPEN U-100 INSULIN glargine 100 units/mL SubQ pen            CONTINUE taking these medications      amitriptyline 25 MG tablet  Commonly known as: ELAVIL  Take 25 mg by mouth nightly.     dicyclomine 10 MG capsule  Commonly known as: BENTYL  Take 10 mg by mouth 4 (four) times daily.     hydrOXYzine 50 MG tablet  Commonly known as: ATARAX  Take 50 mg by mouth daily as needed.     metoprolol tartrate 50 MG tablet  Commonly known as: LOPRESSOR  Take 50 mg by mouth 2 (two) times daily.     oxyCODONE-acetaminophen  mg per tablet  Commonly known as: PERCOCET  Take 1 tablet by mouth every 8 (eight) hours as needed for Pain.     pantoprazole 40 MG tablet  Commonly known as: PROTONIX  Take 1 tablet (40 mg total) by mouth once daily.     promethazine 25 MG tablet  Commonly known as: PHENERGAN  Take 1 tablet (25 mg total) by mouth every 6 (six) hours as needed for Nausea.            STOP taking these medications      BASAGLAR KWIKPEN U-100 INSULIN glargine 100 units/mL SubQ pen  Generic drug: insulin  Replaced by: insulin glargine 100 unit/mL injection              Significant Diagnostic Studies:  N/A    Pending Diagnostic Studies:       None          Indwelling Lines/Drains at time of discharge:   Lines/Drains/Airways       Central Venous Catheter Line  Duration             Percutaneous Central Line Insertion/Assessment - Triple Lumen  10/05/23 1739 Atrial Left 1 day                    Time spent on the discharge of patient: 45 minutes         Italia Koehler MD  Department of Hospital Medicine  [facility] - Medical Surgical Unit

## 2023-10-06 NOTE — H&P
[facility] - Emergency Dept  History & Physical    Subjective:      Chief Complaint/Reason for Admission: DKA    Patient is a frequent visitor of the emergency department type 1 diabetes often in DKA.  Was just released from [facility].  She said they released her too early.  She said her last sugar was 68 because she was refusing to eat anything.  She got out yesterday and comes to this emergency department today complaining of back pain which she always has and feeling terrible overall.  No associated fever chills no associated nausea vomiting.  She goes to the [facility] clinic for primary care.    Patient denies any CP, SOB, N/V/D, headache, fevers or other s/s of infectious process.     Patient is exhibiting narcotic seeking behavior upon intake. This sounds like a common occurrence when she is hospitalized.        Past Medical History:   Diagnosis Date    Diabetes mellitus     Diabetic gastroparesis associated with type 1 diabetes mellitus     DKA (diabetic ketoacidosis)     Hypertension     Non compliance with medical treatment      Past Surgical History:   Procedure Laterality Date    CHOLECYSTECTOMY      ESOPHAGOGASTRODUODENOSCOPY      Multiple    None       Family History   Problem Relation Age of Onset    Heart disease Mother     Hypertension Mother     Diabetes Mother     Hyperlipidemia Mother     Cancer Father     Stroke Father     Hypertension Father     Hyperlipidemia Father      Social History     Tobacco Use    Smoking status: Never    Smokeless tobacco: Never   Substance Use Topics    Alcohol use: Never    Drug use: Not Currently     Types: Marijuana       (Not in a hospital admission)    Review of patient's allergies indicates:  No Known Allergies     Review of Systems   Constitutional:  Positive for malaise/fatigue. Negative for fever and weight loss.   HENT: Negative.     Eyes: Negative.    Respiratory:  Negative for cough, sputum production and shortness of breath.     Cardiovascular:  Negative for chest pain, palpitations, orthopnea and leg swelling.   Gastrointestinal:  Negative for abdominal pain, blood in stool, constipation, diarrhea, nausea and vomiting.   Genitourinary: Negative.    Musculoskeletal: Negative.    Skin: Negative.    Neurological: Negative.    Endo/Heme/Allergies: Negative.    Psychiatric/Behavioral: Negative.         Objective:      Vital Signs (Most Recent)  Temp: 98.5 °F (36.9 °C) (10/05/23 1357)  Pulse: (!) 116 (10/05/23 1357)  Resp: (!) 22 (10/05/23 1703)  BP: (!) 167/104 (10/05/23 1357)  SpO2: 100 % (10/05/23 1357)    Vital Signs Range (Last 24H):  Temp:  [98.5 °F (36.9 °C)]   Pulse:  [116]   Resp:  [18-22]   BP: (167)/(104)   SpO2:  [100 %]     Physical Exam  Vitals and nursing note reviewed.   Constitutional:       General: She is not in acute distress.     Appearance: She is well-developed. She is not diaphoretic.   HENT:      Head: Normocephalic and atraumatic.      Mouth/Throat:      Pharynx: No oropharyngeal exudate.   Eyes:      General: No scleral icterus.     Pupils: Pupils are equal, round, and reactive to light.   Neck:      Vascular: No JVD.   Cardiovascular:      Rate and Rhythm: Normal rate and regular rhythm.      Heart sounds: Normal heart sounds. No murmur heard.     No friction rub.   Pulmonary:      Effort: Pulmonary effort is normal. No respiratory distress.      Breath sounds: Normal breath sounds. No wheezing.   Abdominal:      General: Bowel sounds are normal. There is no distension.      Palpations: Abdomen is soft. There is no mass.      Tenderness: There is no abdominal tenderness.   Musculoskeletal:         General: Normal range of motion.      Cervical back: Normal range of motion and neck supple.   Skin:     General: Skin is warm and dry.      Capillary Refill: Capillary refill takes less than 2 seconds.      Findings: No erythema.   Neurological:      Mental Status: She is alert and oriented to person, place, and time.      " Cranial Nerves: No cranial nerve deficit.      Coordination: Coordination normal.         Data Review:  CBC:   Lab Results   Component Value Date    WBC 10.80 10/05/2023    RBC 3.85 (L) 10/05/2023    HGB 10.6 (L) 10/05/2023    HCT 36.4 (L) 10/05/2023     (H) 10/05/2023     BMP:   Lab Results   Component Value Date     (L) 10/05/2023     (L) 10/08/2022    K 3.5 10/05/2023    K 4.6 10/08/2022    CL 98 (L) 10/08/2022    CO2 20 (L) 10/05/2023    CO2 26 10/08/2022    BUN 12.0 10/05/2023    BUN 12 10/08/2022    CREATININE 1.17 (H) 10/05/2023    CREATININE 0.98 10/08/2022    CALCIUM 7.9 (L) 10/05/2023    CALCIUM 8.0 (L) 10/08/2022      ECG:  tachy, sinus .     Assessment:      Active Hospital Problems    Diagnosis  POA    *DKA (diabetic ketoacidosis) [E11.10]  Yes    Diabetes mellitus [E11.9]  Yes      Resolved Hospital Problems   No resolved problems to display.       Plan:      DKA in type 1 diabetic  Medication non-compliance   Malingering  -Initiate DKA protocol with IV insulin and q1hr BG checks  -Q4 hr CMP, Mg and Phos  -Aggressive IVF resuscitation  -Replace K > 4 and Mg > 2  -Phos replacement as needed   -NPO until off of insulin ggt  -Will hold on narcotics as this time as there is no obvious cause for a 27 yo female with no explainable cause of "back pain" upon questioning that would warrant narcotics   -Strict I/O   -Continue capillary BG checks and transition to SubQ lantus once BG <200, bicarb is above 18 and gap is closed  -likely discharge tomorrow morning  -Patient would benefit from medication compliance               Italia Koehler MD  Hospital Medicine       "

## 2023-10-06 NOTE — PLAN OF CARE
Problem: Adult Inpatient Plan of Care  Goal: Plan of Care Review  10/6/2023 1807 by Cris Liu RN  Outcome: Met  10/6/2023 1732 by Cris Liu RN  Outcome: Ongoing, Progressing  Goal: Patient-Specific Goal (Individualized)  10/6/2023 1807 by Cris Liu RN  Outcome: Met  10/6/2023 1732 by Cris Liu RN  Outcome: Ongoing, Progressing  Goal: Absence of Hospital-Acquired Illness or Injury  10/6/2023 1807 by Cris Liu RN  Outcome: Met  10/6/2023 1732 by Cris Liu RN  Outcome: Ongoing, Progressing  Goal: Optimal Comfort and Wellbeing  10/6/2023 1807 by Cris Liu RN  Outcome: Met  10/6/2023 1732 by Cris Liu RN  Outcome: Ongoing, Progressing  Goal: Readiness for Transition of Care  10/6/2023 1807 by Cris Liu RN  Outcome: Met  10/6/2023 1732 by Cris Liu RN  Outcome: Ongoing, Progressing     Problem: Diabetes Comorbidity  Goal: Blood Glucose Level Within Targeted Range  10/6/2023 1807 by Cris Liu RN  Outcome: Met  10/6/2023 1732 by Cris Liu RN  Outcome: Ongoing, Progressing     Problem: Infection  Goal: Absence of Infection Signs and Symptoms  10/6/2023 1807 by Cris Liu RN  Outcome: Met  10/6/2023 1732 by Cris Liu RN  Outcome: Ongoing, Progressing     Problem: Hypertension Comorbidity  Goal: Blood Pressure in Desired Range  10/6/2023 1807 by Cris Liu RN  Outcome: Met  10/6/2023 1732 by Cris Liu RN  Outcome: Ongoing, Progressing

## 2023-10-06 NOTE — PLAN OF CARE
Problem: Adult Inpatient Plan of Care  Goal: Plan of Care Review  Outcome: Ongoing, Progressing  Goal: Patient-Specific Goal (Individualized)  Outcome: Ongoing, Progressing  Goal: Absence of Hospital-Acquired Illness or Injury  Outcome: Ongoing, Progressing  Goal: Optimal Comfort and Wellbeing  Outcome: Ongoing, Progressing  Goal: Readiness for Transition of Care  Outcome: Ongoing, Progressing     Problem: Diabetes Comorbidity  Goal: Blood Glucose Level Within Targeted Range  Outcome: Ongoing, Progressing     Problem: Infection  Goal: Absence of Infection Signs and Symptoms  Outcome: Ongoing, Progressing     Problem: Hypertension Comorbidity  Goal: Blood Pressure in Desired Range  Outcome: Ongoing, Progressing

## 2023-10-06 NOTE — PLAN OF CARE
10/06/23 1334   Discharge Assessment   Assessment Type Discharge Planning Assessment   Confirmed/corrected address, phone number and insurance Yes   Confirmed Demographics Correct on Facesheet   Source of Information patient   When was your last doctors appointment? 09/15/23   Communicated EBENEZER with patient/caregiver Yes   Reason For Admission DKA   People in Home alone   Do you expect to return to your current living situation? Yes   Do you have help at home or someone to help you manage your care at home? No  (states she can call her dad if she needs any help - he lives 3 min away)   Prior to hospitilization cognitive status: Alert/Oriented   Current cognitive status: Alert/Oriented   Home Accessibility stairs to enter home   Number of Stairs, Main Entrance other (see comments)  (15 steps, no elevator)   Stair Railings, Main Entrance railings on both sides of stairs;railings safe and in good condition   Equipment Currently Used at Home glucometer   Readmission within 30 days? No   Patient currently being followed by outpatient case management? No   Do you currently have service(s) that help you manage your care at home? No   Do you take prescription medications? Yes   Do you have any problems affording any of your prescribed medications? No   Is the patient taking medications as prescribed? no   Who is going to help you get home at discharge? Dad   How do you get to doctors appointments? car, drives self   Are you on dialysis? No   Do you take coumadin? No   DME Needed Upon Discharge  none   Discharge Plan discussed with: Patient   Transition of Care Barriers None   Discharge Plan A Home   Discharge Plan B Home Health   Financial Resource Strain   How hard is it for you to pay for the very basics like food, housing, medical care, and heating? Not very   Housing Stability   In the last 12 months, was there a time when you were not able to pay the mortgage or rent on time? N   In the last 12 months, was there a  time when you did not have a steady place to sleep or slept in a shelter (including now)? N   Transportation Needs   In the past 12 months, has lack of transportation kept you from medical appointments or from getting medications? no   In the past 12 months, has lack of transportation kept you from meetings, work, or from getting things needed for daily living? No   Food Insecurity   Within the past 12 months, you worried that your food would run out before you got the money to buy more. Never true   Within the past 12 months, the food you bought just didn't last and you didn't have money to get more. Never true   Social Connections   Are you , , , , never , or living with a partner? Never marrie   Alcohol Use   Q1: How often do you have a drink containing alcohol? Never   Q2: How many drinks containing alcohol do you have on a typical day when you are drinking? None   Q3: How often do you have six or more drinks on one occasion? Never     Pt states she is indep in adl's. PT denies any need for HH or any dme  Pharmacy: [facility]; PCP: Kumar Ortiz NP

## 2023-10-07 VITALS
TEMPERATURE: 99 F | WEIGHT: 147.69 LBS | DIASTOLIC BLOOD PRESSURE: 74 MMHG | SYSTOLIC BLOOD PRESSURE: 132 MMHG | HEART RATE: 101 BPM | HEIGHT: 62 IN | RESPIRATION RATE: 18 BRPM | OXYGEN SATURATION: 97 % | BODY MASS INDEX: 27.18 KG/M2

## 2023-10-07 LAB
ALBUMIN SERPL-MCNC: 1.9 G/DL (ref 3.5–5)
ALBUMIN/GLOB SERPL: 0.6 RATIO (ref 1.1–2)
ALP SERPL-CCNC: 68 UNIT/L (ref 40–150)
ALT SERPL-CCNC: 13 UNIT/L (ref 0–55)
AST SERPL-CCNC: 15 UNIT/L (ref 5–34)
BACTERIA BLD CULT: NORMAL
BASOPHILS # BLD AUTO: 0.05 X10(3)/MCL
BASOPHILS NFR BLD AUTO: 0.4 %
BILIRUB SERPL-MCNC: 0.2 MG/DL
BUN SERPL-MCNC: 6 MG/DL (ref 7–18.7)
CALCIUM SERPL-MCNC: 7.7 MG/DL (ref 8.4–10.2)
CHLORIDE SERPL-SCNC: 103 MMOL/L (ref 98–107)
CO2 SERPL-SCNC: 25 MMOL/L (ref 22–29)
CREAT SERPL-MCNC: 0.82 MG/DL (ref 0.55–1.02)
EOSINOPHIL # BLD AUTO: 0.13 X10(3)/MCL (ref 0–0.9)
EOSINOPHIL NFR BLD AUTO: 1.1 %
ERYTHROCYTE [DISTWIDTH] IN BLOOD BY AUTOMATED COUNT: 13 % (ref 11.5–17)
GFR SERPLBLD CREATININE-BSD FMLA CKD-EPI: >60 MLS/MIN/1.73/M2
GLOBULIN SER-MCNC: 3.1 GM/DL (ref 2.4–3.5)
GLUCOSE SERPL-MCNC: 119 MG/DL (ref 74–100)
HCT VFR BLD AUTO: 24.2 % (ref 37–47)
HGB BLD-MCNC: 8 G/DL (ref 12–16)
IMM GRANULOCYTES # BLD AUTO: 0.51 X10(3)/MCL (ref 0–0.04)
IMM GRANULOCYTES NFR BLD AUTO: 4.5 %
LYMPHOCYTES # BLD AUTO: 1.94 X10(3)/MCL (ref 0.6–4.6)
LYMPHOCYTES NFR BLD AUTO: 16.9 %
MAGNESIUM SERPL-MCNC: 1.8 MG/DL (ref 1.6–2.6)
MCH RBC QN AUTO: 27.6 PG (ref 27–31)
MCHC RBC AUTO-ENTMCNC: 33.1 G/DL (ref 33–36)
MCV RBC AUTO: 83.4 FL (ref 80–94)
MONOCYTES # BLD AUTO: 1.46 X10(3)/MCL (ref 0.1–1.3)
MONOCYTES NFR BLD AUTO: 12.7 %
NEUTROPHILS # BLD AUTO: 7.37 X10(3)/MCL (ref 2.1–9.2)
NEUTROPHILS NFR BLD AUTO: 64.4 %
PHOSPHATE SERPL-MCNC: 2.6 MG/DL (ref 2.3–4.7)
PLATELET # BLD AUTO: 472 X10(3)/MCL (ref 130–400)
PMV BLD AUTO: 8.8 FL (ref 7.4–10.4)
POCT GLUCOSE: 163 MG/DL (ref 70–110)
POCT GLUCOSE: 166 MG/DL (ref 70–110)
POCT GLUCOSE: 205 MG/DL (ref 70–110)
POCT GLUCOSE: 342 MG/DL (ref 70–110)
POTASSIUM SERPL-SCNC: 3.2 MMOL/L (ref 3.5–5.1)
PROT SERPL-MCNC: 5 GM/DL (ref 6.4–8.3)
RBC # BLD AUTO: 2.9 X10(6)/MCL (ref 4.2–5.4)
SODIUM SERPL-SCNC: 137 MMOL/L (ref 136–145)
WBC # SPEC AUTO: 11.46 X10(3)/MCL (ref 4.5–11.5)

## 2023-10-07 PROCEDURE — 80053 COMPREHEN METABOLIC PANEL: CPT | Performed by: STUDENT IN AN ORGANIZED HEALTH CARE EDUCATION/TRAINING PROGRAM

## 2023-10-07 PROCEDURE — 84100 ASSAY OF PHOSPHORUS: CPT | Performed by: STUDENT IN AN ORGANIZED HEALTH CARE EDUCATION/TRAINING PROGRAM

## 2023-10-07 PROCEDURE — 85025 COMPLETE CBC W/AUTO DIFF WBC: CPT | Performed by: STUDENT IN AN ORGANIZED HEALTH CARE EDUCATION/TRAINING PROGRAM

## 2023-10-07 PROCEDURE — 25000003 PHARM REV CODE 250: Performed by: STUDENT IN AN ORGANIZED HEALTH CARE EDUCATION/TRAINING PROGRAM

## 2023-10-07 PROCEDURE — 83735 ASSAY OF MAGNESIUM: CPT | Performed by: STUDENT IN AN ORGANIZED HEALTH CARE EDUCATION/TRAINING PROGRAM

## 2023-10-07 RX ADMIN — PANTOPRAZOLE SODIUM 40 MG: 40 TABLET, DELAYED RELEASE ORAL at 09:10

## 2023-10-07 RX ADMIN — METOPROLOL TARTRATE 50 MG: 50 TABLET, FILM COATED ORAL at 09:10

## 2023-10-07 RX ADMIN — MUPIROCIN 1 G: 20 OINTMENT TOPICAL at 09:10

## 2023-10-07 RX ADMIN — DICYCLOMINE HYDROCHLORIDE 10 MG: 10 CAPSULE ORAL at 09:10

## 2023-10-07 NOTE — NURSING
"Patient discharged via wheelchair, escorted by hospital staff. Patient left her pain prescription behind, I asked dianet if she would like to take it with her, she said, "no, I'm going to get something somewhere else". I then put patient's prescription in her chart.  "

## 2023-10-07 NOTE — NURSING
Unable to backdate service time, but I did want to make note that the patient returned to the hospital to get  her prescription at 1204

## 2023-10-08 LAB — BACTERIA BLD CULT: NORMAL

## 2023-10-09 ENCOUNTER — PATIENT OUTREACH (OUTPATIENT)
Dept: ADMINISTRATIVE | Facility: CLINIC | Age: 28
End: 2023-10-09
Payer: MEDICAID

## 2023-10-09 LAB — POCT GLUCOSE: 232 MG/DL (ref 70–110)

## 2023-10-10 ENCOUNTER — HOSPITAL ENCOUNTER (EMERGENCY)
Facility: HOSPITAL | Age: 28
Discharge: HOME | End: 2023-10-10
Attending: EMERGENCY MEDICINE
Payer: MEDICAID

## 2023-10-10 VITALS
DIASTOLIC BLOOD PRESSURE: 87 MMHG | HEART RATE: 99 BPM | HEIGHT: 62 IN | RESPIRATION RATE: 20 BRPM | OXYGEN SATURATION: 97 % | TEMPERATURE: 97 F | SYSTOLIC BLOOD PRESSURE: 132 MMHG | WEIGHT: 148 LBS | BODY MASS INDEX: 27.23 KG/M2

## 2023-10-10 DIAGNOSIS — R00.0 TACHYCARDIA: ICD-10-CM

## 2023-10-10 DIAGNOSIS — M54.9 BACK PAIN, UNSPECIFIED BACK LOCATION, UNSPECIFIED BACK PAIN LATERALITY, UNSPECIFIED CHRONICITY: ICD-10-CM

## 2023-10-10 DIAGNOSIS — E11.10 DKA (DIABETIC KETOACIDOSIS): ICD-10-CM

## 2023-10-10 DIAGNOSIS — E11.65 HYPERGLYCEMIA DUE TO DIABETES MELLITUS: Primary | ICD-10-CM

## 2023-10-10 LAB
ALBUMIN SERPL-MCNC: 2.4 G/DL (ref 3.5–5)
ALBUMIN/GLOB SERPL: 0.7 RATIO (ref 1.1–2)
ALLENS TEST: ABNORMAL
ALP SERPL-CCNC: 82 UNIT/L (ref 40–150)
ALT SERPL-CCNC: 11 UNIT/L (ref 0–55)
APPEARANCE UR: CLEAR
AST SERPL-CCNC: 16 UNIT/L (ref 5–34)
B-HCG FREE SERPL-ACNC: <2.42 MIU/ML
BACTERIA #/AREA URNS AUTO: ABNORMAL /HPF
BASOPHILS # BLD AUTO: 0.04 X10(3)/MCL
BASOPHILS NFR BLD AUTO: 0.3 %
BILIRUB SERPL-MCNC: 0.4 MG/DL
BILIRUB UR QL STRIP.AUTO: NEGATIVE
BUN SERPL-MCNC: 6 MG/DL (ref 7–18.7)
CALCIUM SERPL-MCNC: 8.7 MG/DL (ref 8.4–10.2)
CHLORIDE SERPL-SCNC: 93 MMOL/L (ref 98–107)
CO2 SERPL-SCNC: 26 MMOL/L (ref 22–29)
COLOR UR AUTO: YELLOW
CREAT SERPL-MCNC: 0.99 MG/DL (ref 0.55–1.02)
EOSINOPHIL # BLD AUTO: 0.02 X10(3)/MCL (ref 0–0.9)
EOSINOPHIL NFR BLD AUTO: 0.2 %
ERYTHROCYTE [DISTWIDTH] IN BLOOD BY AUTOMATED COUNT: 13.7 % (ref 11.5–17)
GFR SERPLBLD CREATININE-BSD FMLA CKD-EPI: >60 MLS/MIN/1.73/M2
GLOBULIN SER-MCNC: 3.4 GM/DL (ref 2.4–3.5)
GLUCOSE SERPL-MCNC: 155 MG/DL (ref 70–110)
GLUCOSE SERPL-MCNC: 434 MG/DL (ref 74–100)
GLUCOSE UR QL STRIP.AUTO: ABNORMAL
HCO3 UR-SCNC: 29.5 MMOL/L (ref 24–28)
HCT VFR BLD AUTO: 26.5 % (ref 37–47)
HGB BLD-MCNC: 8.7 G/DL (ref 12–16)
IMM GRANULOCYTES # BLD AUTO: 0.15 X10(3)/MCL (ref 0–0.04)
IMM GRANULOCYTES NFR BLD AUTO: 1.3 %
KETONES UR QL STRIP.AUTO: ABNORMAL
LEUKOCYTE ESTERASE UR QL STRIP.AUTO: NEGATIVE
LIPASE SERPL-CCNC: 11 U/L
LYMPHOCYTES # BLD AUTO: 1.5 X10(3)/MCL (ref 0.6–4.6)
LYMPHOCYTES NFR BLD AUTO: 12.5 %
MCH RBC QN AUTO: 28.2 PG (ref 27–31)
MCHC RBC AUTO-ENTMCNC: 32.8 G/DL (ref 33–36)
MCV RBC AUTO: 85.8 FL (ref 80–94)
MONOCYTES # BLD AUTO: 1.07 X10(3)/MCL (ref 0.1–1.3)
MONOCYTES NFR BLD AUTO: 8.9 %
NEUTROPHILS # BLD AUTO: 9.2 X10(3)/MCL (ref 2.1–9.2)
NEUTROPHILS NFR BLD AUTO: 76.8 %
NITRITE UR QL STRIP.AUTO: NEGATIVE
PCO2 BLDA: 27.9 MMHG (ref 35–45)
PH SMN: 7.63 [PH] (ref 7.35–7.45)
PH UR STRIP.AUTO: 7.5 [PH]
PLATELET # BLD AUTO: 722 X10(3)/MCL (ref 130–400)
PMV BLD AUTO: 8.6 FL (ref 7.4–10.4)
PO2 BLDA: 51 MMHG (ref 80–100)
POC BE: 8 MMOL/L
POC SATURATED O2: 92 % (ref 95–100)
POC TCO2: 30 MMOL/L (ref 23–27)
POCT GLUCOSE: 419 MG/DL (ref 70–110)
POTASSIUM SERPL-SCNC: 3.2 MMOL/L (ref 3.5–5.1)
PROT SERPL-MCNC: 5.8 GM/DL (ref 6.4–8.3)
PROT UR QL STRIP.AUTO: ABNORMAL
RBC # BLD AUTO: 3.09 X10(6)/MCL (ref 4.2–5.4)
RBC #/AREA URNS AUTO: ABNORMAL /HPF
RBC UR QL AUTO: ABNORMAL
SAMPLE: ABNORMAL
SITE: ABNORMAL
SODIUM SERPL-SCNC: 134 MMOL/L (ref 136–145)
SP GR UR STRIP.AUTO: 1.01 (ref 1–1.03)
SQUAMOUS #/AREA URNS AUTO: ABNORMAL /HPF
UROBILINOGEN UR STRIP-ACNC: 1
WBC # SPEC AUTO: 11.98 X10(3)/MCL (ref 4.5–11.5)
WBC #/AREA URNS AUTO: ABNORMAL /HPF

## 2023-10-10 PROCEDURE — 93010 ELECTROCARDIOGRAM REPORT: CPT | Mod: ,,, | Performed by: INTERNAL MEDICINE

## 2023-10-10 PROCEDURE — 82962 GLUCOSE BLOOD TEST: CPT | Mod: 91

## 2023-10-10 PROCEDURE — 85025 COMPLETE CBC W/AUTO DIFF WBC: CPT | Performed by: EMERGENCY MEDICINE

## 2023-10-10 PROCEDURE — 93005 ELECTROCARDIOGRAM TRACING: CPT

## 2023-10-10 PROCEDURE — 83690 ASSAY OF LIPASE: CPT | Performed by: EMERGENCY MEDICINE

## 2023-10-10 PROCEDURE — 96374 THER/PROPH/DIAG INJ IV PUSH: CPT

## 2023-10-10 PROCEDURE — 99285 EMERGENCY DEPT VISIT HI MDM: CPT | Mod: 25

## 2023-10-10 PROCEDURE — 96372 THER/PROPH/DIAG INJ SC/IM: CPT | Performed by: EMERGENCY MEDICINE

## 2023-10-10 PROCEDURE — 63600175 PHARM REV CODE 636 W HCPCS: Performed by: EMERGENCY MEDICINE

## 2023-10-10 PROCEDURE — 84702 CHORIONIC GONADOTROPIN TEST: CPT | Performed by: EMERGENCY MEDICINE

## 2023-10-10 PROCEDURE — 80053 COMPREHEN METABOLIC PANEL: CPT | Performed by: EMERGENCY MEDICINE

## 2023-10-10 PROCEDURE — 81001 URINALYSIS AUTO W/SCOPE: CPT | Performed by: EMERGENCY MEDICINE

## 2023-10-10 PROCEDURE — 82803 BLOOD GASES ANY COMBINATION: CPT

## 2023-10-10 PROCEDURE — 99900035 HC TECH TIME PER 15 MIN (STAT)

## 2023-10-10 PROCEDURE — 93010 EKG 12-LEAD: ICD-10-PCS | Mod: ,,, | Performed by: INTERNAL MEDICINE

## 2023-10-10 PROCEDURE — 96375 TX/PRO/DX INJ NEW DRUG ADDON: CPT

## 2023-10-10 PROCEDURE — 96361 HYDRATE IV INFUSION ADD-ON: CPT

## 2023-10-10 PROCEDURE — 36600 WITHDRAWAL OF ARTERIAL BLOOD: CPT

## 2023-10-10 PROCEDURE — 25000003 PHARM REV CODE 250: Performed by: EMERGENCY MEDICINE

## 2023-10-10 RX ORDER — MORPHINE SULFATE 4 MG/ML
2 INJECTION, SOLUTION INTRAMUSCULAR; INTRAVENOUS
Status: COMPLETED | OUTPATIENT
Start: 2023-10-10 | End: 2023-10-10

## 2023-10-10 RX ORDER — HALOPERIDOL 5 MG/ML
5 INJECTION INTRAMUSCULAR
Status: COMPLETED | OUTPATIENT
Start: 2023-10-10 | End: 2023-10-10

## 2023-10-10 RX ORDER — HYDROCODONE BITARTRATE AND ACETAMINOPHEN 5; 325 MG/1; MG/1
1 TABLET ORAL EVERY 6 HOURS PRN
Qty: 12 TABLET | Refills: 0 | Status: SHIPPED | OUTPATIENT
Start: 2023-10-10 | End: 2024-03-18

## 2023-10-10 RX ORDER — ONDANSETRON 2 MG/ML
4 INJECTION INTRAMUSCULAR; INTRAVENOUS
Status: COMPLETED | OUTPATIENT
Start: 2023-10-10 | End: 2023-10-10

## 2023-10-10 RX ADMIN — MORPHINE SULFATE 2 MG: 4 INJECTION, SOLUTION INTRAMUSCULAR; INTRAVENOUS at 07:10

## 2023-10-10 RX ADMIN — SODIUM CHLORIDE 1000 ML: 9 INJECTION, SOLUTION INTRAVENOUS at 08:10

## 2023-10-10 RX ADMIN — HUMAN INSULIN 10 UNITS: 100 INJECTION, SOLUTION SUBCUTANEOUS at 07:10

## 2023-10-10 RX ADMIN — ONDANSETRON 4 MG: 2 INJECTION INTRAMUSCULAR; INTRAVENOUS at 07:10

## 2023-10-10 RX ADMIN — HALOPERIDOL LACTATE 5 MG: 5 INJECTION, SOLUTION INTRAMUSCULAR at 07:10

## 2023-10-10 NOTE — ED PROVIDER NOTES
Encounter Date: 10/10/2023       History     Chief Complaint   Patient presents with    Emesis     Pt c/o vomiting and back pain since this am.  in [facility].     Patient is a 28-year-old female with a history of insulin-dependent diabetes, hypertension, who presents emergency department with hyperglycemia, back pain, nausea and vomiting.  Patient states that she was here a week ago with similar complaint was found to have DKA.  She states that she is compliant with her insulin, states that she takes 70 units and NovoLog before each meal and Basaglar 30 units in the morning.  Patient denies any fever, chills, diarrhea, dysuria, or any other associated symptoms.    The history is provided by the patient. No  was used.     Review of patient's allergies indicates:  No Known Allergies  Past Medical History:   Diagnosis Date    Diabetes mellitus     Diabetic gastroparesis associated with type 1 diabetes mellitus     DKA (diabetic ketoacidosis)     Hypertension     Non compliance with medical treatment      Past Surgical History:   Procedure Laterality Date    CHOLECYSTECTOMY      ESOPHAGOGASTRODUODENOSCOPY      Multiple    None       Family History   Problem Relation Age of Onset    Heart disease Mother     Hypertension Mother     Diabetes Mother     Hyperlipidemia Mother     Cancer Father     Stroke Father     Hypertension Father     Hyperlipidemia Father      Social History     Tobacco Use    Smoking status: Never    Smokeless tobacco: Never   Substance Use Topics    Alcohol use: Never    Drug use: Not Currently     Types: Marijuana     Review of Systems   Constitutional:  Positive for chills. Negative for fever.   HENT:  Negative for sore throat.    Respiratory:  Negative for shortness of breath.    Cardiovascular:  Negative for chest pain.   Gastrointestinal:  Positive for nausea and vomiting.   Genitourinary:  Negative for dysuria.   Musculoskeletal:  Positive for back pain.   Skin:  Negative  for rash.   Neurological:  Negative for weakness.   Hematological:  Does not bruise/bleed easily.   All other systems reviewed and are negative.      Physical Exam     Initial Vitals [10/10/23 1843]   BP Pulse Resp Temp SpO2   (!) 95/45 (!) 135 (!) 24 96.5 °F (35.8 °C) 99 %      MAP       --         Physical Exam    Nursing note and vitals reviewed.  Constitutional: She is not diaphoretic. No distress.   Patient crying with no tears, writhing around in bed   HENT:   Head: Normocephalic and atraumatic.   Mouth/Throat: Oropharynx is clear and moist.   Eyes: Conjunctivae and EOM are normal. Pupils are equal, round, and reactive to light.   Neck: Neck supple. No JVD present.   Normal range of motion.  Cardiovascular:  Normal rate, regular rhythm and normal heart sounds.           No murmur heard.  Tachycardia   Pulmonary/Chest: Breath sounds normal. No respiratory distress. She has no wheezes. She has no rhonchi.   Abdominal: Abdomen is soft. Bowel sounds are normal. There is no abdominal tenderness. There is no rebound and no guarding.   Musculoskeletal:         General: Normal range of motion.      Cervical back: Normal range of motion and neck supple.     Neurological: She is alert and oriented to person, place, and time. She has normal strength. No sensory deficit. GCS score is 15. GCS eye subscore is 4. GCS verbal subscore is 5. GCS motor subscore is 6.   Skin: Skin is warm and dry. Capillary refill takes less than 2 seconds.   Psychiatric: She has a normal mood and affect. Her behavior is normal. Judgment and thought content normal.         ED Course   Procedures  Labs Reviewed   COMPREHENSIVE METABOLIC PANEL - Abnormal; Notable for the following components:       Result Value    Sodium Level 134 (*)     Potassium Level 3.2 (*)     Chloride 93 (*)     Glucose Level 434 (*)     Blood Urea Nitrogen 6.0 (*)     Protein Total 5.8 (*)     Albumin Level 2.4 (*)     Albumin/Globulin Ratio 0.7 (*)     All other components  within normal limits   URINALYSIS, REFLEX TO URINE CULTURE - Abnormal; Notable for the following components:    Protein, UA 3+ (*)     Glucose, UA 3+ (*)     Ketones, UA 2+ (*)     Blood, UA Trace-Intact (*)     All other components within normal limits   CBC WITH DIFFERENTIAL - Abnormal; Notable for the following components:    WBC 11.98 (*)     RBC 3.09 (*)     Hgb 8.7 (*)     Hct 26.5 (*)     MCHC 32.8 (*)     Platelet 722 (*)     IG# 0.15 (*)     All other components within normal limits   URINALYSIS, MICROSCOPIC - Abnormal; Notable for the following components:    Bacteria, UA 3+ (*)     Squamous Epithelial Cells, UA Moderate (*)     All other components within normal limits   POCT GLUCOSE, HAND-HELD DEVICE - Abnormal; Notable for the following components:    POC Glucose 155 (*)     All other components within normal limits   POCT GLUCOSE - Abnormal; Notable for the following components:    POCT Glucose 419 (*)     All other components within normal limits   ISTAT PROCEDURE - Abnormal; Notable for the following components:    POC PH 7.632 (*)     POC PCO2 27.9 (*)     POC PO2 51 (*)     POC HCO3 29.5 (*)     POC TCO2 30 (*)     All other components within normal limits   HCG, QUANTITATIVE - Normal   LIPASE - Normal   CBC W/ AUTO DIFFERENTIAL    Narrative:     The following orders were created for panel order CBC auto differential.  Procedure                               Abnormality         Status                     ---------                               -----------         ------                     CBC with Differential[[id number]]       Abnormal            Final result                 Please view results for these tests on the individual orders.     EKG Readings: (Independently Interpreted)   10/10/2023 at 7:58 p.m.   Ventricular rate 104   Sinus tachycardia   Normal axis   QTC prolonged 544   Remainder of intervals normal   No acute ischemic changes   No STEMI       Imaging Results              X-Ray  Chest 1 View (Final result)  Result time 10/10/23 20:46:01      Final result by Trenton Sharpe MD (10/10/23 20:46:01)                   Impression:      No acute cardiopulmonary abnormality.      Electronically signed by: Trenton Sharpe MD  Date:    10/10/2023  Time:    20:46               Narrative:    EXAMINATION:  Single view chest radiograph.    CLINICAL HISTORY:  back pain;    TECHNIQUE:  Single view of the chest.    COMPARISON:  Chest radiograph 10/05/2023.    FINDINGS:  The lungs are clear without consolidation or effusion.  There is no pneumothorax.  The cardiac silhouette is normal in size.  There is no acute osseous abnormality.                                       Medications   sodium chloride 0.9% bolus 1,000 mL 1,000 mL (0 mLs Intravenous Stopped 10/10/23 2214)   sodium chloride 0.9% bolus 1,000 mL 1,000 mL (0 mLs Intravenous Stopped 10/10/23 2126)   haloperidol lactate injection 5 mg (5 mg Intramuscular Given 10/10/23 1932)   morphine injection 2 mg (2 mg Intravenous Given 10/10/23 1939)   ondansetron injection 4 mg (4 mg Intravenous Given 10/10/23 1939)   insulin regular injection 10 Units 0.1 mL (10 Units Intravenous Given 10/10/23 1953)     Medical Decision Making  Amount and/or Complexity of Data Reviewed  Labs: ordered.  Radiology: ordered.    Risk  OTC drugs.  Prescription drug management.               ED Course as of 10/10/23 2242   Tue Oct 10, 2023   2124 Patient without DKA today, which was discussed with her.  Patient to give urine sample so than can be ruled out because of her back pain.  Anticipate discharge home [AA]   2219 Patient with no signs of DKA, bicarb on chemistry normal, patient with no anion gap, patient noted to be alkalotic on ABD, patient was tachypneic and in pain on arrival.  We will discharge patient home with very short course of pain medication for her back pain, still anticipate patient being discharged home. [AA]      ED Course User Index  [AA] Belinda Lunsford MD                     Clinical Impression:   Final diagnoses:  [R00.0] Tachycardia  [E11.65] Hyperglycemia due to diabetes mellitus (Primary)  [M54.9] Back pain, unspecified back location, unspecified back pain laterality, unspecified chronicity        ED Disposition Condition    Discharge Stable          ED Prescriptions       Medication Sig Dispense Start Date End Date Auth. Provider    HYDROcodone-acetaminophen (NORCO) 5-325 mg per tablet Take 1 tablet by mouth every 6 (six) hours as needed for Pain. 12 tablet 10/10/2023 -- Belinda Lunsford MD          Follow-up Information       Follow up With Specialties Details Why Contact Info    Kumar Ortiz NP Family Medicine   [address] [facility] [address] [id number]  [phone]               Belinda Lunsford MD  10/10/23 1535

## 2023-10-11 ENCOUNTER — HOSPITAL ENCOUNTER (EMERGENCY)
Facility: HOSPITAL | Age: 28
Discharge: HOME | End: 2023-10-11
Attending: INTERNAL MEDICINE
Payer: MEDICAID

## 2023-10-11 VITALS
HEIGHT: 64 IN | RESPIRATION RATE: 24 BRPM | TEMPERATURE: 98 F | HEART RATE: 118 BPM | BODY MASS INDEX: 25.1 KG/M2 | WEIGHT: 147 LBS | OXYGEN SATURATION: 99 % | SYSTOLIC BLOOD PRESSURE: 156 MMHG | DIASTOLIC BLOOD PRESSURE: 103 MMHG

## 2023-10-11 DIAGNOSIS — F41.0 ANXIETY ATTACK: Primary | ICD-10-CM

## 2023-10-11 PROCEDURE — 99284 EMERGENCY DEPT VISIT MOD MDM: CPT

## 2023-10-11 PROCEDURE — 63600175 PHARM REV CODE 636 W HCPCS: Performed by: INTERNAL MEDICINE

## 2023-10-11 PROCEDURE — 96372 THER/PROPH/DIAG INJ SC/IM: CPT | Performed by: INTERNAL MEDICINE

## 2023-10-11 RX ORDER — LORAZEPAM 2 MG/ML
2 INJECTION INTRAMUSCULAR
Status: COMPLETED | OUTPATIENT
Start: 2023-10-11 | End: 2023-10-11

## 2023-10-11 RX ORDER — HYDROXYZINE HYDROCHLORIDE 50 MG/1
50 TABLET, FILM COATED ORAL 3 TIMES DAILY PRN
Qty: 45 TABLET | Refills: 0 | Status: SHIPPED | OUTPATIENT
Start: 2023-10-11 | End: 2024-03-18

## 2023-10-11 RX ADMIN — LORAZEPAM 2 MG: 2 INJECTION INTRAMUSCULAR; INTRAVENOUS at 01:10

## 2023-10-11 NOTE — ED PROVIDER NOTES
Encounter Date: 10/11/2023  History from patient and father     History     Chief Complaint   Patient presents with    mouth numbness     Reporting numbness in her mouth that started within the last 2 hrs     HPI    Dorene Husain is 28 y.o. female who  has a past medical history of Diabetes mellitus, Diabetic gastroparesis associated with type 1 diabetes mellitus, DKA (diabetic ketoacidosis), Hypertension, and Non compliance with medical treatment. arrives in ER with c/o mouth numbness (Reporting numbness in her mouth that started within the last 2 hrs)      Review of patient's allergies indicates:  No Known Allergies  Past Medical History:   Diagnosis Date    Diabetes mellitus     Diabetic gastroparesis associated with type 1 diabetes mellitus     DKA (diabetic ketoacidosis)     Hypertension     Non compliance with medical treatment      Past Surgical History:   Procedure Laterality Date    CHOLECYSTECTOMY      ESOPHAGOGASTRODUODENOSCOPY      Multiple    None       Family History   Problem Relation Age of Onset    Heart disease Mother     Hypertension Mother     Diabetes Mother     Hyperlipidemia Mother     Cancer Father     Stroke Father     Hypertension Father     Hyperlipidemia Father      Social History     Tobacco Use    Smoking status: Never    Smokeless tobacco: Never   Substance Use Topics    Alcohol use: Never    Drug use: Not Currently     Types: Marijuana     Review of Systems   Constitutional:  Negative for fever.   HENT:  Negative for trouble swallowing and voice change.    Eyes:  Negative for visual disturbance.   Respiratory:  Negative for cough and shortness of breath.    Cardiovascular:  Negative for chest pain.   Gastrointestinal:  Negative for abdominal pain, diarrhea and vomiting.   Genitourinary:  Negative for dysuria and hematuria.   Musculoskeletal:  Negative for gait problem.        No Pain.   Skin:  Negative for color change and rash.   Neurological:  Positive for numbness. Negative for  headaches.   Psychiatric/Behavioral:  Negative for behavioral problems and sleep disturbance.         Tearful   All other systems reviewed and are negative.      Physical Exam     Initial Vitals [10/11/23 1335]   BP Pulse Resp Temp SpO2   (!) 156/103 (!) 118 (!) 24 97.5 °F (36.4 °C) 99 %      MAP       --         Physical Exam    Nursing note and vitals reviewed.  Constitutional: She appears well-developed. No distress.   HENT:   Head: Atraumatic.   Eyes: EOM are normal. Pupils are equal, round, and reactive to light.   Cardiovascular:  Normal rate and regular rhythm.           Pulmonary/Chest: Breath sounds normal. No respiratory distress.   Hyperventilating   Abdominal: Abdomen is soft. Bowel sounds are normal.   Musculoskeletal:         General: Normal range of motion.     Neurological: She is alert and oriented to person, place, and time. GCS score is 15. GCS eye subscore is 4. GCS verbal subscore is 5. GCS motor subscore is 6.   Skin: Skin is warm and dry.   Psychiatric: She has a normal mood and affect.   Tearful, anxious         ED Course   Procedures  Labs Reviewed - No data to display       Imaging Results    None          Medications   LORazepam injection 2 mg (2 mg Intramuscular Given 10/11/23 1356)     Medical Decision Making    Dorene Husain is 28 y.o. female who  has a past medical history of Diabetes mellitus, Diabetic gastroparesis associated with type 1 diabetes mellitus, DKA (diabetic ketoacidosis), Hypertension, and Non compliance with medical treatment. arrives in ER with c/o mouth numbness (Reporting numbness in her mouth that started within the last 2 hrs)      Patient was talking to somebody on telephone, her aunt  yesterday who was her got mother also, and after talking to that person she started having difficulty with speech, numbness in the mouth, started crying, so her father brought her to the emergency room to get checked.  On arrival in the emergency room it appears like patient is  having an anxiety attack, decided to give her Ativan 2 mg IM and re-evaluate.    Risk  Prescription drug management.               ED Course as of 10/11/23 1437   Wed Oct 11, 2023   1434 Patient is doing better, she is talking now, I will go ahead and let her go home with instruction to see her family doctor for further management of her anxiety. [GQ]      ED Course User Index  [GQ] Millie Burr MD                    Clinical Impression:   Final diagnoses:  [F41.0] Anxiety attack (Primary)        ED Disposition Condition    Discharge Stable          ED Prescriptions       Medication Sig Dispense Start Date End Date Auth. Provider    hydrOXYzine (ATARAX) 50 MG tablet Take 1 tablet (50 mg total) by mouth 3 (three) times daily as needed. 45 tablet 10/11/2023 -- Millie Burr MD          Follow-up Information       Follow up With Specialties Details Why Contact Info    Kumar Ortiz NP Family Medicine In 1 day  [address] [place] [address]  [phone]               Millie Burr MD  10/11/23 1439

## 2023-10-11 NOTE — PROGRESS NOTES
[facility]3 nurse attempted to contact Dorene Husain  for a TCC post hospital discharge follow up call. No answer. No voicemail available.The patient does not have a scheduled HOSFU appointment noted. Unable to message PCP staff.   Patient expressed no known problems or needs

## 2023-10-12 LAB
POCT GLUCOSE: 155 MG/DL (ref 70–110)
POCT GLUCOSE: 434 MG/DL (ref 70–110)

## 2024-01-08 PROBLEM — E11.10 DKA (DIABETIC KETOACIDOSIS): Status: RESOLVED | Noted: 2023-10-02 | Resolved: 2024-01-08

## 2024-02-17 ENCOUNTER — HOSPITAL ENCOUNTER (EMERGENCY)
Facility: HOSPITAL | Age: 29
Discharge: HOME | End: 2024-02-18
Attending: INTERNAL MEDICINE
Payer: MEDICAID

## 2024-02-17 DIAGNOSIS — E10.65 UNCONTROLLED TYPE 1 DIABETES MELLITUS WITH HYPERGLYCEMIA: ICD-10-CM

## 2024-02-17 DIAGNOSIS — E86.0 DEHYDRATION: ICD-10-CM

## 2024-02-17 DIAGNOSIS — E87.20 LACTIC ACID ACIDOSIS: ICD-10-CM

## 2024-02-17 DIAGNOSIS — R73.9 HYPERGLYCEMIA: Primary | ICD-10-CM

## 2024-02-17 DIAGNOSIS — R79.0 LOW MAGNESIUM LEVEL: ICD-10-CM

## 2024-02-17 DIAGNOSIS — I10 HYPERTENSION, UNSPECIFIED TYPE: ICD-10-CM

## 2024-02-17 LAB
ALBUMIN SERPL-MCNC: 2.7 G/DL (ref 3.5–5)
ALBUMIN/GLOB SERPL: 0.8 RATIO (ref 1.1–2)
ALLENS TEST: ABNORMAL
ALP SERPL-CCNC: 90 UNIT/L (ref 40–150)
ALT SERPL-CCNC: 12 UNIT/L (ref 0–55)
AMPHET UR QL SCN: NEGATIVE
AMYLASE SERPL-CCNC: 44 UNIT/L (ref 25–125)
APPEARANCE UR: CLEAR
AST SERPL-CCNC: 15 UNIT/L (ref 5–34)
B-HCG SERPL QL: NEGATIVE
BACTERIA #/AREA URNS AUTO: ABNORMAL /HPF
BARBITURATE SCN PRESENT UR: NEGATIVE
BASOPHILS # BLD AUTO: 0.01 X10(3)/MCL
BASOPHILS NFR BLD AUTO: 0.1 %
BENZODIAZ UR QL SCN: NEGATIVE
BILIRUB SERPL-MCNC: 0.4 MG/DL
BILIRUB UR QL STRIP.AUTO: NEGATIVE
BNP BLD-MCNC: 49.8 PG/ML
BUN SERPL-MCNC: 17 MG/DL (ref 7–18.7)
CALCIUM SERPL-MCNC: 8.1 MG/DL (ref 8.4–10.2)
CANNABINOIDS UR QL SCN: NEGATIVE
CHLORIDE SERPL-SCNC: 99 MMOL/L (ref 98–107)
CO2 SERPL-SCNC: 17 MMOL/L (ref 22–29)
COCAINE UR QL SCN: NEGATIVE
COLOR UR AUTO: YELLOW
CREAT SERPL-MCNC: 0.98 MG/DL (ref 0.55–1.02)
DELSYS: ABNORMAL
EOSINOPHIL # BLD AUTO: 0 X10(3)/MCL (ref 0–0.9)
EOSINOPHIL NFR BLD AUTO: 0 %
ERYTHROCYTE [DISTWIDTH] IN BLOOD BY AUTOMATED COUNT: 12.6 % (ref 11.5–17)
ETHANOL SERPL-MCNC: <10 MG/DL
FENTANYL UR QL SCN: NEGATIVE
GFR SERPLBLD CREATININE-BSD FMLA CKD-EPI: >60 MLS/MIN/1.73/M2
GLOBULIN SER-MCNC: 3.2 GM/DL (ref 2.4–3.5)
GLUCOSE SERPL-MCNC: 380 MG/DL (ref 74–100)
GLUCOSE UR QL STRIP.AUTO: ABNORMAL
HCO3 UR-SCNC: 22.4 MMOL/L (ref 24–28)
HCT VFR BLD AUTO: 28.2 % (ref 37–47)
HGB BLD-MCNC: 9.7 G/DL (ref 12–16)
IMM GRANULOCYTES # BLD AUTO: 0.03 X10(3)/MCL (ref 0–0.04)
IMM GRANULOCYTES NFR BLD AUTO: 0.3 %
KETONES UR QL STRIP.AUTO: ABNORMAL
LACTATE SERPL-SCNC: 1.3 MMOL/L (ref 0.5–2.2)
LACTATE SERPL-SCNC: 7.1 MMOL/L (ref 0.5–2.2)
LEUKOCYTE ESTERASE UR QL STRIP.AUTO: NEGATIVE
LIPASE SERPL-CCNC: 10 U/L
LYMPHOCYTES # BLD AUTO: 0.73 X10(3)/MCL (ref 0.6–4.6)
LYMPHOCYTES NFR BLD AUTO: 8.1 %
MAGNESIUM SERPL-MCNC: 1.4 MG/DL (ref 1.6–2.6)
MCH RBC QN AUTO: 27.8 PG (ref 27–31)
MCHC RBC AUTO-ENTMCNC: 34.4 G/DL (ref 33–36)
MCV RBC AUTO: 80.8 FL (ref 80–94)
MDMA UR QL SCN: NEGATIVE
MONOCYTES # BLD AUTO: 0.23 X10(3)/MCL (ref 0.1–1.3)
MONOCYTES NFR BLD AUTO: 2.6 %
NEUTROPHILS # BLD AUTO: 7.99 X10(3)/MCL (ref 2.1–9.2)
NEUTROPHILS NFR BLD AUTO: 88.9 %
NITRITE UR QL STRIP.AUTO: NEGATIVE
OPIATES UR QL SCN: NEGATIVE
PCO2 BLDA: 28.5 MMHG (ref 35–45)
PCP UR QL: NEGATIVE
PH SMN: 7.5 [PH] (ref 7.35–7.45)
PH UR STRIP.AUTO: 7 [PH]
PH UR: 7 [PH] (ref 3–11)
PLATELET # BLD AUTO: 333 X10(3)/MCL (ref 130–400)
PMV BLD AUTO: 9.1 FL (ref 7.4–10.4)
PO2 BLDA: 95 MMHG (ref 80–100)
POC BE: -1 MMOL/L
POC SATURATED O2: 98 % (ref 95–100)
POC TCO2: 23 MMOL/L (ref 23–27)
POCT GLUCOSE: 422 MG/DL (ref 70–110)
POTASSIUM SERPL-SCNC: 3.7 MMOL/L (ref 3.5–5.1)
PROT SERPL-MCNC: 5.9 GM/DL (ref 6.4–8.3)
PROT UR QL STRIP.AUTO: ABNORMAL
RBC # BLD AUTO: 3.49 X10(6)/MCL (ref 4.2–5.4)
RBC #/AREA URNS AUTO: ABNORMAL /HPF
RBC UR QL AUTO: ABNORMAL
SAMPLE: ABNORMAL
SITE: ABNORMAL
SODIUM SERPL-SCNC: 134 MMOL/L (ref 136–145)
SP GR UR STRIP.AUTO: 1.02 (ref 1–1.03)
SPECIFIC GRAVITY, URINE AUTO (.000) (OHS): 1.02 (ref 1–1.03)
SQUAMOUS #/AREA URNS AUTO: ABNORMAL /HPF
TROPONIN I SERPL-MCNC: <0.01 NG/ML (ref 0–0.04)
UROBILINOGEN UR STRIP-ACNC: 0.2
WBC # SPEC AUTO: 8.99 X10(3)/MCL (ref 4.5–11.5)
WBC #/AREA URNS AUTO: ABNORMAL /HPF

## 2024-02-17 PROCEDURE — 96361 HYDRATE IV INFUSION ADD-ON: CPT

## 2024-02-17 PROCEDURE — 82803 BLOOD GASES ANY COMBINATION: CPT

## 2024-02-17 PROCEDURE — 83605 ASSAY OF LACTIC ACID: CPT | Performed by: INTERNAL MEDICINE

## 2024-02-17 PROCEDURE — 25000003 PHARM REV CODE 250: Performed by: INTERNAL MEDICINE

## 2024-02-17 PROCEDURE — 81025 URINE PREGNANCY TEST: CPT | Performed by: INTERNAL MEDICINE

## 2024-02-17 PROCEDURE — 96375 TX/PRO/DX INJ NEW DRUG ADDON: CPT | Mod: 59

## 2024-02-17 PROCEDURE — 81003 URINALYSIS AUTO W/O SCOPE: CPT | Performed by: INTERNAL MEDICINE

## 2024-02-17 PROCEDURE — 83690 ASSAY OF LIPASE: CPT | Performed by: INTERNAL MEDICINE

## 2024-02-17 PROCEDURE — 96365 THER/PROPH/DIAG IV INF INIT: CPT

## 2024-02-17 PROCEDURE — 84484 ASSAY OF TROPONIN QUANT: CPT | Performed by: INTERNAL MEDICINE

## 2024-02-17 PROCEDURE — 96366 THER/PROPH/DIAG IV INF ADDON: CPT

## 2024-02-17 PROCEDURE — 99900035 HC TECH TIME PER 15 MIN (STAT)

## 2024-02-17 PROCEDURE — 80053 COMPREHEN METABOLIC PANEL: CPT | Performed by: INTERNAL MEDICINE

## 2024-02-17 PROCEDURE — 82150 ASSAY OF AMYLASE: CPT | Performed by: INTERNAL MEDICINE

## 2024-02-17 PROCEDURE — 36600 WITHDRAWAL OF ARTERIAL BLOOD: CPT

## 2024-02-17 PROCEDURE — 85025 COMPLETE CBC W/AUTO DIFF WBC: CPT | Performed by: INTERNAL MEDICINE

## 2024-02-17 PROCEDURE — 96374 THER/PROPH/DIAG INJ IV PUSH: CPT | Mod: 59

## 2024-02-17 PROCEDURE — 83880 ASSAY OF NATRIURETIC PEPTIDE: CPT | Performed by: INTERNAL MEDICINE

## 2024-02-17 PROCEDURE — 80307 DRUG TEST PRSMV CHEM ANLYZR: CPT | Performed by: INTERNAL MEDICINE

## 2024-02-17 PROCEDURE — 87040 BLOOD CULTURE FOR BACTERIA: CPT | Performed by: INTERNAL MEDICINE

## 2024-02-17 PROCEDURE — 83735 ASSAY OF MAGNESIUM: CPT | Performed by: INTERNAL MEDICINE

## 2024-02-17 PROCEDURE — 63600175 PHARM REV CODE 636 W HCPCS: Performed by: INTERNAL MEDICINE

## 2024-02-17 PROCEDURE — 82077 ASSAY SPEC XCP UR&BREATH IA: CPT | Performed by: INTERNAL MEDICINE

## 2024-02-17 PROCEDURE — 82962 GLUCOSE BLOOD TEST: CPT

## 2024-02-17 RX ORDER — SODIUM CHLORIDE 9 MG/ML
125 INJECTION, SOLUTION INTRAVENOUS ONCE
Status: COMPLETED | OUTPATIENT
Start: 2024-02-17 | End: 2024-02-18

## 2024-02-17 RX ORDER — MAGNESIUM SULFATE HEPTAHYDRATE 40 MG/ML
2 INJECTION, SOLUTION INTRAVENOUS
Status: COMPLETED | OUTPATIENT
Start: 2024-02-17 | End: 2024-02-18

## 2024-02-17 RX ORDER — ONDANSETRON HYDROCHLORIDE 2 MG/ML
4 INJECTION, SOLUTION INTRAVENOUS
Status: COMPLETED | OUTPATIENT
Start: 2024-02-17 | End: 2024-02-17

## 2024-02-17 RX ORDER — DIPHENHYDRAMINE HCL 25 MG
25 CAPSULE ORAL
Status: COMPLETED | OUTPATIENT
Start: 2024-02-17 | End: 2024-02-17

## 2024-02-17 RX ORDER — KETOROLAC TROMETHAMINE 30 MG/ML
15 INJECTION, SOLUTION INTRAMUSCULAR; INTRAVENOUS
Status: COMPLETED | OUTPATIENT
Start: 2024-02-17 | End: 2024-02-17

## 2024-02-17 RX ORDER — PROCHLORPERAZINE EDISYLATE 5 MG/ML
10 INJECTION INTRAMUSCULAR; INTRAVENOUS
Status: DISCONTINUED | OUTPATIENT
Start: 2024-02-17 | End: 2024-02-18 | Stop reason: HOSPADM

## 2024-02-17 RX ORDER — HYDRALAZINE HYDROCHLORIDE 20 MG/ML
10 INJECTION INTRAMUSCULAR; INTRAVENOUS
Status: COMPLETED | OUTPATIENT
Start: 2024-02-17 | End: 2024-02-17

## 2024-02-17 RX ADMIN — SODIUM CHLORIDE 1836 ML: 9 INJECTION, SOLUTION INTRAVENOUS at 10:02

## 2024-02-17 RX ADMIN — HYDRALAZINE HYDROCHLORIDE 10 MG: 20 INJECTION INTRAMUSCULAR; INTRAVENOUS at 10:02

## 2024-02-17 RX ADMIN — SODIUM CHLORIDE, POTASSIUM CHLORIDE, SODIUM LACTATE AND CALCIUM CHLORIDE 1000 ML: 600; 310; 30; 20 INJECTION, SOLUTION INTRAVENOUS at 09:02

## 2024-02-17 RX ADMIN — SODIUM CHLORIDE 1000 ML: 9 INJECTION, SOLUTION INTRAVENOUS at 09:02

## 2024-02-17 RX ADMIN — ONDANSETRON 4 MG: 2 INJECTION INTRAMUSCULAR; INTRAVENOUS at 09:02

## 2024-02-17 RX ADMIN — DIPHENHYDRAMINE HYDROCHLORIDE 25 MG: 25 CAPSULE ORAL at 11:02

## 2024-02-17 RX ADMIN — KETOROLAC TROMETHAMINE 15 MG: 30 INJECTION, SOLUTION INTRAMUSCULAR; INTRAVENOUS at 10:02

## 2024-02-17 RX ADMIN — MAGNESIUM SULFATE HEPTAHYDRATE 2 G: 40 INJECTION, SOLUTION INTRAVENOUS at 09:02

## 2024-02-18 VITALS
RESPIRATION RATE: 18 BRPM | WEIGHT: 135 LBS | TEMPERATURE: 100 F | BODY MASS INDEX: 23.17 KG/M2 | SYSTOLIC BLOOD PRESSURE: 136 MMHG | DIASTOLIC BLOOD PRESSURE: 88 MMHG | HEART RATE: 105 BPM | OXYGEN SATURATION: 100 %

## 2024-02-18 LAB
GLUCOSE SERPL-MCNC: 265 MG/DL (ref 70–110)
POCT GLUCOSE: 265 MG/DL (ref 70–110)
POCT GLUCOSE: 317 MG/DL (ref 70–110)

## 2024-02-18 PROCEDURE — 63600175 PHARM REV CODE 636 W HCPCS: Performed by: INTERNAL MEDICINE

## 2024-02-18 PROCEDURE — 96361 HYDRATE IV INFUSION ADD-ON: CPT | Mod: 59

## 2024-02-18 PROCEDURE — 25000003 PHARM REV CODE 250: Performed by: INTERNAL MEDICINE

## 2024-02-18 PROCEDURE — 82962 GLUCOSE BLOOD TEST: CPT

## 2024-02-18 PROCEDURE — 96375 TX/PRO/DX INJ NEW DRUG ADDON: CPT

## 2024-02-18 PROCEDURE — 99285 EMERGENCY DEPT VISIT HI MDM: CPT | Mod: 25

## 2024-02-18 PROCEDURE — 96372 THER/PROPH/DIAG INJ SC/IM: CPT | Performed by: INTERNAL MEDICINE

## 2024-02-18 RX ORDER — HYDRALAZINE HYDROCHLORIDE 20 MG/ML
10 INJECTION INTRAMUSCULAR; INTRAVENOUS
Status: COMPLETED | OUTPATIENT
Start: 2024-02-18 | End: 2024-02-18

## 2024-02-18 RX ORDER — KETOROLAC TROMETHAMINE 30 MG/ML
15 INJECTION, SOLUTION INTRAMUSCULAR; INTRAVENOUS
Status: COMPLETED | OUTPATIENT
Start: 2024-02-18 | End: 2024-02-18

## 2024-02-18 RX ADMIN — HUMAN INSULIN 4 UNITS: 100 INJECTION, SOLUTION SUBCUTANEOUS at 01:02

## 2024-02-18 RX ADMIN — SODIUM CHLORIDE 125 ML/HR: 9 INJECTION, SOLUTION INTRAVENOUS at 01:02

## 2024-02-18 RX ADMIN — HUMAN INSULIN 10 UNITS: 100 INJECTION, SOLUTION SUBCUTANEOUS at 12:02

## 2024-02-18 RX ADMIN — KETOROLAC TROMETHAMINE 15 MG: 30 INJECTION, SOLUTION INTRAMUSCULAR; INTRAVENOUS at 01:02

## 2024-02-18 RX ADMIN — HYDRALAZINE HYDROCHLORIDE 10 MG: 20 INJECTION INTRAMUSCULAR; INTRAVENOUS at 01:02

## 2024-02-18 NOTE — ED PROVIDER NOTES
Encounter Date: 2/17/2024       History     Chief Complaint   Patient presents with    Hyperglycemia     Began this am with cough and SOB. This afternoon began with vomiting and back pain. Cbg 422     28-year-old black female who is well-known to emergency department due to numerous visits due to uncontrolled diabetes and medical noncompliance presents with vomiting and back pain.  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 [place] and was negative but she was sent home because she was vomiting      Review of patient's allergies indicates:  No Known Allergies  Past Medical History:   Diagnosis Date    Diabetes mellitus     Diabetic gastroparesis associated with type 1 diabetes mellitus     DKA (diabetic ketoacidosis)     Hypertension     Non compliance with medical treatment      Past Surgical History:   Procedure Laterality Date    CHOLECYSTECTOMY      ESOPHAGOGASTRODUODENOSCOPY      Multiple    None       Family History   Problem Relation Age of Onset    Heart disease Mother     Hypertension Mother     Diabetes Mother     Hyperlipidemia Mother     Cancer Father     Stroke Father     Hypertension Father     Hyperlipidemia Father      Social History     Tobacco Use    Smoking status: Never    Smokeless tobacco: Never   Substance Use Topics    Alcohol use: Never    Drug use: Not Currently     Types: Marijuana     Review of Systems   Constitutional:  Positive for fever. Negative for activity change, appetite change, chills, diaphoresis, fatigue and unexpected weight change.   HENT: Negative.  Negative for congestion, dental problem, drooling, ear discharge, ear pain, facial swelling, hearing loss, mouth sores, nosebleeds, postnasal drip, rhinorrhea, sinus pressure, sinus pain, sneezing, sore throat, tinnitus, trouble swallowing and voice change.    Eyes: Negative.  Negative for photophobia, pain, discharge, redness, itching and visual disturbance.   Respiratory:  Negative.  Negative for apnea, cough, choking, chest tightness, shortness of breath, wheezing and stridor.    Cardiovascular: Negative.  Negative for chest pain, palpitations and leg swelling.   Gastrointestinal:  Positive for nausea and vomiting. Negative for abdominal distention, abdominal pain, anal bleeding, blood in stool, constipation, diarrhea and rectal pain.   Endocrine: Negative.  Negative for cold intolerance, heat intolerance, polydipsia, polyphagia and polyuria.   Genitourinary: Negative.  Negative for decreased urine volume, difficulty urinating, dyspareunia, dysuria, enuresis, flank pain, frequency, genital sores, hematuria, menstrual problem, pelvic pain, urgency, vaginal bleeding, vaginal discharge and vaginal pain.   Musculoskeletal:  Positive for back pain. Negative for arthralgias, gait problem, joint swelling, myalgias, neck pain and neck stiffness.   Skin: Negative.  Negative for color change, pallor, rash and wound.   Allergic/Immunologic: Negative.  Negative for environmental allergies, food allergies and immunocompromised state.   Neurological: Negative.  Negative for dizziness, tremors, seizures, syncope, facial asymmetry, speech difficulty, weakness, light-headedness, numbness and headaches.   Hematological: Negative.  Negative for adenopathy. Does not bruise/bleed easily.   Psychiatric/Behavioral: Negative.  Negative for agitation, behavioral problems, confusion, decreased concentration, dysphoric mood, hallucinations, self-injury, sleep disturbance and suicidal ideas. The patient is not nervous/anxious and is not hyperactive.    All other systems reviewed and are negative.      Physical Exam     Initial Vitals [02/17/24 2018]   BP Pulse Resp Temp SpO2   (!) 151/119 105 18 99.5 °F (37.5 °C) 99 %      MAP       --         Physical Exam    Nursing note and vitals reviewed.  Constitutional: She appears well-developed and well-nourished. She is not diaphoretic. No distress.   HENT:   Head:  Normocephalic and atraumatic.   Mouth/Throat: Oropharynx is clear and moist. No oropharyngeal exudate.   Eyes: Conjunctivae and EOM are normal. Pupils are equal, round, and reactive to light. No scleral icterus.   Neck: Neck supple. No JVD present.   Normal range of motion.  Cardiovascular:  Normal rate, regular rhythm, normal heart sounds and intact distal pulses.     Exam reveals no gallop and no friction rub.       No murmur heard.  Pulmonary/Chest: Breath sounds normal. No respiratory distress. She has no wheezes. She exhibits no tenderness.   Abdominal: Abdomen is soft. Bowel sounds are normal. She exhibits no distension. There is abdominal tenderness in the epigastric area.     There is no rebound, no tenderness at McBurney's point and negative Chaudhary's sign.   Musculoskeletal:         General: Normal range of motion.      Cervical back: Normal range of motion and neck supple.     Neurological: She is alert and oriented to person, place, and time. She has normal strength and normal reflexes.   Skin: Skin is warm and dry. Capillary refill takes less than 2 seconds.   Psychiatric: She has a normal mood and affect. Her behavior is normal. Judgment and thought content normal.         ED Course   Critical Care    Date/Time: 2/18/2024 12:52 AM    Performed by: Chuy Mathews MD  Authorized by: Chuy Mathews MD  Direct patient critical care time: 21 minutes  Additional history critical care time: 11 minutes  Ordering / reviewing critical care time: 10 minutes  Documentation critical care time: 12 minutes  Total critical care time (exclusive of procedural time) : 54 minutes  Critical care time was exclusive of separately billable procedures and treating other patients.  Critical care was necessary to treat or prevent imminent or life-threatening deterioration of the following conditions: metabolic crisis and dehydration.  Critical care was time spent personally by me on the following activities: blood  draw for specimens, development of treatment plan with patient or surrogate, interpretation of cardiac output measurements, evaluation of patient's response to treatment, examination of patient, obtaining history from patient or surrogate, ordering and performing treatments and interventions, ordering and review of laboratory studies, pulse oximetry, re-evaluation of patient's condition, review of old charts and vascular access procedures.        Admission on 02/17/2024   Component Date Value Ref Range Status    POCT Glucose 02/17/2024 422 (H)  70 - 110 mg/dL Final    Sodium Level 02/17/2024 134 (L)  136 - 145 mmol/L Final    Potassium Level 02/17/2024 3.7  3.5 - 5.1 mmol/L Final    Chloride 02/17/2024 99  98 - 107 mmol/L Final    Carbon Dioxide 02/17/2024 17 (L)  22 - 29 mmol/L Final    Glucose Level 02/17/2024 380 (H)  74 - 100 mg/dL Final    Blood Urea Nitrogen 02/17/2024 17.0  7.0 - 18.7 mg/dL Final    Creatinine 02/17/2024 0.98  0.55 - 1.02 mg/dL Final    Calcium Level Total 02/17/2024 8.1 (L)  8.4 - 10.2 mg/dL Final    Protein Total 02/17/2024 5.9 (L)  6.4 - 8.3 gm/dL Final    Albumin Level 02/17/2024 2.7 (L)  3.5 - 5.0 g/dL Final    Globulin 02/17/2024 3.2  2.4 - 3.5 gm/dL Final    Albumin/Globulin Ratio 02/17/2024 0.8 (L)  1.1 - 2.0 ratio Final    Bilirubin Total 02/17/2024 0.4  <=1.5 mg/dL Final    Alkaline Phosphatase 02/17/2024 90  40 - 150 unit/L Final    Alanine Aminotransferase 02/17/2024 12  0 - 55 unit/L Final    Aspartate Aminotransferase 02/17/2024 15  5 - 34 unit/L Final    eGFR 02/17/2024 >60  mls/min/1.73/m2 Final    Color, UA 02/17/2024 Yellow  Yellow, Light-Yellow, Dark Yellow, [place], Straw Final    Appearance, UA 02/17/2024 Clear  Clear Final    Specific Gravity, UA 02/17/2024 1.020  1.005 - 1.030 Final    pH, UA 02/17/2024 7.0  5.0 - 8.5 Final    Protein, UA 02/17/2024 3+ (A)  Negative Final    Glucose, UA 02/17/2024 3+ (A)  Negative, Normal Final    Ketones, UA 02/17/2024 2+ (A)  Negative  Final    Blood, UA 02/17/2024 2+ (A)  Negative Final    Bilirubin, UA 02/17/2024 Negative  Negative Final    Urobilinogen, UA 02/17/2024 0.2  0.2, 1.0, Normal Final    Nitrites, UA 02/17/2024 Negative  Negative Final    Leukocyte Esterase, UA 02/17/2024 Negative  Negative Final    Beta hCG Qualitative, Urine 02/17/2024 Negative  Negative Final    Ethanol Level 02/17/2024 <10.0  <=10.0 mg/dL Final    Amphetamines, Urine 02/17/2024 Negative  Negative Final    Barbituates, Urine 02/17/2024 Negative  Negative Final    Benzodiazepine, Urine 02/17/2024 Negative  Negative Final    Cannabinoids, Urine 02/17/2024 Negative  Negative Final    Cocaine, Urine 02/17/2024 Negative  Negative Final    Fentanyl, Urine 02/17/2024 Negative  Negative Final    MDMA, Urine 02/17/2024 Negative  Negative Final    Opiates, Urine 02/17/2024 Negative  Negative Final    Phencyclidine, Urine 02/17/2024 Negative  Negative Final    pH, Urine 02/17/2024 7.0  3.0 - 11.0 Final    Specific Gravity, Urine Auto 02/17/2024 1.020  1.001 - 1.035 Final    Lactic Acid Level 02/17/2024 7.1 (HH)  0.5 - 2.2 mmol/L Final    Troponin-I 02/17/2024 <0.010  0.000 - 0.045 ng/mL Final    Natriuretic Peptide 02/17/2024 49.8  <=100.0 pg/mL Final    Amylase Level 02/17/2024 44  25 - 125 unit/L Final    Lipase Level 02/17/2024 10  <=60 U/L Final    Magnesium Level 02/17/2024 1.40 (L)  1.60 - 2.60 mg/dL Final    WBC 02/17/2024 8.99  4.50 - 11.50 x10(3)/mcL Final    RBC 02/17/2024 3.49 (L)  4.20 - 5.40 x10(6)/mcL Final    Hgb 02/17/2024 9.7 (L)  12.0 - 16.0 g/dL Final    Hct 02/17/2024 28.2 (L)  37.0 - 47.0 % Final    MCV 02/17/2024 80.8  80.0 - 94.0 fL Final    MCH 02/17/2024 27.8  27.0 - 31.0 pg Final    MCHC 02/17/2024 34.4  33.0 - 36.0 g/dL Final    RDW 02/17/2024 12.6  11.5 - 17.0 % Final    Platelet 02/17/2024 333  130 - 400 x10(3)/mcL Final    MPV 02/17/2024 9.1  7.4 - 10.4 fL Final    Neut % 02/17/2024 88.9  % Final    Lymph % 02/17/2024 8.1  % Final    Mono %  02/17/2024 2.6  % Final    Eos % 02/17/2024 0.0  % Final    Basophil % 02/17/2024 0.1  % Final    Lymph # 02/17/2024 0.73  0.6 - 4.6 x10(3)/mcL Final    Neut # 02/17/2024 7.99  2.1 - 9.2 x10(3)/mcL Final    Mono # 02/17/2024 0.23  0.1 - 1.3 x10(3)/mcL Final    Eos # 02/17/2024 0.00  0 - 0.9 x10(3)/mcL Final    Baso # 02/17/2024 0.01  <=0.2 x10(3)/mcL Final    IG# 02/17/2024 0.03  0 - 0.04 x10(3)/mcL Final    IG% 02/17/2024 0.3  % Final    Bacteria, UA 02/17/2024 Few (A)  None Seen, Rare, Occasional /HPF Final    RBC, UA 02/17/2024 3-5  None Seen, 0-2, 3-5, 0-5 /HPF Final    WBC, UA 02/17/2024 0-2  None Seen, 0-2, 3-5, 0-5 /HPF Final    Squamous Epithelial Cells, UA 02/17/2024 Few (A)  None Seen, Rare, Occasional, Occ /HPF Final    POC PH 02/17/2024 7.503 (H)  7.35 - 7.45 Final    POC PCO2 02/17/2024 28.5 (LL)  35 - 45 mmHg Final    POC PO2 02/17/2024 95  80 - 100 mmHg Final    POC HCO3 02/17/2024 22.4 (L)  24 - 28 mmol/L Final    POC BE 02/17/2024 -1  -2 to 2 mmol/L Final    POC SATURATED O2 02/17/2024 98  95 - 100 % Final    POC TCO2 02/17/2024 23  23 - 27 mmol/L Final    Sample 02/17/2024 ARTERIAL   Final    Site 02/17/2024 LB   Final    Allens Test 02/17/2024 N/A   Final    DelSys 02/17/2024 Room Air   Final    Lactic Acid Level 02/17/2024 1.3  0.5 - 2.2 mmol/L Final    POCT Glucose 02/18/2024 317 (H)  70 - 110 mg/dL Final       Labs Reviewed   COMPREHENSIVE METABOLIC PANEL - Abnormal; Notable for the following components:       Result Value    Sodium Level 134 (*)     Carbon Dioxide 17 (*)     Glucose Level 380 (*)     Calcium Level Total 8.1 (*)     Protein Total 5.9 (*)     Albumin Level 2.7 (*)     Albumin/Globulin Ratio 0.8 (*)     All other components within normal limits   URINALYSIS, REFLEX TO URINE CULTURE - Abnormal; Notable for the following components:    Protein, UA 3+ (*)     Glucose, UA 3+ (*)     Ketones, UA 2+ (*)     Blood, UA 2+ (*)     All other components within normal limits   LACTIC ACID,  PLASMA - Abnormal; Notable for the following components:    Lactic Acid Level 7.1 (*)     All other components within normal limits   MAGNESIUM - Abnormal; Notable for the following components:    Magnesium Level 1.40 (*)     All other components within normal limits   CBC WITH DIFFERENTIAL - Abnormal; Notable for the following components:    RBC 3.49 (*)     Hgb 9.7 (*)     Hct 28.2 (*)     All other components within normal limits   URINALYSIS, MICROSCOPIC - Abnormal; Notable for the following components:    Bacteria, UA Few (*)     Squamous Epithelial Cells, UA Few (*)     All other components within normal limits   POCT GLUCOSE - Abnormal; Notable for the following components:    POCT Glucose 422 (*)     All other components within normal limits   ISTAT PROCEDURE - Abnormal; Notable for the following components:    POC PH 7.503 (*)     POC PCO2 28.5 (*)     POC HCO3 22.4 (*)     All other components within normal limits   POCT GLUCOSE - Abnormal; Notable for the following components:    POCT Glucose 317 (*)     All other components within normal limits   PREGNANCY TEST, URINE RAPID - Normal   ALCOHOL,MEDICAL (ETHANOL) - Normal   DRUG SCREEN, URINE (BEAKER) - Normal    Narrative:     Cut off concentrations:    Amphetamines - 1000 ng/ml  Barbiturates - 200 ng/ml  Benzodiazepine - 200 ng/ml  Cannabinoids (THC) - 50 ng/ml  Cocaine - 300 ng/ml  Fentanyl - 1.0 ng/ml  MDMA - 500 ng/ml  Opiates - 300 ng/ml   Phencyclidine (PCP) - 25 ng/ml    Specimen submitted for drug analysis and tested for pH and specific gravity in order to evaluate sample integrity. Suspect tampering if specific gravity is <1.003 and/or pH is not within the range of 4.5 - 8.0  False negatives may result form substances such as bleach added to urine.  False positives may result for the presence of a substance with similar chemical structure to the drug or its metabolite.    This test provides only a PRELIMINARY analytical test result. A more  specific alternate chemical method must be used in order to obtain a confirmed analytical result. Gas chromatography/mass spectrometry (GC/MS) is the preferred confirmatory method. Other chemical confirmation methods are available. Clinical consideration and professional judgement should be applied to any drug of abuse test result, particularly when preliminary positive results are used.    Positive results will be confirmed only at the physicians request. Unconfirmed screening results are to be used only for medical purposes (treatment).        TROPONIN I - Normal   B-TYPE NATRIURETIC PEPTIDE - Normal   AMYLASE - Normal   LIPASE - Normal   LACTIC ACID, PLASMA - Normal   BLOOD CULTURE [facility]   BLOOD CULTURE [facility]   CBC W/ AUTO DIFFERENTIAL    Narrative:     The following orders were created for panel order CBC auto differential.  Procedure                               Abnormality         Status                     ---------                               -----------         ------                     CBC with Differential[[id number]]       Abnormal            Final result                 Please view results for these tests on the individual orders.   POCT GLUCOSE, HAND-HELD DEVICE          Imaging Results    None          Medications   0.9%  NaCl infusion (has no administration in time range)   prochlorperazine injection Soln 10 mg (10 mg Intravenous Not Given 2/17/24 2145)   hydrALAZINE injection 10 mg (has no administration in time range)   ketorolac injection 15 mg (has no administration in time range)   insulin regular injection 10 Units 0.1 mL (has no administration in time range)   insulin regular injection 4 Units 0.04 mL (has no administration in time range)   sodium chloride 0.9% bolus 1,000 mL 1,000 mL (0 mLs Intravenous Stopped 2/17/24 2219)   lactated ringers bolus 1,000 mL (0 mLs Intravenous Stopped 2/17/24 2213)   sodium chloride 0.9% bolus 1,836 mL 1,836 mL (0 mLs Intravenous Stopped 2/17/24 2320)    magnesium sulfate 2g in water 50mL IVPB (premix) (0 g Intravenous Stopped 2/18/24 0019)   ondansetron injection 4 mg (4 mg Intravenous Given 2/17/24 2156)   ketorolac injection 15 mg (15 mg Intravenous Given 2/17/24 2226)   hydrALAZINE injection 10 mg (10 mg Intravenous Given 2/17/24 2226)   diphenhydrAMINE capsule 25 mg (25 mg Oral Given 2/17/24 2326)     Medical Decision Making  28-year-old black female presents with nausea vomiting muscle aches.  Differential includes diabetic ketoacidosis, dehydration, uncontrolled diabetes, hyperosmolar nonketotic, viral gastroenteritis, anxiety, drug-seeking and malingering behavior, medical noncompliance.  Patient is well-known to the ER for numerous visits for the same complaints.  IV was started she was given high rate IV fluids that workup included blood work and an arterial blood gas.  Her chemistry panel shows that she does have an anion gap acidosis however her pH in her bloodstream was 7.5.  Her lactic acid was markedly elevated at 7.1 so she was given an additional 30 milliliters/kilogram body weight IV bolus and when this was complete her lactic acid is back to normal.  For low magnesium blood level she was given 2 g intravenously.  After she was properly hydrated her blood sugar was down to 317 so she was given 10 units subcutaneous and 4 units intravenously.  For her pain she was given some Toradol and nausea she was given Zofran.  I did offer her Compazine was I feel she has got a significant anxiety component and I have given her Compazine in the passed and it works great for her however she refused at this time stating she did not like the way it made her feel.  She repeatedly requested narcotics and I refused    Problems Addressed:  Dehydration: acute illness or injury with systemic symptoms that poses a threat to life or bodily functions  Hyperglycemia: chronic illness or injury that poses a threat to life or bodily functions  Lactic acid acidosis: acute  illness or injury with systemic symptoms that poses a threat to life or bodily functions  Low magnesium level: acute illness or injury  Uncontrolled type 1 diabetes mellitus with hyperglycemia: chronic illness or injury that poses a threat to life or bodily functions    Amount and/or Complexity of Data Reviewed  External Data Reviewed: labs and notes.  Labs: ordered. Decision-making details documented in ED Course.    Risk  OTC drugs.  Prescription drug management.  Diagnosis or treatment significantly limited by social determinants of health.               ED Course as of 02/18/24 0108   Sun Feb 18, 2024 0107 Signed note patient does have some very fluctuating suspicious blood pressure readings and we did give her some IV hydralazine however during the interview process with her I witnessed her flexing her arm and squeezing her hand numerous times during the cycle of the blood pressure cuff and despite trying to redirect her several times to stop doing this every time the blood pressure cuff with cycle she would flex her arm and squeeze her fist and give us erroneous readings [PL]      ED Course User Index  [PL] Chuy Mathews MD                           Clinical Impression:  Final diagnoses:  [R73.9] Hyperglycemia (Primary)  [E87.20] Lactic acid acidosis  [E86.0] Dehydration  [E10.65] Uncontrolled type 1 diabetes mellitus with hyperglycemia  [R79.0] Low magnesium level  [I10] Hypertension, unspecified type          ED Disposition Condition    Discharge Stable          ED Prescriptions    None       Follow-up Information       Follow up With Specialties Details Why Contact Kumar Larsen NP Family Medicine In 3 days  [address] [place] [address]  [phone]            Umu Guzman is a certified MA and was present during the entire interaction with this patient      Chuy Mathews MD  02/18/24 0106       Chuy Mathews MD  02/18/24 0108

## 2024-02-18 NOTE — ED NOTES
Pt arrived via private auto noted distressed and unable to keep still, crying, & uncomfortable d/t abdominal pain & shayna. flank pain. H/o DKA & gastroparesis. States abd pain started around 1030 today with n/v/d. Placed on cardiac monitor. BP and CBG elevated. Attempting IV access at this time. Monitoring continued.

## 2024-02-20 ENCOUNTER — HOSPITAL ENCOUNTER (EMERGENCY)
Facility: HOSPITAL | Age: 29
Discharge: ANOTHER HOSPITAL | End: 2024-02-21
Attending: EMERGENCY MEDICINE
Payer: MEDICAID

## 2024-02-20 DIAGNOSIS — E83.51 HYPOCALCEMIA: ICD-10-CM

## 2024-02-20 DIAGNOSIS — E13.10 DIABETIC KETOACIDOSIS WITHOUT COMA ASSOCIATED WITH OTHER SPECIFIED DIABETES MELLITUS: Primary | ICD-10-CM

## 2024-02-20 DIAGNOSIS — R00.0 TACHYCARDIA: ICD-10-CM

## 2024-02-20 DIAGNOSIS — E87.5 HYPERKALEMIA: ICD-10-CM

## 2024-02-20 DIAGNOSIS — E87.1 HYPONATREMIA: ICD-10-CM

## 2024-02-20 LAB
ABS NEUT CALC (OHS): 22.92 X10(3)/MCL (ref 2.1–9.2)
ALBUMIN SERPL-MCNC: 2.3 G/DL (ref 3.5–5)
ALBUMIN/GLOB SERPL: 0.8 RATIO (ref 1.1–2)
ALP SERPL-CCNC: 109 UNIT/L (ref 40–150)
ALT SERPL-CCNC: 16 UNIT/L (ref 0–55)
AST SERPL-CCNC: 24 UNIT/L (ref 5–34)
B-HCG SERPL QL: NEGATIVE
BILIRUB SERPL-MCNC: 0.2 MG/DL
BUN SERPL-MCNC: 97 MG/DL (ref 7–18.7)
BURR CELLS (OLG): SLIGHT
CALCIUM SERPL-MCNC: 6.7 MG/DL (ref 8.4–10.2)
CHLORIDE SERPL-SCNC: 74 MMOL/L (ref 98–107)
CO2 SERPL-SCNC: <5 MMOL/L (ref 22–29)
CREAT SERPL-MCNC: 4.33 MG/DL (ref 0.55–1.02)
ERYTHROCYTE [DISTWIDTH] IN BLOOD BY AUTOMATED COUNT: 12.8 % (ref 11.5–17)
GFR SERPLBLD CREATININE-BSD FMLA CKD-EPI: 14 MLS/MIN/1.73/M2
GLOBULIN SER-MCNC: 2.9 GM/DL (ref 2.4–3.5)
GLUCOSE SERPL-MCNC: 1097 MG/DL (ref 74–100)
HCT VFR BLD AUTO: 30.8 % (ref 37–47)
HGB BLD-MCNC: 9.4 G/DL (ref 12–16)
LACTATE SERPL-SCNC: 2.9 MMOL/L (ref 0.5–2.2)
LYMPHOCYTES NFR BLD MANUAL: 10 % (ref 13–40)
LYMPHOCYTES NFR BLD MANUAL: 2.83 X10(3)/MCL
MAGNESIUM SERPL-MCNC: 2.7 MG/DL (ref 1.6–2.6)
MCH RBC QN AUTO: 28 PG (ref 27–31)
MCHC RBC AUTO-ENTMCNC: 30.5 G/DL (ref 33–36)
MCV RBC AUTO: 91.7 FL (ref 80–94)
MONOCYTES NFR BLD MANUAL: 2.55 X10(3)/MCL (ref 0.1–1.3)
MONOCYTES NFR BLD MANUAL: 9 % (ref 2–11)
NEUTROPHILS NFR BLD MANUAL: 77 % (ref 47–80)
NEUTS BAND NFR BLD MANUAL: 4 % (ref 0–11)
PLATELET # BLD AUTO: 476 X10(3)/MCL (ref 130–400)
PLATELET # BLD EST: ABNORMAL 10*3/UL
PMV BLD AUTO: 9.6 FL (ref 7.4–10.4)
POCT GLUCOSE: >500 MG/DL (ref 70–110)
POIKILOCYTOSIS BLD QL SMEAR: SLIGHT
POTASSIUM SERPL-SCNC: 8.1 MMOL/L (ref 3.5–5.1)
PROT SERPL-MCNC: 5.2 GM/DL (ref 6.4–8.3)
RBC # BLD AUTO: 3.36 X10(6)/MCL (ref 4.2–5.4)
RBC MORPH BLD: ABNORMAL
SODIUM SERPL-SCNC: 107 MMOL/L (ref 136–145)
WBC # SPEC AUTO: 28.3 X10(3)/MCL (ref 4.5–11.5)

## 2024-02-20 PROCEDURE — 36556 INSERT NON-TUNNEL CV CATH: CPT | Mod: RT

## 2024-02-20 PROCEDURE — 82803 BLOOD GASES ANY COMBINATION: CPT

## 2024-02-20 PROCEDURE — 93005 ELECTROCARDIOGRAM TRACING: CPT

## 2024-02-20 PROCEDURE — 25000003 PHARM REV CODE 250: Performed by: EMERGENCY MEDICINE

## 2024-02-20 PROCEDURE — 99900035 HC TECH TIME PER 15 MIN (STAT)

## 2024-02-20 PROCEDURE — 87077 CULTURE AEROBIC IDENTIFY: CPT | Performed by: EMERGENCY MEDICINE

## 2024-02-20 PROCEDURE — 83605 ASSAY OF LACTIC ACID: CPT | Performed by: EMERGENCY MEDICINE

## 2024-02-20 PROCEDURE — 83735 ASSAY OF MAGNESIUM: CPT | Performed by: EMERGENCY MEDICINE

## 2024-02-20 PROCEDURE — 80053 COMPREHEN METABOLIC PANEL: CPT | Performed by: EMERGENCY MEDICINE

## 2024-02-20 PROCEDURE — 96361 HYDRATE IV INFUSION ADD-ON: CPT

## 2024-02-20 PROCEDURE — 82962 GLUCOSE BLOOD TEST: CPT

## 2024-02-20 PROCEDURE — 96375 TX/PRO/DX INJ NEW DRUG ADDON: CPT

## 2024-02-20 PROCEDURE — 63600175 PHARM REV CODE 636 W HCPCS: Performed by: EMERGENCY MEDICINE

## 2024-02-20 PROCEDURE — 93010 ELECTROCARDIOGRAM REPORT: CPT | Mod: ,,, | Performed by: INTERNAL MEDICINE

## 2024-02-20 PROCEDURE — 85027 COMPLETE CBC AUTOMATED: CPT | Performed by: EMERGENCY MEDICINE

## 2024-02-20 PROCEDURE — 84703 CHORIONIC GONADOTROPIN ASSAY: CPT | Performed by: EMERGENCY MEDICINE

## 2024-02-20 RX ORDER — ALBUTEROL SULFATE 0.83 MG/ML
10 SOLUTION RESPIRATORY (INHALATION)
Status: COMPLETED | OUTPATIENT
Start: 2024-02-20 | End: 2024-02-21

## 2024-02-20 RX ORDER — SODIUM CHLORIDE 0.9 % (FLUSH) 0.9 %
10 SYRINGE (ML) INJECTION
Status: DISCONTINUED | OUTPATIENT
Start: 2024-02-21 | End: 2024-02-21 | Stop reason: HOSPADM

## 2024-02-20 RX ORDER — SODIUM CHLORIDE 9 MG/ML
1000 INJECTION, SOLUTION INTRAVENOUS CONTINUOUS
Status: DISCONTINUED | OUTPATIENT
Start: 2024-02-21 | End: 2024-02-21 | Stop reason: HOSPADM

## 2024-02-20 RX ORDER — DEXTROSE MONOHYDRATE AND SODIUM CHLORIDE 5; .45 G/100ML; G/100ML
INJECTION, SOLUTION INTRAVENOUS CONTINUOUS PRN
Status: DISCONTINUED | OUTPATIENT
Start: 2024-02-21 | End: 2024-02-21 | Stop reason: HOSPADM

## 2024-02-20 RX ORDER — SODIUM BICARBONATE 1 MEQ/ML
50 SYRINGE (ML) INTRAVENOUS
Status: COMPLETED | OUTPATIENT
Start: 2024-02-20 | End: 2024-02-21

## 2024-02-20 RX ORDER — DEXTROSE MONOHYDRATE 100 MG/ML
INJECTION, SOLUTION INTRAVENOUS
Status: DISCONTINUED | OUTPATIENT
Start: 2024-02-21 | End: 2024-02-21 | Stop reason: HOSPADM

## 2024-02-20 RX ORDER — CALCIUM GLUCONATE 20 MG/ML
1 INJECTION, SOLUTION INTRAVENOUS
Status: COMPLETED | OUTPATIENT
Start: 2024-02-20 | End: 2024-02-21

## 2024-02-20 RX ORDER — SODIUM POLYSTYRENE SULFONATE 15 G/60ML
15 SUSPENSION ORAL; RECTAL ONCE
Status: DISCONTINUED | OUTPATIENT
Start: 2024-02-21 | End: 2024-02-21

## 2024-02-20 RX ADMIN — SODIUM CHLORIDE 1000 ML: 9 INJECTION, SOLUTION INTRAVENOUS at 10:02

## 2024-02-20 RX ADMIN — HUMAN INSULIN 10 UNITS: 100 INJECTION, SOLUTION SUBCUTANEOUS at 10:02

## 2024-02-21 VITALS
WEIGHT: 140 LBS | SYSTOLIC BLOOD PRESSURE: 100 MMHG | DIASTOLIC BLOOD PRESSURE: 59 MMHG | OXYGEN SATURATION: 100 % | RESPIRATION RATE: 21 BRPM | HEART RATE: 114 BPM | TEMPERATURE: 97 F | HEIGHT: 62 IN | BODY MASS INDEX: 25.76 KG/M2

## 2024-02-21 LAB
ALBUMIN SERPL-MCNC: 2 G/DL (ref 3.5–5)
ALBUMIN/GLOB SERPL: 0.8 RATIO (ref 1.1–2)
ALLENS TEST: ABNORMAL
ALP SERPL-CCNC: 105 UNIT/L (ref 40–150)
ALT SERPL-CCNC: 16 UNIT/L (ref 0–55)
APPEARANCE UR: CLEAR
AST SERPL-CCNC: 28 UNIT/L (ref 5–34)
BACTERIA #/AREA URNS AUTO: ABNORMAL /HPF
BILIRUB SERPL-MCNC: 0.2 MG/DL
BILIRUB UR QL STRIP.AUTO: NEGATIVE
BUN SERPL-MCNC: 92 MG/DL (ref 7–18.7)
CALCIUM SERPL-MCNC: 6 MG/DL (ref 8.4–10.2)
CHLORIDE SERPL-SCNC: 87 MMOL/L (ref 98–107)
CO2 SERPL-SCNC: <5 MMOL/L (ref 22–29)
COLOR UR AUTO: YELLOW
CREAT SERPL-MCNC: 3.9 MG/DL (ref 0.55–1.02)
GFR SERPLBLD CREATININE-BSD FMLA CKD-EPI: 15 MLS/MIN/1.73/M2
GLOBULIN SER-MCNC: 2.6 GM/DL (ref 2.4–3.5)
GLUCOSE SERPL-MCNC: 844 MG/DL (ref 74–100)
GLUCOSE UR QL STRIP.AUTO: ABNORMAL
HCO3 UR-SCNC: 2.8 MMOL/L (ref 24–28)
KETONES UR QL STRIP.AUTO: ABNORMAL
LACTATE SERPL-SCNC: 2.6 MMOL/L (ref 0.5–2.2)
LEUKOCYTE ESTERASE UR QL STRIP.AUTO: NEGATIVE
MUCOUS THREADS URNS QL MICRO: ABNORMAL /LPF
NITRITE UR QL STRIP.AUTO: NEGATIVE
PCO2 BLDA: 12.6 MMHG (ref 35–45)
PH SMN: 6.95 [PH] (ref 7.35–7.45)
PH UR STRIP.AUTO: 5.5 [PH]
PO2 BLDA: 174 MMHG (ref 40–60)
POC BE: -29 MMOL/L
POC SATURATED O2: 98 % (ref 95–100)
POC TCO2: <5 MMOL/L (ref 24–29)
POCT GLUCOSE: >500 MG/DL (ref 70–110)
POTASSIUM SERPL-SCNC: 4.6 MMOL/L (ref 3.5–5.1)
PROT SERPL-MCNC: 4.6 GM/DL (ref 6.4–8.3)
PROT UR QL STRIP.AUTO: ABNORMAL
RBC #/AREA URNS AUTO: ABNORMAL /HPF
RBC UR QL AUTO: ABNORMAL
SAMPLE: ABNORMAL
SITE: ABNORMAL
SODIUM SERPL-SCNC: 119 MMOL/L (ref 136–145)
SP GR UR STRIP.AUTO: >=1.03 (ref 1–1.03)
SQUAMOUS #/AREA URNS AUTO: ABNORMAL /HPF
UROBILINOGEN UR STRIP-ACNC: 0.2
WBC #/AREA URNS AUTO: ABNORMAL /HPF

## 2024-02-21 PROCEDURE — 96375 TX/PRO/DX INJ NEW DRUG ADDON: CPT

## 2024-02-21 PROCEDURE — 81003 URINALYSIS AUTO W/O SCOPE: CPT | Performed by: EMERGENCY MEDICINE

## 2024-02-21 PROCEDURE — 82962 GLUCOSE BLOOD TEST: CPT

## 2024-02-21 PROCEDURE — 63600175 PHARM REV CODE 636 W HCPCS: Performed by: EMERGENCY MEDICINE

## 2024-02-21 PROCEDURE — 83605 ASSAY OF LACTIC ACID: CPT | Performed by: EMERGENCY MEDICINE

## 2024-02-21 PROCEDURE — 25000003 PHARM REV CODE 250: Performed by: EMERGENCY MEDICINE

## 2024-02-21 PROCEDURE — 94640 AIRWAY INHALATION TREATMENT: CPT

## 2024-02-21 PROCEDURE — 99291 CRITICAL CARE FIRST HOUR: CPT

## 2024-02-21 PROCEDURE — 80053 COMPREHEN METABOLIC PANEL: CPT | Performed by: EMERGENCY MEDICINE

## 2024-02-21 PROCEDURE — 25000242 PHARM REV CODE 250 ALT 637 W/ HCPCS: Performed by: EMERGENCY MEDICINE

## 2024-02-21 PROCEDURE — 94761 N-INVAS EAR/PLS OXIMETRY MLT: CPT

## 2024-02-21 PROCEDURE — 96365 THER/PROPH/DIAG IV INF INIT: CPT

## 2024-02-21 PROCEDURE — 96367 TX/PROPH/DG ADDL SEQ IV INF: CPT

## 2024-02-21 RX ORDER — SODIUM BICARBONATE 1 MEQ/ML
50 SYRINGE (ML) INTRAVENOUS
Status: COMPLETED | OUTPATIENT
Start: 2024-02-21 | End: 2024-02-21

## 2024-02-21 RX ORDER — MORPHINE SULFATE 4 MG/ML
4 INJECTION, SOLUTION INTRAMUSCULAR; INTRAVENOUS
Status: COMPLETED | OUTPATIENT
Start: 2024-02-21 | End: 2024-02-21

## 2024-02-21 RX ORDER — ONDANSETRON HYDROCHLORIDE 2 MG/ML
4 INJECTION, SOLUTION INTRAVENOUS ONCE
Status: COMPLETED | OUTPATIENT
Start: 2024-02-21 | End: 2024-02-21

## 2024-02-21 RX ADMIN — CALCIUM GLUCONATE 1 G: 20 INJECTION, SOLUTION INTRAVENOUS at 12:02

## 2024-02-21 RX ADMIN — SODIUM BICARBONATE 50 MEQ: 84 INJECTION INTRAVENOUS at 02:02

## 2024-02-21 RX ADMIN — SODIUM CHLORIDE 1000 ML: 9 INJECTION, SOLUTION INTRAVENOUS at 02:02

## 2024-02-21 RX ADMIN — SODIUM BICARBONATE 50 MEQ: 84 INJECTION, SOLUTION INTRAVENOUS at 12:02

## 2024-02-21 RX ADMIN — CEFTRIAXONE SODIUM 1 G: 1 INJECTION, POWDER, FOR SOLUTION INTRAMUSCULAR; INTRAVENOUS at 01:02

## 2024-02-21 RX ADMIN — SODIUM CHLORIDE 1000 ML: 9 INJECTION, SOLUTION INTRAVENOUS at 12:02

## 2024-02-21 RX ADMIN — MORPHINE SULFATE 4 MG: 4 INJECTION, SOLUTION INTRAMUSCULAR; INTRAVENOUS at 01:02

## 2024-02-21 RX ADMIN — SODIUM ZIRCONIUM CYCLOSILICATE 10 G: 5 POWDER, FOR SUSPENSION ORAL at 01:02

## 2024-02-21 RX ADMIN — INSULIN HUMAN 0.1 UNITS/KG/HR: 1 INJECTION, SOLUTION INTRAVENOUS at 12:02

## 2024-02-21 RX ADMIN — ALBUTEROL SULFATE 10 MG: 2.5 SOLUTION RESPIRATORY (INHALATION) at 12:02

## 2024-02-21 RX ADMIN — ONDANSETRON 4 MG: 2 INJECTION INTRAMUSCULAR; INTRAVENOUS at 01:02

## 2024-02-21 RX ADMIN — CALCIUM GLUCONATE 1 G: 20 INJECTION, SOLUTION INTRAVENOUS at 02:02

## 2024-02-21 NOTE — ED PROVIDER NOTES
"Encounter Date: 2/20/2024       History     Chief Complaint   Patient presents with    Weakness    Hyperglycemia     Pt states for past 2-3 days has not really eaten very much but has drank plenty of water with her insulin. States she bottomed out twice today. So came to get checked out.        Patient is a 28-year-old female with a history of insulin-dependent diabetes, gastroparesis, hypertension, known noncompliance with medications who presents to the emergency department with multiple complaints.  Patient states that she was here around 4 days ago with abdominal pain nausea and vomiting.  Workup at that time did not show any DKA and patient was subsequently discharged home after symptomatic treatment.  Patient tells me that she has been lying in bed mostly for the past 2 days only eating applesauce.  He states he checked her blood sugar today and it "bottomed out" twice prompting visit here to the emergency department.    She does report having some nausea vomiting diarrhea 2 days ago, but none since.        Review of patient's allergies indicates:  No Known Allergies  Past Medical History:   Diagnosis Date    Diabetes mellitus     Diabetic gastroparesis associated with type 1 diabetes mellitus     DKA (diabetic ketoacidosis)     Hypertension     Non compliance with medical treatment      Past Surgical History:   Procedure Laterality Date    CHOLECYSTECTOMY      ESOPHAGOGASTRODUODENOSCOPY      Multiple    None       Family History   Problem Relation Age of Onset    Heart disease Mother     Hypertension Mother     Diabetes Mother     Hyperlipidemia Mother     Cancer Father     Stroke Father     Hypertension Father     Hyperlipidemia Father      Social History     Tobacco Use    Smoking status: Never    Smokeless tobacco: Never   Substance Use Topics    Alcohol use: Never    Drug use: Not Currently     Types: Marijuana     Review of Systems   Constitutional:  Positive for activity change, appetite change and " fatigue. Negative for fever.   HENT:  Negative for sore throat.    Respiratory:  Negative for shortness of breath.    Cardiovascular:  Negative for chest pain.   Gastrointestinal:  Positive for abdominal pain, diarrhea, nausea and vomiting.   Genitourinary:  Negative for dysuria.   Musculoskeletal:  Negative for back pain.   Skin:  Negative for rash.   Neurological:  Negative for weakness.   Hematological:  Does not bruise/bleed easily.   All other systems reviewed and are negative.      Physical Exam     Initial Vitals [02/20/24 2145]   BP Pulse Resp Temp SpO2   (!) 74/45 95 (!) 26 97.4 °F (36.3 °C) 96 %      MAP       --         Physical Exam    Nursing note and vitals reviewed.  Constitutional: She is not diaphoretic. She appears distressed.   Ill appearing   HENT:   Head: Normocephalic and atraumatic.   Dry mucous membranes with cracking lips   Eyes: Conjunctivae and EOM are normal. Pupils are equal, round, and reactive to light.   Neck: Neck supple. No JVD present.   Normal range of motion.  Cardiovascular:  Regular rhythm and normal heart sounds.           No murmur heard.  tachycardic   Pulmonary/Chest: Breath sounds normal. She is in respiratory distress. She has no wheezes. She has no rhonchi.   Tachypnea   Abdominal: Abdomen is soft. Bowel sounds are normal. There is no abdominal tenderness. There is no rebound and no guarding.   Genitourinary:    Genitourinary Comments: deferred     Musculoskeletal:         General: Normal range of motion.      Cervical back: Normal range of motion and neck supple.     Neurological: She is alert and oriented to person, place, and time. She has normal strength. No sensory deficit. GCS score is 15. GCS eye subscore is 4. GCS verbal subscore is 5. GCS motor subscore is 6.   Skin: Skin is warm and dry. Capillary refill takes less than 2 seconds.   Psychiatric: She has a normal mood and affect. Her behavior is normal. Judgment and thought content normal.         ED Course    Central Line    Date/Time: 2/20/2024 10:34 PM    Performed by: Belinda Lunsford MD  Authorized by: Belinda Lunsford MD    Location procedure was performed:  [facility] EMERGENCY DEPARTMENT  Pre-operative diagnosis:  Hyperglycemia, poor venous access  Post-operative diagnosis:  Hyperglycemia, poor venous access  Consent Done ?:  Emergent Situation  Time out complete?: Verified correct patient, procedure, equipment, staff, and site/side    Indications:  Med administration and vascular access  Anesthesia:  Local infiltration  Local anesthetic:  Lidocaine 1% without epinephrine  Anesthetic total (ml):  3  Preparation:  Skin prepped with ChloraPrep  Skin prep agent dried: Skin prep agent completely dried prior to procedure    Sterile barriers: All five maximal sterile barriers used - gloves, gown, cap, mask and large sterile sheet    Hand hygiene: Hand hygiene performed immediately prior to central venous catheter insertion    Location:  Right femoral  Site selection rationale:  Ease of access  Catheter type:  Triple lumen  Catheter size:  7 Fr  Ultrasound guidance: Yes    Vessel Caliber:  Medium   patent  Comprressibility:  Normal  Needle advanced into vessel with real time ultrasound guidance.    Steril sheath on probe.    Sterile gel used.  Manometry: No    Number of attempts:  1  Securement:  Line sutured, sterile dressing applied and blood return through all ports  Complications: No    Estimated blood loss (mL):  2  Specimens: No    Implants: No    Adverse Events:  NoneTermination Site: iliac vein  Critical Care    Date/Time: 2/21/2024 1:35 AM    Performed by: Belinda Lunsford MD  Authorized by: Belinda Lunsford MD  Direct patient critical care time: 50 minutes  Additional history critical care time: 5 minutes  Ordering / reviewing critical care time: 10 minutes  Documentation critical care time: 10 minutes  Consulting other physicians critical care time: 5 minutes  Consult with family critical care time: 0  minutes  Other critical care time: 0 minutes  Total critical care time (exclusive of procedural time) : 80 minutes  Critical care time was exclusive of separately billable procedures and treating other patients.  Critical care was necessary to treat or prevent imminent or life-threatening deterioration of the following conditions: dehydration, metabolic crisis, endocrine crisis and renal failure.  Critical care was time spent personally by me on the following activities: blood draw for specimens, discussions with consultants, interpretation of cardiac output measurements, evaluation of patient's response to treatment, examination of patient, ordering and performing treatments and interventions, ordering and review of laboratory studies, ordering and review of radiographic studies, pulse oximetry, vascular access procedures and review of old charts.        Labs Reviewed   COMPREHENSIVE METABOLIC PANEL - Abnormal; Notable for the following components:       Result Value    Sodium Level 107 (*)     Potassium Level 8.1 (*)     Chloride 74 (*)     Carbon Dioxide <5 (*)     Glucose Level 1,097 (*)     Blood Urea Nitrogen 97.0 (*)     Creatinine 4.33 (*)     Calcium Level Total 6.7 (*)     Protein Total 5.2 (*)     Albumin Level 2.3 (*)     Albumin/Globulin Ratio 0.8 (*)     All other components within normal limits   URINALYSIS, REFLEX TO URINE CULTURE - Abnormal; Notable for the following components:    Protein, UA 2+ (*)     Glucose, UA 2+ (*)     Ketones, UA 1+ (*)     Blood, UA 1+ (*)     All other components within normal limits   CBC WITH DIFFERENTIAL - Abnormal; Notable for the following components:    WBC 28.30 (*)     RBC 3.36 (*)     Hgb 9.4 (*)     Hct 30.8 (*)     MCHC 30.5 (*)     Platelet 476 (*)     All other components within normal limits   MANUAL DIFFERENTIAL - Abnormal; Notable for the following components:    Lymphs % 10 (*)     Neutrophils Abs Calc 22.923 (*)     Monocytes Abs 2.547 (*)     Platelets  Increased (*)     RBC Morph Abnormal (*)     Poikilocytosis Slight (*)     [place] Cells Slight (*)     All other components within normal limits   LACTIC ACID, PLASMA - Abnormal; Notable for the following components:    Lactic Acid Level 2.9 (*)     All other components within normal limits   MAGNESIUM - Abnormal; Notable for the following components:    Magnesium Level 2.70 (*)     All other components within normal limits   LACTIC ACID, PLASMA - Abnormal; Notable for the following components:    Lactic Acid Level 2.6 (*)     All other components within normal limits   URINALYSIS, MICROSCOPIC - Abnormal; Notable for the following components:    Bacteria, UA Moderate (*)     Mucous, UA Trace (*)     Squamous Epithelial Cells, UA Few (*)     All other components within normal limits   COMPREHENSIVE METABOLIC PANEL - Abnormal; Notable for the following components:    Sodium Level 119 (*)     Chloride 87 (*)     Carbon Dioxide <5 (*)     Glucose Level 844 (*)     Blood Urea Nitrogen 92.0 (*)     Creatinine 3.90 (*)     Calcium Level Total 6.0 (*)     Protein Total 4.6 (*)     Albumin Level 2.0 (*)     Albumin/Globulin Ratio 0.8 (*)     All other components within normal limits   POCT GLUCOSE - Abnormal; Notable for the following components:    POCT Glucose >500 (*)     All other components within normal limits   POCT GLUCOSE - Abnormal; Notable for the following components:    POCT Glucose >500 (*)     All other components within normal limits   HCG, SERUM, QUALITATIVE - Normal   BLOOD CULTURE [facility]   BLOOD CULTURE [facility]   CBC W/ AUTO DIFFERENTIAL    Narrative:     The following orders were created for panel order CBC auto differential.  Procedure                               Abnormality         Status                     ---------                               -----------         ------                     CBC with Differential[[id number]]       Abnormal            Final result               Manual Differential[[id number]]          Abnormal            Final result                 Please view results for these tests on the individual orders.   POCT GLUCOSE MONITORING CONTINUOUS     EKG Readings: (Independently Interpreted)   02/20/2024 at 11:08 p.m.   Ventricular rate 87   Normal sinus rhythm  Normal axis   All All intervals normal   No acute ischemic changes   Diffusely peak T-waves   No STEMI  Interpreted by MD     ECG Results              EKG 12-lead (In process)        Collection Time Result Time QRS Duration OHS QTC Calculation    02/20/24 23:08:16 02/20/24 23:21:27 104 478                     In process by Interface, Lab In [facility] (02/20/24 23:21:34)                   Narrative:    Test Reason : R00.0,    Vent. Rate : 087 BPM     Atrial Rate : 087 BPM     P-R Int : 182 ms          QRS Dur : 104 ms      QT Int : 398 ms       P-R-T Axes : 084 057 012 degrees     QTc Int : 478 ms    Normal sinus rhythm  Possible Left atrial enlargement  Nonspecific ST abnormality  Abnormal ECG  When compared with ECG of 10-OCT-2023 19:58,  ST now depressed in Inferior leads  T wave amplitude has increased in Anterior-lateral leads  QT has shortened    Referred By: [email or web address]   SELF           Confirmed By:                                   Imaging Results              CT Abdomen Pelvis  Without Contrast (Preliminary result)  Result time 02/21/24 00:34:13      Preliminary result by Good Delacruz Jr., MD (02/21/24 00:34:13)                   Narrative:    START OF REPORT:  Technique: CT of the abdomen and pelvis was performed with axial images as well as sagittal and coronal reconstruction images without intravenous contrast.    Comparison: None available.    Clinical History: Acute renal failure; DKA.    Dosage Information: Automated Exposure Control was utilized.    Findings:  Lines and Tubes: A right femoral venous catheter is seen with its tip in the external iliac vein.  Thorax:  Lungs: The visualized lung bases appear  unremarkable.  Pleura: No effusions or thickening are seen.  Abdomen:  Abdominal Wall: No abdominal wall pathology is seen.  Liver: The liver appears unremarkable.  Biliary System: No intrahepatic or extrahepatic biliary duct dilatation is seen.  Gallbladder: Surgical clips are seen in the gallbladder fossa consistent with prior cholecystectomy.  Pancreas: The pancreas appears unremarkable.  Spleen: The spleen appears unremarkable.  Adrenals: The adrenal glands appear unremarkable.  Kidneys: The kidneys appear unremarkable with no stones cysts masses or hydronephrosis.  Aorta: The abdominal aorta appears unremarkable.  IVC: Unremarkable.  Bowel:  Esophagus: The visualized esophagus appears unremarkable.  Stomach: The stomach appears unremarkable.  Duodenum: Unremarkable appearing duodenum.  Small Bowel: There is a pronounced small bowel wall thickening . Considerations include enteritis and inflammatory bowel disease.  Colon: Nondistended. There is moderate stool in the colon which could reflect an element of constipation.  Appendix: The appendix appears unremarkable and is partially seen on Image 82, Series 2.  Peritoneum: No intraperitoneal free air or ascites is seen.    Pelvis:  Bladder: The bladder appears unremarkable.  Female:  Uterus: The uterus appears unremarkable.  Ovaries: The ovaries appear unremarkable.    Bony structures:  Dorsal Spine: The visualized dorsal spine appears unremarkable.  Bony Pelvis: The visualized bony structures of the pelvis appear unremarkable.      Impression:  1. There is a pronounced small bowel wall thickening . Considerations include enteritis and inflammatory bowel disease. Correlate clinically as regards further evaluation and follow-up.  2. Details and other findings as discussed above.                                         X-Ray Chest AP Portable (In process)                      Medications   sodium chloride 0.9% flush 10 mL (has no administration in time range)   0.9%   NaCl infusion (1,000 mLs Intravenous New Bag 2/21/24 0201)   dextrose 5 % and 0.45 % NaCl infusion (has no administration in time range)   dextrose 10 % infusion (has no administration in time range)   dextrose 10 % infusion (has no administration in time range)   insulin regular in 0.9 % NaCl 100 unit/100 mL (1 unit/mL) infusion (0.1 Units/kg/hr × 63.5 kg Intravenous New Bag 2/21/24 0013)   dextrose 10% bolus 125 mL 125 mL (has no administration in time range)   dextrose 10% bolus 250 mL 250 mL (has no administration in time range)   dextrose 10% bolus 500 mL 500 mL (has no administration in time range)     And   dextrose 10% bolus 250 mL 250 mL (has no administration in time range)   dextrose 10% bolus 125 mL 125 mL (has no administration in time range)   dextrose 10% bolus 250 mL 250 mL (has no administration in time range)   dextrose 10% bolus 125 mL 125 mL (has no administration in time range)   dextrose 10% bolus 250 mL 250 mL (has no administration in time range)   calcium gluconate 1 g 1 g in 50 mL IVPB (has no administration in time range)   sodium bicarbonate 8.4 % (1 mEq/mL) injection 50 mEq (has no administration in time range)   sodium bicarbonate 8.4 % (1 mEq/mL) injection 50 mEq (has no administration in time range)   sodium chloride 0.9% bolus 1,000 mL 1,000 mL (0 mLs Intravenous Stopped 2/20/24 2350)   sodium chloride 0.9% bolus 1,000 mL 1,000 mL (0 mLs Intravenous Stopped 2/20/24 2350)   insulin regular injection 10 Units 0.1 mL (10 Units Intravenous Given 2/20/24 2251)   albuterol nebulizer solution 10 mg (10 mg Nebulization Given 2/21/24 0043)   calcium gluconate 1 g in NS IVPB (premixed) (0 g Intravenous Stopped 2/21/24 0105)   sodium bicarbonate 8.4 % (1 mEq/mL) injection 50 mEq (50 mEq Intravenous Given 2/21/24 0006)   sodium chloride 0.9% bolus 1,000 mL 1,000 mL (0 mLs Intravenous Stopped 2/21/24 0104)   sodium chloride 0.9% bolus 1,000 mL 1,000 mL (0 mLs Intravenous Stopped 2/21/24 0103)    sodium zirconium cyclosilicate packet 10 g (10 g Oral Given 2/21/24 0118)   cefTRIAXone (Rocephin) 1 g in dextrose 5 % in water (D5W) 100 mL IVPB (MB+) (0 g Intravenous Stopped 2/21/24 0151)   morphine injection 4 mg (4 mg Intravenous Given 2/21/24 0151)   ondansetron injection 4 mg (4 mg Intravenous Given 2/21/24 0150)     Medical Decision Making  Amount and/or Complexity of Data Reviewed  Labs: ordered.  Radiology: ordered.    Risk  OTC drugs.  Prescription drug management.               ED Course as of 02/21/24 0229 Tue Feb 20, 2024   2342  Patient in DKA with significant metabolic derangements.  Insulin drip ordered, shifting agents for her potassium also ordered in addition to insulin drip.  We will speak with the hospitalist for admission. [AA]   Wed Feb 21, 2024   0058 Hospitalist paged [AA]   0105 I spoke to Dr. Biggs who reviewed patient's work up.  He states with her electrolyte abnormalities, particularly her sodium, she needs to be transferred to a facility with critical care available 24/7. [AA]   0127 I did discuss with the patient that after conversation with the hospitalist, she needs to be transferred to a facility with critical care constantly available.  She agrees with that. She is asking for pain medication. [AA]   0134 Transfer center called to get more information about the patient and will start looking for facilities. [AA]   0142 I spoke with the ED physician at [facility], they have no ICU beds available. [AA]   0217 I spoke with Dr. Hobbs at [facility] who accepts the patient to the ED. [AA]      ED Course User Index  [AA] Belinda Lunsford MD                             Clinical Impression:  Final diagnoses:  [R00.0] Tachycardia  [E13.10] Diabetic ketoacidosis without coma associated with other specified diabetes mellitus (Primary)  [E87.1] Hyponatremia  [E87.5] Hyperkalemia  [E83.51] Hypocalcemia          ED Disposition Condition    Transfer to Another Facility  Stable                Belinda Lunsford MD  02/21/24 0224       Belinda Lunsford MD  02/21/24 0229

## 2024-02-23 LAB
BACTERIA BLD CULT: NORMAL
BACTERIA BLD CULT: NORMAL
OHS QRS DURATION: 104 MS
OHS QTC CALCULATION: 478 MS

## 2024-02-26 LAB
BACTERIA BLD CULT: ABNORMAL
BACTERIA BLD CULT: NORMAL
GRAM STN SPEC: ABNORMAL

## 2024-02-28 PROCEDURE — 99222 1ST HOSP IP/OBS MODERATE 55: CPT | Mod: ,,,

## 2024-03-01 ENCOUNTER — TELEPHONE (OUTPATIENT)
Dept: EMERGENCY MEDICINE | Facility: HOSPITAL | Age: 29
End: 2024-03-01
Payer: MEDICAID

## 2024-03-04 ENCOUNTER — OUTSIDE PLACE OF SERVICE (OUTPATIENT)
Dept: ADMINISTRATIVE | Facility: OTHER | Age: 29
End: 2024-03-04
Payer: MEDICAID

## 2024-03-04 ENCOUNTER — HOSPITAL ENCOUNTER (EMERGENCY)
Facility: HOSPITAL | Age: 29
Discharge: HOME | End: 2024-03-04
Attending: FAMILY MEDICINE
Payer: MEDICAID

## 2024-03-04 VITALS
HEART RATE: 110 BPM | WEIGHT: 155 LBS | OXYGEN SATURATION: 100 % | RESPIRATION RATE: 20 BRPM | HEIGHT: 62 IN | DIASTOLIC BLOOD PRESSURE: 103 MMHG | BODY MASS INDEX: 28.52 KG/M2 | TEMPERATURE: 98 F | SYSTOLIC BLOOD PRESSURE: 166 MMHG

## 2024-03-04 DIAGNOSIS — N61.0 MASTITIS: Primary | ICD-10-CM

## 2024-03-04 LAB
ALBUMIN SERPL-MCNC: 2.7 G/DL (ref 3.5–5)
ALBUMIN/GLOB SERPL: 0.6 RATIO (ref 1.1–2)
ALP SERPL-CCNC: 85 UNIT/L (ref 40–150)
ALT SERPL-CCNC: 18 UNIT/L (ref 0–55)
AST SERPL-CCNC: 37 UNIT/L (ref 5–34)
BASOPHILS # BLD AUTO: 0.06 X10(3)/MCL
BASOPHILS NFR BLD AUTO: 0.6 %
BILIRUB SERPL-MCNC: 0.3 MG/DL
BUN SERPL-MCNC: 10 MG/DL (ref 7–18.7)
CALCIUM SERPL-MCNC: 8.7 MG/DL (ref 8.4–10.2)
CHLORIDE SERPL-SCNC: 101 MMOL/L (ref 98–107)
CO2 SERPL-SCNC: 19 MMOL/L (ref 22–29)
CREAT SERPL-MCNC: 1.18 MG/DL (ref 0.55–1.02)
EOSINOPHIL # BLD AUTO: 0.04 X10(3)/MCL (ref 0–0.9)
EOSINOPHIL NFR BLD AUTO: 0.4 %
ERYTHROCYTE [DISTWIDTH] IN BLOOD BY AUTOMATED COUNT: 14.8 % (ref 11.5–17)
ERYTHROCYTE [SEDIMENTATION RATE] IN BLOOD: 125 MM/HR (ref 0–20)
GFR SERPLBLD CREATININE-BSD FMLA CKD-EPI: >60 MLS/MIN/1.73/M2
GLOBULIN SER-MCNC: 4.3 GM/DL (ref 2.4–3.5)
GLUCOSE SERPL-MCNC: 318 MG/DL (ref 74–100)
HCT VFR BLD AUTO: 25.5 % (ref 37–47)
HGB BLD-MCNC: 8.3 G/DL (ref 12–16)
IMM GRANULOCYTES # BLD AUTO: 0.11 X10(3)/MCL (ref 0–0.04)
IMM GRANULOCYTES NFR BLD AUTO: 1 %
LYMPHOCYTES # BLD AUTO: 1.43 X10(3)/MCL (ref 0.6–4.6)
LYMPHOCYTES NFR BLD AUTO: 13.1 %
MCH RBC QN AUTO: 27.8 PG (ref 27–31)
MCHC RBC AUTO-ENTMCNC: 32.5 G/DL (ref 33–36)
MCV RBC AUTO: 85.3 FL (ref 80–94)
MONOCYTES # BLD AUTO: 0.62 X10(3)/MCL (ref 0.1–1.3)
MONOCYTES NFR BLD AUTO: 5.7 %
NEUTROPHILS # BLD AUTO: 8.63 X10(3)/MCL (ref 2.1–9.2)
NEUTROPHILS NFR BLD AUTO: 79.2 %
PLATELET # BLD AUTO: 624 X10(3)/MCL (ref 130–400)
PMV BLD AUTO: 8.4 FL (ref 7.4–10.4)
POCT GLUCOSE: 319 MG/DL (ref 70–110)
POTASSIUM SERPL-SCNC: 5.3 MMOL/L (ref 3.5–5.1)
PROT SERPL-MCNC: 7 GM/DL (ref 6.4–8.3)
RBC # BLD AUTO: 2.99 X10(6)/MCL (ref 4.2–5.4)
SODIUM SERPL-SCNC: 132 MMOL/L (ref 136–145)
WBC # SPEC AUTO: 10.89 X10(3)/MCL (ref 4.5–11.5)

## 2024-03-04 PROCEDURE — 96361 HYDRATE IV INFUSION ADD-ON: CPT

## 2024-03-04 PROCEDURE — 85025 COMPLETE CBC W/AUTO DIFF WBC: CPT | Performed by: NURSE PRACTITIONER

## 2024-03-04 PROCEDURE — 96375 TX/PRO/DX INJ NEW DRUG ADDON: CPT | Mod: 59

## 2024-03-04 PROCEDURE — 85652 RBC SED RATE AUTOMATED: CPT | Performed by: NURSE PRACTITIONER

## 2024-03-04 PROCEDURE — 63600175 PHARM REV CODE 636 W HCPCS: Performed by: NURSE PRACTITIONER

## 2024-03-04 PROCEDURE — 96374 THER/PROPH/DIAG INJ IV PUSH: CPT

## 2024-03-04 PROCEDURE — 25500020 PHARM REV CODE 255: Performed by: NURSE PRACTITIONER

## 2024-03-04 PROCEDURE — 99285 EMERGENCY DEPT VISIT HI MDM: CPT | Mod: 25

## 2024-03-04 PROCEDURE — 25000003 PHARM REV CODE 250: Performed by: NURSE PRACTITIONER

## 2024-03-04 PROCEDURE — 80053 COMPREHEN METABOLIC PANEL: CPT | Performed by: NURSE PRACTITIONER

## 2024-03-04 PROCEDURE — 86141 C-REACTIVE PROTEIN HS: CPT | Performed by: NURSE PRACTITIONER

## 2024-03-04 RX ORDER — NAPROXEN 500 MG/1
500 TABLET ORAL 2 TIMES DAILY
Qty: 30 TABLET | Refills: 0 | Status: SHIPPED | OUTPATIENT
Start: 2024-03-04 | End: 2024-03-18

## 2024-03-04 RX ORDER — HYDROMORPHONE HYDROCHLORIDE 2 MG/ML
1 INJECTION, SOLUTION INTRAMUSCULAR; INTRAVENOUS; SUBCUTANEOUS
Status: COMPLETED | OUTPATIENT
Start: 2024-03-04 | End: 2024-03-04

## 2024-03-04 RX ORDER — ONDANSETRON HYDROCHLORIDE 2 MG/ML
4 INJECTION, SOLUTION INTRAVENOUS
Status: COMPLETED | OUTPATIENT
Start: 2024-03-04 | End: 2024-03-04

## 2024-03-04 RX ORDER — CLINDAMYCIN HYDROCHLORIDE 150 MG/1
300 CAPSULE ORAL 4 TIMES DAILY
Qty: 40 CAPSULE | Refills: 0 | Status: SHIPPED | OUTPATIENT
Start: 2024-03-04 | End: 2024-03-09

## 2024-03-04 RX ADMIN — SODIUM CHLORIDE 1000 ML: 9 INJECTION, SOLUTION INTRAVENOUS at 09:03

## 2024-03-04 RX ADMIN — ONDANSETRON 4 MG: 2 INJECTION INTRAMUSCULAR; INTRAVENOUS at 09:03

## 2024-03-04 RX ADMIN — IOPAMIDOL 100 ML: 755 INJECTION, SOLUTION INTRAVENOUS at 09:03

## 2024-03-04 RX ADMIN — HYDROMORPHONE HYDROCHLORIDE 1 MG: 2 INJECTION, SOLUTION INTRAMUSCULAR; INTRAVENOUS; SUBCUTANEOUS at 09:03

## 2024-03-05 LAB — CRP SERPL HS-MCNC: 1.18 MG/L

## 2024-03-05 NOTE — ED PROVIDER NOTES
Encounter Date: 3/4/2024       History     Chief Complaint   Patient presents with    Emesis     C/o L breast swelling and pain. Also c/o back pain and vomiting. DC from [facility] today.     Patient is a 28-year-old female who presents to the emergency department with complaints of left breast pain and swelling for the last 2 days.  States this started after having a PICC line but there has no swelling to the upper arm.  Patient also complains of back pain in his area with some associated vomiting.  Patient has frequent visits this emerged room for DKA and frequently has the vomiting/retching with elevated blood sugars.  She denies any fevers chills.  She denies any shortness of breath.  She denies any diarrhea.  She denies any injury or trauma to the breast.  She denies any other complaints or associated symptoms at this time.      Review of patient's allergies indicates:  No Known Allergies  Past Medical History:   Diagnosis Date    Diabetes mellitus     Diabetic gastroparesis associated with type 1 diabetes mellitus     DKA (diabetic ketoacidosis)     Hypertension     Non compliance with medical treatment      Past Surgical History:   Procedure Laterality Date    CHOLECYSTECTOMY      ESOPHAGOGASTRODUODENOSCOPY      Multiple    None       Family History   Problem Relation Age of Onset    Heart disease Mother     Hypertension Mother     Diabetes Mother     Hyperlipidemia Mother     Cancer Father     Stroke Father     Hypertension Father     Hyperlipidemia Father      Social History     Tobacco Use    Smoking status: Never    Smokeless tobacco: Never   Substance Use Topics    Alcohol use: Never    Drug use: Not Currently     Types: Marijuana     Review of Systems   Constitutional:  Negative for activity change, appetite change and fever.   HENT:  Negative for congestion, dental problem and sore throat.    Respiratory:  Negative for shortness of breath.    Cardiovascular:  Negative for chest pain.    Gastrointestinal:  Negative for nausea.   Genitourinary:  Negative for dysuria.   Musculoskeletal:  Positive for myalgias. Negative for back pain.   Skin:  Positive for wound. Negative for rash.   Neurological:  Negative for weakness.   Hematological:  Does not bruise/bleed easily.   Psychiatric/Behavioral:  Positive for agitation.    All other systems reviewed and are negative.      Physical Exam     Initial Vitals   BP Pulse Resp Temp SpO2   03/04/24 1828 03/04/24 1825 03/04/24 1825 03/04/24 1825 03/04/24 1825   (!) 166/98 (!) 122 16 98.7 °F (37.1 °C) 100 %      MAP       --                Physical Exam    Nursing note and vitals reviewed.  Constitutional: Vital signs are normal. She appears well-developed and well-nourished.  Non-toxic appearance. She does not have a sickly appearance.   HENT:   Head: Normocephalic and atraumatic.   Eyes: Conjunctivae, EOM and lids are normal. Lids are everted and swept, no foreign bodies found.   Neck: Trachea normal and phonation normal. Neck supple. No thyroid mass and no thyromegaly present.   Normal range of motion.   Full passive range of motion without pain.     Cardiovascular:  Normal rate, regular rhythm, S1 normal, S2 normal, normal heart sounds, intact distal pulses and normal pulses.           Pulmonary/Chest: Breath sounds normal. No respiratory distress.   Abdominal: Abdomen is soft. She exhibits no distension. There is no abdominal tenderness.   Musculoskeletal:      Cervical back: Full passive range of motion without pain, normal range of motion and neck supple.     Lymphadenopathy:     She has no cervical adenopathy.   Neurological: She is alert and oriented to person, place, and time. She has normal strength.   Skin: Skin is warm, dry and intact. Capillary refill takes less than 2 seconds.   Left breast is obviously more swollen and edematous than the left.  No obvious palpable mass.  No erythema no redness no heat noted.  Patient does have significant tender  with palpation along the breast tissue mainly in the lower breast tissue.   Psychiatric: She has a normal mood and affect. Her speech is normal and behavior is normal. Judgment normal. Cognition and memory are normal.         ED Course   Procedures  Labs Reviewed   COMPREHENSIVE METABOLIC PANEL - Abnormal; Notable for the following components:       Result Value    Sodium Level 132 (*)     Potassium Level 5.3 (*)     Carbon Dioxide 19 (*)     Glucose Level 318 (*)     Creatinine 1.18 (*)     Albumin Level 2.7 (*)     Globulin 4.3 (*)     Albumin/Globulin Ratio 0.6 (*)     Aspartate Aminotransferase 37 (*)     All other components within normal limits   SEDIMENTATION RATE, AUTOMATED - Abnormal; Notable for the following components:    Sed Rate 125 (*)     All other components within normal limits   CBC WITH DIFFERENTIAL - Abnormal; Notable for the following components:    RBC 2.99 (*)     Hgb 8.3 (*)     Hct 25.5 (*)     MCHC 32.5 (*)     Platelet 624 (*)     IG# 0.11 (*)     All other components within normal limits   POCT GLUCOSE - Abnormal; Notable for the following components:    POCT Glucose 319 (*)     All other components within normal limits   CBC W/ AUTO DIFFERENTIAL    Narrative:     The following orders were created for panel order CBC auto differential.  Procedure                               Abnormality         Status                     ---------                               -----------         ------                     CBC with Differential[[id number]]       Abnormal            Final result                 Please view results for these tests on the individual orders.   URINALYSIS, REFLEX TO URINE CULTURE   HIGH SENSITIVITY CRP   DRUG SCREEN, URINE (BEAKER)          Imaging Results              CT Chest With Contrast (Final result)  Result time 03/04/24 21:39:59      Final result by Dario Vu MD (03/04/24 21:39:59)                   Impression:      Skin thickening and edema of the left  breast with no gross adenopathy likely represents mastitis. Recommend follow-up to resolution to exclude much less likely malignancy.        Electronically signed by: Dario Vu  Date:    03/04/2024  Time:    21:39               Narrative:    EXAMINATION:  CT CHEST WITH CONTRAST    CLINICAL HISTORY:  Soft tissue mass, chest, US/xray nondiagnostic;swelling of the left breast; no palpable mass; r/o cellulitis/abscess of breast;    TECHNIQUE:  Axial images of the chest were obtained with contrast.  Sagittal and coronal reconstructed images were available for review.    Automatic dose control was utilized to reduce patient radiation dose.    DLP = 120    COMPARISON:  No prior imaging available for comparison.    FINDINGS:  AORTA: Limited evaluation secondary to lack of IV contrast.  Grossly normal in course and caliber.    THYROID GLAND: The visualized thyroid is unremarkable.    AIRWAYS: Trachea is midline and tracheobronchial tree is patent.    HEART: The heart is normal in size. There is no pericardial effusion.    LUNGS: There are no new masses or nodules identified. No pleural effusion or pneumothorax.    LYMPH NODES: There is no significant mediastinal, axillary or hilar lymphadenopathy.    BONES: No displaced fracture. No aggressive appearing osseous lesion identified.    UPPER ABDOMEN: Skin thickening and edema of the left breast with no gross adenopathy likely represents mastitis.  Recommend follow-up to resolution.                                         Medications   sodium chloride 0.9% bolus 1,000 mL 1,000 mL (1,000 mLs Intravenous New Bag 3/4/24 2101)   ondansetron injection 4 mg (4 mg Intravenous Given 3/4/24 2102)   HYDROmorphone (PF) injection 1 mg (1 mg Intravenous Given 3/4/24 2102)   iopamidoL (ISOVUE-370) injection 100 mL (100 mLs Intravenous Given 3/4/24 2111)     Medical Decision Making  Patient is a 28-year-old female who presents to the emergency department with complaints of left breast  pain and swelling for the last 2 days.  States this started after having a PICC line but there has no swelling to the upper arm.  Patient also complains of back pain in his area with some associated vomiting.  Patient has frequent visits this emerged room for DKA and frequently has the vomiting/retching with elevated blood sugars.  She denies any fevers chills.  She denies any shortness of breath.  She denies any diarrhea.  She denies any injury or trauma to the breast.  She denies any other complaints or associated symptoms at this time.    Problems Addressed:  Mastitis: acute illness or injury     Details: Mastitis seen on CT.  Elevated sed rate as well.  Will put on antibiotics to cover for bacterial infection.  Will give naproxen for anti-inflammatory effect.  Discussed hot compresses needed for the pain and swelling.  Discussed follow-up with PCP to have mammogram done to rule out any possibility of cancer.  Patient aware of plan of care and had no other questions or concerns prior to discharge.    Amount and/or Complexity of Data Reviewed  Labs: ordered. Decision-making details documented in ED Course.  Radiology: ordered. Decision-making details documented in ED Course.    Risk  Prescription drug management.                                      Clinical Impression:  Final diagnoses:  [N61.0] Mastitis (Primary)          ED Disposition Condition    Discharge Stable          ED Prescriptions       Medication Sig Dispense Start Date End Date Auth. Provider    clindamycin (CLEOCIN) 150 MG capsule Take 2 capsules (300 mg total) by mouth 4 (four) times daily. for 5 days 40 capsule 3/4/2024 3/9/2024 Omid Hilliard FNP    naproxen (NAPROSYN) 500 MG tablet Take 1 tablet (500 mg total) by mouth 2 (two) times daily. for 15 days 30 tablet 3/4/2024 3/19/2024 Omid Hilliard FNP          Follow-up Information       Follow up With Specialties Details Why Contact Info    Kumar Ortiz NP Family Medicine Schedule an  appointment as soon as possible for a visit in 3 days For ER Follow Up. [address] [place] [id number]  [phone]               Omid Hilliard, [facility]  03/04/24 5177

## 2024-03-18 ENCOUNTER — HOSPITAL ENCOUNTER (INPATIENT)
Facility: HOSPITAL | Age: 29
LOS: 2 days | Discharge: HOME | DRG: 638 | End: 2024-03-20
Attending: INTERNAL MEDICINE | Admitting: EMERGENCY MEDICINE
Payer: MEDICAID

## 2024-03-18 ENCOUNTER — ANESTHESIA (OUTPATIENT)
Dept: EMERGENCY MEDICINE | Facility: HOSPITAL | Age: 29
DRG: 638 | End: 2024-03-18
Payer: MEDICAID

## 2024-03-18 ENCOUNTER — ANESTHESIA EVENT (OUTPATIENT)
Dept: EMERGENCY MEDICINE | Facility: HOSPITAL | Age: 29
DRG: 638 | End: 2024-03-18
Payer: MEDICAID

## 2024-03-18 DIAGNOSIS — R07.9 CHEST PAIN: ICD-10-CM

## 2024-03-18 DIAGNOSIS — R11.2 INTRACTABLE NAUSEA AND VOMITING: ICD-10-CM

## 2024-03-18 DIAGNOSIS — Z45.2 ENCOUNTER FOR CENTRAL LINE PLACEMENT: ICD-10-CM

## 2024-03-18 DIAGNOSIS — E10.10 DIABETIC KETOACIDOSIS WITHOUT COMA ASSOCIATED WITH TYPE 1 DIABETES MELLITUS: Primary | ICD-10-CM

## 2024-03-18 LAB
ALLENS TEST: ABNORMAL
ANION GAP SERPL CALC-SCNC: 11 MEQ/L
ANION GAP SERPL CALC-SCNC: 14 MEQ/L
ANION GAP SERPL CALC-SCNC: 22 MEQ/L
APPEARANCE UR: CLEAR
BACTERIA #/AREA URNS AUTO: ABNORMAL /HPF
BASOPHILS # BLD AUTO: 0.04 X10(3)/MCL
BASOPHILS NFR BLD AUTO: 0.4 %
BILIRUB UR QL STRIP.AUTO: NEGATIVE
BUN SERPL-MCNC: 17 MG/DL (ref 7–18.7)
BUN SERPL-MCNC: 20 MG/DL (ref 7–18.7)
BUN SERPL-MCNC: 21 MG/DL (ref 7–18.7)
CALCIUM SERPL-MCNC: 8.4 MG/DL (ref 8.4–10.2)
CALCIUM SERPL-MCNC: 8.6 MG/DL (ref 8.4–10.2)
CALCIUM SERPL-MCNC: 8.6 MG/DL (ref 8.4–10.2)
CHLORIDE SERPL-SCNC: 100 MMOL/L (ref 98–107)
CHLORIDE SERPL-SCNC: 103 MMOL/L (ref 98–107)
CHLORIDE SERPL-SCNC: 93 MMOL/L (ref 98–107)
CO2 SERPL-SCNC: 11 MMOL/L (ref 22–29)
CO2 SERPL-SCNC: 18 MMOL/L (ref 22–29)
CO2 SERPL-SCNC: 22 MMOL/L (ref 22–29)
COLOR UR AUTO: YELLOW
CREAT SERPL-MCNC: 1.11 MG/DL (ref 0.55–1.02)
CREAT SERPL-MCNC: 1.22 MG/DL (ref 0.55–1.02)
CREAT SERPL-MCNC: 1.41 MG/DL (ref 0.55–1.02)
CREAT/UREA NIT SERPL: 15
CREAT/UREA NIT SERPL: 15
CREAT/UREA NIT SERPL: 16
DELSYS: ABNORMAL
EOSINOPHIL # BLD AUTO: 0.01 X10(3)/MCL (ref 0–0.9)
EOSINOPHIL NFR BLD AUTO: 0.1 %
ERYTHROCYTE [DISTWIDTH] IN BLOOD BY AUTOMATED COUNT: 16.7 % (ref 11.5–17)
GFR SERPLBLD CREATININE-BSD FMLA CKD-EPI: 52 MLS/MIN/1.73/M2
GFR SERPLBLD CREATININE-BSD FMLA CKD-EPI: >60 MLS/MIN/1.73/M2
GFR SERPLBLD CREATININE-BSD FMLA CKD-EPI: >60 MLS/MIN/1.73/M2
GLUCOSE SERPL-MCNC: 127 MG/DL (ref 74–100)
GLUCOSE SERPL-MCNC: 319 MG/DL (ref 74–100)
GLUCOSE SERPL-MCNC: 782 MG/DL (ref 74–100)
GLUCOSE UR QL STRIP.AUTO: ABNORMAL
HCO3 UR-SCNC: 10.6 MMOL/L (ref 24–28)
HCT VFR BLD AUTO: 24.4 % (ref 37–47)
HGB BLD-MCNC: 7 G/DL (ref 12–16)
IMM GRANULOCYTES # BLD AUTO: 0.18 X10(3)/MCL (ref 0–0.04)
IMM GRANULOCYTES NFR BLD AUTO: 1.7 %
KETONES UR QL STRIP.AUTO: ABNORMAL
LEUKOCYTE ESTERASE UR QL STRIP.AUTO: NEGATIVE
LYMPHOCYTES # BLD AUTO: 1.11 X10(3)/MCL (ref 0.6–4.6)
LYMPHOCYTES NFR BLD AUTO: 10.4 %
MCH RBC QN AUTO: 28.9 PG (ref 27–31)
MCHC RBC AUTO-ENTMCNC: 28.7 G/DL (ref 33–36)
MCV RBC AUTO: 100.8 FL (ref 80–94)
MONOCYTES # BLD AUTO: 0.47 X10(3)/MCL (ref 0.1–1.3)
MONOCYTES NFR BLD AUTO: 4.4 %
NEUTROPHILS # BLD AUTO: 8.88 X10(3)/MCL (ref 2.1–9.2)
NEUTROPHILS NFR BLD AUTO: 83 %
NITRITE UR QL STRIP.AUTO: NEGATIVE
PCO2 BLDA: 20.2 MMHG (ref 35–45)
PH SMN: 7.33 [PH] (ref 7.35–7.45)
PH UR STRIP.AUTO: 5.5 [PH]
PLATELET # BLD AUTO: 596 X10(3)/MCL (ref 130–400)
PMV BLD AUTO: 8.9 FL (ref 7.4–10.4)
PO2 BLDA: 101 MMHG (ref 80–100)
POC BE: -15 MMOL/L
POC SATURATED O2: 98 % (ref 95–100)
POC TCO2: 11 MMOL/L (ref 23–27)
POCT GLUCOSE: 137 MG/DL (ref 70–110)
POCT GLUCOSE: 165 MG/DL (ref 70–110)
POCT GLUCOSE: 206 MG/DL (ref 70–110)
POCT GLUCOSE: 356 MG/DL (ref 70–110)
POCT GLUCOSE: 438 MG/DL (ref 70–110)
POCT GLUCOSE: >500 MG/DL (ref 70–110)
POCT GLUCOSE: >500 MG/DL (ref 70–110)
POTASSIUM SERPL-SCNC: 4.1 MMOL/L (ref 3.5–5.1)
POTASSIUM SERPL-SCNC: 4.1 MMOL/L (ref 3.5–5.1)
POTASSIUM SERPL-SCNC: 4.8 MMOL/L (ref 3.5–5.1)
PROT UR QL STRIP.AUTO: ABNORMAL
RBC # BLD AUTO: 2.42 X10(6)/MCL (ref 4.2–5.4)
RBC #/AREA URNS AUTO: ABNORMAL /HPF
RBC UR QL AUTO: ABNORMAL
SAMPLE: ABNORMAL
SITE: ABNORMAL
SODIUM SERPL-SCNC: 126 MMOL/L (ref 136–145)
SODIUM SERPL-SCNC: 132 MMOL/L (ref 136–145)
SODIUM SERPL-SCNC: 136 MMOL/L (ref 136–145)
SP GR UR STRIP.AUTO: 1.01 (ref 1–1.03)
SQUAMOUS #/AREA URNS AUTO: ABNORMAL /HPF
UROBILINOGEN UR STRIP-ACNC: 0.2
WBC # SPEC AUTO: 10.69 X10(3)/MCL (ref 4.5–11.5)
WBC #/AREA URNS AUTO: ABNORMAL /HPF

## 2024-03-18 PROCEDURE — 96375 TX/PRO/DX INJ NEW DRUG ADDON: CPT

## 2024-03-18 PROCEDURE — 99291 CRITICAL CARE FIRST HOUR: CPT

## 2024-03-18 PROCEDURE — 63600175 PHARM REV CODE 636 W HCPCS: Performed by: INTERNAL MEDICINE

## 2024-03-18 PROCEDURE — 80048 BASIC METABOLIC PNL TOTAL CA: CPT | Performed by: EMERGENCY MEDICINE

## 2024-03-18 PROCEDURE — 85025 COMPLETE CBC W/AUTO DIFF WBC: CPT | Performed by: INTERNAL MEDICINE

## 2024-03-18 PROCEDURE — 25000003 PHARM REV CODE 250: Performed by: EMERGENCY MEDICINE

## 2024-03-18 PROCEDURE — 82803 BLOOD GASES ANY COMBINATION: CPT

## 2024-03-18 PROCEDURE — 96374 THER/PROPH/DIAG INJ IV PUSH: CPT | Mod: 59

## 2024-03-18 PROCEDURE — A4216 STERILE WATER/SALINE, 10 ML: HCPCS | Performed by: INTERNAL MEDICINE

## 2024-03-18 PROCEDURE — 96365 THER/PROPH/DIAG IV INF INIT: CPT

## 2024-03-18 PROCEDURE — 36556 INSERT NON-TUNNEL CV CATH: CPT | Performed by: NURSE ANESTHETIST, CERTIFIED REGISTERED

## 2024-03-18 PROCEDURE — 25000003 PHARM REV CODE 250: Performed by: INTERNAL MEDICINE

## 2024-03-18 PROCEDURE — 96361 HYDRATE IV INFUSION ADD-ON: CPT

## 2024-03-18 PROCEDURE — S5010 5% DEXTROSE AND 0.45% SALINE: HCPCS | Performed by: INTERNAL MEDICINE

## 2024-03-18 PROCEDURE — 82962 GLUCOSE BLOOD TEST: CPT

## 2024-03-18 PROCEDURE — 02HV33Z INSERTION OF INFUSION DEVICE INTO SUPERIOR VENA CAVA, PERCUTANEOUS APPROACH: ICD-10-PCS | Performed by: INTERNAL MEDICINE

## 2024-03-18 PROCEDURE — 63600175 PHARM REV CODE 636 W HCPCS

## 2024-03-18 PROCEDURE — 96366 THER/PROPH/DIAG IV INF ADDON: CPT

## 2024-03-18 PROCEDURE — 94761 N-INVAS EAR/PLS OXIMETRY MLT: CPT | Mod: XB

## 2024-03-18 PROCEDURE — 81003 URINALYSIS AUTO W/O SCOPE: CPT | Performed by: INTERNAL MEDICINE

## 2024-03-18 PROCEDURE — 20000000 HC ICU ROOM

## 2024-03-18 PROCEDURE — 80048 BASIC METABOLIC PNL TOTAL CA: CPT | Performed by: INTERNAL MEDICINE

## 2024-03-18 PROCEDURE — 36600 WITHDRAWAL OF ARTERIAL BLOOD: CPT

## 2024-03-18 RX ORDER — ONDANSETRON 4 MG/1
8 TABLET, ORALLY DISINTEGRATING ORAL
Status: COMPLETED | OUTPATIENT
Start: 2024-03-18 | End: 2024-03-18

## 2024-03-18 RX ORDER — ONDANSETRON HYDROCHLORIDE 4 MG/5ML
4 SOLUTION ORAL EVERY 6 HOURS PRN
Status: DISCONTINUED | OUTPATIENT
Start: 2024-03-18 | End: 2024-03-18

## 2024-03-18 RX ORDER — SODIUM CHLORIDE 9 MG/ML
1000 INJECTION, SOLUTION INTRAVENOUS CONTINUOUS
Status: DISCONTINUED | OUTPATIENT
Start: 2024-03-18 | End: 2024-03-18

## 2024-03-18 RX ORDER — NALOXONE HCL 0.4 MG/ML
0.02 VIAL (ML) INJECTION
Status: DISCONTINUED | OUTPATIENT
Start: 2024-03-18 | End: 2024-03-20 | Stop reason: HOSPADM

## 2024-03-18 RX ORDER — DEXTROSE MONOHYDRATE 100 MG/ML
INJECTION, SOLUTION INTRAVENOUS
Status: DISCONTINUED | OUTPATIENT
Start: 2024-03-18 | End: 2024-03-20 | Stop reason: HOSPADM

## 2024-03-18 RX ORDER — DIPHENHYDRAMINE HYDROCHLORIDE 50 MG/ML
25 INJECTION INTRAMUSCULAR; INTRAVENOUS
Status: COMPLETED | OUTPATIENT
Start: 2024-03-18 | End: 2024-03-18

## 2024-03-18 RX ORDER — KETOROLAC TROMETHAMINE 30 MG/ML
15 INJECTION, SOLUTION INTRAMUSCULAR; INTRAVENOUS
Status: COMPLETED | OUTPATIENT
Start: 2024-03-18 | End: 2024-03-18

## 2024-03-18 RX ORDER — PANTOPRAZOLE SODIUM 40 MG/1
40 TABLET, DELAYED RELEASE ORAL DAILY
Status: DISCONTINUED | OUTPATIENT
Start: 2024-03-18 | End: 2024-03-20 | Stop reason: HOSPADM

## 2024-03-18 RX ORDER — SODIUM CHLORIDE 0.9 % (FLUSH) 0.9 %
10 SYRINGE (ML) INJECTION EVERY 12 HOURS PRN
Status: DISCONTINUED | OUTPATIENT
Start: 2024-03-18 | End: 2024-03-20 | Stop reason: HOSPADM

## 2024-03-18 RX ORDER — ONDANSETRON HYDROCHLORIDE 2 MG/ML
INJECTION, SOLUTION INTRAVENOUS
Status: COMPLETED
Start: 2024-03-18 | End: 2024-03-18

## 2024-03-18 RX ORDER — DEXTROSE MONOHYDRATE AND SODIUM CHLORIDE 5; .45 G/100ML; G/100ML
INJECTION, SOLUTION INTRAVENOUS CONTINUOUS PRN
Status: DISCONTINUED | OUTPATIENT
Start: 2024-03-18 | End: 2024-03-19

## 2024-03-18 RX ORDER — MUPIROCIN 20 MG/G
OINTMENT TOPICAL 2 TIMES DAILY
Status: DISCONTINUED | OUTPATIENT
Start: 2024-03-18 | End: 2024-03-20 | Stop reason: HOSPADM

## 2024-03-18 RX ORDER — HALOPERIDOL 5 MG/ML
5 INJECTION INTRAMUSCULAR
Status: DISCONTINUED | OUTPATIENT
Start: 2024-03-18 | End: 2024-03-18

## 2024-03-18 RX ORDER — PROCHLORPERAZINE EDISYLATE 5 MG/ML
5 INJECTION INTRAMUSCULAR; INTRAVENOUS EVERY 6 HOURS PRN
Status: DISCONTINUED | OUTPATIENT
Start: 2024-03-18 | End: 2024-03-18

## 2024-03-18 RX ORDER — SODIUM CHLORIDE 0.9 % (FLUSH) 0.9 %
10 SYRINGE (ML) INJECTION
Status: DISCONTINUED | OUTPATIENT
Start: 2024-03-18 | End: 2024-03-20 | Stop reason: HOSPADM

## 2024-03-18 RX ORDER — ACETAMINOPHEN 325 MG/1
650 TABLET ORAL EVERY 8 HOURS PRN
Status: DISCONTINUED | OUTPATIENT
Start: 2024-03-18 | End: 2024-03-20 | Stop reason: HOSPADM

## 2024-03-18 RX ORDER — ACETAMINOPHEN 325 MG/1
650 TABLET ORAL EVERY 4 HOURS PRN
Status: DISCONTINUED | OUTPATIENT
Start: 2024-03-18 | End: 2024-03-20 | Stop reason: HOSPADM

## 2024-03-18 RX ORDER — METOPROLOL TARTRATE 50 MG/1
50 TABLET ORAL 2 TIMES DAILY
Status: DISCONTINUED | OUTPATIENT
Start: 2024-03-18 | End: 2024-03-20 | Stop reason: HOSPADM

## 2024-03-18 RX ORDER — AMITRIPTYLINE HYDROCHLORIDE 25 MG/1
25 TABLET, FILM COATED ORAL NIGHTLY
Status: DISCONTINUED | OUTPATIENT
Start: 2024-03-18 | End: 2024-03-20 | Stop reason: HOSPADM

## 2024-03-18 RX ORDER — SODIUM CHLORIDE, SODIUM LACTATE, POTASSIUM CHLORIDE, CALCIUM CHLORIDE 600; 310; 30; 20 MG/100ML; MG/100ML; MG/100ML; MG/100ML
1000 INJECTION, SOLUTION INTRAVENOUS CONTINUOUS
Status: ACTIVE | OUTPATIENT
Start: 2024-03-18 | End: 2024-03-18

## 2024-03-18 RX ORDER — ONDANSETRON HYDROCHLORIDE 2 MG/ML
4 INJECTION, SOLUTION INTRAVENOUS EVERY 6 HOURS PRN
Status: DISCONTINUED | OUTPATIENT
Start: 2024-03-18 | End: 2024-03-20 | Stop reason: HOSPADM

## 2024-03-18 RX ORDER — HALOPERIDOL 5 MG/ML
2 INJECTION INTRAMUSCULAR
Status: DISCONTINUED | OUTPATIENT
Start: 2024-03-18 | End: 2024-03-18

## 2024-03-18 RX ORDER — HYDROCODONE BITARTRATE AND ACETAMINOPHEN 5; 325 MG/1; MG/1
1 TABLET ORAL EVERY 4 HOURS PRN
Status: DISCONTINUED | OUTPATIENT
Start: 2024-03-18 | End: 2024-03-20 | Stop reason: HOSPADM

## 2024-03-18 RX ORDER — SODIUM CHLORIDE 0.9 % (FLUSH) 0.9 %
10 SYRINGE (ML) INJECTION EVERY 8 HOURS
Status: DISCONTINUED | OUTPATIENT
Start: 2024-03-18 | End: 2024-03-20 | Stop reason: HOSPADM

## 2024-03-18 RX ADMIN — ONDANSETRON 8 MG: 4 TABLET, ORALLY DISINTEGRATING ORAL at 11:03

## 2024-03-18 RX ADMIN — ONDANSETRON 4 MG: 2 INJECTION INTRAMUSCULAR; INTRAVENOUS at 09:03

## 2024-03-18 RX ADMIN — HYDROCODONE BITARTRATE AND ACETAMINOPHEN 1 TABLET: 5; 325 TABLET ORAL at 03:03

## 2024-03-18 RX ADMIN — DIPHENHYDRAMINE HYDROCHLORIDE 25 MG: 50 INJECTION INTRAMUSCULAR; INTRAVENOUS at 11:03

## 2024-03-18 RX ADMIN — MUSCLE RUB CREAM: 100; 150 CREAM TOPICAL at 06:03

## 2024-03-18 RX ADMIN — METOPROLOL TARTRATE 50 MG: 50 TABLET, FILM COATED ORAL at 10:03

## 2024-03-18 RX ADMIN — Medication 10 ML: at 10:03

## 2024-03-18 RX ADMIN — MUPIROCIN 1 G: 20 OINTMENT TOPICAL at 10:03

## 2024-03-18 RX ADMIN — AMITRIPTYLINE HYDROCHLORIDE 25 MG: 25 TABLET, FILM COATED ORAL at 10:03

## 2024-03-18 RX ADMIN — PANTOPRAZOLE SODIUM 40 MG: 40 TABLET, DELAYED RELEASE ORAL at 04:03

## 2024-03-18 RX ADMIN — SODIUM CHLORIDE, POTASSIUM CHLORIDE, SODIUM LACTATE AND CALCIUM CHLORIDE 1770 ML: 600; 310; 30; 20 INJECTION, SOLUTION INTRAVENOUS at 11:03

## 2024-03-18 RX ADMIN — HYDROCODONE BITARTRATE AND ACETAMINOPHEN 1 TABLET: 5; 325 TABLET ORAL at 07:03

## 2024-03-18 RX ADMIN — MUSCLE RUB CREAM: 100; 150 CREAM TOPICAL at 10:03

## 2024-03-18 RX ADMIN — KETOROLAC TROMETHAMINE 15 MG: 30 INJECTION, SOLUTION INTRAMUSCULAR; INTRAVENOUS at 11:03

## 2024-03-18 RX ADMIN — INSULIN HUMAN 0.1 UNITS/KG/HR: 1 INJECTION, SOLUTION INTRAVENOUS at 11:03

## 2024-03-18 RX ADMIN — DEXTROSE AND SODIUM CHLORIDE: 5; 450 INJECTION, SOLUTION INTRAVENOUS at 06:03

## 2024-03-18 RX ADMIN — SODIUM CHLORIDE, POTASSIUM CHLORIDE, SODIUM LACTATE AND CALCIUM CHLORIDE 1000 ML: 600; 310; 30; 20 INJECTION, SOLUTION INTRAVENOUS at 01:03

## 2024-03-18 NOTE — H&P
[facility] - Emergency Dept    History & Physical      Patient Name: Dorene Husain  MRN: [id number]  Admission Date: 3/18/2024  Attending Physician: Gurvinder Restrepo MD   Primary Care Provider: Kumar Ortiz NP         Patient information was obtained from patient and ER records.     Subjective:     Principal Problem:Diabetic ketoacidosis without coma associated with type 1 diabetes mellitus    Chief Complaint:   Chief Complaint   Patient presents with    Vomiting     Was here yesterday for DKA states she is still vomiting, CBG >500 in triage        HPI:   Ms. Husain is a 28-year-old female with type 1 diabetes and history of very poor compliance with medication and diet who presented to the ED this morning with complaints of back pain, nausea/vomiting and elevated blood sugar.  Evaluation in the ED showed her to be in DKA.  She received 2 L crystalloid as IV boluses and currently on  mL/hour.  Central line was placed in the ED due to poor IV access.  Ms. Husain frequently presents to the ED ID of complaints.  Much of the time her blood sugars are quite elevated.  She presented yesterday with a primary complaint of left breast pain.  She had been seen on a prior visit diagnosed with mastitis and started on antibiotics.  Her serum bicarb yesterday was 19, sugar near 800.  She was given IV fluids , IV insulin, symptomatic meds and subsequently discharged home.  Labs prior to discharge showed her serum bicarb to be 21, AG 10, gluc 259.  On presentation today serum bicarb was 11, AG 22, .    Past Medical History:   Diagnosis Date    Diabetes mellitus     Diabetic gastroparesis associated with type 1 diabetes mellitus     DKA (diabetic ketoacidosis)     Hypertension     Non compliance with medical treatment        Past Surgical History:   Procedure Laterality Date    CHOLECYSTECTOMY      ESOPHAGOGASTRODUODENOSCOPY      Multiple    None         Review of patient's allergies indicates:  No Known  Allergies    Current Facility-Administered Medications on File Prior to Encounter   Medication    [COMPLETED] 0.9%  NaCl infusion    [COMPLETED] insulin regular injection 10 Units 0.1 mL    [COMPLETED] insulin regular injection 5 Units 0.05 mL    [COMPLETED] insulin regular injection 6 Units 0.06 mL    [COMPLETED] ketorolac injection 30 mg    [COMPLETED] lactated ringers bolus 1,000 mL    [COMPLETED] ondansetron injection 4 mg    [COMPLETED] promethazine injection 25 mg    [COMPLETED] sodium chloride 0.9% bolus 1,000 mL 1,000 mL    [COMPLETED] traMADoL tablet 50 mg    [DISCONTINUED] metoclopramide injection 10 mg     Current Outpatient Medications on File Prior to Encounter   Medication Sig    amitriptyline (ELAVIL) 25 MG tablet Take 25 mg by mouth nightly.    dicyclomine (BENTYL) 10 MG capsule Take 10 mg by mouth 4 (four) times daily.    HYDROcodone-acetaminophen (NORCO) 5-325 mg per tablet Take 1 tablet by mouth every 6 (six) hours as needed for Pain.    hydrOXYzine (ATARAX) 50 MG tablet Take 1 tablet (50 mg total) by mouth 3 (three) times daily as needed.    insulin glargine (LANTUS) 100 unit/mL injection Inject 26 Units into the skin once daily.    metoprolol tartrate (LOPRESSOR) 50 MG tablet Take 50 mg by mouth 2 (two) times daily.    naproxen (NAPROSYN) 500 MG tablet Take 1 tablet (500 mg total) by mouth 2 (two) times daily. for 15 days    ondansetron (ZOFRAN-ODT) 4 MG TbDL Take 1 tablet (4 mg total) by mouth every 6 (six) hours as needed (nausea/vomiting).    oxyCODONE-acetaminophen (PERCOCET)  mg per tablet Take 1 tablet by mouth every 8 (eight) hours as needed for Pain.    pantoprazole (PROTONIX) 40 MG tablet Take 1 tablet (40 mg total) by mouth once daily.    promethazine (PHENERGAN) 25 MG suppository Place 1 suppository (25 mg total) rectally every 6 (six) hours as needed for Nausea.    promethazine (PHENERGAN) 25 MG tablet Take 1 tablet (25 mg total) by mouth every 6 (six) hours as needed for  Nausea.    [DISCONTINUED] insulin lispro (HUMALOG U-100 INSULIN) 100 unit/mL Crtg Inject into the skin.     Family History       Problem Relation (Age of Onset)    Cancer Father    Diabetes Mother    Heart disease Mother    Hyperlipidemia Mother, Father    Hypertension Mother, Father    Stroke Father          Tobacco Use    Smoking status: Never    Smokeless tobacco: Never   Substance and Sexual Activity    Alcohol use: Never    Drug use: Not Currently     Types: Marijuana    Sexual activity: Yes     Review of Systems   Constitutional: Negative.    HENT: Negative.     Respiratory: Negative.     Cardiovascular: Negative.    Gastrointestinal:  Positive for nausea and vomiting. Negative for abdominal pain.   Genitourinary: Negative.    Musculoskeletal:  Positive for arthralgias and back pain.   Neurological: Negative.      Objective:     Vital Signs (Most Recent):  Temp: 98.2 °F (36.8 °C) (03/18/24 1022)  Pulse: (!) 124 (03/18/24 1347)  Resp: 15 (03/18/24 1302)  BP: 139/81 (03/18/24 1347)  SpO2: 100 % (03/18/24 1347) Vital Signs (24h Range):  Temp:  [98.2 °F (36.8 °C)] 98.2 °F (36.8 °C)  Pulse:  [] 124  Resp:  [15-38] 15  SpO2:  [81 %-100 %] 100 %  BP: ()/() 139/81     Weight: 59 kg (130 lb)  Body mass index is 23.78 kg/m².    Physical exam  Constitution-well nourished, normally developed female in NAD  Eyes-PERRL, EOMI; conjunctiva pale  HENT-normocephalic, atraumatic; dry mucous membranes  Neck-supple  Respiratory-normal respirations; CTA with good air movement  Heart-RRR; normal S1 and S2; 2/6 systolic murmur at LSB  Abdomen-soft, nontender, nondistended  Genitourinary-deferred  Musculoskeletal-no joint abnormalities, normal ROM throughout; no edema  Skin-warm, dry; no rashes  Neurologic-alert and oriented x3; nonfocal exam    Scheduled Meds:   amitriptyline  25 mg Oral Nightly    metoprolol tartrate  50 mg Oral BID    pantoprazole  40 mg Oral Daily    sodium chloride 0.9%  10 mL Intravenous Q8H  "    Continuous Infusions:   dextrose 5 % and 0.45 % NaCl      insulin regular 1 units/mL infusion orderable (DKA) 0.1 Units/kg/hr (03/18/24 1159)    lactated ringers 1,000 mL (03/18/24 1356)     PRN Meds:.acetaminophen, acetaminophen, dextrose 10 % in water (D10W), dextrose 10 % in water (D10W), dextrose 10%, dextrose 10%, dextrose 5 % and 0.45 % NaCl, HYDROcodone-acetaminophen, naloxone, prochlorperazine, sodium chloride 0.9%, sodium chloride 0.9%      Significant Labs: All pertinent labs within the past 24 hours have been reviewed.  A1C:   Recent Labs   Lab 10/04/23  0352   HGBA1C 11.5*     ABGs:   Recent Labs   Lab 03/17/24  1438 03/18/24  1049   PH 7.424 7.328*   PCO2 29.5* 20.2*   HCO3 19.3* 10.6*   POCSATURATED 97 98   BE -5* -15*   PO2 85 101*     Blood Culture: No results for input(s): "LABBLOO" in the last 48 hours.  CBC:   Recent Labs   Lab 03/17/24  1355 03/18/24  1050   WBC 11.09 10.69   HGB 7.4* 7.0*   HCT 24.5* 24.4*   * 596*     CMP:   Recent Labs   Lab 03/17/24  1355 03/17/24  2034 03/18/24  1050   * 132* 126*   K 4.7 4.0 4.8   CO2 19* 21* 11*   BUN 19.0* 16.0 21.0*   CREATININE 1.60* 0.95 1.41*   CALCIUM 8.3* 8.0* 8.4   ALBUMIN 3.0*  --   --    BILITOT 0.5  --   --    ALKPHOS 77  --   --    AST 17  --   --    ALT 13  --   --      Magnesium:   Recent Labs   Lab 03/17/24  1355   MG 1.70     POCT Glucose:   Recent Labs   Lab 03/17/24  1830 03/17/24  1927 03/18/24  1257   POCTGLUCOSE 252* 236* >500*     Urine Studies:   Recent Labs   Lab 03/18/24  1048   APPEARANCEUA Clear   PROTEINUA 2+*   BILIRUBINUA Negative   UROBILINOGEN 0.2   LEUKOCYTESUR Negative   RBCUA 0-2   WBCUA 0-2   BACTERIA None Seen       Significant Imaging: I have reviewed all pertinent imaging results/findings within the past 24 hours.  CXR  Impression:  1. No active cardiopulmonary disease identified  2. Interim placement right central line    Assessment/Plan:   Diabetes mellitus, type 1, uncontrolled, with ketoacidosis, " without coma  Continue insulin drip per protocol  Continue liberal IV fluids  Monitor electrolytes and supplement as necessary  Clear liquids    Diabetic gastroparesis  Manage as necessary    Acute kidney injury  Patient likely has some underlying CKD  Acute injury due to ketoacidosis  Expect improvement with IV hydration and correction of acidosis    Anemia  Likely due to chronic disease, possible iron-deficiency  Check iron studies in a.m.  Consider transfusion should hemoglobin drop below 7    Hypertension  Continue beta-blocker    VTE prophylaxis:  Lovenox  GI prophylaxis:  Pantoprazole    Code status:  Full  Active Diagnoses:    Diagnosis Date Noted POA    PRINCIPAL PROBLEM:  Diabetic ketoacidosis without coma associated with type 1 diabetes mellitus [E10.10] 10/02/2023 Yes      Problems Resolved During this Admission:     VTE Risk Mitigation (From admission, onward)           Ordered     IP VTE LOW RISK PATIENT  Once         03/18/24 1105                Social determinants of health limiting diagnosis/treatment   none     Critical Care time:  35 minutes        Gurvinder Restrepo MD  Department of Hospital Medicine   [facility] - Emergency Dept

## 2024-03-18 NOTE — ANESTHESIA PROCEDURE NOTES
Central Line Placement    Diagnosis: DKA  Doctor requesting consult: Bobby  Patient location during procedure: ED  Procedure start time: 3/18/2024 11:10 AM  Timeout: 3/18/2024 11:05 AM  Procedure end time: 3/18/2024 11:25 AM    Staffing  Authorizing Provider: Jim Benjamin CRNA  Performing Provider: Jim Benjamin CRNA    Staffing  Performed by: Jim Benjamin CRNA  Authorized by: Jim Benjamin CRNA    Preanesthetic Checklist  Completed: patient identified, IV checked, site marked, risks and benefits discussed, monitors and equipment checked, timeout performed and anesthesia consent given  Indication   Indication: med administration, vascular access     Anesthesia   local infiltration    Central Line   Skin Prep: skin prepped with ChloraPrep, skin prep agent completely dried prior to procedure  Sterile Barriers Followed: Yes    All five maximal barriers used- gloves, gown, cap, mask, and large sterile sheet    hand hygiene performed prior to central venous catheter insertion  Location: right internal jugular.   Catheter type: triple lumen  Catheter Size: 7 Fr  Inserted Catheter Length: 14 cm  Ultrasound: vascular probe with ultrasound   Vessel Caliber: large, patent, compressibility normal  Needle advanced into vessel with real time Ultrasound guidance.  Guidewire confirmed in vessel.  sterile gel and probe cover used in ultrasound-guided central venous catheter insertion  Manometry: none  Insertion Attempts: 1   Securement:line sutured, chlorhexidine patch, sterile dressing applied and blood return through all ports    Post-Procedure   X-Ray: no pneumothorax on x-ray, placement verified by x-ray and successful placement       Guidewire Guidewire removed intact. Guidewire removed intact, verified with nurse.

## 2024-03-18 NOTE — ED PROVIDER NOTES
Encounter Date: 3/18/2024  History from patient     History     Chief Complaint   Patient presents with    Vomiting     Was here yesterday for DKA states she is still vomiting, CBG >500 in triage     STEFANY Husain is 28 y.o. female who  has a past medical history of Diabetes mellitus, Diabetic gastroparesis associated with type 1 diabetes mellitus, DKA (diabetic ketoacidosis), Hypertension, and Non compliance with medical treatment. arrives in ER with c/o Vomiting (Was here yesterday for DKA states she is still vomiting, CBG >500 in triage)    Review of patient's allergies indicates:  No Known Allergies  Past Medical History:   Diagnosis Date    Diabetes mellitus     Diabetic gastroparesis associated with type 1 diabetes mellitus     DKA (diabetic ketoacidosis)     Hypertension     Non compliance with medical treatment      Past Surgical History:   Procedure Laterality Date    CHOLECYSTECTOMY      ESOPHAGOGASTRODUODENOSCOPY      Multiple    None       Family History   Problem Relation Age of Onset    Heart disease Mother     Hypertension Mother     Diabetes Mother     Hyperlipidemia Mother     Cancer Father     Stroke Father     Hypertension Father     Hyperlipidemia Father      Social History     Tobacco Use    Smoking status: Never    Smokeless tobacco: Never   Substance Use Topics    Alcohol use: Never    Drug use: Not Currently     Types: Marijuana     Review of Systems   Constitutional:  Negative for fever.   HENT:  Negative for trouble swallowing and voice change.    Eyes:  Negative for visual disturbance.   Respiratory:  Negative for cough and shortness of breath.    Cardiovascular:  Negative for chest pain.   Gastrointestinal:  Positive for nausea and vomiting. Negative for abdominal pain and diarrhea.   Genitourinary:  Negative for dysuria and hematuria.   Musculoskeletal:  Negative for back pain and gait problem.   Skin:  Negative for color change and rash.   Neurological:  Positive for weakness.  Negative for headaches.   Psychiatric/Behavioral:  Negative for behavioral problems and sleep disturbance.    All other systems reviewed and are negative.    Physical Exam     Initial Vitals [03/18/24 1022]   BP Pulse Resp Temp SpO2   (!) 131/98 (!) 131 (!) 22 98.2 °F (36.8 °C) 100 %      MAP       --         Physical Exam    Nursing note and vitals reviewed.  Constitutional: She appears well-developed. No distress.   HENT:   Head: Atraumatic.   Eyes: EOM are normal.   Neck: Neck supple.   Cardiovascular:  Normal rate and regular rhythm.           Pulmonary/Chest: Breath sounds normal. No respiratory distress.   Abdominal: Abdomen is soft. Bowel sounds are normal.   Musculoskeletal:         General: Normal range of motion.      Cervical back: Neck supple.     Neurological: She is alert and oriented to person, place, and time.   Skin: Skin is warm and dry.   Psychiatric:   Anxious, crying, moving constantly in the bed       ED Course   Critical Care    Date/Time: 3/18/2024 11:38 AM    Performed by: Millie Burr MD  Authorized by: Millie Burr MD  Direct patient critical care time: 40 minutes  Total critical care time (exclusive of procedural time) : 40 minutes  Critical care was necessary to treat or prevent imminent or life-threatening deterioration of the following conditions: metabolic crisis.  Critical care was time spent personally by me on the following activities: development of treatment plan with patient or surrogate, discussions with consultants, evaluation of patient's response to treatment, examination of patient, obtaining history from patient or surrogate, ordering and performing treatments and interventions, ordering and review of laboratory studies, ordering and review of radiographic studies, pulse oximetry, re-evaluation of patient's condition and review of old charts.  Comments: CRNA Did the central line for me,        Orders Placed This Encounter   Procedures    Critical Care    X-Ray  Chest 1 View    CBC auto differential    Basic metabolic panel    Urinalysis, Reflex to Urine Culture    CBC with Differential    Urinalysis, Microscopic    CBC auto differential    Comprehensive metabolic panel    Basic Metabolic Panel    Magnesium    Phosphorus    Iron and TIBC    Diet Clear liquid (no sugar)/Bariatric    Vital signs per ICU/CCU orders    Height and weight    Intake and output Notify physician if urinary output is less than 250 mL in 8 hours.    When DKA has resolved and provider has ordered subcutaneous basal insulin,    Notify Physician    Notify physician for any DKA/HHS patient that is not responding as expected to Step 2 (running infusion at set dose without titration).  Not responding is defined as any increase in glucose from previous glucose OR not achieving  less than 200 after 6 hours on the continuous dose.    Notify physician for hypoglycemia <100 mg/dl    BG  mg/dL    If any glucose result is less than 50 or greater than 400:    If 2nd result is less than 50 or greater than 400:    Vital signs    Intake and output    Notify physician     Tele: Maintain IV access while on telemetry - Adult    Cardiac Monitoring - Adult    Vital signs    Bladder scan    Notify Physician    Full code    POCT ARTERIAL BLOOD GAS Blood Gas    Pulse Oximetry Q4H    CARDIAC MONITORING STRIPS    CARDIAC MONITORING STRIPS    POCT Glucose, Hand-Held Device    EKG 12-lead    Insert Saline lock IV    Saline lock IV    Admit to Inpatient     Medications   sodium chloride 0.9% flush 10 mL (has no administration in time range)   dextrose 5 % and 0.45 % NaCl infusion (has no administration in time range)   dextrose 10 % infusion (has no administration in time range)   dextrose 10 % infusion (has no administration in time range)   insulin regular in 0.9 % NaCl 100 unit/100 mL (1 unit/mL) infusion (0.1 Units/kg/hr × 59 kg Intravenous New Bag 3/18/24 2710)   dextrose 10% bolus 125 mL 125 mL (has no administration  in time range)   dextrose 10% bolus 250 mL 250 mL (has no administration in time range)   lactated ringers infusion (1,000 mLs Intravenous New Bag 3/18/24 1356)   sodium chloride 0.9% flush 10 mL (has no administration in time range)   acetaminophen tablet 650 mg (has no administration in time range)   amitriptyline tablet 25 mg (has no administration in time range)   metoprolol tartrate (LOPRESSOR) tablet 50 mg (has no administration in time range)   pantoprazole EC tablet 40 mg (40 mg Oral Given 3/18/24 1604)   sodium chloride 0.9% flush 10 mL (has no administration in time range)   naloxone 0.4 mg/mL injection 0.02 mg (has no administration in time range)   acetaminophen tablet 650 mg (has no administration in time range)   HYDROcodone-acetaminophen 5-325 mg per tablet 1 tablet (1 tablet Oral Given 3/18/24 1504)   prochlorperazine injection Soln 5 mg (has no administration in time range)   mupirocin 2 % ointment (has no administration in time range)   lactated ringers bolus 1,770 mL (0 mLs Intravenous Stopped 3/18/24 1339)   ondansetron disintegrating tablet 8 mg (8 mg Oral Given 3/18/24 1148)   diphenhydrAMINE injection 25 mg (25 mg Intravenous Given 3/18/24 1147)   ketorolac injection 15 mg (15 mg Intravenous Given 3/18/24 1150)     Admission on 03/18/2024   Component Date Value Ref Range Status    Sodium Level 03/18/2024 126 (L)  136 - 145 mmol/L Final    Potassium Level 03/18/2024 4.8  3.5 - 5.1 mmol/L Final    Chloride 03/18/2024 93 (L)  98 - 107 mmol/L Final    Carbon Dioxide 03/18/2024 11 (L)  22 - 29 mmol/L Final    Glucose Level 03/18/2024 782 (HH)  74 - 100 mg/dL Final    Blood Urea Nitrogen 03/18/2024 21.0 (H)  7.0 - 18.7 mg/dL Final    Creatinine 03/18/2024 1.41 (H)  0.55 - 1.02 mg/dL Final    BUN/Creatinine Ratio 03/18/2024 15   Final    Calcium Level Total 03/18/2024 8.4  8.4 - 10.2 mg/dL Final    Anion Gap 03/18/2024 22.0  mEq/L Final    eGFR 03/18/2024 52  mls/min/1.73/m2 Final    Color, UA  03/18/2024 Yellow  Yellow, Light-Yellow, Dark Yellow, Katarina, Straw Final    Appearance, UA 03/18/2024 Clear  Clear Final    Specific Gravity, UA 03/18/2024 1.015  1.005 - 1.030 Final    pH, UA 03/18/2024 5.5  5.0 - 8.5 Final    Protein, UA 03/18/2024 2+ (A)  Negative Final    Glucose, UA 03/18/2024 2+ (A)  Negative, Normal Final    Ketones, UA 03/18/2024 4+ (A)  Negative Final    Blood, UA 03/18/2024 Trace-Intact (A)  Negative Final    Bilirubin, UA 03/18/2024 Negative  Negative Final    Urobilinogen, UA 03/18/2024 0.2  0.2, 1.0, Normal Final    Nitrites, UA 03/18/2024 Negative  Negative Final    Leukocyte Esterase, UA 03/18/2024 Negative  Negative Final    WBC 03/18/2024 10.69  4.50 - 11.50 x10(3)/mcL Final    RBC 03/18/2024 2.42 (L)  4.20 - 5.40 x10(6)/mcL Final    Hgb 03/18/2024 7.0 (L)  12.0 - 16.0 g/dL Final    Hct 03/18/2024 24.4 (L)  37.0 - 47.0 % Final    MCV 03/18/2024 100.8 (H)  80.0 - 94.0 fL Final    MCH 03/18/2024 28.9  27.0 - 31.0 pg Final    MCHC 03/18/2024 28.7 (L)  33.0 - 36.0 g/dL Final    RDW 03/18/2024 16.7  11.5 - 17.0 % Final    Platelet 03/18/2024 596 (H)  130 - 400 x10(3)/mcL Final    MPV 03/18/2024 8.9  7.4 - 10.4 fL Final    Neut % 03/18/2024 83.0  % Final    Lymph % 03/18/2024 10.4  % Final    Mono % 03/18/2024 4.4  % Final    Eos % 03/18/2024 0.1  % Final    Basophil % 03/18/2024 0.4  % Final    Lymph # 03/18/2024 1.11  0.6 - 4.6 x10(3)/mcL Final    Neut # 03/18/2024 8.88  2.1 - 9.2 x10(3)/mcL Final    Mono # 03/18/2024 0.47  0.1 - 1.3 x10(3)/mcL Final    Eos # 03/18/2024 0.01  0 - 0.9 x10(3)/mcL Final    Baso # 03/18/2024 0.04  <=0.2 x10(3)/mcL Final    IG# 03/18/2024 0.18 (H)  0 - 0.04 x10(3)/mcL Final    IG% 03/18/2024 1.7  % Final    POC PH 03/18/2024 7.328 (L)  7.35 - 7.45 Final    POC PCO2 03/18/2024 20.2 (LL)  35 - 45 mmHg Final    POC PO2 03/18/2024 101 (H)  80 - 100 mmHg Final    POC HCO3 03/18/2024 10.6 (L)  24 - 28 mmol/L Final    POC BE 03/18/2024 -15 (L)  -2 to 2 mmol/L Final     POC SATURATED O2 03/18/2024 98  95 - 100 % Final    POC TCO2 03/18/2024 11 (L)  23 - 27 mmol/L Final    Sample 03/18/2024 ARTERIAL   Final    Site 03/18/2024 LB   Final    Allens Test 03/18/2024 N/A   Final    DelSys 03/18/2024 Room Air   Final    Bacteria, UA 03/18/2024 None Seen  None Seen, Rare, Occasional /HPF Final    RBC, UA 03/18/2024 0-2  None Seen, 0-2, 3-5, 0-5 /HPF Final    WBC, UA 03/18/2024 0-2  None Seen, 0-2, 3-5, 0-5 /HPF Final    Squamous Epithelial Cells, UA 03/18/2024 Few (A)  None Seen, Rare, Occasional, Occ /HPF Final    POCT Glucose 03/18/2024 >500 (HH)  70 - 110 mg/dL Final       Labs Reviewed   BASIC METABOLIC PANEL - Abnormal; Notable for the following components:       Result Value    Sodium Level 126 (*)     Chloride 93 (*)     Carbon Dioxide 11 (*)     Glucose Level 782 (*)     Blood Urea Nitrogen 21.0 (*)     Creatinine 1.41 (*)     All other components within normal limits   URINALYSIS, REFLEX TO URINE CULTURE - Abnormal; Notable for the following components:    Protein, UA 2+ (*)     Glucose, UA 2+ (*)     Ketones, UA 4+ (*)     Blood, UA Trace-Intact (*)     All other components within normal limits   CBC WITH DIFFERENTIAL - Abnormal; Notable for the following components:    RBC 2.42 (*)     Hgb 7.0 (*)     Hct 24.4 (*)     .8 (*)     MCHC 28.7 (*)     Platelet 596 (*)     IG# 0.18 (*)     All other components within normal limits   URINALYSIS, MICROSCOPIC - Abnormal; Notable for the following components:    Squamous Epithelial Cells, UA Few (*)     All other components within normal limits   ISTAT PROCEDURE - Abnormal; Notable for the following components:    POC PH 7.328 (*)     POC PCO2 20.2 (*)     POC PO2 101 (*)     POC HCO3 10.6 (*)     POC BE -15 (*)     POC TCO2 11 (*)     All other components within normal limits   POCT GLUCOSE - Abnormal; Notable for the following components:    POCT Glucose >500 (*)     All other components within normal limits   CBC W/ AUTO  DIFFERENTIAL    Narrative:     The following orders were created for panel order CBC auto differential.  Procedure                               Abnormality         Status                     ---------                               -----------         ------                     CBC with Differential[[id number]]       Abnormal            Final result                 Please view results for these tests on the individual orders.   BASIC METABOLIC PANEL   POCT GLUCOSE, HAND-HELD DEVICE   POCT GLUCOSE MONITORING CONTINUOUS          Imaging Results              X-Ray Chest 1 View (Final result)  Result time 03/18/24 14:15:31      Final result by Danilo Navarro MD (03/18/24 14:15:31)                   Impression:      1. No active cardiopulmonary disease identified  2. Interim placement right central line      Electronically signed by: Danilo Navarro  Date:    03/18/2024  Time:    14:15               Narrative:    EXAMINATION:  XR CHEST 1 VIEW    CLINICAL HISTORY:  , Encounter for adjustment and management of vascular access device.    COMPARISON:  02/20/2024    FINDINGS:  An AP view or more reveals the heart to be borderline enlarged.  The trachea is midline.  There is interim placement of a right central line with the tip superimposed over the SVC.  No infiltrate or effusion is seen.  There is slight elevation of the right hemidiaphragm.  Bony structures appear grossly intact.                                       Medical Decision Making    Dorene Husain is 28 y.o. female who  has a past medical history of Diabetes mellitus, Diabetic gastroparesis associated with type 1 diabetes mellitus, DKA (diabetic ketoacidosis), Hypertension, and Non compliance with medical treatment. arrives in ER with c/o Vomiting (Was here yesterday for DKA states she is still vomiting, CBG >500 in triage)    Patient who was in the emergency room yesterday with a blood sugar of almost 800 had IV fluids and insulin given to her yesterday when she  improved she was discharged, she was not in DKA yesterday, she comes back to the emergency room today with complaint of nausea and vomiting which started again this morning, and she decided to come back.  I am going to do a blood gas on her, she has a difficult stick so depending on the results of the blood gas I will decide as to what further treatment I need to do on her.  She does have an elevated blood sugar again today which was more than 500 on the glucometer.    Amount and/or Complexity of Data Reviewed  Labs: ordered. Decision-making details documented in ED Course.  Radiology: ordered and independent interpretation performed.    Risk  OTC drugs.  Prescription drug management.  Decision regarding hospitalization.               ED Course as of 03/18/24 1650   Mon Mar 18, 2024   1102 Patient's blood gas shows that patient has bicarb is only 10, of course she is hyperventilating so bring her pH up to 7.3-8, I will go ahead and get them to put a central line as the ER is very busy I will let the CRNA do the central line and then it started her on insulin drip. [GQ]   1142 Patient's anion gap is 22 today, I am starting her on insulin drip, fluid boluses, her blood sugar is 782 again today, I will essentially admit her in the ICU for further management of her DKA. [GQ]   1258 After Zofran, Benadryl, Toradol patient has calmed down and actually she has gone to sleep, her blood sugar is still more than 500, she is on insulin drip, I am waiting for the hospitalist to call me back for admission. [GQ]   1321 Patient's remained comfortable, hospitalist is in a meeting in they will call back.  We do have ICU bed for this patient and should be able to admit her there for DKA. [GQ]   1322 Patient got 30 mL/kg bolus, I am going to put her on Ringer's lactate going at 200 mL/hour [GQ]   1323 BP: 132/77 [GQ]   1323 Pulse(!): 129 [GQ]   1323 Resp: 15 [GQ]   1323 SpO2: 100 % [GQ]   1346 Dr. Restrepo came to the emergency room to  see the patient and we are going to admit patient in the ICU. [GQ]      ED Course User Index  [GQ] Millie Burr MD                           Clinical Impression:  Final diagnoses:  [Z45.2] Encounter for central line placement  [E10.10] Diabetic ketoacidosis without coma associated with type 1 diabetes mellitus (Primary)  [R11.2] Intractable nausea and vomiting          ED Disposition Condition    Admit Stable                Millie Burr MD  03/18/24 1626       Millie Burr MD  03/18/24 [id number]

## 2024-03-18 NOTE — PLAN OF CARE
Problem: Adult Inpatient Plan of Care  Goal: Plan of Care Review  Outcome: Ongoing, Progressing  Goal: Patient-Specific Goal (Individualized)  Outcome: Ongoing, Progressing  Goal: Absence of Hospital-Acquired Illness or Injury  Outcome: Ongoing, Progressing  Goal: Optimal Comfort and Wellbeing  Outcome: Ongoing, Progressing  Goal: Readiness for Transition of Care  Outcome: Ongoing, Progressing     Problem: Diabetes Comorbidity  Goal: Blood Glucose Level Within Targeted Range  Outcome: Ongoing, Progressing     Problem: Infection  Goal: Absence of Infection Signs and Symptoms  Outcome: Ongoing, Progressing     Problem: Pain Acute  Goal: Acceptable Pain Control and Functional Ability  Outcome: Ongoing, Progressing     Problem: Dysrhythmia  Goal: Normalized Cardiac Rhythm  Outcome: Ongoing, Progressing

## 2024-03-19 LAB
ABO + RH BLD: NORMAL
ABO + RH BLD: NORMAL
ALBUMIN SERPL-MCNC: 2.2 G/DL (ref 3.5–5)
ALBUMIN/GLOB SERPL: 0.9 RATIO (ref 1.1–2)
ALP SERPL-CCNC: 53 UNIT/L (ref 40–150)
ALT SERPL-CCNC: 8 UNIT/L (ref 0–55)
ANION GAP SERPL CALC-SCNC: 12 MEQ/L
ANION GAP SERPL CALC-SCNC: 4 MEQ/L
ANION GAP SERPL CALC-SCNC: 7 MEQ/L
ANION GAP SERPL CALC-SCNC: 7 MEQ/L
ANION GAP SERPL CALC-SCNC: 8 MEQ/L
ANISOCYTOSIS BLD QL SMEAR: SLIGHT
AST SERPL-CCNC: 15 UNIT/L (ref 5–34)
BASOPHILS # BLD AUTO: 0.03 X10(3)/MCL
BASOPHILS NFR BLD AUTO: 0.3 %
BILIRUB SERPL-MCNC: 0.3 MG/DL
BLD PROD TYP BPU: NORMAL
BLD PROD TYP BPU: NORMAL
BLOOD UNIT EXPIRATION DATE: NORMAL
BLOOD UNIT EXPIRATION DATE: NORMAL
BLOOD UNIT TYPE CODE: 5100
BLOOD UNIT TYPE CODE: 5100
BUN SERPL-MCNC: 12 MG/DL (ref 7–18.7)
BUN SERPL-MCNC: 12 MG/DL (ref 7–18.7)
BUN SERPL-MCNC: 14 MG/DL (ref 7–18.7)
BUN SERPL-MCNC: 7 MG/DL (ref 7–18.7)
BUN SERPL-MCNC: 7 MG/DL (ref 7–18.7)
BUN SERPL-MCNC: 8 MG/DL (ref 7–18.7)
CALCIUM SERPL-MCNC: 7.5 MG/DL (ref 8.4–10.2)
CALCIUM SERPL-MCNC: 7.8 MG/DL (ref 8.4–10.2)
CALCIUM SERPL-MCNC: 7.9 MG/DL (ref 8.4–10.2)
CALCIUM SERPL-MCNC: 7.9 MG/DL (ref 8.4–10.2)
CALCIUM SERPL-MCNC: 8.1 MG/DL (ref 8.4–10.2)
CALCIUM SERPL-MCNC: 8.4 MG/DL (ref 8.4–10.2)
CHLORIDE SERPL-SCNC: 103 MMOL/L (ref 98–107)
CHLORIDE SERPL-SCNC: 104 MMOL/L (ref 98–107)
CHLORIDE SERPL-SCNC: 105 MMOL/L (ref 98–107)
CHLORIDE SERPL-SCNC: 105 MMOL/L (ref 98–107)
CHLORIDE SERPL-SCNC: 106 MMOL/L (ref 98–107)
CHLORIDE SERPL-SCNC: 106 MMOL/L (ref 98–107)
CO2 SERPL-SCNC: 21 MMOL/L (ref 22–29)
CO2 SERPL-SCNC: 22 MMOL/L (ref 22–29)
CO2 SERPL-SCNC: 23 MMOL/L (ref 22–29)
CO2 SERPL-SCNC: 24 MMOL/L (ref 22–29)
CREAT SERPL-MCNC: 0.92 MG/DL (ref 0.55–1.02)
CREAT SERPL-MCNC: 0.96 MG/DL (ref 0.55–1.02)
CREAT SERPL-MCNC: 1 MG/DL (ref 0.55–1.02)
CREAT SERPL-MCNC: 1.01 MG/DL (ref 0.55–1.02)
CREAT SERPL-MCNC: 1.02 MG/DL (ref 0.55–1.02)
CREAT SERPL-MCNC: 1.04 MG/DL (ref 0.55–1.02)
CREAT/UREA NIT SERPL: 12
CREAT/UREA NIT SERPL: 14
CREAT/UREA NIT SERPL: 7
CREAT/UREA NIT SERPL: 7
CREAT/UREA NIT SERPL: 8
CROSSMATCH INTERPRETATION: NORMAL
CROSSMATCH INTERPRETATION: NORMAL
DISPENSE STATUS: NORMAL
DISPENSE STATUS: NORMAL
EOSINOPHIL # BLD AUTO: 0.18 X10(3)/MCL (ref 0–0.9)
EOSINOPHIL NFR BLD AUTO: 1.8 %
ERYTHROCYTE [DISTWIDTH] IN BLOOD BY AUTOMATED COUNT: 16.8 % (ref 11.5–17)
EST. AVERAGE GLUCOSE BLD GHB EST-MCNC: 168.6 MG/DL
GFR SERPLBLD CREATININE-BSD FMLA CKD-EPI: >60 MLS/MIN/1.73/M2
GLOBULIN SER-MCNC: 2.5 GM/DL (ref 2.4–3.5)
GLUCOSE SERPL-MCNC: 118 MG/DL (ref 74–100)
GLUCOSE SERPL-MCNC: 129 MG/DL (ref 74–100)
GLUCOSE SERPL-MCNC: 140 MG/DL (ref 74–100)
GLUCOSE SERPL-MCNC: 212 MG/DL (ref 74–100)
GLUCOSE SERPL-MCNC: 213 MG/DL (ref 74–100)
GLUCOSE SERPL-MCNC: 228 MG/DL (ref 74–100)
GROUP & RH: NORMAL
HBA1C MFR BLD: 7.5 %
HCT VFR BLD AUTO: 17.1 % (ref 37–47)
HCT VFR BLD AUTO: 29.7 % (ref 37–47)
HGB BLD-MCNC: 5.5 G/DL (ref 12–16)
HGB BLD-MCNC: 9.9 G/DL (ref 12–16)
IMM GRANULOCYTES # BLD AUTO: 0.12 X10(3)/MCL (ref 0–0.04)
IMM GRANULOCYTES NFR BLD AUTO: 1.2 %
INDIRECT COOMBS: NORMAL
IRON SATN MFR SERPL: 12 % (ref 20–50)
IRON SERPL-MCNC: 20 UG/DL (ref 50–170)
LYMPHOCYTES # BLD AUTO: 2.31 X10(3)/MCL (ref 0.6–4.6)
LYMPHOCYTES NFR BLD AUTO: 22.8 %
MAGNESIUM SERPL-MCNC: 1.4 MG/DL (ref 1.6–2.6)
MCH RBC QN AUTO: 28.6 PG (ref 27–31)
MCHC RBC AUTO-ENTMCNC: 32.2 G/DL (ref 33–36)
MCV RBC AUTO: 89.1 FL (ref 80–94)
MONOCYTES # BLD AUTO: 1.05 X10(3)/MCL (ref 0.1–1.3)
MONOCYTES NFR BLD AUTO: 10.4 %
NEUTROPHILS # BLD AUTO: 6.45 X10(3)/MCL (ref 2.1–9.2)
NEUTROPHILS NFR BLD AUTO: 63.5 %
PHOSPHATE SERPL-MCNC: 4 MG/DL (ref 2.3–4.7)
PLATELET # BLD AUTO: 476 X10(3)/MCL (ref 130–400)
PLATELET # BLD EST: ABNORMAL 10*3/UL
PMV BLD AUTO: 8.6 FL (ref 7.4–10.4)
POCT GLUCOSE: 120 MG/DL (ref 70–110)
POCT GLUCOSE: 122 MG/DL (ref 70–110)
POCT GLUCOSE: 122 MG/DL (ref 70–110)
POCT GLUCOSE: 124 MG/DL (ref 70–110)
POCT GLUCOSE: 126 MG/DL (ref 70–110)
POCT GLUCOSE: 127 MG/DL (ref 70–110)
POCT GLUCOSE: 128 MG/DL (ref 70–110)
POCT GLUCOSE: 132 MG/DL (ref 70–110)
POCT GLUCOSE: 134 MG/DL (ref 70–110)
POCT GLUCOSE: 143 MG/DL (ref 70–110)
POCT GLUCOSE: 146 MG/DL (ref 70–110)
POCT GLUCOSE: 150 MG/DL (ref 70–110)
POCT GLUCOSE: 185 MG/DL (ref 70–110)
POCT GLUCOSE: 189 MG/DL (ref 70–110)
POCT GLUCOSE: 196 MG/DL (ref 70–110)
POCT GLUCOSE: 206 MG/DL (ref 70–110)
POCT GLUCOSE: 207 MG/DL (ref 70–110)
POCT GLUCOSE: 211 MG/DL (ref 70–110)
POCT GLUCOSE: 212 MG/DL (ref 70–110)
POCT GLUCOSE: 215 MG/DL (ref 70–110)
POCT GLUCOSE: 237 MG/DL (ref 70–110)
POTASSIUM SERPL-SCNC: 3.2 MMOL/L (ref 3.5–5.1)
POTASSIUM SERPL-SCNC: 3.5 MMOL/L (ref 3.5–5.1)
POTASSIUM SERPL-SCNC: 3.5 MMOL/L (ref 3.5–5.1)
POTASSIUM SERPL-SCNC: 3.6 MMOL/L (ref 3.5–5.1)
POTASSIUM SERPL-SCNC: 3.6 MMOL/L (ref 3.5–5.1)
POTASSIUM SERPL-SCNC: 3.8 MMOL/L (ref 3.5–5.1)
PROT SERPL-MCNC: 4.7 GM/DL (ref 6.4–8.3)
RBC # BLD AUTO: 1.92 X10(6)/MCL (ref 4.2–5.4)
RBC MORPH BLD: ABNORMAL
SODIUM SERPL-SCNC: 133 MMOL/L (ref 136–145)
SODIUM SERPL-SCNC: 135 MMOL/L (ref 136–145)
SODIUM SERPL-SCNC: 136 MMOL/L (ref 136–145)
SPECIMEN OUTDATE: NORMAL
TARGETS BLD QL SMEAR: SLIGHT
TIBC SERPL-MCNC: 149 UG/DL (ref 70–310)
TIBC SERPL-MCNC: 169 UG/DL (ref 250–450)
UNIT NUMBER: NORMAL
UNIT NUMBER: NORMAL
WBC # SPEC AUTO: 10.14 X10(3)/MCL (ref 4.5–11.5)

## 2024-03-19 PROCEDURE — P9016 RBC LEUKOCYTES REDUCED: HCPCS | Performed by: EMERGENCY MEDICINE

## 2024-03-19 PROCEDURE — 30233N1 TRANSFUSION OF NONAUTOLOGOUS RED BLOOD CELLS INTO PERIPHERAL VEIN, PERCUTANEOUS APPROACH: ICD-10-PCS | Performed by: INTERNAL MEDICINE

## 2024-03-19 PROCEDURE — 83735 ASSAY OF MAGNESIUM: CPT | Performed by: EMERGENCY MEDICINE

## 2024-03-19 PROCEDURE — 63600175 PHARM REV CODE 636 W HCPCS: Performed by: INTERNAL MEDICINE

## 2024-03-19 PROCEDURE — 84100 ASSAY OF PHOSPHORUS: CPT | Performed by: EMERGENCY MEDICINE

## 2024-03-19 PROCEDURE — 25000003 PHARM REV CODE 250: Performed by: EMERGENCY MEDICINE

## 2024-03-19 PROCEDURE — S5010 5% DEXTROSE AND 0.45% SALINE: HCPCS | Performed by: INTERNAL MEDICINE

## 2024-03-19 PROCEDURE — 83036 HEMOGLOBIN GLYCOSYLATED A1C: CPT | Performed by: INTERNAL MEDICINE

## 2024-03-19 PROCEDURE — 85025 COMPLETE CBC W/AUTO DIFF WBC: CPT | Performed by: INTERNAL MEDICINE

## 2024-03-19 PROCEDURE — 80048 BASIC METABOLIC PNL TOTAL CA: CPT | Performed by: EMERGENCY MEDICINE

## 2024-03-19 PROCEDURE — 25000003 PHARM REV CODE 250: Performed by: INTERNAL MEDICINE

## 2024-03-19 PROCEDURE — A4216 STERILE WATER/SALINE, 10 ML: HCPCS | Performed by: INTERNAL MEDICINE

## 2024-03-19 PROCEDURE — 36430 TRANSFUSION BLD/BLD COMPNT: CPT

## 2024-03-19 PROCEDURE — 94761 N-INVAS EAR/PLS OXIMETRY MLT: CPT

## 2024-03-19 PROCEDURE — 11000001 HC ACUTE MED/SURG PRIVATE ROOM

## 2024-03-19 PROCEDURE — 86923 COMPATIBILITY TEST ELECTRIC: CPT | Performed by: EMERGENCY MEDICINE

## 2024-03-19 PROCEDURE — 85018 HEMOGLOBIN: CPT | Performed by: INTERNAL MEDICINE

## 2024-03-19 PROCEDURE — 86901 BLOOD TYPING SEROLOGIC RH(D): CPT | Performed by: EMERGENCY MEDICINE

## 2024-03-19 PROCEDURE — 83540 ASSAY OF IRON: CPT | Performed by: EMERGENCY MEDICINE

## 2024-03-19 PROCEDURE — 80053 COMPREHEN METABOLIC PANEL: CPT | Performed by: INTERNAL MEDICINE

## 2024-03-19 RX ORDER — DIPHENHYDRAMINE HYDROCHLORIDE 50 MG/ML
25 INJECTION INTRAMUSCULAR; INTRAVENOUS EVERY 4 HOURS PRN
Status: DISCONTINUED | OUTPATIENT
Start: 2024-03-19 | End: 2024-03-20 | Stop reason: HOSPADM

## 2024-03-19 RX ORDER — SODIUM CHLORIDE 9 MG/ML
INJECTION, SOLUTION INTRAVENOUS CONTINUOUS
Status: DISCONTINUED | OUTPATIENT
Start: 2024-03-19 | End: 2024-03-20 | Stop reason: HOSPADM

## 2024-03-19 RX ORDER — HYDROCODONE BITARTRATE AND ACETAMINOPHEN 500; 5 MG/1; MG/1
TABLET ORAL
Status: DISCONTINUED | OUTPATIENT
Start: 2024-03-19 | End: 2024-03-19

## 2024-03-19 RX ORDER — GLUCAGON 1 MG
1 KIT INJECTION
Status: DISCONTINUED | OUTPATIENT
Start: 2024-03-19 | End: 2024-03-20 | Stop reason: HOSPADM

## 2024-03-19 RX ORDER — HYDRALAZINE HYDROCHLORIDE 20 MG/ML
20 INJECTION INTRAMUSCULAR; INTRAVENOUS EVERY 4 HOURS PRN
Status: DISCONTINUED | OUTPATIENT
Start: 2024-03-19 | End: 2024-03-20 | Stop reason: HOSPADM

## 2024-03-19 RX ORDER — METOCLOPRAMIDE HYDROCHLORIDE 5 MG/ML
10 INJECTION INTRAMUSCULAR; INTRAVENOUS EVERY 6 HOURS
Status: DISCONTINUED | OUTPATIENT
Start: 2024-03-19 | End: 2024-03-20

## 2024-03-19 RX ORDER — INSULIN ASPART 100 [IU]/ML
0-10 INJECTION, SOLUTION INTRAVENOUS; SUBCUTANEOUS
Status: DISCONTINUED | OUTPATIENT
Start: 2024-03-19 | End: 2024-03-20 | Stop reason: HOSPADM

## 2024-03-19 RX ORDER — KETOROLAC TROMETHAMINE 30 MG/ML
30 INJECTION, SOLUTION INTRAMUSCULAR; INTRAVENOUS EVERY 6 HOURS PRN
Status: COMPLETED | OUTPATIENT
Start: 2024-03-19 | End: 2024-03-20

## 2024-03-19 RX ADMIN — Medication 10 ML: at 05:03

## 2024-03-19 RX ADMIN — DIPHENHYDRAMINE HYDROCHLORIDE 25 MG: 50 INJECTION INTRAMUSCULAR; INTRAVENOUS at 10:03

## 2024-03-19 RX ADMIN — INSULIN DETEMIR 10 UNITS: 100 INJECTION, SOLUTION SUBCUTANEOUS at 04:03

## 2024-03-19 RX ADMIN — METOPROLOL TARTRATE 50 MG: 50 TABLET, FILM COATED ORAL at 08:03

## 2024-03-19 RX ADMIN — MUPIROCIN 1 G: 20 OINTMENT TOPICAL at 08:03

## 2024-03-19 RX ADMIN — METOCLOPRAMIDE 10 MG: 5 INJECTION, SOLUTION INTRAMUSCULAR; INTRAVENOUS at 05:03

## 2024-03-19 RX ADMIN — Medication 10 ML: at 09:03

## 2024-03-19 RX ADMIN — DEXTROSE AND SODIUM CHLORIDE: 5; 450 INJECTION, SOLUTION INTRAVENOUS at 11:03

## 2024-03-19 RX ADMIN — MUSCLE RUB CREAM: 100; 150 CREAM TOPICAL at 08:03

## 2024-03-19 RX ADMIN — HYDROCODONE BITARTRATE AND ACETAMINOPHEN 1 TABLET: 5; 325 TABLET ORAL at 12:03

## 2024-03-19 RX ADMIN — ONDANSETRON 4 MG: 2 INJECTION INTRAMUSCULAR; INTRAVENOUS at 08:03

## 2024-03-19 RX ADMIN — DIPHENHYDRAMINE HYDROCHLORIDE 25 MG: 50 INJECTION INTRAMUSCULAR; INTRAVENOUS at 05:03

## 2024-03-19 RX ADMIN — PANTOPRAZOLE SODIUM 40 MG: 40 TABLET, DELAYED RELEASE ORAL at 08:03

## 2024-03-19 RX ADMIN — INSULIN ASPART 2 UNITS: 100 INJECTION, SOLUTION INTRAVENOUS; SUBCUTANEOUS at 08:03

## 2024-03-19 RX ADMIN — SODIUM CHLORIDE: 9 INJECTION, SOLUTION INTRAVENOUS at 07:03

## 2024-03-19 RX ADMIN — HYDROCODONE BITARTRATE AND ACETAMINOPHEN 1 TABLET: 5; 325 TABLET ORAL at 06:03

## 2024-03-19 RX ADMIN — KETOROLAC TROMETHAMINE 30 MG: 30 INJECTION, SOLUTION INTRAMUSCULAR; INTRAVENOUS at 10:03

## 2024-03-19 RX ADMIN — DEXTROSE AND SODIUM CHLORIDE: 5; 450 INJECTION, SOLUTION INTRAVENOUS at 02:03

## 2024-03-19 RX ADMIN — HYDRALAZINE HYDROCHLORIDE 20 MG: 20 INJECTION INTRAMUSCULAR; INTRAVENOUS at 08:03

## 2024-03-19 RX ADMIN — KETOROLAC TROMETHAMINE 30 MG: 30 INJECTION, SOLUTION INTRAMUSCULAR; INTRAVENOUS at 04:03

## 2024-03-19 RX ADMIN — HYDROCODONE BITARTRATE AND ACETAMINOPHEN 1 TABLET: 5; 325 TABLET ORAL at 01:03

## 2024-03-19 RX ADMIN — AMITRIPTYLINE HYDROCHLORIDE 25 MG: 25 TABLET, FILM COATED ORAL at 08:03

## 2024-03-19 NOTE — PROGRESS NOTES
[facility] Hospital Medicine Progress Note        Chief Complaint: Inpatient Follow-up for     HPI:   Ms. Husain is a 28-year-old female with type 1 diabetes and history of very poor compliance with medication and diet who presented to the ED this morning with complaints of back pain, nausea/vomiting and elevated blood sugar.  Evaluation in the ED showed her to be in DKA.  She received 2 L crystalloid as IV boluses and currently on  mL/hour.  Central line was placed in the ED due to poor IV access.  Ms. Husain frequently presents to the ED ID of complaints.  Much of the time her blood sugars are quite elevated.  She presented yesterday with a primary complaint of left breast pain.  She had been seen on a prior visit diagnosed with mastitis and started on antibiotics.  Her serum bicarb yesterday was 19, sugar near 800.  She was given IV fluids , IV insulin, symptomatic meds and subsequently discharged home.  Labs prior to discharge showed her serum bicarb to be 21, AG 10, gluc 259.  On presentation today serum bicarb was 11, AG 22,     March 19, 2024-complain of nausea vomiting, no abdominal pain, no fever, no shortness for breath,    Case was discussed with patient's nurse and  on the floor.    Objective/physical exam:  General: In no acute distress, afebrile  Chest: Clear to auscultation bilaterally  Heart: RRR, +S1, S2, no appreciable murmur  Abdomen: Soft, nontender, BS +  MSK: Warm, no lower extremity edema, no clubbing or cyanosis  Neurologic: Alert and oriented x4, Cranial nerve II-XII intact, Strength 5/5 in all 4 extremities    VITAL SIGNS: 24 HRS MIN & MAX LAST   Temp  Min: 97.5 °F (36.4 °C)  Max: 99.9 °F (37.7 °C) 98.8 °F (37.1 °C)   BP  Min: 98/79  Max: 230/119 (!) 155/88   Pulse  Min: 77  Max: 131  92   Resp  Min: 2  Max: 83 19   SpO2  Min: 81 %  Max: 100 % 100 %     I have reviewed the following labs:  Recent Labs   Lab 03/17/24  1355 03/18/24  1050  03/19/24 0418   WBC 11.09 10.69 10.14   RBC 2.55* 2.42* 1.92*   HGB 7.4* 7.0* 5.5*   HCT 24.5* 24.4* 17.1*   MCV 96.1* 100.8* 89.1   MCH 29.0 28.9 28.6   MCHC 30.2* 28.7* 32.2*   RDW 16.6 16.7 16.8   * 596* 476*   MPV 9.1 8.9 8.6     Recent Labs   Lab 03/17/24  1355 03/17/24  1438 03/17/24  2034 03/18/24  1049 03/18/24  1050 03/19/24  0017 03/19/24  0418 03/19/24  0753   *  --    < >  --    < > 135* 133* 136   K 4.7  --    < >  --    < > 3.8 3.2* 3.5   CO2 19*  --    < >  --    < > 22 23 21*   BUN 19.0*  --    < >  --    < > 14.0 12.0 12.0   CREATININE 1.60*  --    < >  --    < > 1.02 0.92 1.00   CALCIUM 8.3*  --    < >  --    < > 7.9* 7.5* 8.4   PH  --  7.424  --  7.328*  --   --   --   --    MG 1.70  --   --   --   --   --  1.40*  --    ALBUMIN 3.0*  --   --   --   --   --  2.2*  --    ALKPHOS 77  --   --   --   --   --  53  --    ALT 13  --   --   --   --   --  8  --    AST 17  --   --   --   --   --  15  --    BILITOT 0.5  --   --   --   --   --  0.3  --     < > = values in this interval not displayed.     Microbiology Results (last 7 days)       ** No results found for the last 168 hours. **             See below for Radiology    Scheduled Med:   amitriptyline  25 mg Oral Nightly    methyl salicylate-menthol 15-10%   Topical (Top) TID    metoprolol tartrate  50 mg Oral BID    mupirocin   Nasal BID    pantoprazole  40 mg Oral Daily    sodium chloride 0.9%  10 mL Intravenous Q8H      Continuous Infusions:   dextrose 5 % and 0.45 % NaCl 125 mL/hr at 03/19/24 0608    insulin regular 1 units/mL infusion orderable (DKA) 0.02 Units/kg/hr (03/19/24 0608)      PRN Meds:  0.9%  NaCl infusion (for blood administration), acetaminophen, acetaminophen, dextrose 10 % in water (D10W), dextrose 10 % in water (D10W), dextrose 10%, dextrose 10%, dextrose 5 % and 0.45 % NaCl, hydrALAZINE, HYDROcodone-acetaminophen, ketorolac, naloxone, ondansetron, sodium chloride 0.9%, sodium chloride 0.9%      Assessment/Plan:  Diabetic ketoacidosis   Diabetic gastroparesis   Acute renal failure   anemia   Hypertension     Continue IV insulin drip   Continue hydration with D5 half-normal saline 125 cc/hour   Continue Protonix for GI prophylaxis   Check CBC BMP in a.m.  Critical care time 30 minutes      Patient condition:  Stable/Fair/Guarded/ Serious/ Critical    Anticipated discharge and Disposition:         All diagnosis and differential diagnosis have been reviewed; assessment and plan has been documented; I have personally reviewed the labs and test results that are presently available; I have reviewed the patients medication list; I have reviewed the consulting providers response and recommendations. I have reviewed or attempted to review medical records based upon their availability    All of the patient's questions have been  addressed and answered. Patient's is agreeable to the above stated plan. I will continue to monitor closely and make adjustments to medical management as needed.  _____________________________________________________________________    Nutrition Status:    Radiology:  I have personally reviewed the following imaging and agree with the radiologist.     X-Ray Chest 1 View  Narrative: EXAMINATION:  XR CHEST 1 VIEW    CLINICAL HISTORY:  , Encounter for adjustment and management of vascular access device.    COMPARISON:  02/20/2024    FINDINGS:  An AP view or more reveals the heart to be borderline enlarged.  The trachea is midline.  There is interim placement of a right central line with the tip superimposed over the SVC.  No infiltrate or effusion is seen.  There is slight elevation of the right hemidiaphragm.  Bony structures appear grossly intact.  Impression: 1. No active cardiopulmonary disease identified  2. Interim placement right central line    Electronically signed by: Danilo Navarro  Date:    03/18/2024  Time:    14:15      Pricilla Vaughan MD  Department of Hospital Medicine    [facility]   03/19/2024

## 2024-03-19 NOTE — NURSING
Informed will transfer to a private room after Insulin drip Discontinued and she able to continue to eat and not vomit.    1730 complained of nausea requested a bag to vomit. Bag given. Small amount clear liquid noted in bag. No undigested food noted.    1740 Called to room reports jaw is locked. Drooling saliva from corners of her mouth. Reported Benadryl helps.   notified. Benadryl ordered.    1747 Benadryl 25mg IV given.    1800 Able to speak moving jaw, no longer drooling. Reports feeling better

## 2024-03-19 NOTE — PLAN OF CARE
Problem: Adult Inpatient Plan of Care  Goal: Plan of Care Review  Outcome: Ongoing, Progressing  Goal: Patient-Specific Goal (Individualized)  Outcome: Ongoing, Progressing  Goal: Absence of Hospital-Acquired Illness or Injury  Outcome: Ongoing, Progressing  Goal: Optimal Comfort and Wellbeing  Outcome: Ongoing, Progressing  Goal: Readiness for Transition of Care  Outcome: Ongoing, Progressing     Problem: Diabetes Comorbidity  Goal: Blood Glucose Level Within Targeted Range  Outcome: Ongoing, Progressing     Problem: Infection  Goal: Absence of Infection Signs and Symptoms  Outcome: Ongoing, Progressing     Problem: Pain Acute  Goal: Acceptable Pain Control and Functional Ability  Outcome: Ongoing, Progressing     Problem: Dysrhythmia  Goal: Normalized Cardiac Rhythm  Outcome: Ongoing, Progressing     Problem: Anemia  Goal: Anemia Symptom Improvement  Outcome: Ongoing, Progressing     Problem: Malnutrition  Goal: Improved Nutritional Intake  Outcome: Ongoing, Progressing

## 2024-03-19 NOTE — NURSING
On the BSC, requested I do CBG for 130pm when she was done and back in bed. CBG done late due to patient request.

## 2024-03-20 VITALS
TEMPERATURE: 98 F | HEIGHT: 62 IN | HEART RATE: 91 BPM | SYSTOLIC BLOOD PRESSURE: 128 MMHG | RESPIRATION RATE: 18 BRPM | WEIGHT: 137 LBS | BODY MASS INDEX: 25.21 KG/M2 | OXYGEN SATURATION: 100 % | DIASTOLIC BLOOD PRESSURE: 76 MMHG

## 2024-03-20 LAB
ANION GAP SERPL CALC-SCNC: 5 MEQ/L
BASOPHILS # BLD AUTO: 0.04 X10(3)/MCL
BASOPHILS NFR BLD AUTO: 0.5 %
BUN SERPL-MCNC: 5 MG/DL (ref 7–18.7)
CALCIUM SERPL-MCNC: 7.6 MG/DL (ref 8.4–10.2)
CHLORIDE SERPL-SCNC: 109 MMOL/L (ref 98–107)
CO2 SERPL-SCNC: 22 MMOL/L (ref 22–29)
CREAT SERPL-MCNC: 0.9 MG/DL (ref 0.55–1.02)
CREAT/UREA NIT SERPL: 6
EOSINOPHIL # BLD AUTO: 0.28 X10(3)/MCL (ref 0–0.9)
EOSINOPHIL NFR BLD AUTO: 3.7 %
ERYTHROCYTE [DISTWIDTH] IN BLOOD BY AUTOMATED COUNT: 15.6 % (ref 11.5–17)
GFR SERPLBLD CREATININE-BSD FMLA CKD-EPI: >60 MLS/MIN/1.73/M2
GLUCOSE SERPL-MCNC: 173 MG/DL (ref 74–100)
HCT VFR BLD AUTO: 27.9 % (ref 37–47)
HGB BLD-MCNC: 9.3 G/DL (ref 12–16)
IMM GRANULOCYTES # BLD AUTO: 0.08 X10(3)/MCL (ref 0–0.04)
IMM GRANULOCYTES NFR BLD AUTO: 1.1 %
LYMPHOCYTES # BLD AUTO: 2.38 X10(3)/MCL (ref 0.6–4.6)
LYMPHOCYTES NFR BLD AUTO: 31.6 %
MCH RBC QN AUTO: 29.5 PG (ref 27–31)
MCHC RBC AUTO-ENTMCNC: 33.3 G/DL (ref 33–36)
MCV RBC AUTO: 88.6 FL (ref 80–94)
MONOCYTES # BLD AUTO: 0.86 X10(3)/MCL (ref 0.1–1.3)
MONOCYTES NFR BLD AUTO: 11.4 %
NEUTROPHILS # BLD AUTO: 3.88 X10(3)/MCL (ref 2.1–9.2)
NEUTROPHILS NFR BLD AUTO: 51.7 %
PLATELET # BLD AUTO: 406 X10(3)/MCL (ref 130–400)
PMV BLD AUTO: 8 FL (ref 7.4–10.4)
POCT GLUCOSE: 162 MG/DL (ref 70–110)
POCT GLUCOSE: 316 MG/DL (ref 70–110)
POTASSIUM SERPL-SCNC: 3.4 MMOL/L (ref 3.5–5.1)
RBC # BLD AUTO: 3.15 X10(6)/MCL (ref 4.2–5.4)
SODIUM SERPL-SCNC: 136 MMOL/L (ref 136–145)
WBC # SPEC AUTO: 7.52 X10(3)/MCL (ref 4.5–11.5)

## 2024-03-20 PROCEDURE — 25000003 PHARM REV CODE 250: Performed by: INTERNAL MEDICINE

## 2024-03-20 PROCEDURE — A4216 STERILE WATER/SALINE, 10 ML: HCPCS | Performed by: INTERNAL MEDICINE

## 2024-03-20 PROCEDURE — 97161 PT EVAL LOW COMPLEX 20 MIN: CPT

## 2024-03-20 PROCEDURE — 63600175 PHARM REV CODE 636 W HCPCS: Performed by: INTERNAL MEDICINE

## 2024-03-20 PROCEDURE — C1751 CATH, INF, PER/CENT/MIDLINE: HCPCS

## 2024-03-20 PROCEDURE — 94761 N-INVAS EAR/PLS OXIMETRY MLT: CPT

## 2024-03-20 PROCEDURE — A4216 STERILE WATER/SALINE, 10 ML: HCPCS | Performed by: EMERGENCY MEDICINE

## 2024-03-20 PROCEDURE — 80048 BASIC METABOLIC PNL TOTAL CA: CPT | Performed by: INTERNAL MEDICINE

## 2024-03-20 PROCEDURE — 25000003 PHARM REV CODE 250: Performed by: EMERGENCY MEDICINE

## 2024-03-20 PROCEDURE — 85025 COMPLETE CBC W/AUTO DIFF WBC: CPT | Performed by: INTERNAL MEDICINE

## 2024-03-20 RX ORDER — LIDOCAINE 50 MG/G
1 PATCH TOPICAL ONCE
Status: DISCONTINUED | OUTPATIENT
Start: 2024-03-20 | End: 2024-03-20 | Stop reason: HOSPADM

## 2024-03-20 RX ORDER — POTASSIUM CHLORIDE 20 MEQ/1
40 TABLET, EXTENDED RELEASE ORAL ONCE
Status: COMPLETED | OUTPATIENT
Start: 2024-03-20 | End: 2024-03-20

## 2024-03-20 RX ORDER — METOCLOPRAMIDE 10 MG/1
10 TABLET ORAL ONCE
Status: COMPLETED | OUTPATIENT
Start: 2024-03-20 | End: 2024-03-20

## 2024-03-20 RX ORDER — METOCLOPRAMIDE 10 MG/1
10 TABLET ORAL 4 TIMES DAILY
Qty: 30 TABLET | Refills: 0 | Status: SHIPPED | OUTPATIENT
Start: 2024-03-20

## 2024-03-20 RX ADMIN — DIPHENHYDRAMINE HYDROCHLORIDE 25 MG: 50 INJECTION INTRAMUSCULAR; INTRAVENOUS at 06:03

## 2024-03-20 RX ADMIN — PANTOPRAZOLE SODIUM 40 MG: 40 TABLET, DELAYED RELEASE ORAL at 09:03

## 2024-03-20 RX ADMIN — INSULIN ASPART 2 UNITS: 100 INJECTION, SOLUTION INTRAVENOUS; SUBCUTANEOUS at 06:03

## 2024-03-20 RX ADMIN — METOCLOPRAMIDE 10 MG: 10 TABLET ORAL at 12:03

## 2024-03-20 RX ADMIN — DIPHENHYDRAMINE HYDROCHLORIDE 25 MG: 50 INJECTION INTRAMUSCULAR; INTRAVENOUS at 10:03

## 2024-03-20 RX ADMIN — SODIUM CHLORIDE: 9 INJECTION, SOLUTION INTRAVENOUS at 03:03

## 2024-03-20 RX ADMIN — POTASSIUM CHLORIDE 40 MEQ: 1500 TABLET, EXTENDED RELEASE ORAL at 12:03

## 2024-03-20 RX ADMIN — INSULIN ASPART 8 UNITS: 100 INJECTION, SOLUTION INTRAVENOUS; SUBCUTANEOUS at 11:03

## 2024-03-20 RX ADMIN — ONDANSETRON 4 MG: 2 INJECTION INTRAMUSCULAR; INTRAVENOUS at 09:03

## 2024-03-20 RX ADMIN — HYDROCODONE BITARTRATE AND ACETAMINOPHEN 1 TABLET: 5; 325 TABLET ORAL at 09:03

## 2024-03-20 RX ADMIN — MUSCLE RUB CREAM: 100; 150 CREAM TOPICAL at 09:03

## 2024-03-20 RX ADMIN — Medication 10 ML: at 01:03

## 2024-03-20 RX ADMIN — LIDOCAINE 5% 1 PATCH: 700 PATCH TOPICAL at 10:03

## 2024-03-20 RX ADMIN — DIPHENHYDRAMINE HYDROCHLORIDE 25 MG: 50 INJECTION INTRAMUSCULAR; INTRAVENOUS at 02:03

## 2024-03-20 RX ADMIN — Medication 10 ML: at 12:03

## 2024-03-20 RX ADMIN — METOPROLOL TARTRATE 50 MG: 50 TABLET, FILM COATED ORAL at 09:03

## 2024-03-20 RX ADMIN — KETOROLAC TROMETHAMINE 30 MG: 30 INJECTION, SOLUTION INTRAMUSCULAR; INTRAVENOUS at 12:03

## 2024-03-20 RX ADMIN — KETOROLAC TROMETHAMINE 30 MG: 30 INJECTION, SOLUTION INTRAMUSCULAR; INTRAVENOUS at 06:03

## 2024-03-20 RX ADMIN — MUPIROCIN 1 G: 20 OINTMENT TOPICAL at 09:03

## 2024-03-20 NOTE — DISCHARGE SUMMARY
Hospital Medicine discharge summary      Chief Complaint: Inpatient Follow-up for     HPI:   Ms. Husain is a 28-year-old female with type 1 diabetes and history of very poor compliance with medication and diet who presented to the ED this morning with complaints of back pain, nausea/vomiting and elevated blood sugar.  Evaluation in the ED showed her to be in DKA.  She received 2 L crystalloid as IV boluses and currently on  mL/hour.  Central line was placed in the ED due to poor IV access.  Ms. Husain frequently presents to the ED ID of complaints.  Much of the time her blood sugars are quite elevated.  She presented yesterday with a primary complaint of left breast pain.  She had been seen on a prior visit diagnosed with mastitis and started on antibiotics.  Her serum bicarb yesterday was 19, sugar near 800.  She was given IV fluids , IV insulin, symptomatic meds and subsequently discharged home.  Labs prior to discharge showed her serum bicarb to be 21, AG 10, gluc 259.  On presentation today serum bicarb was 11, AG 22,     March 19, 2024-complain of nausea vomiting, no abdominal pain, no fever, no shortness for breath,    March 20, 2024-a bicarb is stable 22, a CBC is stable, I had a long conversation with the patient about the importance of insulin regimen     Patient can go home today and have a follow-up with the primary care doctor     I gave the patient Reglan prescription for gastroparesis    Case was discussed with patient's nurse and  on the floor.    Objective/physical exam:  General: In no acute distress, afebrile  Chest: Clear to auscultation bilaterally  Heart: RRR, +S1, S2, no appreciable murmur  Abdomen: Soft, nontender, BS +  MSK: Warm, no lower extremity edema, no clubbing or cyanosis  Neurologic: Alert and oriented x4, Cranial nerve II-XII intact, Strength 5/5 in all 4 extremities    VITAL SIGNS: 24 HRS MIN & MAX LAST   Temp  Min: 97.3 °F (36.3 °C)  Max: 99.4 °F (37.4 °C)  98.4 °F (36.9 °C)   BP  Min: 117/89  Max: 180/99 (!) 180/99   Pulse  Min: 77  Max: 99  99   Resp  Min: 12  Max: 22 20   SpO2  Min: 99 %  Max: 100 % 99 %     I have reviewed the following labs:  Recent Labs   Lab 03/18/24  1050 03/19/24  0418 03/19/24 1915 03/20/24  0623   WBC 10.69 10.14  --  7.52   RBC 2.42* 1.92*  --  3.15*   HGB 7.0* 5.5* 9.9* 9.3*   HCT 24.4* 17.1* 29.7* 27.9*   .8* 89.1  --  88.6   MCH 28.9 28.6  --  29.5   MCHC 28.7* 32.2*  --  33.3   RDW 16.7 16.8  --  15.6   * 476*  --  406*   MPV 8.9 8.6  --  8.0       Recent Labs   Lab 03/17/24  1355 03/17/24  1438 03/17/24  2034 03/18/24  1049 03/18/24  1050 03/19/24  0418 03/19/24  0753 03/19/24  1610 03/19/24 1915 03/20/24  0623   *  --    < >  --    < > 133*   < > 133* 133* 136   K 4.7  --    < >  --    < > 3.2*   < > 3.6 3.6 3.4*   CO2 19*  --    < >  --    < > 23   < > 21* 21* 22   BUN 19.0*  --    < >  --    < > 12.0   < > 7.0 7.0 5.0*   CREATININE 1.60*  --    < >  --    < > 0.92   < > 0.96 1.04* 0.90   CALCIUM 8.3*  --    < >  --    < > 7.5*   < > 7.8* 8.1* 7.6*   PH  --  7.424  --  7.328*  --   --   --   --   --   --    MG 1.70  --   --   --   --  1.40*  --   --   --   --    ALBUMIN 3.0*  --   --   --   --  2.2*  --   --   --   --    ALKPHOS 77  --   --   --   --  53  --   --   --   --    ALT 13  --   --   --   --  8  --   --   --   --    AST 17  --   --   --   --  15  --   --   --   --    BILITOT 0.5  --   --   --   --  0.3  --   --   --   --     < > = values in this interval not displayed.       Microbiology Results (last 7 days)       ** No results found for the last 168 hours. **             See below for Radiology    Scheduled Med:   amitriptyline  25 mg Oral Nightly    LIDOcaine  1 patch Transdermal Once    methyl salicylate-menthol 15-10%   Topical (Top) TID    metoclopramide  10 mg Intravenous Q6H    metoprolol tartrate  50 mg Oral BID    mupirocin   Nasal BID    pantoprazole  40 mg Oral Daily    potassium chloride   40 mEq Oral Once    sodium chloride 0.9%  10 mL Intravenous Q8H      Continuous Infusions:   sodium chloride 0.9% 125 mL/hr at 03/20/24 0350      PRN Meds:  acetaminophen, acetaminophen, dextrose 10 % in water (D10W), dextrose 10 % in water (D10W), dextrose 10%, dextrose 10%, dextrose 50%, dextrose 50%, diphenhydrAMINE, glucagon (human recombinant), hydrALAZINE, HYDROcodone-acetaminophen, insulin aspart U-100, naloxone, ondansetron, sodium chloride 0.9%, sodium chloride 0.9%     Discharge diagnosis  Diabetic ketoacidosis   Diabetic gastroparesis   Acute renal failure   anemia   Hypertension       Discharge condition-stable     Discharge destination-home-discharge time 30 minutes          Patient condition:  Stable/Fair/Guarded/ Serious/ Critical    Anticipated discharge and Disposition:         All diagnosis and differential diagnosis have been reviewed; assessment and plan has been documented; I have personally reviewed the labs and test results that are presently available; I have reviewed the patients medication list; I have reviewed the consulting providers response and recommendations. I have reviewed or attempted to review medical records based upon their availability    All of the patient's questions have been  addressed and answered. Patient's is agreeable to the above stated plan. I will continue to monitor closely and make adjustments to medical management as needed.  _____________________________________________________________________    Nutrition Status:    Radiology:  I have personally reviewed the following imaging and agree with the radiologist.     X-Ray Chest 1 View  Narrative: EXAMINATION:  XR CHEST 1 VIEW    CLINICAL HISTORY:  , Encounter for adjustment and management of vascular access device.    COMPARISON:  02/20/2024    FINDINGS:  An AP view or more reveals the heart to be borderline enlarged.  The trachea is midline.  There is interim placement of a right central line with the tip  superimposed over the SVC.  No infiltrate or effusion is seen.  There is slight elevation of the right hemidiaphragm.  Bony structures appear grossly intact.  Impression: 1. No active cardiopulmonary disease identified  2. Interim placement right central line    Electronically signed by: Danilo Navarro  Date:    03/18/2024  Time:    14:15      Pricilla Vaughan MD  Department of Hospital Medicine   [facility]   03/20/2024

## 2024-03-20 NOTE — NURSING
Bed side report done with zulema rn,pt transferred from icu to [facility] floor in stable condition

## 2024-03-20 NOTE — PT/OT/SLP EVAL
Physical Therapy Evaluation & Discharge Summary    Patient Name:  Dorene Husain   MRN:  [id number]    Recommendations:     Discharge Recommendations: No Therapy Indicated   Discharge Equipment Recommendations: none   Barriers to discharge: None    Assessment:     Dorene Husain is a 28 y.o. female admitted with a medical diagnosis of Diabetic ketoacidosis without coma associated with type 1 diabetes mellitus.  She presents with the following impairments/functional limitations:   none.    Rehab Prognosis:  PT not indicated per eval ; pt already reached maximum level of function/returned to baseline function I amb.    Recent Surgery: * No surgery found *      Plan:     During this hospitalization, patient to be seen   to address the identified rehab impairments via   and progress toward the following goals:    Plan of Care Expires:       Subjective     Chief Complaint: wanting to go home  Patient/Family Comments/goals: go home today  Pain/Comfort:       Patients cultural, spiritual, Orthodoxy conflicts given the current situation:      Living Environment:  Pt lives at home  Prior to admission, patients level of function was I amb.  Equipment used at home: none.  DME owned (not currently used): none.  Upon discharge, patient will have assistance from family.    Objective:     Communicated with pt and nurse prior to session.  Patient found HOB elevated with    upon PT entry to room.    General Precautions: Standard,    Orthopedic Precautions:    Braces:    Respiratory Status: Room air    Exams:  RLE ROM: WFL  LLE ROM: WFL    Functional Mobility:  Bed Mobility:     Supine to Sit: independence  Transfers:     Sit to Stand:  independence with no AD  Gait: amb 100ft Indep no AD      AM-PAC 6 CLICK MOBILITY  Total Score:        Treatment & Education:  Pt with no functional deficit.  No further PT indicated.  DC PT.    Patient left HOB elevated with all lines intact, call button in reach, and nurse notified.    GOALS: NA Patient  is Eval only.  Multidisciplinary Problems       Physical Therapy Goals       Not on file                    History:     Past Medical History:   Diagnosis Date    Diabetes mellitus     Diabetic gastroparesis associated with type 1 diabetes mellitus     DKA (diabetic ketoacidosis)     Hypertension     Non compliance with medical treatment        Past Surgical History:   Procedure Laterality Date    CHOLECYSTECTOMY      ESOPHAGOGASTRODUODENOSCOPY      Multiple    None         Time Tracking:     PT Received On: 03/20/24  PT Start Time: 1415     PT Stop Time: 1430  PT Total Time (min): 15 min     Billable Minutes: Evaluation 15      03/20/2024

## 2024-03-20 NOTE — PLAN OF CARE
03/20/24 1112   Final Note   Assessment Type Final Discharge Note   Anticipated Discharge Disposition Home   Post-Acute Status   Discharge Delays None known at this time     D/c home.  No needs.

## 2024-03-20 NOTE — PLAN OF CARE
Problem: Adult Inpatient Plan of Care  Goal: Plan of Care Review  Outcome: Ongoing, Progressing  Goal: Patient-Specific Goal (Individualized)  Outcome: Ongoing, Progressing  Goal: Absence of Hospital-Acquired Illness or Injury  Outcome: Ongoing, Progressing  Goal: Optimal Comfort and Wellbeing  Outcome: Ongoing, Progressing  Goal: Readiness for Transition of Care  Outcome: Ongoing, Progressing     Problem: Diabetes Comorbidity  Goal: Blood Glucose Level Within Targeted Range  Outcome: Ongoing, Progressing     Problem: Infection  Goal: Absence of Infection Signs and Symptoms  Outcome: Ongoing, Progressing     Problem: Pain Acute  Goal: Acceptable Pain Control and Functional Ability  Outcome: Ongoing, Progressing     Problem: Dysrhythmia  Goal: Normalized Cardiac Rhythm  Outcome: Ongoing, Progressing     Problem: Anemia  Goal: Anemia Symptom Improvement  Outcome: Ongoing, Progressing     Problem: Malnutrition  Goal: Improved Nutritional Intake  Outcome: Ongoing, Progressing     Problem: Hypertension Acute  Goal: Blood Pressure Within Desired Range  Outcome: Ongoing, Progressing

## 2024-03-20 NOTE — PLAN OF CARE
Problem: Adult Inpatient Plan of Care  Goal: Plan of Care Review  3/20/2024 1423 by Olga Guidry LPN  Outcome: Met  3/20/2024 1224 by Olga Guidry LPN  Outcome: Ongoing, Progressing  Goal: Patient-Specific Goal (Individualized)  3/20/2024 1423 by Olga Guidry LPN  Outcome: Met  3/20/2024 1224 by Olga Guidry LPN  Outcome: Ongoing, Progressing  Goal: Absence of Hospital-Acquired Illness or Injury  3/20/2024 1423 by Olga Guidry LPN  Outcome: Met  3/20/2024 1224 by Olga Guidry LPN  Outcome: Ongoing, Progressing  Goal: Optimal Comfort and Wellbeing  3/20/2024 1423 by Olga Guidry LPN  Outcome: Met  3/20/2024 1224 by Olga Guidry LPN  Outcome: Ongoing, Progressing  Goal: Readiness for Transition of Care  3/20/2024 1423 by Olga Guidry LPN  Outcome: Met  3/20/2024 1224 by Olga Guidry LPN  Outcome: Ongoing, Progressing     Problem: Diabetes Comorbidity  Goal: Blood Glucose Level Within Targeted Range  3/20/2024 1423 by Olga Guidry LPN  Outcome: Met  3/20/2024 1224 by Olga Guidry LPN  Outcome: Ongoing, Progressing     Problem: Infection  Goal: Absence of Infection Signs and Symptoms  3/20/2024 1423 by Olga Guidry LPN  Outcome: Met  3/20/2024 1224 by Olga Guidry LPN  Outcome: Ongoing, Progressing     Problem: Pain Acute  Goal: Acceptable Pain Control and Functional Ability  3/20/2024 1423 by Olga Guidry LPN  Outcome: Met  3/20/2024 1224 by Olga Guidry LPN  Outcome: Ongoing, Progressing     Problem: Dysrhythmia  Goal: Normalized Cardiac Rhythm  3/20/2024 1423 by Olga Guidry LPN  Outcome: Met  3/20/2024 1224 by Olga Guidry LPN  Outcome: Ongoing, Progressing     Problem: Anemia  Goal: Anemia Symptom Improvement  3/20/2024 1423 by Olga Guidry LPN  Outcome: Met  3/20/2024 1224 by Olga Guidry LPN  Outcome: Ongoing, Progressing     Problem: Malnutrition  Goal: Improved Nutritional Intake  3/20/2024 1423 by Brea  HARITHA Espinoza  Outcome: Met  3/20/2024 1224 by Olga Guidry LPN  Outcome: Ongoing, Progressing     Problem: Hypertension Acute  Goal: Blood Pressure Within Desired Range  3/20/2024 1423 by Olga Guidry LPN  Outcome: Met  3/20/2024 1224 by Olga Guidry LPN  Outcome: Ongoing, Progressing

## 2024-03-20 NOTE — PT/OT/SLP PROGRESS
Physical Therapy      Patient Name:  Dorene Husain   MRN:  [id number]    Patient not seen today at 1050 secondary to pt wants to wait until after lunch for PT . Notified nurse we will follow-up after lunch per request of patient.

## 2024-06-17 PROBLEM — E10.10 DIABETIC KETOACIDOSIS WITHOUT COMA ASSOCIATED WITH TYPE 1 DIABETES MELLITUS: Status: RESOLVED | Noted: 2023-10-02 | Resolved: 2024-06-17

## 2024-09-19 NOTE — DISCHARGE INSTRUCTIONS
Take medicines as prescribed    See your family doctor in one to 2 days for further evaluation, workup, and treatment as necessary    Avoid driving or operating machinery while taking medicines as some medicines might cause drowsiness and may cause problems. Also pain medicines have potential of being addictive  so use Pain meds specially Narcotics Sparingly.    The exam and treatment you received in Emergency Room was for an urgent problem and NOT INTENDED AS COMPLETE CARE. It is important that you FOLLOW UP with a doctor for ongoing care. If your symptoms become WORSE or you DO NOT IMPROVE and you are unable to reach your health care provider, you should RETURN to the emergency department. The Emergency Room doctor has provided a PRELIMINARY INTERPRETATION of all your STUDIES. A final interpretation may be done after you are discharged. IF A CHANGE in your diagnosis or treatment is needed WE WILL CONTACT YOU. It is critical that we have a CURRENT PHONE NUMBER FOR YOU.      No finding selected

## 2025-05-18 ENCOUNTER — HOSPITAL ENCOUNTER (EMERGENCY)
Facility: HOSPITAL | Age: 30
Discharge: HOME | End: 2025-05-18
Attending: EMERGENCY MEDICINE
Payer: COMMERCIAL

## 2025-05-18 VITALS
HEART RATE: 101 BPM | BODY MASS INDEX: 29.86 KG/M2 | RESPIRATION RATE: 18 BRPM | HEIGHT: 62 IN | TEMPERATURE: 99 F | SYSTOLIC BLOOD PRESSURE: 182 MMHG | WEIGHT: 162.25 LBS | DIASTOLIC BLOOD PRESSURE: 122 MMHG | OXYGEN SATURATION: 98 %

## 2025-05-18 DIAGNOSIS — M79.18 MYOFASCIAL PAIN: Primary | ICD-10-CM

## 2025-05-18 PROCEDURE — 63600175 PHARM REV CODE 636 W HCPCS: Performed by: EMERGENCY MEDICINE

## 2025-05-18 PROCEDURE — 96361 HYDRATE IV INFUSION ADD-ON: CPT

## 2025-05-18 PROCEDURE — 96372 THER/PROPH/DIAG INJ SC/IM: CPT | Mod: 59 | Performed by: EMERGENCY MEDICINE

## 2025-05-18 PROCEDURE — 96374 THER/PROPH/DIAG INJ IV PUSH: CPT

## 2025-05-18 PROCEDURE — 99284 EMERGENCY DEPT VISIT MOD MDM: CPT | Mod: 25

## 2025-05-18 PROCEDURE — 96375 TX/PRO/DX INJ NEW DRUG ADDON: CPT

## 2025-05-18 RX ORDER — METHOCARBAMOL 750 MG/1
1500 TABLET, FILM COATED ORAL 3 TIMES DAILY
Qty: 30 TABLET | Refills: 0 | Status: SHIPPED | OUTPATIENT
Start: 2025-05-18 | End: 2025-05-18

## 2025-05-18 RX ORDER — DICLOFENAC SODIUM 75 MG/1
75 TABLET, DELAYED RELEASE ORAL 2 TIMES DAILY
Qty: 14 TABLET | Refills: 0 | Status: SHIPPED | OUTPATIENT
Start: 2025-05-18 | End: 2025-05-18

## 2025-05-18 RX ORDER — KETOROLAC TROMETHAMINE 30 MG/ML
30 INJECTION, SOLUTION INTRAMUSCULAR; INTRAVENOUS
Status: DISCONTINUED | OUTPATIENT
Start: 2025-05-18 | End: 2025-05-18 | Stop reason: HOSPADM

## 2025-05-18 RX ORDER — DICLOFENAC SODIUM 75 MG/1
75 TABLET, DELAYED RELEASE ORAL 2 TIMES DAILY
Qty: 14 TABLET | Refills: 0 | Status: SHIPPED | OUTPATIENT
Start: 2025-05-18

## 2025-05-18 RX ORDER — DEXAMETHASONE SODIUM PHOSPHATE 4 MG/ML
8 INJECTION, SOLUTION INTRA-ARTICULAR; INTRALESIONAL; INTRAMUSCULAR; INTRAVENOUS; SOFT TISSUE
Status: COMPLETED | OUTPATIENT
Start: 2025-05-18 | End: 2025-05-18

## 2025-05-18 RX ORDER — ORPHENADRINE CITRATE 30 MG/ML
60 INJECTION INTRAMUSCULAR; INTRAVENOUS
Status: COMPLETED | OUTPATIENT
Start: 2025-05-18 | End: 2025-05-18

## 2025-05-18 RX ORDER — NALBUPHINE HYDROCHLORIDE 10 MG/ML
5 INJECTION INTRAMUSCULAR; INTRAVENOUS; SUBCUTANEOUS ONCE
Qty: 0.5 ML | Refills: 0 | Status: SHIPPED | OUTPATIENT
Start: 2025-05-18 | End: 2025-05-18

## 2025-05-18 RX ORDER — DIAZEPAM 5 MG/1
5 TABLET ORAL EVERY 6 HOURS PRN
Qty: 8 TABLET | Refills: 0 | Status: SHIPPED | OUTPATIENT
Start: 2025-05-18

## 2025-05-18 RX ORDER — DEXAMETHASONE 6 MG/1
6 TABLET ORAL 2 TIMES DAILY WITH MEALS
Qty: 8 TABLET | Refills: 0 | Status: SHIPPED | OUTPATIENT
Start: 2025-05-18 | End: 2025-05-18 | Stop reason: ALTCHOICE

## 2025-05-18 RX ORDER — DIAZEPAM 10 MG/2ML
2 INJECTION INTRAMUSCULAR
Status: COMPLETED | OUTPATIENT
Start: 2025-05-18 | End: 2025-05-18

## 2025-05-18 RX ORDER — ONDANSETRON HYDROCHLORIDE 2 MG/ML
8 INJECTION, SOLUTION INTRAVENOUS
Status: COMPLETED | OUTPATIENT
Start: 2025-05-18 | End: 2025-05-18

## 2025-05-18 RX ORDER — METHOCARBAMOL 750 MG/1
1500 TABLET, FILM COATED ORAL 3 TIMES DAILY
Qty: 30 TABLET | Refills: 0 | Status: SHIPPED | OUTPATIENT
Start: 2025-05-18 | End: 2025-05-23

## 2025-05-18 RX ADMIN — ONDANSETRON 8 MG: 2 INJECTION INTRAMUSCULAR; INTRAVENOUS at 03:05

## 2025-05-18 RX ADMIN — SODIUM CHLORIDE, POTASSIUM CHLORIDE, SODIUM LACTATE AND CALCIUM CHLORIDE 1000 ML: 600; 310; 30; 20 INJECTION, SOLUTION INTRAVENOUS at 03:05

## 2025-05-18 RX ADMIN — ORPHENADRINE CITRATE 60 MG: 30 INJECTION, SOLUTION INTRAMUSCULAR; INTRAVENOUS at 03:05

## 2025-05-18 RX ADMIN — DIAZEPAM 2 MG: 5 INJECTION, SOLUTION INTRAMUSCULAR; INTRAVENOUS at 05:05

## 2025-05-18 RX ADMIN — DEXAMETHASONE SODIUM PHOSPHATE 8 MG: 4 INJECTION, SOLUTION INTRA-ARTICULAR; INTRALESIONAL; INTRAMUSCULAR; INTRAVENOUS; SOFT TISSUE at 03:05
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
